# Patient Record
Sex: FEMALE | Race: BLACK OR AFRICAN AMERICAN | Employment: FULL TIME | URBAN - METROPOLITAN AREA
[De-identification: names, ages, dates, MRNs, and addresses within clinical notes are randomized per-mention and may not be internally consistent; named-entity substitution may affect disease eponyms.]

---

## 2018-07-12 ENCOUNTER — HOSPITAL ENCOUNTER (EMERGENCY)
Age: 58
Discharge: HOME | End: 2018-07-12
Attending: EMERGENCY MEDICINE | Admitting: EMERGENCY MEDICINE
Payer: COMMERCIAL

## 2018-07-12 VITALS
HEIGHT: 62 IN | RESPIRATION RATE: 16 BRPM | DIASTOLIC BLOOD PRESSURE: 80 MMHG | SYSTOLIC BLOOD PRESSURE: 145 MMHG | HEART RATE: 85 BPM | TEMPERATURE: 97.8 F | BODY MASS INDEX: 36.12 KG/M2 | WEIGHT: 196.25 LBS | OXYGEN SATURATION: 99 %

## 2018-07-12 DIAGNOSIS — T78.40XA ALLERGIC REACTION, INITIAL ENCOUNTER: Primary | ICD-10-CM

## 2018-07-12 DIAGNOSIS — L50.9 URTICARIA: ICD-10-CM

## 2018-07-12 PROCEDURE — 74011000250 HC RX REV CODE- 250: Performed by: PHYSICIAN ASSISTANT

## 2018-07-12 PROCEDURE — 96361 HYDRATE IV INFUSION ADD-ON: CPT

## 2018-07-12 PROCEDURE — 74011250636 HC RX REV CODE- 250/636: Performed by: PHYSICIAN ASSISTANT

## 2018-07-12 PROCEDURE — 96375 TX/PRO/DX INJ NEW DRUG ADDON: CPT

## 2018-07-12 PROCEDURE — 99282 EMERGENCY DEPT VISIT SF MDM: CPT

## 2018-07-12 PROCEDURE — 96374 THER/PROPH/DIAG INJ IV PUSH: CPT

## 2018-07-12 RX ORDER — DIPHENHYDRAMINE HYDROCHLORIDE 50 MG/ML
50 INJECTION, SOLUTION INTRAMUSCULAR; INTRAVENOUS
Status: COMPLETED | OUTPATIENT
Start: 2018-07-12 | End: 2018-07-12

## 2018-07-12 RX ORDER — FAMOTIDINE 20 MG/1
20 TABLET, FILM COATED ORAL 2 TIMES DAILY
Qty: 20 TAB | Refills: 0 | Status: SHIPPED | OUTPATIENT
Start: 2018-07-12 | End: 2018-12-28

## 2018-07-12 RX ORDER — PREDNISONE 20 MG/1
60 TABLET ORAL DAILY
Qty: 15 TAB | Refills: 0 | Status: SHIPPED | OUTPATIENT
Start: 2018-07-12 | End: 2018-07-17

## 2018-07-12 RX ORDER — HYDROXYZINE 50 MG/1
50 TABLET, FILM COATED ORAL
Qty: 20 TAB | Refills: 0 | Status: SHIPPED | OUTPATIENT
Start: 2018-07-12 | End: 2018-07-22

## 2018-07-12 RX ADMIN — METHYLPREDNISOLONE SODIUM SUCCINATE 125 MG: 125 INJECTION, POWDER, FOR SOLUTION INTRAMUSCULAR; INTRAVENOUS at 16:18

## 2018-07-12 RX ADMIN — DIPHENHYDRAMINE HYDROCHLORIDE 50 MG: 50 INJECTION, SOLUTION INTRAMUSCULAR; INTRAVENOUS at 16:18

## 2018-07-12 RX ADMIN — FAMOTIDINE 20 MG: 10 INJECTION, SOLUTION INTRAVENOUS at 16:18

## 2018-07-12 RX ADMIN — SODIUM CHLORIDE 1000 ML: 900 INJECTION, SOLUTION INTRAVENOUS at 16:17

## 2018-07-12 NOTE — ED PROVIDER NOTES
HPI Comments: 61 y.o. female with no significant past medical history who presents from home via private vehicle with chief complaint of rash. Pt reports a 2 day history of generalized hive-like rash with accompanying HA. Pt also notes 2 days of diarrhea, but states this has resolved. Pt states she was evaluated 2 years ago by an allergy specialist and since then has been taking Benadryl and Zantac, which has not been helping her rash. Pt denies any new medications, new soaps, or new lotions. Pt denies any sore throat, trouble swallowing, fevers, chills, nausea, vomiting, chest pain, abd pain, urinary sx, shortness of breath, headache, diarrhea, sweating or weight loss. There are no other acute medical concerns at this time. Social hx: Former smoker (Quit 2013); No EtOH use; Works at [employer]  PCP: Diamond Burns MD     Note written by Kait Troy, as dictated by Lilo Pineda PA-C 4:04 PM     The history is provided by the patient. No  was used. History reviewed. No pertinent past medical history. Past Surgical History:   Procedure Laterality Date    HX HYSTERECTOMY           History reviewed. No pertinent family history. Social History     Social History    Marital status: SINGLE     Spouse name: N/A    Number of children: N/A    Years of education: N/A     Occupational History    Not on file. Social History Main Topics    Smoking status: Former Smoker     Quit date: 3/6/2013    Smokeless tobacco: Not on file    Alcohol use No    Drug use: No    Sexual activity: Not on file     Other Topics Concern    Not on file     Social History Narrative         ALLERGIES: Lortab [hydrocodone-acetaminophen]    Review of Systems   Constitutional: Negative for appetite change, chills, fatigue and fever. HENT: Negative for congestion, ear pain, postnasal drip, rhinorrhea, sore throat and trouble swallowing. Eyes: Negative for visual disturbance.    Respiratory: Negative for cough, shortness of breath and wheezing. Cardiovascular: Negative for chest pain, palpitations and leg swelling. Gastrointestinal: Negative for abdominal pain, anal bleeding, constipation, diarrhea, nausea and vomiting. Genitourinary: Negative for dysuria and hematuria. Musculoskeletal: Negative for arthralgias and myalgias. Skin: Positive for color change and rash. Allergic/Immunologic: Negative for immunocompromised state. Neurological: Positive for headaches. Negative for weakness and light-headedness. Vitals:    07/12/18 1429   BP: 150/80   Pulse: (!) 120   Resp: 20   Temp: 99 °F (37.2 °C)   SpO2: 98%   Weight: 89 kg (196 lb 4 oz)   Height: 5' 2\" (1.575 m)            Physical Exam   Constitutional: She is oriented to person, place, and time. She appears well-developed and well-nourished. No distress. HENT:   Head: Normocephalic and atraumatic. Right Ear: External ear normal.   Left Ear: External ear normal.   Eyes: EOM are normal. Pupils are equal, round, and reactive to light. Neck: Neck supple. Cardiovascular: Normal rate, regular rhythm, normal heart sounds and intact distal pulses. Exam reveals no gallop and no friction rub. No murmur heard. Pulmonary/Chest: Effort normal and breath sounds normal. No stridor. No respiratory distress. She has no wheezes. She has no rales. She exhibits no tenderness. Abdominal: Soft. Bowel sounds are normal. She exhibits no distension and no mass. There is no tenderness. There is no rebound and no guarding. Musculoskeletal: Normal range of motion. She exhibits no edema, tenderness or deformity. Neurological: She is alert and oriented to person, place, and time. No cranial nerve deficit. Coordination normal.   Skin: Rash noted. No erythema. No pallor. Diffuse urticarial eruption to face, chest, back and extremities. + pruritic and blanching. Psychiatric: She has a normal mood and affect.  Her behavior is normal.   Nursing note and vitals reviewed. MDM  Number of Diagnoses or Management Options  Allergic reaction, initial encounter:   Urticaria:      Amount and/or Complexity of Data Reviewed  Review and summarize past medical records: yes  Independent visualization of images, tracings, or specimens: yes    Patient Progress  Patient progress: stable        ED Course       Procedures    4:25 PM  Discussed pt, sx, hx and current findings with Bridget Torrez MD. He is in agreement with plan. Will give steroids, pepcid and benadyrl with IV fluids. Jeferson Garcia. EVELIN Wolfe      6:30 PM  Pt states she is feeling much better after fluids and medications. Will discharge home with steroids, pepcid, and atarax. LABORATORY TESTS:  No results found for this or any previous visit (from the past 12 hour(s)). IMAGING RESULTS:    No results found. MEDICATIONS GIVEN:  Medications   sodium chloride 0.9 % bolus infusion 1,000 mL (0 mL IntraVENous IV Completed 7/12/18 1802)   diphenhydrAMINE (BENADRYL) injection 50 mg (50 mg IntraVENous Given 7/12/18 1618)   famotidine (PF) (PEPCID) 20 mg in sodium chloride 0.9% 10 mL injection (20 mg IntraVENous Given 7/12/18 1618)   methylPREDNISolone (PF) (SOLU-MEDROL) injection 125 mg (125 mg IntraVENous Given 7/12/18 1618)       IMPRESSION:  1. Allergic reaction, initial encounter    2. Urticaria        PLAN:  1. Discharge Medication List as of 7/12/2018  6:35 PM      START taking these medications    Details   famotidine (PEPCID) 20 mg tablet Take 1 Tab by mouth two (2) times a day., Print, Disp-20 Tab, R-0      hydrOXYzine HCl (ATARAX) 50 mg tablet Take 1 Tab by mouth three (3) times daily as needed for Itching for up to 10 days. , Print, Disp-20 Tab, R-0         CONTINUE these medications which have CHANGED    Details   predniSONE (DELTASONE) 20 mg tablet Take 3 Tabs by mouth daily for 5 days. , Print, Disp-15 Tab, R-0           2.    Follow-up Information     Follow up With Details Comments Contact Nicolás Palomo MD Schedule an appointment as soon as possible for a visit 2-4 days for recheck  [address] [facility] [phone]      your allergy doctor your saw before Schedule an appointment as soon as possible for a visit 2-4 days for recheck         Return to ED if worse       6:35 PM  Pt has been reexamined. Pt has no new complaints, changes or physical findings. Care plan outlined and precautions discussed. All available results were reviewed with pt. All medications were reviewed with pt. All of pt's questions and concerns were addressed. Pt agrees to F/U as instructed and agrees to return to ED upon further deterioration. Pt is ready to go home.   EDIN Valentine

## 2018-07-12 NOTE — LETTER
[address] 
[address] [place], [place] [address] [id number] 
[phone] Work/School Note Date: 7/12/2018 To Whom It May concern: 
 
Chloe Lockett was seen and treated today in the emergency room by the following provider(s): 
Attending Provider: Trina Campos MD 
Physician Assistant: EDIN Thomas. Chloe Lockett may return to work on 7/15/18 [facility] Sincerely, 
 
 
 
 
EDIN Thomas

## 2018-07-12 NOTE — DISCHARGE INSTRUCTIONS
Hives: Care Instructions  Your Care Instructions  Hives are raised, red, itchy patches of skin. They are also called wheals or welts. They usually have red borders and pale centers. Hives range in size from ¼ inch to 3 inches or more across. They may seem to move from place to place on the skin. Several hives may form a large area of raised, red skin. You can get hives after an insect sting, after taking medicine or eating certain foods, or because of infection or stress. Other causes include plants, things you breathe in, makeup, heat, cold, sunlight, and latex. You cannot spread hives to other people. Hives may last a few minutes or a few days, but a single spot may last less than 36 hours. Follow-up care is a key part of your treatment and safety. Be sure to make and go to all appointments, and call your doctor if you are having problems. It's also a good idea to know your test results and keep a list of the medicines you take. How can you care for yourself at home? · Avoid whatever you think may have caused your hives, such as a certain food or medicine. However, you may not know the cause. · Put a cool, wet towel on the area to relieve itching. · Take an over-the-counter antihistamine, such as diphenhydramine (Benadryl), cetirizine (Zyrtec), or loratadine (Claritin), to help stop the hives and calm the itching. Read and follow directions on the label. These medicines can make you feel sleepy. Do not drive while using them. · Stay away from strong soaps, detergents, and chemicals. These can make itching worse. When should you call for help? Call 911 anytime you think you may need emergency care. For example, call if:    · You have symptoms of a severe allergic reaction. These may include:  ¨ Sudden raised, red areas (hives) all over your body. ¨ Swelling of the throat, mouth, lips, or tongue. ¨ Trouble breathing. ¨ Passing out (losing consciousness).  Or you may feel very lightheaded or suddenly feel weak, confused, or restless.    Call your doctor now or seek immediate medical care if:    · You have symptoms of an allergic reaction, such as:  ¨ A rash or hives (raised, red areas on the skin). ¨ Itching. ¨ Swelling. ¨ Belly pain, nausea, or vomiting.     · You get hives after you start a new medicine.     · Hives have not gone away after 24 hours.    Watch closely for changes in your health, and be sure to contact your doctor if:    · You do not get better as expected. Where can you learn more? Go to http://carolinaAssemblagejose.info/. Enter N715 in the search box to learn more about \"Hives: Care Instructions. \"  Current as of: November 20, 2017  Content Version: 11.7  © 5489-6771 [employer]. Care instructions adapted under license by [employer] (which disclaims liability or warranty for this information). If you have questions about a medical condition or this instruction, always ask your healthcare professional. [place] [address] any warranty or liability for your use of this information. Allergic Reaction: Care Instructions  Your Care Instructions    An allergic reaction is an excessive response from your immune system to a medicine, chemical, food, insect bite, or other substance. A reaction can range from mild to life-threatening. Some people have a mild rash, hives, and itching or stomach cramps. In severe reactions, swelling of your tongue and throat can close up your airway so that you cannot breathe. Follow-up care is a key part of your treatment and safety. Be sure to make and go to all appointments, and call your doctor if you are having problems. It's also a good idea to know your test results and keep a list of the medicines you take. How can you care for yourself at home? · If you know what caused your allergic reaction, be sure to avoid it. Your allergy may become more severe each time you have a reaction.   · Take an over-the-counter antihistamine, such as cetirizine (Zyrtec) or loratadine (Claritin), to treat mild symptoms. Read and follow directions on the label. Some antihistamines can make you feel sleepy. Do not give antihistamines to a child unless you have checked with your doctor first. Mild symptoms include sneezing or an itchy or runny nose; an itchy mouth; a few hives or mild itching; and mild nausea or stomach discomfort. · Do not scratch hives or a rash. Put a cold, moist towel on them or take cool baths to relieve itching. Put ice packs on hives, swelling, or insect stings for 10 to 15 minutes at a time. Put a thin cloth between the ice pack and your skin. Do not take hot baths or showers. They will make the itching worse. · Your doctor may prescribe a shot of epinephrine to carry with you in case you have a severe reaction. Learn how to give yourself the shot and keep it with you at all times. Make sure it is not . · Go to the emergency room every time you have a severe reaction, even if you have used your shot of epinephrine and are feeling better. Symptoms can come back after a shot. · Wear medical alert jewelry that lists your allergies. You can buy this at most [employer]. · If your child has a severe allergy, make sure that his or her teachers, babysitters, coaches, and other caregivers know about the allergy. They should have an epinephrine shot, know how and when to give it, and have a plan to take your child to the hospital.  When should you call for help? Give an epinephrine shot if:    · You think you are having a severe allergic reaction.     · You have symptoms in more than one body area, such as mild nausea and an itchy mouth.    After giving an epinephrine shot call 911, even if you feel better.   Call 911 if:    · You have symptoms of a severe allergic reaction. These may include:  ¨ Sudden raised, red areas (hives) all over your body.   ¨ Swelling of the throat, mouth, lips, or tongue. ¨ Trouble breathing. ¨ Passing out (losing consciousness). Or you may feel very lightheaded or suddenly feel weak, confused, or restless.     · You have been given an epinephrine shot, even if you feel better.    Call your doctor now or seek immediate medical care if:    · You have symptoms of an allergic reaction, such as:  ¨ A rash or hives (raised, red areas on the skin). ¨ Itching. ¨ Swelling. ¨ Belly pain, nausea, or vomiting.    Watch closely for changes in your health, and be sure to contact your doctor if:    · You do not get better as expected. Where can you learn more? Go to http://carolina-jose.info/. Enter Z843 in the search box to learn more about \"Allergic Reaction: Care Instructions. \"  Current as of: October 6, 2017  Content Version: 11.7  © 8269-2875 [employer]. Care instructions adapted under license by [employer] (which disclaims liability or warranty for this information). If you have questions about a medical condition or this instruction, always ask your healthcare professional. [address] any warranty or liability for your use of this information.

## 2018-07-12 NOTE — ED NOTES
Patient given discharge instructions and prescriptions, all questions answered and patient verbalized understanding.   Patient ambulatory to ED Lobby

## 2018-07-12 NOTE — ED NOTES
2:28 PM  I have evaluated the patient as the Provider in Triage. I have reviewed Her vital signs and the triage nurse assessment. I have talked with the patient and any available family and advised that I am the provider in triage and have ordered the appropriate study to initiate their work up based on the clinical presentation during my assessment. I have advised that the patient will be accommodated in the Main ED as soon as possible. I have also requested to contact the triage nurse or myself immediately if the patient experiences any changes in their condition during this brief waiting period. Patient with hives for the past 3 days. No known trigger. Denies changes in soaps, lotions, detergents. States that she periodically breaks out in hives, this time is the worst. Has been taking benadryl, last dose last night. Sent by pcp office   States that yesterday both eyes were swollen as well as her lips.  Denies shortness of breath, drooling, inability to swallow, stridor  Jane Salas, JANIYA

## 2018-11-30 ENCOUNTER — HOSPITAL ENCOUNTER (OUTPATIENT)
Dept: MAMMOGRAPHY | Age: 58
Discharge: HOME | End: 2018-11-30
Attending: PHYSICIAN ASSISTANT
Payer: COMMERCIAL

## 2018-11-30 DIAGNOSIS — N63.20 LEFT BREAST LUMP: ICD-10-CM

## 2018-11-30 PROCEDURE — 76882 US LMTD JT/FCL EVL NVASC XTR: CPT

## 2018-11-30 PROCEDURE — 77066 DX MAMMO INCL CAD BI: CPT

## 2018-12-12 ENCOUNTER — HOSPITAL ENCOUNTER (OUTPATIENT)
Dept: MAMMOGRAPHY | Age: 58
Discharge: HOME | End: 2018-12-12
Attending: PHYSICIAN ASSISTANT
Payer: COMMERCIAL

## 2018-12-12 DIAGNOSIS — N63.20 LEFT BREAST LUMP: ICD-10-CM

## 2018-12-12 DIAGNOSIS — R92.8 ABNORMAL MAMMOGRAM OF LEFT BREAST: ICD-10-CM

## 2018-12-12 PROCEDURE — 88305 TISSUE EXAM BY PATHOLOGIST: CPT

## 2018-12-12 PROCEDURE — 88360 TUMOR IMMUNOHISTOCHEM/MANUAL: CPT

## 2018-12-12 PROCEDURE — 77030020004 US BX BREAST VAC LT 1ST LESION W/CLIP AND SPECIMEN

## 2018-12-12 PROCEDURE — 38505 NEEDLE BIOPSY LYMPH NODES: CPT

## 2018-12-12 PROCEDURE — 77030020004 US GUIDE BX BREAST LT LYMPH NODE

## 2018-12-12 PROCEDURE — 74011000250 HC RX REV CODE- 250: Performed by: RADIOLOGY

## 2018-12-12 PROCEDURE — 74011250636 HC RX REV CODE- 250/636: Performed by: RADIOLOGY

## 2018-12-12 RX ORDER — LIDOCAINE HYDROCHLORIDE AND EPINEPHRINE 10; 10 MG/ML; UG/ML
20 INJECTION, SOLUTION INFILTRATION; PERINEURAL ONCE
Status: COMPLETED | OUTPATIENT
Start: 2018-12-12 | End: 2018-12-12

## 2018-12-12 RX ORDER — LIDOCAINE HYDROCHLORIDE 10 MG/ML
5 INJECTION INFILTRATION; PERINEURAL
Status: COMPLETED | OUTPATIENT
Start: 2018-12-12 | End: 2018-12-12

## 2018-12-12 RX ADMIN — LIDOCAINE HYDROCHLORIDE AND EPINEPHRINE 200 MG: 10; 10 INJECTION, SOLUTION INFILTRATION; PERINEURAL at 10:35

## 2018-12-12 RX ADMIN — LIDOCAINE HYDROCHLORIDE 5 ML: 10 INJECTION, SOLUTION INFILTRATION; PERINEURAL at 10:35

## 2018-12-12 NOTE — PROGRESS NOTES
Patient tolerated left breast and left axilla biopsies well with scant bleeding. Biopsy site was bandaged in the usual fashion and discharge instructions were reviewed with the patient. She was provided with a written copy as well. Advised patient that results should be available by Friday and that she will receive a phone call in the afternoon. Encouraged her to call with any questions or concerns.

## 2018-12-12 NOTE — DISCHARGE INSTRUCTIONS
Breast Biopsy Discharge Instructions    PAIN CONTROL     You may have mild discomfort; take 1-2 Tylenol every 4-6 hours as needed.  Do not take aspirin containing products or anti-inflammatory medications (Advil, Aleve, Motrin, Ibuprofen, etc.) for 24 hours.  Wearing a comfortable bra for support may help with discomfort. WOUND CARE      A small amount of bleeding or bruising at the biopsy site is normal. Watch for signs and symptoms of infections: skin warm to touch, yellowish drainage from wound, fever or severe pain.  Place an ice pack over the site hourly, 20-30 at a time for a few hours today.  The clear dressing is water resistant (you may shower, but do not allow the dressing to become saturated). You may remove the dressing in 48 hours. The steri-strips (small pieces of tape covering the incision) will fall off on their own in a few days. If the clear dressing irritates your skin or begins to peel off, you may remove it. Remember, if you remove the clear dressing before 48 hours, you should not get the site wet.  If at any point you have EXCESSIVE BLEEDING (saturating the gauze under the clear dressing) OR pain please call:    Daytime 7:30am-5:00pm: [facility] [phone]    After Hours:    [phone] (ask to speak to a radiologist)              or [address] [place] [address] [phone]    ACTIVITY     Do not participate in any strenuous activities for 24 hours (exercise, sports, housework, etc.).  You may resume work (light duty only) for the first 24 hours.  No heavy lifting with the arm on the affected side of your body. ADDITIONAL INSTRUCTIONS    We will call you with results on Friday December 14 in the afternoon.       YOUR TREATMENT TEAM TODAY WAS    MD: Arabella Ash   RN: Chau Gutiérrez  Radiology Tech: Ching Reynolds

## 2018-12-14 NOTE — PROGRESS NOTES
Dr. Leonard Hodge called patient with pathology results. Called patient and provided appointment information. Appointment with Dr. Gurjit Hawk at [facility] on 12/28/18 at [phone], 1300 arrival time. Patient reports no concerns with the biopsy sites. Encouraged her to call with any questions or concerns.

## 2018-12-28 ENCOUNTER — OFFICE VISIT (OUTPATIENT)
Dept: SURGERY | Age: 58
End: 2018-12-28

## 2018-12-28 VITALS
BODY MASS INDEX: 35.88 KG/M2 | SYSTOLIC BLOOD PRESSURE: 160 MMHG | WEIGHT: 195 LBS | DIASTOLIC BLOOD PRESSURE: 92 MMHG | HEART RATE: 96 BPM | HEIGHT: 62 IN

## 2018-12-28 DIAGNOSIS — Z17.1 MALIGNANT NEOPLASM OF UPPER-OUTER QUADRANT OF LEFT BREAST IN FEMALE, ESTROGEN RECEPTOR NEGATIVE (HCC): Primary | ICD-10-CM

## 2018-12-28 DIAGNOSIS — C50.412 MALIGNANT NEOPLASM OF UPPER-OUTER QUADRANT OF LEFT BREAST IN FEMALE, ESTROGEN RECEPTOR NEGATIVE (HCC): Primary | ICD-10-CM

## 2018-12-28 PROBLEM — E66.01 SEVERE OBESITY (HCC): Status: ACTIVE | Noted: 2018-12-28

## 2018-12-28 RX ORDER — DIPHENHYDRAMINE HCL 25 MG
25 CAPSULE ORAL
COMMUNITY
End: 2019-08-23 | Stop reason: ALTCHOICE

## 2018-12-28 NOTE — PROGRESS NOTES
HISTORY OF PRESENT ILLNESS  Harrison Link is a 60 y.o. female. HPI NEW patient referral for consultation by Dr. Pauline mack for a new LEFT breast cancer diagnosis. The patient had a palpable LEFT breast lump that led to the breast imaging and biopsy. The patient denies any nipple inversion or discharge. She is accompanied by her daughter to discuss her surgical options . OB History     Obstetric Comments    Menarche 14, LMP 47 # of children 2, age of 4st delivery 22, Hysterectomy/oophorectomy yes/no, Breast bx yes left , history of breast feeding no, BCP yes, Hormone therapy no        Family history - no breast or ovarian cancer  Mammogram and ultrasound - 11/30/18   Ultrasound: Ultrasound of the axillary tail at 1:00 15 cm from the nipple  demonstrates a nonparallel round hypoechoic mass with indistinct margins  measuring 1.6 x 1.7 x 1.7 cm with no posterior features. Internal vascularity is  noted. There is a single irregularly thickened left axillary lymph node with  cortical thickness of 0.4 cm. Additional normal left axillary nodes are seen. The patient was scanned by the technologist and the physician.        IMPRESSION  IMPRESSION:     1. BI-RADS Assessment Category 5: Highly suggestive of malignancy- Appropriate  action should be taken. Left axillary tail mass, favored to be a primary breast  mass versus abnormal lymph node. Abnormal left axillary lymph node.     Recommendations:  Left breast and left axillary ultrasound guided biopsies.     Past Medical History:   Diagnosis Date    Menopause      Past Surgical History:   Procedure Laterality Date    HX HYSTERECTOMY       Social History     Socioeconomic History    Marital status: UNKNOWN     Spouse name: Not on file    Number of children: Not on file    Years of education: Not on file    Highest education level: Not on file   Social Needs    Financial resource strain: Not on file    Food insecurity - worry: Not on file    Food insecurity - inability: Not on file    Transportation needs - medical: Not on file   [place] needs - non-medical: Not on file   Occupational History    Not on file   Tobacco Use    Smoking status: Former Smoker     Last attempt to quit: 3/6/2013     Years since quittin.8    Smokeless tobacco: Never Used   Substance and Sexual Activity    Alcohol use: No    Drug use: No    Sexual activity: Not on file   Other Topics Concern    Not on file   Social History Narrative    Not on file     OB History     Obstetric Comments    Menarche 14, LMP 47 # of children 2, age of 4st delivery 22, Hysterectomy/oophorectomy yes/no, Breast bx yes left , history of breast feeding no, BCP yes, Hormone therapy no          Current Outpatient Medications:     diphenhydrAMINE (BENADRYL) 25 mg capsule, Take 25 mg by mouth every six (6) hours as needed. , Disp: , Rfl:   Allergies   Allergen Reactions    Lortab [Hydrocodone-Acetaminophen] Rash     Pt stated she is not allergic to it          Review of Systems   Constitutional: Negative. HENT: Negative. Eyes: Negative. Respiratory: Negative. Cardiovascular: Negative. Gastrointestinal: Negative. Genitourinary: Negative. Musculoskeletal: Positive for joint pain. Skin: Negative. Neurological: Negative. Endo/Heme/Allergies: Negative. Psychiatric/Behavioral: Negative. All other systems reviewed and are negative. Physical Exam   Constitutional: She is oriented to person, place, and time. She appears well-developed and well-nourished. HENT:   Head: Normocephalic. Eyes: EOM are normal.   Neck: Neck supple. No thyromegaly present. Cardiovascular: Normal rate, regular rhythm, normal heart sounds and intact distal pulses. Pulmonary/Chest: Effort normal and breath sounds normal. Right breast exhibits no inverted nipple, no mass, no nipple discharge, no skin change and no tenderness. Left breast exhibits mass (1.5 cm mass tail of left breast).  Left breast exhibits no inverted nipple, no nipple discharge, no skin change and no tenderness. Breasts are symmetrical.       Abdominal: Soft. Musculoskeletal: Normal range of motion. Lymphadenopathy:     She has no cervical adenopathy. She has no axillary adenopathy. Neurological: She is alert and oriented to person, place, and time. Skin: Skin is warm and dry. Psychiatric: She has a normal mood and affect. Nursing note and vitals reviewed. ASSESSMENT and PLAN    ICD-10-CM ICD-9-CM    1. Malignant neoplasm of upper-outer quadrant of left breast in female, estrogen receptor negative (HCC) C50.412 174.4     Z17.1 V86.1      59 yo female with left breast T1 (1.7 cm) N0 (node biopsy neg) idc triple negative. She is here with her daughter  I have reviewed the imaging and pathology with her and she was given copies of these reports. 90 minutes were spent face-to-face with the patient during this encounter and 90% of that time was spent on counseling and coordination of care. 1. Discussed lumpectomy and radiation vs mastectomy. Discussed reconstruction. MRI ordered to see if patient is a candidate for a lumpectomy. 2. Discussed sentinel lymph node biopsy. 3. Discussed external beam radiation. 4. Discussed hormone therapy. 5. Discussed the possibility of chemotherapy. Will schedule mri and refer to med onc since triple negative  She will need adjuvant chemo  Surgical plan is left us guided lumpectomy, sln biopsy, likely through same incision. biosorb placement  Will need xrt as well.    I will call her after breast mri

## 2018-12-28 NOTE — COMMUNICATION BODY
HISTORY OF PRESENT ILLNESS  Muna Lares is a 60 y.o. female. HPI NEW patient referral for consultation by Dr. Mariam mack for a new LEFT breast cancer diagnosis. The patient had a palpable LEFT breast lump that led to the breast imaging and biopsy. The patient denies any nipple inversion or discharge. She is accompanied by her daughter to discuss her surgical options . OB History     Obstetric Comments    Menarche 14, LMP 47 # of children 2, age of 4st delivery 22, Hysterectomy/oophorectomy yes/no, Breast bx yes left , history of breast feeding no, BCP yes, Hormone therapy no        Family history - no breast or ovarian cancer  Mammogram and ultrasound - 11/30/18   Ultrasound: Ultrasound of the axillary tail at 1:00 15 cm from the nipple  demonstrates a nonparallel round hypoechoic mass with indistinct margins  measuring 1.6 x 1.7 x 1.7 cm with no posterior features. Internal vascularity is  noted. There is a single irregularly thickened left axillary lymph node with  cortical thickness of 0.4 cm. Additional normal left axillary nodes are seen. The patient was scanned by the technologist and the physician.        IMPRESSION  IMPRESSION:     1. BI-RADS Assessment Category 5: Highly suggestive of malignancy- Appropriate  action should be taken. Left axillary tail mass, favored to be a primary breast  mass versus abnormal lymph node. Abnormal left axillary lymph node.     Recommendations:  Left breast and left axillary ultrasound guided biopsies.     Past Medical History:   Diagnosis Date    Menopause      Past Surgical History:   Procedure Laterality Date    HX HYSTERECTOMY       Social History     Socioeconomic History    Marital status: UNKNOWN     Spouse name: Not on file    Number of children: Not on file    Years of education: Not on file    Highest education level: Not on file   Social Needs    Financial resource strain: Not on file    Food insecurity - worry: Not on file    Food insecurity - inability: Not on file    Transportation needs - medical: Not on file   [employer] needs - non-medical: Not on file   Occupational History    Not on file   Tobacco Use    Smoking status: Former Smoker     Last attempt to quit: 3/6/2013     Years since quittin.8    Smokeless tobacco: Never Used   Substance and Sexual Activity    Alcohol use: No    Drug use: No    Sexual activity: Not on file   Other Topics Concern    Not on file   Social History Narrative    Not on file     OB History     Obstetric Comments    Menarche 14, LMP 47 # of children 2, age of 4st delivery 22, Hysterectomy/oophorectomy yes/no, Breast bx yes left , history of breast feeding no, BCP yes, Hormone therapy no          Current Outpatient Medications:     diphenhydrAMINE (BENADRYL) 25 mg capsule, Take 25 mg by mouth every six (6) hours as needed. , Disp: , Rfl:   Allergies   Allergen Reactions    Lortab [Hydrocodone-Acetaminophen] Rash     Pt stated she is not allergic to it          Review of Systems   Constitutional: Negative. HENT: Negative. Eyes: Negative. Respiratory: Negative. Cardiovascular: Negative. Gastrointestinal: Negative. Genitourinary: Negative. Musculoskeletal: Positive for joint pain. Skin: Negative. Neurological: Negative. Endo/Heme/Allergies: Negative. Psychiatric/Behavioral: Negative. All other systems reviewed and are negative. Physical Exam   Constitutional: She is oriented to person, place, and time. She appears well-developed and well-nourished. HENT:   Head: Normocephalic. Eyes: EOM are normal.   Neck: Neck supple. No thyromegaly present. Cardiovascular: Normal rate, regular rhythm, normal heart sounds and intact distal pulses. Pulmonary/Chest: Effort normal and breath sounds normal. Right breast exhibits no inverted nipple, no mass, no nipple discharge, no skin change and no tenderness. Left breast exhibits mass (1.5 cm mass tail of left breast).  Left breast exhibits no inverted nipple, no nipple discharge, no skin change and no tenderness. Breasts are symmetrical.       Abdominal: Soft. Musculoskeletal: Normal range of motion. Lymphadenopathy:     She has no cervical adenopathy. She has no axillary adenopathy. Neurological: She is alert and oriented to person, place, and time. Skin: Skin is warm and dry. Psychiatric: She has a normal mood and affect. Nursing note and vitals reviewed. ASSESSMENT and PLAN    ICD-10-CM ICD-9-CM    1. Malignant neoplasm of upper-outer quadrant of left breast in female, estrogen receptor negative (HCC) C50.412 174.4     Z17.1 V86.1      61 yo female with left breast T1 (1.7 cm) N0 (node biopsy neg) idc triple negative. She is here with her daughter  I have reviewed the imaging and pathology with her and she was given copies of these reports. 90 minutes were spent face-to-face with the patient during this encounter and 90% of that time was spent on counseling and coordination of care. 1. Discussed lumpectomy and radiation vs mastectomy. Discussed reconstruction. MRI ordered to see if patient is a candidate for a lumpectomy. 2. Discussed sentinel lymph node biopsy. 3. Discussed external beam radiation. 4. Discussed hormone therapy. 5. Discussed the possibility of chemotherapy. Will schedule mri and refer to med onc since triple negative  She will need adjuvant chemo  Surgical plan is left us guided lumpectomy, sln biopsy, likely through same incision. biosorb placement  Will need xrt as well.    I will call her after breast mri

## 2018-12-28 NOTE — PATIENT INSTRUCTIONS
Learning About Breast Cancer Surgery  What is breast cancer surgery? Surgery is a key part of treatment for breast cancer. The type of surgery you have depends on the size, location, and type of the cancer. It also depends on your health and what is important to you. Your doctor may combine treatments. This is a common way to treat breast cancer. You may have surgery to remove all the cancer that can be seen. After surgery you may also need radiation, chemotherapy, or hormone therapy. These treatments get rid of any cancer cells that may be left. During the surgery, the doctor may remove lymph nodes from the armpit. The lymph nodes will be looked at under a microscope. This is used to check if cancer has spread from the breast into the lymph nodes. What is a lumpectomy? Lumpectomy    Lumpectomy removes the tumor in the breast. The incision is made close to the tumor. This shows common places where the cut is made. Simple mastectomy    Simple mastectomy removes the whole breast, including nipple and skin. This shows where the cut is often made. Nipple-sparing mastectomy with inframammary cut    Nipple-sparing mastectomy removes the whole breast but leaves the skin and nipple. This shows the cut in the curve under the breast (inframammary). Nipple-sparing mastectomy with lateral cut    Nipple-sparing mastectomy removes the whole breast but leaves the skin and nipple. This shows the cut from the nipple along the side of the breast toward the armpit (lateral). Nipple-sparing mastectomy with periareolar cut    Nipple-sparing mastectomy removes the whole breast but leaves the skin and nipple. This shows the cut around the top of the nipple and along the side of the breast toward the armpit (periareolar).   Skin-sparing mastectomy with periareolar cut    Skin-sparing mastectomy removes the whole breast and the nipple, but it keeps the skin that covers the breast. This shows the cut around the nipple (periareloar). Skin-sparing mastectomy with teardrop cut    Skin-sparing mastectomy removes the whole breast and the nipple, but it keeps the skin that covers the breast. This shows the cut around the nipple in a teardrop shape. Skin-sparing mastectomy with tennis racket cut    Skin-sparing mastectomy removes the whole breast and the nipple, but it keeps the skin that covers the breast. This shows the cut around the nipple and along the side of the breast toward the armpit (tennis racket shape). What can you expect after surgery? Your doctor will send the breast tissue to a lab for testing. This will help the doctor know more about the type of cancer you have. It may take up to a week or more to get the results back. Your doctor will discuss the results with you. You may meet with a doctor who specializes in cancer treatment (an oncologist). This doctor can help you decide about any other treatment you may need. Your needs and values are important when choosing a treatment that is right for you. The amount of time you will need to recover depends on the type of surgery you had. It also depends on whether you need any more treatment. If you had a lumpectomy, you will probably look the same in a bra. But your breasts may not match in size or shape after surgery. This depends on the size of your breasts and how much tissue was removed. Surgery to create a new breast is called reconstruction. This may be done at the same time as a mastectomy. Or it may be done later. Some women choose not to do reconstruction at all. You choose what feels right for you. No matter what kind of surgery you have, you will get information about your treatment. This includes how to prepare, what to expect, and what to do afterward. Follow-up care is a key part of your treatment and safety. Be sure to make and go to all appointments, and call your doctor if you are having problems.  It's also a good idea to know your test results and keep a list of the medicines you take. Where can you learn more? Go to http://carolina-jose.info/. Enter P020 in the search box to learn more about \"Learning About Breast Cancer Surgery. \"  Current as of: September 20, 2017  Content Version: 11.8  © 7111-4808 Healthwise, [employer]. Care instructions adapted under license by [employer] (which disclaims liability or warranty for this information). If you have questions about a medical condition or this instruction, always ask your healthcare professional. [address] any warranty or liability for your use of this information.

## 2019-01-16 ENCOUNTER — TELEPHONE (OUTPATIENT)
Dept: SURGERY | Age: 59
End: 2019-01-16

## 2019-01-16 ENCOUNTER — HOSPITAL ENCOUNTER (OUTPATIENT)
Dept: MRI IMAGING | Age: 59
Discharge: HOME | End: 2019-01-16
Attending: SURGERY

## 2019-01-16 DIAGNOSIS — C50.412 MALIGNANT NEOPLASM OF UPPER-OUTER QUADRANT OF LEFT BREAST IN FEMALE, ESTROGEN RECEPTOR NEGATIVE (HCC): ICD-10-CM

## 2019-01-16 DIAGNOSIS — C50.412 MALIGNANT NEOPLASM OF UPPER-OUTER QUADRANT OF LEFT BREAST IN FEMALE, ESTROGEN RECEPTOR NEGATIVE (HCC): Primary | ICD-10-CM

## 2019-01-16 DIAGNOSIS — Z17.1 MALIGNANT NEOPLASM OF UPPER-OUTER QUADRANT OF LEFT BREAST IN FEMALE, ESTROGEN RECEPTOR NEGATIVE (HCC): Primary | ICD-10-CM

## 2019-01-16 DIAGNOSIS — Z17.1 MALIGNANT NEOPLASM OF UPPER-OUTER QUADRANT OF LEFT BREAST IN FEMALE, ESTROGEN RECEPTOR NEGATIVE (HCC): ICD-10-CM

## 2019-01-16 NOTE — TELEPHONE ENCOUNTER
Patient's breast mri has been rescheduled for Monday 1/21/19. Appointments for this Order   1/21/2019 1230  - 45 min [facility] MRI 2 (Resource) [facility] Rad Mri     Per technologist, Wade Doan, at [facility] MRI, Dr. Margaretmary Apley is requesting that the patient be pre-medicated for possible contrast reaction. Will discuss with Dr. Brandon Allen what to call in for the patient.

## 2019-01-16 NOTE — TELEPHONE ENCOUNTER
Called this pretreatment protocol into patient's [facility] pharmacy at [facility]. I gave her the instructions and she understood.

## 2019-01-21 ENCOUNTER — HOSPITAL ENCOUNTER (OUTPATIENT)
Dept: MRI IMAGING | Age: 59
Discharge: HOME | End: 2019-01-21
Attending: SURGERY
Payer: COMMERCIAL

## 2019-01-21 VITALS — WEIGHT: 195 LBS | BODY MASS INDEX: 35.67 KG/M2

## 2019-01-21 PROCEDURE — 74011000258 HC RX REV CODE- 258: Performed by: SURGERY

## 2019-01-21 PROCEDURE — A9585 GADOBUTROL INJECTION: HCPCS | Performed by: SURGERY

## 2019-01-21 PROCEDURE — 74011250636 HC RX REV CODE- 250/636: Performed by: SURGERY

## 2019-01-21 PROCEDURE — 77049 MRI BREAST C-+ W/CAD BI: CPT

## 2019-01-21 RX ORDER — SODIUM CHLORIDE 0.9 % (FLUSH) 0.9 %
10 SYRINGE (ML) INJECTION
Status: ACTIVE | OUTPATIENT
Start: 2019-01-21 | End: 2019-01-22

## 2019-01-21 RX ADMIN — GADOBUTROL 10 ML: 604.72 INJECTION INTRAVENOUS at 13:19

## 2019-01-21 RX ADMIN — SODIUM CHLORIDE 100 ML: 900 INJECTION, SOLUTION INTRAVENOUS at 13:20

## 2019-01-21 NOTE — PROGRESS NOTES
Pt came for breast MRI today and she was a difficult stick. Unable to access either antecubital. Placed IV in right hand.

## 2019-01-22 ENCOUNTER — TELEPHONE (OUTPATIENT)
Dept: SURGERY | Age: 59
End: 2019-01-22

## 2019-01-24 ENCOUNTER — OFFICE VISIT (OUTPATIENT)
Dept: ONCOLOGY | Age: 59
End: 2019-01-24

## 2019-01-24 VITALS
TEMPERATURE: 98.4 F | OXYGEN SATURATION: 98 % | SYSTOLIC BLOOD PRESSURE: 178 MMHG | RESPIRATION RATE: 16 BRPM | HEART RATE: 89 BPM | HEIGHT: 62 IN | DIASTOLIC BLOOD PRESSURE: 107 MMHG | BODY MASS INDEX: 36.44 KG/M2 | WEIGHT: 198 LBS

## 2019-01-24 DIAGNOSIS — Z51.81 ENCOUNTER FOR MONITORING CARDIOTOXIC DRUG THERAPY: ICD-10-CM

## 2019-01-24 DIAGNOSIS — T45.1X5A CINV (CHEMOTHERAPY-INDUCED NAUSEA AND VOMITING): ICD-10-CM

## 2019-01-24 DIAGNOSIS — C50.412 MALIGNANT NEOPLASM OF UPPER-OUTER QUADRANT OF LEFT BREAST IN FEMALE, ESTROGEN RECEPTOR NEGATIVE (HCC): Primary | ICD-10-CM

## 2019-01-24 DIAGNOSIS — Z17.1 MALIGNANT NEOPLASM OF UPPER-OUTER QUADRANT OF LEFT BREAST IN FEMALE, ESTROGEN RECEPTOR NEGATIVE (HCC): Primary | ICD-10-CM

## 2019-01-24 DIAGNOSIS — R11.2 CINV (CHEMOTHERAPY-INDUCED NAUSEA AND VOMITING): ICD-10-CM

## 2019-01-24 DIAGNOSIS — Z79.899 ENCOUNTER FOR MONITORING CARDIOTOXIC DRUG THERAPY: ICD-10-CM

## 2019-01-24 RX ORDER — PROCHLORPERAZINE MALEATE 10 MG
5 TABLET ORAL
Qty: 60 TAB | Refills: 3 | Status: SHIPPED | OUTPATIENT
Start: 2019-01-24 | End: 2019-01-31

## 2019-01-24 RX ORDER — LIDOCAINE AND PRILOCAINE 25; 25 MG/G; MG/G
CREAM TOPICAL AS NEEDED
Qty: 30 G | Refills: 0 | Status: SHIPPED | OUTPATIENT
Start: 2019-01-24 | End: 2019-08-23 | Stop reason: ALTCHOICE

## 2019-01-24 RX ORDER — ONDANSETRON 4 MG/1
4 TABLET, ORALLY DISINTEGRATING ORAL
Qty: 30 TAB | Refills: 1 | Status: SHIPPED | OUTPATIENT
Start: 2019-01-24 | End: 2019-08-23 | Stop reason: ALTCHOICE

## 2019-01-24 NOTE — PROGRESS NOTES
Mj Menard is a 60 y.o. female new patient referred by Dr. Alvarez Myers to provider for left breast cancer. Patient reports she works full-time at [employer]. Patient accompanied by her daughter to today's appt. Elevated B/P noted; pt denies taking B/P medication; other VS stable. Patient denies pain. Visit Vitals  BP (!) 178/107 (BP 1 Location: Left arm, BP Patient Position: Sitting)   Pulse 89   Temp 98.4 °F (36.9 °C) (Oral)   Resp 16   Ht 5' 2\" (1.575 m)   Wt 198 lb (89.8 kg)   SpO2 98%   BMI 36.21 kg/m²         Health Maintenance Review: Patient reminded of \"due or due soon\" health maintenance. I have asked the patient to contact his/her primary care provider (PCP) for follow-up on his/her health maintenance.

## 2019-01-24 NOTE — PROGRESS NOTES
Initial chemo teaching completed,written copies given, chemo packet given and reviewed, consent signed.

## 2019-01-24 NOTE — PROGRESS NOTES
[employer]  Social Work Navigator Encounter     Patient Name:  Morelia Handy    Medical History: dx breast cancer    Advance Directives:    Narrative: SW provided business cards for [place]. Dr and pt discussed pt getting chemo on Wednesday and working FR SAT and then taking SUnday off. Pt works at [employer]. PT has 4 weeks of vacation. PT is disabled? and SW unsure how much pt works. Pt also has PTO and sick days. SW spoke to patient about importance of positive attitude and thinking, yoga, walking, etc       SW will complete a complete assessment at a future visit.        Barriers to Care:     Plan:

## 2019-01-24 NOTE — PROGRESS NOTES
Oncology Consultation Note        Patient: Philip Mahoney MRN: [id number]  SSN: [id number]    YOB: 1960  Age: 60 y.o. Sex: female      Subjective:      Philip Mahoney is a 60 y.o. female who I am seeing in consultation for a diagnosis of left sided invasive breast cancer. She felt a lump in the left breast, went to see her PCP, got a mammogram and was noted to have abnormal density in the left upper outer quadrant of the breast. A biopsy of the mass revealed gr 3 IDC, TNBC. The axillary LN is clear of disease. She saw Dr. Keiko Bush. An MRI of the breast shows the tumor to be larger than previously believed to be. A few of the left axillary LN still appear abnormal. She comes in to discuss the role of systemic chemotherapy in this disease. She works of the [employer] full time and she is accompanied by her daughter. Review of Systems:    Constitutional: negative  Eyes: negative  Ears, Nose, Mouth, Throat, and Face: negative  Respiratory: negative  Cardiovascular: negative  Gastrointestinal: negative  Genitourinary:negative  Integument/Breast: negative  Hematologic/Lymphatic: negative  Musculoskeletal:negative  Neurological: negative        Past Medical History:   Diagnosis Date    Menopause      Past Surgical History:   Procedure Laterality Date    HX HYSTERECTOMY        History reviewed. No pertinent family history. Social History     Tobacco Use    Smoking status: Former Smoker     Last attempt to quit: 3/6/2013     Years since quittin.8    Smokeless tobacco: Never Used    Tobacco comment: Quit smoking 23 days ago   Substance Use Topics    Alcohol use: No      Prior to Admission medications    Medication Sig Start Date End Date Taking? Authorizing Provider   lidocaine-prilocaine (EMLA) topical cream Apply  to affected area as needed for Pain.  19  Yes Gretchen Cordero MD   ondansetron (ZOFRAN ODT) 4 mg disintegrating tablet Take 1 Tab by mouth every eight (8) hours as needed for Nausea. 1/24/19  Yes Artem Sparks MD   prochlorperazine (COMPAZINE) 10 mg tablet Take 0.5 Tabs by mouth every six (6) hours as needed for up to 7 days. 1/24/19 1/31/19 Yes Artem Sparks MD   diphenhydrAMINE (BENADRYL) 25 mg capsule Take 25 mg by mouth every six (6) hours as needed. Yes Provider, Historical              Allergies   Allergen Reactions    Lortab [Hydrocodone-Acetaminophen] Rash     Pt stated she is not allergic to it           Objective:     Vitals:    01/24/19 1325   BP: (!) 178/107   Pulse: 89   Resp: 16   Temp: 98.4 °F (36.9 °C)   TempSrc: Oral   SpO2: 98%   Weight: 198 lb (89.8 kg)   Height: 5' 2\" (1.575 m)            Physical Exam:    GENERAL: alert, cooperative, no distress, appears stated age  EYE: conjunctivae/corneas clear. PERRL, EOM's intact. Fundi benign  LYMPHATIC: Cervical, supraclavicular, and axillary nodes normal.   THROAT & NECK: normal and no erythema or exudates noted. LUNG: clear to auscultation bilaterally  HEART: regular rate and rhythm, S1, S2 normal, no murmur, click, rub or gallop  ABDOMEN: soft, non-tender. Bowel sounds normal. No masses,  no organomegaly  EXTREMITIES:  extremities normal, atraumatic, no cyanosis or edema  SKIN: Normal.  NEUROLOGIC: AOx3. Gait normal. Reflexes and motor strength normal and symmetric            Assessment:     1. Left breast carcinoma:  T1c N0 M0 (Stage I) infiltrating ductal carcinoma, Tumor size 1.6 cm, LN -ve, grade 3, ER -ve, NC -ve, Her 2 -ve    ECOG PS 0  Intent of Treatment - curative  Prognosis - good        I spent 90 minute with the patient in a face-to-face encounter. I explained her the stage of the disease, pathophysiology of the disease and the treatment approaches. I answered all her questions. More than 50% of the time was utilized in education, counseling and co-ordination of care. Patient sent for consideration of neoadjuvant therapy.  I spent significant time in explaining the rationale of neoadjuvant chemotherapy is tumor shrinkage with the hope to achieve a successful surgical treatment (with clear surgical margin) which may be a lumpectomy/mastectomy associated with axillary lymph node dissection. I explained to patient that neoadjuvant chemotherapy has not shown to be superior to adjuvant chemotherapy in the treatment of breast cancer. However if the patient achieves a pathological complete remission (pCR), her chances for 5 year survival is excellent. Most (60-70%) of patients have a response to neoadjuvant chemotherapy but a small proportion of patient actually achieve a pCR (10-15%). Since Ms. Kaley Saxena has a gr 3 tumor, she may have a good response to chemotherapy. NSABP B-27 randomized patient to a combination of AC=>T and AC. There was no difference in the DFS and OS between the two groups. However greater proportion of patient were able to achieve a pCR in the arm receiving AC=>T. We also know that patients who do achieve a pCR have a better outcome than those who don't. Thus I believe AC=>T is the treatment of choice. Dose dense chemotherapy is more toxic and has not proven to be superior to q3 weeks chemotherapy. I have offered her standard adjuvant chemotherapy. AC followed by weekly paclitacel    Adriamycin 60 mg/m2 3w X 4  Cyclophosphamide 600 mg/m2 q3w X 4    Followed by    Paclitaxel 80 mg/m2 qw X 12    I counseled the patient regarding the chemotherapy. Discussions included side-effect, toxicity, benefit and risks of chemotherapy. She understood the expected side-effect which includes alopecia, nausea, peripheral neuropathy, neutropenic fever, anemia, decreased in the LVEF, need for transfusion among other things. After weighing the benefit and risks, she agreed to proceed with chemotherapy. I have discussed the case with Dr. Amira Mariscal.  She will place a port and perform a sentinel LN excision because the USG guided axillary LN is clear and MRI still shows more than one abnormal node in the axilla. The patient's emotional well being was addressed during this office visit and patient seems to be coping well with the diagnosis and the treatment. Plan:       1. Port-a-cath placement  2. Fax a prescription of Compazine and Dexamethasone (pre-med) to pharmacy. 3. Refer the patient to [facility] to start chemotherapy. 4. Start neoadjuvant chemotherapy in 2 weeks      Signed By: Lai Medina MD     January 24, 2019           CC. Wero Sequeira MD  CC.  Lex Woodward MD

## 2019-01-30 ENCOUNTER — HOSPITAL ENCOUNTER (OUTPATIENT)
Dept: NON INVASIVE DIAGNOSTICS | Age: 59
Discharge: HOME | End: 2019-01-30
Attending: INTERNAL MEDICINE
Payer: COMMERCIAL

## 2019-01-30 VITALS
BODY MASS INDEX: 35.88 KG/M2 | DIASTOLIC BLOOD PRESSURE: 80 MMHG | WEIGHT: 195 LBS | HEIGHT: 62 IN | SYSTOLIC BLOOD PRESSURE: 150 MMHG

## 2019-01-30 DIAGNOSIS — Z79.899 ENCOUNTER FOR MONITORING CARDIOTOXIC DRUG THERAPY: ICD-10-CM

## 2019-01-30 DIAGNOSIS — C50.412 MALIGNANT NEOPLASM OF UPPER-OUTER QUADRANT OF LEFT FEMALE BREAST, UNSPECIFIED ESTROGEN RECEPTOR STATUS (HCC): Primary | ICD-10-CM

## 2019-01-30 DIAGNOSIS — Z17.1 MALIGNANT NEOPLASM OF UPPER-OUTER QUADRANT OF LEFT BREAST IN FEMALE, ESTROGEN RECEPTOR NEGATIVE (HCC): ICD-10-CM

## 2019-01-30 DIAGNOSIS — Z51.81 ENCOUNTER FOR MONITORING CARDIOTOXIC DRUG THERAPY: ICD-10-CM

## 2019-01-30 DIAGNOSIS — C50.412 MALIGNANT NEOPLASM OF UPPER-OUTER QUADRANT OF LEFT BREAST IN FEMALE, ESTROGEN RECEPTOR NEGATIVE (HCC): ICD-10-CM

## 2019-01-30 LAB
ECHO LV INTERNAL DIMENSION DIASTOLIC: 3.41 CM (ref 3.9–5.3)
ECHO LV INTERNAL DIMENSION SYSTOLIC: 2.14 CM
ECHO LV IVSD: 0.75 CM (ref 0.6–0.9)
ECHO LV MASS 2D: 90.9 G (ref 67–162)
ECHO LV MASS INDEX 2D: 48.1 G/M2 (ref 43–95)
ECHO LV POSTERIOR WALL DIASTOLIC: 1.04 CM (ref 0.6–0.9)
ECHO RV INTERNAL DIMENSION: 2.88 CM
ECHO TV REGURGITANT MAX VELOCITY: 161.03 CM/S
ECHO TV REGURGITANT PEAK GRADIENT: 10.4 MMHG

## 2019-01-30 PROCEDURE — 93306 TTE W/DOPPLER COMPLETE: CPT

## 2019-01-30 NOTE — PROGRESS NOTES
Chanel Payan   Order Status: Active   Rx Reply:    Status: Read Today 1526 by ZOE Balderas [facility]   Reason: Other   Message: Pt states she had an issue with tylenol #3, but never this, she can also take tylenol   Sent: [phone] by David Mccabe RN     Lortab allergy removed

## 2019-01-30 NOTE — PERIOP NOTES
[facility]  Ambulatory Surgery Unit  Pre-operative Instructions    Surgery/Procedure Date  Thursday, January 31, 2019            Tentative Arrival Time 1000      1. On the day of your surgery/procedure, please report to the Ambulatory Surgery Unit Registration Desk and sign in at your designated time. The Ambulatory Surgery Unit is located in [facility] on the [facility] side of the [facility] across from the [address] [place] [address]. Please have all of your health insurance cards and a photo ID. 2. You must have someone with you to drive you home, as you should not drive a car for 24 hours following anesthesia. Please make arrangements for a responsible adult friend or family member to stay with you for at least the first 24 hours after your surgery. 3. Do not have anything to eat or drink (including water, gum, mints, coffee, juice) after 11:59 PM, Wednesday. This may not apply to medications prescribed by your physician. (Please note below the special instructions with medications to take the morning of surgery, if applicable.)    4. We recommend you do not drink any alcoholic beverages for 24 hours before and after your surgery. 5. Contact your surgeons office for instructions on the following medications: non-steroidal anti-inflammatory drugs (i.e. Advil, Aleve), vitamins, and supplements. (Some surgeons will want you to stop these medications prior to surgery and others may allow you to take them)   **If you are currently taking Plavix, Coumadin, Aspirin and/or other blood-thinning agents, contact your surgeon for instructions. ** Your surgeon will partner with the physician prescribing these medications to determine if it is safe to stop or if you need to continue taking. Please do not stop taking these medications without instructions from your surgeon.     6. In an effort to help prevent surgical site infection, we ask that you shower with an anti-bacterial soap (i.e. Dial/Safeguard, or the soap provided to you at your preadmission testing appointment) for 3 days prior to and on the morning of surgery, using a fresh towel after each shower. (Please begin this process with fresh bed linens.) Do not apply any lotions, powders, or deodorants after the shower on the day of your procedure. If applicable, please do not shave the operative site for 48 hours prior to surgery. 7. Wear comfortable clothes. Wear glasses instead of contacts. Do not bring any jewelry or money (other than copays or fees as instructed). Do not wear make-up, particularly mascara, the morning of your surgery. Do not wear nail polish, particularly if you are having foot /hand surgery. Wear your hair loose or down, no ponytails, buns, shanelle pins or clips. All body piercings must be removed. 8. You should understand that if you do not follow these instructions your surgery may be cancelled. If your physical condition changes (i.e. fever, cold or flu) please contact your surgeon as soon as possible. 9. It is important that you be on time. If a situation occurs where you may be late, or if you have any questions or problems, please call [phone].    10. Your surgery time may be subject to change. You will receive a phone call the day prior to surgery to confirm your arrival time. 11. Pediatric patients: please bring a change of clothes, diapers, bottle/sippy cup, pacifier, etc.      Special Instructions: Take all medications and inhalers, as prescribed, on the morning of surgery with a sip of water. I understand a pre-operative phone call will be made to verify my surgery time. In the event that I am not available, I give permission for a message to be left on my answering service and/or with another person?       yes    Preop instructions reviewed  Pt verbalized understanding.      ___________________      ___________________      ________________  (Signature of Patient)          (Witness) (Date and Time)

## 2019-01-31 ENCOUNTER — ANESTHESIA (OUTPATIENT)
Dept: SURGERY | Age: 59
End: 2019-01-31
Payer: COMMERCIAL

## 2019-01-31 ENCOUNTER — HOSPITAL ENCOUNTER (OUTPATIENT)
Age: 59
Setting detail: OUTPATIENT SURGERY
Discharge: HOME | End: 2019-01-31
Attending: SURGERY | Admitting: SURGERY
Payer: COMMERCIAL

## 2019-01-31 ENCOUNTER — APPOINTMENT (OUTPATIENT)
Dept: GENERAL RADIOLOGY | Age: 59
End: 2019-01-31
Attending: SURGERY
Payer: COMMERCIAL

## 2019-01-31 ENCOUNTER — APPOINTMENT (OUTPATIENT)
Dept: NUCLEAR MEDICINE | Age: 59
End: 2019-01-31
Attending: SURGERY
Payer: COMMERCIAL

## 2019-01-31 ENCOUNTER — ANESTHESIA EVENT (OUTPATIENT)
Dept: SURGERY | Age: 59
End: 2019-01-31
Payer: COMMERCIAL

## 2019-01-31 VITALS
BODY MASS INDEX: 35.88 KG/M2 | OXYGEN SATURATION: 95 % | DIASTOLIC BLOOD PRESSURE: 84 MMHG | HEART RATE: 90 BPM | HEIGHT: 62 IN | SYSTOLIC BLOOD PRESSURE: 155 MMHG | RESPIRATION RATE: 15 BRPM | TEMPERATURE: 98.6 F | WEIGHT: 195 LBS

## 2019-01-31 DIAGNOSIS — C50.412 MALIGNANT NEOPLASM OF UPPER-OUTER QUADRANT OF LEFT FEMALE BREAST, UNSPECIFIED ESTROGEN RECEPTOR STATUS (HCC): ICD-10-CM

## 2019-01-31 DIAGNOSIS — C50.412 MALIGNANT NEOPLASM OF UPPER-OUTER QUADRANT OF LEFT FEMALE BREAST (HCC): ICD-10-CM

## 2019-01-31 LAB
25(OH)D3+25(OH)D2 SERPL-MCNC: 12.8 NG/ML (ref 30–100)
ALBUMIN SERPL-MCNC: 4.2 G/DL (ref 3.5–5.5)
ALBUMIN/GLOB SERPL: 1.6 {RATIO} (ref 1.2–2.2)
ALP SERPL-CCNC: 121 IU/L (ref 39–117)
ALT SERPL-CCNC: 16 IU/L (ref 0–32)
AST SERPL-CCNC: 13 IU/L (ref 0–40)
BASOPHILS # BLD AUTO: 0 X10E3/UL (ref 0–0.2)
BASOPHILS NFR BLD AUTO: 0 %
BILIRUB SERPL-MCNC: 0.3 MG/DL (ref 0–1.2)
BUN SERPL-MCNC: 14 MG/DL (ref 6–24)
BUN/CREAT SERPL: 16 (ref 9–23)
CALCIUM SERPL-MCNC: 9.8 MG/DL (ref 8.7–10.2)
CHLORIDE SERPL-SCNC: 101 MMOL/L (ref 96–106)
CO2 SERPL-SCNC: 25 MMOL/L (ref 20–29)
CREAT SERPL-MCNC: 0.88 MG/DL (ref 0.57–1)
EOSINOPHIL # BLD AUTO: 0.1 X10E3/UL (ref 0–0.4)
EOSINOPHIL NFR BLD AUTO: 2 %
ERYTHROCYTE [DISTWIDTH] IN BLOOD BY AUTOMATED COUNT: 15.6 % (ref 12.3–15.4)
GLOBULIN SER CALC-MCNC: 2.6 G/DL (ref 1.5–4.5)
GLUCOSE SERPL-MCNC: 112 MG/DL (ref 65–99)
HCT VFR BLD AUTO: 40.8 % (ref 34–46.6)
HGB BLD-MCNC: 13.1 G/DL (ref 11.1–15.9)
IMM GRANULOCYTES # BLD AUTO: 0 X10E3/UL (ref 0–0.1)
IMM GRANULOCYTES NFR BLD AUTO: 0 %
LYMPHOCYTES # BLD AUTO: 1.8 X10E3/UL (ref 0.7–3.1)
LYMPHOCYTES NFR BLD AUTO: 34 %
MCH RBC QN AUTO: 28.3 PG (ref 26.6–33)
MCHC RBC AUTO-ENTMCNC: 32.1 G/DL (ref 31.5–35.7)
MCV RBC AUTO: 88 FL (ref 79–97)
MONOCYTES # BLD AUTO: 0.3 X10E3/UL (ref 0.1–0.9)
MONOCYTES NFR BLD AUTO: 6 %
NEUTROPHILS # BLD AUTO: 3 X10E3/UL (ref 1.4–7)
NEUTROPHILS NFR BLD AUTO: 58 %
PLATELET # BLD AUTO: 344 X10E3/UL (ref 150–379)
POTASSIUM SERPL-SCNC: 4.6 MMOL/L (ref 3.5–5.2)
PROT SERPL-MCNC: 6.8 G/DL (ref 6–8.5)
RBC # BLD AUTO: 4.63 X10E6/UL (ref 3.77–5.28)
SODIUM SERPL-SCNC: 141 MMOL/L (ref 134–144)
WBC # BLD AUTO: 5.2 X10E3/UL (ref 3.4–10.8)

## 2019-01-31 PROCEDURE — 88341 IMHCHEM/IMCYTCHM EA ADD ANTB: CPT

## 2019-01-31 PROCEDURE — 76210000040 HC AMBSU PH I REC FIRST 0.5 HR: Performed by: SURGERY

## 2019-01-31 PROCEDURE — 77030011640 HC PAD GRND REM COVD -A: Performed by: SURGERY

## 2019-01-31 PROCEDURE — 74011250636 HC RX REV CODE- 250/636: Performed by: ANESTHESIOLOGY

## 2019-01-31 PROCEDURE — 71045 X-RAY EXAM CHEST 1 VIEW: CPT

## 2019-01-31 PROCEDURE — 76030000001 HC AMB SURG OR TIME 1 TO 1.5: Performed by: SURGERY

## 2019-01-31 PROCEDURE — 77030011267 HC ELECTRD BLD COVD -A: Performed by: SURGERY

## 2019-01-31 PROCEDURE — 74011000250 HC RX REV CODE- 250

## 2019-01-31 PROCEDURE — 77030018673: Performed by: SURGERY

## 2019-01-31 PROCEDURE — 77030031139 HC SUT VCRL2 J&J -A: Performed by: SURGERY

## 2019-01-31 PROCEDURE — 88307 TISSUE EXAM BY PATHOLOGIST: CPT

## 2019-01-31 PROCEDURE — 74011250636 HC RX REV CODE- 250/636: Performed by: SURGERY

## 2019-01-31 PROCEDURE — 76060000062 HC AMB SURG ANES 1 TO 1.5 HR: Performed by: SURGERY

## 2019-01-31 PROCEDURE — 77030002996 HC SUT SLK J&J -A: Performed by: SURGERY

## 2019-01-31 PROCEDURE — 74011250636 HC RX REV CODE- 250/636

## 2019-01-31 PROCEDURE — 88342 IMHCHEM/IMCYTCHM 1ST ANTB: CPT

## 2019-01-31 PROCEDURE — 74011000250 HC RX REV CODE- 250: Performed by: SURGERY

## 2019-01-31 PROCEDURE — C1788 PORT, INDWELLING, IMP: HCPCS | Performed by: SURGERY

## 2019-01-31 PROCEDURE — 77030021352 HC CBL LD SYS DISP COVD -B: Performed by: SURGERY

## 2019-01-31 PROCEDURE — 76000 FLUOROSCOPY <1 HR PHYS/QHP: CPT

## 2019-01-31 PROCEDURE — 77030026438 HC STYL ET INTUB CARD -A: Performed by: NURSE ANESTHETIST, CERTIFIED REGISTERED

## 2019-01-31 PROCEDURE — 77030034626 HC LIGASURE SM JAW SEAL OPN SURG COVD -E: Performed by: SURGERY

## 2019-01-31 PROCEDURE — 77030002933 HC SUT MCRYL J&J -A: Performed by: SURGERY

## 2019-01-31 PROCEDURE — 77030039266 HC ADH SKN EXOFIN S2SG -A: Performed by: SURGERY

## 2019-01-31 PROCEDURE — 77030008684 HC TU ET CUF COVD -B: Performed by: NURSE ANESTHETIST, CERTIFIED REGISTERED

## 2019-01-31 PROCEDURE — 77030002986 HC SUT PROL J&J -A: Performed by: SURGERY

## 2019-01-31 PROCEDURE — 77030020255 HC SOL INJ LR 1000ML BG: Performed by: SURGERY

## 2019-01-31 PROCEDURE — A9520 TC99 TILMANOCEPT DIAG 0.5MCI: HCPCS

## 2019-01-31 PROCEDURE — 76210000046 HC AMBSU PH II REC FIRST 0.5 HR: Performed by: SURGERY

## 2019-01-31 PROCEDURE — 77030020256 HC SOL INJ NACL 0.9%  500ML: Performed by: SURGERY

## 2019-01-31 DEVICE — SYSTEM INFUS PRT CATH 8FR L66CM INTRO 8FR CHST TI SGL LUMN: Type: IMPLANTABLE DEVICE | Site: CHEST | Status: FUNCTIONAL

## 2019-01-31 RX ORDER — MIDAZOLAM HYDROCHLORIDE 1 MG/ML
INJECTION, SOLUTION INTRAMUSCULAR; INTRAVENOUS AS NEEDED
Status: DISCONTINUED | OUTPATIENT
Start: 2019-01-31 | End: 2019-01-31 | Stop reason: HOSPADM

## 2019-01-31 RX ORDER — GLYCOPYRROLATE 0.2 MG/ML
INJECTION INTRAMUSCULAR; INTRAVENOUS AS NEEDED
Status: DISCONTINUED | OUTPATIENT
Start: 2019-01-31 | End: 2019-01-31 | Stop reason: HOSPADM

## 2019-01-31 RX ORDER — FENTANYL CITRATE 50 UG/ML
INJECTION, SOLUTION INTRAMUSCULAR; INTRAVENOUS AS NEEDED
Status: DISCONTINUED | OUTPATIENT
Start: 2019-01-31 | End: 2019-01-31 | Stop reason: HOSPADM

## 2019-01-31 RX ORDER — ACETAMINOPHEN 10 MG/ML
INJECTION, SOLUTION INTRAVENOUS AS NEEDED
Status: DISCONTINUED | OUTPATIENT
Start: 2019-01-31 | End: 2019-01-31 | Stop reason: HOSPADM

## 2019-01-31 RX ORDER — NEOSTIGMINE METHYLSULFATE 1 MG/ML
INJECTION INTRAVENOUS AS NEEDED
Status: DISCONTINUED | OUTPATIENT
Start: 2019-01-31 | End: 2019-01-31 | Stop reason: HOSPADM

## 2019-01-31 RX ORDER — CEFAZOLIN SODIUM/WATER 2 G/20 ML
SYRINGE (ML) INTRAVENOUS
Status: COMPLETED
Start: 2019-01-31 | End: 2019-01-31

## 2019-01-31 RX ORDER — OXYCODONE AND ACETAMINOPHEN 5; 325 MG/1; MG/1
1 TABLET ORAL
Qty: 30 TAB | Refills: 0 | Status: SHIPPED | OUTPATIENT
Start: 2019-01-31 | End: 2019-08-23 | Stop reason: ALTCHOICE

## 2019-01-31 RX ORDER — DIPHENHYDRAMINE HYDROCHLORIDE 50 MG/ML
INJECTION, SOLUTION INTRAMUSCULAR; INTRAVENOUS AS NEEDED
Status: DISCONTINUED | OUTPATIENT
Start: 2019-01-31 | End: 2019-01-31 | Stop reason: HOSPADM

## 2019-01-31 RX ORDER — BUPIVACAINE HYDROCHLORIDE 2.5 MG/ML
10 INJECTION, SOLUTION EPIDURAL; INFILTRATION; INTRACAUDAL ONCE
Status: COMPLETED | OUTPATIENT
Start: 2019-01-31 | End: 2019-01-31

## 2019-01-31 RX ORDER — SUCCINYLCHOLINE CHLORIDE 20 MG/ML
INJECTION INTRAMUSCULAR; INTRAVENOUS AS NEEDED
Status: DISCONTINUED | OUTPATIENT
Start: 2019-01-31 | End: 2019-01-31 | Stop reason: HOSPADM

## 2019-01-31 RX ORDER — ONDANSETRON 2 MG/ML
INJECTION INTRAMUSCULAR; INTRAVENOUS AS NEEDED
Status: DISCONTINUED | OUTPATIENT
Start: 2019-01-31 | End: 2019-01-31 | Stop reason: HOSPADM

## 2019-01-31 RX ORDER — PROPOFOL 10 MG/ML
INJECTION, EMULSION INTRAVENOUS AS NEEDED
Status: DISCONTINUED | OUTPATIENT
Start: 2019-01-31 | End: 2019-01-31 | Stop reason: HOSPADM

## 2019-01-31 RX ORDER — ROCURONIUM BROMIDE 10 MG/ML
INJECTION, SOLUTION INTRAVENOUS AS NEEDED
Status: DISCONTINUED | OUTPATIENT
Start: 2019-01-31 | End: 2019-01-31 | Stop reason: HOSPADM

## 2019-01-31 RX ORDER — CEFAZOLIN SODIUM/WATER 2 G/20 ML
2 SYRINGE (ML) INTRAVENOUS ONCE
Status: COMPLETED | OUTPATIENT
Start: 2019-02-01 | End: 2019-01-31

## 2019-01-31 RX ORDER — LIDOCAINE HYDROCHLORIDE 20 MG/ML
INJECTION, SOLUTION EPIDURAL; INFILTRATION; INTRACAUDAL; PERINEURAL AS NEEDED
Status: DISCONTINUED | OUTPATIENT
Start: 2019-01-31 | End: 2019-01-31 | Stop reason: HOSPADM

## 2019-01-31 RX ORDER — LIDOCAINE HYDROCHLORIDE AND EPINEPHRINE 10; 10 MG/ML; UG/ML
10 INJECTION, SOLUTION INFILTRATION; PERINEURAL ONCE
Status: COMPLETED | OUTPATIENT
Start: 2019-01-31 | End: 2019-01-31

## 2019-01-31 RX ORDER — DEXAMETHASONE SODIUM PHOSPHATE 4 MG/ML
INJECTION, SOLUTION INTRA-ARTICULAR; INTRALESIONAL; INTRAMUSCULAR; INTRAVENOUS; SOFT TISSUE AS NEEDED
Status: DISCONTINUED | OUTPATIENT
Start: 2019-01-31 | End: 2019-01-31 | Stop reason: HOSPADM

## 2019-01-31 RX ORDER — SODIUM CHLORIDE, SODIUM LACTATE, POTASSIUM CHLORIDE, CALCIUM CHLORIDE 600; 310; 30; 20 MG/100ML; MG/100ML; MG/100ML; MG/100ML
25 INJECTION, SOLUTION INTRAVENOUS CONTINUOUS
Status: DISCONTINUED | OUTPATIENT
Start: 2019-01-31 | End: 2019-01-31 | Stop reason: HOSPADM

## 2019-01-31 RX ADMIN — SUCCINYLCHOLINE CHLORIDE 140 MG: 20 INJECTION INTRAMUSCULAR; INTRAVENOUS at 13:10

## 2019-01-31 RX ADMIN — SODIUM CHLORIDE, SODIUM LACTATE, POTASSIUM CHLORIDE, AND CALCIUM CHLORIDE 25 ML/HR: 600; 310; 30; 20 INJECTION, SOLUTION INTRAVENOUS at 11:33

## 2019-01-31 RX ADMIN — Medication 1.5 G: at 13:17

## 2019-01-31 RX ADMIN — ONDANSETRON 4 MG: 2 INJECTION INTRAMUSCULAR; INTRAVENOUS at 13:25

## 2019-01-31 RX ADMIN — DEXAMETHASONE SODIUM PHOSPHATE 4 MG: 4 INJECTION, SOLUTION INTRA-ARTICULAR; INTRALESIONAL; INTRAMUSCULAR; INTRAVENOUS; SOFT TISSUE at 13:25

## 2019-01-31 RX ADMIN — ACETAMINOPHEN 1000 MG: 10 INJECTION, SOLUTION INTRAVENOUS at 13:34

## 2019-01-31 RX ADMIN — DIPHENHYDRAMINE HYDROCHLORIDE 12.5 MG: 50 INJECTION, SOLUTION INTRAMUSCULAR; INTRAVENOUS at 13:20

## 2019-01-31 RX ADMIN — LIDOCAINE HYDROCHLORIDE 40 MG: 20 INJECTION, SOLUTION EPIDURAL; INFILTRATION; INTRACAUDAL; PERINEURAL at 13:09

## 2019-01-31 RX ADMIN — FENTANYL CITRATE 50 MCG: 50 INJECTION, SOLUTION INTRAMUSCULAR; INTRAVENOUS at 13:20

## 2019-01-31 RX ADMIN — PROPOFOL 200 MG: 10 INJECTION, EMULSION INTRAVENOUS at 13:09

## 2019-01-31 RX ADMIN — ROCURONIUM BROMIDE 30 MG: 10 INJECTION, SOLUTION INTRAVENOUS at 13:25

## 2019-01-31 RX ADMIN — GLYCOPYRROLATE 0.4 MG: 0.2 INJECTION INTRAMUSCULAR; INTRAVENOUS at 14:03

## 2019-01-31 RX ADMIN — FENTANYL CITRATE 50 MCG: 50 INJECTION, SOLUTION INTRAMUSCULAR; INTRAVENOUS at 13:09

## 2019-01-31 RX ADMIN — MIDAZOLAM HYDROCHLORIDE 2 MG: 1 INJECTION, SOLUTION INTRAMUSCULAR; INTRAVENOUS at 13:05

## 2019-01-31 RX ADMIN — NEOSTIGMINE METHYLSULFATE 3 MG: 1 INJECTION INTRAVENOUS at 14:03

## 2019-01-31 NOTE — H&P
HISTORY OF PRESENT ILLNESS  Lul Moralez is a 60 y.o. female. HPI NEW patient referral for consultation by Dr. Isa mack for a new LEFT breast cancer diagnosis. The patient had a palpable LEFT breast lump that led to the breast imaging and biopsy. The patient denies any nipple inversion or discharge. She is accompanied by her daughter to discuss her surgical options . OB History      Obstetric Comments     Menarche 14, LMP 47 # of children 2, age of 4st delivery 22, Hysterectomy/oophorectomy yes/no, Breast bx yes left , history of breast feeding no, BCP yes, Hormone therapy no          Family history - no breast or ovarian cancer  Mammogram and ultrasound - 11/30/18   Ultrasound: Ultrasound of the axillary tail at 1:00 15 cm from the nipple  demonstrates a nonparallel round hypoechoic mass with indistinct margins  measuring 1.6 x 1.7 x 1.7 cm with no posterior features. Internal vascularity is  noted. There is a single irregularly thickened left axillary lymph node with  cortical thickness of 0.4 cm. Additional normal left axillary nodes are seen. The patient was scanned by the technologist and the physician.        IMPRESSION  IMPRESSION:     1. BI-RADS Assessment Category 5: Highly suggestive of malignancy- Appropriate  action should be taken.  Left axillary tail mass, favored to be a primary breast  mass versus abnormal lymph node.  Abnormal left axillary lymph node.     Recommendations:  Left breast and left axillary ultrasound guided biopsies.          Past Medical History:   Diagnosis Date    Menopause              Past Surgical History:   Procedure Laterality Date    HX HYSTERECTOMY          Social History            Socioeconomic History    Marital status: UNKNOWN       Spouse name: Not on file    Number of children: Not on file    Years of education: Not on file    Highest education level: Not on file   Social Needs    Financial resource strain: Not on file    Food insecurity - worry: Not on file    Food insecurity - inability: Not on file    Transportation needs - medical: Not on file   [employer] needs - non-medical: Not on file   Occupational History    Not on file   Tobacco Use    Smoking status: Former Smoker       Last attempt to quit: 3/6/2013       Years since quittin.8    Smokeless tobacco: Never Used   Substance and Sexual Activity    Alcohol use: No    Drug use: No    Sexual activity: Not on file   Other Topics Concern    Not on file   Social History Narrative    Not on file          OB History      Obstetric Comments     Menarche 14, LMP 47 # of children 2, age of 4st delivery 22, Hysterectomy/oophorectomy yes/no, Breast bx yes left , history of breast feeding no, BCP yes, Hormone therapy no             Current Outpatient Medications:     diphenhydrAMINE (BENADRYL) 25 mg capsule, Take 25 mg by mouth every six (6) hours as needed. , Disp: , Rfl:         Allergies   Allergen Reactions    Lortab [Hydrocodone-Acetaminophen] Rash       Pt stated she is not allergic to it            Review of Systems   Constitutional: Negative. HENT: Negative. Eyes: Negative. Respiratory: Negative. Cardiovascular: Negative. Gastrointestinal: Negative. Genitourinary: Negative. Musculoskeletal: Positive for joint pain. Skin: Negative. Neurological: Negative. Endo/Heme/Allergies: Negative. Psychiatric/Behavioral: Negative. All other systems reviewed and are negative.        Physical Exam   Constitutional: She is oriented to person, place, and time. She appears well-developed and well-nourished. HENT:   Head: Normocephalic. Eyes: EOM are normal.   Neck: Neck supple. No thyromegaly present. Cardiovascular: Normal rate, regular rhythm, normal heart sounds and intact distal pulses. Pulmonary/Chest: Effort normal and breath sounds normal. Right breast exhibits no inverted nipple, no mass, no nipple discharge, no skin change and no tenderness.  Left breast exhibits mass (1.5 cm mass tail of left breast). Left breast exhibits no inverted nipple, no nipple discharge, no skin change and no tenderness. Breasts are symmetrical.       Abdominal: Soft. Musculoskeletal: Normal range of motion. Lymphadenopathy:     She has no cervical adenopathy. She has no axillary adenopathy. Neurological: She is alert and oriented to person, place, and time. Skin: Skin is warm and dry. Psychiatric: She has a normal mood and affect. Nursing note and vitals reviewed.        ASSESSMENT and PLAN      ICD-10-CM ICD-9-CM     1. Malignant neoplasm of upper-outer quadrant of left breast in female, estrogen receptor negative ([facility] [address] C50.412 174. 4       Z17.1 V86. 1        59 yo female with left breast T1 (1.7 cm) N0 (node biopsy neg) idc triple negative. She is here with her daughter  I have reviewed the imaging and pathology with her and she was given copies of these reports.     90 minutes were spent face-to-face with the patient during this encounter and 90% of that time was spent on counseling and coordination of care. 1. Discussed lumpectomy and radiation vs mastectomy. Discussed reconstruction. MRI ordered to see if patient is a candidate for a lumpectomy. 2. Discussed sentinel lymph node biopsy. 3. Discussed external beam radiation. 4. Discussed hormone therapy. 5. Discussed the possibility of chemotherapy.      Will schedule mri and refer to med onc since triple negative  She will need adjuvant chemo  Surgical plan is left us guided lumpectomy, sln biopsy, likely through same incision. biosorb placement  Will need xrt as well. Patient has seen med onc. Plan is neoadjuvant chemo  Will place port and do sln biopsy.

## 2019-01-31 NOTE — PERIOP NOTES
Betty Jimenez  1960  [id number]    Situation:  Verbal report given from: RADAMES Silva RN; Kobe Bailey CRNA  Procedure: Procedure(s):  PORTACATH INSERTION/LEFT BREAST SENTINEL NODE BIOPSY  SENTINEL NODE BIOPSY    Background:    Preoperative diagnosis: LEFT BREAST CANCER    Postoperative diagnosis: LEFT BREAST CANCER    :  Dr. Mendy Davidson    Assistant(s): Circ-1: Brittney Pérez RN  Radiology Technician: Matthew Milan  Scrub Tech-1: Jefferson Hoskins  Scrub Tech-Relief: [place] Calos  Scrub RN-1: Kellen Freire RN  Surg Asst-1: Mary Menchaca    Specimens:   ID Type Source Tests Collected by Time Destination   1 : LEFT AXILLARY SENTINEL NODE #1 Fresh Kristy Moya MD 1/31/2019 1347 Pathology   2 : LEFT AXILLARY SENTINEL NODE #2 Fresh Henry Livingston MD 1/31/2019 1358 Pathology   3 : LEFT AXILLARY SENTINEL NODE #3 Fresh Henry Livingston MD 1/31/2019 1359 Pathology       Assessment:  Intra-procedure medications         Anesthesia gave intra-procedure sedation and medications, see anesthesia flow sheet     Intravenous fluids: LR@ KVO     Vital signs stable       Recommendation:    Permission to share finding with Elizabeth Dickerson (dtr) : yes

## 2019-01-31 NOTE — PERIOP NOTES
Patient: Jeremiah Son MRN: [id number]  SSN: [id number]   YOB: 1960  Age: 60 y.o. Sex: female     Patient is status post Procedure(s):  PORTACATH INSERTION/LEFT BREAST SENTINEL NODE BIOPSY  SENTINEL NODE BIOPSY. Surgeon(s) and Role:     Baldemar Castro MD - Primary    Local/Dose/Irrigation:  SEE MAR                  Peripheral IV 01/31/19 Right Wrist (Active)   Site Assessment Clean, dry, & intact 1/31/2019 11:32 AM   Phlebitis Assessment 0 1/31/2019 11:32 AM   Infiltration Assessment 0 1/31/2019 11:32 AM   Dressing Status Clean, dry, & intact 1/31/2019 11:32 AM   Dressing Type Transparent 1/31/2019 11:32 AM   Hub Color/Line Status Pink; Infusing 1/31/2019 11:32 AM            Airway - Endotracheal Tube 01/31/19 Oral (Active)                   Dressing/Packing:  Wound Chest Right-Dressing Type : Topical skin adhesive/glue (01/31/19 1300)  Wound Axilla Left-Dressing Type : Topical skin adhesive/glue (01/31/19 1300)      Other:  PER DR. Paula Madrid LEFT AXILLARY SENTINEL NODE SPECIMENS FOR PERMANENT NOT FROZEN. SPECIMENS TAKEN TO LAB BY MODESTA CASTAÑEDA.

## 2019-01-31 NOTE — OP NOTES
[address]  OPERATIVE REPORT    Cole Olson  MR#: [id number]  : 1960  ACCOUNT #: [de-identified]   DATE OF SERVICE: 2019    PREOPERATIVE DIAGNOSIS:  Left breast cancer, upper outer quadrant, need intravenous access for chemotherapy. POSTOPERATIVE DIAGNOSIS:  Left breast cancer, upper outer quadrant, need intravenous access for chemotherapy. PROCEDURES PERFORMED:  Right subclavian port insertion, left axillary sentinel node biopsy. SURGEON:  Roberto Da Silva MD    ASSISTANT:  Maliha Gar    ANESTHESIA:  General.    ESTIMATED BLOOD LOSS:  20 mL. SPECIMENS REMOVED:  1. Left axillary sentinel node #1.  2.  Left axillary sentinel node #2. 3.  Left axillary sentinel node #3. FINDINGS:  Tip of port junction SVC and right atrium. COMPLICATIONS:  None. IMPLANTS:  An 8-[employer]. INDICATIONS FOR PROCEDURE:  This 42-year-old female with triple negative breast cancer, left breast, stage II. Her initial core needle biopsy of the node was negative; however, she is to have a sentinel lymph node for staging and port placement for neoadjuvant chemo. PROCEDURE IN DETAIL:  The patient was seen in the preop holding area, surgical site was marked by surgeon. Informed consent was obtained. She initially went to nuclear medicine where technetium 99 was injected in the left breast.  She tolerated this well. She went to the operating room and laid in supine position where LMA anesthesia was induced. Bilateral chest and left axilla prepped and draped in the usual fashion. A timeout was performed. Attention was turned to the right chest wall. The patient was placed in Trendelenburg position. An 18-gauge introducer needle was used to access the right subclavian vein via landmarks. The syringe was removed. The wire was threaded through the needle. The needle was removed and the wire was going toward the SVC and right atrium on fluoroscopy.   Next, a 15 blade was used to make an incision at the wire. Bovie cautery was used to dissect the port pocket. The dilator sheath was threaded over the wire. The inner cannula wire were removed and the catheter was clamped and threaded through the sheath and the sheath was torn away at 15 cm at the chest wall. The tip of the port was at the junction of SVC and right atrium on fluoroscopy. Next, the catheter was clamped, cut and threaded into the port. The port was placed in the port pocket. This aspirated and flushed well with injectable saline. The port was secured on either side with a 3-0 Prolene and final flush aspirated and flushed well. A chest x-ray was ordered for recovery. The 10 mL of local anesthetic was injected at the skin incision and port site. The port incision was closed with interrupted 3-0 Vicryl and 4-0 subcuticular Monocryl and Dermabond was placed on the skin. Next, attention was turned to the left axillary area. A 10 blade was used to make an incision in the inferior axillary hairline. Bovie cautery was used to dissect through the axillary fascia. Three sentinel lymph nodes were identified using Neoprobe guidance. The hottest node, sentinel lymph node #1 was also the node that had the previous core needle biopsy. These were excised using LigaSure and sent for permanent pathology. The cavity was irrigated, 20 mL of local anesthetic was injected into the axilla and skin. Deeper tissues were closed with interrupted 3-0 Vicryl and the skin with 4-0 subcuticular Monocryl. Dermabond was placed on the skin incisions. All sponge, needle and instrument counts were correct. The patient went to recovery in stable condition.       MD CHRISTIAN Montes / [place]  D: 01/31/2019 14:19     T: 01/31/2019 14:32  JOB #: [id number]

## 2019-01-31 NOTE — PERIOP NOTES
Yael Paws applied at this time and set to appropriate temperature. Patient given remote and instructed on use. Will continue to monitor.

## 2019-01-31 NOTE — ANESTHESIA POSTPROCEDURE EVALUATION
Procedure(s): PORTACATH INSERTION/LEFT BREAST SENTINEL NODE BIOPSY SENTINEL NODE BIOPSY. Anesthesia Post Evaluation Patient location during evaluation: PACU Note status: Adequate. Level of consciousness: responsive to verbal stimuli and sleepy but conscious Pain management: satisfactory to patient Airway patency: patent Anesthetic complications: no 
Cardiovascular status: acceptable Respiratory status: acceptable Hydration status: acceptable Comments: +Post-Anesthesia Evaluation and Assessment Patient: Lashonda Melendez MRN: [id number]  SSN: [id number] YOB: 1960  Age: 60 y.o. Sex: female Cardiovascular Function/Vital Signs /84   Pulse 90   Temp 37.2 °C (98.9 °F)   Resp 15   Ht 5' 2\" (1.575 m)   Wt 88.5 kg (195 lb)   SpO2 95%   BMI 35.67 kg/m² Patient is status post Procedure(s): PORTACATH INSERTION/LEFT BREAST SENTINEL NODE BIOPSY SENTINEL NODE BIOPSY. Nausea/Vomiting: Controlled. Postoperative hydration reviewed and adequate. Pain: 
Pain Scale 1: Numeric (0 - 10) (01/31/19 1445) Pain Intensity 1: 0 (01/31/19 1445) Managed. Neurological Status:  
Neuro (WDL): Exceptions to WDL (01/31/19 1425) At baseline. Mental Status and Level of Consciousness: Arousable. Pulmonary Status:  
O2 Device: Room air (01/31/19 1445) Adequate oxygenation and airway patent. Complications related to anesthesia: None Post-anesthesia assessment completed. No concerns. Signed By: Marco Turpin MD  
 1/31/2019 Post anesthesia nausea and vomiting:  controlled Visit Vitals /84 Pulse 90 Temp 37.2 °C (98.9 °F) Resp 15 Ht 5' 2\" (1.575 m) Wt 88.5 kg (195 lb) SpO2 95% BMI 35.67 kg/m²

## 2019-01-31 NOTE — ANESTHESIA PREPROCEDURE EVALUATION
Anesthetic History No history of anesthetic complications Review of Systems / Medical History Patient summary reviewed, nursing notes reviewed and pertinent labs reviewed Pulmonary Within defined limits Neuro/Psych Cardiovascular Within defined limits Exercise tolerance: >4 METS 
  
GI/Hepatic/Renal 
Within defined limits Endo/Other Obesity and cancer (breast ca) Other Findings Physical Exam 
 
Airway Mallampati: I 
TM Distance: 4 - 6 cm Neck ROM: normal range of motion Mouth opening: Normal 
 
 Cardiovascular Regular rate and rhythm,  S1 and S2 normal,  no murmur, click, rub, or gallop Dental 
No notable dental hx Pulmonary Breath sounds clear to auscultation Abdominal 
GI exam deferred Other Findings Anesthetic Plan ASA: 2 Anesthesia type: general 
 
 
 
 
 
Anesthetic plan and risks discussed with: Patient

## 2019-01-31 NOTE — PERIOP NOTES
Permission received to review discharge instructions and discuss private health information with daughter, Aly Daley. Patient states that daughter & sister will be with them for at least 24 hours following today's procedure.

## 2019-01-31 NOTE — PERIOP NOTES
1424 Pt arrived via stretcher into PACU from OR procedure with Dr. Luis Domingo asleep and comfortable. Received report from RN and CRNA. 56 Permission received to review discharge instructions and discuss private health information with Geo Garcia (dtr) 26 Brought daughter back to be with mom in recovery.

## 2019-01-31 NOTE — BRIEF OP NOTE
BRIEF OPERATIVE NOTE    Date of Procedure: 1/31/2019   Preoperative Diagnosis: LEFT BREAST CANCER UPPER OUTER QUADRANT  Postoperative Diagnosis: SAME  Procedure(s):  PORTACATH INSERTION RIGHT SUBCLAVIAN   LEFT BREAST SENTINEL NODE BIOPSY  SENTINEL NODE BIOPSY  Surgeon(s) and Role:     Baylee Sanchez MD - Primary         Surgical Assistant: Itzel Christopher    Surgical Staff:  Circ-1: Nora Lopez RN  Radiology Technician: Agustina Coffman  Scrub Tech-1: Tina Casanova  Scrub Tech-Relief: Gordy Lunsfordub RN-1: Rodríguez Aden RN  Surg Asst-1: Ceci Jimenez  Event Time In Time Out   Incision Start 1331    Incision Close [phone]      Anesthesia: General   Estimated Blood Loss: 20 ML  Specimens:   ID Type Source Tests Collected by Time Destination   1 : LEFT AXILLARY SENTINEL NODE #1 Fresh Sandra Rodas MD 1/31/2019 1347 Pathology   2 : LEFT AXILLARY SENTINEL NODE #2 Fresh Henry Loza MD 1/31/2019 1358 Pathology   3 : LEFT AXILLARY SENTINEL NODE #3 Fresh Axillary  Sean Loza MD 1/31/2019 1359 Pathology      Findings: TIP OF PORT JCT SVC AND RIGHT ATRIUM   Complications: NONE  Implants:   Implant Name Type Inv.  Item Serial No.  Lot No. LRB No. Used Action   SMART PORT CT, [employer] Other  [id number]  [id number] Right 1 Implanted     DICTATED   [id number]

## 2019-02-01 PROBLEM — Z17.0 MALIGNANT NEOPLASM OF UPPER-OUTER QUADRANT OF LEFT BREAST IN FEMALE, ESTROGEN RECEPTOR POSITIVE (HCC): Status: ACTIVE | Noted: 2019-02-01

## 2019-02-01 PROBLEM — C50.412 MALIGNANT NEOPLASM OF UPPER-OUTER QUADRANT OF LEFT BREAST IN FEMALE, ESTROGEN RECEPTOR POSITIVE (HCC): Status: ACTIVE | Noted: 2019-02-01

## 2019-02-01 RX ORDER — PALONOSETRON 0.05 MG/ML
0.25 INJECTION, SOLUTION INTRAVENOUS ONCE
Status: CANCELLED | OUTPATIENT
Start: 2019-02-11 | End: 2019-02-11

## 2019-02-01 RX ORDER — ACETAMINOPHEN 325 MG/1
650 TABLET ORAL AS NEEDED
Status: CANCELLED
Start: 2019-02-11

## 2019-02-01 RX ORDER — SODIUM CHLORIDE 9 MG/ML
10 INJECTION INTRAMUSCULAR; INTRAVENOUS; SUBCUTANEOUS AS NEEDED
Status: CANCELLED | OUTPATIENT
Start: 2019-02-11

## 2019-02-01 RX ORDER — SODIUM CHLORIDE 9 MG/ML
25 INJECTION, SOLUTION INTRAVENOUS CONTINUOUS
Status: CANCELLED | OUTPATIENT
Start: 2019-02-11 | End: 2019-02-11

## 2019-02-01 RX ORDER — DIPHENHYDRAMINE HYDROCHLORIDE 50 MG/ML
50 INJECTION, SOLUTION INTRAMUSCULAR; INTRAVENOUS AS NEEDED
Status: CANCELLED
Start: 2019-02-11

## 2019-02-01 RX ORDER — EPINEPHRINE 1 MG/ML
0.3 INJECTION, SOLUTION, CONCENTRATE INTRAVENOUS AS NEEDED
Status: CANCELLED | OUTPATIENT
Start: 2019-02-11

## 2019-02-01 RX ORDER — ONDANSETRON 2 MG/ML
8 INJECTION INTRAMUSCULAR; INTRAVENOUS AS NEEDED
Status: CANCELLED | OUTPATIENT
Start: 2019-02-11

## 2019-02-01 RX ORDER — SODIUM CHLORIDE 0.9 % (FLUSH) 0.9 %
10 SYRINGE (ML) INJECTION AS NEEDED
Status: CANCELLED
Start: 2019-02-11

## 2019-02-01 RX ORDER — ALBUTEROL SULFATE 0.83 MG/ML
2.5 SOLUTION RESPIRATORY (INHALATION) AS NEEDED
Status: CANCELLED
Start: 2019-02-11

## 2019-02-01 RX ORDER — DOXORUBICIN HYDROCHLORIDE 2 MG/ML
60 INJECTION, SOLUTION INTRAVENOUS ONCE
Status: CANCELLED
Start: 2019-02-11 | End: 2019-02-11

## 2019-02-01 RX ORDER — HEPARIN 100 UNIT/ML
300-500 SYRINGE INTRAVENOUS AS NEEDED
Status: CANCELLED
Start: 2019-02-11

## 2019-02-01 RX ORDER — HYDROCORTISONE SODIUM SUCCINATE 100 MG/2ML
100 INJECTION, POWDER, FOR SOLUTION INTRAMUSCULAR; INTRAVENOUS AS NEEDED
Status: CANCELLED | OUTPATIENT
Start: 2019-02-11

## 2019-02-04 ENCOUNTER — TELEPHONE (OUTPATIENT)
Dept: SURGERY | Age: 59
End: 2019-02-04

## 2019-02-04 NOTE — TELEPHONE ENCOUNTER
Called and left a voicemail on the patient's answering machine to follow up after surgery. Instructed to call with any concerns or questions.

## 2019-02-11 ENCOUNTER — HOSPITAL ENCOUNTER (OUTPATIENT)
Dept: INFUSION THERAPY | Age: 59
Discharge: HOME | End: 2019-02-11
Payer: COMMERCIAL

## 2019-02-11 ENCOUNTER — OFFICE VISIT (OUTPATIENT)
Dept: ONCOLOGY | Age: 59
End: 2019-02-11

## 2019-02-11 VITALS
TEMPERATURE: 98.8 F | BODY MASS INDEX: 36.07 KG/M2 | SYSTOLIC BLOOD PRESSURE: 166 MMHG | HEART RATE: 82 BPM | RESPIRATION RATE: 16 BRPM | DIASTOLIC BLOOD PRESSURE: 96 MMHG | OXYGEN SATURATION: 96 % | WEIGHT: 196 LBS | HEIGHT: 62 IN

## 2019-02-11 VITALS
OXYGEN SATURATION: 99 % | SYSTOLIC BLOOD PRESSURE: 159 MMHG | HEART RATE: 85 BPM | WEIGHT: 196.5 LBS | HEIGHT: 62 IN | TEMPERATURE: 98.5 F | BODY MASS INDEX: 36.16 KG/M2 | RESPIRATION RATE: 16 BRPM | DIASTOLIC BLOOD PRESSURE: 82 MMHG

## 2019-02-11 DIAGNOSIS — Z17.1 MALIGNANT NEOPLASM OF UPPER-OUTER QUADRANT OF LEFT BREAST IN FEMALE, ESTROGEN RECEPTOR NEGATIVE (HCC): Primary | ICD-10-CM

## 2019-02-11 DIAGNOSIS — C50.412 MALIGNANT NEOPLASM OF UPPER-OUTER QUADRANT OF LEFT BREAST IN FEMALE, ESTROGEN RECEPTOR POSITIVE (HCC): Primary | ICD-10-CM

## 2019-02-11 DIAGNOSIS — C50.412 MALIGNANT NEOPLASM OF UPPER-OUTER QUADRANT OF LEFT BREAST IN FEMALE, ESTROGEN RECEPTOR NEGATIVE (HCC): Primary | ICD-10-CM

## 2019-02-11 DIAGNOSIS — Z17.0 MALIGNANT NEOPLASM OF UPPER-OUTER QUADRANT OF LEFT BREAST IN FEMALE, ESTROGEN RECEPTOR POSITIVE (HCC): Primary | ICD-10-CM

## 2019-02-11 LAB
ALBUMIN SERPL-MCNC: 3.2 G/DL (ref 3.5–5)
ALBUMIN/GLOB SERPL: 0.8 {RATIO} (ref 1.1–2.2)
ALP SERPL-CCNC: 104 U/L (ref 45–117)
ALT SERPL-CCNC: 27 U/L (ref 12–78)
ANION GAP SERPL CALC-SCNC: 7 MMOL/L (ref 5–15)
AST SERPL-CCNC: 13 U/L (ref 15–37)
BASO+EOS+MONOS # BLD AUTO: 0.3 K/UL (ref 0.2–1.2)
BASO+EOS+MONOS NFR BLD AUTO: 5 % (ref 3.2–16.9)
BILIRUB SERPL-MCNC: 0.3 MG/DL (ref 0.2–1)
BUN SERPL-MCNC: 15 MG/DL (ref 6–20)
BUN/CREAT SERPL: 21 (ref 12–20)
CALCIUM SERPL-MCNC: 9 MG/DL (ref 8.5–10.1)
CHLORIDE SERPL-SCNC: 105 MMOL/L (ref 97–108)
CO2 SERPL-SCNC: 28 MMOL/L (ref 21–32)
CREAT SERPL-MCNC: 0.72 MG/DL (ref 0.55–1.02)
DIFFERENTIAL METHOD BLD: ABNORMAL
ERYTHROCYTE [DISTWIDTH] IN BLOOD BY AUTOMATED COUNT: 15.9 % (ref 11.8–15.8)
GLOBULIN SER CALC-MCNC: 4 G/DL (ref 2–4)
GLUCOSE SERPL-MCNC: 98 MG/DL (ref 65–100)
HCT VFR BLD AUTO: 39 % (ref 35–47)
HGB BLD-MCNC: 12.4 G/DL (ref 11.5–16)
LYMPHOCYTES # BLD: 1.8 K/UL (ref 0.8–3.5)
LYMPHOCYTES NFR BLD: 34 % (ref 12–49)
MCH RBC QN AUTO: 28.1 PG (ref 26–34)
MCHC RBC AUTO-ENTMCNC: 31.8 G/DL (ref 30–36.5)
MCV RBC AUTO: 88.4 FL (ref 80–99)
NEUTS SEG # BLD: 3.1 K/UL (ref 1.8–8)
NEUTS SEG NFR BLD: 61 % (ref 32–75)
PLATELET # BLD AUTO: 349 K/UL (ref 150–400)
POTASSIUM SERPL-SCNC: 3.6 MMOL/L (ref 3.5–5.1)
PROT SERPL-MCNC: 7.2 G/DL (ref 6.4–8.2)
RBC # BLD AUTO: 4.41 M/UL (ref 3.8–5.2)
SODIUM SERPL-SCNC: 140 MMOL/L (ref 136–145)
WBC # BLD AUTO: 5.2 K/UL (ref 3.6–11)

## 2019-02-11 PROCEDURE — 96367 TX/PROPH/DG ADDL SEQ IV INF: CPT

## 2019-02-11 PROCEDURE — 80053 COMPREHEN METABOLIC PANEL: CPT

## 2019-02-11 PROCEDURE — 74011250636 HC RX REV CODE- 250/636: Performed by: INTERNAL MEDICINE

## 2019-02-11 PROCEDURE — 74011000258 HC RX REV CODE- 258: Performed by: INTERNAL MEDICINE

## 2019-02-11 PROCEDURE — 36415 COLL VENOUS BLD VENIPUNCTURE: CPT

## 2019-02-11 PROCEDURE — 74011250636 HC RX REV CODE- 250/636

## 2019-02-11 PROCEDURE — 77030012965 HC NDL HUBR BBMI -A

## 2019-02-11 PROCEDURE — 96411 CHEMO IV PUSH ADDL DRUG: CPT

## 2019-02-11 PROCEDURE — 85025 COMPLETE CBC W/AUTO DIFF WBC: CPT

## 2019-02-11 PROCEDURE — 74011000250 HC RX REV CODE- 250: Performed by: INTERNAL MEDICINE

## 2019-02-11 PROCEDURE — 96375 TX/PRO/DX INJ NEW DRUG ADDON: CPT

## 2019-02-11 PROCEDURE — 96413 CHEMO IV INFUSION 1 HR: CPT

## 2019-02-11 RX ORDER — DOXORUBICIN HYDROCHLORIDE 2 MG/ML
30 INJECTION, SOLUTION INTRAVENOUS ONCE
Status: COMPLETED | OUTPATIENT
Start: 2019-02-11 | End: 2019-02-11

## 2019-02-11 RX ORDER — SODIUM CHLORIDE 9 MG/ML
10 INJECTION INTRAMUSCULAR; INTRAVENOUS; SUBCUTANEOUS AS NEEDED
Status: DISPENSED | OUTPATIENT
Start: 2019-02-11 | End: 2019-02-11

## 2019-02-11 RX ORDER — SODIUM CHLORIDE 9 MG/ML
25 INJECTION, SOLUTION INTRAVENOUS CONTINUOUS
Status: DISPENSED | OUTPATIENT
Start: 2019-02-11 | End: 2019-02-11

## 2019-02-11 RX ORDER — SODIUM CHLORIDE 0.9 % (FLUSH) 0.9 %
10 SYRINGE (ML) INJECTION AS NEEDED
Status: ACTIVE | OUTPATIENT
Start: 2019-02-11 | End: 2019-02-11

## 2019-02-11 RX ORDER — HEPARIN 100 UNIT/ML
300-500 SYRINGE INTRAVENOUS AS NEEDED
Status: ACTIVE | OUTPATIENT
Start: 2019-02-11 | End: 2019-02-11

## 2019-02-11 RX ORDER — PALONOSETRON 0.05 MG/ML
0.25 INJECTION, SOLUTION INTRAVENOUS ONCE
Status: COMPLETED | OUTPATIENT
Start: 2019-02-11 | End: 2019-02-11

## 2019-02-11 RX ADMIN — PALONOSETRON HYDROCHLORIDE 0.25 MG: 0.25 INJECTION INTRAVENOUS at 12:20

## 2019-02-11 RX ADMIN — Medication 500 UNITS: at 14:08

## 2019-02-11 RX ADMIN — CYCLOPHOSPHAMIDE 1182 MG: 1 INJECTION, POWDER, FOR SOLUTION INTRAVENOUS; ORAL at 13:30

## 2019-02-11 RX ADMIN — SODIUM CHLORIDE 10 ML: 9 INJECTION, SOLUTION INTRAMUSCULAR; INTRAVENOUS; SUBCUTANEOUS at 11:10

## 2019-02-11 RX ADMIN — DEXAMETHASONE SODIUM PHOSPHATE 12 MG: 4 INJECTION, SOLUTION INTRA-ARTICULAR; INTRALESIONAL; INTRAMUSCULAR; INTRAVENOUS; SOFT TISSUE at 12:45

## 2019-02-11 RX ADMIN — DOXORUBICIN HYDROCHLORIDE 59.2 MG: 2 INJECTION, SOLUTION INTRAVENOUS at 13:20

## 2019-02-11 RX ADMIN — SODIUM CHLORIDE 150 MG: 900 INJECTION, SOLUTION INTRAVENOUS at 13:00

## 2019-02-11 RX ADMIN — Medication 10 ML: at 11:10

## 2019-02-11 RX ADMIN — DOXORUBICIN HYDROCHLORIDE 59.2 MG: 2 INJECTION, SOLUTION INTRAVENOUS at 13:25

## 2019-02-11 RX ADMIN — SODIUM CHLORIDE 25 ML/HR: 900 INJECTION, SOLUTION INTRAVENOUS at 12:20

## 2019-02-11 RX ADMIN — Medication 10 ML: at 14:08

## 2019-02-11 NOTE — DISCHARGE INSTRUCTIONS

## 2019-02-11 NOTE — PROGRESS NOTES
[facility] [place] 
at [place] [address], [facility], [address] 
[phone] Follow-up Note Patient: Naveed Dee MRN: [id number]  SSN: [id number] YOB: 1960  Age: 60 y.o. Sex: female Diagnosis: 1. Left breast carcinoma: 
T1c N1mi M0 (Stage I) infiltrating ductal carcinoma, Tumor size 1.6 cm, LN 1/3 with micromet, grade 3, ER -ve, ID -ve, Her 2 -ve Treatment: 1. Neoadjuvant chemotherapy Adriamycin, cytoxan - Cycle 1 Day 1 Subjective:  
  
Naveed Dee is a 60 y.o. female with a diagnosis of left sided invasive breast cancer. She felt a lump in the left breast, went to see her PCP, got a mammogram and was noted to have abnormal density in the left upper outer quadrant of the breast. A biopsy of the mass revealed gr 3 IDC, TNBC. The axillary LN is clear of disease. She saw Dr. Jennifer Iniguez. An MRI of the breast shows the tumor to be larger than previously believed to be. A left axillary LN biopsy showed 1/3 nodes with micrometastasis. She works at [employer] full time. Ms. Felisa Hou is here today with her daughter to start neoadjuvant chemotherapy. She feels well without complaints. Review of Systems: 
 
Constitutional: negative Eyes: negative Ears, Nose, Mouth, Throat, and Face: negative Respiratory: negative Cardiovascular: negative Gastrointestinal: negative Genitourinary:negative Integument/Breast: negative Hematologic/Lymphatic: negative Musculoskeletal:negative Neurological: negative Past Medical History:  
Diagnosis Date  Breast cancer ([facility] [address]  Menopause Past Surgical History:  
Procedure Laterality Date  HX BREAST BIOPSY Left x2  
 HX HYSTERECTOMY partial, ovaries remain No family history on file. Social History Tobacco Use  Smoking status: Former Smoker Last attempt to quit: 3/6/2013 Years since quittin.9  Smokeless tobacco: Never Used  Tobacco comment: Quit smoking 23 days ago Substance Use Topics  Alcohol use: No  
  
Prior to Admission medications Medication Sig Start Date End Date Taking? Authorizing Provider  
oxyCODONE-acetaminophen (PERCOCET) 5-325 mg per tablet Take 1 Tab by mouth every four (4) hours as needed for Pain. Max Daily Amount: 6 Tabs. 1/31/19  Yes Rosemary Ortiz MD  
lidocaine-prilocaine (EMLA) topical cream Apply  to affected area as needed for Pain. 1/24/19  Yes Gretchen Cordero MD  
ondansetron (ZOFRAN ODT) 4 mg disintegrating tablet Take 1 Tab by mouth every eight (8) hours as needed for Nausea. 1/24/19  Yes Gretchen Cordero MD  
diphenhydrAMINE (BENADRYL) 25 mg capsule Take 25 mg by mouth every six (6) hours as needed. Yes Provider, Historical  
  
 
 
 
 
Allergies Allergen Reactions  Codeine Rash Rash from tylenol #3 Objective:  
 
Visit Vitals BP (!) 166/96 (BP 1 Location: Right arm, BP Patient Position: Sitting) Pulse 82 Temp 98.8 °F (37.1 °C) (Oral) Resp 16 Ht 5' 2\" (1.575 m) Wt 196 lb (88.9 kg) SpO2 96% BMI 35.85 kg/m² Pain Scale: 0/10 Pain Location:  
 
 
 
Physical Exam: 
 
GENERAL: alert, cooperative, no distress, appears stated age EYE: conjunctivae/corneas clear. PERRL, EOM's intact. Fundi benign LYMPHATIC: Cervical, supraclavicular, and axillary nodes normal.  
THROAT & NECK: normal and no erythema or exudates noted. LUNG: clear to auscultation bilaterally HEART: regular rate and rhythm, S1, S2 normal, no murmur, click, rub or gallop ABDOMEN: soft, non-tender. Bowel sounds normal. No masses,  no organomegaly EXTREMITIES:  extremities normal, atraumatic, no cyanosis or edema SKIN: Normal. 
NEUROLOGIC: AOx3. Gait normal. Reflexes and motor strength normal and symmetric Lab Results Component Value Date/Time  WBC 5.2 02/11/2019 11:03 AM  
 HGB 12.4 02/11/2019 11:03 AM  
 HCT 39.0 02/11/2019 11:03 AM  
 PLATELET 085 33/92/9357 11:03 AM  
 MCV 88.4 02/11/2019 11:03 AM  
 
 
Lab Results Component Value Date/Time Sodium 140 02/11/2019 11:03 AM  
 Potassium 3.6 02/11/2019 11:03 AM  
 Chloride 105 02/11/2019 11:03 AM  
 CO2 28 02/11/2019 11:03 AM  
 Anion gap 7 02/11/2019 11:03 AM  
 Glucose 98 02/11/2019 11:03 AM  
 BUN 15 02/11/2019 11:03 AM  
 Creatinine 0.72 02/11/2019 11:03 AM  
 BUN/Creatinine ratio 21 (H) 02/11/2019 11:03 AM  
 GFR est AA >60 02/11/2019 11:03 AM  
 GFR est non-AA >60 02/11/2019 11:03 AM  
 Calcium 9.0 02/11/2019 11:03 AM  
 Bilirubin, total 0.3 02/11/2019 11:03 AM  
 AST (SGOT) 13 (L) 02/11/2019 11:03 AM  
 Alk. phosphatase 104 02/11/2019 11:03 AM  
 Protein, total 7.2 02/11/2019 11:03 AM  
 Albumin 3.2 (L) 02/11/2019 11:03 AM  
 Globulin 4.0 02/11/2019 11:03 AM  
 A-G Ratio 0.8 (L) 02/11/2019 11:03 AM  
 ALT (SGPT) 27 02/11/2019 11:03 AM  
 
 
 
 
Assessment: 1. Left breast carcinoma: 
T1c N1mi M0 (Stage I) infiltrating ductal carcinoma, Tumor size 1.6 cm, LN 1/3 with micromet, grade 3, ER -ve, AK -ve, Her 2 -ve ECOG PS 0 Intent of Treatment - curative Prognosis - good Echocardiogram (1/30/2019): Normal, LVEF 56-60% Starting neoadjuvant chemotherapy Adriamycin, Cyclophosphamide - Cycle 1 Day 1 Reviewed the expected side-effects which includes alopecia, nausea, peripheral neuropathy, neutropenic fever, anemia, decreased in the LVEF, need for transfusion among other things. Blood counts acceptable. Results reviewed with patient. Symptom management form reviewed with patient. Plan:  
 
 
> Start neoadjuvant chemotherapy 
> Zofran and compazine as needed 
> Follow-up in 3 weeks Signed by: Agustín Elizabeth MD 
                   February 11, 2019 
 
 
 
CC. Echo Reis MD 
CC.  Breanne Ahumada MD 
 
 
 
 yes

## 2019-02-11 NOTE — PROGRESS NOTES
Outpatient Infusion Center - Chemotherapy Progress Note    0982- [facility] in stable condition. Assessment completed. No concerns voiced other than pt noted to have high BP, pt denies taking BP medication. MD office made aware. Right chest port accessed without issue with positive blood return. Labs drawn per order and sent. Line flushed, clamped, Curos Cap applied to end clave. Pt over to MD office. Patient Vitals for the past 12 hrs:   Temp Pulse Resp BP SpO2   02/11/19 1408 -- 85 16 159/82 --   02/11/19 1055 98.5 °F (36.9 °C) 95 18 (!) 177/101 99 %     Recent Results (from the past 12 hour(s))   METABOLIC PANEL, COMPREHENSIVE    Collection Time: 02/11/19 11:03 AM   Result Value Ref Range    Sodium 140 136 - 145 mmol/L    Potassium 3.6 3.5 - 5.1 mmol/L    Chloride 105 97 - 108 mmol/L    CO2 28 21 - 32 mmol/L    Anion gap 7 5 - 15 mmol/L    Glucose 98 65 - 100 mg/dL    BUN 15 6 - 20 MG/DL    Creatinine 0.72 0.55 - 1.02 MG/DL    BUN/Creatinine ratio 21 (H) 12 - 20      GFR est AA >60 >60 ml/min/1.73m2    GFR est non-AA >60 >60 ml/min/1.73m2    Calcium 9.0 8.5 - 10.1 MG/DL    Bilirubin, total 0.3 0.2 - 1.0 MG/DL    ALT (SGPT) 27 12 - 78 U/L    AST (SGOT) 13 (L) 15 - 37 U/L    Alk. phosphatase 104 45 - 117 U/L    Protein, total 7.2 6.4 - 8.2 g/dL    Albumin 3.2 (L) 3.5 - 5.0 g/dL    Globulin 4.0 2.0 - 4.0 g/dL    A-G Ratio 0.8 (L) 1.1 - 2.2     CBC WITH 3 PART DIFF    Collection Time: 02/11/19 11:03 AM   Result Value Ref Range    WBC 5.2 3.6 - 11.0 K/uL    RBC 4.41 3.80 - 5.20 M/uL    HGB 12.4 11.5 - 16.0 g/dL    HCT 39.0 35.0 - 47.0 %    MCV 88.4 80.0 - 99.0 FL    MCH 28.1 26.0 - 34.0 PG    MCHC 31.8 30.0 - 36.5 g/dL    RDW 15.9 (H) 11.8 - 15.8 %    PLATELET 586 870 - 091 K/uL    NEUTROPHILS 61 32 - 75 %    MIXED CELLS 5 3.2 - 16.9 %    LYMPHOCYTES 34 12 - 49 %    ABS. NEUTROPHILS 3.1 1.8 - 8.0 K/UL    ABS. MIXED CELLS 0.3 0.2 - 1.2 K/uL    ABS.  LYMPHOCYTES 1.8 0.8 - 3.5 K/UL    DF AUTOMATED       Medications:  NS KVO  Aloxi IVP  Decadron IV  Emend IV  Doxorubicin IVP  Cytoxan IV    Chemo discharge instructions reviewed and copy given to pt. Opportunity for questions/clarification provided. Pt verbalized understanding. 1410- Pt tolerated treatment well. Port maintained positive blood return throughout treatment, flushed with positive blood return at conclusion, and de-accessed. D/c home ambulatory in no distress.  Pt aware of next [facility] appointment scheduled for 3/6 at 10 AM.

## 2019-02-11 NOTE — PROGRESS NOTES
Alex Gentile is a 60 y.o. female here today for left breast cancer f/u. Patient starting A/C today. Patient accompanied by her daughter to today's appt. Elevated B/P noted; pt denies taking B/P medication; other VS stable. Patient denies pain. Good appetite. Patient denies N/V/D and constipation. Patient denies numbness and tingling. Patient denies mouth ulcers. Patient denies cough. Patient denies SOB. Patient denies falls. Visit Vitals BP (!) 166/96 (BP 1 Location: Right arm, BP Patient Position: Sitting) Pulse 82 Temp 98.8 °F (37.1 °C) (Oral) Resp 16 Ht 5' 2\" (1.575 m) Wt 196 lb (88.9 kg) SpO2 96% BMI 35.85 kg/m² Pain Scale: /10 Pain Location: 1. Have you been to the ER, urgent care clinic since your last visit? Hospitalized since your last visit? No 
 
2. Have you seen or consulted any other health care providers outside of the [address] [place] [address] since your last visit? Include any pap smears or colon screening. No  
 
Health Maintenance Review: Patient reminded of \"due or due soon\" health maintenance. I have asked the patient to contact his/her primary care provider (PCP) for follow-up on his/her health maintenance.

## 2019-03-04 ENCOUNTER — APPOINTMENT (OUTPATIENT)
Dept: INFUSION THERAPY | Age: 59
End: 2019-03-04
Payer: COMMERCIAL

## 2019-03-05 RX ORDER — SODIUM CHLORIDE 9 MG/ML
25 INJECTION, SOLUTION INTRAVENOUS CONTINUOUS
Status: CANCELLED | OUTPATIENT
Start: 2019-03-06 | End: 2019-03-06

## 2019-03-05 RX ORDER — ONDANSETRON 2 MG/ML
8 INJECTION INTRAMUSCULAR; INTRAVENOUS AS NEEDED
Status: CANCELLED | OUTPATIENT
Start: 2019-03-06

## 2019-03-05 RX ORDER — DIPHENHYDRAMINE HYDROCHLORIDE 50 MG/ML
50 INJECTION, SOLUTION INTRAMUSCULAR; INTRAVENOUS AS NEEDED
Status: CANCELLED
Start: 2019-03-27

## 2019-03-05 RX ORDER — EPINEPHRINE 1 MG/ML
0.3 INJECTION, SOLUTION, CONCENTRATE INTRAVENOUS AS NEEDED
Status: CANCELLED | OUTPATIENT
Start: 2019-03-27

## 2019-03-05 RX ORDER — DOXORUBICIN HYDROCHLORIDE 2 MG/ML
30 INJECTION, SOLUTION INTRAVENOUS ONCE
Status: CANCELLED
Start: 2019-03-06 | End: 2019-03-06

## 2019-03-05 RX ORDER — HYDROCORTISONE SODIUM SUCCINATE 100 MG/2ML
100 INJECTION, POWDER, FOR SOLUTION INTRAMUSCULAR; INTRAVENOUS AS NEEDED
Status: CANCELLED | OUTPATIENT
Start: 2019-03-06

## 2019-03-05 RX ORDER — DIPHENHYDRAMINE HYDROCHLORIDE 50 MG/ML
50 INJECTION, SOLUTION INTRAMUSCULAR; INTRAVENOUS AS NEEDED
Status: CANCELLED
Start: 2019-03-06

## 2019-03-05 RX ORDER — ACETAMINOPHEN 325 MG/1
650 TABLET ORAL AS NEEDED
Status: CANCELLED
Start: 2019-04-17

## 2019-03-05 RX ORDER — EPINEPHRINE 1 MG/ML
0.3 INJECTION, SOLUTION, CONCENTRATE INTRAVENOUS AS NEEDED
Status: CANCELLED | OUTPATIENT
Start: 2019-03-06

## 2019-03-05 RX ORDER — DIPHENHYDRAMINE HYDROCHLORIDE 50 MG/ML
50 INJECTION, SOLUTION INTRAMUSCULAR; INTRAVENOUS AS NEEDED
Status: CANCELLED
Start: 2019-04-17

## 2019-03-05 RX ORDER — SODIUM CHLORIDE 0.9 % (FLUSH) 0.9 %
10 SYRINGE (ML) INJECTION AS NEEDED
Status: CANCELLED
Start: 2019-04-17

## 2019-03-05 RX ORDER — SODIUM CHLORIDE 9 MG/ML
25 INJECTION, SOLUTION INTRAVENOUS CONTINUOUS
Status: CANCELLED | OUTPATIENT
Start: 2019-04-17 | End: 2019-04-17

## 2019-03-05 RX ORDER — SODIUM CHLORIDE 9 MG/ML
10 INJECTION INTRAMUSCULAR; INTRAVENOUS; SUBCUTANEOUS AS NEEDED
Status: CANCELLED | OUTPATIENT
Start: 2019-03-27

## 2019-03-05 RX ORDER — ALBUTEROL SULFATE 0.83 MG/ML
2.5 SOLUTION RESPIRATORY (INHALATION) AS NEEDED
Status: CANCELLED
Start: 2019-03-06

## 2019-03-05 RX ORDER — EPINEPHRINE 1 MG/ML
0.3 INJECTION, SOLUTION, CONCENTRATE INTRAVENOUS AS NEEDED
Status: CANCELLED | OUTPATIENT
Start: 2019-04-17

## 2019-03-05 RX ORDER — ONDANSETRON 2 MG/ML
8 INJECTION INTRAMUSCULAR; INTRAVENOUS AS NEEDED
Status: CANCELLED | OUTPATIENT
Start: 2019-03-27

## 2019-03-05 RX ORDER — ACETAMINOPHEN 325 MG/1
650 TABLET ORAL AS NEEDED
Status: CANCELLED
Start: 2019-03-27

## 2019-03-05 RX ORDER — PALONOSETRON 0.05 MG/ML
0.25 INJECTION, SOLUTION INTRAVENOUS ONCE
Status: CANCELLED | OUTPATIENT
Start: 2019-03-27 | End: 2019-03-25

## 2019-03-05 RX ORDER — DOXORUBICIN HYDROCHLORIDE 2 MG/ML
30 INJECTION, SOLUTION INTRAVENOUS ONCE
Status: CANCELLED
Start: 2019-03-27 | End: 2019-03-27

## 2019-03-05 RX ORDER — DOXORUBICIN HYDROCHLORIDE 2 MG/ML
30 INJECTION, SOLUTION INTRAVENOUS ONCE
Status: CANCELLED
Start: 2019-04-17 | End: 2019-04-17

## 2019-03-05 RX ORDER — SODIUM CHLORIDE 9 MG/ML
25 INJECTION, SOLUTION INTRAVENOUS CONTINUOUS
Status: CANCELLED | OUTPATIENT
Start: 2019-03-27 | End: 2019-03-27

## 2019-03-05 RX ORDER — SODIUM CHLORIDE 0.9 % (FLUSH) 0.9 %
10 SYRINGE (ML) INJECTION AS NEEDED
Status: CANCELLED
Start: 2019-03-27

## 2019-03-05 RX ORDER — ALBUTEROL SULFATE 0.83 MG/ML
2.5 SOLUTION RESPIRATORY (INHALATION) AS NEEDED
Status: CANCELLED
Start: 2019-03-27

## 2019-03-05 RX ORDER — PALONOSETRON 0.05 MG/ML
0.25 INJECTION, SOLUTION INTRAVENOUS ONCE
Status: CANCELLED | OUTPATIENT
Start: 2019-03-06 | End: 2019-03-05

## 2019-03-05 RX ORDER — HEPARIN 100 UNIT/ML
300-500 SYRINGE INTRAVENOUS AS NEEDED
Status: CANCELLED
Start: 2019-03-27

## 2019-03-05 RX ORDER — HEPARIN 100 UNIT/ML
300-500 SYRINGE INTRAVENOUS AS NEEDED
Status: CANCELLED
Start: 2019-04-17

## 2019-03-05 RX ORDER — SODIUM CHLORIDE 9 MG/ML
10 INJECTION INTRAMUSCULAR; INTRAVENOUS; SUBCUTANEOUS AS NEEDED
Status: CANCELLED | OUTPATIENT
Start: 2019-04-17

## 2019-03-05 RX ORDER — SODIUM CHLORIDE 9 MG/ML
10 INJECTION INTRAMUSCULAR; INTRAVENOUS; SUBCUTANEOUS AS NEEDED
Status: CANCELLED | OUTPATIENT
Start: 2019-03-06

## 2019-03-05 RX ORDER — HYDROCORTISONE SODIUM SUCCINATE 100 MG/2ML
100 INJECTION, POWDER, FOR SOLUTION INTRAMUSCULAR; INTRAVENOUS AS NEEDED
Status: CANCELLED | OUTPATIENT
Start: 2019-04-17

## 2019-03-05 RX ORDER — ALBUTEROL SULFATE 0.83 MG/ML
2.5 SOLUTION RESPIRATORY (INHALATION) AS NEEDED
Status: CANCELLED
Start: 2019-04-17

## 2019-03-05 RX ORDER — SODIUM CHLORIDE 0.9 % (FLUSH) 0.9 %
10 SYRINGE (ML) INJECTION AS NEEDED
Status: CANCELLED
Start: 2019-03-06

## 2019-03-05 RX ORDER — HEPARIN 100 UNIT/ML
300-500 SYRINGE INTRAVENOUS AS NEEDED
Status: CANCELLED
Start: 2019-03-06

## 2019-03-05 RX ORDER — ONDANSETRON 2 MG/ML
8 INJECTION INTRAMUSCULAR; INTRAVENOUS AS NEEDED
Status: CANCELLED | OUTPATIENT
Start: 2019-04-17

## 2019-03-05 RX ORDER — HYDROCORTISONE SODIUM SUCCINATE 100 MG/2ML
100 INJECTION, POWDER, FOR SOLUTION INTRAMUSCULAR; INTRAVENOUS AS NEEDED
Status: CANCELLED | OUTPATIENT
Start: 2019-03-27

## 2019-03-05 RX ORDER — ACETAMINOPHEN 325 MG/1
650 TABLET ORAL AS NEEDED
Status: CANCELLED
Start: 2019-03-06

## 2019-03-05 RX ORDER — PALONOSETRON 0.05 MG/ML
0.25 INJECTION, SOLUTION INTRAVENOUS ONCE
Status: CANCELLED | OUTPATIENT
Start: 2019-04-17 | End: 2019-04-15

## 2019-03-06 ENCOUNTER — OFFICE VISIT (OUTPATIENT)
Dept: ONCOLOGY | Age: 59
End: 2019-03-06

## 2019-03-06 ENCOUNTER — HOSPITAL ENCOUNTER (OUTPATIENT)
Dept: INFUSION THERAPY | Age: 59
Discharge: HOME | End: 2019-03-06
Payer: COMMERCIAL

## 2019-03-06 VITALS
TEMPERATURE: 98.8 F | SYSTOLIC BLOOD PRESSURE: 150 MMHG | DIASTOLIC BLOOD PRESSURE: 86 MMHG | BODY MASS INDEX: 36.77 KG/M2 | WEIGHT: 199.8 LBS | HEART RATE: 89 BPM | OXYGEN SATURATION: 98 % | RESPIRATION RATE: 18 BRPM | HEIGHT: 62 IN

## 2019-03-06 VITALS
HEIGHT: 62 IN | OXYGEN SATURATION: 97 % | WEIGHT: 199.2 LBS | RESPIRATION RATE: 18 BRPM | HEART RATE: 75 BPM | TEMPERATURE: 98.4 F | BODY MASS INDEX: 36.66 KG/M2 | DIASTOLIC BLOOD PRESSURE: 93 MMHG | SYSTOLIC BLOOD PRESSURE: 170 MMHG

## 2019-03-06 DIAGNOSIS — C50.412 MALIGNANT NEOPLASM OF UPPER-OUTER QUADRANT OF LEFT BREAST IN FEMALE, ESTROGEN RECEPTOR POSITIVE (HCC): Primary | ICD-10-CM

## 2019-03-06 DIAGNOSIS — Z17.1 MALIGNANT NEOPLASM OF UPPER-OUTER QUADRANT OF LEFT BREAST IN FEMALE, ESTROGEN RECEPTOR NEGATIVE (HCC): Primary | ICD-10-CM

## 2019-03-06 DIAGNOSIS — Z17.0 MALIGNANT NEOPLASM OF UPPER-OUTER QUADRANT OF LEFT BREAST IN FEMALE, ESTROGEN RECEPTOR POSITIVE (HCC): Primary | ICD-10-CM

## 2019-03-06 DIAGNOSIS — C50.412 MALIGNANT NEOPLASM OF UPPER-OUTER QUADRANT OF LEFT BREAST IN FEMALE, ESTROGEN RECEPTOR NEGATIVE (HCC): Primary | ICD-10-CM

## 2019-03-06 LAB
ALBUMIN SERPL-MCNC: 3.5 G/DL (ref 3.5–5)
ALBUMIN/GLOB SERPL: 0.9 {RATIO} (ref 1.1–2.2)
ALP SERPL-CCNC: 114 U/L (ref 45–117)
ALT SERPL-CCNC: 21 U/L (ref 12–78)
ANION GAP SERPL CALC-SCNC: 7 MMOL/L (ref 5–15)
AST SERPL-CCNC: 11 U/L (ref 15–37)
BASOPHILS # BLD: 0 K/UL (ref 0–0.1)
BASOPHILS NFR BLD: 1 % (ref 0–1)
BILIRUB SERPL-MCNC: 0.3 MG/DL (ref 0.2–1)
BUN SERPL-MCNC: 18 MG/DL (ref 6–20)
BUN/CREAT SERPL: 23 (ref 12–20)
CALCIUM SERPL-MCNC: 8.9 MG/DL (ref 8.5–10.1)
CHLORIDE SERPL-SCNC: 107 MMOL/L (ref 97–108)
CO2 SERPL-SCNC: 26 MMOL/L (ref 21–32)
CREAT SERPL-MCNC: 0.79 MG/DL (ref 0.55–1.02)
DIFFERENTIAL METHOD BLD: ABNORMAL
EOSINOPHIL # BLD: 0 K/UL (ref 0–0.4)
EOSINOPHIL NFR BLD: 1 % (ref 0–7)
ERYTHROCYTE [DISTWIDTH] IN BLOOD BY AUTOMATED COUNT: 15.3 % (ref 11.5–14.5)
GLOBULIN SER CALC-MCNC: 3.7 G/DL (ref 2–4)
GLUCOSE SERPL-MCNC: 100 MG/DL (ref 65–100)
HCT VFR BLD AUTO: 37.9 % (ref 35–47)
HGB BLD-MCNC: 12.1 G/DL (ref 11.5–16)
IMM GRANULOCYTES # BLD AUTO: 0 K/UL (ref 0–0.04)
IMM GRANULOCYTES NFR BLD AUTO: 1 % (ref 0–0.5)
LYMPHOCYTES # BLD: 1.4 K/UL (ref 0.8–3.5)
LYMPHOCYTES NFR BLD: 38 % (ref 12–49)
MCH RBC QN AUTO: 28.5 PG (ref 26–34)
MCHC RBC AUTO-ENTMCNC: 31.9 G/DL (ref 30–36.5)
MCV RBC AUTO: 89.4 FL (ref 80–99)
MONOCYTES # BLD: 0.5 K/UL (ref 0–1)
MONOCYTES NFR BLD: 14 % (ref 5–13)
NEUTS SEG # BLD: 1.6 K/UL (ref 1.8–8)
NEUTS SEG NFR BLD: 45 % (ref 32–75)
NRBC # BLD: 0.02 K/UL (ref 0–0.01)
NRBC BLD-RTO: 0.6 PER 100 WBC
PLATELET # BLD AUTO: 538 K/UL (ref 150–400)
PMV BLD AUTO: 9.9 FL (ref 8.9–12.9)
POTASSIUM SERPL-SCNC: 3.8 MMOL/L (ref 3.5–5.1)
PROT SERPL-MCNC: 7.2 G/DL (ref 6.4–8.2)
RBC # BLD AUTO: 4.24 M/UL (ref 3.8–5.2)
SODIUM SERPL-SCNC: 140 MMOL/L (ref 136–145)
TROPONIN I SERPL-MCNC: <0.05 NG/ML
WBC # BLD AUTO: 3.6 K/UL (ref 3.6–11)

## 2019-03-06 PROCEDURE — 77030012965 HC NDL HUBR BBMI -A

## 2019-03-06 PROCEDURE — 74011000258 HC RX REV CODE- 258: Performed by: INTERNAL MEDICINE

## 2019-03-06 PROCEDURE — 74011250636 HC RX REV CODE- 250/636: Performed by: INTERNAL MEDICINE

## 2019-03-06 PROCEDURE — 80053 COMPREHEN METABOLIC PANEL: CPT

## 2019-03-06 PROCEDURE — 84484 ASSAY OF TROPONIN QUANT: CPT

## 2019-03-06 PROCEDURE — 96367 TX/PROPH/DG ADDL SEQ IV INF: CPT

## 2019-03-06 PROCEDURE — 96411 CHEMO IV PUSH ADDL DRUG: CPT

## 2019-03-06 PROCEDURE — 96375 TX/PRO/DX INJ NEW DRUG ADDON: CPT

## 2019-03-06 PROCEDURE — 36415 COLL VENOUS BLD VENIPUNCTURE: CPT

## 2019-03-06 PROCEDURE — 96413 CHEMO IV INFUSION 1 HR: CPT

## 2019-03-06 PROCEDURE — 74011250636 HC RX REV CODE- 250/636: Performed by: NURSE PRACTITIONER

## 2019-03-06 PROCEDURE — 74011250636 HC RX REV CODE- 250/636

## 2019-03-06 PROCEDURE — 85025 COMPLETE CBC W/AUTO DIFF WBC: CPT

## 2019-03-06 RX ORDER — PALONOSETRON 0.05 MG/ML
0.25 INJECTION, SOLUTION INTRAVENOUS ONCE
Status: COMPLETED | OUTPATIENT
Start: 2019-03-06 | End: 2019-03-06

## 2019-03-06 RX ORDER — SODIUM CHLORIDE 9 MG/ML
25 INJECTION, SOLUTION INTRAVENOUS CONTINUOUS
Status: DISPENSED | OUTPATIENT
Start: 2019-03-06 | End: 2019-03-06

## 2019-03-06 RX ORDER — SODIUM CHLORIDE 0.9 % (FLUSH) 0.9 %
10 SYRINGE (ML) INJECTION AS NEEDED
Status: ACTIVE | OUTPATIENT
Start: 2019-03-06 | End: 2019-03-06

## 2019-03-06 RX ORDER — DOXORUBICIN HYDROCHLORIDE 2 MG/ML
30 INJECTION, SOLUTION INTRAVENOUS ONCE
Status: COMPLETED | OUTPATIENT
Start: 2019-03-06 | End: 2019-03-06

## 2019-03-06 RX ORDER — HEPARIN 100 UNIT/ML
300-500 SYRINGE INTRAVENOUS AS NEEDED
Status: ACTIVE | OUTPATIENT
Start: 2019-03-06 | End: 2019-03-06

## 2019-03-06 RX ORDER — SODIUM CHLORIDE 9 MG/ML
10 INJECTION INTRAMUSCULAR; INTRAVENOUS; SUBCUTANEOUS AS NEEDED
Status: ACTIVE | OUTPATIENT
Start: 2019-03-06 | End: 2019-03-06

## 2019-03-06 RX ADMIN — DEXAMETHASONE SODIUM PHOSPHATE 12 MG: 4 INJECTION, SOLUTION INTRA-ARTICULAR; INTRALESIONAL; INTRAMUSCULAR; INTRAVENOUS; SOFT TISSUE at 11:36

## 2019-03-06 RX ADMIN — DOXORUBICIN HYDROCHLORIDE 59.2 MG: 2 INJECTION, SOLUTION INTRAVENOUS at 13:05

## 2019-03-06 RX ADMIN — Medication 500 UNITS: at 13:50

## 2019-03-06 RX ADMIN — PALONOSETRON HYDROCHLORIDE 0.25 MG: 0.25 INJECTION, SOLUTION INTRAVENOUS at 11:29

## 2019-03-06 RX ADMIN — Medication 20 ML: at 13:50

## 2019-03-06 RX ADMIN — DOXORUBICIN HYDROCHLORIDE 59.2 MG: 2 INJECTION, SOLUTION INTRAVENOUS at 13:08

## 2019-03-06 RX ADMIN — SODIUM CHLORIDE 150 MG: 900 INJECTION, SOLUTION INTRAVENOUS at 12:35

## 2019-03-06 RX ADMIN — CYCLOPHOSPHAMIDE 1182 MG: 1 INJECTION, POWDER, FOR SOLUTION INTRAVENOUS; ORAL at 13:12

## 2019-03-06 RX ADMIN — SODIUM CHLORIDE 25 ML/HR: 900 INJECTION, SOLUTION INTRAVENOUS at 11:29

## 2019-03-06 RX ADMIN — SODIUM CHLORIDE 10 ML: 9 INJECTION INTRAMUSCULAR; INTRAVENOUS; SUBCUTANEOUS at 10:18

## 2019-03-06 RX ADMIN — Medication 10 ML: at 10:19

## 2019-03-06 NOTE — PROGRESS NOTES
Oncology Navigator  Psychosocial Assessment    Reason for Assessment:    []Depression  []Anxiety  []Caregiver [place]  []Maladaptive Coping with Serious Illness   [x]Other: Initial assessment - has completed 1 chemo - very upbeat positive, worked everyday    Sources of Information:    [x]Patient  []Family  []Staff  []Medical Record   35year old daughter and 9 yr old Jyotsna Silverio 2/11/2019   Confirm Advance Directive None       Mental Status:    [x]Alert  []Lethargic  []Unresponsive  Oriented to:  [x]Person  [x]Place  [x]Time  [x]Situation      Barriers to Learning:    []Language  []Developmental  []Cognitive  []Altered Mental Status  []Visual/Hearing Impairment  []Unable to Read/Write  []Motivational   []No Barriers Identified  []Other:    Relationship Status:  [x]Single  []  []Significant Other/Life Partner  []  []  []      Living Circumstances:  []Lives Alone  []Family/Significant Other in Household  []Roommates  [x]Children in the Home  []Paid Caregivers  []Assisted Living Facility/Group Home  []Skilled [address]  []Homeless  []Incarcerated  []Environmental/Care Concerns  []Other: 35 yr old child and 9 yr old granddtr    Support System:    []Strong  [x]Fair  []Limited    Financial/Legal Concerns:    []Uninsured  []Limited Income/Resources  []Non-Citizen  []No Concerns Identified  []Financial POA:    []Other:    Quaker/Spiritual/Existential:  []Strong Sense of Spirituality  []Involved in [facility]  []Request  Visit  []Expressing [place]  [x]No Concerns Identified    Coping with Illness:         Patient: Family/Caregiver:   Understanding and Acceptance of Illness/Prognosis  [x] [x]   Strong Sense of Resilience [x] [x]   Self Reflection [] []   Engaged Support System [x] []   Does not Readily Discuss Illness [] []   Denial of Terminal Status [] []   Anger [] []   Depression [] []   Anxiety/Fear [] [] Bargaining [] []   Recent Diagnosis/Prognosis [x] [x]   Difficulties with Body Image [] []   Loss of Identity [] []   Excessive Substance Use [] []   Mental Health History [] []   Enmeshed Relationships [] []   History of Loss [] []   Anticipatory Grief [] []   Concern for Complicated Grief [] []   Suicidal Ideation or Plan [] []   Unable to assess [] []                  Narrative:  Pt works at [employer] and is happy to rpt she was able to work everyday since last treatment. Pt tolerating treatment very well. Referrals:     I. Transportation    Medicaid ([employer]) []   [facility] []                                    Regional organization  []                                      Financial Assistance/Medication Access    Patient assistance program (Care Card) []   Co-pay assistance  []                                    Leukemia & Lymphoma Society []   [address]  []   Patient [address] []   [facility]  []     Emotional support    Peer support group []   Local counseling []                                    Online support group []   Coordination of psychiatry consult []     Goals/Plan:  Continue to provide psychosocial support as needed.

## 2019-03-06 NOTE — PROGRESS NOTES
Betty Jimenez is a 60 y.o. female here today for left breast cancer; triple negative f/u. Patient receiving A/C; cycle 2. Patient accompanied by her daughter to today's appt. Elevated B/P noted; pt denies taking B/P medication; other VS stable. Patient denies pain. Good appetite. Patient denies N/V/D and constipation. Patient denies numbness and tingling. Patient denies mouth ulcers. Patient denies cough. Patient denies SOB. Patient denies falls. Patient reports fatigue. Visit Vitals  BP (!) 170/93 (BP 1 Location: Right arm, BP Patient Position: Sitting)   Pulse 75   Temp 98.4 °F (36.9 °C) (Oral)   Resp 18   Ht 5' 2\" (1.575 m)   Wt 199 lb 3.2 oz (90.4 kg)   SpO2 97%   BMI 36.43 kg/m²       Pain Scale: 0 - No pain/10  Pain Location:     1. Have you been to the ER, urgent care clinic since your last visit? Hospitalized since your last visit? No    2. Have you seen or consulted any other health care providers outside of the [address] [place] [address] since your last visit? Include any pap smears or colon screening. No     Health Maintenance Review: Patient reminded of \"due or due soon\" health maintenance. I have asked the patient to contact his/her primary care provider (PCP) for follow-up on his/her health maintenance.

## 2019-03-06 NOTE — PROGRESS NOTES
Pt arrived to [facility] ambulatory for [facility] C2 in no acute distress at 1005.  Assessment unremarkable, no new concerns voiced. R chest port accessed without issue and positive blood return noted.  Labs obtained- CBC with diff and CMP. Pt to MD office for follow-up appointment. Visit Vitals  BP (!) 168/96 (BP 1 Location: Left arm, BP Patient Position: Sitting)   Pulse 93   Temp 98.8 °F (37.1 °C)   Resp 18   Ht 5' 2\" (1.575 m)   Wt 90.6 kg (199 lb 12.8 oz)   SpO2 98%   BMI 36.54 kg/m²     Recent Results (from the past 12 hour(s))   CBC WITH AUTOMATED DIFF    Collection Time: 03/06/19 10:11 AM   Result Value Ref Range    WBC 3.6 3.6 - 11.0 K/uL    RBC 4.24 3.80 - 5.20 M/uL    HGB 12.1 11.5 - 16.0 g/dL    HCT 37.9 35.0 - 47.0 %    MCV 89.4 80.0 - 99.0 FL    MCH 28.5 26.0 - 34.0 PG    MCHC 31.9 30.0 - 36.5 g/dL    RDW 15.3 (H) 11.5 - 14.5 %    PLATELET 423 (H) 860 - 400 K/uL    MPV 9.9 8.9 - 12.9 FL    NRBC 0.6 (H) 0  WBC    ABSOLUTE NRBC 0.02 (H) 0.00 - 0.01 K/uL    NEUTROPHILS 45 32 - 75 %    LYMPHOCYTES 38 12 - 49 %    MONOCYTES 14 (H) 5 - 13 %    EOSINOPHILS 1 0 - 7 %    BASOPHILS 1 0 - 1 %    IMMATURE GRANULOCYTES 1 (H) 0.0 - 0.5 %    ABS. NEUTROPHILS 1.6 (L) 1.8 - 8.0 K/UL    ABS. LYMPHOCYTES 1.4 0.8 - 3.5 K/UL    ABS. MONOCYTES 0.5 0.0 - 1.0 K/UL    ABS. EOSINOPHILS 0.0 0.0 - 0.4 K/UL    ABS. BASOPHILS 0.0 0.0 - 0.1 K/UL    ABS. IMM.  GRANS. 0.0 0.00 - 0.04 K/UL    DF AUTOMATED     METABOLIC PANEL, COMPREHENSIVE    Collection Time: 03/06/19 10:11 AM   Result Value Ref Range    Sodium 140 136 - 145 mmol/L    Potassium 3.8 3.5 - 5.1 mmol/L    Chloride 107 97 - 108 mmol/L    CO2 26 21 - 32 mmol/L    Anion gap 7 5 - 15 mmol/L    Glucose 100 65 - 100 mg/dL    BUN 18 6 - 20 MG/DL    Creatinine 0.79 0.55 - 1.02 MG/DL    BUN/Creatinine ratio 23 (H) 12 - 20      GFR est AA >60 >60 ml/min/1.73m2    GFR est non-AA >60 >60 ml/min/1.73m2    Calcium 8.9 8.5 - 10.1 MG/DL    Bilirubin, total 0.3 0.2 - 1.0 MG/DL ALT (SGPT) 21 12 - 78 U/L    AST (SGOT) 11 (L) 15 - 37 U/L    Alk. phosphatase 114 45 - 117 U/L    Protein, total 7.2 6.4 - 8.2 g/dL    Albumin 3.5 3.5 - 5.0 g/dL    Globulin 3.7 2.0 - 4.0 g/dL    A-G Ratio 0.9 (L) 1.1 - 2.2         The following medications administered:  NS @ KVO  Aloxi 0.25 mg IVP  Decadron 12 mg IVP  Emend 150 mg IV over 20 minutes  Adriamycin 118.4 mg IVP  Cytoxan 1,182 mg IV over 30 minutes    Visit Vitals  /86 (BP 1 Location: Right arm, BP Patient Position: Sitting)   Pulse 89   Temp 98.8 °F (37.1 °C)   Resp 18   Ht 5' 2\" (1.575 m)   Wt 90.6 kg (199 lb 12.8 oz)   SpO2 98%   BMI 36.54 kg/m²     Troponin level drawn at conclusion of treatment. Pending at time of note. Pt tolerated treatment well.  No adverse reaction noted. Port flushed per policy and needle removed, 2x2 and paper tape placed.  Pt discharged ambulatory in no acute distress at 1350, accompanied by self. Next appointment 3/27/19 @ 1000.

## 2019-03-25 ENCOUNTER — APPOINTMENT (OUTPATIENT)
Dept: INFUSION THERAPY | Age: 59
End: 2019-03-25
Payer: COMMERCIAL

## 2019-03-27 ENCOUNTER — OFFICE VISIT (OUTPATIENT)
Dept: ONCOLOGY | Age: 59
End: 2019-03-27

## 2019-03-27 ENCOUNTER — HOSPITAL ENCOUNTER (OUTPATIENT)
Dept: INFUSION THERAPY | Age: 59
Discharge: HOME | End: 2019-03-27
Payer: COMMERCIAL

## 2019-03-27 VITALS
SYSTOLIC BLOOD PRESSURE: 148 MMHG | HEIGHT: 62 IN | HEART RATE: 90 BPM | BODY MASS INDEX: 36.62 KG/M2 | TEMPERATURE: 98.4 F | RESPIRATION RATE: 16 BRPM | WEIGHT: 199 LBS | DIASTOLIC BLOOD PRESSURE: 84 MMHG

## 2019-03-27 VITALS
HEIGHT: 62 IN | WEIGHT: 199.1 LBS | HEART RATE: 83 BPM | TEMPERATURE: 98.9 F | DIASTOLIC BLOOD PRESSURE: 90 MMHG | OXYGEN SATURATION: 99 % | SYSTOLIC BLOOD PRESSURE: 151 MMHG | BODY MASS INDEX: 36.64 KG/M2 | RESPIRATION RATE: 18 BRPM

## 2019-03-27 DIAGNOSIS — Z17.0 MALIGNANT NEOPLASM OF UPPER-OUTER QUADRANT OF LEFT BREAST IN FEMALE, ESTROGEN RECEPTOR POSITIVE (HCC): Primary | ICD-10-CM

## 2019-03-27 DIAGNOSIS — C50.412 MALIGNANT NEOPLASM OF UPPER-OUTER QUADRANT OF LEFT BREAST IN FEMALE, ESTROGEN RECEPTOR NEGATIVE (HCC): Primary | ICD-10-CM

## 2019-03-27 DIAGNOSIS — C50.412 MALIGNANT NEOPLASM OF UPPER-OUTER QUADRANT OF LEFT BREAST IN FEMALE, ESTROGEN RECEPTOR POSITIVE (HCC): Primary | ICD-10-CM

## 2019-03-27 DIAGNOSIS — Z17.1 MALIGNANT NEOPLASM OF UPPER-OUTER QUADRANT OF LEFT BREAST IN FEMALE, ESTROGEN RECEPTOR NEGATIVE (HCC): Primary | ICD-10-CM

## 2019-03-27 LAB
ALBUMIN SERPL-MCNC: 3.3 G/DL (ref 3.5–5)
ALBUMIN/GLOB SERPL: 0.9 {RATIO} (ref 1.1–2.2)
ALP SERPL-CCNC: 95 U/L (ref 45–117)
ALT SERPL-CCNC: 16 U/L (ref 12–78)
ANION GAP SERPL CALC-SCNC: 7 MMOL/L (ref 5–15)
AST SERPL-CCNC: 12 U/L (ref 15–37)
BASO+EOS+MONOS # BLD AUTO: 0.4 K/UL (ref 0.2–1.2)
BASO+EOS+MONOS NFR BLD AUTO: 17 % (ref 3.2–16.9)
BILIRUB SERPL-MCNC: 0.2 MG/DL (ref 0.2–1)
BUN SERPL-MCNC: 14 MG/DL (ref 6–20)
BUN/CREAT SERPL: 18 (ref 12–20)
CALCIUM SERPL-MCNC: 8.7 MG/DL (ref 8.5–10.1)
CHLORIDE SERPL-SCNC: 109 MMOL/L (ref 97–108)
CO2 SERPL-SCNC: 25 MMOL/L (ref 21–32)
CREAT SERPL-MCNC: 0.78 MG/DL (ref 0.55–1.02)
DIFFERENTIAL METHOD BLD: ABNORMAL
ERYTHROCYTE [DISTWIDTH] IN BLOOD BY AUTOMATED COUNT: 16.1 % (ref 11.8–15.8)
GLOBULIN SER CALC-MCNC: 3.5 G/DL (ref 2–4)
GLUCOSE SERPL-MCNC: 104 MG/DL (ref 65–100)
HCT VFR BLD AUTO: 34 % (ref 35–47)
HGB BLD-MCNC: 11.1 G/DL (ref 11.5–16)
LYMPHOCYTES # BLD: 1 K/UL (ref 0.8–3.5)
LYMPHOCYTES NFR BLD: 44 % (ref 12–49)
MCH RBC QN AUTO: 28.8 PG (ref 26–34)
MCHC RBC AUTO-ENTMCNC: 32.6 G/DL (ref 30–36.5)
MCV RBC AUTO: 88.1 FL (ref 80–99)
NEUTS SEG # BLD: 1 K/UL (ref 1.8–8)
NEUTS SEG NFR BLD: 39 % (ref 32–75)
PLATELET # BLD AUTO: 496 K/UL (ref 150–400)
POTASSIUM SERPL-SCNC: 3.7 MMOL/L (ref 3.5–5.1)
PROT SERPL-MCNC: 6.8 G/DL (ref 6.4–8.2)
RBC # BLD AUTO: 3.86 M/UL (ref 3.8–5.2)
SODIUM SERPL-SCNC: 141 MMOL/L (ref 136–145)
WBC # BLD AUTO: 2.4 K/UL (ref 3.6–11)

## 2019-03-27 PROCEDURE — 96375 TX/PRO/DX INJ NEW DRUG ADDON: CPT

## 2019-03-27 PROCEDURE — 74011250636 HC RX REV CODE- 250/636

## 2019-03-27 PROCEDURE — 96411 CHEMO IV PUSH ADDL DRUG: CPT

## 2019-03-27 PROCEDURE — 80053 COMPREHEN METABOLIC PANEL: CPT

## 2019-03-27 PROCEDURE — 74011250636 HC RX REV CODE- 250/636: Performed by: INTERNAL MEDICINE

## 2019-03-27 PROCEDURE — 96367 TX/PROPH/DG ADDL SEQ IV INF: CPT

## 2019-03-27 PROCEDURE — 36415 COLL VENOUS BLD VENIPUNCTURE: CPT

## 2019-03-27 PROCEDURE — 77030012965 HC NDL HUBR BBMI -A

## 2019-03-27 PROCEDURE — 85025 COMPLETE CBC W/AUTO DIFF WBC: CPT

## 2019-03-27 PROCEDURE — 74011000258 HC RX REV CODE- 258: Performed by: INTERNAL MEDICINE

## 2019-03-27 PROCEDURE — 96413 CHEMO IV INFUSION 1 HR: CPT

## 2019-03-27 RX ORDER — SODIUM CHLORIDE 9 MG/ML
25 INJECTION, SOLUTION INTRAVENOUS CONTINUOUS
Status: DISPENSED | OUTPATIENT
Start: 2019-03-27 | End: 2019-03-27

## 2019-03-27 RX ORDER — DOXORUBICIN HYDROCHLORIDE 2 MG/ML
30 INJECTION, SOLUTION INTRAVENOUS ONCE
Status: COMPLETED | OUTPATIENT
Start: 2019-03-27 | End: 2019-03-27

## 2019-03-27 RX ORDER — SODIUM CHLORIDE 0.9 % (FLUSH) 0.9 %
10 SYRINGE (ML) INJECTION AS NEEDED
Status: ACTIVE | OUTPATIENT
Start: 2019-03-27 | End: 2019-03-27

## 2019-03-27 RX ORDER — HEPARIN 100 UNIT/ML
300-500 SYRINGE INTRAVENOUS AS NEEDED
Status: ACTIVE | OUTPATIENT
Start: 2019-03-27 | End: 2019-03-27

## 2019-03-27 RX ORDER — PALONOSETRON 0.05 MG/ML
0.25 INJECTION, SOLUTION INTRAVENOUS ONCE
Status: COMPLETED | OUTPATIENT
Start: 2019-03-27 | End: 2019-03-27

## 2019-03-27 RX ORDER — SODIUM CHLORIDE 9 MG/ML
10 INJECTION INTRAMUSCULAR; INTRAVENOUS; SUBCUTANEOUS AS NEEDED
Status: ACTIVE | OUTPATIENT
Start: 2019-03-27 | End: 2019-03-27

## 2019-03-27 RX ADMIN — SODIUM CHLORIDE 25 ML/HR: 900 INJECTION, SOLUTION INTRAVENOUS at 11:16

## 2019-03-27 RX ADMIN — DOXORUBICIN HYDROCHLORIDE 59.2 MG: 2 INJECTION, SOLUTION INTRAVENOUS at 12:15

## 2019-03-27 RX ADMIN — CYCLOPHOSPHAMIDE 1182 MG: 1 INJECTION, POWDER, FOR SOLUTION INTRAVENOUS; ORAL at 12:25

## 2019-03-27 RX ADMIN — PALONOSETRON HYDROCHLORIDE 0.25 MG: 0.25 INJECTION, SOLUTION INTRAVENOUS at 11:17

## 2019-03-27 RX ADMIN — Medication 500 UNITS: at 12:58

## 2019-03-27 RX ADMIN — Medication 10 ML: at 10:11

## 2019-03-27 RX ADMIN — SODIUM CHLORIDE 150 MG: 900 INJECTION, SOLUTION INTRAVENOUS at 11:18

## 2019-03-27 RX ADMIN — Medication 10 ML: at 12:58

## 2019-03-27 RX ADMIN — DEXAMETHASONE SODIUM PHOSPHATE 12 MG: 4 INJECTION, SOLUTION INTRA-ARTICULAR; INTRALESIONAL; INTRAMUSCULAR; INTRAVENOUS; SOFT TISSUE at 11:20

## 2019-03-27 NOTE — PROGRESS NOTES
Jeferson Smalls is a 60 y.o. female here today for left breast cancer; triple negative f/u. Patient receiving A/C; cycle 3. Patient accompanied by her daughter to today's appt. VS stable. Patient denies pain. Decreased appetite; stable weight. Patient denies N/V/D and constipation. Patient report numbness and tingling in her fingers. Patient denies mouth ulcers. Patient denies cough. Patient denies SOB. Visit Vitals  /84 (BP 1 Location: Left arm, BP Patient Position: Sitting)   Pulse 90   Temp 98.4 °F (36.9 °C) (Oral)   Resp 16   Ht 5' 2\" (1.575 m)   Wt 199 lb (90.3 kg)   BMI 36.40 kg/m²       Pain Scale: 0 - No pain/10  Pain Location:     Health Maintenance Review: Patient reminded of \"due or due soon\" health maintenance. I have asked the patient to contact his/her primary care provider (PCP) for follow-up on his/her health maintenance.

## 2019-03-27 NOTE — PROGRESS NOTES
Pt arrived to [facility] ambulatory in no acute distress at 1000 for [facility] C3.  Assessment unremarkable. R chest port accessed without issue and positive blood return noted.  Labs obtained, CBCap, CMP. Visit Vitals  /89 (BP 1 Location: Left arm, BP Patient Position: Sitting)   Pulse 84   Temp 98.9 °F (37.2 °C)   Resp 18   Ht 5' 2\" (1.575 m)   Wt 90.3 kg (199 lb 1.6 oz)   SpO2 99%   BMI 36.42 kg/m²     Recent Results (from the past 12 hour(s))   CBC WITH 3 PART DIFF    Collection Time: 03/27/19 10:07 AM   Result Value Ref Range    WBC 2.4 (L) 3.6 - 11.0 K/uL    RBC 3.86 3.80 - 5.20 M/uL    HGB 11.1 (L) 11.5 - 16.0 g/dL    HCT 34.0 (L) 35.0 - 47.0 %    MCV 88.1 80.0 - 99.0 FL    MCH 28.8 26.0 - 34.0 PG    MCHC 32.6 30.0 - 36.5 g/dL    RDW 16.1 (H) 11.8 - 15.8 %    PLATELET 981 (H) 798 - 400 K/uL    NEUTROPHILS 39 32 - 75 %    MIXED CELLS 17 (H) 3.2 - 16.9 %    LYMPHOCYTES 44 12 - 49 %    ABS. NEUTROPHILS 1.0 (L) 1.8 - 8.0 K/UL    ABS. MIXED CELLS 0.4 0.2 - 1.2 K/uL    ABS. LYMPHOCYTES 1.0 0.8 - 3.5 K/UL    DF AUTOMATED     METABOLIC PANEL, COMPREHENSIVE    Collection Time: 03/27/19 10:07 AM   Result Value Ref Range    Sodium 141 136 - 145 mmol/L    Potassium 3.7 3.5 - 5.1 mmol/L    Chloride 109 (H) 97 - 108 mmol/L    CO2 25 21 - 32 mmol/L    Anion gap 7 5 - 15 mmol/L    Glucose 104 (H) 65 - 100 mg/dL    BUN 14 6 - 20 MG/DL    Creatinine 0.78 0.55 - 1.02 MG/DL    BUN/Creatinine ratio 18 12 - 20      GFR est AA >60 >60 ml/min/1.73m2    GFR est non-AA >60 >60 ml/min/1.73m2    Calcium 8.7 8.5 - 10.1 MG/DL    Bilirubin, total 0.2 0.2 - 1.0 MG/DL    ALT (SGPT) 16 12 - 78 U/L    AST (SGOT) 12 (L) 15 - 37 U/L    Alk.  phosphatase 95 45 - 117 U/L    Protein, total 6.8 6.4 - 8.2 g/dL    Albumin 3.3 (L) 3.5 - 5.0 g/dL    Globulin 3.5 2.0 - 4.0 g/dL    A-G Ratio 0.9 (L) 1.1 - 2.2       The following medications administered:  [email or web address]  Aloxi 0.25mg IVP  Decadron 12mg IV over 10 minutes  Emend 150mg IV over 20 minutes  Doxorubicin 59.2 x 2 IVP  Cytoxan 1182mg IV over 30 minutes    Visit Vitals  /90   Pulse 83   Temp 98.9 °F (37.2 °C)   Resp 18   Ht 5' 2\" (1.575 m)   Wt 90.3 kg (199 lb 1.6 oz)   SpO2 99%   BMI 36.42 kg/m²     Pt tolerated treatment well. Port flushed per policy and de-accessed, 2x2 and tape placed.  Pt discharged ambulatory in no acute distress at 1300, accompanied by family. Next appointment 4/17/19 at 1000.

## 2019-03-27 NOTE — PROGRESS NOTES
[facility]  at [address] [place] [address], [address] [facility]  [phone]      Follow-up Note        Patient: Guillermo Lozano MRN: [id number]  SSN: [id number]    YOB: 1960  Age: 60 y.o. Sex: female        Diagnosis:     1. Left breast carcinoma:  T1c N1mi M0 (Stage I) infiltrating ductal carcinoma, Tumor size 1.6 cm, LN 1/3 with micromet, grade 3, ER -ve, NE -ve, Her 2 -ve    Treatment:     1. Neoadjuvant chemotherapy   Adriamycin, cytoxan - Cycle 3 Day 1    Subjective:      Guillermo Lozano is a 60 y.o. female with a diagnosis of left sided invasive breast cancer. She felt a lump in the left breast, went to see her PCP, got a mammogram and was noted to have abnormal density in the left upper outer quadrant of the breast. A biopsy of the mass revealed gr 3 IDC, TNBC. The axillary LN is clear of disease. She saw Dr. Valerie Wadsworth. An MRI of the breast shows the tumor to be larger than previously believed to be. A left axillary LN biopsy showed 1/3 nodes with micrometastasis. She works at [employer] full time. Ms. Elmira Shaw is receiving neoadjuvant chemotherapy. She is tolerating treatment extremely well and continues to work full time. She is here today with her daughter. She notes decreased appetite but weight remains stable. Review of Systems:    Constitutional: decreased appetite  Eyes: negative  Ears, Nose, Mouth, Throat, and Face: negative  Respiratory: negative  Cardiovascular: negative  Gastrointestinal: negative  Genitourinary:negative  Integument/Breast: negative  Hematologic/Lymphatic: negative  Musculoskeletal:negative  Neurological: negative        Past Medical History:   Diagnosis Date    Breast cancer ([address]     Menopause      Past Surgical History:   Procedure Laterality Date    HX BREAST BIOPSY Left     x2    HX HYSTERECTOMY      partial, ovaries remain      History reviewed.  No pertinent family history. Social History     Tobacco Use    Smoking status: Former Smoker     Last attempt to quit: 3/6/2013     Years since quittin.0    Smokeless tobacco: Never Used    Tobacco comment: Quit smoking 23 days ago   Substance Use Topics    Alcohol use: No      Prior to Admission medications    Medication Sig Start Date End Date Taking? Authorizing Provider   oxyCODONE-acetaminophen (PERCOCET) 5-325 mg per tablet Take 1 Tab by mouth every four (4) hours as needed for Pain. Max Daily Amount: 6 Tabs. 19  Yes Adriane Zaidi MD   lidocaine-prilocaine (EMLA) topical cream Apply  to affected area as needed for Pain. 19  Yes Iva Monson MD   ondansetron (ZOFRAN ODT) 4 mg disintegrating tablet Take 1 Tab by mouth every eight (8) hours as needed for Nausea. 19  Yes Iva Monson MD   diphenhydrAMINE (BENADRYL) 25 mg capsule Take 25 mg by mouth every six (6) hours as needed. Yes Provider, Historical              Allergies   Allergen Reactions    Codeine Rash     Rash from tylenol #3           Objective:     Visit Vitals  /84 (BP 1 Location: Left arm, BP Patient Position: Sitting)   Pulse 90   Temp 98.4 °F (36.9 °C) (Oral)   Resp 16   Ht 5' 2\" (1.575 m)   Wt 199 lb (90.3 kg)   BMI 36.40 kg/m²       Pain Scale: 0/10  Pain Location:       Physical Exam:    GENERAL: alert, cooperative, no distress, appears stated age  EYE: conjunctivae/corneas clear. PERRL, EOM's intact. Fundi benign  LYMPHATIC: Cervical, supraclavicular, and axillary nodes normal.   THROAT & NECK: normal and no erythema or exudates noted. LUNG: clear to auscultation bilaterally  HEART: regular rate and rhythm, S1, S2 normal, no murmur, click, rub or gallop  ABDOMEN: soft, non-tender. Bowel sounds normal. No masses,  no organomegaly  EXTREMITIES:  extremities normal, atraumatic, no cyanosis or edema  SKIN: Normal.  NEUROLOGIC: AOx3.  Gait normal. Reflexes and motor strength normal and symmetric      Lab Results   Component Value Date/Time    WBC 2.4 (L) 03/27/2019 10:07 AM    HGB 11.1 (L) 03/27/2019 10:07 AM    HCT 34.0 (L) 03/27/2019 10:07 AM    PLATELET 531 (H) 56/30/9032 10:07 AM    MCV 88.1 03/27/2019 10:07 AM       Lab Results   Component Value Date/Time    Sodium 141 03/27/2019 10:07 AM    Potassium 3.7 03/27/2019 10:07 AM    Chloride 109 (H) 03/27/2019 10:07 AM    CO2 25 03/27/2019 10:07 AM    Anion gap 7 03/27/2019 10:07 AM    Glucose 104 (H) 03/27/2019 10:07 AM    BUN 14 03/27/2019 10:07 AM    Creatinine 0.78 03/27/2019 10:07 AM    BUN/Creatinine ratio 18 03/27/2019 10:07 AM    GFR est AA >60 03/27/2019 10:07 AM    GFR est non-AA >60 03/27/2019 10:07 AM    Calcium 8.7 03/27/2019 10:07 AM    Bilirubin, total 0.2 03/27/2019 10:07 AM    AST (SGOT) 12 (L) 03/27/2019 10:07 AM    Alk. phosphatase 95 03/27/2019 10:07 AM    Protein, total 6.8 03/27/2019 10:07 AM    Albumin 3.3 (L) 03/27/2019 10:07 AM    Globulin 3.5 03/27/2019 10:07 AM    A-G Ratio 0.9 (L) 03/27/2019 10:07 AM    ALT (SGPT) 16 03/27/2019 10:07 AM           Assessment:     1. Left breast carcinoma:  T1c N1mi M0 (Stage I) infiltrating ductal carcinoma, Tumor size 1.6 cm, LN 1/3 with micromet, grade 3, ER -ve, GA -ve, Her 2 -ve    ECOG PS 0  Intent of Treatment - curative  Prognosis - good        Echocardiogram (1/30/2019): Normal, LVEF 56-60%    Receiving neoadjuvant chemotherapy   Adriamycin, Cyclophosphamide - Cycle 3 Day 1    Tolerating treatment   A detailed system by system evaluation of side effect was performed to assess chemotherapy related toxicity. Blood counts are acceptable. Results reviewed with the patient. Symptom management form reviewed with patient. Plan:       > Continue neoadjuvant chemotherapy  > Zofran and compazine as needed  > Follow-up in 3 weeks        Signed by: Clementine Cheadle, MD                     March 27, 2019        CC. Alejandrina Ramirez MD  CC.  Bonnie Ruiz MD

## 2019-04-17 ENCOUNTER — HOSPITAL ENCOUNTER (OUTPATIENT)
Dept: INFUSION THERAPY | Age: 59
Discharge: HOME | End: 2019-04-17
Payer: COMMERCIAL

## 2019-04-17 ENCOUNTER — OFFICE VISIT (OUTPATIENT)
Dept: ONCOLOGY | Age: 59
End: 2019-04-17

## 2019-04-17 VITALS
HEART RATE: 99 BPM | HEIGHT: 62 IN | SYSTOLIC BLOOD PRESSURE: 153 MMHG | OXYGEN SATURATION: 98 % | BODY MASS INDEX: 35.9 KG/M2 | RESPIRATION RATE: 16 BRPM | WEIGHT: 195.1 LBS | DIASTOLIC BLOOD PRESSURE: 83 MMHG | TEMPERATURE: 98.3 F

## 2019-04-17 VITALS
SYSTOLIC BLOOD PRESSURE: 165 MMHG | DIASTOLIC BLOOD PRESSURE: 89 MMHG | RESPIRATION RATE: 16 BRPM | TEMPERATURE: 98 F | HEIGHT: 62 IN | BODY MASS INDEX: 35.9 KG/M2 | WEIGHT: 195.1 LBS | OXYGEN SATURATION: 98 % | HEART RATE: 78 BPM

## 2019-04-17 DIAGNOSIS — C50.412 MALIGNANT NEOPLASM OF UPPER-OUTER QUADRANT OF LEFT BREAST IN FEMALE, ESTROGEN RECEPTOR POSITIVE (HCC): Primary | ICD-10-CM

## 2019-04-17 DIAGNOSIS — Z17.0 MALIGNANT NEOPLASM OF UPPER-OUTER QUADRANT OF LEFT BREAST IN FEMALE, ESTROGEN RECEPTOR POSITIVE (HCC): Primary | ICD-10-CM

## 2019-04-17 DIAGNOSIS — Z79.899 ENCOUNTER FOR MONITORING CARDIOTOXIC DRUG THERAPY: ICD-10-CM

## 2019-04-17 DIAGNOSIS — Z51.81 ENCOUNTER FOR MONITORING CARDIOTOXIC DRUG THERAPY: ICD-10-CM

## 2019-04-17 LAB
ALBUMIN SERPL-MCNC: 3.5 G/DL (ref 3.5–5)
ALBUMIN/GLOB SERPL: 1.1 {RATIO} (ref 1.1–2.2)
ALP SERPL-CCNC: 90 U/L (ref 45–117)
ALT SERPL-CCNC: 21 U/L (ref 12–78)
ANION GAP SERPL CALC-SCNC: 4 MMOL/L (ref 5–15)
AST SERPL-CCNC: 15 U/L (ref 15–37)
BASOPHILS # BLD: 0 K/UL (ref 0–0.1)
BASOPHILS NFR BLD: 1 % (ref 0–1)
BILIRUB SERPL-MCNC: 0.3 MG/DL (ref 0.2–1)
BUN SERPL-MCNC: 13 MG/DL (ref 6–20)
BUN/CREAT SERPL: 18 (ref 12–20)
CALCIUM SERPL-MCNC: 8.9 MG/DL (ref 8.5–10.1)
CHLORIDE SERPL-SCNC: 110 MMOL/L (ref 97–108)
CO2 SERPL-SCNC: 28 MMOL/L (ref 21–32)
CREAT SERPL-MCNC: 0.72 MG/DL (ref 0.55–1.02)
DIFFERENTIAL METHOD BLD: ABNORMAL
EOSINOPHIL # BLD: 0 K/UL (ref 0–0.4)
EOSINOPHIL NFR BLD: 1 % (ref 0–7)
ERYTHROCYTE [DISTWIDTH] IN BLOOD BY AUTOMATED COUNT: 16.3 % (ref 11.5–14.5)
GLOBULIN SER CALC-MCNC: 3.2 G/DL (ref 2–4)
GLUCOSE SERPL-MCNC: 97 MG/DL (ref 65–100)
HCT VFR BLD AUTO: 34 % (ref 35–47)
HGB BLD-MCNC: 11.2 G/DL (ref 11.5–16)
IMM GRANULOCYTES # BLD AUTO: 0 K/UL (ref 0–0.04)
IMM GRANULOCYTES NFR BLD AUTO: 0 % (ref 0–0.5)
LYMPHOCYTES # BLD: 0.9 K/UL (ref 0.8–3.5)
LYMPHOCYTES NFR BLD: 36 % (ref 12–49)
MCH RBC QN AUTO: 29.6 PG (ref 26–34)
MCHC RBC AUTO-ENTMCNC: 32.9 G/DL (ref 30–36.5)
MCV RBC AUTO: 89.7 FL (ref 80–99)
MONOCYTES # BLD: 0.5 K/UL (ref 0–1)
MONOCYTES NFR BLD: 20 % (ref 5–13)
NEUTS SEG # BLD: 1.2 K/UL (ref 1.8–8)
NEUTS SEG NFR BLD: 42 % (ref 32–75)
NRBC # BLD: 0 K/UL (ref 0–0.01)
NRBC BLD-RTO: 0 PER 100 WBC
PLATELET # BLD AUTO: 458 K/UL (ref 150–400)
PMV BLD AUTO: 9.8 FL (ref 8.9–12.9)
POTASSIUM SERPL-SCNC: 3.6 MMOL/L (ref 3.5–5.1)
PROT SERPL-MCNC: 6.7 G/DL (ref 6.4–8.2)
RBC # BLD AUTO: 3.79 M/UL (ref 3.8–5.2)
RBC MORPH BLD: ABNORMAL
SODIUM SERPL-SCNC: 142 MMOL/L (ref 136–145)
WBC # BLD AUTO: 2.6 K/UL (ref 3.6–11)

## 2019-04-17 PROCEDURE — 36415 COLL VENOUS BLD VENIPUNCTURE: CPT

## 2019-04-17 PROCEDURE — 85025 COMPLETE CBC W/AUTO DIFF WBC: CPT

## 2019-04-17 PROCEDURE — 96367 TX/PROPH/DG ADDL SEQ IV INF: CPT

## 2019-04-17 PROCEDURE — 74011000250 HC RX REV CODE- 250: Performed by: INTERNAL MEDICINE

## 2019-04-17 PROCEDURE — 77030012965 HC NDL HUBR BBMI -A

## 2019-04-17 PROCEDURE — 96413 CHEMO IV INFUSION 1 HR: CPT

## 2019-04-17 PROCEDURE — 74011250636 HC RX REV CODE- 250/636: Performed by: INTERNAL MEDICINE

## 2019-04-17 PROCEDURE — 96411 CHEMO IV PUSH ADDL DRUG: CPT

## 2019-04-17 PROCEDURE — 80053 COMPREHEN METABOLIC PANEL: CPT

## 2019-04-17 PROCEDURE — 96375 TX/PRO/DX INJ NEW DRUG ADDON: CPT

## 2019-04-17 PROCEDURE — 74011250636 HC RX REV CODE- 250/636

## 2019-04-17 PROCEDURE — 74011000258 HC RX REV CODE- 258: Performed by: INTERNAL MEDICINE

## 2019-04-17 RX ORDER — SODIUM CHLORIDE 0.9 % (FLUSH) 0.9 %
10 SYRINGE (ML) INJECTION AS NEEDED
Status: ACTIVE | OUTPATIENT
Start: 2019-04-17 | End: 2019-04-17

## 2019-04-17 RX ORDER — PALONOSETRON 0.05 MG/ML
0.25 INJECTION, SOLUTION INTRAVENOUS ONCE
Status: COMPLETED | OUTPATIENT
Start: 2019-04-17 | End: 2019-04-17

## 2019-04-17 RX ORDER — HEPARIN 100 UNIT/ML
300-500 SYRINGE INTRAVENOUS AS NEEDED
Status: ACTIVE | OUTPATIENT
Start: 2019-04-17 | End: 2019-04-17

## 2019-04-17 RX ORDER — SODIUM CHLORIDE 9 MG/ML
25 INJECTION, SOLUTION INTRAVENOUS CONTINUOUS
Status: DISPENSED | OUTPATIENT
Start: 2019-04-17 | End: 2019-04-17

## 2019-04-17 RX ORDER — SODIUM CHLORIDE 9 MG/ML
10 INJECTION INTRAMUSCULAR; INTRAVENOUS; SUBCUTANEOUS AS NEEDED
Status: ACTIVE | OUTPATIENT
Start: 2019-04-17 | End: 2019-04-17

## 2019-04-17 RX ORDER — DOXORUBICIN HYDROCHLORIDE 2 MG/ML
30 INJECTION, SOLUTION INTRAVENOUS ONCE
Status: COMPLETED | OUTPATIENT
Start: 2019-04-17 | End: 2019-04-17

## 2019-04-17 RX ADMIN — PALONOSETRON HYDROCHLORIDE 0.25 MG: 0.25 INJECTION INTRAVENOUS at 11:28

## 2019-04-17 RX ADMIN — Medication 10 ML: at 13:40

## 2019-04-17 RX ADMIN — DOXORUBICIN HYDROCHLORIDE 59.2 MG: 2 INJECTION, SOLUTION INTRAVENOUS at 13:02

## 2019-04-17 RX ADMIN — CYCLOPHOSPHAMIDE 1182 MG: 1 INJECTION, POWDER, FOR SOLUTION INTRAVENOUS; ORAL at 13:05

## 2019-04-17 RX ADMIN — Medication 10 ML: at 10:15

## 2019-04-17 RX ADMIN — Medication 500 UNITS: at 13:40

## 2019-04-17 RX ADMIN — DEXAMETHASONE SODIUM PHOSPHATE 12 MG: 4 INJECTION, SOLUTION INTRA-ARTICULAR; INTRALESIONAL; INTRAMUSCULAR; INTRAVENOUS; SOFT TISSUE at 12:20

## 2019-04-17 RX ADMIN — SODIUM CHLORIDE 150 MG: 900 INJECTION, SOLUTION INTRAVENOUS at 12:35

## 2019-04-17 RX ADMIN — SODIUM CHLORIDE 25 ML/HR: 900 INJECTION, SOLUTION INTRAVENOUS at 11:28

## 2019-04-17 RX ADMIN — SODIUM CHLORIDE 10 ML: 9 INJECTION INTRAMUSCULAR; INTRAVENOUS; SUBCUTANEOUS at 10:15

## 2019-04-17 RX ADMIN — DOXORUBICIN HYDROCHLORIDE 59.2 MG: 2 INJECTION, SOLUTION INTRAVENOUS at 12:59

## 2019-04-17 NOTE — PROGRESS NOTES
Outpatient Infusion Center - Chemotherapy Progress Note    1005- Pt admit to [facility] in stable condition. Assessment completed. No concerns voiced. Pt c/o occasional constipation and is taking stool softener. Right chest port accessed without issue with positive blood return. Labs drawn per order and sent. Line flushed, clamped, Curos Cap applied to end clave. Pt over to MD office. Patient Vitals for the past 12 hrs:   Temp Pulse Resp BP SpO2   04/17/19 1337 -- 78 16 165/89 --   04/17/19 1006 98 °F (36.7 °C) 86 18 (!) 153/91 98 %     Recent Results (from the past 12 hour(s))   CBC WITH AUTOMATED DIFF    Collection Time: 04/17/19 10:09 AM   Result Value Ref Range    WBC 2.6 (L) 3.6 - 11.0 K/uL    RBC 3.79 (L) 3.80 - 5.20 M/uL    HGB 11.2 (L) 11.5 - 16.0 g/dL    HCT 34.0 (L) 35.0 - 47.0 %    MCV 89.7 80.0 - 99.0 FL    MCH 29.6 26.0 - 34.0 PG    MCHC 32.9 30.0 - 36.5 g/dL    RDW 16.3 (H) 11.5 - 14.5 %    PLATELET 975 (H) 804 - 400 K/uL    MPV 9.8 8.9 - 12.9 FL    NRBC 0.0 0  WBC    ABSOLUTE NRBC 0.00 0.00 - 0.01 K/uL    NEUTROPHILS 42 32 - 75 %    LYMPHOCYTES 36 12 - 49 %    MONOCYTES 20 (H) 5 - 13 %    EOSINOPHILS 1 0 - 7 %    BASOPHILS 1 0 - 1 %    IMMATURE GRANULOCYTES 0 0.0 - 0.5 %    ABS. NEUTROPHILS 1.2 (L) 1.8 - 8.0 K/UL    ABS. LYMPHOCYTES 0.9 0.8 - 3.5 K/UL    ABS. MONOCYTES 0.5 0.0 - 1.0 K/UL    ABS. EOSINOPHILS 0.0 0.0 - 0.4 K/UL    ABS. BASOPHILS 0.0 0.0 - 0.1 K/UL    ABS. IMM.  GRANS. 0.0 0.00 - 0.04 K/UL    DF AUTOMATED      RBC COMMENTS ANISOCYTOSIS  1+       METABOLIC PANEL, COMPREHENSIVE    Collection Time: 04/17/19 10:09 AM   Result Value Ref Range    Sodium 142 136 - 145 mmol/L    Potassium 3.6 3.5 - 5.1 mmol/L    Chloride 110 (H) 97 - 108 mmol/L    CO2 28 21 - 32 mmol/L    Anion gap 4 (L) 5 - 15 mmol/L    Glucose 97 65 - 100 mg/dL    BUN 13 6 - 20 MG/DL    Creatinine 0.72 0.55 - 1.02 MG/DL    BUN/Creatinine ratio 18 12 - 20      GFR est AA >60 >60 ml/min/1.73m2    GFR est non-AA >60 >60 ml/min/1.73m2    Calcium 8.9 8.5 - 10.1 MG/DL    Bilirubin, total 0.3 0.2 - 1.0 MG/DL    ALT (SGPT) 21 12 - 78 U/L    AST (SGOT) 15 15 - 37 U/L    Alk. phosphatase 90 45 - 117 U/L    Protein, total 6.7 6.4 - 8.2 g/dL    Albumin 3.5 3.5 - 5.0 g/dL    Globulin 3.2 2.0 - 4.0 g/dL    A-G Ratio 1.1 1.1 - 2.2       Medications:  NS KVO  Aloxi IVP  Decadron IV  Emend IV  Doxorubicin IVP  Cytoxan IV    1340- Pt tolerated treatment well. Port maintained positive blood return throughout treatment, flushed with positive blood return at conclusion, and de-accessed. D/c home ambulatory in no distress.  Pt aware of next [facility] appointment scheduled for 5/8 at 10 AM.

## 2019-04-17 NOTE — PROGRESS NOTES
2001 [facility]  at [address]  [place], [address] [facility]  [phone]      Follow-up Note        Patient: Hansel Brand MRN: [id number]  SSN: [id number]    YOB: 1960  Age: 60 y.o. Sex: female        Diagnosis:     1. Left breast carcinoma:  T1c N1mi M0 (Stage I) infiltrating ductal carcinoma, Tumor size 1.6 cm, LN 1/3 with micromet, grade 3, ER -ve, TN -ve, Her 2 -ve    Treatment:     1. Neoadjuvant chemotherapy   Adriamycin, cytoxan - Cycle 4 Day 1    Subjective:      Hansel Brand is a 60 y.o. female with a diagnosis of left sided invasive breast cancer. She felt a lump in the left breast, went to see her PCP, got a mammogram and was noted to have abnormal density in the left upper outer quadrant of the breast. A biopsy of the mass revealed gr 3 IDC, TNBC. The axillary LN is clear of disease. She saw Dr. Adrián Díaz. An MRI of the breast shows the tumor to be larger than previously believed to be. A left axillary LN biopsy showed 1/3 nodes with micrometastasis. She works at [employer] full time. Ms. Laurel Pollard is receiving neoadjuvant chemotherapy. She is tolerating treatment extremely well and continues to work full time. She denies pain but does report numbness and tingling in her fingers. She will start taxol in 2-3 weeks. Review of Systems:    Constitutional: decreased appetite  Eyes: negative  Ears, Nose, Mouth, Throat, and Face: negative  Respiratory: negative  Cardiovascular: negative  Gastrointestinal: negative  Genitourinary:negative  Integument/Breast: negative  Hematologic/Lymphatic: negative  Musculoskeletal:negative  Neurological: negative        Past Medical History:   Diagnosis Date    Breast cancer ([facility] [address]     Menopause      Past Surgical History:   Procedure Laterality Date    HX BREAST BIOPSY Left     x2    HX HYSTERECTOMY      partial, ovaries remain      No family history on file.   Social History     Tobacco Use    Smoking status: Former Smoker     Last attempt to quit: 3/6/2013     Years since quittin.1    Smokeless tobacco: Never Used    Tobacco comment: Quit smoking 23 days ago   Substance Use Topics    Alcohol use: No      Prior to Admission medications    Medication Sig Start Date End Date Taking? Authorizing Provider   oxyCODONE-acetaminophen (PERCOCET) 5-325 mg per tablet Take 1 Tab by mouth every four (4) hours as needed for Pain. Max Daily Amount: 6 Tabs. 19  Yes Sera Ramirez MD   lidocaine-prilocaine (EMLA) topical cream Apply  to affected area as needed for Pain. 19  Yes Negro Rodrigues MD   ondansetron (ZOFRAN ODT) 4 mg disintegrating tablet Take 1 Tab by mouth every eight (8) hours as needed for Nausea. 19  Yes Negro Rodrigues MD   diphenhydrAMINE (BENADRYL) 25 mg capsule Take 25 mg by mouth every six (6) hours as needed. Yes Provider, Historical              Allergies   Allergen Reactions    Codeine Rash     Rash from tylenol #3           Objective:     Visit Vitals  /83 (BP 1 Location: Left arm, BP Patient Position: Sitting)   Pulse 99   Temp 98.3 °F (36.8 °C) (Oral)   Resp 16   Ht 5' 2\" (1.575 m)   Wt 195 lb 1.6 oz (88.5 kg)   SpO2 98%   BMI 35.68 kg/m²       Pain Scale: 0/10  Pain Location:       Physical Exam:    GENERAL: alert, cooperative, no distress, appears stated age  EYE: conjunctivae/corneas clear. PERRL, EOM's intact. Fundi benign  LYMPHATIC: Cervical, supraclavicular, and axillary nodes normal.   THROAT & NECK: normal and no erythema or exudates noted. LUNG: clear to auscultation bilaterally  HEART: regular rate and rhythm, S1, S2 normal, no murmur, click, rub or gallop  ABDOMEN: soft, non-tender. Bowel sounds normal. No masses,  no organomegaly  EXTREMITIES:  extremities normal, atraumatic, no cyanosis or edema  SKIN: Normal.  NEUROLOGIC: AOx3.  Gait normal. Reflexes and motor strength normal and symmetric      Lab Results Component Value Date/Time    WBC 2.6 (L) 04/17/2019 10:09 AM    HGB 11.2 (L) 04/17/2019 10:09 AM    HCT 34.0 (L) 04/17/2019 10:09 AM    PLATELET 662 (H) 79/20/4967 10:09 AM    MCV 89.7 04/17/2019 10:09 AM       Lab Results   Component Value Date/Time    Sodium 142 04/17/2019 10:09 AM    Potassium 3.6 04/17/2019 10:09 AM    Chloride 110 (H) 04/17/2019 10:09 AM    CO2 28 04/17/2019 10:09 AM    Anion gap 4 (L) 04/17/2019 10:09 AM    Glucose 97 04/17/2019 10:09 AM    BUN 13 04/17/2019 10:09 AM    Creatinine 0.72 04/17/2019 10:09 AM    BUN/Creatinine ratio 18 04/17/2019 10:09 AM    GFR est AA >60 04/17/2019 10:09 AM    GFR est non-AA >60 04/17/2019 10:09 AM    Calcium 8.9 04/17/2019 10:09 AM    Bilirubin, total 0.3 04/17/2019 10:09 AM    AST (SGOT) 15 04/17/2019 10:09 AM    Alk. phosphatase 90 04/17/2019 10:09 AM    Protein, total 6.7 04/17/2019 10:09 AM    Albumin 3.5 04/17/2019 10:09 AM    Globulin 3.2 04/17/2019 10:09 AM    A-G Ratio 1.1 04/17/2019 10:09 AM    ALT (SGPT) 21 04/17/2019 10:09 AM           Assessment:     1. Left breast carcinoma:  T1c N1mi M0 (Stage I) infiltrating ductal carcinoma, Tumor size 1.6 cm, LN 1/3 with micromet, grade 3, ER -ve, MA -ve, Her 2 -ve    ECOG PS 0  Intent of Treatment - curative  Prognosis - good        Echocardiogram (1/30/2019): Normal, LVEF 56-60%    Receiving neoadjuvant chemotherapy   Adriamycin, Cyclophosphamide - Cycle 4 Day 1    Tolerating treatment   A detailed system by system evaluation of side effect was performed to assess chemotherapy related toxicity. Blood counts are acceptable. Results reviewed with the patient. Symptom management form reviewed with patient. Plan:       > Continue neoadjuvant chemotherapy  > plan to start Taxol on 5/8/2019  > Zofran and compazine as needed  > repeat 2D echo since completing Adriamycin  > Follow-up in 3 weeks        Signed by: Gayla Bazzi MD                     April 17, 2019        CC. Yonathan Ghosh MD  CC.  Jesica Alfaro Mariam Lu MD

## 2019-04-17 NOTE — PROGRESS NOTES
Selam Recinos a 60 y.o. female here today for left breast cancer; triple negative f/u. Patient receiving A/C; cycle 4. Patient accompanied by her daughter to today's appt. VS stable. Patient denies pain. Decreased appetite; stable weight. Patient denies N/V/D and constipation. Patient report numbness and tingling in her fingers. Patient denies mouth ulcers. Patient denies cough. Patient denies SOB. Visit Vitals  /83 (BP 1 Location: Left arm, BP Patient Position: Sitting)   Pulse 99   Temp 98.3 °F (36.8 °C) (Oral)   Resp 16   Ht 5' 2\" (1.575 m)   Wt 195 lb 1.6 oz (88.5 kg)   SpO2 98%   BMI 35.68 kg/m²       Pain Scale: 0 - No pain/10  Pain Location:     1. Have you been to the ER, urgent care clinic since your last visit? Hospitalized since your last visit? No    2. Have you seen or consulted any other health care providers outside of the [address] [place] [address] since your last visit? Include any pap smears or colon screening. No     Health Maintenance Review: Patient reminded of \"due or due soon\" health maintenance. I have asked the patient to contact his/her primary care provider (PCP) for follow-up on his/her health maintenance.           .

## 2019-05-06 RX ORDER — SODIUM CHLORIDE 9 MG/ML
10 INJECTION INTRAMUSCULAR; INTRAVENOUS; SUBCUTANEOUS AS NEEDED
Status: CANCELLED | OUTPATIENT
Start: 2019-05-22

## 2019-05-06 RX ORDER — SODIUM CHLORIDE 9 MG/ML
10 INJECTION INTRAMUSCULAR; INTRAVENOUS; SUBCUTANEOUS AS NEEDED
Status: CANCELLED | OUTPATIENT
Start: 2019-07-03

## 2019-05-06 RX ORDER — ONDANSETRON 2 MG/ML
8 INJECTION INTRAMUSCULAR; INTRAVENOUS AS NEEDED
Status: CANCELLED | OUTPATIENT
Start: 2019-05-15

## 2019-05-06 RX ORDER — SODIUM CHLORIDE 0.9 % (FLUSH) 0.9 %
10 SYRINGE (ML) INJECTION AS NEEDED
Status: CANCELLED
Start: 2019-06-19

## 2019-05-06 RX ORDER — DIPHENHYDRAMINE HYDROCHLORIDE 50 MG/ML
50 INJECTION, SOLUTION INTRAMUSCULAR; INTRAVENOUS AS NEEDED
Status: CANCELLED
Start: 2019-06-19

## 2019-05-06 RX ORDER — ACETAMINOPHEN 325 MG/1
650 TABLET ORAL AS NEEDED
Status: CANCELLED
Start: 2019-05-22

## 2019-05-06 RX ORDER — HEPARIN 100 UNIT/ML
300-500 SYRINGE INTRAVENOUS AS NEEDED
Status: CANCELLED
Start: 2019-06-05

## 2019-05-06 RX ORDER — DIPHENHYDRAMINE HYDROCHLORIDE 50 MG/ML
50 INJECTION, SOLUTION INTRAMUSCULAR; INTRAVENOUS AS NEEDED
Status: CANCELLED
Start: 2019-05-22

## 2019-05-06 RX ORDER — DIPHENHYDRAMINE HYDROCHLORIDE 50 MG/ML
50 INJECTION, SOLUTION INTRAMUSCULAR; INTRAVENOUS AS NEEDED
Status: CANCELLED
Start: 2019-05-29

## 2019-05-06 RX ORDER — ACETAMINOPHEN 325 MG/1
650 TABLET ORAL AS NEEDED
Status: CANCELLED
Start: 2019-06-19

## 2019-05-06 RX ORDER — DEXAMETHASONE SODIUM PHOSPHATE 4 MG/ML
10 INJECTION, SOLUTION INTRA-ARTICULAR; INTRALESIONAL; INTRAMUSCULAR; INTRAVENOUS; SOFT TISSUE ONCE
Status: CANCELLED | OUTPATIENT
Start: 2019-06-12

## 2019-05-06 RX ORDER — HYDROCORTISONE SODIUM SUCCINATE 100 MG/2ML
100 INJECTION, POWDER, FOR SOLUTION INTRAMUSCULAR; INTRAVENOUS AS NEEDED
Status: CANCELLED | OUTPATIENT
Start: 2019-05-22

## 2019-05-06 RX ORDER — DIPHENHYDRAMINE HYDROCHLORIDE 50 MG/ML
50 INJECTION, SOLUTION INTRAMUSCULAR; INTRAVENOUS AS NEEDED
Status: CANCELLED
Start: 2019-07-03

## 2019-05-06 RX ORDER — HYDROCORTISONE SODIUM SUCCINATE 100 MG/2ML
100 INJECTION, POWDER, FOR SOLUTION INTRAMUSCULAR; INTRAVENOUS AS NEEDED
Status: CANCELLED | OUTPATIENT
Start: 2019-05-08

## 2019-05-06 RX ORDER — DIPHENHYDRAMINE HYDROCHLORIDE 50 MG/ML
50 INJECTION, SOLUTION INTRAMUSCULAR; INTRAVENOUS AS NEEDED
Status: CANCELLED
Start: 2019-05-08

## 2019-05-06 RX ORDER — DIPHENHYDRAMINE HYDROCHLORIDE 50 MG/ML
50 INJECTION, SOLUTION INTRAMUSCULAR; INTRAVENOUS ONCE
Status: CANCELLED
Start: 2019-06-26

## 2019-05-06 RX ORDER — EPINEPHRINE 1 MG/ML
0.3 INJECTION, SOLUTION, CONCENTRATE INTRAVENOUS AS NEEDED
Status: CANCELLED | OUTPATIENT
Start: 2019-06-26

## 2019-05-06 RX ORDER — SODIUM CHLORIDE 9 MG/ML
25 INJECTION, SOLUTION INTRAVENOUS CONTINUOUS
Status: CANCELLED | OUTPATIENT
Start: 2019-05-15 | End: 2019-05-15

## 2019-05-06 RX ORDER — DIPHENHYDRAMINE HYDROCHLORIDE 50 MG/ML
50 INJECTION, SOLUTION INTRAMUSCULAR; INTRAVENOUS AS NEEDED
Status: CANCELLED
Start: 2019-06-26

## 2019-05-06 RX ORDER — HYDROCORTISONE SODIUM SUCCINATE 100 MG/2ML
100 INJECTION, POWDER, FOR SOLUTION INTRAMUSCULAR; INTRAVENOUS AS NEEDED
Status: CANCELLED | OUTPATIENT
Start: 2019-06-12

## 2019-05-06 RX ORDER — ALBUTEROL SULFATE 0.83 MG/ML
2.5 SOLUTION RESPIRATORY (INHALATION) AS NEEDED
Status: CANCELLED
Start: 2019-05-22

## 2019-05-06 RX ORDER — DEXAMETHASONE SODIUM PHOSPHATE 4 MG/ML
10 INJECTION, SOLUTION INTRA-ARTICULAR; INTRALESIONAL; INTRAMUSCULAR; INTRAVENOUS; SOFT TISSUE ONCE
Status: CANCELLED | OUTPATIENT
Start: 2019-05-29

## 2019-05-06 RX ORDER — DIPHENHYDRAMINE HYDROCHLORIDE 50 MG/ML
50 INJECTION, SOLUTION INTRAMUSCULAR; INTRAVENOUS ONCE
Status: CANCELLED
Start: 2019-06-19

## 2019-05-06 RX ORDER — ACETAMINOPHEN 325 MG/1
650 TABLET ORAL AS NEEDED
Status: CANCELLED
Start: 2019-07-03

## 2019-05-06 RX ORDER — SODIUM CHLORIDE 9 MG/ML
25 INJECTION, SOLUTION INTRAVENOUS CONTINUOUS
Status: CANCELLED | OUTPATIENT
Start: 2019-05-29 | End: 2019-05-30

## 2019-05-06 RX ORDER — SODIUM CHLORIDE 0.9 % (FLUSH) 0.9 %
10 SYRINGE (ML) INJECTION AS NEEDED
Status: CANCELLED
Start: 2019-05-15

## 2019-05-06 RX ORDER — DIPHENHYDRAMINE HYDROCHLORIDE 50 MG/ML
50 INJECTION, SOLUTION INTRAMUSCULAR; INTRAVENOUS ONCE
Status: CANCELLED
Start: 2019-05-22

## 2019-05-06 RX ORDER — EPINEPHRINE 1 MG/ML
0.3 INJECTION, SOLUTION, CONCENTRATE INTRAVENOUS AS NEEDED
Status: CANCELLED | OUTPATIENT
Start: 2019-05-29

## 2019-05-06 RX ORDER — DIPHENHYDRAMINE HYDROCHLORIDE 50 MG/ML
50 INJECTION, SOLUTION INTRAMUSCULAR; INTRAVENOUS AS NEEDED
Status: CANCELLED
Start: 2019-05-15

## 2019-05-06 RX ORDER — ONDANSETRON 2 MG/ML
8 INJECTION INTRAMUSCULAR; INTRAVENOUS AS NEEDED
Status: CANCELLED | OUTPATIENT
Start: 2019-06-12

## 2019-05-06 RX ORDER — HEPARIN 100 UNIT/ML
300-500 SYRINGE INTRAVENOUS AS NEEDED
Status: CANCELLED
Start: 2019-05-22

## 2019-05-06 RX ORDER — SODIUM CHLORIDE 9 MG/ML
25 INJECTION, SOLUTION INTRAVENOUS CONTINUOUS
Status: CANCELLED | OUTPATIENT
Start: 2019-06-19 | End: 2019-06-20

## 2019-05-06 RX ORDER — HYDROCORTISONE SODIUM SUCCINATE 100 MG/2ML
100 INJECTION, POWDER, FOR SOLUTION INTRAMUSCULAR; INTRAVENOUS AS NEEDED
Status: CANCELLED | OUTPATIENT
Start: 2019-06-19

## 2019-05-06 RX ORDER — DIPHENHYDRAMINE HYDROCHLORIDE 50 MG/ML
50 INJECTION, SOLUTION INTRAMUSCULAR; INTRAVENOUS ONCE
Status: CANCELLED
Start: 2019-05-08

## 2019-05-06 RX ORDER — SODIUM CHLORIDE 9 MG/ML
25 INJECTION, SOLUTION INTRAVENOUS CONTINUOUS
Status: CANCELLED | OUTPATIENT
Start: 2019-05-08 | End: 2019-05-08

## 2019-05-06 RX ORDER — ALBUTEROL SULFATE 0.83 MG/ML
2.5 SOLUTION RESPIRATORY (INHALATION) AS NEEDED
Status: CANCELLED
Start: 2019-05-29

## 2019-05-06 RX ORDER — ACETAMINOPHEN 325 MG/1
650 TABLET ORAL AS NEEDED
Status: CANCELLED
Start: 2019-06-05

## 2019-05-06 RX ORDER — EPINEPHRINE 1 MG/ML
0.3 INJECTION, SOLUTION, CONCENTRATE INTRAVENOUS AS NEEDED
Status: CANCELLED | OUTPATIENT
Start: 2019-05-08

## 2019-05-06 RX ORDER — SODIUM CHLORIDE 9 MG/ML
10 INJECTION INTRAMUSCULAR; INTRAVENOUS; SUBCUTANEOUS AS NEEDED
Status: CANCELLED | OUTPATIENT
Start: 2019-05-29

## 2019-05-06 RX ORDER — SODIUM CHLORIDE 9 MG/ML
25 INJECTION, SOLUTION INTRAVENOUS CONTINUOUS
Status: CANCELLED | OUTPATIENT
Start: 2019-07-03 | End: 2019-07-03

## 2019-05-06 RX ORDER — ALBUTEROL SULFATE 0.83 MG/ML
2.5 SOLUTION RESPIRATORY (INHALATION) AS NEEDED
Status: CANCELLED
Start: 2019-07-03

## 2019-05-06 RX ORDER — DEXAMETHASONE SODIUM PHOSPHATE 4 MG/ML
10 INJECTION, SOLUTION INTRA-ARTICULAR; INTRALESIONAL; INTRAMUSCULAR; INTRAVENOUS; SOFT TISSUE ONCE
Status: CANCELLED | OUTPATIENT
Start: 2019-06-19

## 2019-05-06 RX ORDER — DEXAMETHASONE SODIUM PHOSPHATE 4 MG/ML
10 INJECTION, SOLUTION INTRA-ARTICULAR; INTRALESIONAL; INTRAMUSCULAR; INTRAVENOUS; SOFT TISSUE ONCE
Status: CANCELLED | OUTPATIENT
Start: 2019-05-22

## 2019-05-06 RX ORDER — ACETAMINOPHEN 325 MG/1
650 TABLET ORAL AS NEEDED
Status: CANCELLED
Start: 2019-06-12

## 2019-05-06 RX ORDER — SODIUM CHLORIDE 9 MG/ML
25 INJECTION, SOLUTION INTRAVENOUS CONTINUOUS
Status: CANCELLED | OUTPATIENT
Start: 2019-06-12 | End: 2019-06-12

## 2019-05-06 RX ORDER — ONDANSETRON 2 MG/ML
8 INJECTION INTRAMUSCULAR; INTRAVENOUS AS NEEDED
Status: CANCELLED | OUTPATIENT
Start: 2019-05-22

## 2019-05-06 RX ORDER — SODIUM CHLORIDE 0.9 % (FLUSH) 0.9 %
10 SYRINGE (ML) INJECTION AS NEEDED
Status: CANCELLED
Start: 2019-06-26

## 2019-05-06 RX ORDER — HEPARIN 100 UNIT/ML
300-500 SYRINGE INTRAVENOUS AS NEEDED
Status: CANCELLED
Start: 2019-05-15

## 2019-05-06 RX ORDER — SODIUM CHLORIDE 9 MG/ML
10 INJECTION INTRAMUSCULAR; INTRAVENOUS; SUBCUTANEOUS AS NEEDED
Status: CANCELLED | OUTPATIENT
Start: 2019-06-26

## 2019-05-06 RX ORDER — SODIUM CHLORIDE 9 MG/ML
10 INJECTION INTRAMUSCULAR; INTRAVENOUS; SUBCUTANEOUS AS NEEDED
Status: CANCELLED | OUTPATIENT
Start: 2019-05-15

## 2019-05-06 RX ORDER — ALBUTEROL SULFATE 0.83 MG/ML
2.5 SOLUTION RESPIRATORY (INHALATION) AS NEEDED
Status: CANCELLED
Start: 2019-06-12

## 2019-05-06 RX ORDER — HYDROCORTISONE SODIUM SUCCINATE 100 MG/2ML
100 INJECTION, POWDER, FOR SOLUTION INTRAMUSCULAR; INTRAVENOUS AS NEEDED
Status: CANCELLED | OUTPATIENT
Start: 2019-07-03

## 2019-05-06 RX ORDER — EPINEPHRINE 1 MG/ML
0.3 INJECTION, SOLUTION, CONCENTRATE INTRAVENOUS AS NEEDED
Status: CANCELLED | OUTPATIENT
Start: 2019-06-05

## 2019-05-06 RX ORDER — SODIUM CHLORIDE 9 MG/ML
10 INJECTION INTRAMUSCULAR; INTRAVENOUS; SUBCUTANEOUS AS NEEDED
Status: CANCELLED | OUTPATIENT
Start: 2019-05-08

## 2019-05-06 RX ORDER — EPINEPHRINE 1 MG/ML
0.3 INJECTION, SOLUTION, CONCENTRATE INTRAVENOUS AS NEEDED
Status: CANCELLED | OUTPATIENT
Start: 2019-05-15

## 2019-05-06 RX ORDER — HEPARIN 100 UNIT/ML
300-500 SYRINGE INTRAVENOUS AS NEEDED
Status: CANCELLED
Start: 2019-06-12

## 2019-05-06 RX ORDER — DIPHENHYDRAMINE HYDROCHLORIDE 50 MG/ML
50 INJECTION, SOLUTION INTRAMUSCULAR; INTRAVENOUS AS NEEDED
Status: CANCELLED
Start: 2019-06-12

## 2019-05-06 RX ORDER — ONDANSETRON 2 MG/ML
8 INJECTION INTRAMUSCULAR; INTRAVENOUS AS NEEDED
Status: CANCELLED | OUTPATIENT
Start: 2019-06-26

## 2019-05-06 RX ORDER — DEXAMETHASONE SODIUM PHOSPHATE 4 MG/ML
10 INJECTION, SOLUTION INTRA-ARTICULAR; INTRALESIONAL; INTRAMUSCULAR; INTRAVENOUS; SOFT TISSUE ONCE
Status: CANCELLED | OUTPATIENT
Start: 2019-06-26

## 2019-05-06 RX ORDER — HEPARIN 100 UNIT/ML
300-500 SYRINGE INTRAVENOUS AS NEEDED
Status: CANCELLED
Start: 2019-06-26

## 2019-05-06 RX ORDER — EPINEPHRINE 1 MG/ML
0.3 INJECTION, SOLUTION, CONCENTRATE INTRAVENOUS AS NEEDED
Status: CANCELLED | OUTPATIENT
Start: 2019-05-22

## 2019-05-06 RX ORDER — SODIUM CHLORIDE 0.9 % (FLUSH) 0.9 %
10 SYRINGE (ML) INJECTION AS NEEDED
Status: CANCELLED
Start: 2019-05-22

## 2019-05-06 RX ORDER — SODIUM CHLORIDE 0.9 % (FLUSH) 0.9 %
10 SYRINGE (ML) INJECTION AS NEEDED
Status: CANCELLED
Start: 2019-05-08

## 2019-05-06 RX ORDER — HEPARIN 100 UNIT/ML
300-500 SYRINGE INTRAVENOUS AS NEEDED
Status: CANCELLED
Start: 2019-06-19

## 2019-05-06 RX ORDER — SODIUM CHLORIDE 9 MG/ML
10 INJECTION INTRAMUSCULAR; INTRAVENOUS; SUBCUTANEOUS AS NEEDED
Status: CANCELLED | OUTPATIENT
Start: 2019-06-05

## 2019-05-06 RX ORDER — ALBUTEROL SULFATE 0.83 MG/ML
2.5 SOLUTION RESPIRATORY (INHALATION) AS NEEDED
Status: CANCELLED
Start: 2019-05-15

## 2019-05-06 RX ORDER — SODIUM CHLORIDE 9 MG/ML
10 INJECTION INTRAMUSCULAR; INTRAVENOUS; SUBCUTANEOUS AS NEEDED
Status: CANCELLED | OUTPATIENT
Start: 2019-06-19

## 2019-05-06 RX ORDER — EPINEPHRINE 1 MG/ML
0.3 INJECTION, SOLUTION, CONCENTRATE INTRAVENOUS AS NEEDED
Status: CANCELLED | OUTPATIENT
Start: 2019-07-03

## 2019-05-06 RX ORDER — HYDROCORTISONE SODIUM SUCCINATE 100 MG/2ML
100 INJECTION, POWDER, FOR SOLUTION INTRAMUSCULAR; INTRAVENOUS AS NEEDED
Status: CANCELLED | OUTPATIENT
Start: 2019-06-05

## 2019-05-06 RX ORDER — SODIUM CHLORIDE 0.9 % (FLUSH) 0.9 %
10 SYRINGE (ML) INJECTION AS NEEDED
Status: CANCELLED
Start: 2019-07-03

## 2019-05-06 RX ORDER — SODIUM CHLORIDE 9 MG/ML
25 INJECTION, SOLUTION INTRAVENOUS CONTINUOUS
Status: CANCELLED | OUTPATIENT
Start: 2019-06-26 | End: 2019-06-26

## 2019-05-06 RX ORDER — HEPARIN 100 UNIT/ML
300-500 SYRINGE INTRAVENOUS AS NEEDED
Status: CANCELLED
Start: 2019-07-03

## 2019-05-06 RX ORDER — ACETAMINOPHEN 325 MG/1
650 TABLET ORAL AS NEEDED
Status: CANCELLED
Start: 2019-06-26

## 2019-05-06 RX ORDER — SODIUM CHLORIDE 0.9 % (FLUSH) 0.9 %
10 SYRINGE (ML) INJECTION AS NEEDED
Status: CANCELLED
Start: 2019-05-29

## 2019-05-06 RX ORDER — DEXAMETHASONE SODIUM PHOSPHATE 4 MG/ML
10 INJECTION, SOLUTION INTRA-ARTICULAR; INTRALESIONAL; INTRAMUSCULAR; INTRAVENOUS; SOFT TISSUE ONCE
Status: CANCELLED | OUTPATIENT
Start: 2019-05-15

## 2019-05-06 RX ORDER — DIPHENHYDRAMINE HYDROCHLORIDE 50 MG/ML
50 INJECTION, SOLUTION INTRAMUSCULAR; INTRAVENOUS AS NEEDED
Status: CANCELLED
Start: 2019-06-05

## 2019-05-06 RX ORDER — ALBUTEROL SULFATE 0.83 MG/ML
2.5 SOLUTION RESPIRATORY (INHALATION) AS NEEDED
Status: CANCELLED
Start: 2019-05-08

## 2019-05-06 RX ORDER — ONDANSETRON 2 MG/ML
8 INJECTION INTRAMUSCULAR; INTRAVENOUS AS NEEDED
Status: CANCELLED | OUTPATIENT
Start: 2019-05-29

## 2019-05-06 RX ORDER — SODIUM CHLORIDE 9 MG/ML
25 INJECTION, SOLUTION INTRAVENOUS CONTINUOUS
Status: CANCELLED | OUTPATIENT
Start: 2019-05-22 | End: 2019-05-22

## 2019-05-06 RX ORDER — DIPHENHYDRAMINE HYDROCHLORIDE 50 MG/ML
50 INJECTION, SOLUTION INTRAMUSCULAR; INTRAVENOUS ONCE
Status: CANCELLED
Start: 2019-06-05

## 2019-05-06 RX ORDER — ACETAMINOPHEN 325 MG/1
650 TABLET ORAL AS NEEDED
Status: CANCELLED
Start: 2019-05-08

## 2019-05-06 RX ORDER — HEPARIN 100 UNIT/ML
300-500 SYRINGE INTRAVENOUS AS NEEDED
Status: CANCELLED
Start: 2019-05-08

## 2019-05-06 RX ORDER — EPINEPHRINE 1 MG/ML
0.3 INJECTION, SOLUTION, CONCENTRATE INTRAVENOUS AS NEEDED
Status: CANCELLED | OUTPATIENT
Start: 2019-06-19

## 2019-05-06 RX ORDER — HYDROCORTISONE SODIUM SUCCINATE 100 MG/2ML
100 INJECTION, POWDER, FOR SOLUTION INTRAMUSCULAR; INTRAVENOUS AS NEEDED
Status: CANCELLED | OUTPATIENT
Start: 2019-05-15

## 2019-05-06 RX ORDER — DIPHENHYDRAMINE HYDROCHLORIDE 50 MG/ML
50 INJECTION, SOLUTION INTRAMUSCULAR; INTRAVENOUS ONCE
Status: CANCELLED
Start: 2019-05-29

## 2019-05-06 RX ORDER — ONDANSETRON 2 MG/ML
8 INJECTION INTRAMUSCULAR; INTRAVENOUS AS NEEDED
Status: CANCELLED | OUTPATIENT
Start: 2019-06-19

## 2019-05-06 RX ORDER — SODIUM CHLORIDE 9 MG/ML
10 INJECTION INTRAMUSCULAR; INTRAVENOUS; SUBCUTANEOUS AS NEEDED
Status: CANCELLED | OUTPATIENT
Start: 2019-06-12

## 2019-05-06 RX ORDER — DIPHENHYDRAMINE HYDROCHLORIDE 50 MG/ML
50 INJECTION, SOLUTION INTRAMUSCULAR; INTRAVENOUS ONCE
Status: CANCELLED
Start: 2019-07-03

## 2019-05-06 RX ORDER — ACETAMINOPHEN 325 MG/1
650 TABLET ORAL AS NEEDED
Status: CANCELLED
Start: 2019-05-15

## 2019-05-06 RX ORDER — ONDANSETRON 2 MG/ML
8 INJECTION INTRAMUSCULAR; INTRAVENOUS AS NEEDED
Status: CANCELLED | OUTPATIENT
Start: 2019-07-03

## 2019-05-06 RX ORDER — EPINEPHRINE 1 MG/ML
0.3 INJECTION, SOLUTION, CONCENTRATE INTRAVENOUS AS NEEDED
Status: CANCELLED | OUTPATIENT
Start: 2019-06-12

## 2019-05-06 RX ORDER — SODIUM CHLORIDE 0.9 % (FLUSH) 0.9 %
10 SYRINGE (ML) INJECTION AS NEEDED
Status: CANCELLED
Start: 2019-06-12

## 2019-05-06 RX ORDER — HYDROCORTISONE SODIUM SUCCINATE 100 MG/2ML
100 INJECTION, POWDER, FOR SOLUTION INTRAMUSCULAR; INTRAVENOUS AS NEEDED
Status: CANCELLED | OUTPATIENT
Start: 2019-06-26

## 2019-05-06 RX ORDER — DEXAMETHASONE SODIUM PHOSPHATE 4 MG/ML
10 INJECTION, SOLUTION INTRA-ARTICULAR; INTRALESIONAL; INTRAMUSCULAR; INTRAVENOUS; SOFT TISSUE ONCE
Status: CANCELLED | OUTPATIENT
Start: 2019-07-03

## 2019-05-06 RX ORDER — DIPHENHYDRAMINE HYDROCHLORIDE 50 MG/ML
50 INJECTION, SOLUTION INTRAMUSCULAR; INTRAVENOUS ONCE
Status: CANCELLED
Start: 2019-05-15

## 2019-05-06 RX ORDER — HEPARIN 100 UNIT/ML
300-500 SYRINGE INTRAVENOUS AS NEEDED
Status: CANCELLED
Start: 2019-05-29

## 2019-05-06 RX ORDER — DIPHENHYDRAMINE HYDROCHLORIDE 50 MG/ML
50 INJECTION, SOLUTION INTRAMUSCULAR; INTRAVENOUS ONCE
Status: CANCELLED
Start: 2019-06-12

## 2019-05-06 RX ORDER — SODIUM CHLORIDE 9 MG/ML
25 INJECTION, SOLUTION INTRAVENOUS CONTINUOUS
Status: CANCELLED | OUTPATIENT
Start: 2019-06-05 | End: 2019-06-05

## 2019-05-06 RX ORDER — DEXAMETHASONE SODIUM PHOSPHATE 4 MG/ML
10 INJECTION, SOLUTION INTRA-ARTICULAR; INTRALESIONAL; INTRAMUSCULAR; INTRAVENOUS; SOFT TISSUE ONCE
Status: CANCELLED | OUTPATIENT
Start: 2019-05-08

## 2019-05-06 RX ORDER — ALBUTEROL SULFATE 0.83 MG/ML
2.5 SOLUTION RESPIRATORY (INHALATION) AS NEEDED
Status: CANCELLED
Start: 2019-06-19

## 2019-05-06 RX ORDER — ALBUTEROL SULFATE 0.83 MG/ML
2.5 SOLUTION RESPIRATORY (INHALATION) AS NEEDED
Status: CANCELLED
Start: 2019-06-05

## 2019-05-06 RX ORDER — ONDANSETRON 2 MG/ML
8 INJECTION INTRAMUSCULAR; INTRAVENOUS AS NEEDED
Status: CANCELLED | OUTPATIENT
Start: 2019-05-08

## 2019-05-06 RX ORDER — ALBUTEROL SULFATE 0.83 MG/ML
2.5 SOLUTION RESPIRATORY (INHALATION) AS NEEDED
Status: CANCELLED
Start: 2019-06-26

## 2019-05-06 RX ORDER — HYDROCORTISONE SODIUM SUCCINATE 100 MG/2ML
100 INJECTION, POWDER, FOR SOLUTION INTRAMUSCULAR; INTRAVENOUS AS NEEDED
Status: CANCELLED | OUTPATIENT
Start: 2019-05-29

## 2019-05-06 RX ORDER — DEXAMETHASONE SODIUM PHOSPHATE 4 MG/ML
10 INJECTION, SOLUTION INTRA-ARTICULAR; INTRALESIONAL; INTRAMUSCULAR; INTRAVENOUS; SOFT TISSUE ONCE
Status: CANCELLED | OUTPATIENT
Start: 2019-06-05

## 2019-05-06 RX ORDER — SODIUM CHLORIDE 0.9 % (FLUSH) 0.9 %
10 SYRINGE (ML) INJECTION AS NEEDED
Status: CANCELLED
Start: 2019-06-05

## 2019-05-06 RX ORDER — ACETAMINOPHEN 325 MG/1
650 TABLET ORAL AS NEEDED
Status: CANCELLED
Start: 2019-05-29

## 2019-05-06 RX ORDER — ONDANSETRON 2 MG/ML
8 INJECTION INTRAMUSCULAR; INTRAVENOUS AS NEEDED
Status: CANCELLED | OUTPATIENT
Start: 2019-06-05

## 2019-05-07 ENCOUNTER — TELEPHONE (OUTPATIENT)
Dept: ONCOLOGY | Age: 59
End: 2019-05-07

## 2019-05-07 RX ORDER — DEXAMETHASONE 4 MG/1
20 TABLET ORAL SEE ADMIN INSTRUCTIONS
Qty: 60 TAB | Refills: 0 | Status: SHIPPED | OUTPATIENT
Start: 2019-05-07 | End: 2019-08-23 | Stop reason: ALTCHOICE

## 2019-05-07 NOTE — TELEPHONE ENCOUNTER
Patient is starting new chemo tomorrow. Mentioned she was supposed to start taking pills today but they are not at her pharmacy.       5/7/19 jake

## 2019-05-08 ENCOUNTER — OFFICE VISIT (OUTPATIENT)
Dept: ONCOLOGY | Age: 59
End: 2019-05-08

## 2019-05-08 ENCOUNTER — HOSPITAL ENCOUNTER (OUTPATIENT)
Dept: INFUSION THERAPY | Age: 59
Discharge: HOME | End: 2019-05-08
Payer: COMMERCIAL

## 2019-05-08 VITALS
HEIGHT: 62 IN | BODY MASS INDEX: 35.51 KG/M2 | DIASTOLIC BLOOD PRESSURE: 79 MMHG | SYSTOLIC BLOOD PRESSURE: 134 MMHG | TEMPERATURE: 98.3 F | RESPIRATION RATE: 18 BRPM | OXYGEN SATURATION: 97 % | WEIGHT: 193 LBS | HEART RATE: 93 BPM

## 2019-05-08 VITALS
HEIGHT: 62 IN | WEIGHT: 193.9 LBS | HEART RATE: 84 BPM | SYSTOLIC BLOOD PRESSURE: 156 MMHG | TEMPERATURE: 98 F | OXYGEN SATURATION: 98 % | RESPIRATION RATE: 18 BRPM | DIASTOLIC BLOOD PRESSURE: 94 MMHG | BODY MASS INDEX: 35.68 KG/M2

## 2019-05-08 DIAGNOSIS — C50.412 MALIGNANT NEOPLASM OF UPPER-OUTER QUADRANT OF LEFT BREAST IN FEMALE, ESTROGEN RECEPTOR POSITIVE (HCC): Primary | ICD-10-CM

## 2019-05-08 DIAGNOSIS — Z17.0 MALIGNANT NEOPLASM OF UPPER-OUTER QUADRANT OF LEFT BREAST IN FEMALE, ESTROGEN RECEPTOR POSITIVE (HCC): Primary | ICD-10-CM

## 2019-05-08 LAB
ALBUMIN SERPL-MCNC: 3.5 G/DL (ref 3.5–5)
ALBUMIN/GLOB SERPL: 0.9 {RATIO} (ref 1.1–2.2)
ALP SERPL-CCNC: 106 U/L (ref 45–117)
ALT SERPL-CCNC: 18 U/L (ref 12–78)
ANION GAP SERPL CALC-SCNC: 7 MMOL/L (ref 5–15)
AST SERPL-CCNC: 9 U/L (ref 15–37)
BASO+EOS+MONOS # BLD AUTO: 0.1 K/UL (ref 0.2–1.2)
BASO+EOS+MONOS NFR BLD AUTO: 5 % (ref 3.2–16.9)
BILIRUB SERPL-MCNC: 0.3 MG/DL (ref 0.2–1)
BUN SERPL-MCNC: 17 MG/DL (ref 6–20)
BUN/CREAT SERPL: 22 (ref 12–20)
CALCIUM SERPL-MCNC: 8.9 MG/DL (ref 8.5–10.1)
CHLORIDE SERPL-SCNC: 108 MMOL/L (ref 97–108)
CO2 SERPL-SCNC: 24 MMOL/L (ref 21–32)
CREAT SERPL-MCNC: 0.79 MG/DL (ref 0.55–1.02)
DIFFERENTIAL METHOD BLD: ABNORMAL
ERYTHROCYTE [DISTWIDTH] IN BLOOD BY AUTOMATED COUNT: 16.8 % (ref 11.8–15.8)
GLOBULIN SER CALC-MCNC: 4 G/DL (ref 2–4)
GLUCOSE SERPL-MCNC: 153 MG/DL (ref 65–100)
HCT VFR BLD AUTO: 32.8 % (ref 35–47)
HGB BLD-MCNC: 10.6 G/DL (ref 11.5–16)
LYMPHOCYTES # BLD: 0.4 K/UL (ref 0.8–3.5)
LYMPHOCYTES NFR BLD: 16 % (ref 12–49)
MCH RBC QN AUTO: 28.5 PG (ref 26–34)
MCHC RBC AUTO-ENTMCNC: 32.3 G/DL (ref 30–36.5)
MCV RBC AUTO: 88.2 FL (ref 80–99)
NEUTS SEG # BLD: 2.2 K/UL (ref 1.8–8)
NEUTS SEG NFR BLD: 79 % (ref 32–75)
PLATELET # BLD AUTO: 502 K/UL (ref 150–400)
POTASSIUM SERPL-SCNC: 3.8 MMOL/L (ref 3.5–5.1)
PROT SERPL-MCNC: 7.5 G/DL (ref 6.4–8.2)
RBC # BLD AUTO: 3.72 M/UL (ref 3.8–5.2)
SODIUM SERPL-SCNC: 139 MMOL/L (ref 136–145)
WBC # BLD AUTO: 2.7 K/UL (ref 3.6–11)

## 2019-05-08 PROCEDURE — 36415 COLL VENOUS BLD VENIPUNCTURE: CPT

## 2019-05-08 PROCEDURE — 74011250636 HC RX REV CODE- 250/636: Performed by: INTERNAL MEDICINE

## 2019-05-08 PROCEDURE — 77030012965 HC NDL HUBR BBMI -A

## 2019-05-08 PROCEDURE — 96375 TX/PRO/DX INJ NEW DRUG ADDON: CPT

## 2019-05-08 PROCEDURE — 85025 COMPLETE CBC W/AUTO DIFF WBC: CPT

## 2019-05-08 PROCEDURE — 80053 COMPREHEN METABOLIC PANEL: CPT

## 2019-05-08 PROCEDURE — 96413 CHEMO IV INFUSION 1 HR: CPT

## 2019-05-08 PROCEDURE — 74011000250 HC RX REV CODE- 250: Performed by: INTERNAL MEDICINE

## 2019-05-08 RX ORDER — DIPHENHYDRAMINE HYDROCHLORIDE 50 MG/ML
50 INJECTION, SOLUTION INTRAMUSCULAR; INTRAVENOUS ONCE
Status: COMPLETED | OUTPATIENT
Start: 2019-05-08 | End: 2019-05-08

## 2019-05-08 RX ORDER — DEXAMETHASONE SODIUM PHOSPHATE 4 MG/ML
10 INJECTION, SOLUTION INTRA-ARTICULAR; INTRALESIONAL; INTRAMUSCULAR; INTRAVENOUS; SOFT TISSUE ONCE
Status: COMPLETED | OUTPATIENT
Start: 2019-05-08 | End: 2019-05-08

## 2019-05-08 RX ORDER — SODIUM CHLORIDE 9 MG/ML
25 INJECTION, SOLUTION INTRAVENOUS CONTINUOUS
Status: DISPENSED | OUTPATIENT
Start: 2019-05-08 | End: 2019-05-08

## 2019-05-08 RX ORDER — SODIUM CHLORIDE 9 MG/ML
10 INJECTION INTRAMUSCULAR; INTRAVENOUS; SUBCUTANEOUS AS NEEDED
Status: ACTIVE | OUTPATIENT
Start: 2019-05-08 | End: 2019-05-08

## 2019-05-08 RX ORDER — HEPARIN 100 UNIT/ML
300-500 SYRINGE INTRAVENOUS AS NEEDED
Status: ACTIVE | OUTPATIENT
Start: 2019-05-08 | End: 2019-05-08

## 2019-05-08 RX ORDER — SODIUM CHLORIDE 0.9 % (FLUSH) 0.9 %
10 SYRINGE (ML) INJECTION AS NEEDED
Status: ACTIVE | OUTPATIENT
Start: 2019-05-08 | End: 2019-05-08

## 2019-05-08 RX ADMIN — SODIUM CHLORIDE 25 ML/HR: 900 INJECTION, SOLUTION INTRAVENOUS at 11:44

## 2019-05-08 RX ADMIN — Medication 500 UNITS: at 13:30

## 2019-05-08 RX ADMIN — FAMOTIDINE 20 MG: 10 INJECTION, SOLUTION INTRAVENOUS at 11:49

## 2019-05-08 RX ADMIN — PACLITAXEL 158 MG: 6 INJECTION, SOLUTION INTRAVENOUS at 12:17

## 2019-05-08 RX ADMIN — SODIUM CHLORIDE 10 ML: 9 INJECTION INTRAMUSCULAR; INTRAVENOUS; SUBCUTANEOUS at 13:30

## 2019-05-08 RX ADMIN — DEXAMETHASONE SODIUM PHOSPHATE 10 MG: 4 INJECTION, SOLUTION INTRA-ARTICULAR; INTRALESIONAL; INTRAMUSCULAR; INTRAVENOUS; SOFT TISSUE at 12:02

## 2019-05-08 RX ADMIN — Medication 10 ML: at 11:46

## 2019-05-08 RX ADMIN — DIPHENHYDRAMINE HYDROCHLORIDE 50 MG: 50 INJECTION INTRAMUSCULAR; INTRAVENOUS at 11:46

## 2019-05-08 NOTE — PROGRESS NOTES
Identified pt with two pt identifiers(name and ). Reviewed record in preparation for visit and have obtained necessary documentation. Chief Complaint Patient presents with  Breast Cancer D1, C5, Paclitaxel (weekly), Helms bad after last chemo Tylenol dose not help Visit Vitals /79 (BP 1 Location: Left arm, BP Patient Position: Sitting) Pulse 93 Temp 98.3 °F (36.8 °C) (Oral) Resp 18 Ht 5' 2\" (1.575 m) Wt 193 lb (87.5 kg) SpO2 97% BMI 35.30 kg/m² Health Maintenance Due Topic  Hepatitis C Screening  DTaP/Tdap/Td series (1 - Tdap)  PAP AKA CERVICAL CYTOLOGY  Shingrix Vaccine Age 50> (1 of 2)  FOBT Q 1 YEAR AGE 50-75 Coordination of Care Questionnaire: 
:  
1) Have you been to an emergency room, urgent care, or hospitalized since your last visit? If yes, where when, and reason for visit? no  
 
 
2. Have seen or consulted any other health care provider since your last visit? If yes, where when, and reason for visit? NO 
 
 
3) Do you have an Advanced Directive/ Living Will in place? NO If yes, do we have a copy on file NO If no, would you like information NO Patient is accompanied by sister I have received verbal consent from Alda Davis to discuss any/all medical information while they are present in the room.

## 2019-05-08 NOTE — PROGRESS NOTES
[facility] Note:    1000 Pt arrived at [facility] ambulatory and in no distress for C1 taxol. Assessment completed, no new complaints voiced. Port accessed, labs drawn. Pt seen at Dr. Shira Back office. Handout on taxol given to pt and reviewed. Medications received:  NS @ KVO  Benadryl 50 mg IV  pepsid 20 mg IV  Dexamethasone 10 mg IV  Taxol 158 mg IV over 1 hour- pt put hands in ice bath    Port flushed with NS and heparin and needle removed. Patient Vitals for the past 8 hrs:   Temp Pulse Resp BP SpO2   05/08/19 1325 98 °F (36.7 °C) 84 18 (!) 156/94 --   05/08/19 1018 98.6 °F (37 °C) 92 18 (!) 172/98 98 %         1330 Tolerated treatment well, no adverse reaction noted. D/Cd from [facility] ambulatory and in no distress accompanied by daughter. Next appt 5/15/19    Recent Results (from the past 8 hour(s))   METABOLIC PANEL, COMPREHENSIVE    Collection Time: 05/08/19 10:06 AM   Result Value Ref Range    Sodium 139 136 - 145 mmol/L    Potassium 3.8 3.5 - 5.1 mmol/L    Chloride 108 97 - 108 mmol/L    CO2 24 21 - 32 mmol/L    Anion gap 7 5 - 15 mmol/L    Glucose 153 (H) 65 - 100 mg/dL    BUN 17 6 - 20 MG/DL    Creatinine 0.79 0.55 - 1.02 MG/DL    BUN/Creatinine ratio 22 (H) 12 - 20      GFR est AA >60 >60 ml/min/1.73m2    GFR est non-AA >60 >60 ml/min/1.73m2    Calcium 8.9 8.5 - 10.1 MG/DL    Bilirubin, total 0.3 0.2 - 1.0 MG/DL    ALT (SGPT) 18 12 - 78 U/L    AST (SGOT) 9 (L) 15 - 37 U/L    Alk.  phosphatase 106 45 - 117 U/L    Protein, total 7.5 6.4 - 8.2 g/dL    Albumin 3.5 3.5 - 5.0 g/dL    Globulin 4.0 2.0 - 4.0 g/dL    A-G Ratio 0.9 (L) 1.1 - 2.2     CBC WITH 3 PART DIFF    Collection Time: 05/08/19 10:06 AM   Result Value Ref Range    WBC 2.7 (L) 3.6 - 11.0 K/uL    RBC 3.72 (L) 3.80 - 5.20 M/uL    HGB 10.6 (L) 11.5 - 16.0 g/dL    HCT 32.8 (L) 35.0 - 47.0 %    MCV 88.2 80.0 - 99.0 FL    MCH 28.5 26.0 - 34.0 PG    MCHC 32.3 30.0 - 36.5 g/dL    RDW 16.8 (H) 11.8 - 15.8 %    PLATELET 219 (H) 150 - 400 K/uL    NEUTROPHILS 79 (H) 32 - 75 %    MIXED CELLS 5 3.2 - 16.9 %    LYMPHOCYTES 16 12 - 49 %    ABS. NEUTROPHILS 2.2 1.8 - 8.0 K/UL    ABS. MIXED CELLS 0.1 (L) 0.2 - 1.2 K/uL    ABS.  LYMPHOCYTES 0.4 (L) 0.8 - 3.5 K/UL    DF AUTOMATED

## 2019-05-08 NOTE — PROGRESS NOTES
2001 [facility] 
at [place] [address], [facility], [address] 
[phone] Follow-up Note Patient: Governor Narvaez MRN: [id number]  SSN: [id number] YOB: 1960  Age: 60 y.o. Sex: female Diagnosis: 1. Left breast carcinoma: 
T1c N1mi M0 (Stage I) infiltrating ductal carcinoma, Tumor size 1.6 cm, LN 1/3 with micromet, grade 3, ER -ve, TN -ve, Her 2 -ve Treatment: 1. Neoadjuvant chemotherapy Adriamycin, cytoxan - s/p 4 cycles Weekly Taxol - Week #1 of 12 Subjective:  
  
Governor Narvaez is a 60 y.o. female with a diagnosis of left sided invasive breast cancer. She felt a lump in the left breast, went to see her PCP, got a mammogram and was noted to have abnormal density in the left upper outer quadrant of the breast. A biopsy of the mass revealed gr 3 IDC, TNBC. The axillary LN is clear of disease. She saw Dr. Jennifer Mckeon. An MRI of the breast shows the tumor to be larger than previously believed to be. A left axillary LN biopsy showed 1/3 nodes with micrometastasis. She works at [employer] full time. Ms. Latesha Martinez is receiving neoadjuvant chemotherapy. She is tolerating treatment extremely well and continues to work full time. She is starting weekly taxol today. She feels well with no complaints. Review of Systems: 
 
Constitutional: decreased appetite Eyes: negative Ears, Nose, Mouth, Throat, and Face: negative Respiratory: negative Cardiovascular: negative Gastrointestinal: negative Genitourinary:negative Integument/Breast: negative Hematologic/Lymphatic: negative Musculoskeletal:negative Neurological: negative Past Medical History:  
Diagnosis Date  Breast cancer ([address]  Menopause Past Surgical History:  
Procedure Laterality Date  HX BREAST BIOPSY Left x2  
 HX HYSTERECTOMY partial, ovaries remain No family history on file. Social History Tobacco Use  Smoking status: Former Smoker Last attempt to quit: 3/6/2013 Years since quittin.1  Smokeless tobacco: Never Used  Tobacco comment: Quit smoking 23 days ago Substance Use Topics  Alcohol use: No  
  
Prior to Admission medications Medication Sig Start Date End Date Taking? Authorizing Provider  
dexamethasone (DECADRON) 4 mg tablet Take 20 mg by mouth See Admin Instructions. Take 20 mg the night before chemotherapy 19  Yes David Bello NP  
oxyCODONE-acetaminophen (PERCOCET) 5-325 mg per tablet Take 1 Tab by mouth every four (4) hours as needed for Pain. Max Daily Amount: 6 Tabs. 19  Yes Marek Del Rio MD  
lidocaine-prilocaine (EMLA) topical cream Apply  to affected area as needed for Pain. 19  Yes Jess Mccarthy MD  
ondansetron (ZOFRAN ODT) 4 mg disintegrating tablet Take 1 Tab by mouth every eight (8) hours as needed for Nausea. 19  Yes Jess Mccarthy MD  
diphenhydrAMINE (BENADRYL) 25 mg capsule Take 25 mg by mouth every six (6) hours as needed. Yes Provider, Historical  
  
 
 
 
 
Allergies Allergen Reactions  Codeine Rash Rash from tylenol #3 Objective:  
 
Visit Vitals /79 (BP 1 Location: Left arm, BP Patient Position: Sitting) Pulse 93 Temp 98.3 °F (36.8 °C) (Oral) Resp 18 Ht 5' 2\" (1.575 m) Wt 193 lb (87.5 kg) SpO2 97% BMI 35.30 kg/m² Pain Scale: 0/10 Pain Location:  
 
 
Physical Exam: 
 
GENERAL: alert, cooperative, no distress, appears stated age EYE: conjunctivae/corneas clear. PERRL, EOM's intact. Fundi benign LYMPHATIC: Cervical, supraclavicular, and axillary nodes normal.  
THROAT & NECK: normal and no erythema or exudates noted. LUNG: clear to auscultation bilaterally HEART: regular rate and rhythm, S1, S2 normal, no murmur, click, rub or gallop ABDOMEN: soft, non-tender. Bowel sounds normal. No masses,  no organomegaly EXTREMITIES:  extremities normal, atraumatic, no cyanosis or edema SKIN: Normal. 
NEUROLOGIC: AOx3. Gait normal. Reflexes and motor strength normal and symmetric Lab Results Component Value Date/Time WBC 2.7 (L) 05/08/2019 10:06 AM  
 HGB 10.6 (L) 05/08/2019 10:06 AM  
 HCT 32.8 (L) 05/08/2019 10:06 AM  
 PLATELET 688 (H) 98/97/6915 10:06 AM  
 MCV 88.2 05/08/2019 10:06 AM  
 
 
Lab Results Component Value Date/Time Sodium 139 05/08/2019 10:06 AM  
 Potassium 3.8 05/08/2019 10:06 AM  
 Chloride 108 05/08/2019 10:06 AM  
 CO2 24 05/08/2019 10:06 AM  
 Anion gap 7 05/08/2019 10:06 AM  
 Glucose 153 (H) 05/08/2019 10:06 AM  
 BUN 17 05/08/2019 10:06 AM  
 Creatinine 0.79 05/08/2019 10:06 AM  
 BUN/Creatinine ratio 22 (H) 05/08/2019 10:06 AM  
 GFR est AA >60 05/08/2019 10:06 AM  
 GFR est non-AA >60 05/08/2019 10:06 AM  
 Calcium 8.9 05/08/2019 10:06 AM  
 Bilirubin, total 0.3 05/08/2019 10:06 AM  
 AST (SGOT) 9 (L) 05/08/2019 10:06 AM  
 Alk. phosphatase 106 05/08/2019 10:06 AM  
 Protein, total 7.5 05/08/2019 10:06 AM  
 Albumin 3.5 05/08/2019 10:06 AM  
 Globulin 4.0 05/08/2019 10:06 AM  
 A-G Ratio 0.9 (L) 05/08/2019 10:06 AM  
 ALT (SGPT) 18 05/08/2019 10:06 AM  
 
 
 
 
Assessment: 1. Left breast carcinoma: 
T1c N1mi M0 (Stage I) infiltrating ductal carcinoma, Tumor size 1.6 cm, LN 1/3 with micromet, grade 3, ER -ve, CO -ve, Her 2 -ve ECOG PS 0 Intent of Treatment - curative Prognosis - good Echocardiogram (1/30/2019): Normal, LVEF 56-60% Receiving neoadjuvant chemotherapy Adriamycin, Cyclophosphamide - s/p 4 cycles Weekly Taxol - Week #1 of 12 Tolerating treatment A detailed system by system evaluation of side effect was performed to assess chemotherapy related toxicity. Blood counts are acceptable. Results reviewed with the patient. Symptom management form reviewed with patient. Plan:  
 
 
> Continue neoadjuvant chemotherapy - starting weekly Taxol today > Zofran and compazine as needed 
> Follow-up in 2 weeks Signed by: Misty Elizondo MD 
                   May 8, 2019 
 
 
 
CC. Lizzie Miller MD 
CC.  Caesar Toro MD

## 2019-05-14 ENCOUNTER — HOSPITAL ENCOUNTER (OUTPATIENT)
Dept: NON INVASIVE DIAGNOSTICS | Age: 59
Discharge: HOME | End: 2019-05-14
Attending: INTERNAL MEDICINE
Payer: COMMERCIAL

## 2019-05-14 DIAGNOSIS — Z17.0 MALIGNANT NEOPLASM OF UPPER-OUTER QUADRANT OF LEFT BREAST IN FEMALE, ESTROGEN RECEPTOR POSITIVE (HCC): ICD-10-CM

## 2019-05-14 DIAGNOSIS — Z79.899 ENCOUNTER FOR MONITORING CARDIOTOXIC DRUG THERAPY: ICD-10-CM

## 2019-05-14 DIAGNOSIS — Z51.81 ENCOUNTER FOR MONITORING CARDIOTOXIC DRUG THERAPY: ICD-10-CM

## 2019-05-14 DIAGNOSIS — C50.412 MALIGNANT NEOPLASM OF UPPER-OUTER QUADRANT OF LEFT BREAST IN FEMALE, ESTROGEN RECEPTOR POSITIVE (HCC): ICD-10-CM

## 2019-05-14 LAB
ECHO AV AREA PEAK VELOCITY: 1.5 CM2
ECHO AV AREA/BSA PEAK VELOCITY: 0.8 CM2/M2
ECHO AV CUSP MM: 1.78 CM
ECHO AV PEAK GRADIENT: 11.3 MMHG
ECHO AV PEAK VELOCITY: 168.07 CM/S
ECHO EST RA PRESSURE: 10 MMHG
ECHO LA AREA 4C: 16.9 CM2
ECHO LA VOL 4C: 49.56 ML (ref 22–52)
ECHO LV E' LATERAL VELOCITY: 8.59 CM/S
ECHO LV E' SEPTAL VELOCITY: 8.06 CM/S
ECHO LV INTERNAL DIMENSION DIASTOLIC MMODE: 4.95 CM
ECHO LV INTERNAL DIMENSION SYSTOLIC MMODE: 2.84 CM
ECHO LV IVSD MMODE: 0.86 CM
ECHO LV POSTERIOR WALL DIASTOLIC MMODE: 1.05 CM
ECHO LVOT DIAM: 2.01 CM
ECHO LVOT PEAK GRADIENT: 2.6 MMHG
ECHO LVOT PEAK VELOCITY: 80.35 CM/S
ECHO MV A VELOCITY: 61.45 CM/S
ECHO MV E DECELERATION TIME (DT): 203 MS
ECHO MV E VELOCITY: 84.25 CM/S
ECHO MV E/A RATIO: 1.37
ECHO MV E/E' LATERAL: 9.81
ECHO MV E/E' RATIO (AVERAGED): 10.13
ECHO MV E/E' SEPTAL: 10.45
ECHO MV REGURGITANT PEAK GRADIENT: 14.3 MMHG
ECHO MV REGURGITANT PEAK VELOCITY: 189.19 CM/S
ECHO PULMONARY ARTERY SYSTOLIC PRESSURE (PASP): 26 MMHG
ECHO PV MAX VELOCITY: 82.82 CM/S
ECHO PV PEAK GRADIENT: 2.7 MMHG
ECHO RIGHT VENTRICULAR SYSTOLIC PRESSURE (RVSP): 26 MMHG
ECHO RV INTERNAL DIMENSION: 2.96 CM
ECHO TV A WAVE: 40.34 CM/S
ECHO TV E WAVE: 67.39 CM/S
ECHO TV EROA: 0.6
ECHO TV REGURGITANT MAX VELOCITY: 200.03 CM/S
ECHO TV REGURGITANT PEAK GRADIENT: 16 MMHG

## 2019-05-14 PROCEDURE — 93306 TTE W/DOPPLER COMPLETE: CPT

## 2019-05-15 ENCOUNTER — HOSPITAL ENCOUNTER (OUTPATIENT)
Dept: INFUSION THERAPY | Age: 59
Discharge: HOME | End: 2019-05-15
Payer: COMMERCIAL

## 2019-05-15 VITALS
DIASTOLIC BLOOD PRESSURE: 88 MMHG | HEART RATE: 95 BPM | OXYGEN SATURATION: 99 % | HEIGHT: 62 IN | RESPIRATION RATE: 18 BRPM | BODY MASS INDEX: 35.3 KG/M2 | SYSTOLIC BLOOD PRESSURE: 143 MMHG | WEIGHT: 191.8 LBS | TEMPERATURE: 98.2 F

## 2019-05-15 DIAGNOSIS — Z17.0 MALIGNANT NEOPLASM OF UPPER-OUTER QUADRANT OF LEFT BREAST IN FEMALE, ESTROGEN RECEPTOR POSITIVE (HCC): Primary | ICD-10-CM

## 2019-05-15 DIAGNOSIS — C50.412 MALIGNANT NEOPLASM OF UPPER-OUTER QUADRANT OF LEFT BREAST IN FEMALE, ESTROGEN RECEPTOR POSITIVE (HCC): Primary | ICD-10-CM

## 2019-05-15 LAB
BASO+EOS+MONOS # BLD AUTO: 0.1 K/UL (ref 0.2–1.2)
BASO+EOS+MONOS NFR BLD AUTO: 2 % (ref 3.2–16.9)
DIFFERENTIAL METHOD BLD: ABNORMAL
ERYTHROCYTE [DISTWIDTH] IN BLOOD BY AUTOMATED COUNT: 17 % (ref 11.8–15.8)
HCT VFR BLD AUTO: 33.2 % (ref 35–47)
HGB BLD-MCNC: 11 G/DL (ref 11.5–16)
LYMPHOCYTES # BLD: 0.5 K/UL (ref 0.8–3.5)
LYMPHOCYTES NFR BLD: 12 % (ref 12–49)
MCH RBC QN AUTO: 29.1 PG (ref 26–34)
MCHC RBC AUTO-ENTMCNC: 33.1 G/DL (ref 30–36.5)
MCV RBC AUTO: 87.8 FL (ref 80–99)
NEUTS SEG # BLD: 3.4 K/UL (ref 1.8–8)
NEUTS SEG NFR BLD: 86 % (ref 32–75)
PLATELET # BLD AUTO: 441 K/UL (ref 150–400)
RBC # BLD AUTO: 3.78 M/UL (ref 3.8–5.2)
WBC # BLD AUTO: 4 K/UL (ref 3.6–11)

## 2019-05-15 PROCEDURE — 77030012965 HC NDL HUBR BBMI -A

## 2019-05-15 PROCEDURE — 74011000250 HC RX REV CODE- 250: Performed by: INTERNAL MEDICINE

## 2019-05-15 PROCEDURE — 74011250637 HC RX REV CODE- 250/637: Performed by: NURSE PRACTITIONER

## 2019-05-15 PROCEDURE — 74011250636 HC RX REV CODE- 250/636: Performed by: INTERNAL MEDICINE

## 2019-05-15 PROCEDURE — 85025 COMPLETE CBC W/AUTO DIFF WBC: CPT

## 2019-05-15 PROCEDURE — 96375 TX/PRO/DX INJ NEW DRUG ADDON: CPT

## 2019-05-15 PROCEDURE — 36415 COLL VENOUS BLD VENIPUNCTURE: CPT

## 2019-05-15 PROCEDURE — 96413 CHEMO IV INFUSION 1 HR: CPT

## 2019-05-15 RX ORDER — SODIUM CHLORIDE 0.9 % (FLUSH) 0.9 %
10 SYRINGE (ML) INJECTION AS NEEDED
Status: ACTIVE | OUTPATIENT
Start: 2019-05-15 | End: 2019-05-15

## 2019-05-15 RX ORDER — DIPHENHYDRAMINE HYDROCHLORIDE 50 MG/ML
50 INJECTION, SOLUTION INTRAMUSCULAR; INTRAVENOUS ONCE
Status: DISCONTINUED | OUTPATIENT
Start: 2019-05-15 | End: 2019-05-15 | Stop reason: SDUPTHER

## 2019-05-15 RX ORDER — HEPARIN 100 UNIT/ML
300-500 SYRINGE INTRAVENOUS AS NEEDED
Status: ACTIVE | OUTPATIENT
Start: 2019-05-15 | End: 2019-05-15

## 2019-05-15 RX ORDER — SODIUM CHLORIDE 9 MG/ML
25 INJECTION, SOLUTION INTRAVENOUS CONTINUOUS
Status: DISPENSED | OUTPATIENT
Start: 2019-05-15 | End: 2019-05-15

## 2019-05-15 RX ORDER — SODIUM CHLORIDE 9 MG/ML
10 INJECTION INTRAMUSCULAR; INTRAVENOUS; SUBCUTANEOUS AS NEEDED
Status: ACTIVE | OUTPATIENT
Start: 2019-05-15 | End: 2019-05-15

## 2019-05-15 RX ORDER — DEXAMETHASONE SODIUM PHOSPHATE 4 MG/ML
10 INJECTION, SOLUTION INTRA-ARTICULAR; INTRALESIONAL; INTRAMUSCULAR; INTRAVENOUS; SOFT TISSUE ONCE
Status: COMPLETED | OUTPATIENT
Start: 2019-05-15 | End: 2019-05-15

## 2019-05-15 RX ORDER — DIPHENHYDRAMINE HCL 25 MG
50 CAPSULE ORAL ONCE
Status: COMPLETED | OUTPATIENT
Start: 2019-05-15 | End: 2019-05-15

## 2019-05-15 RX ADMIN — FAMOTIDINE 20 MG: 10 INJECTION, SOLUTION INTRAVENOUS at 11:05

## 2019-05-15 RX ADMIN — SODIUM CHLORIDE 25 ML/HR: 900 INJECTION, SOLUTION INTRAVENOUS at 11:05

## 2019-05-15 RX ADMIN — DEXAMETHASONE SODIUM PHOSPHATE 10 MG: 4 INJECTION, SOLUTION INTRA-ARTICULAR; INTRALESIONAL; INTRAMUSCULAR; INTRAVENOUS; SOFT TISSUE at 11:09

## 2019-05-15 RX ADMIN — PACLITAXEL 158 MG: 6 INJECTION, SOLUTION INTRAVENOUS at 11:55

## 2019-05-15 RX ADMIN — Medication 500 UNITS: at 13:00

## 2019-05-15 RX ADMIN — Medication 10 ML: at 13:00

## 2019-05-15 RX ADMIN — SODIUM CHLORIDE 10 ML: 9 INJECTION, SOLUTION INTRAMUSCULAR; INTRAVENOUS; SUBCUTANEOUS at 10:15

## 2019-05-15 RX ADMIN — DIPHENHYDRAMINE HYDROCHLORIDE 50 MG: 25 CAPSULE ORAL at 11:04

## 2019-05-15 NOTE — PROGRESS NOTES
[facility]fusion [facility] - Chemotherapy Progress Note    1010- Pt admit to [facility] for C5D8 (C2D1) Taxol ambulatory in stable condition. Assessment completed. Pt c/o increase in headaches with no relief from Tylenol or Advil. Pt also c/o increase in frequency of acid reflux. MD office notified. Per NP, pt advised to start taking Prilosec 30 mins before breakfast daily. Right chest port accessed without issue with positive blood return. Labs drawn per order and sent. Line flushed, clamped, Curos Cap applied to end clave. Patient Vitals for the past 12 hrs:   Temp Pulse Resp BP SpO2   05/15/19 1256 -- 95 -- 143/88 --   05/15/19 1026 98.2 °F (36.8 °C) 96 18 (!) 161/91 99 %     Recent Results (from the past 12 hour(s))   CBC WITH 3 PART DIFF    Collection Time: 05/15/19 10:13 AM   Result Value Ref Range    WBC 4.0 3.6 - 11.0 K/uL    RBC 3.78 (L) 3.80 - 5.20 M/uL    HGB 11.0 (L) 11.5 - 16.0 g/dL    HCT 33.2 (L) 35.0 - 47.0 %    MCV 87.8 80.0 - 99.0 FL    MCH 29.1 26.0 - 34.0 PG    MCHC 33.1 30.0 - 36.5 g/dL    RDW 17.0 (H) 11.8 - 15.8 %    PLATELET 118 (H) 486 - 400 K/uL    NEUTROPHILS 86 (H) 32 - 75 %    MIXED CELLS 2 (L) 3.2 - 16.9 %    LYMPHOCYTES 12 12 - 49 %    ABS. NEUTROPHILS 3.4 1.8 - 8.0 K/UL    ABS. MIXED CELLS 0.1 (L) 0.2 - 1.2 K/uL    ABS. LYMPHOCYTES 0.5 (L) 0.8 - 3.5 K/UL    DF AUTOMATED       Medications:  NS KVO  Benadryl PO  Pepcid IVP  Decadron IV  Taxol IV    1300- Pt tolerated treatment well. Port maintained positive blood return throughout treatment, flushed with positive blood return at conclusion, and de-accessed. D/c home ambulatory in no distress.  Pt aware of next [facility] appointment scheduled for 5/22 at 10 AM.

## 2019-05-22 ENCOUNTER — HOSPITAL ENCOUNTER (OUTPATIENT)
Dept: INFUSION THERAPY | Age: 59
Discharge: HOME | End: 2019-05-22
Payer: COMMERCIAL

## 2019-05-22 ENCOUNTER — OFFICE VISIT (OUTPATIENT)
Dept: ONCOLOGY | Age: 59
End: 2019-05-22

## 2019-05-22 VITALS
BODY MASS INDEX: 36.07 KG/M2 | SYSTOLIC BLOOD PRESSURE: 154 MMHG | HEART RATE: 87 BPM | DIASTOLIC BLOOD PRESSURE: 89 MMHG | WEIGHT: 196 LBS | TEMPERATURE: 97.7 F | RESPIRATION RATE: 16 BRPM | HEIGHT: 62 IN

## 2019-05-22 VITALS
SYSTOLIC BLOOD PRESSURE: 160 MMHG | WEIGHT: 196 LBS | DIASTOLIC BLOOD PRESSURE: 92 MMHG | RESPIRATION RATE: 16 BRPM | TEMPERATURE: 98.3 F | HEIGHT: 62 IN | HEART RATE: 86 BPM | BODY MASS INDEX: 36.07 KG/M2

## 2019-05-22 DIAGNOSIS — Z17.0 MALIGNANT NEOPLASM OF UPPER-OUTER QUADRANT OF LEFT BREAST IN FEMALE, ESTROGEN RECEPTOR POSITIVE (HCC): Primary | ICD-10-CM

## 2019-05-22 DIAGNOSIS — T45.1X5A ANEMIA ASSOCIATED WITH CHEMOTHERAPY: ICD-10-CM

## 2019-05-22 DIAGNOSIS — D64.81 ANEMIA ASSOCIATED WITH CHEMOTHERAPY: ICD-10-CM

## 2019-05-22 DIAGNOSIS — C50.412 MALIGNANT NEOPLASM OF UPPER-OUTER QUADRANT OF LEFT BREAST IN FEMALE, ESTROGEN RECEPTOR POSITIVE (HCC): Primary | ICD-10-CM

## 2019-05-22 LAB
BASOPHILS # BLD: 0 K/UL (ref 0–0.1)
BASOPHILS NFR BLD: 0 % (ref 0–1)
DIFFERENTIAL METHOD BLD: ABNORMAL
EOSINOPHIL # BLD: 0 K/UL (ref 0–0.4)
EOSINOPHIL NFR BLD: 0 % (ref 0–7)
ERYTHROCYTE [DISTWIDTH] IN BLOOD BY AUTOMATED COUNT: 17.2 % (ref 11.5–14.5)
HCT VFR BLD AUTO: 31.8 % (ref 35–47)
HGB BLD-MCNC: 10.4 G/DL (ref 11.5–16)
IMM GRANULOCYTES # BLD AUTO: 0 K/UL (ref 0–0.04)
IMM GRANULOCYTES NFR BLD AUTO: 1 % (ref 0–0.5)
LYMPHOCYTES # BLD: 0.3 K/UL (ref 0.8–3.5)
LYMPHOCYTES NFR BLD: 20 % (ref 12–49)
MCH RBC QN AUTO: 29 PG (ref 26–34)
MCHC RBC AUTO-ENTMCNC: 32.7 G/DL (ref 30–36.5)
MCV RBC AUTO: 88.6 FL (ref 80–99)
MONOCYTES # BLD: 0 K/UL (ref 0–1)
MONOCYTES NFR BLD: 2 % (ref 5–13)
NEUTS SEG # BLD: 1.3 K/UL (ref 1.8–8)
NEUTS SEG NFR BLD: 77 % (ref 32–75)
NRBC # BLD: 0 K/UL (ref 0–0.01)
NRBC BLD-RTO: 0 PER 100 WBC
PLATELET # BLD AUTO: 296 K/UL (ref 150–400)
PMV BLD AUTO: 10.2 FL (ref 8.9–12.9)
RBC # BLD AUTO: 3.59 M/UL (ref 3.8–5.2)
RBC MORPH BLD: ABNORMAL
RBC MORPH BLD: ABNORMAL
WBC # BLD AUTO: 1.6 K/UL (ref 3.6–11)

## 2019-05-22 PROCEDURE — 74011250636 HC RX REV CODE- 250/636: Performed by: INTERNAL MEDICINE

## 2019-05-22 PROCEDURE — 85025 COMPLETE CBC W/AUTO DIFF WBC: CPT

## 2019-05-22 PROCEDURE — 74011000250 HC RX REV CODE- 250: Performed by: INTERNAL MEDICINE

## 2019-05-22 PROCEDURE — 96375 TX/PRO/DX INJ NEW DRUG ADDON: CPT

## 2019-05-22 PROCEDURE — 77030012965 HC NDL HUBR BBMI -A

## 2019-05-22 PROCEDURE — 74011250637 HC RX REV CODE- 250/637: Performed by: NURSE PRACTITIONER

## 2019-05-22 PROCEDURE — 74011000258 HC RX REV CODE- 258: Performed by: INTERNAL MEDICINE

## 2019-05-22 PROCEDURE — 96413 CHEMO IV INFUSION 1 HR: CPT

## 2019-05-22 PROCEDURE — 36415 COLL VENOUS BLD VENIPUNCTURE: CPT

## 2019-05-22 RX ORDER — DIPHENHYDRAMINE HCL 25 MG
50 CAPSULE ORAL ONCE
Status: COMPLETED | OUTPATIENT
Start: 2019-05-22 | End: 2019-05-22

## 2019-05-22 RX ORDER — DIPHENHYDRAMINE HYDROCHLORIDE 50 MG/ML
50 INJECTION, SOLUTION INTRAMUSCULAR; INTRAVENOUS ONCE
Status: DISCONTINUED | OUTPATIENT
Start: 2019-05-22 | End: 2019-05-22 | Stop reason: CLARIF

## 2019-05-22 RX ORDER — SODIUM CHLORIDE 9 MG/ML
10 INJECTION INTRAMUSCULAR; INTRAVENOUS; SUBCUTANEOUS AS NEEDED
Status: ACTIVE | OUTPATIENT
Start: 2019-05-22 | End: 2019-05-22

## 2019-05-22 RX ORDER — SODIUM CHLORIDE 0.9 % (FLUSH) 0.9 %
10 SYRINGE (ML) INJECTION AS NEEDED
Status: ACTIVE | OUTPATIENT
Start: 2019-05-22 | End: 2019-05-22

## 2019-05-22 RX ORDER — SODIUM CHLORIDE 9 MG/ML
25 INJECTION, SOLUTION INTRAVENOUS CONTINUOUS
Status: DISPENSED | OUTPATIENT
Start: 2019-05-22 | End: 2019-05-22

## 2019-05-22 RX ORDER — DEXAMETHASONE SODIUM PHOSPHATE 4 MG/ML
10 INJECTION, SOLUTION INTRA-ARTICULAR; INTRALESIONAL; INTRAMUSCULAR; INTRAVENOUS; SOFT TISSUE ONCE
Status: COMPLETED | OUTPATIENT
Start: 2019-05-22 | End: 2019-05-22

## 2019-05-22 RX ORDER — HEPARIN 100 UNIT/ML
300-500 SYRINGE INTRAVENOUS AS NEEDED
Status: ACTIVE | OUTPATIENT
Start: 2019-05-22 | End: 2019-05-22

## 2019-05-22 RX ADMIN — DEXAMETHASONE SODIUM PHOSPHATE 10 MG: 4 INJECTION, SOLUTION INTRA-ARTICULAR; INTRALESIONAL; INTRAMUSCULAR; INTRAVENOUS; SOFT TISSUE at 12:04

## 2019-05-22 RX ADMIN — Medication 10 ML: at 14:05

## 2019-05-22 RX ADMIN — FAMOTIDINE 20 MG: 10 INJECTION, SOLUTION INTRAVENOUS at 12:04

## 2019-05-22 RX ADMIN — DIPHENHYDRAMINE HYDROCHLORIDE 50 MG: 25 CAPSULE ORAL at 12:04

## 2019-05-22 RX ADMIN — PACLITAXEL 158 MG: 6 INJECTION, SOLUTION INTRAVENOUS at 13:02

## 2019-05-22 RX ADMIN — Medication 500 UNITS: at 14:05

## 2019-05-22 RX ADMIN — SODIUM CHLORIDE 25 ML/HR: 900 INJECTION, SOLUTION INTRAVENOUS at 12:07

## 2019-05-22 NOTE — PROGRESS NOTES
Rios Monaco a 60 y.o. female here today for left breast cancer; triple negative f/u. Patient receiving weekly Taxol; cycle 5; day 15. VS stable. Patient denies pain. Good appetite. Patient denies N/V/D and constipation. Patient denies numbness and tingling. Patient denies mouth ulcers. Patient denies cough. Patient denies SOB. Visit Vitals  /89 (BP 1 Location: Left arm, BP Patient Position: Sitting)   Pulse 87   Temp 97.7 °F (36.5 °C) (Oral)   Resp 16   Ht 5' 2\" (1.575 m)   Wt 196 lb (88.9 kg)   BMI 35.85 kg/m²       Pain Scale: 0 - No pain/10  Pain Location:     1. Have you been to the ER, urgent care clinic since your last visit? Hospitalized since your last visit? No    2. Have you seen or consulted any other health care providers outside of the [address] [place] [address] since your last visit? Include any pap smears or colon screening. No     Health Maintenance Review: Patient reminded of \"due or due soon\" health maintenance. I have asked the patient to contact his/her primary care provider (PCP) for follow-up on his/her health maintenance.

## 2019-05-22 NOTE — PROGRESS NOTES
[address], 97 [facility] Juan David Nguyenu, 200 [facility]  [phone]      Follow-up Note        Patient: Ariel David MRN: [id number]  SSN: [id number]    YOB: 1960  Age: 60 y.o. Sex: female        Diagnosis:     1. Left breast carcinoma:  T1c N1mi M0 (Stage I) infiltrating ductal carcinoma, Tumor size 1.6 cm, LN 1/3 with micromet, grade 3, ER -ve, [place] -ve, Her 2 -ve    Treatment:     1. Neoadjuvant chemotherapy   Adriamycin, cytoxan - s/p 4 cycles   Weekly Taxol - Week #3 of 12    Subjective:      Ariel David is a 60 y.o. female with a diagnosis of left sided invasive breast cancer. She felt a lump in the left breast, went to see her PCP, got a mammogram and was noted to have abnormal density in the left upper outer quadrant of the breast. A biopsy of the mass revealed gr 3 IDC, TNBC. The axillary LN is clear of disease. She saw Dr. Bautista Griffiths. An MRI of the breast shows the tumor to be larger than previously believed to be. A left axillary LN biopsy showed 1/3 nodes with micrometastasis. She works at [employer] full time. Ms. Dariana Jack is receiving neoadjuvant chemotherapy. She is tolerating treatment extremely well and continues to work full time. She is receiving weekly taxol. She feels well with no complaints. Review of Systems:    Constitutional: decreased appetite  Eyes: negative  Ears, Nose, Mouth, Throat, and Face: negative  Respiratory: negative  Cardiovascular: negative  Gastrointestinal: negative  Genitourinary:negative  Integument/Breast: negative  Hematologic/Lymphatic: negative  Musculoskeletal:negative  Neurological: negative        Past Medical History:   Diagnosis Date    Breast cancer ([address]     Menopause      Past Surgical History:   Procedure Laterality Date    HX BREAST BIOPSY Left     x2    HX HYSTERECTOMY      partial, ovaries remain      History reviewed.  No pertinent family history. Social History     Tobacco Use    Smoking status: Former Smoker     Last attempt to quit: 3/6/2013     Years since quittin.2    Smokeless tobacco: Never Used    Tobacco comment: Quit smoking 23 days ago   Substance Use Topics    Alcohol use: No      Prior to Admission medications    Medication Sig Start Date End Date Taking? Authorizing Provider   omeprazole magnesium (PRILOSEC PO) Take  by mouth. Yes Provider, Historical   dexamethasone (DECADRON) 4 mg tablet Take 20 mg by mouth See Admin Instructions. Take 20 mg the night before chemotherapy 19  Yes Sharlene Bello NP   lidocaine-prilocaine (EMLA) topical cream Apply  to affected area as needed for Pain. 19  Yes Torie Francisco MD   ondansetron (ZOFRAN ODT) 4 mg disintegrating tablet Take 1 Tab by mouth every eight (8) hours as needed for Nausea. 19  Yes Torie Francisco MD   diphenhydrAMINE (BENADRYL) 25 mg capsule Take 25 mg by mouth every six (6) hours as needed. Yes Provider, Historical   oxyCODONE-acetaminophen (PERCOCET) 5-325 mg per tablet Take 1 Tab by mouth every four (4) hours as needed for Pain. Max Daily Amount: 6 Tabs. 19   Hank Damon MD              Allergies   Allergen Reactions    Codeine Rash     Rash from tylenol #3           Objective:     Visit Vitals  /89 (BP 1 Location: Left arm, BP Patient Position: Sitting)   Pulse 87   Temp 97.7 °F (36.5 °C) (Oral)   Resp 16   Ht 5' 2\" (1.575 m)   Wt 196 lb (88.9 kg)   BMI 35.85 kg/m²       Pain Scale: 0/10  Pain Location:       Physical Exam:    GENERAL: alert, cooperative, no distress, appears stated age  EYE: conjunctivae/corneas clear. PERRL, EOM's intact. Fundi benign  LYMPHATIC: Cervical, supraclavicular, and axillary nodes normal.   THROAT & NECK: normal and no erythema or exudates noted. LUNG: clear to auscultation bilaterally  HEART: regular rate and rhythm, S1, S2 normal, no murmur, click, rub or gallop  ABDOMEN: soft, non-tender.  Bowel sounds normal. No masses,  no organomegaly  EXTREMITIES:  extremities normal, atraumatic, no cyanosis or edema  SKIN: Normal.  NEUROLOGIC: AOx3. Gait normal. Reflexes and motor strength normal and symmetric      Lab Results   Component Value Date/Time    WBC 1.6 (L) 05/22/2019 10:45 AM    HGB 10.4 (L) 05/22/2019 10:45 AM    HCT 31.8 (L) 05/22/2019 10:45 AM    PLATELET 813 46/81/4460 10:45 AM    MCV 88.6 05/22/2019 10:45 AM       Lab Results   Component Value Date/Time    Sodium 139 05/08/2019 10:06 AM    Potassium 3.8 05/08/2019 10:06 AM    Chloride 108 05/08/2019 10:06 AM    CO2 24 05/08/2019 10:06 AM    Anion gap 7 05/08/2019 10:06 AM    Glucose 153 (H) 05/08/2019 10:06 AM    BUN 17 05/08/2019 10:06 AM    Creatinine 0.79 05/08/2019 10:06 AM    BUN/Creatinine ratio 22 (H) 05/08/2019 10:06 AM    GFR est AA >60 05/08/2019 10:06 AM    GFR est non-AA >60 05/08/2019 10:06 AM    Calcium 8.9 05/08/2019 10:06 AM    Bilirubin, total 0.3 05/08/2019 10:06 AM    AST (SGOT) 9 (L) 05/08/2019 10:06 AM    Alk. phosphatase 106 05/08/2019 10:06 AM    Protein, total 7.5 05/08/2019 10:06 AM    Albumin 3.5 05/08/2019 10:06 AM    Globulin 4.0 05/08/2019 10:06 AM    A-G Ratio 0.9 (L) 05/08/2019 10:06 AM    ALT (SGPT) 18 05/08/2019 10:06 AM           Assessment:     1. Left breast carcinoma:  T1c N1mi M0 (Stage I) infiltrating ductal carcinoma, Tumor size 1.6 cm, LN 1/3 with micromet, grade 3, ER -ve, [place] -ve, Her 2 -ve    ECOG PS 0  Intent of Treatment - curative  Prognosis - good        Echocardiogram (1/30/2019): Normal, LVEF 56-60%    Receiving neoadjuvant chemotherapy   Adriamycin, Cyclophosphamide - s/p 4 cycles   Weekly Taxol - Week #3 of 12    Tolerating treatment   A detailed system by system evaluation of side effect was performed to assess chemotherapy related toxicity. Blood counts are acceptable. Results reviewed with the patient. Symptom management form reviewed with patient.       2. Anemia, chemotherapy related    Observation        Plan:       > Continue neoadjuvant chemotherapy   > F/U with Dr. Delfino Fang  > Return in 2 weeks      Signed by: Viviana Zapata MD                     May 22, 2019        CC. Amirah Rizo MD  CC.  Lynn Farooq MD

## 2019-05-22 NOTE — PROGRESS NOTES
1010 Pt arrived at [facility] ambulatory and in no acute distress for the following regimen:    Chemotherapy Flowsheet 5/22/2019   Cycle C5D15   Date 5/22/2019   Drug / Regimen Taxol   Pre Meds given   Notes given         Patient Vitals for the past 12 hrs:   Temp Pulse Resp BP   05/22/19 1401 -- 86 16 (!) 160/92   05/22/19 1017 98.3 °F (36.8 °C) 88 18 150/89       Recent Results (from the past 12 hour(s))   CBC WITH AUTOMATED DIFF    Collection Time: 05/22/19 10:45 AM   Result Value Ref Range    WBC 1.6 (L) 3.6 - 11.0 K/uL    RBC 3.59 (L) 3.80 - 5.20 M/uL    HGB 10.4 (L) 11.5 - 16.0 g/dL    HCT 31.8 (L) 35.0 - 47.0 %    MCV 88.6 80.0 - 99.0 FL    MCH 29.0 26.0 - 34.0 PG    MCHC 32.7 30.0 - 36.5 g/dL    RDW 17.2 (H) 11.5 - 14.5 %    PLATELET 202 826 - 499 K/uL    MPV 10.2 8.9 - 12.9 FL    NRBC 0.0 0  WBC    ABSOLUTE NRBC 0.00 0.00 - 0.01 K/uL    NEUTROPHILS 77 (H) 32 - 75 %    LYMPHOCYTES 20 12 - 49 %    MONOCYTES 2 (L) 5 - 13 %    EOSINOPHILS 0 0 - 7 %    BASOPHILS 0 0 - 1 %    IMMATURE GRANULOCYTES 1 (H) 0.0 - 0.5 %    ABS. NEUTROPHILS 1.3 (L) 1.8 - 8.0 K/UL    ABS. LYMPHOCYTES 0.3 (L) 0.8 - 3.5 K/UL    ABS. MONOCYTES 0.0 0.0 - 1.0 K/UL    ABS. EOSINOPHILS 0.0 0.0 - 0.4 K/UL    ABS. BASOPHILS 0.0 0.0 - 0.1 K/UL    ABS. IMM.  GRANS. 0.0 0.00 - 0.04 K/UL    DF AUTOMATED      RBC COMMENTS ANISOCYTOSIS  1+        RBC COMMENTS OVALOCYTES  PRESENT           Medications Administered     0.9% sodium chloride infusion     Admin Date  05/22/2019 Action  New Bag Dose  25 mL/hr Rate  25 mL/hr Route  IntraVENous Administered By  Lisa Watson RN          dexamethasone (DECADRON) 4 mg/mL injection 10 mg     Admin Date  05/22/2019 Action  Given Dose  10 mg Route  IntraVENous Administered By  Lisa Watson RN          diphenhydrAMINE (BENADRYL) capsule 50 mg     Admin Date  05/22/2019 Action  Given Dose  50 mg Route  Oral Administered By  Lisa Watson RN          famotidine (PF) (PEPCID) 20 mg in sodium chloride 0.9% 10 mL injection     Admin Date  05/22/2019 Action  Given Dose  20 mg Route  IntraVENous Administered By  Odilia Dahl RN          heparin (porcine) pf 300-500 Units     Admin Date  05/22/2019 Action  Given Dose  500 Units Route  InterCATHeter Administered By  Tatyana Paulson RN          PACLitaxel (TAXOL) 158 mg in 0.9% sodium chloride 250 mL, overfill volume 25 mL chemo infusion     Admin Date  05/22/2019 Action  New Bag Dose  158 mg Rate  301.3 mL/hr Route  IntraVENous Administered By  Odilia Dahl RN          saline peripheral flush soln 10 mL     Admin Date  05/22/2019 Action  Given Dose  10 mL Route  InterCATHeter Administered By  Tatyana Paulson RN                9287 Tolerated treatment well, no adverse reaction noted. D/Cd from [facility] ambulatory and in no acute distress.     Future Appointments   Date Time Provider Jyotsna Burns   5/29/2019  1:00 PM CHAIR 2 [address] REG   6/5/2019 11:00 AM CHAIR 2 [address] REG   6/5/2019 11:15 AM Jeyson Chan NP ONCMR ELADIA SCHED   6/12/2019  1:00 PM CHAIR 2 [address] REG   6/19/2019  1:00 PM CHAIR 2 [address] REG   6/26/2019  1:00 PM CHAIR 2 [address] REG   7/3/2019  1:00 PM CHAIR 2 [address] REG   7/10/2019  1:00 PM CHAIR 2 [address] REG

## 2019-05-29 ENCOUNTER — HOSPITAL ENCOUNTER (OUTPATIENT)
Dept: INFUSION THERAPY | Age: 59
Discharge: HOME | End: 2019-05-29
Payer: COMMERCIAL

## 2019-05-29 VITALS
RESPIRATION RATE: 16 BRPM | OXYGEN SATURATION: 97 % | TEMPERATURE: 98.2 F | HEIGHT: 62 IN | WEIGHT: 195.2 LBS | DIASTOLIC BLOOD PRESSURE: 93 MMHG | HEART RATE: 90 BPM | SYSTOLIC BLOOD PRESSURE: 174 MMHG | BODY MASS INDEX: 35.92 KG/M2

## 2019-05-29 DIAGNOSIS — C50.412 MALIGNANT NEOPLASM OF UPPER-OUTER QUADRANT OF LEFT BREAST IN FEMALE, ESTROGEN RECEPTOR POSITIVE (HCC): Primary | ICD-10-CM

## 2019-05-29 DIAGNOSIS — Z17.0 MALIGNANT NEOPLASM OF UPPER-OUTER QUADRANT OF LEFT BREAST IN FEMALE, ESTROGEN RECEPTOR POSITIVE (HCC): Primary | ICD-10-CM

## 2019-05-29 LAB
ALBUMIN SERPL-MCNC: 3.5 G/DL (ref 3.5–5)
ALBUMIN/GLOB SERPL: 0.8 {RATIO} (ref 1.1–2.2)
ALP SERPL-CCNC: 106 U/L (ref 45–117)
ALT SERPL-CCNC: 39 U/L (ref 12–78)
ANION GAP SERPL CALC-SCNC: 7 MMOL/L (ref 5–15)
AST SERPL-CCNC: 23 U/L (ref 15–37)
BASOPHILS # BLD: 0 K/UL (ref 0–0.1)
BASOPHILS NFR BLD: 0 % (ref 0–1)
BILIRUB SERPL-MCNC: 0.3 MG/DL (ref 0.2–1)
BUN SERPL-MCNC: 16 MG/DL (ref 6–20)
BUN/CREAT SERPL: 21 (ref 12–20)
CALCIUM SERPL-MCNC: 9.2 MG/DL (ref 8.5–10.1)
CHLORIDE SERPL-SCNC: 105 MMOL/L (ref 97–108)
CO2 SERPL-SCNC: 26 MMOL/L (ref 21–32)
CREAT SERPL-MCNC: 0.77 MG/DL (ref 0.55–1.02)
DIFFERENTIAL METHOD BLD: ABNORMAL
EOSINOPHIL # BLD: 0 K/UL (ref 0–0.4)
EOSINOPHIL NFR BLD: 0 % (ref 0–7)
ERYTHROCYTE [DISTWIDTH] IN BLOOD BY AUTOMATED COUNT: 17.3 % (ref 11.5–14.5)
GLOBULIN SER CALC-MCNC: 4.2 G/DL (ref 2–4)
GLUCOSE SERPL-MCNC: 131 MG/DL (ref 65–100)
HCT VFR BLD AUTO: 31.6 % (ref 35–47)
HGB BLD-MCNC: 10.5 G/DL (ref 11.5–16)
IMM GRANULOCYTES # BLD AUTO: 0 K/UL (ref 0–0.04)
IMM GRANULOCYTES NFR BLD AUTO: 1 % (ref 0–0.5)
LYMPHOCYTES # BLD: 0.4 K/UL (ref 0.8–3.5)
LYMPHOCYTES NFR BLD: 17 % (ref 12–49)
MCH RBC QN AUTO: 29.7 PG (ref 26–34)
MCHC RBC AUTO-ENTMCNC: 33.2 G/DL (ref 30–36.5)
MCV RBC AUTO: 89.5 FL (ref 80–99)
MONOCYTES # BLD: 0.1 K/UL (ref 0–1)
MONOCYTES NFR BLD: 2 % (ref 5–13)
NEUTS SEG # BLD: 2.1 K/UL (ref 1.8–8)
NEUTS SEG NFR BLD: 80 % (ref 32–75)
NRBC # BLD: 0 K/UL (ref 0–0.01)
NRBC BLD-RTO: 0 PER 100 WBC
PLATELET # BLD AUTO: 280 K/UL (ref 150–400)
PMV BLD AUTO: 9.9 FL (ref 8.9–12.9)
POTASSIUM SERPL-SCNC: 3.6 MMOL/L (ref 3.5–5.1)
PROT SERPL-MCNC: 7.7 G/DL (ref 6.4–8.2)
RBC # BLD AUTO: 3.53 M/UL (ref 3.8–5.2)
RBC MORPH BLD: ABNORMAL
SODIUM SERPL-SCNC: 138 MMOL/L (ref 136–145)
WBC # BLD AUTO: 2.6 K/UL (ref 3.6–11)

## 2019-05-29 PROCEDURE — 74011250636 HC RX REV CODE- 250/636: Performed by: INTERNAL MEDICINE

## 2019-05-29 PROCEDURE — 96413 CHEMO IV INFUSION 1 HR: CPT

## 2019-05-29 PROCEDURE — 77030012965 HC NDL HUBR BBMI -A

## 2019-05-29 PROCEDURE — 96375 TX/PRO/DX INJ NEW DRUG ADDON: CPT

## 2019-05-29 PROCEDURE — 85025 COMPLETE CBC W/AUTO DIFF WBC: CPT

## 2019-05-29 PROCEDURE — 74011000250 HC RX REV CODE- 250: Performed by: INTERNAL MEDICINE

## 2019-05-29 PROCEDURE — 36415 COLL VENOUS BLD VENIPUNCTURE: CPT

## 2019-05-29 PROCEDURE — 74011250637 HC RX REV CODE- 250/637: Performed by: INTERNAL MEDICINE

## 2019-05-29 PROCEDURE — 80053 COMPREHEN METABOLIC PANEL: CPT

## 2019-05-29 RX ORDER — SODIUM CHLORIDE 0.9 % (FLUSH) 0.9 %
10 SYRINGE (ML) INJECTION AS NEEDED
Status: DISCONTINUED | OUTPATIENT
Start: 2019-05-29 | End: 2019-05-30 | Stop reason: HOSPADM

## 2019-05-29 RX ORDER — SODIUM CHLORIDE 9 MG/ML
10 INJECTION INTRAMUSCULAR; INTRAVENOUS; SUBCUTANEOUS AS NEEDED
Status: DISCONTINUED | OUTPATIENT
Start: 2019-05-29 | End: 2019-05-30 | Stop reason: HOSPADM

## 2019-05-29 RX ORDER — DIPHENHYDRAMINE HCL 25 MG
50 CAPSULE ORAL ONCE
Status: COMPLETED | OUTPATIENT
Start: 2019-05-29 | End: 2019-05-29

## 2019-05-29 RX ORDER — DIPHENHYDRAMINE HYDROCHLORIDE 50 MG/ML
50 INJECTION, SOLUTION INTRAMUSCULAR; INTRAVENOUS ONCE
Status: DISCONTINUED | OUTPATIENT
Start: 2019-05-29 | End: 2019-05-29

## 2019-05-29 RX ORDER — DEXAMETHASONE SODIUM PHOSPHATE 4 MG/ML
10 INJECTION, SOLUTION INTRA-ARTICULAR; INTRALESIONAL; INTRAMUSCULAR; INTRAVENOUS; SOFT TISSUE ONCE
Status: COMPLETED | OUTPATIENT
Start: 2019-05-29 | End: 2019-05-29

## 2019-05-29 RX ORDER — SODIUM CHLORIDE 9 MG/ML
25 INJECTION, SOLUTION INTRAVENOUS CONTINUOUS
Status: DISCONTINUED | OUTPATIENT
Start: 2019-05-29 | End: 2019-05-30 | Stop reason: HOSPADM

## 2019-05-29 RX ORDER — HEPARIN 100 UNIT/ML
300-500 SYRINGE INTRAVENOUS AS NEEDED
Status: DISCONTINUED | OUTPATIENT
Start: 2019-05-29 | End: 2019-05-30 | Stop reason: HOSPADM

## 2019-05-29 RX ADMIN — SODIUM CHLORIDE 25 ML/HR: 900 INJECTION, SOLUTION INTRAVENOUS at 15:01

## 2019-05-29 RX ADMIN — DEXAMETHASONE SODIUM PHOSPHATE 10 MG: 4 INJECTION, SOLUTION INTRA-ARTICULAR; INTRALESIONAL; INTRAMUSCULAR; INTRAVENOUS; SOFT TISSUE at 15:09

## 2019-05-29 RX ADMIN — DIPHENHYDRAMINE HYDROCHLORIDE 50 MG: 25 CAPSULE ORAL at 15:02

## 2019-05-29 RX ADMIN — FAMOTIDINE 20 MG: 10 INJECTION, SOLUTION INTRAVENOUS at 15:04

## 2019-05-29 RX ADMIN — Medication 500 UNITS: at 17:03

## 2019-05-29 RX ADMIN — PACLITAXEL 158 MG: 6 INJECTION, SOLUTION INTRAVENOUS at 16:00

## 2019-05-29 RX ADMIN — Medication 10 ML: at 15:57

## 2019-05-29 RX ADMIN — Medication 10 ML: at 17:04

## 2019-05-29 NOTE — PROGRESS NOTES
[facility] - Chemotherapy Progress Note    1310 Pt admit to [facility] for Taxol ambulatory in stable condition. Assessment completed. No new concerns voiced. Port accessed and flushed with positive blood return. Labs drawn per order and sent for processing. Results are within parameters to treat. Chemotherapy Flowsheet 5/29/2019   Cycle C6D1   Date 5/29/2019   Drug / Regimen Taxol   Pre Meds given   Notes -       Visit Vitals  BP (!) 145/92 (BP 1 Location: Right arm, BP Patient Position: Sitting)   Pulse 96   Temp 97.9 °F (36.6 °C)   Resp 18   Ht 5' 2\" (1.575 m)   Wt 88.5 kg (195 lb 3.2 oz)   BMI 35.70 kg/m²     Patient Vitals for the past 12 hrs:   Temp Pulse Resp BP SpO2   05/29/19 1701 98.2 °F (36.8 °C) 90 16 (!) 174/93 97 %   05/29/19 1312 97.9 °F (36.6 °C) 96 18 (!) 145/92 --       Recent Results (from the past 12 hour(s))   CBC WITH AUTOMATED DIFF    Collection Time: 05/29/19  1:31 PM   Result Value Ref Range    WBC 2.6 (L) 3.6 - 11.0 K/uL    RBC 3.53 (L) 3.80 - 5.20 M/uL    HGB 10.5 (L) 11.5 - 16.0 g/dL    HCT 31.6 (L) 35.0 - 47.0 %    MCV 89.5 80.0 - 99.0 FL    MCH 29.7 26.0 - 34.0 PG    MCHC 33.2 30.0 - 36.5 g/dL    RDW 17.3 (H) 11.5 - 14.5 %    PLATELET 267 578 - 949 K/uL    MPV 9.9 8.9 - 12.9 FL    NRBC 0.0 0  WBC    ABSOLUTE NRBC 0.00 0.00 - 0.01 K/uL    NEUTROPHILS 80 (H) 32 - 75 %    LYMPHOCYTES 17 12 - 49 %    MONOCYTES 2 (L) 5 - 13 %    EOSINOPHILS 0 0 - 7 %    BASOPHILS 0 0 - 1 %    IMMATURE GRANULOCYTES 1 (H) 0.0 - 0.5 %    ABS. NEUTROPHILS 2.1 1.8 - 8.0 K/UL    ABS. LYMPHOCYTES 0.4 (L) 0.8 - 3.5 K/UL    ABS. MONOCYTES 0.1 0.0 - 1.0 K/UL    ABS. EOSINOPHILS 0.0 0.0 - 0.4 K/UL    ABS. BASOPHILS 0.0 0.0 - 0.1 K/UL    ABS. IMM.  GRANS. 0.0 0.00 - 0.04 K/UL    DF AUTOMATED      RBC COMMENTS ANISOCYTOSIS  1+       METABOLIC PANEL, COMPREHENSIVE    Collection Time: 05/29/19  1:31 PM   Result Value Ref Range    Sodium 138 136 - 145 mmol/L    Potassium 3.6 3.5 - 5.1 mmol/L    Chloride 105 97 - 108 mmol/L    CO2 26 21 - 32 mmol/L    Anion gap 7 5 - 15 mmol/L    Glucose 131 (H) 65 - 100 mg/dL    BUN 16 6 - 20 MG/DL    Creatinine 0.77 0.55 - 1.02 MG/DL    BUN/Creatinine ratio 21 (H) 12 - 20      GFR est AA >60 >60 ml/min/1.73m2    GFR est non-AA >60 >60 ml/min/1.73m2    Calcium 9.2 8.5 - 10.1 MG/DL    Bilirubin, total 0.3 0.2 - 1.0 MG/DL    ALT (SGPT) 39 12 - 78 U/L    AST (SGOT) 23 15 - 37 U/L    Alk. phosphatase 106 45 - 117 U/L    Protein, total 7.7 6.4 - 8.2 g/dL    Albumin 3.5 3.5 - 5.0 g/dL    Globulin 4.2 (H) 2.0 - 4.0 g/dL    A-G Ratio 0.8 (L) 1.1 - 2.2         Medications:  Dexamethasone IVP  Benadryl PO  Pepcid IVP  Taxol IV    1715 Pt tolerated treatment well. Port maintained positive blood return throughout treatment, flushed with positive blood return at conclusion and throughout. D/c home ambulatory in no distress. Pt aware of next appointment scheduled for 6/5/19.

## 2019-06-04 NOTE — PROGRESS NOTES
[address] [facility]  [address], 97 [facility] Juan David Diaz, 200 [facility]  [phone]      Follow-up Note        Patient: Andria Lepe MRN: [id number]  SSN: [id number]    YOB: 1960  Age: 60 y.o. Sex: female        Diagnosis:     1. Left breast carcinoma:  T1c N1mi M0 (Stage I) infiltrating ductal carcinoma, Tumor size 1.6 cm, LN 1/3 with micromet, grade 3, ER -ve, [place] -ve, Her 2 -ve    Treatment:     1. Neoadjuvant chemotherapy   Adriamycin, cytoxan - s/p 4 cycles   Weekly Taxol - Week #5 of 12    Subjective:      Andria Lepe is a 60 y.o. female with a diagnosis of left sided invasive breast cancer. She felt a lump in the left breast, went to see her PCP, got a mammogram and was noted to have abnormal density in the left upper outer quadrant of the breast. A biopsy of the mass revealed gr 3 IDC, TNBC. The axillary LN is clear of disease. She saw Dr. Rachael Mejia. An MRI of the breast shows the tumor to be larger than previously believed to be. A left axillary LN biopsy showed 1/3 nodes with micrometastasis. She works at [employer] full time. Ms. Arelis Napoles is receiving neoadjuvant chemotherapy. She is tolerating treatment extremely well and continues to work full time. She is receiving weekly taxol. She notes some mild numbness/tingling in her toes but otherwise feels well.       Review of Systems:    Constitutional: decreased appetite  Eyes: negative  Ears, Nose, Mouth, Throat, and Face: negative  Respiratory: negative  Cardiovascular: negative  Gastrointestinal: negative  Genitourinary:negative  Integument/Breast: negative  Hematologic/Lymphatic: negative  Musculoskeletal:negative  Neurological: numbness/tingling toes        Past Medical History:   Diagnosis Date    Breast cancer ([address]     Menopause      Past Surgical History:   Procedure Laterality Date    HX BREAST BIOPSY Left     x2    HX HYSTERECTOMY      partial, ovaries remain      No family history on file. Social History     Tobacco Use    Smoking status: Former Smoker     Last attempt to quit: 3/6/2013     Years since quittin.2    Smokeless tobacco: Never Used    Tobacco comment: Quit smoking 23 days ago   Substance Use Topics    Alcohol use: No      Prior to Admission medications    Medication Sig Start Date End Date Taking? Authorizing Provider   omeprazole magnesium (PRILOSEC PO) Take  by mouth. Provider, Historical   dexamethasone (DECADRON) 4 mg tablet Take 20 mg by mouth See Admin Instructions. Take 20 mg the night before chemotherapy 19   Jori Patrick NP   oxyCODONE-acetaminophen (PERCOCET) 5-325 mg per tablet Take 1 Tab by mouth every four (4) hours as needed for Pain. Max Daily Amount: 6 Tabs. 19   Radu Hunter MD   lidocaine-prilocaine (EMLA) topical cream Apply  to affected area as needed for Pain. 19   Mariia Oseguera MD   ondansetron (ZOFRAN ODT) 4 mg disintegrating tablet Take 1 Tab by mouth every eight (8) hours as needed for Nausea. 19   Mariia Oseguera MD   diphenhydrAMINE (BENADRYL) 25 mg capsule Take 25 mg by mouth every six (6) hours as needed. Provider, Historical          Allergies   Allergen Reactions    Codeine Rash     Rash from tylenol #3         Objective:     Visit Vitals  /85 (BP 1 Location: Left arm, BP Patient Position: Sitting)   Pulse 95   Temp 98 °F (36.7 °C) (Oral)   Resp 16   Ht 5' 2\" (1.575 m)   Wt 194 lb 12.8 oz (88.4 kg)   SpO2 98%   BMI 35.63 kg/m²       Pain Scale: 0 - No pain/10  Pain Location:       Physical Exam:    GENERAL: alert, cooperative, no distress, appears stated age  EYE: conjunctivae/corneas clear. PERRL, EOM's intact. Fundi benign  LYMPHATIC: Cervical, supraclavicular, and axillary nodes normal.   THROAT & NECK: normal and no erythema or exudates noted.    LUNG: clear to auscultation bilaterally  HEART: regular rate and rhythm, S1, S2 normal, no murmur, click, rub or gallop  ABDOMEN: soft, non-tender. Bowel sounds normal. No masses,  no organomegaly  EXTREMITIES:  extremities normal, atraumatic, no cyanosis or edema  SKIN: Normal.  NEUROLOGIC: AOx3. Gait normal. Reflexes and motor strength normal and symmetric      Lab Results   Component Value Date/Time    WBC 2.6 (L) 05/29/2019 01:31 PM    HGB 10.5 (L) 05/29/2019 01:31 PM    HCT 31.6 (L) 05/29/2019 01:31 PM    PLATELET 416 75/48/3464 01:31 PM    MCV 89.5 05/29/2019 01:31 PM       Lab Results   Component Value Date/Time    Sodium 138 05/29/2019 01:31 PM    Potassium 3.6 05/29/2019 01:31 PM    Chloride 105 05/29/2019 01:31 PM    CO2 26 05/29/2019 01:31 PM    Anion gap 7 05/29/2019 01:31 PM    Glucose 131 (H) 05/29/2019 01:31 PM    BUN 16 05/29/2019 01:31 PM    Creatinine 0.77 05/29/2019 01:31 PM    BUN/Creatinine ratio 21 (H) 05/29/2019 01:31 PM    GFR est AA >60 05/29/2019 01:31 PM    GFR est non-AA >60 05/29/2019 01:31 PM    Calcium 9.2 05/29/2019 01:31 PM    Bilirubin, total 0.3 05/29/2019 01:31 PM    AST (SGOT) 23 05/29/2019 01:31 PM    Alk. phosphatase 106 05/29/2019 01:31 PM    Protein, total 7.7 05/29/2019 01:31 PM    Albumin 3.5 05/29/2019 01:31 PM    Globulin 4.2 (H) 05/29/2019 01:31 PM    A-G Ratio 0.8 (L) 05/29/2019 01:31 PM    ALT (SGPT) 39 05/29/2019 01:31 PM           Assessment:     1. Left breast carcinoma:  T1c N1mi M0 (Stage I) infiltrating ductal carcinoma, Tumor size 1.6 cm, LN 1/3 with micromet, grade 3, ER -ve, [place] -ve, Her 2 -ve    ECOG PS 0  Intent of Treatment - curative  Prognosis - good        Echocardiogram (5/14/2019): Normal, LVEF 61-65%    Receiving neoadjuvant chemotherapy   Adriamycin, Cyclophosphamide - s/p 4 cycles   Weekly Taxol - Week #5 of 12    Tolerating treatment   A detailed system by system evaluation of side effect was performed to assess chemotherapy related toxicity. Blood counts are acceptable. Results reviewed with the patient.     Symptom management form reviewed with patient. 2. Anemia, chemotherapy related    Observation        Plan:       > Continue neoadjuvant chemotherapy   > Follow-up with Dr. Pulliam Loss on 6/10  > Follow-up in 2 weeks        Signed by: Asael Mcgarry MD                     June 5, 2019        CC. Bruno Goodell, MD  CC.  Marium Hooks MD

## 2019-06-05 ENCOUNTER — OFFICE VISIT (OUTPATIENT)
Dept: ONCOLOGY | Age: 59
End: 2019-06-05

## 2019-06-05 ENCOUNTER — HOSPITAL ENCOUNTER (OUTPATIENT)
Dept: INFUSION THERAPY | Age: 59
Discharge: HOME | End: 2019-06-05
Payer: COMMERCIAL

## 2019-06-05 VITALS
TEMPERATURE: 98.5 F | WEIGHT: 194.8 LBS | SYSTOLIC BLOOD PRESSURE: 164 MMHG | HEART RATE: 90 BPM | RESPIRATION RATE: 18 BRPM | BODY MASS INDEX: 35.85 KG/M2 | OXYGEN SATURATION: 98 % | HEIGHT: 62 IN | DIASTOLIC BLOOD PRESSURE: 94 MMHG

## 2019-06-05 VITALS
HEART RATE: 95 BPM | TEMPERATURE: 98 F | BODY MASS INDEX: 35.85 KG/M2 | OXYGEN SATURATION: 98 % | HEIGHT: 62 IN | SYSTOLIC BLOOD PRESSURE: 154 MMHG | WEIGHT: 194.8 LBS | DIASTOLIC BLOOD PRESSURE: 85 MMHG | RESPIRATION RATE: 16 BRPM

## 2019-06-05 DIAGNOSIS — D64.81 ANEMIA ASSOCIATED WITH CHEMOTHERAPY: ICD-10-CM

## 2019-06-05 DIAGNOSIS — Z17.0 MALIGNANT NEOPLASM OF UPPER-OUTER QUADRANT OF LEFT BREAST IN FEMALE, ESTROGEN RECEPTOR POSITIVE (HCC): Primary | ICD-10-CM

## 2019-06-05 DIAGNOSIS — C50.412 MALIGNANT NEOPLASM OF UPPER-OUTER QUADRANT OF LEFT BREAST IN FEMALE, ESTROGEN RECEPTOR POSITIVE (HCC): Primary | ICD-10-CM

## 2019-06-05 DIAGNOSIS — T45.1X5A ANEMIA ASSOCIATED WITH CHEMOTHERAPY: ICD-10-CM

## 2019-06-05 LAB
BASO+EOS+MONOS # BLD AUTO: 0 K/UL (ref 0.2–1.2)
BASO+EOS+MONOS NFR BLD AUTO: 1 % (ref 3.2–16.9)
DIFFERENTIAL METHOD BLD: ABNORMAL
ERYTHROCYTE [DISTWIDTH] IN BLOOD BY AUTOMATED COUNT: 17.4 % (ref 11.8–15.8)
HCT VFR BLD AUTO: 32.5 % (ref 35–47)
HGB BLD-MCNC: 10.8 G/DL (ref 11.5–16)
LYMPHOCYTES # BLD: 0.3 K/UL (ref 0.8–3.5)
LYMPHOCYTES NFR BLD: 21 % (ref 12–49)
MCH RBC QN AUTO: 30 PG (ref 26–34)
MCHC RBC AUTO-ENTMCNC: 33.2 G/DL (ref 30–36.5)
MCV RBC AUTO: 90.3 FL (ref 80–99)
NEUTS SEG # BLD: 1.2 K/UL (ref 1.8–8)
NEUTS SEG NFR BLD: 78 % (ref 32–75)
PLATELET # BLD AUTO: 362 K/UL (ref 150–400)
RBC # BLD AUTO: 3.6 M/UL (ref 3.8–5.2)
WBC # BLD AUTO: 1.5 K/UL (ref 3.6–11)

## 2019-06-05 PROCEDURE — 77030012965 HC NDL HUBR BBMI -A

## 2019-06-05 PROCEDURE — 74011250636 HC RX REV CODE- 250/636: Performed by: INTERNAL MEDICINE

## 2019-06-05 PROCEDURE — 36415 COLL VENOUS BLD VENIPUNCTURE: CPT

## 2019-06-05 PROCEDURE — 74011250637 HC RX REV CODE- 250/637: Performed by: NURSE PRACTITIONER

## 2019-06-05 PROCEDURE — 96413 CHEMO IV INFUSION 1 HR: CPT

## 2019-06-05 PROCEDURE — 96375 TX/PRO/DX INJ NEW DRUG ADDON: CPT

## 2019-06-05 PROCEDURE — 85025 COMPLETE CBC W/AUTO DIFF WBC: CPT

## 2019-06-05 PROCEDURE — 74011000250 HC RX REV CODE- 250: Performed by: INTERNAL MEDICINE

## 2019-06-05 RX ORDER — DEXAMETHASONE SODIUM PHOSPHATE 4 MG/ML
10 INJECTION, SOLUTION INTRA-ARTICULAR; INTRALESIONAL; INTRAMUSCULAR; INTRAVENOUS; SOFT TISSUE ONCE
Status: COMPLETED | OUTPATIENT
Start: 2019-06-05 | End: 2019-06-05

## 2019-06-05 RX ORDER — SODIUM CHLORIDE 9 MG/ML
25 INJECTION, SOLUTION INTRAVENOUS CONTINUOUS
Status: DISPENSED | OUTPATIENT
Start: 2019-06-05 | End: 2019-06-05

## 2019-06-05 RX ORDER — SODIUM CHLORIDE 9 MG/ML
10 INJECTION INTRAMUSCULAR; INTRAVENOUS; SUBCUTANEOUS AS NEEDED
Status: ACTIVE | OUTPATIENT
Start: 2019-06-05 | End: 2019-06-05

## 2019-06-05 RX ORDER — SODIUM CHLORIDE 0.9 % (FLUSH) 0.9 %
10 SYRINGE (ML) INJECTION AS NEEDED
Status: ACTIVE | OUTPATIENT
Start: 2019-06-05 | End: 2019-06-05

## 2019-06-05 RX ORDER — DIPHENHYDRAMINE HCL 25 MG
50 CAPSULE ORAL ONCE
Status: COMPLETED | OUTPATIENT
Start: 2019-06-05 | End: 2019-06-05

## 2019-06-05 RX ORDER — DIPHENHYDRAMINE HYDROCHLORIDE 50 MG/ML
50 INJECTION, SOLUTION INTRAMUSCULAR; INTRAVENOUS ONCE
Status: DISCONTINUED | OUTPATIENT
Start: 2019-06-05 | End: 2019-06-05 | Stop reason: CLARIF

## 2019-06-05 RX ORDER — HEPARIN 100 UNIT/ML
300-500 SYRINGE INTRAVENOUS AS NEEDED
Status: ACTIVE | OUTPATIENT
Start: 2019-06-05 | End: 2019-06-05

## 2019-06-05 RX ADMIN — SODIUM CHLORIDE 25 ML/HR: 900 INJECTION, SOLUTION INTRAVENOUS at 12:46

## 2019-06-05 RX ADMIN — Medication 500 UNITS: at 15:07

## 2019-06-05 RX ADMIN — Medication 10 ML: at 15:07

## 2019-06-05 RX ADMIN — PACLITAXEL 158 MG: 6 INJECTION, SOLUTION INTRAVENOUS at 14:03

## 2019-06-05 RX ADMIN — DIPHENHYDRAMINE HYDROCHLORIDE 50 MG: 25 CAPSULE ORAL at 12:45

## 2019-06-05 RX ADMIN — DEXAMETHASONE SODIUM PHOSPHATE 10 MG: 4 INJECTION, SOLUTION INTRA-ARTICULAR; INTRALESIONAL; INTRAMUSCULAR; INTRAVENOUS; SOFT TISSUE at 12:53

## 2019-06-05 RX ADMIN — FAMOTIDINE 20 MG: 10 INJECTION, SOLUTION INTRAVENOUS at 12:48

## 2019-06-05 RX ADMIN — SODIUM CHLORIDE 10 ML: 9 INJECTION INTRAMUSCULAR; INTRAVENOUS; SUBCUTANEOUS at 11:20

## 2019-06-05 RX ADMIN — Medication 10 ML: at 11:20

## 2019-06-05 NOTE — PROGRESS NOTES
Pt arrived to [facility] ambulatory for Taxol C6D8 in no acute distress at 1110.  Assessment unremarkable except pt reports tingling in both feet and elevated BP.  R chest port accessed without issue and positive blood return noted.  Labs obtained- CBC with diff. Pt to MD office for follow-up appointment. Patient Vitals for the past 12 hrs:   Temp Pulse Resp BP SpO2   06/05/19 1113 -- 96 -- (!) 164/103 --   06/05/19 1111 98.5 °F (36.9 °C) (!) 101 18 (!) 187/101 98 %     Recent Results (from the past 12 hour(s))   CBC WITH 3 PART DIFF    Collection Time: 06/05/19 11:19 AM   Result Value Ref Range    WBC 1.5 (L) 3.6 - 11.0 K/uL    RBC 3.60 (L) 3.80 - 5.20 M/uL    HGB 10.8 (L) 11.5 - 16.0 g/dL    HCT 32.5 (L) 35.0 - 47.0 %    MCV 90.3 80.0 - 99.0 FL    MCH 30.0 26.0 - 34.0 PG    MCHC 33.2 30.0 - 36.5 g/dL    RDW 17.4 (H) 11.8 - 15.8 %    PLATELET 776 289 - 125 K/uL    NEUTROPHILS 78 (H) 32 - 75 %    MIXED CELLS 1 (L) 3.2 - 16.9 %    LYMPHOCYTES 21 12 - 49 %    ABS. NEUTROPHILS 1.2 (L) 1.8 - 8.0 K/UL    ABS. MIXED CELLS 0.0 (L) 0.2 - 1.2 K/uL    ABS. LYMPHOCYTES 0.3 (L) 0.8 - 3.5 K/UL    DF AUTOMATED         The following medications administered:  NS @ KVO  Benadryl 50 mg PO  Pepcid 20 mg IVP  Decadron 10 mg IVP  Taxol 158 mg IV over 1 hour    Visit Vitals  BP (!) 164/94 (BP 1 Location: Left arm, BP Patient Position: Sitting)   Pulse 90   Temp 98.5 °F (36.9 °C)   Resp 18   Ht 5' 2\" (1.575 m)   Wt 88.4 kg (194 lb 12.8 oz)   SpO2 98%   Breastfeeding? No   BMI 35.63 kg/m²       Pt tolerated treatment well.  No adverse reaction noted. Port flushed per policy and removed, 2x2 and paper tape placed.  Pt discharged ambulatory in no acute distress at 1510, accompanied by family member. Next appointment 6/12/19 @ 1000.

## 2019-06-05 NOTE — PROGRESS NOTES
Tarah Miles a 60 y.o. female here today for left breast cancer; triple negative f/u. Patient receiving weekly Taxol; cycle 6; day 8. VS stable. Patient denies pain. Good appetite. Patient denies N/V/D and constipation. Patient report numbness and tingling in toes and a slight H/A. Patient denies mouth ulcers. Patient denies cough. Patient denies SOB. Visit Vitals  /85 (BP 1 Location: Left arm, BP Patient Position: Sitting)   Pulse 95   Temp 98 °F (36.7 °C) (Oral)   Resp 16   Ht 5' 2\" (1.575 m)   Wt 194 lb 12.8 oz (88.4 kg)   SpO2 98%   BMI 35.63 kg/m²       Pain Scale: 0 - No pain/10  Pain Location:     1. Have you been to the ER, urgent care clinic since your last visit? Hospitalized since your last visit? No    2. Have you seen or consulted any other health care providers outside of the [address] [place] [address] since your last visit? Include any pap smears or colon screening. No     Health Maintenance Review: Patient reminded of \"due or due soon\" health maintenance.  I have asked the patient to contact his/her primary care provider (PCP) for follow-up on his/her health maintenance.

## 2019-06-10 ENCOUNTER — OFFICE VISIT (OUTPATIENT)
Dept: SURGERY | Age: 59
End: 2019-06-10

## 2019-06-10 VITALS
HEART RATE: 101 BPM | WEIGHT: 194 LBS | DIASTOLIC BLOOD PRESSURE: 86 MMHG | BODY MASS INDEX: 35.7 KG/M2 | SYSTOLIC BLOOD PRESSURE: 147 MMHG | HEIGHT: 62 IN

## 2019-06-10 DIAGNOSIS — C50.412 MALIGNANT NEOPLASM OF UPPER-OUTER QUADRANT OF LEFT FEMALE BREAST, UNSPECIFIED ESTROGEN RECEPTOR STATUS (HCC): Primary | ICD-10-CM

## 2019-06-10 NOTE — PROGRESS NOTES
HISTORY OF PRESENT ILLNESS  Kenn Beck is a 60 y.o. female. HPI ESTABLISHED patient here for follow-up of LEFT breast cancer. She is half-way through her chemotherapy. Left breast carcinoma:  T1c N1mi M0 (Stage I) infiltrating ductal carcinoma, Tumor size 1.6 cm, LN 1/3 with micromet, grade 3, ER -ve, [place] -ve, Her 2 -ve    No family history of breast or ovarian cancer. Last breast imagin19: BILATERAL breast MRI  18: BILATERAL diagnostic mammogram    Review of Systems   All other systems reviewed and are negative. Physical Exam   Pulmonary/Chest:       Scar in axilla from sln well healed  Left breast residual mass is adjacent and superior to medial edge of scar. Nursing note and vitals reviewed. BREAST ULTRASOUND, Preop planning  Indication:preop planning  left Breast tail of breast    Technique: The area was scanned using a high-frequency linear-array near-field transducer  Findings: 7 mmirregular mass with dropout  Impression: Biopsy site visible with ultrasound  Disposition:  Will schedule lumpectomy  And mri preop  ASSESSMENT and PLAN    ICD-10-CM ICD-9-CM    1. Malignant neoplasm of upper-outer quadrant of left female breast, unspecified estrogen receptor status (HCC) C50.412 174.4      59 yo with T1c N1mi M0 (Stage I) infiltrating ductal carcinoma, Tumor size 1.6 cm, LN 1/3 with micromet, grade 3, ER -ve, [place] -ve, Her 2 -ve  She had sln biopsy  Plan is mri after chemo and lumpectomy  Tumor much smaller today.

## 2019-06-10 NOTE — PATIENT INSTRUCTIONS

## 2019-06-12 ENCOUNTER — HOSPITAL ENCOUNTER (OUTPATIENT)
Dept: INFUSION THERAPY | Age: 59
Discharge: HOME | End: 2019-06-12
Payer: COMMERCIAL

## 2019-06-12 VITALS
HEIGHT: 62 IN | OXYGEN SATURATION: 97 % | BODY MASS INDEX: 35.96 KG/M2 | TEMPERATURE: 98 F | HEART RATE: 96 BPM | DIASTOLIC BLOOD PRESSURE: 92 MMHG | WEIGHT: 195.44 LBS | RESPIRATION RATE: 16 BRPM | SYSTOLIC BLOOD PRESSURE: 144 MMHG

## 2019-06-12 DIAGNOSIS — C50.412 MALIGNANT NEOPLASM OF UPPER-OUTER QUADRANT OF LEFT BREAST IN FEMALE, ESTROGEN RECEPTOR POSITIVE (HCC): Primary | ICD-10-CM

## 2019-06-12 DIAGNOSIS — Z17.0 MALIGNANT NEOPLASM OF UPPER-OUTER QUADRANT OF LEFT BREAST IN FEMALE, ESTROGEN RECEPTOR POSITIVE (HCC): Primary | ICD-10-CM

## 2019-06-12 LAB
BASO+EOS+MONOS # BLD AUTO: 0 K/UL (ref 0.2–1.2)
BASO+EOS+MONOS NFR BLD AUTO: 2 % (ref 3.2–16.9)
DIFFERENTIAL METHOD BLD: ABNORMAL
ERYTHROCYTE [DISTWIDTH] IN BLOOD BY AUTOMATED COUNT: 17.5 % (ref 11.8–15.8)
HCT VFR BLD AUTO: 32.9 % (ref 35–47)
HGB BLD-MCNC: 11 G/DL (ref 11.5–16)
LYMPHOCYTES # BLD: 0.4 K/UL (ref 0.8–3.5)
LYMPHOCYTES NFR BLD: 18 % (ref 12–49)
MCH RBC QN AUTO: 30 PG (ref 26–34)
MCHC RBC AUTO-ENTMCNC: 33.4 G/DL (ref 30–36.5)
MCV RBC AUTO: 89.6 FL (ref 80–99)
NEUTS SEG # BLD: 1.8 K/UL (ref 1.8–8)
NEUTS SEG NFR BLD: 81 % (ref 32–75)
PLATELET # BLD AUTO: 355 K/UL (ref 150–400)
RBC # BLD AUTO: 3.67 M/UL (ref 3.8–5.2)
WBC # BLD AUTO: 2.2 K/UL (ref 3.6–11)

## 2019-06-12 PROCEDURE — 74011250636 HC RX REV CODE- 250/636: Performed by: INTERNAL MEDICINE

## 2019-06-12 PROCEDURE — 77030012965 HC NDL HUBR BBMI -A

## 2019-06-12 PROCEDURE — 96413 CHEMO IV INFUSION 1 HR: CPT

## 2019-06-12 PROCEDURE — 36415 COLL VENOUS BLD VENIPUNCTURE: CPT

## 2019-06-12 PROCEDURE — 74011000250 HC RX REV CODE- 250: Performed by: INTERNAL MEDICINE

## 2019-06-12 PROCEDURE — 74011250637 HC RX REV CODE- 250/637: Performed by: INTERNAL MEDICINE

## 2019-06-12 PROCEDURE — 96375 TX/PRO/DX INJ NEW DRUG ADDON: CPT

## 2019-06-12 PROCEDURE — 85025 COMPLETE CBC W/AUTO DIFF WBC: CPT

## 2019-06-12 RX ORDER — HEPARIN 100 UNIT/ML
300-500 SYRINGE INTRAVENOUS AS NEEDED
Status: ACTIVE | OUTPATIENT
Start: 2019-06-12 | End: 2019-06-12

## 2019-06-12 RX ORDER — SODIUM CHLORIDE 9 MG/ML
10 INJECTION INTRAMUSCULAR; INTRAVENOUS; SUBCUTANEOUS AS NEEDED
Status: ACTIVE | OUTPATIENT
Start: 2019-06-12 | End: 2019-06-12

## 2019-06-12 RX ORDER — DEXAMETHASONE SODIUM PHOSPHATE 4 MG/ML
10 INJECTION, SOLUTION INTRA-ARTICULAR; INTRALESIONAL; INTRAMUSCULAR; INTRAVENOUS; SOFT TISSUE ONCE
Status: COMPLETED | OUTPATIENT
Start: 2019-06-12 | End: 2019-06-12

## 2019-06-12 RX ORDER — SODIUM CHLORIDE 0.9 % (FLUSH) 0.9 %
10 SYRINGE (ML) INJECTION AS NEEDED
Status: ACTIVE | OUTPATIENT
Start: 2019-06-12 | End: 2019-06-12

## 2019-06-12 RX ORDER — DIPHENHYDRAMINE HCL 25 MG
50 CAPSULE ORAL ONCE
Status: COMPLETED | OUTPATIENT
Start: 2019-06-12 | End: 2019-06-12

## 2019-06-12 RX ORDER — SODIUM CHLORIDE 9 MG/ML
25 INJECTION, SOLUTION INTRAVENOUS CONTINUOUS
Status: DISPENSED | OUTPATIENT
Start: 2019-06-12 | End: 2019-06-12

## 2019-06-12 RX ORDER — DIPHENHYDRAMINE HYDROCHLORIDE 50 MG/ML
50 INJECTION, SOLUTION INTRAMUSCULAR; INTRAVENOUS ONCE
Status: DISCONTINUED | OUTPATIENT
Start: 2019-06-12 | End: 2019-06-12

## 2019-06-12 RX ADMIN — SODIUM CHLORIDE 25 ML/HR: 900 INJECTION, SOLUTION INTRAVENOUS at 10:44

## 2019-06-12 RX ADMIN — DIPHENHYDRAMINE HYDROCHLORIDE 50 MG: 25 CAPSULE ORAL at 10:44

## 2019-06-12 RX ADMIN — Medication 500 UNITS: at 13:05

## 2019-06-12 RX ADMIN — Medication 10 ML: at 10:21

## 2019-06-12 RX ADMIN — Medication 10 ML: at 13:05

## 2019-06-12 RX ADMIN — SODIUM CHLORIDE 10 ML: 9 INJECTION INTRAMUSCULAR; INTRAVENOUS; SUBCUTANEOUS at 10:22

## 2019-06-12 RX ADMIN — DEXAMETHASONE SODIUM PHOSPHATE 10 MG: 4 INJECTION, SOLUTION INTRA-ARTICULAR; INTRALESIONAL; INTRAMUSCULAR; INTRAVENOUS; SOFT TISSUE at 10:54

## 2019-06-12 RX ADMIN — PACLITAXEL 158 MG: 6 INJECTION, SOLUTION INTRAVENOUS at 12:05

## 2019-06-12 RX ADMIN — FAMOTIDINE 20 MG: 10 INJECTION, SOLUTION INTRAVENOUS at 10:47

## 2019-06-12 NOTE — PROGRESS NOTES
Pt arrived to [facility] [place] ambulatory in no acute distress at 1010 for Taxol C6D15.  Assessment unremarkable no new complaints or concerns voiced. Pt continues to have tingling in fingers and toes, but denies any increase since last chemotherapy treatment. R chest port accessed without issue and positive blood return noted.      Visit Vitals  BP (!) 152/93 (BP 1 Location: Left arm, BP Patient Position: At rest;Sitting)   Pulse (!) 104   Temp 98 °F (36.7 °C)   Resp 18   Ht 5' 2\" (1.575 m)   Wt 88.6 kg (195 lb 7 oz)   SpO2 97%   BMI 35.75 kg/m²         Labs obtained:   Recent Results (from the past 12 hour(s))   CBC WITH 3 PART DIFF    Collection Time: 06/12/19 10:22 AM   Result Value Ref Range    WBC 2.2 (L) 3.6 - 11.0 K/uL    RBC 3.67 (L) 3.80 - 5.20 M/uL    HGB 11.0 (L) 11.5 - 16.0 g/dL    HCT 32.9 (L) 35.0 - 47.0 %    MCV 89.6 80.0 - 99.0 FL    MCH 30.0 26.0 - 34.0 PG    MCHC 33.4 30.0 - 36.5 g/dL    RDW 17.5 (H) 11.8 - 15.8 %    PLATELET 335 557 - 141 K/uL    NEUTROPHILS 81 (H) 32 - 75 %    MIXED CELLS 2 (L) 3.2 - 16.9 %    LYMPHOCYTES 18 12 - 49 %    ABS. NEUTROPHILS 1.8 1.8 - 8.0 K/UL    ABS. MIXED CELLS 0.0 (L) 0.2 - 1.2 K/uL    ABS. LYMPHOCYTES 0.4 (L) 0.8 - 3.5 K/UL    DF AUTOMATED         The following medications administered:  NS KVO  Pepcid 20 mg IVP  Decadron 10 mg IVP  Benadryl 50 mg po  Taxol 158 mg IV over 1 hour  NS Flush  Heparin Flush    Visit Vitals  BP (!) 144/92 (BP 1 Location: Left arm, BP Patient Position: At rest)   Pulse 96   Temp 98 °F (36.7 °C)   Resp 16   Ht 5' 2\" (1.575 m)   Wt 88.6 kg (195 lb 7 oz)   SpO2 97%   BMI 35.75 kg/m²       Pt tolerated treatment well. No adverse reactions noted. IV flushed per policy and removed, 2x2 and coban placed.  Pt discharged ambulatory in no acute distress at 1310, accompanied by family.   Next appointment 6/19/19 @ 1 pm.

## 2019-06-19 ENCOUNTER — OFFICE VISIT (OUTPATIENT)
Dept: ONCOLOGY | Age: 59
End: 2019-06-19

## 2019-06-19 ENCOUNTER — HOSPITAL ENCOUNTER (OUTPATIENT)
Dept: INFUSION THERAPY | Age: 59
Discharge: HOME | End: 2019-06-19
Payer: COMMERCIAL

## 2019-06-19 VITALS
OXYGEN SATURATION: 98 % | TEMPERATURE: 98.6 F | SYSTOLIC BLOOD PRESSURE: 181 MMHG | RESPIRATION RATE: 18 BRPM | WEIGHT: 194 LBS | BODY MASS INDEX: 35.7 KG/M2 | DIASTOLIC BLOOD PRESSURE: 89 MMHG | HEART RATE: 107 BPM | HEIGHT: 62 IN

## 2019-06-19 VITALS
HEART RATE: 99 BPM | SYSTOLIC BLOOD PRESSURE: 155 MMHG | DIASTOLIC BLOOD PRESSURE: 85 MMHG | RESPIRATION RATE: 18 BRPM | BODY MASS INDEX: 35.7 KG/M2 | WEIGHT: 194 LBS | TEMPERATURE: 98.3 F | OXYGEN SATURATION: 99 % | HEIGHT: 62 IN

## 2019-06-19 DIAGNOSIS — T45.1X5A ANEMIA ASSOCIATED WITH CHEMOTHERAPY: ICD-10-CM

## 2019-06-19 DIAGNOSIS — D64.81 ANEMIA ASSOCIATED WITH CHEMOTHERAPY: ICD-10-CM

## 2019-06-19 DIAGNOSIS — C50.412 MALIGNANT NEOPLASM OF UPPER-OUTER QUADRANT OF LEFT BREAST IN FEMALE, ESTROGEN RECEPTOR POSITIVE (HCC): Primary | ICD-10-CM

## 2019-06-19 DIAGNOSIS — Z17.0 MALIGNANT NEOPLASM OF UPPER-OUTER QUADRANT OF LEFT BREAST IN FEMALE, ESTROGEN RECEPTOR POSITIVE (HCC): Primary | ICD-10-CM

## 2019-06-19 LAB
ALBUMIN SERPL-MCNC: 3.8 G/DL (ref 3.5–5)
ALBUMIN/GLOB SERPL: 1 {RATIO} (ref 1.1–2.2)
ALP SERPL-CCNC: 102 U/L (ref 45–117)
ALT SERPL-CCNC: 27 U/L (ref 12–78)
ANION GAP SERPL CALC-SCNC: 6 MMOL/L (ref 5–15)
AST SERPL-CCNC: 12 U/L (ref 15–37)
BASO+EOS+MONOS # BLD AUTO: 0 K/UL (ref 0.2–1.2)
BASO+EOS+MONOS NFR BLD AUTO: 1 % (ref 3.2–16.9)
BILIRUB SERPL-MCNC: 0.3 MG/DL (ref 0.2–1)
BUN SERPL-MCNC: 16 MG/DL (ref 6–20)
BUN/CREAT SERPL: 17 (ref 12–20)
CALCIUM SERPL-MCNC: 9.1 MG/DL (ref 8.5–10.1)
CHLORIDE SERPL-SCNC: 106 MMOL/L (ref 97–108)
CO2 SERPL-SCNC: 25 MMOL/L (ref 21–32)
CREAT SERPL-MCNC: 0.93 MG/DL (ref 0.55–1.02)
DIFFERENTIAL METHOD BLD: ABNORMAL
ERYTHROCYTE [DISTWIDTH] IN BLOOD BY AUTOMATED COUNT: 17.8 % (ref 11.8–15.8)
GLOBULIN SER CALC-MCNC: 3.8 G/DL (ref 2–4)
GLUCOSE SERPL-MCNC: 196 MG/DL (ref 65–100)
HCT VFR BLD AUTO: 33 % (ref 35–47)
HGB BLD-MCNC: 11.1 G/DL (ref 11.5–16)
LYMPHOCYTES # BLD: 0.5 K/UL (ref 0.8–3.5)
LYMPHOCYTES NFR BLD: 19 % (ref 12–49)
MCH RBC QN AUTO: 30.1 PG (ref 26–34)
MCHC RBC AUTO-ENTMCNC: 33.6 G/DL (ref 30–36.5)
MCV RBC AUTO: 89.4 FL (ref 80–99)
NEUTS SEG # BLD: 2.3 K/UL (ref 1.8–8)
NEUTS SEG NFR BLD: 80 % (ref 32–75)
PLATELET # BLD AUTO: 318 K/UL (ref 150–400)
POTASSIUM SERPL-SCNC: 3.4 MMOL/L (ref 3.5–5.1)
PROT SERPL-MCNC: 7.6 G/DL (ref 6.4–8.2)
RBC # BLD AUTO: 3.69 M/UL (ref 3.8–5.2)
SODIUM SERPL-SCNC: 137 MMOL/L (ref 136–145)
WBC # BLD AUTO: 2.8 K/UL (ref 3.6–11)

## 2019-06-19 PROCEDURE — 96375 TX/PRO/DX INJ NEW DRUG ADDON: CPT

## 2019-06-19 PROCEDURE — 74011250637 HC RX REV CODE- 250/637: Performed by: INTERNAL MEDICINE

## 2019-06-19 PROCEDURE — 77030012965 HC NDL HUBR BBMI -A

## 2019-06-19 PROCEDURE — 80053 COMPREHEN METABOLIC PANEL: CPT

## 2019-06-19 PROCEDURE — 74011250636 HC RX REV CODE- 250/636: Performed by: INTERNAL MEDICINE

## 2019-06-19 PROCEDURE — 36415 COLL VENOUS BLD VENIPUNCTURE: CPT

## 2019-06-19 PROCEDURE — 74011000250 HC RX REV CODE- 250: Performed by: INTERNAL MEDICINE

## 2019-06-19 PROCEDURE — 74011000250 HC RX REV CODE- 250

## 2019-06-19 PROCEDURE — 85025 COMPLETE CBC W/AUTO DIFF WBC: CPT

## 2019-06-19 PROCEDURE — 96413 CHEMO IV INFUSION 1 HR: CPT

## 2019-06-19 RX ORDER — DIPHENHYDRAMINE HYDROCHLORIDE 50 MG/ML
50 INJECTION, SOLUTION INTRAMUSCULAR; INTRAVENOUS ONCE
Status: DISCONTINUED | OUTPATIENT
Start: 2019-06-19 | End: 2019-06-19

## 2019-06-19 RX ORDER — DIPHENHYDRAMINE HCL 25 MG
50 CAPSULE ORAL ONCE
Status: COMPLETED | OUTPATIENT
Start: 2019-06-19 | End: 2019-06-19

## 2019-06-19 RX ORDER — DEXAMETHASONE SODIUM PHOSPHATE 4 MG/ML
10 INJECTION, SOLUTION INTRA-ARTICULAR; INTRALESIONAL; INTRAMUSCULAR; INTRAVENOUS; SOFT TISSUE ONCE
Status: COMPLETED | OUTPATIENT
Start: 2019-06-19 | End: 2019-06-19

## 2019-06-19 RX ORDER — SODIUM CHLORIDE 9 MG/ML
25 INJECTION, SOLUTION INTRAVENOUS CONTINUOUS
Status: DISCONTINUED | OUTPATIENT
Start: 2019-06-19 | End: 2019-06-20 | Stop reason: HOSPADM

## 2019-06-19 RX ORDER — SODIUM CHLORIDE 9 MG/ML
10 INJECTION INTRAMUSCULAR; INTRAVENOUS; SUBCUTANEOUS AS NEEDED
Status: DISCONTINUED | OUTPATIENT
Start: 2019-06-19 | End: 2019-06-20 | Stop reason: HOSPADM

## 2019-06-19 RX ORDER — SODIUM CHLORIDE 0.9 % (FLUSH) 0.9 %
10 SYRINGE (ML) INJECTION AS NEEDED
Status: DISCONTINUED | OUTPATIENT
Start: 2019-06-19 | End: 2019-06-20 | Stop reason: HOSPADM

## 2019-06-19 RX ORDER — SODIUM CHLORIDE 9 MG/ML
INJECTION INTRAMUSCULAR; INTRAVENOUS; SUBCUTANEOUS
Status: COMPLETED
Start: 2019-06-19 | End: 2019-06-19

## 2019-06-19 RX ORDER — HEPARIN 100 UNIT/ML
300-500 SYRINGE INTRAVENOUS AS NEEDED
Status: DISCONTINUED | OUTPATIENT
Start: 2019-06-19 | End: 2019-06-20 | Stop reason: HOSPADM

## 2019-06-19 RX ADMIN — FAMOTIDINE 20 MG: 10 INJECTION, SOLUTION INTRAVENOUS at 14:21

## 2019-06-19 RX ADMIN — DEXAMETHASONE SODIUM PHOSPHATE 10 MG: 4 INJECTION, SOLUTION INTRAMUSCULAR; INTRAVENOUS at 14:15

## 2019-06-19 RX ADMIN — SODIUM CHLORIDE 10 ML: 9 INJECTION, SOLUTION INTRAMUSCULAR; INTRAVENOUS; SUBCUTANEOUS at 13:28

## 2019-06-19 RX ADMIN — DIPHENHYDRAMINE HYDROCHLORIDE 50 MG: 25 CAPSULE ORAL at 14:20

## 2019-06-19 RX ADMIN — SODIUM CHLORIDE 25 ML/HR: 900 INJECTION, SOLUTION INTRAVENOUS at 14:13

## 2019-06-19 RX ADMIN — Medication 10 ML: at 15:42

## 2019-06-19 RX ADMIN — SODIUM CHLORIDE 10 ML: 9 INJECTION INTRAMUSCULAR; INTRAVENOUS; SUBCUTANEOUS at 13:28

## 2019-06-19 RX ADMIN — Medication 500 UNITS: at 15:42

## 2019-06-19 RX ADMIN — PACLITAXEL 158 MG: 6 INJECTION, SOLUTION INTRAVENOUS at 14:42

## 2019-06-19 RX ADMIN — Medication 10 ML: at 13:28

## 2019-06-19 NOTE — PROGRESS NOTES
[address], [address] [place] [address] Juan David Paulineau, [address] [facility]  [phone]      Follow-up Note        Patient: Giuseppe Tapia MRN: [id number]  SSN: [id number]    YOB: 1960  Age: 60 y.o. Sex: female        Diagnosis:     1. Left breast carcinoma:  T1c N1mi M0 (Stage I) infiltrating ductal carcinoma, Tumor size 1.6 cm, LN 1/3 with micromet, grade 3, ER -ve, IA -ve, Her 2 -ve    Treatment:     1. Neoadjuvant chemotherapy   Adriamycin, cytoxan - s/p 4 cycles   Weekly Taxol - Week #7 of 12    Subjective:      Giuseppe Tapia is a 60 y.o. female with a diagnosis of left sided invasive breast cancer. She felt a lump in the left breast, went to see her PCP, got a mammogram and was noted to have abnormal density in the left upper outer quadrant of the breast. A biopsy of the mass revealed gr 3 IDC, TNBC. The axillary LN is clear of disease. She saw Dr. Clarence Winn. An MRI of the breast shows the tumor to be larger than previously believed to be. A left axillary LN biopsy showed 1/3 nodes with micrometastasis. She works at [employer] full time. Ms. Lorraine Calderon is receiving neoadjuvant chemotherapy. She is tolerating treatment extremely well and continues to work full time. She is receiving weekly taxol. Repeat ultrasound with Dr. Shasha Blakely shows good response to treatment.       Review of Systems:    Constitutional: decreased appetite  Eyes: negative  Ears, Nose, Mouth, Throat, and Face: negative  Respiratory: negative  Cardiovascular: negative  Gastrointestinal: negative  Genitourinary:negative  Integument/Breast: negative  Hematologic/Lymphatic: negative  Musculoskeletal:negative  Neurological: numbness/tingling toes        Past Medical History:   Diagnosis Date    Breast cancer ([address]     Menopause      Past Surgical History:   Procedure Laterality Date    HX BREAST BIOPSY Left     x2    HX HYSTERECTOMY      partial, ovaries remain  VASCULAR SURGERY PROCEDURE UNLIST      [facility]      History reviewed. No pertinent family history. Social History     Tobacco Use    Smoking status: Former Smoker     Last attempt to quit: 3/6/2013     Years since quittin.2    Smokeless tobacco: Never Used    Tobacco comment: Quit smoking 23 days ago   Substance Use Topics    Alcohol use: No      Prior to Admission medications    Medication Sig Start Date End Date Taking? Authorizing Provider   omeprazole magnesium (PRILOSEC PO) Take  by mouth. Yes Provider, Historical   dexamethasone (DECADRON) 4 mg tablet Take 20 mg by mouth See Admin Instructions. Take 20 mg the night before chemotherapy 19  Yes Glory Bello, JANIYA   oxyCODONE-acetaminophen (PERCOCET) 5-325 mg per tablet Take 1 Tab by mouth every four (4) hours as needed for Pain. Max Daily Amount: 6 Tabs. 19   Lucian Mckenna MD   lidocaine-prilocaine (EMLA) topical cream Apply  to affected area as needed for Pain. 19   Nilda Bo MD   ondansetron (ZOFRAN ODT) 4 mg disintegrating tablet Take 1 Tab by mouth every eight (8) hours as needed for Nausea. 19   Nilda Bo MD   diphenhydrAMINE (BENADRYL) 25 mg capsule Take 25 mg by mouth every six (6) hours as needed. Provider, Historical          Allergies   Allergen Reactions    Codeine Rash     Rash from tylenol #3         Objective:     Visit Vitals  Resp 18   Ht 5' 2\" (1.575 m)   Wt 194 lb (88 kg)   BMI 35.48 kg/m²       Pain Scale: 0 - No pain/10  Pain Location:       Physical Exam:    GENERAL: alert, cooperative, no distress, appears stated age  EYE: conjunctivae/corneas clear. PERRL, EOM's intact. Fundi benign  LYMPHATIC: Cervical, supraclavicular, and axillary nodes normal.   THROAT & NECK: normal and no erythema or exudates noted. LUNG: clear to auscultation bilaterally  HEART: regular rate and rhythm, S1, S2 normal, no murmur, click, rub or gallop  ABDOMEN: soft, non-tender.  Bowel sounds normal. No masses,  no organomegaly  EXTREMITIES:  extremities normal, atraumatic, no cyanosis or edema  SKIN: Normal.  NEUROLOGIC: AOx3. Gait normal. Reflexes and motor strength normal and symmetric      Lab Results   Component Value Date/Time    WBC 2.8 (L) 06/19/2019 01:24 PM    HGB 11.1 (L) 06/19/2019 01:24 PM    HCT 33.0 (L) 06/19/2019 01:24 PM    PLATELET 613 38/11/9591 01:24 PM    MCV 89.4 06/19/2019 01:24 PM       Lab Results   Component Value Date/Time    Sodium 138 05/29/2019 01:31 PM    Potassium 3.6 05/29/2019 01:31 PM    Chloride 105 05/29/2019 01:31 PM    CO2 26 05/29/2019 01:31 PM    Anion gap 7 05/29/2019 01:31 PM    Glucose 131 (H) 05/29/2019 01:31 PM    BUN 16 05/29/2019 01:31 PM    Creatinine 0.77 05/29/2019 01:31 PM    BUN/Creatinine ratio 21 (H) 05/29/2019 01:31 PM    GFR est AA >60 05/29/2019 01:31 PM    GFR est non-AA >60 05/29/2019 01:31 PM    Calcium 9.2 05/29/2019 01:31 PM    Bilirubin, total 0.3 05/29/2019 01:31 PM    AST (SGOT) 23 05/29/2019 01:31 PM    Alk. phosphatase 106 05/29/2019 01:31 PM    Protein, total 7.7 05/29/2019 01:31 PM    Albumin 3.5 05/29/2019 01:31 PM    Globulin 4.2 (H) 05/29/2019 01:31 PM    A-G Ratio 0.8 (L) 05/29/2019 01:31 PM    ALT (SGPT) 39 05/29/2019 01:31 PM           Assessment:     1. Left breast carcinoma:  T1c N1mi M0 (Stage I) infiltrating ductal carcinoma, Tumor size 1.6 cm, LN 1/3 with micromet, grade 3, ER -ve, ID -ve, Her 2 -ve    ECOG PS 0  Intent of Treatment - curative  Prognosis - good        Echocardiogram (5/14/2019): Normal, LVEF 61-65%    Receiving neoadjuvant chemotherapy   Adriamycin, Cyclophosphamide - s/p 4 cycles   Weekly Taxol - Week #7 of 12    Tolerating treatment   A detailed system by system evaluation of side effect was performed to assess chemotherapy related toxicity. Blood counts are acceptable. Results reviewed with the patient. Symptom management form reviewed with patient.       2. Anemia, chemotherapy related    Observation        Plan:       > Continue neoadjuvant chemotherapy   > Follow-up in 2 weeks        Signed by: Pablito Andrea MD                     June 19, 2019        CC. Racquel Dennis MD  CC.  Sean Quick MD

## 2019-06-19 NOTE — PROGRESS NOTES
Gamal Freeman is a 60 y.o. female here today for follow up for right breast cancer. VS stable. Patient denies pain. Good appetite. Patient denies N/V/D and constipation. Patient c/o numbness and tingling in feet. Patient denies mouth ulcers. Patient denies cough. Patient denies SOB. Patient denies falls. Visit Vitals  /85 (BP 1 Location: Left arm, BP Patient Position: Sitting)   Pulse 99   Temp 98.3 °F (36.8 °C) (Oral)   Resp 18   Ht 5' 2\" (1.575 m)   Wt 194 lb (88 kg)   SpO2 99%   BMI 35.48 kg/m²       Pain Scale: /10  Pain Location:     1. Have you been to the ER, urgent care clinic since your last visit? Hospitalized since your last visit? No    2. Have you seen or consulted any other health care providers outside of the [address] [place] [address] since your last visit? Include any pap smears or colon screening. No    Ms. Ursula Lal has a reminder for a \"due or due soon\" health maintenance. I have asked that she contact her primary care provider for follow-up on this health maintenance.

## 2019-06-26 ENCOUNTER — HOSPITAL ENCOUNTER (OUTPATIENT)
Dept: INFUSION THERAPY | Age: 59
Discharge: HOME | End: 2019-06-26
Payer: COMMERCIAL

## 2019-06-26 VITALS
OXYGEN SATURATION: 100 % | RESPIRATION RATE: 18 BRPM | WEIGHT: 197.31 LBS | TEMPERATURE: 97.9 F | SYSTOLIC BLOOD PRESSURE: 158 MMHG | HEART RATE: 100 BPM | BODY MASS INDEX: 36.31 KG/M2 | DIASTOLIC BLOOD PRESSURE: 99 MMHG | HEIGHT: 62 IN

## 2019-06-26 DIAGNOSIS — C50.412 MALIGNANT NEOPLASM OF UPPER-OUTER QUADRANT OF LEFT BREAST IN FEMALE, ESTROGEN RECEPTOR POSITIVE (HCC): Primary | ICD-10-CM

## 2019-06-26 DIAGNOSIS — Z17.0 MALIGNANT NEOPLASM OF UPPER-OUTER QUADRANT OF LEFT BREAST IN FEMALE, ESTROGEN RECEPTOR POSITIVE (HCC): Primary | ICD-10-CM

## 2019-06-26 LAB
BASO+EOS+MONOS # BLD AUTO: 0.1 K/UL (ref 0.2–1.2)
BASO+EOS+MONOS NFR BLD AUTO: 4 % (ref 3.2–16.9)
DIFFERENTIAL METHOD BLD: ABNORMAL
ERYTHROCYTE [DISTWIDTH] IN BLOOD BY AUTOMATED COUNT: 17.5 % (ref 11.8–15.8)
HCT VFR BLD AUTO: 32.2 % (ref 35–47)
HGB BLD-MCNC: 10.6 G/DL (ref 11.5–16)
LYMPHOCYTES # BLD: 0.4 K/UL (ref 0.8–3.5)
LYMPHOCYTES NFR BLD: 15 % (ref 12–49)
MCH RBC QN AUTO: 29.6 PG (ref 26–34)
MCHC RBC AUTO-ENTMCNC: 32.9 G/DL (ref 30–36.5)
MCV RBC AUTO: 89.9 FL (ref 80–99)
NEUTS SEG # BLD: 2 K/UL (ref 1.8–8)
NEUTS SEG NFR BLD: 81 % (ref 32–75)
PLATELET # BLD AUTO: 307 K/UL (ref 150–400)
RBC # BLD AUTO: 3.58 M/UL (ref 3.8–5.2)
WBC # BLD AUTO: 2.5 K/UL (ref 3.6–11)

## 2019-06-26 PROCEDURE — 96413 CHEMO IV INFUSION 1 HR: CPT

## 2019-06-26 PROCEDURE — 74011000250 HC RX REV CODE- 250: Performed by: INTERNAL MEDICINE

## 2019-06-26 PROCEDURE — 74011250636 HC RX REV CODE- 250/636: Performed by: INTERNAL MEDICINE

## 2019-06-26 PROCEDURE — 74011250637 HC RX REV CODE- 250/637: Performed by: NURSE PRACTITIONER

## 2019-06-26 PROCEDURE — 36415 COLL VENOUS BLD VENIPUNCTURE: CPT

## 2019-06-26 PROCEDURE — 85025 COMPLETE CBC W/AUTO DIFF WBC: CPT

## 2019-06-26 PROCEDURE — 77030012965 HC NDL HUBR BBMI -A

## 2019-06-26 PROCEDURE — 96375 TX/PRO/DX INJ NEW DRUG ADDON: CPT

## 2019-06-26 RX ORDER — HEPARIN 100 UNIT/ML
300-500 SYRINGE INTRAVENOUS AS NEEDED
Status: ACTIVE | OUTPATIENT
Start: 2019-06-26 | End: 2019-06-26

## 2019-06-26 RX ORDER — SODIUM CHLORIDE 9 MG/ML
10 INJECTION INTRAMUSCULAR; INTRAVENOUS; SUBCUTANEOUS AS NEEDED
Status: ACTIVE | OUTPATIENT
Start: 2019-06-26 | End: 2019-06-26

## 2019-06-26 RX ORDER — DEXAMETHASONE SODIUM PHOSPHATE 4 MG/ML
10 INJECTION, SOLUTION INTRA-ARTICULAR; INTRALESIONAL; INTRAMUSCULAR; INTRAVENOUS; SOFT TISSUE ONCE
Status: COMPLETED | OUTPATIENT
Start: 2019-06-26 | End: 2019-06-26

## 2019-06-26 RX ORDER — SODIUM CHLORIDE 0.9 % (FLUSH) 0.9 %
10 SYRINGE (ML) INJECTION AS NEEDED
Status: ACTIVE | OUTPATIENT
Start: 2019-06-26 | End: 2019-06-26

## 2019-06-26 RX ORDER — SODIUM CHLORIDE 9 MG/ML
25 INJECTION, SOLUTION INTRAVENOUS CONTINUOUS
Status: DISPENSED | OUTPATIENT
Start: 2019-06-26 | End: 2019-06-26

## 2019-06-26 RX ORDER — DIPHENHYDRAMINE HCL 25 MG
50 CAPSULE ORAL ONCE
Status: COMPLETED | OUTPATIENT
Start: 2019-06-26 | End: 2019-06-26

## 2019-06-26 RX ORDER — DIPHENHYDRAMINE HYDROCHLORIDE 50 MG/ML
50 INJECTION, SOLUTION INTRAMUSCULAR; INTRAVENOUS ONCE
Status: DISCONTINUED | OUTPATIENT
Start: 2019-06-26 | End: 2019-06-26 | Stop reason: SDUPTHER

## 2019-06-26 RX ADMIN — SODIUM CHLORIDE 25 ML/HR: 900 INJECTION, SOLUTION INTRAVENOUS at 11:36

## 2019-06-26 RX ADMIN — FAMOTIDINE 20 MG: 10 INJECTION, SOLUTION INTRAVENOUS at 11:36

## 2019-06-26 RX ADMIN — Medication 500 UNITS: at 14:27

## 2019-06-26 RX ADMIN — PACLITAXEL 158 MG: 6 INJECTION, SOLUTION INTRAVENOUS at 13:24

## 2019-06-26 RX ADMIN — SODIUM CHLORIDE 25 ML/HR: 900 INJECTION, SOLUTION INTRAVENOUS at 14:24

## 2019-06-26 RX ADMIN — DIPHENHYDRAMINE HYDROCHLORIDE 50 MG: 25 CAPSULE ORAL at 11:35

## 2019-06-26 RX ADMIN — Medication 10 ML: at 14:27

## 2019-06-26 RX ADMIN — DEXAMETHASONE SODIUM PHOSPHATE 10 MG: 4 INJECTION, SOLUTION INTRAMUSCULAR; INTRAVENOUS at 11:36

## 2019-06-26 NOTE — PROGRESS NOTES
[facility] Progress Note    Date: 2019    Name: Lia Galvan    MRN: [id number]         : 1960    Ms. Philip Mascorro arrived ambulatory at 1100 and in no distress for C7D8 Taxol. Assessment was completed, no acute issues at this time, no new complaints voiced. RCW port accessed without difficulty with 1 inch garcia needle; + blood return noted, labs drawn and sent. Ms. Walker Johns vitals were reviewed. Patient Vitals for the past 12 hrs:   Temp Pulse Resp BP SpO2   19 1426 -- 100 18 (!) 158/99 --   19 1109 97.9 °F (36.6 °C) 93 18 (!) 154/100 100 %       Lab results were obtained and reviewed. Recent Results (from the past 12 hour(s))   CBC WITH 3 PART DIFF    Collection Time: 19 11:17 AM   Result Value Ref Range    WBC 2.5 (L) 3.6 - 11.0 K/uL    RBC 3.58 (L) 3.80 - 5.20 M/uL    HGB 10.6 (L) 11.5 - 16.0 g/dL    HCT 32.2 (L) 35.0 - 47.0 %    MCV 89.9 80.0 - 99.0 FL    MCH 29.6 26.0 - 34.0 PG    MCHC 32.9 30.0 - 36.5 g/dL    RDW 17.5 (H) 11.8 - 15.8 %    PLATELET 449 838 - 161 K/uL    NEUTROPHILS 81 (H) 32 - 75 %    MIXED CELLS 4 3.2 - 16.9 %    LYMPHOCYTES 15 12 - 49 %    ABS. NEUTROPHILS 2.0 1.8 - 8.0 K/UL    ABS. MIXED CELLS 0.1 (L) 0.2 - 1.2 K/uL    ABS. LYMPHOCYTES 0.4 (L) 0.8 - 3.5 K/UL    DF AUTOMATED       Patient's labs within parameters for treatment. Pre-medications  were administered as ordered and chemotherapy was initiated.      Medication:  Medications Administered     0.9% sodium chloride infusion     Admin Date  2019 Action  New Bag Dose  25 mL/hr Rate  25 mL/hr Route  IntraVENous Administered By  [employer] A           Admin Date  2019 Action  New Bag Dose  25 mL/hr Rate  25 mL/hr Route  IntraVENous Administered By  Ling [place] A          dexamethasone (DECADRON) 4 mg/mL injection 10 mg     Admin Date  2019 Action  Given Dose  10 mg Route  IntraVENous Administered By  Ling LUNA          diphenhydrAMINE (BENADRYL) capsule 50 mg     Admin Date  06/26/2019 Action  Given Dose  50 mg Route  Oral Administered By  Krystyna Webbs A          famotidine (PF) (PEPCID) 20 mg in sodium chloride 0.9% 10 mL injection     Admin Date  06/26/2019 Action  Given Dose  20 mg Route  IntraVENous Administered By  Krystyna Webbs A          heparin (porcine) pf 300-500 Units     Admin Date  06/26/2019 Action  Given Dose  500 Units Route  InterCATHeter Administered By  Krystyna Rae A          PACLitaxel (TAXOL) 158 mg in 0.9% sodium chloride 250 mL, overfill volume 25 mL chemo infusion     Admin Date  06/26/2019 Action  New Bag Dose  158 mg Rate  301.3 mL/hr Route  IntraVENous Administered By  Krystyna Webbs A          saline peripheral flush soln 10 mL     Admin Date  06/26/2019 Action  Given Dose  10 mL Route  InterCATHeter Administered By  Krystyna Webbs A                  Patient port flushed; heparinized; and de-accessed per protocol. Ms. Hany Knight tolerated treatment well and was discharged from [place] [address] in stable condition at 1430. She is aware of when to return for her next appointment.     Future Appointments   Date Time Provider Jyotsna Burns   6/26/2019 11:00 AM CHAIR 2 [address] REG   7/1/2019  2:15 PM [facility] MRI 2 [facility] . [facility]    7/3/2019 11:00 AM CHAIR 2 [address] REG   7/3/2019  1:45 PM JANIYA Mims SCHED   7/10/2019 10:00 AM CHAIR 2 [address] REG   7/17/2019 11:00 AM CHAIR 2 [facility] REG   8/5/2019 11:20 AM Dalia Melendez MD [facility] [place] SCHED       Sonam Armas  June 26, 2019

## 2019-06-27 ENCOUNTER — TELEPHONE (OUTPATIENT)
Dept: SURGERY | Age: 59
End: 2019-06-27

## 2019-06-27 ENCOUNTER — DOCUMENTATION ONLY (OUTPATIENT)
Dept: SURGERY | Age: 59
End: 2019-06-27

## 2019-06-27 NOTE — TELEPHONE ENCOUNTER
From RN - Just received a message that this patient's MRI was denied for 7/1/2019. Requesting a peer to peer. Phone # [phone] reference # [id number]. Doug العراقي called from pre authorization and his # is [phone] if you should need it    Called for peer to peer -   Auth number [id number] - expires 7/26/19.
ADMIT

## 2019-06-27 NOTE — PROGRESS NOTES
I just received a call from Latrice Frey at [facility] pre [facility] department. The patient has a MRI scheduled for 7/1/2019 and it was denied. They are requesting a peer to peer @ # [phone]. Reference # [id number].  The information was given to Renu Damon NP.

## 2019-07-01 ENCOUNTER — HOSPITAL ENCOUNTER (OUTPATIENT)
Dept: MRI IMAGING | Age: 59
Discharge: HOME | End: 2019-07-01
Attending: SURGERY
Payer: COMMERCIAL

## 2019-07-01 VITALS — BODY MASS INDEX: 36.03 KG/M2 | WEIGHT: 197 LBS

## 2019-07-01 DIAGNOSIS — C50.412 MALIGNANT NEOPLASM OF UPPER-OUTER QUADRANT OF LEFT FEMALE BREAST, UNSPECIFIED ESTROGEN RECEPTOR STATUS (HCC): ICD-10-CM

## 2019-07-01 PROCEDURE — 77049 MRI BREAST C-+ W/CAD BI: CPT

## 2019-07-01 PROCEDURE — 74011250636 HC RX REV CODE- 250/636: Performed by: SURGERY

## 2019-07-01 PROCEDURE — A9585 GADOBUTROL INJECTION: HCPCS | Performed by: SURGERY

## 2019-07-01 PROCEDURE — 74011000258 HC RX REV CODE- 258: Performed by: SURGERY

## 2019-07-01 RX ADMIN — SODIUM CHLORIDE 100 ML: 900 INJECTION, SOLUTION INTRAVENOUS at 14:00

## 2019-07-01 RX ADMIN — GADOBUTROL 10 ML: 604.72 INJECTION INTRAVENOUS at 15:01

## 2019-07-03 ENCOUNTER — OFFICE VISIT (OUTPATIENT)
Dept: ONCOLOGY | Age: 59
End: 2019-07-03

## 2019-07-03 ENCOUNTER — HOSPITAL ENCOUNTER (OUTPATIENT)
Dept: INFUSION THERAPY | Age: 59
Discharge: HOME | End: 2019-07-03
Payer: COMMERCIAL

## 2019-07-03 VITALS
HEART RATE: 98 BPM | BODY MASS INDEX: 35.7 KG/M2 | TEMPERATURE: 97.3 F | RESPIRATION RATE: 18 BRPM | SYSTOLIC BLOOD PRESSURE: 129 MMHG | OXYGEN SATURATION: 97 % | HEIGHT: 62 IN | WEIGHT: 194 LBS | DIASTOLIC BLOOD PRESSURE: 83 MMHG

## 2019-07-03 VITALS
DIASTOLIC BLOOD PRESSURE: 83 MMHG | OXYGEN SATURATION: 97 % | HEIGHT: 62 IN | BODY MASS INDEX: 35.79 KG/M2 | HEART RATE: 98 BPM | RESPIRATION RATE: 18 BRPM | SYSTOLIC BLOOD PRESSURE: 129 MMHG | WEIGHT: 194.5 LBS | TEMPERATURE: 97.3 F

## 2019-07-03 DIAGNOSIS — C50.412 MALIGNANT NEOPLASM OF UPPER-OUTER QUADRANT OF LEFT BREAST IN FEMALE, ESTROGEN RECEPTOR POSITIVE (HCC): Primary | ICD-10-CM

## 2019-07-03 DIAGNOSIS — D64.81 ANEMIA ASSOCIATED WITH CHEMOTHERAPY: ICD-10-CM

## 2019-07-03 DIAGNOSIS — Z17.0 MALIGNANT NEOPLASM OF UPPER-OUTER QUADRANT OF LEFT BREAST IN FEMALE, ESTROGEN RECEPTOR POSITIVE (HCC): Primary | ICD-10-CM

## 2019-07-03 DIAGNOSIS — D70.1 CHEMOTHERAPY INDUCED NEUTROPENIA (HCC): ICD-10-CM

## 2019-07-03 DIAGNOSIS — T45.1X5A ANEMIA ASSOCIATED WITH CHEMOTHERAPY: ICD-10-CM

## 2019-07-03 DIAGNOSIS — T45.1X5A CHEMOTHERAPY INDUCED NEUTROPENIA (HCC): ICD-10-CM

## 2019-07-03 LAB
BASOPHILS # BLD: 0 K/UL (ref 0–0.1)
BASOPHILS NFR BLD: 0 % (ref 0–1)
DIFFERENTIAL METHOD BLD: ABNORMAL
EOSINOPHIL # BLD: 0 K/UL (ref 0–0.4)
EOSINOPHIL NFR BLD: 0 % (ref 0–7)
ERYTHROCYTE [DISTWIDTH] IN BLOOD BY AUTOMATED COUNT: 17.1 % (ref 11.5–14.5)
HCT VFR BLD AUTO: 36 % (ref 35–47)
HGB BLD-MCNC: 11.5 G/DL (ref 11.5–16)
IMM GRANULOCYTES # BLD AUTO: 0 K/UL (ref 0–0.04)
IMM GRANULOCYTES NFR BLD AUTO: 1 % (ref 0–0.5)
LYMPHOCYTES # BLD: 0.4 K/UL (ref 0.8–3.5)
LYMPHOCYTES NFR BLD: 29 % (ref 12–49)
MCH RBC QN AUTO: 28.8 PG (ref 26–34)
MCHC RBC AUTO-ENTMCNC: 31.9 G/DL (ref 30–36.5)
MCV RBC AUTO: 90.2 FL (ref 80–99)
MONOCYTES # BLD: 0 K/UL (ref 0–1)
MONOCYTES NFR BLD: 3 % (ref 5–13)
NEUTS SEG # BLD: 0.9 K/UL (ref 1.8–8)
NEUTS SEG NFR BLD: 67 % (ref 32–75)
NRBC # BLD: 0 K/UL (ref 0–0.01)
NRBC BLD-RTO: 0 PER 100 WBC
PLATELET # BLD AUTO: 325 K/UL (ref 150–400)
PMV BLD AUTO: 10 FL (ref 8.9–12.9)
RBC # BLD AUTO: 3.99 M/UL (ref 3.8–5.2)
RBC MORPH BLD: ABNORMAL
WBC # BLD AUTO: 1.3 K/UL (ref 3.6–11)

## 2019-07-03 PROCEDURE — 74011250636 HC RX REV CODE- 250/636: Performed by: INTERNAL MEDICINE

## 2019-07-03 PROCEDURE — 36415 COLL VENOUS BLD VENIPUNCTURE: CPT

## 2019-07-03 PROCEDURE — 77030012965 HC NDL HUBR BBMI -A

## 2019-07-03 PROCEDURE — 36591 DRAW BLOOD OFF VENOUS DEVICE: CPT

## 2019-07-03 PROCEDURE — 85025 COMPLETE CBC W/AUTO DIFF WBC: CPT

## 2019-07-03 RX ORDER — ALBUTEROL SULFATE 0.83 MG/ML
2.5 SOLUTION RESPIRATORY (INHALATION) AS NEEDED
Status: CANCELLED
Start: 2019-07-17

## 2019-07-03 RX ORDER — ONDANSETRON 2 MG/ML
8 INJECTION INTRAMUSCULAR; INTRAVENOUS AS NEEDED
Status: CANCELLED | OUTPATIENT
Start: 2019-07-24

## 2019-07-03 RX ORDER — DEXAMETHASONE SODIUM PHOSPHATE 4 MG/ML
10 INJECTION, SOLUTION INTRA-ARTICULAR; INTRALESIONAL; INTRAMUSCULAR; INTRAVENOUS; SOFT TISSUE ONCE
Status: CANCELLED | OUTPATIENT
Start: 2019-07-24

## 2019-07-03 RX ORDER — ACETAMINOPHEN 325 MG/1
650 TABLET ORAL AS NEEDED
Status: CANCELLED
Start: 2019-07-24

## 2019-07-03 RX ORDER — DIPHENHYDRAMINE HYDROCHLORIDE 50 MG/ML
50 INJECTION, SOLUTION INTRAMUSCULAR; INTRAVENOUS ONCE
Status: CANCELLED
Start: 2019-07-24

## 2019-07-03 RX ORDER — DIPHENHYDRAMINE HYDROCHLORIDE 50 MG/ML
50 INJECTION, SOLUTION INTRAMUSCULAR; INTRAVENOUS ONCE
Status: CANCELLED
Start: 2019-07-17

## 2019-07-03 RX ORDER — SODIUM CHLORIDE 0.9 % (FLUSH) 0.9 %
10 SYRINGE (ML) INJECTION AS NEEDED
Status: ACTIVE | OUTPATIENT
Start: 2019-07-03 | End: 2019-07-03

## 2019-07-03 RX ORDER — ONDANSETRON 2 MG/ML
8 INJECTION INTRAMUSCULAR; INTRAVENOUS AS NEEDED
Status: CANCELLED | OUTPATIENT
Start: 2019-07-10

## 2019-07-03 RX ORDER — ACETAMINOPHEN 325 MG/1
650 TABLET ORAL AS NEEDED
Status: CANCELLED
Start: 2019-07-10

## 2019-07-03 RX ORDER — SODIUM CHLORIDE 9 MG/ML
25 INJECTION, SOLUTION INTRAVENOUS CONTINUOUS
Status: CANCELLED | OUTPATIENT
Start: 2019-07-24 | End: 2019-07-24

## 2019-07-03 RX ORDER — ALBUTEROL SULFATE 0.83 MG/ML
2.5 SOLUTION RESPIRATORY (INHALATION) AS NEEDED
Status: CANCELLED
Start: 2019-07-10

## 2019-07-03 RX ORDER — ONDANSETRON 2 MG/ML
8 INJECTION INTRAMUSCULAR; INTRAVENOUS AS NEEDED
Status: CANCELLED | OUTPATIENT
Start: 2019-07-17

## 2019-07-03 RX ORDER — SODIUM CHLORIDE 0.9 % (FLUSH) 0.9 %
10 SYRINGE (ML) INJECTION AS NEEDED
Status: CANCELLED
Start: 2019-07-24

## 2019-07-03 RX ORDER — HYDROCORTISONE SODIUM SUCCINATE 100 MG/2ML
100 INJECTION, POWDER, FOR SOLUTION INTRAMUSCULAR; INTRAVENOUS AS NEEDED
Status: CANCELLED | OUTPATIENT
Start: 2019-07-10

## 2019-07-03 RX ORDER — DIPHENHYDRAMINE HYDROCHLORIDE 50 MG/ML
50 INJECTION, SOLUTION INTRAMUSCULAR; INTRAVENOUS AS NEEDED
Status: CANCELLED
Start: 2019-07-24

## 2019-07-03 RX ORDER — DEXAMETHASONE SODIUM PHOSPHATE 4 MG/ML
10 INJECTION, SOLUTION INTRA-ARTICULAR; INTRALESIONAL; INTRAMUSCULAR; INTRAVENOUS; SOFT TISSUE ONCE
Status: CANCELLED | OUTPATIENT
Start: 2019-07-17

## 2019-07-03 RX ORDER — SODIUM CHLORIDE 9 MG/ML
10 INJECTION INTRAMUSCULAR; INTRAVENOUS; SUBCUTANEOUS AS NEEDED
Status: ACTIVE | OUTPATIENT
Start: 2019-07-03 | End: 2019-07-03

## 2019-07-03 RX ORDER — DEXAMETHASONE SODIUM PHOSPHATE 4 MG/ML
10 INJECTION, SOLUTION INTRA-ARTICULAR; INTRALESIONAL; INTRAMUSCULAR; INTRAVENOUS; SOFT TISSUE ONCE
Status: CANCELLED | OUTPATIENT
Start: 2019-07-10

## 2019-07-03 RX ORDER — HEPARIN 100 UNIT/ML
300-500 SYRINGE INTRAVENOUS AS NEEDED
Status: ACTIVE | OUTPATIENT
Start: 2019-07-03 | End: 2019-07-03

## 2019-07-03 RX ORDER — HYDROCORTISONE SODIUM SUCCINATE 100 MG/2ML
100 INJECTION, POWDER, FOR SOLUTION INTRAMUSCULAR; INTRAVENOUS AS NEEDED
Status: CANCELLED | OUTPATIENT
Start: 2019-07-17

## 2019-07-03 RX ORDER — HEPARIN 100 UNIT/ML
300-500 SYRINGE INTRAVENOUS AS NEEDED
Status: CANCELLED
Start: 2019-07-17

## 2019-07-03 RX ORDER — DIPHENHYDRAMINE HYDROCHLORIDE 50 MG/ML
50 INJECTION, SOLUTION INTRAMUSCULAR; INTRAVENOUS AS NEEDED
Status: CANCELLED
Start: 2019-07-17

## 2019-07-03 RX ORDER — SODIUM CHLORIDE 9 MG/ML
10 INJECTION INTRAMUSCULAR; INTRAVENOUS; SUBCUTANEOUS AS NEEDED
Status: CANCELLED | OUTPATIENT
Start: 2019-07-10

## 2019-07-03 RX ORDER — DIPHENHYDRAMINE HYDROCHLORIDE 50 MG/ML
50 INJECTION, SOLUTION INTRAMUSCULAR; INTRAVENOUS AS NEEDED
Status: CANCELLED
Start: 2019-07-10

## 2019-07-03 RX ORDER — SODIUM CHLORIDE 9 MG/ML
10 INJECTION INTRAMUSCULAR; INTRAVENOUS; SUBCUTANEOUS AS NEEDED
Status: CANCELLED | OUTPATIENT
Start: 2019-07-17

## 2019-07-03 RX ORDER — HEPARIN 100 UNIT/ML
300-500 SYRINGE INTRAVENOUS AS NEEDED
Status: CANCELLED
Start: 2019-07-10

## 2019-07-03 RX ORDER — SODIUM CHLORIDE 9 MG/ML
25 INJECTION, SOLUTION INTRAVENOUS CONTINUOUS
Status: CANCELLED | OUTPATIENT
Start: 2019-07-17 | End: 2019-07-17

## 2019-07-03 RX ORDER — EPINEPHRINE 1 MG/ML
0.3 INJECTION, SOLUTION, CONCENTRATE INTRAVENOUS AS NEEDED
Status: CANCELLED | OUTPATIENT
Start: 2019-07-10

## 2019-07-03 RX ORDER — ACETAMINOPHEN 325 MG/1
650 TABLET ORAL AS NEEDED
Status: CANCELLED
Start: 2019-07-17

## 2019-07-03 RX ORDER — ALBUTEROL SULFATE 0.83 MG/ML
2.5 SOLUTION RESPIRATORY (INHALATION) AS NEEDED
Status: CANCELLED
Start: 2019-07-24

## 2019-07-03 RX ORDER — HYDROCORTISONE SODIUM SUCCINATE 100 MG/2ML
100 INJECTION, POWDER, FOR SOLUTION INTRAMUSCULAR; INTRAVENOUS AS NEEDED
Status: CANCELLED | OUTPATIENT
Start: 2019-07-24

## 2019-07-03 RX ORDER — EPINEPHRINE 1 MG/ML
0.3 INJECTION, SOLUTION, CONCENTRATE INTRAVENOUS AS NEEDED
Status: CANCELLED | OUTPATIENT
Start: 2019-07-24

## 2019-07-03 RX ORDER — SODIUM CHLORIDE 9 MG/ML
25 INJECTION, SOLUTION INTRAVENOUS CONTINUOUS
Status: CANCELLED | OUTPATIENT
Start: 2019-07-10 | End: 2019-07-10

## 2019-07-03 RX ORDER — SODIUM CHLORIDE 0.9 % (FLUSH) 0.9 %
10 SYRINGE (ML) INJECTION AS NEEDED
Status: CANCELLED
Start: 2019-07-17

## 2019-07-03 RX ORDER — HEPARIN 100 UNIT/ML
300-500 SYRINGE INTRAVENOUS AS NEEDED
Status: CANCELLED
Start: 2019-07-24

## 2019-07-03 RX ORDER — SODIUM CHLORIDE 0.9 % (FLUSH) 0.9 %
10 SYRINGE (ML) INJECTION AS NEEDED
Status: CANCELLED
Start: 2019-07-10

## 2019-07-03 RX ORDER — EPINEPHRINE 1 MG/ML
0.3 INJECTION, SOLUTION, CONCENTRATE INTRAVENOUS AS NEEDED
Status: CANCELLED | OUTPATIENT
Start: 2019-07-17

## 2019-07-03 RX ORDER — LORATADINE 10 MG/1
10 TABLET ORAL
COMMUNITY
End: 2020-06-24

## 2019-07-03 RX ORDER — DIPHENHYDRAMINE HYDROCHLORIDE 50 MG/ML
50 INJECTION, SOLUTION INTRAMUSCULAR; INTRAVENOUS ONCE
Status: CANCELLED
Start: 2019-07-10

## 2019-07-03 RX ORDER — SODIUM CHLORIDE 9 MG/ML
10 INJECTION INTRAMUSCULAR; INTRAVENOUS; SUBCUTANEOUS AS NEEDED
Status: CANCELLED | OUTPATIENT
Start: 2019-07-24

## 2019-07-03 RX ADMIN — Medication 500 UNITS: at 12:44

## 2019-07-03 RX ADMIN — Medication 10 ML: at 12:44

## 2019-07-03 RX ADMIN — Medication 10 ML: at 11:21

## 2019-07-03 RX ADMIN — Medication 10 ML: at 11:22

## 2019-07-03 NOTE — PROGRESS NOTES
[facility] Visit Note    2151 Pt arrived at [facility] ambulatory and in no distress for Taxol C7D14 HELD. Assessment unremarkable, no new complaints voiced. R Chest port accessed w/o difficulty and positive for blood return. Dwayne Emerson NP notified of pt.'s [address] 0.9, order given to hold (skip) treatment today and pt to return for next week's treatment. Will resume at [facility]    Pt educated on hand washing and infection prevention. Pt verbalized if she should develop any fevers 100.4 or greater to contact her oncologist.     Patient Vitals for the past 12 hrs:   Temp Pulse Resp BP SpO2   07/03/19 1200 -- 98 18 129/83 --   07/03/19 1109 97.3 °F (36.3 °C) 98 18 (!) 156/102 97 %     Labs:   Recent Results (from the past 12 hour(s))   CBC WITH AUTOMATED DIFF    Collection Time: 07/03/19 11:19 AM   Result Value Ref Range    WBC 1.3 (L) 3.6 - 11.0 K/uL    RBC 3.99 3.80 - 5.20 M/uL    HGB 11.5 11.5 - 16.0 g/dL    HCT 36.0 35.0 - 47.0 %    MCV 90.2 80.0 - 99.0 FL    MCH 28.8 26.0 - 34.0 PG    MCHC 31.9 30.0 - 36.5 g/dL    RDW 17.1 (H) 11.5 - 14.5 %    PLATELET 638 211 - 246 K/uL    MPV 10.0 8.9 - 12.9 FL    NRBC 0.0 0  WBC    ABSOLUTE NRBC 0.00 0.00 - 0.01 K/uL    NEUTROPHILS 67 32 - 75 %    LYMPHOCYTES 29 12 - 49 %    MONOCYTES 3 (L) 5 - 13 %    EOSINOPHILS 0 0 - 7 %    BASOPHILS 0 0 - 1 %    IMMATURE GRANULOCYTES 1 (H) 0.0 - 0.5 %    ABS. NEUTROPHILS 0.9 (L) 1.8 - 8.0 K/UL    ABS. LYMPHOCYTES 0.4 (L) 0.8 - 3.5 K/UL    ABS. MONOCYTES 0.0 0.0 - 1.0 K/UL    ABS. EOSINOPHILS 0.0 0.0 - 0.4 K/UL    ABS. BASOPHILS 0.0 0.0 - 0.1 K/UL    ABS. IMM.  GRANS. 0.0 0.00 - 0.04 K/UL    DF AUTOMATED      RBC COMMENTS ANISOCYTOSIS  1+         Labs:   Recent Results (from the past 12 hour(s))   CBC WITH AUTOMATED DIFF    Collection Time: 07/03/19 11:19 AM   Result Value Ref Range    WBC 1.3 (L) 3.6 - 11.0 K/uL    RBC 3.99 3.80 - 5.20 M/uL    HGB 11.5 11.5 - 16.0 g/dL    HCT 36.0 35.0 - 47.0 %    MCV 90.2 80.0 - 99.0 FL MCH 28.8 26.0 - 34.0 PG    MCHC 31.9 30.0 - 36.5 g/dL    RDW 17.1 (H) 11.5 - 14.5 %    PLATELET 208 632 - 296 K/uL    MPV 10.0 8.9 - 12.9 FL    NRBC 0.0 0  WBC    ABSOLUTE NRBC 0.00 0.00 - 0.01 K/uL    NEUTROPHILS 67 32 - 75 %    LYMPHOCYTES 29 12 - 49 %    MONOCYTES 3 (L) 5 - 13 %    EOSINOPHILS 0 0 - 7 %    BASOPHILS 0 0 - 1 %    IMMATURE GRANULOCYTES 1 (H) 0.0 - 0.5 %    ABS. NEUTROPHILS 0.9 (L) 1.8 - 8.0 K/UL    ABS. LYMPHOCYTES 0.4 (L) 0.8 - 3.5 K/UL    ABS. MONOCYTES 0.0 0.0 - 1.0 K/UL    ABS. EOSINOPHILS 0.0 0.0 - 0.4 K/UL    ABS. BASOPHILS 0.0 0.0 - 0.1 K/UL    ABS. IMM. GRANS. 0.0 0.00 - 0.04 K/UL    DF AUTOMATED      RBC COMMENTS ANISOCYTOSIS  1+           Medications received:  NS Flush  Heparin Flush    1250 Tolerated treatment well, no adverse reaction noted. D/Cd from [facility] ambulatory and in no distress accompanied by Self.   Next appt 7/10/19    Future Appointments   Date Time Provider Jyotsna Burns   7/10/2019 10:00 AM CHAIR 2 [address] REG   7/17/2019 11:00 AM CHAIR 2 [address] REG   7/24/2019 11:00 AM CHAIR 2 [address] REG   8/5/2019 11:20 AM Valerie Lehamn MD [facility] [place] SCHED   9/5/2019  7:30 AM Valerie Lehman MD [facility] [place] SCHED   9/16/2019  9:30 AM Valerie Lehman MD [facility] [address]

## 2019-07-03 NOTE — PROGRESS NOTES
[address] [facility]  [address], [address] [facility] Juan David [place], 200 [address] [facility]  [phone]      Follow-up Note        Patient: Bernice Busby MRN: [id number]  SSN: [id number]    YOB: 1960  Age: 60 y.o. Sex: female        Diagnosis:     1. Left breast carcinoma:  T1c N1mi M0 (Stage I) infiltrating ductal carcinoma, Tumor size 1.6 cm, LN 1/3 with micromet, grade 3, ER -ve, LA -ve, Her 2 -ve    Treatment:     1. Neoadjuvant chemotherapy   Adriamycin, cytoxan - s/p 4 cycles   Weekly Taxol - s/p 8 weeks    Subjective:      Bernice Busby is a 60 y.o. female with a diagnosis of left sided invasive breast cancer. She felt a lump in the left breast, went to see her PCP, got a mammogram and was noted to have abnormal density in the left upper outer quadrant of the breast. A biopsy of the mass revealed gr 3 IDC, TNBC. The axillary LN is clear of disease. She saw Dr. Norma Morales. An MRI of the breast shows the tumor to be larger than previously believed to be. A left axillary LN biopsy showed 1/3 nodes with micrometastasis. She works at [employer] full time. Ms. Mary Bronson is receiving neoadjuvant chemotherapy. She is tolerating treatment extremely well and continues to work full time. She is receiving weekly taxol. Repeat ultrasound with Dr. Kerry Sexton shows good response to treatment. She feels well today with no complaints.       Review of Systems:    Constitutional: decreased appetite  Eyes: negative  Ears, Nose, Mouth, Throat, and Face: negative  Respiratory: negative  Cardiovascular: negative  Gastrointestinal: negative  Genitourinary:negative  Integument/Breast: negative  Hematologic/Lymphatic: negative  Musculoskeletal:negative  Neurological: numbness/tingling toes        Past Medical History:   Diagnosis Date    Breast cancer ([address]     Menopause      Past Surgical History:   Procedure Laterality Date    HX BREAST BIOPSY Left     x2    HX HYSTERECTOMY      partial, ovaries remain    VASCULAR SURGERY PROCEDURE UNLIST      portacat      History reviewed. No pertinent family history. Social History     Tobacco Use    Smoking status: Former Smoker     Last attempt to quit: 3/6/2013     Years since quittin.3    Smokeless tobacco: Never Used    Tobacco comment: Quit smoking 23 days ago   Substance Use Topics    Alcohol use: No      Prior to Admission medications    Medication Sig Start Date End Date Taking? Authorizing Provider   loratadine (CLARITIN) 10 mg tablet Take 10 mg by mouth daily as needed for Allergies. Yes Provider, Historical   omeprazole magnesium (PRILOSEC PO) Take  by mouth. Yes Provider, Historical   dexamethasone (DECADRON) 4 mg tablet Take 20 mg by mouth See Admin Instructions. Take 20 mg the night before chemotherapy 19  Yes Sharlene Bello, JANIYA   lidocaine-prilocaine (EMLA) topical cream Apply  to affected area as needed for Pain. 19  Yes Makenna Adams MD   diphenhydrAMINE (BENADRYL) 25 mg capsule Take 25 mg by mouth every six (6) hours as needed. Yes Provider, Historical   oxyCODONE-acetaminophen (PERCOCET) 5-325 mg per tablet Take 1 Tab by mouth every four (4) hours as needed for Pain. Max Daily Amount: 6 Tabs. Patient not taking: Reported on 7/3/2019 1/31/19   Qing Green MD   ondansetron (ZOFRAN ODT) 4 mg disintegrating tablet Take 1 Tab by mouth every eight (8) hours as needed for Nausea.   Patient not taking: Reported on 7/3/2019 1/24/19   Makenna Adams MD          Allergies   Allergen Reactions    Codeine Rash     Rash from tylenol #3         Objective:     Visit Vitals  /83 (BP 1 Location: Left arm, BP Patient Position: Sitting)   Pulse 98   Temp 97.3 °F (36.3 °C) (Oral)   Resp 18   Ht 5' 2\" (1.575 m)   Wt 194 lb (88 kg)   SpO2 97%   BMI 35.48 kg/m²       Pain Scale: 0 - No pain/10  Pain Location:       Physical Exam:    GENERAL: alert, cooperative, no distress, appears stated age  EYE: conjunctivae/corneas clear. PERRL, EOM's intact. Fundi benign  LYMPHATIC: Cervical, supraclavicular, and axillary nodes normal.   THROAT & NECK: normal and no erythema or exudates noted. LUNG: clear to auscultation bilaterally  HEART: regular rate and rhythm, S1, S2 normal, no murmur, click, rub or gallop  ABDOMEN: soft, non-tender. Bowel sounds normal. No masses,  no organomegaly  EXTREMITIES:  extremities normal, atraumatic, no cyanosis or edema  SKIN: Normal.  NEUROLOGIC: AOx3. Gait normal. Reflexes and motor strength normal and symmetric      Lab Results   Component Value Date/Time    WBC 1.3 (L) 07/03/2019 11:19 AM    HGB 11.5 07/03/2019 11:19 AM    HCT 36.0 07/03/2019 11:19 AM    PLATELET 443 67/27/2852 11:19 AM    MCV 90.2 07/03/2019 11:19 AM       Lab Results   Component Value Date/Time    Sodium 137 06/19/2019 01:24 PM    Potassium 3.4 (L) 06/19/2019 01:24 PM    Chloride 106 06/19/2019 01:24 PM    CO2 25 06/19/2019 01:24 PM    Anion gap 6 06/19/2019 01:24 PM    Glucose 196 (H) 06/19/2019 01:24 PM    BUN 16 06/19/2019 01:24 PM    Creatinine 0.93 06/19/2019 01:24 PM    BUN/Creatinine ratio 17 06/19/2019 01:24 PM    GFR est AA >60 06/19/2019 01:24 PM    GFR est non-AA >60 06/19/2019 01:24 PM    Calcium 9.1 06/19/2019 01:24 PM    Bilirubin, total 0.3 06/19/2019 01:24 PM    AST (SGOT) 12 (L) 06/19/2019 01:24 PM    Alk. phosphatase 102 06/19/2019 01:24 PM    Protein, total 7.6 06/19/2019 01:24 PM    Albumin 3.8 06/19/2019 01:24 PM    Globulin 3.8 06/19/2019 01:24 PM    A-G Ratio 1.0 (L) 06/19/2019 01:24 PM    ALT (SGPT) 27 06/19/2019 01:24 PM           Assessment:     1.  Left breast carcinoma:  T1c N1mi M0 (Stage I) infiltrating ductal carcinoma, Tumor size 1.6 cm, LN 1/3 with micromet, grade 3, ER -ve, VT -ve, Her 2 -ve    ECOG PS 0  Intent of Treatment - curative  Prognosis - good        Echocardiogram (5/14/2019): Normal, LVEF 61-65%    Receiving neoadjuvant chemotherapy   Adriamycin, Cyclophosphamide - s/p 4 cycles   Weekly Taxol - s/p 8 weeks    Tolerating treatment   + neutropenia (grade III)  A detailed system by system evaluation of side effect was performed to assess chemotherapy related toxicity. Blood counts are low. Results reviewed with the patient. Hold treatment today    Symptom management form reviewed with patient. 2. Anemia, chemotherapy related    Observation      3. Neutropenia    > Hold treatment today  > Resume weekly paclitaxel in 1 week      Plan:       > Hold treatment today  > Follow-up in 2 weeks        Signed by: Patrizia Chaudhari MD                     July 7, 2019        CC. Shabnam Johnson MD  CC.  Juan Mckeon MD

## 2019-07-03 NOTE — PROGRESS NOTES
Identified pt with two pt identifiers(name and ). Reviewed record in preparation for visit and have obtained necessary documentation. Chief Complaint   Patient presents with    Breast Cancer     infusion day       Visit Vitals  /83 (BP 1 Location: Left arm, BP Patient Position: Sitting)   Pulse 98   Temp 97.3 °F (36.3 °C) (Oral)   Resp 18   Ht 5' 2\" (1.575 m)   Wt 194 lb (88 kg)   SpO2 97%   BMI 35.48 kg/m²     Health Maintenance Due   Topic    Hepatitis C Screening     DTaP/Tdap/Td series (1 - Tdap)    PAP AKA CERVICAL CYTOLOGY     Shingrix Vaccine Age 50> (1 of 2)    FOBT Q 1 YEAR AGE 50-75        Coordination of Care Questionnaire:  :   1) Have you been to an emergency room, urgent care, or hospitalized since your last visit? If yes, where when, and reason for visit? No         2. Have seen or consulted any other health care provider since your last visit? If yes, where when, and reason for visit? No       3) Do you have an Advanced Directive/ Living Will in place? No  If yes, do we have a copy on file   If no, would you like information     Patient is accompanied by self I have received verbal consent from Satinder Hawk to discuss any/all medical information while they are present in the room.

## 2019-07-08 ENCOUNTER — DOCUMENTATION ONLY (OUTPATIENT)
Dept: SURGERY | Age: 59
End: 2019-07-08

## 2019-07-08 NOTE — PROGRESS NOTES
I received a call from the patient. She could not have her last chemotherapy because of low WBC. Patient will call me back on 08/10 or 08/11 so we can pick another day for her surgery. This should be her last chemo then.

## 2019-07-10 ENCOUNTER — HOSPITAL ENCOUNTER (OUTPATIENT)
Dept: INFUSION THERAPY | Age: 59
Discharge: HOME | End: 2019-07-10
Payer: COMMERCIAL

## 2019-07-10 VITALS
TEMPERATURE: 98.2 F | OXYGEN SATURATION: 100 % | RESPIRATION RATE: 18 BRPM | SYSTOLIC BLOOD PRESSURE: 163 MMHG | HEART RATE: 97 BPM | BODY MASS INDEX: 36.49 KG/M2 | DIASTOLIC BLOOD PRESSURE: 92 MMHG | WEIGHT: 198.3 LBS | HEIGHT: 62 IN

## 2019-07-10 DIAGNOSIS — Z17.0 MALIGNANT NEOPLASM OF UPPER-OUTER QUADRANT OF LEFT BREAST IN FEMALE, ESTROGEN RECEPTOR POSITIVE (HCC): Primary | ICD-10-CM

## 2019-07-10 DIAGNOSIS — C50.412 MALIGNANT NEOPLASM OF UPPER-OUTER QUADRANT OF LEFT BREAST IN FEMALE, ESTROGEN RECEPTOR POSITIVE (HCC): Primary | ICD-10-CM

## 2019-07-10 LAB
ALBUMIN SERPL-MCNC: 3.4 G/DL (ref 3.5–5)
ALBUMIN/GLOB SERPL: 0.9 {RATIO} (ref 1.1–2.2)
ALP SERPL-CCNC: 98 U/L (ref 45–117)
ALT SERPL-CCNC: 31 U/L (ref 12–78)
ANION GAP SERPL CALC-SCNC: 10 MMOL/L (ref 5–15)
AST SERPL-CCNC: 14 U/L (ref 15–37)
BASOPHILS # BLD: 0 K/UL (ref 0–0.1)
BASOPHILS NFR BLD: 0 % (ref 0–1)
BILIRUB SERPL-MCNC: 0.3 MG/DL (ref 0.2–1)
BUN SERPL-MCNC: 22 MG/DL (ref 6–20)
BUN/CREAT SERPL: 20 (ref 12–20)
CALCIUM SERPL-MCNC: 9.1 MG/DL (ref 8.5–10.1)
CHLORIDE SERPL-SCNC: 105 MMOL/L (ref 97–108)
CO2 SERPL-SCNC: 22 MMOL/L (ref 21–32)
CREAT SERPL-MCNC: 1.08 MG/DL (ref 0.55–1.02)
DIFFERENTIAL METHOD BLD: ABNORMAL
EOSINOPHIL # BLD: 0 K/UL (ref 0–0.4)
EOSINOPHIL NFR BLD: 0 % (ref 0–7)
ERYTHROCYTE [DISTWIDTH] IN BLOOD BY AUTOMATED COUNT: 16.7 % (ref 11.5–14.5)
GLOBULIN SER CALC-MCNC: 3.8 G/DL (ref 2–4)
GLUCOSE SERPL-MCNC: 245 MG/DL (ref 65–100)
HCT VFR BLD AUTO: 36.2 % (ref 35–47)
HGB BLD-MCNC: 11.7 G/DL (ref 11.5–16)
IMM GRANULOCYTES # BLD AUTO: 0 K/UL (ref 0–0.04)
IMM GRANULOCYTES NFR BLD AUTO: 1 % (ref 0–0.5)
LYMPHOCYTES # BLD: 0.4 K/UL (ref 0.8–3.5)
LYMPHOCYTES NFR BLD: 13 % (ref 12–49)
MCH RBC QN AUTO: 29.5 PG (ref 26–34)
MCHC RBC AUTO-ENTMCNC: 32.3 G/DL (ref 30–36.5)
MCV RBC AUTO: 91.4 FL (ref 80–99)
MONOCYTES # BLD: 0.1 K/UL (ref 0–1)
MONOCYTES NFR BLD: 2 % (ref 5–13)
NEUTS SEG # BLD: 2.9 K/UL (ref 1.8–8)
NEUTS SEG NFR BLD: 84 % (ref 32–75)
NRBC # BLD: 0 K/UL (ref 0–0.01)
NRBC BLD-RTO: 0 PER 100 WBC
PLATELET # BLD AUTO: 315 K/UL (ref 150–400)
PMV BLD AUTO: 10.1 FL (ref 8.9–12.9)
POTASSIUM SERPL-SCNC: 4 MMOL/L (ref 3.5–5.1)
PROT SERPL-MCNC: 7.2 G/DL (ref 6.4–8.2)
RBC # BLD AUTO: 3.96 M/UL (ref 3.8–5.2)
RBC MORPH BLD: ABNORMAL
SODIUM SERPL-SCNC: 137 MMOL/L (ref 136–145)
WBC # BLD AUTO: 3.4 K/UL (ref 3.6–11)

## 2019-07-10 PROCEDURE — 96413 CHEMO IV INFUSION 1 HR: CPT

## 2019-07-10 PROCEDURE — 74011000250 HC RX REV CODE- 250: Performed by: INTERNAL MEDICINE

## 2019-07-10 PROCEDURE — 74011250637 HC RX REV CODE- 250/637: Performed by: INTERNAL MEDICINE

## 2019-07-10 PROCEDURE — 80053 COMPREHEN METABOLIC PANEL: CPT

## 2019-07-10 PROCEDURE — 77030012965 HC NDL HUBR BBMI -A

## 2019-07-10 PROCEDURE — 74011250636 HC RX REV CODE- 250/636: Performed by: INTERNAL MEDICINE

## 2019-07-10 PROCEDURE — 85025 COMPLETE CBC W/AUTO DIFF WBC: CPT

## 2019-07-10 PROCEDURE — 96375 TX/PRO/DX INJ NEW DRUG ADDON: CPT

## 2019-07-10 PROCEDURE — 36415 COLL VENOUS BLD VENIPUNCTURE: CPT

## 2019-07-10 RX ORDER — DEXAMETHASONE SODIUM PHOSPHATE 4 MG/ML
10 INJECTION, SOLUTION INTRA-ARTICULAR; INTRALESIONAL; INTRAMUSCULAR; INTRAVENOUS; SOFT TISSUE ONCE
Status: COMPLETED | OUTPATIENT
Start: 2019-07-10 | End: 2019-07-10

## 2019-07-10 RX ORDER — HEPARIN 100 UNIT/ML
300-500 SYRINGE INTRAVENOUS AS NEEDED
Status: ACTIVE | OUTPATIENT
Start: 2019-07-10 | End: 2019-07-10

## 2019-07-10 RX ORDER — DIPHENHYDRAMINE HYDROCHLORIDE 50 MG/ML
50 INJECTION, SOLUTION INTRAMUSCULAR; INTRAVENOUS ONCE
Status: DISCONTINUED | OUTPATIENT
Start: 2019-07-10 | End: 2019-07-10

## 2019-07-10 RX ORDER — SODIUM CHLORIDE 0.9 % (FLUSH) 0.9 %
10 SYRINGE (ML) INJECTION AS NEEDED
Status: ACTIVE | OUTPATIENT
Start: 2019-07-10 | End: 2019-07-10

## 2019-07-10 RX ORDER — DIPHENHYDRAMINE HCL 25 MG
50 CAPSULE ORAL ONCE
Status: COMPLETED | OUTPATIENT
Start: 2019-07-10 | End: 2019-07-10

## 2019-07-10 RX ORDER — SODIUM CHLORIDE 9 MG/ML
10 INJECTION INTRAMUSCULAR; INTRAVENOUS; SUBCUTANEOUS AS NEEDED
Status: ACTIVE | OUTPATIENT
Start: 2019-07-10 | End: 2019-07-10

## 2019-07-10 RX ORDER — SODIUM CHLORIDE 9 MG/ML
25 INJECTION, SOLUTION INTRAVENOUS CONTINUOUS
Status: DISPENSED | OUTPATIENT
Start: 2019-07-10 | End: 2019-07-10

## 2019-07-10 RX ADMIN — PACLITAXEL 158 MG: 6 INJECTION, SOLUTION INTRAVENOUS at 12:55

## 2019-07-10 RX ADMIN — FAMOTIDINE 20 MG: 10 INJECTION, SOLUTION INTRAVENOUS at 12:00

## 2019-07-10 RX ADMIN — SODIUM CHLORIDE 25 ML/HR: 900 INJECTION, SOLUTION INTRAVENOUS at 11:55

## 2019-07-10 RX ADMIN — DIPHENHYDRAMINE HYDROCHLORIDE 50 MG: 25 CAPSULE ORAL at 11:55

## 2019-07-10 RX ADMIN — DEXAMETHASONE SODIUM PHOSPHATE 10 MG: 4 INJECTION, SOLUTION INTRAMUSCULAR; INTRAVENOUS at 11:56

## 2019-07-10 RX ADMIN — Medication 500 UNITS: at 13:55

## 2019-07-10 RX ADMIN — Medication 10 ML: at 10:25

## 2019-07-10 RX ADMIN — Medication 10 ML: at 13:55

## 2019-07-10 RX ADMIN — SODIUM CHLORIDE 10 ML: 9 INJECTION, SOLUTION INTRAMUSCULAR; INTRAVENOUS; SUBCUTANEOUS at 10:25

## 2019-07-10 NOTE — PROGRESS NOTES
[facility]patient [facility]fusion Center - Chemotherapy Progress Note    6279- Pt admit to [facility] for C8D1 Taxol ambulatory in stable condition. Assessment completed. No new concerns voiced. Right chest port accessed without issue with positive blood return. Labs drawn per order and sent. Line flushed, clamped, Curos Cap applied to end clave. Patient Vitals for the past 12 hrs:   Temp Pulse Resp BP SpO2   07/10/19 1016 98.2 °F (36.8 °C) (!) 107 18 (!) 157/95 100 %     Recent Results (from the past 12 hour(s))   CBC WITH AUTOMATED DIFF    Collection Time: 07/10/19 10:23 AM   Result Value Ref Range    WBC 3.4 (L) 3.6 - 11.0 K/uL    RBC 3.96 3.80 - 5.20 M/uL    HGB 11.7 11.5 - 16.0 g/dL    HCT 36.2 35.0 - 47.0 %    MCV 91.4 80.0 - 99.0 FL    MCH 29.5 26.0 - 34.0 PG    MCHC 32.3 30.0 - 36.5 g/dL    RDW 16.7 (H) 11.5 - 14.5 %    PLATELET 622 823 - 239 K/uL    MPV 10.1 8.9 - 12.9 FL    NRBC 0.0 0  WBC    ABSOLUTE NRBC 0.00 0.00 - 0.01 K/uL    NEUTROPHILS 84 (H) 32 - 75 %    LYMPHOCYTES 13 12 - 49 %    MONOCYTES 2 (L) 5 - 13 %    EOSINOPHILS 0 0 - 7 %    BASOPHILS 0 0 - 1 %    IMMATURE GRANULOCYTES 1 (H) 0.0 - 0.5 %    ABS. NEUTROPHILS 2.9 1.8 - 8.0 K/UL    ABS. LYMPHOCYTES 0.4 (L) 0.8 - 3.5 K/UL    ABS. MONOCYTES 0.1 0.0 - 1.0 K/UL    ABS. EOSINOPHILS 0.0 0.0 - 0.4 K/UL    ABS. BASOPHILS 0.0 0.0 - 0.1 K/UL    ABS. IMM.  GRANS. 0.0 0.00 - 0.04 K/UL    DF AUTOMATED      RBC COMMENTS NORMOCYTIC, NORMOCHROMIC     METABOLIC PANEL, COMPREHENSIVE    Collection Time: 07/10/19 10:23 AM   Result Value Ref Range    Sodium 137 136 - 145 mmol/L    Potassium 4.0 3.5 - 5.1 mmol/L    Chloride 105 97 - 108 mmol/L    CO2 22 21 - 32 mmol/L    Anion gap 10 5 - 15 mmol/L    Glucose 245 (H) 65 - 100 mg/dL    BUN 22 (H) 6 - 20 MG/DL    Creatinine 1.08 (H) 0.55 - 1.02 MG/DL    BUN/Creatinine ratio 20 12 - 20      GFR est AA >60 >60 ml/min/1.73m2    GFR est non-AA 52 (L) >60 ml/min/1.73m2    Calcium 9.1 8.5 - 10.1 MG/DL    Bilirubin, total 0.3 0.2 - 1.0 MG/DL    ALT (SGPT) 31 12 - 78 U/L    AST (SGOT) 14 (L) 15 - 37 U/L    Alk. phosphatase 98 45 - 117 U/L    Protein, total 7.2 6.4 - 8.2 g/dL    Albumin 3.4 (L) 3.5 - 5.0 g/dL    Globulin 3.8 2.0 - 4.0 g/dL    A-G Ratio 0.9 (L) 1.1 - 2.2       Medications:  NS KVO  Benadryl PO  Pepcid IVP  Decadron IVP  Taxol IV    BP (!) 163/92   Pulse 97   Temp 98.2 °F (36.8 °C)   Resp 18   Ht 5' 2\" (1.575 m)   Wt 89.9 kg (198 lb 4.8 oz)   SpO2 100%   BMI 36.27 kg/m²     1355- Pt tolerated treatment well. Port maintained positive blood return throughout treatment, flushed with positive blood return at conclusion, and de-accessed. D/c home ambulatory in no distress.  Pt aware of next [facility] appointment scheduled for 7/17 at 11 AM.

## 2019-07-17 ENCOUNTER — OFFICE VISIT (OUTPATIENT)
Dept: ONCOLOGY | Age: 59
End: 2019-07-17

## 2019-07-17 ENCOUNTER — HOSPITAL ENCOUNTER (OUTPATIENT)
Dept: INFUSION THERAPY | Age: 59
Discharge: HOME | End: 2019-07-17
Payer: COMMERCIAL

## 2019-07-17 VITALS
OXYGEN SATURATION: 97 % | HEIGHT: 62 IN | DIASTOLIC BLOOD PRESSURE: 82 MMHG | WEIGHT: 198.6 LBS | BODY MASS INDEX: 36.55 KG/M2 | TEMPERATURE: 98.1 F | HEART RATE: 102 BPM | SYSTOLIC BLOOD PRESSURE: 136 MMHG | RESPIRATION RATE: 18 BRPM

## 2019-07-17 VITALS
DIASTOLIC BLOOD PRESSURE: 104 MMHG | HEART RATE: 97 BPM | BODY MASS INDEX: 36.51 KG/M2 | HEIGHT: 62 IN | WEIGHT: 198.4 LBS | SYSTOLIC BLOOD PRESSURE: 155 MMHG | OXYGEN SATURATION: 98 % | RESPIRATION RATE: 18 BRPM | TEMPERATURE: 97.8 F

## 2019-07-17 DIAGNOSIS — T45.1X5A ANEMIA ASSOCIATED WITH CHEMOTHERAPY: ICD-10-CM

## 2019-07-17 DIAGNOSIS — C50.412 MALIGNANT NEOPLASM OF UPPER-OUTER QUADRANT OF LEFT BREAST IN FEMALE, ESTROGEN RECEPTOR POSITIVE (HCC): Primary | ICD-10-CM

## 2019-07-17 DIAGNOSIS — Z17.0 MALIGNANT NEOPLASM OF UPPER-OUTER QUADRANT OF LEFT BREAST IN FEMALE, ESTROGEN RECEPTOR POSITIVE (HCC): Primary | ICD-10-CM

## 2019-07-17 DIAGNOSIS — D64.81 ANEMIA ASSOCIATED WITH CHEMOTHERAPY: ICD-10-CM

## 2019-07-17 LAB
BASO+EOS+MONOS # BLD AUTO: 0.1 K/UL (ref 0.2–1.2)
BASO+EOS+MONOS NFR BLD AUTO: 1 % (ref 3.2–16.9)
DIFFERENTIAL METHOD BLD: ABNORMAL
ERYTHROCYTE [DISTWIDTH] IN BLOOD BY AUTOMATED COUNT: 16.7 % (ref 11.8–15.8)
HCT VFR BLD AUTO: 33.5 % (ref 35–47)
HGB BLD-MCNC: 11.2 G/DL (ref 11.5–16)
LYMPHOCYTES # BLD: 0.6 K/UL (ref 0.8–3.5)
LYMPHOCYTES NFR BLD: 12 % (ref 12–49)
MCH RBC QN AUTO: 29.9 PG (ref 26–34)
MCHC RBC AUTO-ENTMCNC: 33.4 G/DL (ref 30–36.5)
MCV RBC AUTO: 89.6 FL (ref 80–99)
NEUTS SEG # BLD: 4.4 K/UL (ref 1.8–8)
NEUTS SEG NFR BLD: 87 % (ref 32–75)
PLATELET # BLD AUTO: 330 K/UL (ref 150–400)
RBC # BLD AUTO: 3.74 M/UL (ref 3.8–5.2)
WBC # BLD AUTO: 5.1 K/UL (ref 3.6–11)

## 2019-07-17 PROCEDURE — 96375 TX/PRO/DX INJ NEW DRUG ADDON: CPT

## 2019-07-17 PROCEDURE — 96413 CHEMO IV INFUSION 1 HR: CPT

## 2019-07-17 PROCEDURE — 85025 COMPLETE CBC W/AUTO DIFF WBC: CPT

## 2019-07-17 PROCEDURE — 74011000250 HC RX REV CODE- 250: Performed by: INTERNAL MEDICINE

## 2019-07-17 PROCEDURE — 74011250637 HC RX REV CODE- 250/637

## 2019-07-17 PROCEDURE — 77030012965 HC NDL HUBR BBMI -A

## 2019-07-17 PROCEDURE — 74011250636 HC RX REV CODE- 250/636: Performed by: INTERNAL MEDICINE

## 2019-07-17 PROCEDURE — 36415 COLL VENOUS BLD VENIPUNCTURE: CPT

## 2019-07-17 RX ORDER — SODIUM CHLORIDE 9 MG/ML
10 INJECTION INTRAMUSCULAR; INTRAVENOUS; SUBCUTANEOUS AS NEEDED
Status: DISPENSED | OUTPATIENT
Start: 2019-07-17 | End: 2019-07-17

## 2019-07-17 RX ORDER — DIPHENHYDRAMINE HCL 25 MG
50 CAPSULE ORAL ONCE
Status: COMPLETED | OUTPATIENT
Start: 2019-07-17 | End: 2019-07-17

## 2019-07-17 RX ORDER — HEPARIN 100 UNIT/ML
300-500 SYRINGE INTRAVENOUS AS NEEDED
Status: ACTIVE | OUTPATIENT
Start: 2019-07-17 | End: 2019-07-17

## 2019-07-17 RX ORDER — SODIUM CHLORIDE 9 MG/ML
25 INJECTION, SOLUTION INTRAVENOUS CONTINUOUS
Status: DISPENSED | OUTPATIENT
Start: 2019-07-17 | End: 2019-07-17

## 2019-07-17 RX ORDER — DEXAMETHASONE SODIUM PHOSPHATE 4 MG/ML
10 INJECTION, SOLUTION INTRA-ARTICULAR; INTRALESIONAL; INTRAMUSCULAR; INTRAVENOUS; SOFT TISSUE ONCE
Status: COMPLETED | OUTPATIENT
Start: 2019-07-17 | End: 2019-07-17

## 2019-07-17 RX ORDER — SODIUM CHLORIDE 0.9 % (FLUSH) 0.9 %
10 SYRINGE (ML) INJECTION AS NEEDED
Status: ACTIVE | OUTPATIENT
Start: 2019-07-17 | End: 2019-07-17

## 2019-07-17 RX ORDER — DIPHENHYDRAMINE HYDROCHLORIDE 50 MG/ML
50 INJECTION, SOLUTION INTRAMUSCULAR; INTRAVENOUS ONCE
Status: DISCONTINUED | OUTPATIENT
Start: 2019-07-17 | End: 2019-07-17

## 2019-07-17 RX ADMIN — DIPHENHYDRAMINE HYDROCHLORIDE 50 MG: 25 CAPSULE ORAL at 12:16

## 2019-07-17 RX ADMIN — Medication 10 ML: at 14:10

## 2019-07-17 RX ADMIN — Medication 10 ML: at 11:15

## 2019-07-17 RX ADMIN — DEXAMETHASONE SODIUM PHOSPHATE 10 MG: 4 INJECTION, SOLUTION INTRA-ARTICULAR; INTRALESIONAL; INTRAMUSCULAR; INTRAVENOUS; SOFT TISSUE at 12:20

## 2019-07-17 RX ADMIN — FAMOTIDINE 20 MG: 10 INJECTION, SOLUTION INTRAVENOUS at 12:17

## 2019-07-17 RX ADMIN — SODIUM CHLORIDE 10 ML: 9 INJECTION, SOLUTION INTRAMUSCULAR; INTRAVENOUS; SUBCUTANEOUS at 11:15

## 2019-07-17 RX ADMIN — Medication 500 UNITS: at 14:10

## 2019-07-17 RX ADMIN — SODIUM CHLORIDE 25 ML/HR: 900 INJECTION, SOLUTION INTRAVENOUS at 12:17

## 2019-07-17 RX ADMIN — PACLITAXEL 158 MG: 6 INJECTION, SOLUTION INTRAVENOUS at 13:00

## 2019-07-17 NOTE — PROGRESS NOTES
[facility]  [address], [address] [facility] [place], [address] [facility]  [phone]      Follow-up Note        Patient: Domenica Francisco MRN: [id number]  SSN: [id number]    YOB: 1960  Age: 60 y.o. Sex: female        Diagnosis:     1. Left breast carcinoma:  T1c N1mi M0 (Stage I) infiltrating ductal carcinoma, Tumor size 1.6 cm, LN 1/3 with micromet, grade 3, ER -ve, [place] -ve, Her 2 -ve    Treatment:     1. Neoadjuvant chemotherapy   Adriamycin, cytoxan - s/p 4 cycles   Weekly Taxol - Week # 11 of 12    Subjective:      Domenica Francisco is a 60 y.o. female with a diagnosis of left sided invasive breast cancer. She felt a lump in the left breast, went to see her PCP, got a mammogram and was noted to have abnormal density in the left upper outer quadrant of the breast. A biopsy of the mass revealed gr 3 IDC, TNBC. The axillary LN is clear of disease. She saw Dr. Lloyd Hall. An MRI of the breast shows the tumor to be larger than previously believed to be. A left axillary LN biopsy showed 1/3 nodes with micrometastasis. She works at [employer] full time. Ms. Lore Jacob is receiving neoadjuvant chemotherapy. She is tolerating treatment extremely well and continues to work full time. She is receiving weekly taxol. Repeat ultrasound with Dr. Lewis Webb shows good response to treatment. She feels well today with no complaints. Surgery is scheduled in early September.       Review of Systems:    Constitutional: decreased appetite  Eyes: negative  Ears, Nose, Mouth, Throat, and Face: negative  Respiratory: negative  Cardiovascular: negative  Gastrointestinal: negative  Genitourinary:negative  Integument/Breast: negative  Hematologic/Lymphatic: negative  Musculoskeletal:negative  Neurological: numbness/tingling toes        Past Medical History:   Diagnosis Date    Breast cancer ([address]     Menopause      Past Surgical History:   Procedure Laterality Date    HX BREAST BIOPSY Left     x2    HX HYSTERECTOMY      partial, ovaries remain    VASCULAR SURGERY PROCEDURE UNLIST      portacath      History reviewed. No pertinent family history. Social History     Tobacco Use    Smoking status: Former Smoker     Last attempt to quit: 3/6/2013     Years since quittin.3    Smokeless tobacco: Never Used    Tobacco comment: Quit smoking 23 days ago   Substance Use Topics    Alcohol use: No      Prior to Admission medications    Medication Sig Start Date End Date Taking? Authorizing Provider   loratadine (CLARITIN) 10 mg tablet Take 10 mg by mouth daily as needed for Allergies. Yes Provider, Historical   omeprazole magnesium (PRILOSEC PO) Take  by mouth. Yes Provider, Historical   dexamethasone (DECADRON) 4 mg tablet Take 20 mg by mouth See Admin Instructions. Take 20 mg the night before chemotherapy 19  Yes Tunde Bello, NP   diphenhydrAMINE (BENADRYL) 25 mg capsule Take 25 mg by mouth every six (6) hours as needed. Yes Provider, Historical   oxyCODONE-acetaminophen (PERCOCET) 5-325 mg per tablet Take 1 Tab by mouth every four (4) hours as needed for Pain. Max Daily Amount: 6 Tabs. Patient not taking: Reported on 7/3/2019 1/31/19   Sheyla Martinez MD   lidocaine-prilocaine (EMLA) topical cream Apply  to affected area as needed for Pain. 19   Ryley Dunne MD   ondansetron (ZOFRAN ODT) 4 mg disintegrating tablet Take 1 Tab by mouth every eight (8) hours as needed for Nausea.   Patient not taking: Reported on 7/3/2019 1/24/19   Ryley Dunne MD          Allergies   Allergen Reactions    Codeine Rash     Rash from tylenol #3         Objective:     Visit Vitals  /82 (BP 1 Location: Left arm, BP Patient Position: Sitting)   Pulse (!) 102   Temp 98.1 °F (36.7 °C) (Oral)   Resp 18   Ht 5' 2\" (1.575 m)   Wt 198 lb 9.6 oz (90.1 kg)   SpO2 97%   BMI 36.32 kg/m²       Pain Scale: 0 - No pain/10  Pain Location:       Physical Exam:    GENERAL: alert, cooperative, no distress, appears stated age  EYE: conjunctivae/corneas clear. PERRL, EOM's intact  LYMPHATIC: Cervical, supraclavicular, and axillary nodes normal.   THROAT & NECK: normal and no erythema or exudates noted. LUNG: clear to auscultation bilaterally  HEART: regular rate and rhythm, S1, S2 normal, no murmur, click, rub or gallop  ABDOMEN: soft, non-tender. Bowel sounds normal. No masses,  no organomegaly  EXTREMITIES:  extremities normal, atraumatic, no cyanosis or edema  SKIN: Normal.  NEUROLOGIC: AOx3. Gait normal. Reflexes and motor strength normal and symmetric      Lab Results   Component Value Date/Time    WBC 5.1 07/17/2019 11:14 AM    HGB 11.2 (L) 07/17/2019 11:14 AM    HCT 33.5 (L) 07/17/2019 11:14 AM    PLATELET 278 45/68/9675 11:14 AM    MCV 89.6 07/17/2019 11:14 AM       Lab Results   Component Value Date/Time    Sodium 137 07/10/2019 10:23 AM    Potassium 4.0 07/10/2019 10:23 AM    Chloride 105 07/10/2019 10:23 AM    CO2 22 07/10/2019 10:23 AM    Anion gap 10 07/10/2019 10:23 AM    Glucose 245 (H) 07/10/2019 10:23 AM    BUN 22 (H) 07/10/2019 10:23 AM    Creatinine 1.08 (H) 07/10/2019 10:23 AM    BUN/Creatinine ratio 20 07/10/2019 10:23 AM    GFR est AA >60 07/10/2019 10:23 AM    GFR est non-AA 52 (L) 07/10/2019 10:23 AM    Calcium 9.1 07/10/2019 10:23 AM    Bilirubin, total 0.3 07/10/2019 10:23 AM    AST (SGOT) 14 (L) 07/10/2019 10:23 AM    Alk. phosphatase 98 07/10/2019 10:23 AM    Protein, total 7.2 07/10/2019 10:23 AM    Albumin 3.4 (L) 07/10/2019 10:23 AM    Globulin 3.8 07/10/2019 10:23 AM    A-G Ratio 0.9 (L) 07/10/2019 10:23 AM    ALT (SGPT) 31 07/10/2019 10:23 AM           Assessment:     1.  Left breast carcinoma:  T1c N1mi M0 (Stage I) infiltrating ductal carcinoma, Tumor size 1.6 cm, LN 1/3 with micromet, grade 3, ER -ve, [place] -ve, Her 2 -ve    ECOG PS 0  Intent of Treatment - curative  Prognosis - good        Echocardiogram (5/14/2019): Normal, LVEF 61-65%    Receiving neoadjuvant chemotherapy   Adriamycin, Cyclophosphamide - s/p 4 cycles   Weekly Taxol - Week # 11 of 12    Tolerating treatment   A detailed system by system evaluation of side effect was performed to assess chemotherapy related toxicity. Blood counts are acceptable. Results reviewed with the patient. Symptom management form reviewed with patient. 2. Anemia, chemotherapy related    Observation      3. Neutropenia - improved    > Monitor      Plan:       > Continue weekly Paclitaxel  > Proceed with surgery - scheduled on 9/5/2019  > Follow-up in 3 months        Signed by: Tay Hannon MD                     August 4, 2019        CC. Garfield Aldridge MD  CC.  Daxa Martinez MD

## 2019-07-24 ENCOUNTER — HOSPITAL ENCOUNTER (OUTPATIENT)
Dept: INFUSION THERAPY | Age: 59
Discharge: HOME | End: 2019-07-24
Payer: COMMERCIAL

## 2019-07-24 VITALS
OXYGEN SATURATION: 99 % | TEMPERATURE: 96.8 F | WEIGHT: 197.3 LBS | HEIGHT: 62 IN | RESPIRATION RATE: 18 BRPM | DIASTOLIC BLOOD PRESSURE: 102 MMHG | SYSTOLIC BLOOD PRESSURE: 159 MMHG | BODY MASS INDEX: 36.31 KG/M2 | HEART RATE: 102 BPM

## 2019-07-24 DIAGNOSIS — Z17.0 MALIGNANT NEOPLASM OF UPPER-OUTER QUADRANT OF LEFT BREAST IN FEMALE, ESTROGEN RECEPTOR POSITIVE (HCC): Primary | ICD-10-CM

## 2019-07-24 DIAGNOSIS — C50.412 MALIGNANT NEOPLASM OF UPPER-OUTER QUADRANT OF LEFT BREAST IN FEMALE, ESTROGEN RECEPTOR POSITIVE (HCC): Primary | ICD-10-CM

## 2019-07-24 LAB
BASO+EOS+MONOS # BLD AUTO: 0.1 K/UL (ref 0.2–1.2)
BASO+EOS+MONOS NFR BLD AUTO: 3 % (ref 3.2–16.9)
DIFFERENTIAL METHOD BLD: ABNORMAL
ERYTHROCYTE [DISTWIDTH] IN BLOOD BY AUTOMATED COUNT: 17.6 % (ref 11.8–15.8)
HCT VFR BLD AUTO: 34.6 % (ref 35–47)
HGB BLD-MCNC: 11.5 G/DL (ref 11.5–16)
LYMPHOCYTES # BLD: 0.5 K/UL (ref 0.8–3.5)
LYMPHOCYTES NFR BLD: 24 % (ref 12–49)
MCH RBC QN AUTO: 29.7 PG (ref 26–34)
MCHC RBC AUTO-ENTMCNC: 33.2 G/DL (ref 30–36.5)
MCV RBC AUTO: 89.4 FL (ref 80–99)
NEUTS SEG # BLD: 1.6 K/UL (ref 1.8–8)
NEUTS SEG NFR BLD: 73 % (ref 32–75)
PLATELET # BLD AUTO: 302 K/UL (ref 150–400)
RBC # BLD AUTO: 3.87 M/UL (ref 3.8–5.2)
WBC # BLD AUTO: 2.2 K/UL (ref 3.6–11)

## 2019-07-24 PROCEDURE — 96375 TX/PRO/DX INJ NEW DRUG ADDON: CPT

## 2019-07-24 PROCEDURE — 77030012965 HC NDL HUBR BBMI -A

## 2019-07-24 PROCEDURE — 85025 COMPLETE CBC W/AUTO DIFF WBC: CPT

## 2019-07-24 PROCEDURE — 74011250636 HC RX REV CODE- 250/636: Performed by: INTERNAL MEDICINE

## 2019-07-24 PROCEDURE — 74011250637 HC RX REV CODE- 250/637: Performed by: INTERNAL MEDICINE

## 2019-07-24 PROCEDURE — 74011000250 HC RX REV CODE- 250: Performed by: INTERNAL MEDICINE

## 2019-07-24 PROCEDURE — 96413 CHEMO IV INFUSION 1 HR: CPT

## 2019-07-24 PROCEDURE — 36415 COLL VENOUS BLD VENIPUNCTURE: CPT

## 2019-07-24 RX ORDER — SODIUM CHLORIDE 0.9 % (FLUSH) 0.9 %
10 SYRINGE (ML) INJECTION AS NEEDED
Status: ACTIVE | OUTPATIENT
Start: 2019-07-24 | End: 2019-07-24

## 2019-07-24 RX ORDER — HEPARIN 100 UNIT/ML
300-500 SYRINGE INTRAVENOUS AS NEEDED
Status: ACTIVE | OUTPATIENT
Start: 2019-07-24 | End: 2019-07-24

## 2019-07-24 RX ORDER — DEXAMETHASONE SODIUM PHOSPHATE 4 MG/ML
10 INJECTION, SOLUTION INTRA-ARTICULAR; INTRALESIONAL; INTRAMUSCULAR; INTRAVENOUS; SOFT TISSUE ONCE
Status: COMPLETED | OUTPATIENT
Start: 2019-07-24 | End: 2019-07-24

## 2019-07-24 RX ORDER — DIPHENHYDRAMINE HCL 25 MG
50 CAPSULE ORAL ONCE
Status: COMPLETED | OUTPATIENT
Start: 2019-07-24 | End: 2019-07-24

## 2019-07-24 RX ORDER — SODIUM CHLORIDE 9 MG/ML
25 INJECTION, SOLUTION INTRAVENOUS CONTINUOUS
Status: DISPENSED | OUTPATIENT
Start: 2019-07-24 | End: 2019-07-24

## 2019-07-24 RX ORDER — SODIUM CHLORIDE 9 MG/ML
10 INJECTION INTRAMUSCULAR; INTRAVENOUS; SUBCUTANEOUS AS NEEDED
Status: ACTIVE | OUTPATIENT
Start: 2019-07-24 | End: 2019-07-24

## 2019-07-24 RX ORDER — DIPHENHYDRAMINE HYDROCHLORIDE 50 MG/ML
50 INJECTION, SOLUTION INTRAMUSCULAR; INTRAVENOUS ONCE
Status: DISCONTINUED | OUTPATIENT
Start: 2019-07-24 | End: 2019-07-24

## 2019-07-24 RX ADMIN — PACLITAXEL 158 MG: 6 INJECTION, SOLUTION INTRAVENOUS at 12:10

## 2019-07-24 RX ADMIN — DIPHENHYDRAMINE HYDROCHLORIDE 50 MG: 25 CAPSULE ORAL at 11:37

## 2019-07-24 RX ADMIN — Medication 10 ML: at 13:15

## 2019-07-24 RX ADMIN — Medication 10 ML: at 11:15

## 2019-07-24 RX ADMIN — Medication 500 UNITS: at 13:15

## 2019-07-24 RX ADMIN — DEXAMETHASONE SODIUM PHOSPHATE 10 MG: 4 INJECTION, SOLUTION INTRA-ARTICULAR; INTRALESIONAL; INTRAMUSCULAR; INTRAVENOUS; SOFT TISSUE at 11:42

## 2019-07-24 RX ADMIN — FAMOTIDINE 20 MG: 10 INJECTION INTRAVENOUS at 11:40

## 2019-07-24 RX ADMIN — SODIUM CHLORIDE 25 ML/HR: 900 INJECTION, SOLUTION INTRAVENOUS at 11:38

## 2019-07-24 RX ADMIN — SODIUM CHLORIDE 10 ML: 9 INJECTION, SOLUTION INTRAMUSCULAR; INTRAVENOUS; SUBCUTANEOUS at 11:15

## 2019-07-24 NOTE — PROGRESS NOTES
Outpatient Infusion Center - Chemotherapy Progress Note    1110- Pt admit to [facility] for C8D15 Taxol ambulatory in stable condition. Assessment completed. No new concerns voiced. Right chest port accessed without issue with positive blood return. Labs drawn per order and sent. Line flushed, clamped, Curos Cap applied to end clave. Pt over to MD office. Chemotherapy Flowsheet 7/24/2019   Cycle C8D15   Date 7/24/2019   Drug / Regimen Taxol   Pre Meds given   Notes given     Patient Vitals for the past 12 hrs:   Temp Pulse Resp BP SpO2   07/24/19 1308 -- (!) 102 -- (!) 159/102 --   07/24/19 1110 96.8 °F (36 °C) (!) 101 18 (!) 162/99 99 %     Recent Results (from the past 12 hour(s))   CBC WITH 3 PART DIFF    Collection Time: 07/24/19 11:03 AM   Result Value Ref Range    WBC 2.2 (L) 3.6 - 11.0 K/uL    RBC 3.87 3.80 - 5.20 M/uL    HGB 11.5 11.5 - 16.0 g/dL    HCT 34.6 (L) 35.0 - 47.0 %    MCV 89.4 80.0 - 99.0 FL    MCH 29.7 26.0 - 34.0 PG    MCHC 33.2 30.0 - 36.5 g/dL    RDW 17.6 (H) 11.8 - 15.8 %    PLATELET 429 258 - 752 K/uL    NEUTROPHILS 73 32 - 75 %    MIXED CELLS 3 (L) 3.2 - 16.9 %    LYMPHOCYTES 24 12 - 49 %    ABS. NEUTROPHILS 1.6 (L) 1.8 - 8.0 K/UL    ABS. MIXED CELLS 0.1 (L) 0.2 - 1.2 K/uL    ABS.  LYMPHOCYTES 0.5 (L) 0.8 - 3.5 K/UL    DF AUTOMATED       Medications Administered     0.9% sodium chloride infusion     Admin Date  07/24/2019 Action  New Bag Dose  25 mL/hr Rate  25 mL/hr Route  IntraVENous Administered By  Radha Loredo, HIRO          dexamethasone (DECADRON) 4 mg/mL injection 10 mg     Admin Date  07/24/2019 Action  Given Dose  10 mg Route  IntraVENous Administered By  Radha Loredo RN          diphenhydrAMINE (BENADRYL) capsule 50 mg     Admin Date  07/24/2019 Action  Given Dose  50 mg Route  Oral Administered By  Radha Loredo, RN          famotidine (PF) (PEPCID) 20 mg in sodium chloride 0.9% 10 mL injection     Admin Date  07/24/2019 Action  Given Dose  20 mg Route  IntraVENous Administered By  Cirilo Guerra RN          heparin (porcine) pf 300-500 Units     Admin Date  07/24/2019 Action  Given Dose  500 Units Route  InterCATHeter Administered By  Cirilo Guerra RN          PACLitaxel (TAXOL) 158 mg in 0.9% sodium chloride 250 mL, overfill volume 25 mL chemo infusion     Admin Date  07/24/2019 Action  New Bag Dose  158 mg Rate  301.3 mL/hr Route  IntraVENous Administered By  Cirilo Guerra RN          saline peripheral flush soln 10 mL     Admin Date  07/24/2019 Action  Given Dose  10 mL Route  InterCATHeter Administered By  Cirilo Guerra RN           Admin Date  07/24/2019 Action  Given Dose  10 mL Route  InterCATHeter Administered By  Cirilo Guerra RN          sodium chloride 0.9% injection 10 mL     Admin Date  07/24/2019 Action  Given Dose  10 mL Route  IntraVENous Administered By  Cirilo Guerra RN                1128- Pt tolerated treatment well. Port maintained positive blood return throughout treatment, flushed with positive blood return at conclusion, and de-accessed. D/c home ambulatory in no distress. Treatment completed, no further [facility] appts scheduled at this time.

## 2019-07-31 ENCOUNTER — APPOINTMENT (OUTPATIENT)
Dept: INFUSION THERAPY | Age: 59
End: 2019-07-31
Payer: COMMERCIAL

## 2019-08-07 ENCOUNTER — APPOINTMENT (OUTPATIENT)
Dept: INFUSION THERAPY | Age: 59
End: 2019-08-07

## 2019-08-23 ENCOUNTER — OFFICE VISIT (OUTPATIENT)
Dept: SURGERY | Age: 59
End: 2019-08-23

## 2019-08-23 VITALS
HEIGHT: 62 IN | WEIGHT: 197 LBS | HEART RATE: 84 BPM | DIASTOLIC BLOOD PRESSURE: 84 MMHG | BODY MASS INDEX: 36.25 KG/M2 | SYSTOLIC BLOOD PRESSURE: 155 MMHG

## 2019-08-23 DIAGNOSIS — C77.3 BREAST CANCER METASTASIZED TO AXILLARY LYMPH NODE, LEFT (HCC): Primary | ICD-10-CM

## 2019-08-23 DIAGNOSIS — C50.912 BREAST CANCER METASTASIZED TO AXILLARY LYMPH NODE, LEFT (HCC): Primary | ICD-10-CM

## 2019-08-23 NOTE — PROGRESS NOTES
HISTORY OF PRESENT ILLNESS  Roxanna Bill is a 60 y.o. female. HPI ESTABLISHED patient here to discuss surgery for LEFT breast cancer. She completed neoadjuvant chemotherapy and is scheduled for LEFT breast lumpectomy 9/4/19. She feels a lump near SLNB site which is concerning to her. Breast cancer-  1/3/19 - port insertion and LEFT SLNB. 61 yo with T1c N1mi M0 (Stage I) infiltrating ductal carcinoma, Tumor size 1.6 cm, LN 1/3 with micromet, grade 3, ER -ve, [place] -ve, Her 2 -ve  7/2019 - completed neoadjuvant chemotherapy - Dr. Stevie Chapman   9/5/19 - pending surgery LEFT breast lumpectomy     No family history of breast or ovarian cancer.     Last breast imaging-  11/2018 - mammogram  7/1/19 - breast MRI  MRI Results (most recent):  Results from [place] encounter on 07/01/19   MRI BREAST BI W WO CONT    Narrative HISTORY: 40-year-old female with left breast cancer, status post neoadjuvant  chemotherapy, presenting for follow-up breast MRI. She has also had a sentinel  lymph node biopsy, with 1 lymph node demonstrating micrometastatic carcinoma. COMPARISON: MRI January 2019, mammography and ultrasound from November and  December, 2018    TECHNIQUE:  Bilateral breast MRI was performed using a dedicated breast coil without  compression with the patient in the prone position. Precontrast T1-weighted  images with fat suppression were obtained followed by bolus injection of 9 mL  Gadavist. Postcontrast dynamic and high-resolution images were acquired. T2-weighted axial imaging with fat suppression was also performed. The images  were analyzed using CAD analysis, enhancement curves, digital subtraction, and 2  and 3 dimensional reconstructions.     FINDINGS:    Background parenchymal enhancement: Mild     Mammographic breast density: Heterogeneously dense breast parenchyma    Right breast:  There is no suspicious mass or nonmass enhancement within the right breast.  There is no skin thickening or nipple retraction. There is no suspicious axillary or internal mammary chain lymphadenopathy. Left breast:  Again demonstrated is a malignant mass in the posterior third of the left breast  at 1:00 with an associated biopsy clip. There has been a reduction in size,  status post chemotherapy. The mass now measures 2.4 x 1.6 cm in greatest axial  dimension, in comparison to 2.9 x 2.3 cm. There is some persistent malignant  enhancement within the mass, but this too has decreased since chemotherapy. No  new or progressive disease is identified. There is no evidence of left axillary  lymphadenopathy. The previously biopsied left axillary lymph node has been  removed since the prior study. A small axillary lymph node is noted inferior to  the biopsy-proven malignancy by approximately 1.5 cm, and this measures 8 mm and  is without significant change. Impression IMPRESSION:    Right Breast:  1. BI-RADS Assessment Category 1: Negative. No evidence of breast carcinoma  within the right breast.    Left Breast:  1. BI-RADS Assessment Category 6: Known biopsy proven malignancy- Appropriate  action should be taken. The biopsy-proven malignancy in the posterior left  breast at 1:00 has decreased in size and degree of internal enhancement, status  post neoadjuvant chemotherapy, now 2.4 x 1.6 cm, previously 2.9 x 2.3 cm. This  is consistent with a response to chemotherapy. 2. Interval resection of left axillary lymph nodes. No axillary lymphadenopathy  on the current study. There is a single small axillary versus axillary tail  lymph node located 1.5 cm inferior to the biopsy-proven malignancy, and this is  unchanged since the prior study. 3. No new or progressive disease identified. RECOMMENDATIONS:  Appropriate action is recommended. There is evidence of a response to  chemotherapy in the left breast upper outer quadrant. There is no new or  progressive disease. There is no contralateral malignancy.     A summary portfolio has been created in PACS. Review of Systems   All other systems reviewed and are negative. Physical Exam   Pulmonary/Chest:       Left breast mass adjacent to sln biopsy sight     Nursing note and vitals reviewed. ASSESSMENT and PLAN    ICD-10-CM ICD-9-CM    1. Breast cancer metastasized to axillary lymph node, left (HCC) C50.912 174.9     C77.3 196.3      - mass slightly smaller on mri.    Will do lumpectomy, poss port removal  Had sln biopsy with 1/3 + no pierce  No alnd needed since getting xrt

## 2019-08-23 NOTE — PATIENT INSTRUCTIONS
Lumpectomy: What to Expect at [address] [place] [address]    For 1 or 2 days after the surgery you will probably feel tired and have some pain. The skin around the cut (incision) may feel firm, swollen, and tender, and be bruised. Tenderness should go away in about 2 or 3 days, and the bruising within 2 weeks. Firmness and swelling may last for 3 to 6 months. You may feel a soft lump in your breast that gradually turns hard. This is the incision healing. It is not cancer. Women should wear a well-fitted and supportive bra, even during the night, for 1 week. You will probably be able to go back to work or your normal routine in 1 to 3 weeks after the surgery. This may depend on whether you have more treatment. Your doctor may have removed some lymph nodes in your armpit to see if the cancer has spread. If so, you may feel either numbness or tingling (\"pins and needles\") in your armpit or on the inside of your upper arm. This should improve over the next several weeks. Some people have numbness for a longer time. When you find out that you have cancer, you may feel many emotions and may need some help coping. Seek out family, friends, and counselors for support. You also can do things at home to make yourself feel better while you go through treatment. Call the [employer] [facility] (8-236.525.3371) or visit its website at [employer] [employer] for more information. This care sheet gives you a general idea about how long it will take for you to recover. But each person recovers at a different pace. Follow the steps below to get better as quickly as possible. How can you care for yourself at home? Activity    · Rest when you feel tired. Getting enough sleep will help you recover. You may want to sleep on the side that has not been operated on.  A woman may want to use a pillow to support the affected breast while lying on her side.     · Avoid strenuous activities, such as biking, jogging, weightlifting, or aerobic exercise, for 1 month or until your doctor says it is okay. This may include housework, such as washing windows, especially if you have to use the arm next to the affected breast.     · Most people can return to their normal activities within 2 weeks.     · Try to walk each day. Start out by walking a little more than you did the day before. Bit by bit, increase the amount you walk. Walking boosts blood flow and helps prevent pneumonia and constipation.     · For 1 to 2 weeks, avoid lifting anything over 10 to 15 pounds or that would make you strain. This may include heavy grocery bags and milk containers, a heavy briefcase or backpack, cat litter or dog food bags, a vacuum , or a child.     · You may drive when you are no longer taking pain medicine and can use your arm without pain. Talk to your doctor about when to start driving, especially if you are having radiation treatments.     · You will probably be able to go back to work or your normal routine in 1 to 3 weeks. It may be longer, depending on the type of work you do and whether you are having radiation or chemotherapy.     · You may shower 24 to 48 hours after surgery, if your doctor okays it. Pat the incision dry. Do not take a bath for the first 2 weeks, or until your doctor tells you it is okay. Diet    · You can eat your normal diet. If your stomach is upset, try bland, low-fat foods like plain rice, broiled chicken, toast, and yogurt.     · You may notice that your bowel movements are not regular right after your surgery. This is common. Try to avoid constipation and straining with bowel movements. You may want to take a fiber supplement every day. If you have not had a bowel movement after a couple of days, ask your doctor about taking a mild laxative. Medicines    · Your doctor will tell you if and when you can restart your medicines.  He or she will also give you instructions about taking any new medicines.     · If you take blood thinners, such as warfarin (Coumadin), clopidogrel (Plavix), or aspirin, be sure to talk to your doctor. He or she will tell you if and when to start taking those medicines again. Make sure that you understand exactly what your doctor wants you to do.     · Take pain medicines exactly as directed. ? Your doctor may have given you a medicine to numb the area inside and around your cut (incision). The numbness will last from 6 to 12 hours. If you went home right after the surgery, you may want to take pain medicine before this wears off.  ? If the doctor gave you a prescription medicine for pain, take it as prescribed. ? If you are not taking a prescription pain medicine, ask your doctor if you can take an over-the-counter medicine.     · If your doctor prescribed antibiotics, take them as directed. Do not stop taking them just because you feel better. You need to take the full course of antibiotics.     · If you think your pain medicine is making you sick to your stomach:  ? Take your medicine after meals (unless your doctor has told you not to). ? Ask your doctor for a different pain medicine. Incision care    · If you have strips of tape on the cut the doctor made (incision), leave the tape on for a week or until it falls off.     · When you can shower, wash the area daily with warm, soapy water and pat it dry. Follow-up care is a key part of your treatment and safety. Be sure to make and go to all appointments, and call your doctor if you are having problems. It's also a good idea to know your test results and keep a list of the medicines you take. When should you call for help? Call 911 anytime you think you may need emergency care.  For example, call if:    · You passed out (lost consciousness).     · You have chest pain, are short of breath, or cough up blood.    Call your doctor now or seek immediate medical care if:    · You are sick to your stomach or cannot drink fluids.     · You cannot pass stools or gas.     · You have pain that does not get better after you take your pain medicine.     · You have loose stitches, or your incision comes open.     · Bright red blood has soaked through the bandage over your incision.     · You have signs of a blood clot in your leg (called a deep vein thrombosis), such as:  ? Pain in your calf, back of the knee, thigh, or groin. ? Redness or swelling in your leg.     · You have signs of infection, such as:  ? Increased pain, swelling, warmth, or redness. ? Red streaks leading from the incision. ? Pus draining from the incision. ? A fever.    Watch closely for changes in your health, and be sure to contact your doctor if:    · You have any problems.     · You have new or worse swelling or pain in your arm. Where can you learn more? Go to http://carolina-jose.info/. Enter D222 in the search box to learn more about \"Lumpectomy: What to Expect at Home. \"  Current as of: December 19, 2018  Content Version: 12.1  © 3996-7544 [employer]. Care instructions adapted under license by [employer] (which disclaims liability or warranty for this information). If you have questions about a medical condition or this instruction, always ask your healthcare professional. [address] any warranty or liability for your use of this information.

## 2019-08-23 NOTE — H&P (VIEW-ONLY)
HISTORY OF PRESENT ILLNESS  Yunior Link is a 60 y.o. female. HPI ESTABLISHED patient here to discuss surgery for LEFT breast cancer. She completed neoadjuvant chemotherapy and is scheduled for LEFT breast lumpectomy 9/4/19. She feels a lump near SLNB site which is concerning to her. Breast cancer-  1/3/19 - port insertion and LEFT SLNB. 59 yo with T1c N1mi M0 (Stage I) infiltrating ductal carcinoma, Tumor size 1.6 cm, LN 1/3 with micromet, grade 3, ER -ve, MD -ve, Her 2 -ve  7/2019 - completed neoadjuvant chemotherapy - Dr. Trish Montenegro   9/5/19 - pending surgery LEFT breast lumpectomy     No family history of breast or ovarian cancer.     Last breast imaging-  11/2018 - mammogram  7/1/19 - breast MRI  MRI Results (most recent):  Results from [place] encounter on 07/01/19   MRI BREAST BI W WO CONT    Narrative HISTORY: 58-year-old female with left breast cancer, status post neoadjuvant  chemotherapy, presenting for follow-up breast MRI. She has also had a sentinel  lymph node biopsy, with 1 lymph node demonstrating micrometastatic carcinoma. COMPARISON: MRI January 2019, mammography and ultrasound from November and  December, 2018    TECHNIQUE:  Bilateral breast MRI was performed using a dedicated breast coil without  compression with the patient in the prone position. Precontrast T1-weighted  images with fat suppression were obtained followed by bolus injection of 9 mL  Gadavist. Postcontrast dynamic and high-resolution images were acquired. T2-weighted axial imaging with fat suppression was also performed. The images  were analyzed using CAD analysis, enhancement curves, digital subtraction, and 2  and 3 dimensional reconstructions.     FINDINGS:    Background parenchymal enhancement: Mild     Mammographic breast density: Heterogeneously dense breast parenchyma    Right breast:  There is no suspicious mass or nonmass enhancement within the right breast.  There is no skin thickening or nipple retraction. There is no suspicious axillary or internal mammary chain lymphadenopathy. Left breast:  Again demonstrated is a malignant mass in the posterior third of the left breast  at 1:00 with an associated biopsy clip. There has been a reduction in size,  status post chemotherapy. The mass now measures 2.4 x 1.6 cm in greatest axial  dimension, in comparison to 2.9 x 2.3 cm. There is some persistent malignant  enhancement within the mass, but this too has decreased since chemotherapy. No  new or progressive disease is identified. There is no evidence of left axillary  lymphadenopathy. The previously biopsied left axillary lymph node has been  removed since the prior study. A small axillary lymph node is noted inferior to  the biopsy-proven malignancy by approximately 1.5 cm, and this measures 8 mm and  is without significant change. Impression IMPRESSION:    Right Breast:  1. BI-RADS Assessment Category 1: Negative. No evidence of breast carcinoma  within the right breast.    Left Breast:  1. BI-RADS Assessment Category 6: Known biopsy proven malignancy- Appropriate  action should be taken. The biopsy-proven malignancy in the posterior left  breast at 1:00 has decreased in size and degree of internal enhancement, status  post neoadjuvant chemotherapy, now 2.4 x 1.6 cm, previously 2.9 x 2.3 cm. This  is consistent with a response to chemotherapy. 2. Interval resection of left axillary lymph nodes. No axillary lymphadenopathy  on the current study. There is a single small axillary versus axillary tail  lymph node located 1.5 cm inferior to the biopsy-proven malignancy, and this is  unchanged since the prior study. 3. No new or progressive disease identified. RECOMMENDATIONS:  Appropriate action is recommended. There is evidence of a response to  chemotherapy in the left breast upper outer quadrant. There is no new or  progressive disease. There is no contralateral malignancy.     A summary portfolio has been created in PACS. Review of Systems   All other systems reviewed and are negative. Physical Exam   Pulmonary/Chest:       Left breast mass adjacent to sln biopsy sight     Nursing note and vitals reviewed. ASSESSMENT and PLAN    ICD-10-CM ICD-9-CM    1. Breast cancer metastasized to axillary lymph node, left (HCC) C50.912 174.9     C77.3 196.3      - mass slightly smaller on mri.    Will do lumpectomy, poss port removal  Had sln biopsy with 1/3 + no pierce  No alnd needed since getting xrt

## 2019-08-27 ENCOUNTER — DOCUMENTATION ONLY (OUTPATIENT)
Dept: SURGERY | Age: 59
End: 2019-08-27

## 2019-08-27 DIAGNOSIS — C50.412 MALIGNANT NEOPLASM OF UPPER-OUTER QUADRANT OF LEFT FEMALE BREAST, UNSPECIFIED ESTROGEN RECEPTOR STATUS (HCC): Primary | ICD-10-CM

## 2019-08-27 NOTE — PROGRESS NOTES
I spoke with the patient and reviewed her surgery in early September, I also gave her the post op appointment with date and time. She appreciated the call. ,

## 2019-08-30 NOTE — PERIOP NOTES
[facility]  Ambulatory Surgery Unit  Pre-operative Instructions    Surgery/Procedure Date  Thursday, September 5, 2019            Tentative Arrival Time 9645      1. On the day of your surgery/procedure, please report to the Ambulatory Surgery Unit Registration Desk and sign in at your designated time. The Ambulatory Surgery Unit is located in [facility] on the [facility] side of the [facility] across from the [address] [place] [address]. Please have all of your health insurance cards and a photo ID. 2. You must have someone with you to drive you home, as you should not drive a car for 24 hours following anesthesia. Please make arrangements for a responsible adult friend or family member to stay with you for at least the first 24 hours after your surgery. 3. Do not have anything to eat or drink (including water, gum, mints, coffee, juice) after 11:59 PM, Wednesday. This may not apply to medications prescribed by your physician. (Please note below the special instructions with medications to take the morning of surgery, if applicable.)    4. We recommend you do not drink any alcoholic beverages for 24 hours before and after your surgery. 5. Contact your surgeons office for instructions on the following medications: non-steroidal anti-inflammatory drugs (i.e. Advil, Aleve), vitamins, and supplements. (Some surgeons will want you to stop these medications prior to surgery and others may allow you to take them)   **If you are currently taking Plavix, Coumadin, Aspirin and/or other blood-thinning agents, contact your surgeon for instructions. ** Your surgeon will partner with the physician prescribing these medications to determine if it is safe to stop or if you need to continue taking. Please do not stop taking these medications without instructions from your surgeon.     6. In an effort to help prevent surgical site infection, we ask that you shower with an anti-bacterial soap (i.e. Dial/Safeguard, or the soap provided to you at your preadmission testing appointment) for 3 days prior to and on the morning of surgery, using a fresh towel after each shower. (Please begin this process with fresh bed linens.) Do not apply any lotions, powders, or deodorants after the shower on the day of your procedure. If applicable, please do not shave the operative site for 48 hours prior to surgery. 7. Wear comfortable clothes. Wear glasses instead of contacts. Do not bring any jewelry or money (other than copays or fees as instructed). Do not wear make-up, particularly mascara, the morning of your surgery. Do not wear nail polish, particularly if you are having foot /hand surgery. Wear your hair loose or down, no ponytails, buns, shanelle pins or clips. All body piercings must be removed. 8. You should understand that if you do not follow these instructions your surgery may be cancelled. If your physical condition changes (i.e. fever, cold or flu) please contact your surgeon as soon as possible. 9. It is important that you be on time. If a situation occurs where you may be late, or if you have any questions or problems, please call [phone].    10. Your surgery time may be subject to change. You will receive a phone call the day prior to surgery to confirm your arrival time. 11. Pediatric patients: please bring a change of clothes, diapers, bottle/sippy cup, pacifier, etc.      Special Instructions: Take all medications and inhalers, as prescribed, on the morning of surgery with a sip of water. I understand a pre-operative phone call will be made to verify my surgery time. In the event that I am not available, I give permission for a message to be left on my answering service and/or with another person?       yes    Preop instructions reviewed  Pt verbalized understanding.      ___________________      ___________________      ________________  (Signature of Patient)          (Witness) (Date and Time)

## 2019-09-04 ENCOUNTER — ANESTHESIA EVENT (OUTPATIENT)
Dept: SURGERY | Age: 59
End: 2019-09-04
Payer: COMMERCIAL

## 2019-09-05 ENCOUNTER — ANESTHESIA (OUTPATIENT)
Dept: SURGERY | Age: 59
End: 2019-09-05
Payer: COMMERCIAL

## 2019-09-05 ENCOUNTER — HOSPITAL ENCOUNTER (OUTPATIENT)
Age: 59
Setting detail: OUTPATIENT SURGERY
Discharge: HOME | End: 2019-09-05
Attending: SURGERY | Admitting: SURGERY
Payer: COMMERCIAL

## 2019-09-05 VITALS
BODY MASS INDEX: 36.22 KG/M2 | TEMPERATURE: 97.8 F | DIASTOLIC BLOOD PRESSURE: 91 MMHG | WEIGHT: 196.8 LBS | OXYGEN SATURATION: 100 % | HEIGHT: 62 IN | RESPIRATION RATE: 16 BRPM | SYSTOLIC BLOOD PRESSURE: 162 MMHG | HEART RATE: 83 BPM

## 2019-09-05 DIAGNOSIS — C50.412 MALIGNANT NEOPLASM OF UPPER-OUTER QUADRANT OF LEFT FEMALE BREAST, UNSPECIFIED ESTROGEN RECEPTOR STATUS (HCC): ICD-10-CM

## 2019-09-05 PROCEDURE — 77030034626 HC LIGASURE SM JAW SEAL OPN SURG COVD -E: Performed by: SURGERY

## 2019-09-05 PROCEDURE — 77030031139 HC SUT VCRL2 J&J -A: Performed by: SURGERY

## 2019-09-05 PROCEDURE — 74011250636 HC RX REV CODE- 250/636: Performed by: SURGERY

## 2019-09-05 PROCEDURE — 76060000062 HC AMB SURG ANES 1 TO 1.5 HR: Performed by: SURGERY

## 2019-09-05 PROCEDURE — 77030018836 HC SOL IRR NACL ICUM -A: Performed by: SURGERY

## 2019-09-05 PROCEDURE — 74011250636 HC RX REV CODE- 250/636: Performed by: ANESTHESIOLOGY

## 2019-09-05 PROCEDURE — 77030019908 HC STETH ESOPH SIMS -A: Performed by: ANESTHESIOLOGY

## 2019-09-05 PROCEDURE — 77030020255 HC SOL INJ LR 1000ML BG: Performed by: SURGERY

## 2019-09-05 PROCEDURE — 77030018673: Performed by: SURGERY

## 2019-09-05 PROCEDURE — 77030021352 HC CBL LD SYS DISP COVD -B: Performed by: SURGERY

## 2019-09-05 PROCEDURE — 77030020143 HC AIRWY LARYN INTUB CGAS -A: Performed by: ANESTHESIOLOGY

## 2019-09-05 PROCEDURE — 77030011825 HC SUPP SURG PSTOP S2SG -B: Performed by: SURGERY

## 2019-09-05 PROCEDURE — 77030008467 HC STPLR SKN COVD -B: Performed by: SURGERY

## 2019-09-05 PROCEDURE — 76210000046 HC AMBSU PH II REC FIRST 0.5 HR: Performed by: SURGERY

## 2019-09-05 PROCEDURE — 88307 TISSUE EXAM BY PATHOLOGIST: CPT

## 2019-09-05 PROCEDURE — 77030002933 HC SUT MCRYL J&J -A: Performed by: SURGERY

## 2019-09-05 PROCEDURE — 77030039266 HC ADH SKN EXOFIN S2SG -A: Performed by: SURGERY

## 2019-09-05 PROCEDURE — 77030011267 HC ELECTRD BLD COVD -A: Performed by: SURGERY

## 2019-09-05 PROCEDURE — 77030011640 HC PAD GRND REM COVD -A: Performed by: SURGERY

## 2019-09-05 PROCEDURE — 74011000250 HC RX REV CODE- 250: Performed by: SURGERY

## 2019-09-05 PROCEDURE — 74011250636 HC RX REV CODE- 250/636: Performed by: NURSE ANESTHETIST, CERTIFIED REGISTERED

## 2019-09-05 PROCEDURE — 76210000040 HC AMBSU PH I REC FIRST 0.5 HR: Performed by: SURGERY

## 2019-09-05 PROCEDURE — 76030000001 HC AMB SURG OR TIME 1 TO 1.5: Performed by: SURGERY

## 2019-09-05 RX ORDER — HYDROMORPHONE HYDROCHLORIDE 1 MG/ML
.2-.5 INJECTION, SOLUTION INTRAMUSCULAR; INTRAVENOUS; SUBCUTANEOUS ONCE
Status: DISCONTINUED | OUTPATIENT
Start: 2019-09-05 | End: 2019-09-05 | Stop reason: HOSPADM

## 2019-09-05 RX ORDER — PHENYLEPHRINE HCL IN 0.9% NACL 0.4MG/10ML
SYRINGE (ML) INTRAVENOUS AS NEEDED
Status: DISCONTINUED | OUTPATIENT
Start: 2019-09-05 | End: 2019-09-05 | Stop reason: HOSPADM

## 2019-09-05 RX ORDER — CEFAZOLIN SODIUM/WATER 2 G/20 ML
SYRINGE (ML) INTRAVENOUS
Status: COMPLETED
Start: 2019-09-05 | End: 2019-09-05

## 2019-09-05 RX ORDER — DEXAMETHASONE SODIUM PHOSPHATE 4 MG/ML
INJECTION, SOLUTION INTRA-ARTICULAR; INTRALESIONAL; INTRAMUSCULAR; INTRAVENOUS; SOFT TISSUE AS NEEDED
Status: DISCONTINUED | OUTPATIENT
Start: 2019-09-05 | End: 2019-09-05 | Stop reason: HOSPADM

## 2019-09-05 RX ORDER — LIDOCAINE HYDROCHLORIDE 10 MG/ML
0.1 INJECTION, SOLUTION EPIDURAL; INFILTRATION; INTRACAUDAL; PERINEURAL AS NEEDED
Status: DISCONTINUED | OUTPATIENT
Start: 2019-09-05 | End: 2019-09-05 | Stop reason: HOSPADM

## 2019-09-05 RX ORDER — SODIUM CHLORIDE 0.9 % (FLUSH) 0.9 %
5-40 SYRINGE (ML) INJECTION EVERY 8 HOURS
Status: DISCONTINUED | OUTPATIENT
Start: 2019-09-05 | End: 2019-09-05 | Stop reason: HOSPADM

## 2019-09-05 RX ORDER — OXYCODONE AND ACETAMINOPHEN 5; 325 MG/1; MG/1
1 TABLET ORAL
Qty: 20 TAB | Refills: 0 | Status: SHIPPED | OUTPATIENT
Start: 2019-09-05 | End: 2019-09-10

## 2019-09-05 RX ORDER — SODIUM CHLORIDE 0.9 % (FLUSH) 0.9 %
5-40 SYRINGE (ML) INJECTION AS NEEDED
Status: DISCONTINUED | OUTPATIENT
Start: 2019-09-05 | End: 2019-09-05 | Stop reason: HOSPADM

## 2019-09-05 RX ORDER — FENTANYL CITRATE 50 UG/ML
INJECTION, SOLUTION INTRAMUSCULAR; INTRAVENOUS AS NEEDED
Status: DISCONTINUED | OUTPATIENT
Start: 2019-09-05 | End: 2019-09-05 | Stop reason: HOSPADM

## 2019-09-05 RX ORDER — PROPOFOL 10 MG/ML
INJECTION, EMULSION INTRAVENOUS AS NEEDED
Status: DISCONTINUED | OUTPATIENT
Start: 2019-09-05 | End: 2019-09-05 | Stop reason: HOSPADM

## 2019-09-05 RX ORDER — MORPHINE SULFATE 10 MG/ML
2 INJECTION, SOLUTION INTRAMUSCULAR; INTRAVENOUS
Status: DISCONTINUED | OUTPATIENT
Start: 2019-09-05 | End: 2019-09-05 | Stop reason: HOSPADM

## 2019-09-05 RX ORDER — CEFAZOLIN SODIUM/WATER 2 G/20 ML
2 SYRINGE (ML) INTRAVENOUS
Status: COMPLETED | OUTPATIENT
Start: 2019-09-05 | End: 2019-09-05

## 2019-09-05 RX ORDER — FENTANYL CITRATE 50 UG/ML
25 INJECTION, SOLUTION INTRAMUSCULAR; INTRAVENOUS
Status: DISCONTINUED | OUTPATIENT
Start: 2019-09-05 | End: 2019-09-05 | Stop reason: HOSPADM

## 2019-09-05 RX ORDER — LIDOCAINE HYDROCHLORIDE 20 MG/ML
INJECTION, SOLUTION EPIDURAL; INFILTRATION; INTRACAUDAL; PERINEURAL AS NEEDED
Status: DISCONTINUED | OUTPATIENT
Start: 2019-09-05 | End: 2019-09-05 | Stop reason: HOSPADM

## 2019-09-05 RX ORDER — MIDAZOLAM HYDROCHLORIDE 1 MG/ML
INJECTION, SOLUTION INTRAMUSCULAR; INTRAVENOUS AS NEEDED
Status: DISCONTINUED | OUTPATIENT
Start: 2019-09-05 | End: 2019-09-05 | Stop reason: HOSPADM

## 2019-09-05 RX ORDER — SODIUM CHLORIDE, SODIUM LACTATE, POTASSIUM CHLORIDE, CALCIUM CHLORIDE 600; 310; 30; 20 MG/100ML; MG/100ML; MG/100ML; MG/100ML
25 INJECTION, SOLUTION INTRAVENOUS CONTINUOUS
Status: DISCONTINUED | OUTPATIENT
Start: 2019-09-05 | End: 2019-09-05 | Stop reason: HOSPADM

## 2019-09-05 RX ORDER — DIPHENHYDRAMINE HYDROCHLORIDE 50 MG/ML
12.5 INJECTION, SOLUTION INTRAMUSCULAR; INTRAVENOUS AS NEEDED
Status: DISCONTINUED | OUTPATIENT
Start: 2019-09-05 | End: 2019-09-05 | Stop reason: HOSPADM

## 2019-09-05 RX ORDER — OXYCODONE AND ACETAMINOPHEN 5; 325 MG/1; MG/1
1 TABLET ORAL
Status: DISCONTINUED | OUTPATIENT
Start: 2019-09-05 | End: 2019-09-05 | Stop reason: HOSPADM

## 2019-09-05 RX ORDER — ONDANSETRON 4 MG/1
4 TABLET, ORALLY DISINTEGRATING ORAL
Qty: 5 TAB | Refills: 1 | Status: SHIPPED | OUTPATIENT
Start: 2019-09-05 | End: 2020-06-24

## 2019-09-05 RX ADMIN — Medication 80 MCG: at 08:21

## 2019-09-05 RX ADMIN — Medication 80 MCG: at 08:02

## 2019-09-05 RX ADMIN — Medication 2 G: at 07:30

## 2019-09-05 RX ADMIN — PROPOFOL 150 MG: 10 INJECTION, EMULSION INTRAVENOUS at 07:33

## 2019-09-05 RX ADMIN — SODIUM CHLORIDE, SODIUM LACTATE, POTASSIUM CHLORIDE, AND CALCIUM CHLORIDE 25 ML/HR: 600; 310; 30; 20 INJECTION, SOLUTION INTRAVENOUS at 06:45

## 2019-09-05 RX ADMIN — LIDOCAINE HYDROCHLORIDE 40 MG: 20 INJECTION, SOLUTION EPIDURAL; INFILTRATION; INTRACAUDAL; PERINEURAL at 07:33

## 2019-09-05 RX ADMIN — Medication 80 MCG: at 08:13

## 2019-09-05 RX ADMIN — DEXAMETHASONE SODIUM PHOSPHATE 4 MG: 4 INJECTION, SOLUTION INTRAMUSCULAR; INTRAVENOUS at 07:41

## 2019-09-05 RX ADMIN — FENTANYL CITRATE 50 MCG: 50 INJECTION, SOLUTION INTRAMUSCULAR; INTRAVENOUS at 07:33

## 2019-09-05 RX ADMIN — FENTANYL CITRATE 50 MCG: 50 INJECTION, SOLUTION INTRAMUSCULAR; INTRAVENOUS at 08:04

## 2019-09-05 RX ADMIN — MIDAZOLAM HYDROCHLORIDE 2 MG: 1 INJECTION, SOLUTION INTRAMUSCULAR; INTRAVENOUS at 07:29

## 2019-09-05 NOTE — INTERVAL H&P NOTE
H&P Update:  Isa Boeck was seen and examined. History and physical has been reviewed. The patient has been examined.  There have been no significant clinical changes since the completion of the originally dated History and Physical.

## 2019-09-05 NOTE — ANESTHESIA POSTPROCEDURE EVALUATION
Procedure(s):  LEFT BREAST LUMPECTOMY WITH ULTRASOUND  PORT A CATH REMOVAL.     general    Anesthesia Post Evaluation      Multimodal analgesia: multimodal analgesia used between 6 hours prior to anesthesia start to PACU discharge  Patient location during evaluation: bedside  Patient participation: complete - patient participated  Level of consciousness: awake and alert  Pain score: 0  Airway patency: patent  Anesthetic complications: no  Cardiovascular status: acceptable  Respiratory status: acceptable  Hydration status: acceptable  Comments: Pt's BP running high periop; she states that her BP has been \"all over lately\"  Post anesthesia nausea and vomiting:  none      Vitals Value Taken Time   /87 9/5/2019  8:51 AM   Temp 36.6 °C (97.8 °F) 9/5/2019  8:46 AM   Pulse 89 9/5/2019  8:51 AM   Resp 15 9/5/2019  8:51 AM   SpO2 99 % 9/5/2019  8:51 AM

## 2019-09-05 NOTE — PERIOP NOTES
0836-Pt. To pacu, sleepy but arousable. Vss. Denies pain. Incision site to right chest and left breast w/dermabond intact. Denies nausea. 0855-Pts. Family at bedside. Discharge instructions reviewed w/pt. And family. They verbalized understanding. Prescription given. 0914-Pt. And family stated ready for discharge. Vss. Denies pain and nausea. Incision to right chest and left breast w/dermabond intact. Lungs clear. Pt discharged via wheelchair to car, accompanied by RN. Pt discharged awake and alert, respirations equal and unlabored, skin warm, dry, and intact. Pt and family members' questions and concerns addressed prior to discharge.

## 2019-09-05 NOTE — PERIOP NOTES
Tressa Bump  1960  [id number]    Situation:  Verbal report given from: Rach Rankin CRNA  Procedure: Procedure(s):  LEFT BREAST LUMPECTOMY WITH ULTRASOUND  PORT A CATH REMOVAL    Background:    Preoperative diagnosis: LEFT BREAST CANCER    Postoperative diagnosis: LEFT BREAST CANCER    :  Dr. Kashmir Harris    Assistant(s): Circ-1: Juli Leavitt RN  Scrub Tech-1: Alma Rosa Ohms  Surg Asst-1: Abigail Ibarra    Specimens:   ID Type Source Tests Collected by Time Destination   1 : Left Breast Lumpectomy ( SHort Superior/Long Lateral/ Staples on Anterior Margin) Preservative Breast  Shae Manzo MD 9/5/2019 3424 Pathology   2 : Medial Margin - Stitch on New Margin Preservative Breast  Shae Manzo MD 9/5/2019 9426 Pathology   3 : Deep Margin - Stitch on New Margin Preservative Breast  Shae Manzo MD 9/5/2019 3790 Pathology       Assessment:  Intra-procedure medications         Anesthesia gave intra-procedure sedation and medications, see anesthesia flow sheet     Intravenous fluids: LR@ KVO     Vital signs stable       Recommendation:    Permission to share finding with daughter and sister : yes

## 2019-09-05 NOTE — PERIOP NOTES
Permission received to review discharge instructions and discuss private health information with Golden Coronel (sister) and Miguel A Gonzales (daughter).

## 2019-09-05 NOTE — BRIEF OP NOTE
BRIEF OPERATIVE NOTE    Date of Procedure: 9/5/2019   Preoperative Diagnosis: LEFT BREAST CANCER upper outer quadrant  Postoperative Diagnosis: LEFT BREAST CANCER    Procedure(s):  LEFT BREAST LUMPECTOMY WITH ULTRASOUND  PORT A CATH REMOVAL  intraop margin assessment using rf spectroscopy  Surgeon(s) and Role:     Marvin Damian MD - Primary         Surgical Assistant: shabnam mcmullen     Surgical Staff:  Circ-1: Thad Ayala RN  Scrub Tech-1: [facility]  Surg Asst-1: Josie Holman  Event Time In Time Out   Incision Start 9353    Incision Close       Anesthesia: General   Estimated Blood Loss: 10 ml  Specimens:   ID Type Source Tests Collected by Time Destination   1 : Left Breast Lumpectomy ( SHort Superior/Long Lateral/ Staples on Anterior Margin) Preservative Breast  Armen Ngo MD 9/5/2019 0139 Pathology   2 : Medial Margin - Stitch on New Margin Preservative Breast  Armen Ngo MD 9/5/2019 2748 Pathology   3 : Deep Margin - Stitch on New Margin Preservative Breast  Armen Ngo MD 9/5/2019 9775 Pathology      Findings: left breast mass excised   Complications: none  Implants: * No implants in log *    Dictated stat

## 2019-09-05 NOTE — ANESTHESIA PREPROCEDURE EVALUATION
Anesthetic History   No history of anesthetic complications            Review of Systems / Medical History  Patient summary reviewed, nursing notes reviewed and pertinent labs reviewed    Pulmonary  Within defined limits                 Neuro/Psych   Within defined limits           Cardiovascular  Within defined limits                Exercise tolerance: >4 METS  Comments: 05/19 Post Chemo ECHO= EF 61-65%.  Mild MR   GI/Hepatic/Renal  Within defined limits              Endo/Other        Obesity and cancer (left breast ca, s/p chemo)     Other Findings              Physical Exam    Airway  Mallampati: I  TM Distance: 4 - 6 cm  Neck ROM: normal range of motion   Mouth opening: Normal     Cardiovascular  Regular rate and rhythm,  S1 and S2 normal,  no murmur, click, rub, or gallop  Rhythm: regular  Rate: normal      Pertinent negatives: No murmur   Dental    Dentition: Bridges  Comments: Permanent, upper right   Pulmonary  Breath sounds clear to auscultation               Abdominal  GI exam deferred       Other Findings            Anesthetic Plan    ASA: 2  Anesthesia type: general          Induction: Intravenous  Anesthetic plan and risks discussed with: Patient      LMA OK

## 2019-09-05 NOTE — OP NOTES
[address]  OPERATIVE REPORT    Name:  Wade Gaitan  MR#:  [id number]  :  1960  ACCOUNT #:  [de-identified]  DATE OF SERVICE:  2019      PREOPERATIVE DIAGNOSIS:  Left breast cancer, upper outer quadrant. POSTOPERATIVE DIAGNOSIS:  Left breast cancer, upper outer quadrant. PROCEDURE PERFORMED:  Left ultrasound-guided lumpectomy, right subclavian port removal, intraop margin assessment using RF spectroscopy. SURGEON:  Tomasa Almonte MD    ASSISTANT:  Malachi Jovel    ANESTHESIA:  General.    COMPLICATIONS:  None. SPECIMENS REMOVED:  1. Left breast lumpectomy. 2.  Medial margin. 3.  Deep margin. IMPLANTS:  No implants. ESTIMATED BLOOD LOSS:  10 mL. FINDINGS:  Left breast mass excised. INDICATIONS FOR PROCEDURE:  This is a 50-year-old female who had a left breast cancer of the upper outer quadrant. She had already had a right subclavian port placement with a left axillary sentinel lymph node biopsy, a deep sentinel lymph node biopsy. She had 1/3 nodes positive on this. She had completed neoadjuvant chemotherapy and then was here to have her lumpectomy and port removed. PROCEDURE IN DETAIL:  The patient was seen in the preop holding area, where surgical site was marked by surgeon. Informed consent was obtained. She was taken to the operating room and laid in supine position, where general endotracheal anesthesia was induced. Bilateral chest and breasts were prepped and draped in the usual fashion. A time-out was performed. Attention was turned to the right chest wall and 10 mL of local anesthetic was injected at the port site. A 15-blade was used to make an incision on the port scar. Bovie cautery was used to dissect through the port capsule. Heavy scissors were used to cut the Prolene on either side. The port was removed and pressure was held on the port insertion opening for 5 minutes. Next this tract was oversewn with 3-0 Vicryl.   The incision was closed with interrupted 3-0 Vicryl and 4-0 subcuticular Monocryl. Attention was turned to the left breast and the tail of the left breast in the upper outer quadrant. This mass is just medial to the scar to the incision for the sentinel lymph node biopsy and therefore the incision today was used to incorporate this old scarring to her incision. 10 mL of local was injected into the left breast skin. The lesion was identified and confirmed using ultrasound guidance. A 15-blade was used to make an incision. Bovie cautery was used to dissect down all the way to the pectoralis muscle. Using Bovie cautery, this was excised, marked short stitch superior and long stitch lateral.  Margin probe device was plugged in and calibrated. All six margins were assessed. For additional margins, see above. Of note, this was sitting right on top of the pectoralis muscle and therefore for deep margin, additional pectoralis muscle was excised using a LigaSure device and sent for permanent pathology. The cavity was irrigated. The muscle that was excised was oversewn with 2-0 Vicryl, 30 mL of local anesthetic was injected into the breast tissue. The deep tissue was closed with interrupted 2-0 Vicryl and the skin with interrupted 3-0 Vicryl and 4-0 subcuticular Monocryl. Skin glue was placed on the incision as well as a pressure dressing and a bra. All sponge and instrument counts were correct. The patient went to the recovery in stable condition.         Glendon Hashimoto, MD [id number]  D:  09/05/2019 8:29  T:  09/05/2019 8:37  JOB #:  [id number]

## 2019-09-06 ENCOUNTER — TELEPHONE (OUTPATIENT)
Dept: SURGERY | Age: 59
End: 2019-09-06

## 2019-09-06 NOTE — TELEPHONE ENCOUNTER
I called the patient to check on her POD #1 and she states she is doing well. She has not needed any pain medications and was very appreciative for the call.

## 2019-09-18 ENCOUNTER — OFFICE VISIT (OUTPATIENT)
Dept: SURGERY | Age: 59
End: 2019-09-18

## 2019-09-18 VITALS
BODY MASS INDEX: 36.07 KG/M2 | WEIGHT: 196 LBS | HEIGHT: 62 IN | SYSTOLIC BLOOD PRESSURE: 138 MMHG | HEART RATE: 98 BPM | DIASTOLIC BLOOD PRESSURE: 83 MMHG

## 2019-09-18 DIAGNOSIS — C50.412 MALIGNANT NEOPLASM OF UPPER-OUTER QUADRANT OF LEFT FEMALE BREAST, UNSPECIFIED ESTROGEN RECEPTOR STATUS (HCC): Primary | ICD-10-CM

## 2019-09-18 NOTE — LETTER
NOTIFICATION RETURN TO WORK 
 
9/18/2019 9:40 AM 
 
Ms. Brian Kirk [address] [place] [id number] To Whom It May Concern: 
 
Brian Kirk is currently under the care of [address] [place] [address]. Ms. Dagmar Abbasi may return to work on 9/19/19 with the following restriction: No lifting greater than 10 pounds for 2 weeks (until October 4, 2019). If there are questions or concerns please have the patient contact our office. Sincerely, June Mims MD

## 2019-09-18 NOTE — PATIENT INSTRUCTIONS
Lumpectomy: What to Expect at [address] [place] [address]    For 1 or 2 days after the surgery you will probably feel tired and have some pain. The skin around the cut (incision) may feel firm, swollen, and tender, and be bruised. Tenderness should go away in about 2 or 3 days, and the bruising within 2 weeks. Firmness and swelling may last for 3 to 6 months. You may feel a soft lump in your breast that gradually turns hard. This is the incision healing. It is not cancer. Women should wear a well-fitted and supportive bra, even during the night, for 1 week. You will probably be able to go back to work or your normal routine in 1 to 3 weeks after the surgery. This may depend on whether you have more treatment. Your doctor may have removed some lymph nodes in your armpit to see if the cancer has spread. If so, you may feel either numbness or tingling (\"pins and needles\") in your armpit or on the inside of your upper arm. This should improve over the next several weeks. Some people have numbness for a longer time. When you find out that you have cancer, you may feel many emotions and may need some help coping. Seek out family, friends, and counselors for support. You also can do things at home to make yourself feel better while you go through treatment. Call the [employer] (3-921.171.6483) or visit its website at [address] [employer] for more information. This care sheet gives you a general idea about how long it will take for you to recover. But each person recovers at a different pace. Follow the steps below to get better as quickly as possible. How can you care for yourself at home? Activity    · Rest when you feel tired. Getting enough sleep will help you recover. You may want to sleep on the side that has not been operated on.  A woman may want to use a pillow to support the affected breast while lying on her side.     · Avoid strenuous activities, such as biking, jogging, weightlifting, or aerobic exercise, for 1 month or until your doctor says it is okay. This may include housework, such as washing windows, especially if you have to use the arm next to the affected breast.     · Most people can return to their normal activities within 2 weeks.     · Try to walk each day. Start out by walking a little more than you did the day before. Bit by bit, increase the amount you walk. Walking boosts blood flow and helps prevent pneumonia and constipation.     · For 1 to 2 weeks, avoid lifting anything over 10 to 15 pounds or that would make you strain. This may include heavy grocery bags and milk containers, a heavy briefcase or backpack, cat litter or dog food bags, a vacuum , or a child.     · You may drive when you are no longer taking pain medicine and can use your arm without pain. Talk to your doctor about when to start driving, especially if you are having radiation treatments.     · You will probably be able to go back to work or your normal routine in 1 to 3 weeks. It may be longer, depending on the type of work you do and whether you are having radiation or chemotherapy.     · You may shower 24 to 48 hours after surgery, if your doctor okays it. Pat the incision dry. Do not take a bath for the first 2 weeks, or until your doctor tells you it is okay. Diet    · You can eat your normal diet. If your stomach is upset, try bland, low-fat foods like plain rice, broiled chicken, toast, and yogurt.     · You may notice that your bowel movements are not regular right after your surgery. This is common. Try to avoid constipation and straining with bowel movements. You may want to take a fiber supplement every day. If you have not had a bowel movement after a couple of days, ask your doctor about taking a mild laxative. Medicines    · Your doctor will tell you if and when you can restart your medicines.  He or she will also give you instructions about taking any new medicines.     · If you take blood thinners, such as warfarin (Coumadin), clopidogrel (Plavix), or aspirin, be sure to talk to your doctor. He or she will tell you if and when to start taking those medicines again. Make sure that you understand exactly what your doctor wants you to do.     · Take pain medicines exactly as directed. ? Your doctor may have given you a medicine to numb the area inside and around your cut (incision). The numbness will last from 6 to 12 hours. If you went home right after the surgery, you may want to take pain medicine before this wears off.  ? If the doctor gave you a prescription medicine for pain, take it as prescribed. ? If you are not taking a prescription pain medicine, ask your doctor if you can take an over-the-counter medicine.     · If your doctor prescribed antibiotics, take them as directed. Do not stop taking them just because you feel better. You need to take the full course of antibiotics.     · If you think your pain medicine is making you sick to your stomach:  ? Take your medicine after meals (unless your doctor has told you not to). ? Ask your doctor for a different pain medicine. Incision care    · If you have strips of tape on the cut the doctor made (incision), leave the tape on for a week or until it falls off.     · When you can shower, wash the area daily with warm, soapy water and pat it dry. Follow-up care is a key part of your treatment and safety. Be sure to make and go to all appointments, and call your doctor if you are having problems. It's also a good idea to know your test results and keep a list of the medicines you take. When should you call for help? Call 911 anytime you think you may need emergency care.  For example, call if:    · You passed out (lost consciousness).     · You have chest pain, are short of breath, or cough up blood.    Call your doctor now or seek immediate medical care if:    · You are sick to your stomach or cannot drink fluids.     · You cannot pass stools or gas.     · You have pain that does not get better after you take your pain medicine.     · You have loose stitches, or your incision comes open.     · Bright red blood has soaked through the bandage over your incision.     · You have signs of a blood clot in your leg (called a deep vein thrombosis), such as:  ? Pain in your calf, back of the knee, thigh, or groin. ? Redness or swelling in your leg.     · You have signs of infection, such as:  ? Increased pain, swelling, warmth, or redness. ? Red streaks leading from the incision. ? Pus draining from the incision. ? A fever.    Watch closely for changes in your health, and be sure to contact your doctor if:    · You have any problems.     · You have new or worse swelling or pain in your arm. Where can you learn more? Go to http://carolina-jose.info/. Enter D222 in the search box to learn more about \"Lumpectomy: What to Expect at Home. \"  Current as of: December 19, 2018  Content Version: 12.1  © 0346-1353 [employer]wise[employer]. Care instructions adapted under license by [employer] (which disclaims liability or warranty for this information). If you have questions about a medical condition or this instruction, always ask your healthcare professional. [address] any warranty or liability for your use of this information.

## 2019-09-18 NOTE — PROGRESS NOTES
HISTORY OF PRESENT ILLNESS  Corinda Holstein is a 60 y.o. female. HPI ESTABLISHED patient here for post op visit s/p LEFT breast lumpectomy and port removal. She is doing well. No pain at this time. Has occasional soreness.      Breast cancer-  1/3/19 - port insertion and LEFT SLNB. 61 yo with T1c N1mi M0 (Stage I) infiltrating ductal carcinoma, Tumor size 1.6 cm, LN 1/3 with micromet, grade 3, ER -ve, MI -ve, Her 2 -ve  7/2019 - completed neoadjuvant chemotherapy - Dr. Linda Matter   9/5/19 - LEFT breast lumpectomy and port removal. 22 mm not great response, Margins clear. Pending appt with Dr. Emanuel Lack      No family history of breast or ovarian cancer.     Last breast imaging-  11/2018 - mammogram  7/1/19 - breast MRI    FINAL PATHOLOGIC DIAGNOSIS   1. Left breast, excision:   Invasive Carcinoma of the Breast Cancer Case Summary:   Procedure: Excision. Specimen Laterality: Left. Tumor Size: Size of Largest Invasive Carcinoma: 22 mm. Histologic Type: Poorly differentiated invasive carcinoma. Histologic Grade ([place] Histologic Score):   - Glandular (Acinar)/Tubular Differentiation: Score 3.   - Nuclear Pleomorphism: Score 3.   - Mitotic Rate: Score 3.   - Overall Grade: Grade 3 (Score 9). Tumor Focality: Unifocal.   Ductal Carcinoma in Situ (DCIS): Not identified. Margins:   - Invasive Carcinoma: Margins uninvolved by invasive carcinoma. Distance from closest margin: <1 mm, medial and lateral (additional separately submitted margins uninvolved). Lymph Nodes:   - Total number of lymph nodes examined (sentinel and nonsentinel): 0 (previously removed). Treatment effect in breast: Probable response to presurgical therapy in invasive carcinoma. Lymph-Vascular Invasion: Not identified. Pathologic Staging: ypT2 pNX   Breast biomarkers previously reported: ER negative, MI negative, HER-2/natacha negative.    2. Soft tissue, submitted as \"medial margin\", excision:   Benign fibroadipose tissue, negative for tumor.   3. Soft tissue, submitted as \"deep margin\", excision:   Benign fibromuscular tissue, negative for tumor. Review of Systems   All other systems reviewed and are negative. Physical Exam   Pulmonary/Chest:       Incisions healing well     Nursing note and vitals reviewed.       ASSESSMENT and PLAN    ICD-10-CM ICD-9-CM    1. Malignant neoplasm of upper-outer quadrant of left female breast, unspecified estrogen receptor status (HCC) C50.412 174.4      - refer to rad onc  Healing well  F/u in 4 months

## 2019-09-22 DIAGNOSIS — C50.412 MALIGNANT NEOPLASM OF UPPER-OUTER QUADRANT OF LEFT FEMALE BREAST, UNSPECIFIED ESTROGEN RECEPTOR STATUS (HCC): Primary | ICD-10-CM

## 2019-10-16 ENCOUNTER — OFFICE VISIT (OUTPATIENT)
Dept: ONCOLOGY | Age: 59
End: 2019-10-16

## 2019-10-16 VITALS
BODY MASS INDEX: 36.62 KG/M2 | WEIGHT: 199 LBS | SYSTOLIC BLOOD PRESSURE: 171 MMHG | HEIGHT: 62 IN | RESPIRATION RATE: 16 BRPM | DIASTOLIC BLOOD PRESSURE: 84 MMHG | HEART RATE: 92 BPM | TEMPERATURE: 98.8 F | OXYGEN SATURATION: 98 %

## 2019-10-16 DIAGNOSIS — Z17.0 MALIGNANT NEOPLASM OF UPPER-OUTER QUADRANT OF LEFT BREAST IN FEMALE, ESTROGEN RECEPTOR POSITIVE (HCC): Primary | ICD-10-CM

## 2019-10-16 DIAGNOSIS — C50.412 MALIGNANT NEOPLASM OF UPPER-OUTER QUADRANT OF LEFT BREAST IN FEMALE, ESTROGEN RECEPTOR POSITIVE (HCC): Primary | ICD-10-CM

## 2019-10-16 NOTE — PROGRESS NOTES
[facility]  [address], [address] [facility] [place], [address] [facility]  [phone]      Follow-up Note        Patient: Oleg Carrizales MRN: [id number]  SSN: [id number]    YOB: 1960  Age: 60 y.o. Sex: female        Diagnosis:     1. Left breast carcinoma:  T1c N1mi M0 (Stage I) infiltrating ductal carcinoma, Tumor size 1.6 cm, LN 1/3 with micromet, grade 3, ER -ve, ND -ve, Her 2 -ve    ypT2 N0    Treatment:     1. Neoadjuvant chemotherapy   Adriamycin, cytoxan - s/p 4 cycles   Weekly Taxol - s/p 12 cycles  2. S/p left lumpectomy on 9/5/2019 by Dr. Estela Kuhn    Subjective:      Oleg Carrizales is a 60 y.o. female with a diagnosis of left sided invasive breast cancer. She felt a lump in the left breast, went to see her PCP, got a mammogram and was noted to have abnormal density in the left upper outer quadrant of the breast. A biopsy of the mass revealed gr 3 IDC, TNBC. The axillary LN is clear of disease. She saw Dr. Xavi Shafer. An MRI of the breast shows the tumor to be larger than previously believed to be. A left axillary LN biopsy showed 1/3 nodes with micrometastasis. She works at [employer] full time. Ms. Shantell Waller completed neoadjuvant chemotherapy and underwent lumpectomy in September. She has recovered well from surgery. She has seen Dr. Selvin Sanchez and will start adjuvant radiation in the near future. She feels well with no complaints.       Review of Systems:    Constitutional: negative  Eyes: negative  Ears, Nose, Mouth, Throat, and Face: negative  Respiratory: negative  Cardiovascular: negative  Gastrointestinal: negative  Genitourinary:negative  Integument/Breast: negative  Hematologic/Lymphatic: negative  Musculoskeletal:negative  Neurological: mild numbness/tingling toes        Past Medical History:   Diagnosis Date    Breast cancer ([address]     left side    Menopause      Past Surgical History:   Procedure Laterality Date  HX BREAST BIOPSY Left     x2    HX BREAST LUMPECTOMY Left 2019    LEFT BREAST LUMPECTOMY WITH ULTRASOUND performed by Ramón Escalante MD at [facility]    HX HYSTERECTOMY      partial, ovaries remain    VASCULAR SURGERY PROCEDURE UNLIST      [facility]      No family history on file. Social History     Tobacco Use    Smoking status: Former Smoker     Packs/day: 0.10     Last attempt to quit: 3/6/2013     Years since quittin.6    Smokeless tobacco: Never Used    Tobacco comment: Quit smoking 23 days ago   Substance Use Topics    Alcohol use: No      Prior to Admission medications    Medication Sig Start Date End Date Taking? Authorizing Provider   ondansetron (ZOFRAN ODT) 4 mg disintegrating tablet Take 1 Tab by mouth every eight (8) hours as needed for Nausea. 19   Ramón Escalante MD   loratadine (CLARITIN) 10 mg tablet Take 10 mg by mouth daily as needed for Allergies. Provider, Historical          Allergies   Allergen Reactions    Codeine Rash     Rash from tylenol #3         Objective:     Visit Vitals  /84 (BP 1 Location: Left arm, BP Patient Position: Sitting)   Pulse 92   Temp 98.8 °F (37.1 °C) (Oral)   Resp 16   Ht 5' 2\" (1.575 m)   Wt 199 lb (90.3 kg)   SpO2 98%   BMI 36.40 kg/m²       Pain Scale: 0 - No pain/10  Pain Location:       Physical Exam:    GENERAL: alert, cooperative, no distress, appears stated age  EYE: conjunctivae/corneas clear. PERRL, EOM's intact  LYMPHATIC: Cervical, supraclavicular, and axillary nodes normal.   THROAT & NECK: normal and no erythema or exudates noted. LUNG: clear to auscultation bilaterally  HEART: regular rate and rhythm, S1, S2 normal, no murmur, click, rub or gallop  ABDOMEN: soft, non-tender. Bowel sounds normal. No masses,  no organomegaly  EXTREMITIES:  extremities normal, atraumatic, no cyanosis or edema  SKIN: Normal.  NEUROLOGIC: AOx3. Gait normal. Reflexes and motor strength normal and symmetric        Assessment:     1. Left breast carcinoma:  T1c N1mi M0 (Stage I) infiltrating ductal carcinoma, Tumor size 1.6 cm, LN 1/3 with micromet, grade 3, ER -ve, NM -ve, Her 2 -ve    ECOG PS 0  Intent of Treatment - curative  Prognosis - good        Echocardiogram (5/14/2019): Normal, LVEF 61-65%    Completed neoadjuvant chemotherapy   Adriamycin, Cyclophosphamide - s/p 4 cycles   Weekly Taxol - s/p 12 cycles    S/p left lumpectomy on 9/5/2019 with Dr. Luz Elena Jaffe  ypT2 N0    Since she had residual disease, she may qualify for clinical trial   OG : A Randomized, Phase III Trial to Evaluate the Efficacy and Safety of [id number]-3475 (Pembrolizumab) as Adjuvant Therapy for Triple Receptor-Negative Breast Cancer with >/= 1 CM Residual Invasive Cancer or Positive Lymph Nodes (ypN+) after Neoadjuvant Chemotherapy    Discussed clinical trial with patient and she is amenable. Will have research screen patient for enrollment. She has an appointment with Dr. Candi Currie for adjuvant radiation today. Symptom management form reviewed with patient. Plan:       > Screen for enrollment in clinical trial  > Follow-up in 4 weeks      Signed by: Gene Delong MD                     October 16, 2019        CC. Vazquez Prakash MD  CC. Jay Puckett MD  CC.  Delores Earl MD

## 2019-10-16 NOTE — PROGRESS NOTES
Identified pt with two pt identifiers(name and ). Reviewed record in preparation for visit and have obtained necessary documentation. Chief Complaint   Patient presents with    Breast Cancer     3 month f/u        Health Maintenance Due   Topic    Hepatitis C Screening     Pneumococcal 0-64 years (1 of 3 - PCV13)    DTaP/Tdap/Td series (1 - Tdap)    PAP AKA CERVICAL CYTOLOGY     Shingrix Vaccine Age 50> (1 of 2)    FOBT Q 1 YEAR AGE 54-65     Influenza Age 5 to Adult         Visit Vitals  /84 (BP 1 Location: Left arm, BP Patient Position: Sitting)   Pulse 92   Temp 98.8 °F (37.1 °C) (Oral)   Resp 16   Ht 5' 2\" (1.575 m)   Wt 199 lb (90.3 kg)   SpO2 98%   BMI 36.40 kg/m²     Pain Scale: 0 - No pain/10    Coordination of Care Questionnaire:  :   1. Have you been to the ER, urgent care clinic since your last visit? Hospitalized since your last visit? No    2. Have you seen or consulted any other health care providers outside of the [address] [place] [address] since your last visit? Include any pap smears or colon screening.  No

## 2019-10-23 ENCOUNTER — RESEARCH ENCOUNTER (OUTPATIENT)
Dept: ONCOLOGY | Age: 59
End: 2019-10-23

## 2019-10-23 NOTE — PROGRESS NOTES
Met with patient and her daughter regarding interest in clinical trial . Answered all immediate questions. Patient states she wants to think more about it and will let us know at her next visit.

## 2019-11-11 ENCOUNTER — TELEPHONE (OUTPATIENT)
Dept: ONCOLOGY | Age: 59
End: 2019-11-11

## 2019-11-11 NOTE — TELEPHONE ENCOUNTER
Unfortunately Tamika President has to leave early this day I do not want to double book at [phone] due to he has tumor board. She will need to re-schedule for another day.

## 2019-11-11 NOTE — TELEPHONE ENCOUNTER
Pt called to state the date of her appt she has to go to radiation at 8:30 am and also meet with the doctor after, pt is asking can her appt time here be moved to later on sometime during the day 11/13/2019 pt best contact [phone]

## 2019-11-27 ENCOUNTER — RESEARCH ENCOUNTER (OUTPATIENT)
Dept: ONCOLOGY | Age: 59
End: 2019-11-27

## 2019-11-27 ENCOUNTER — OFFICE VISIT (OUTPATIENT)
Dept: ONCOLOGY | Age: 59
End: 2019-11-27

## 2019-11-27 VITALS
DIASTOLIC BLOOD PRESSURE: 89 MMHG | RESPIRATION RATE: 14 BRPM | HEIGHT: 62 IN | WEIGHT: 196.4 LBS | OXYGEN SATURATION: 97 % | SYSTOLIC BLOOD PRESSURE: 149 MMHG | HEART RATE: 97 BPM | TEMPERATURE: 98.6 F | BODY MASS INDEX: 36.14 KG/M2

## 2019-11-27 DIAGNOSIS — C50.412 MALIGNANT NEOPLASM OF UPPER-OUTER QUADRANT OF LEFT BREAST IN FEMALE, ESTROGEN RECEPTOR NEGATIVE (HCC): Primary | ICD-10-CM

## 2019-11-27 DIAGNOSIS — Z17.1 MALIGNANT NEOPLASM OF UPPER-OUTER QUADRANT OF LEFT BREAST IN FEMALE, ESTROGEN RECEPTOR NEGATIVE (HCC): Primary | ICD-10-CM

## 2019-11-27 NOTE — PROGRESS NOTES
.  Chief Complaint   Patient presents with    Breast Problem     left breast cancer f/u   pt has radiation this month  f/u     1. Have you been to the ER, urgent care clinic since your last visit? Hospitalized since your last visit? No    2. Have you seen or consulted any other health care providers outside of the [address] [place] [address] since your last visit? Include any pap smears or colon screening.  No     Health Maintenance Due   Topic Date Due    Hepatitis C Screening  1960    Pneumococcal 0-64 years (1 of 3 - PCV13) 09/21/1965    DTaP/Tdap/Td series (1 - Tdap) 09/21/1970    PAP AKA CERVICAL CYTOLOGY  09/21/1980    Shingrix Vaccine Age 50> (1 of 2) 09/21/2009    FOBT Q 1 YEAR AGE 50-75  09/21/2009    Influenza Age 9 to Adult  08/01/2019

## 2019-11-28 NOTE — PROGRESS NOTES
[facility]  [address], 97 [facility] [place] Diaz, [address] [facility]  [phone]      Follow-up Note        Patient: Robel Peterson MRN: [id number]  SSN: [id number]    YOB: 1960  Age: 60 y.o. Sex: female        Diagnosis:     1. Left breast carcinoma:  T1c N1mi M0 (Stage I) infiltrating ductal carcinoma, Tumor size 1.6 cm, LN 1/3 with micromet, grade 3, ER -ve, [place] -ve, Her 2 -ve    ypT2 N0    Treatment:     1. Neoadjuvant chemotherapy   Adriamycin, cytoxan - s/p 4 cycles   Weekly Taxol - s/p 12 cycles  2. S/p left lumpectomy on 9/5/2019 by Dr. Primitivo Gonzalez    Subjective:      Robel Peterson is a 60 y.o. female with a diagnosis of left sided invasive breast cancer. She felt a lump in the left breast, went to see her PCP, got a mammogram and was noted to have abnormal density in the left upper outer quadrant of the breast. A biopsy of the mass revealed gr 3 IDC, TNBC. The axillary LN is clear of disease. She saw Dr. Noé Wiley. An MRI of the breast shows the tumor to be larger than previously believed to be. A left axillary LN biopsy showed 1/3 nodes with micrometastasis. She works at [employer] full time. Ms. Dayton Nielson completed neoadjuvant chemotherapy and underwent lumpectomy in September. She has recovered well from surgery. She feels well with no complaints.  She returns to enroll in the clinical trial.       Review of Systems:    Constitutional: negative  Eyes: negative  Ears, Nose, Mouth, Throat, and Face: negative  Respiratory: negative  Cardiovascular: negative  Gastrointestinal: negative  Genitourinary:negative  Integument/Breast: negative  Hematologic/Lymphatic: negative  Musculoskeletal:negative  Neurological: mild numbness/tingling toes        Past Medical History:   Diagnosis Date    Breast cancer ([address]     left side    Menopause      Past Surgical History:   Procedure Laterality Date    HX BREAST BIOPSY Left     x2  HX BREAST LUMPECTOMY Left 2019    LEFT BREAST LUMPECTOMY WITH ULTRASOUND performed by Hasmukh Almanza MD at [facility]    HX HYSTERECTOMY      partial, ovaries remain    VASCULAR SURGERY PROCEDURE UNLIST      Located within [facility]      Family History   Problem Relation Age of Onset    Cancer Father     Cancer Maternal Aunt     Cancer Maternal Uncle     Cancer Paternal Aunt     Cancer Paternal Uncle      Social History     Tobacco Use    Smoking status: Former Smoker     Packs/day: 0.10     Last attempt to quit: 3/6/2013     Years since quittin.7    Smokeless tobacco: Never Used    Tobacco comment: Quit smoking 23 days ago   Substance Use Topics    Alcohol use: No      Prior to Admission medications    Medication Sig Start Date End Date Taking? Authorizing Provider   ondansetron (ZOFRAN ODT) 4 mg disintegrating tablet Take 1 Tab by mouth every eight (8) hours as needed for Nausea. 19   Hasmukh Almanza MD   loratadine (CLARITIN) 10 mg tablet Take 10 mg by mouth daily as needed for Allergies. Provider, Historical          Allergies   Allergen Reactions    Codeine Rash     Rash from tylenol #3         Objective:     Visit Vitals  /89   Pulse 97   Temp 98.6 °F (37 °C) (Oral)   Resp 14   Ht 5' 2\" (1.575 m)   Wt 196 lb 6.4 oz (89.1 kg)   SpO2 97%   BMI 35.92 kg/m²       Pain Scale: 0 - No pain/10  Pain Location:       Physical Exam:    GENERAL: alert, cooperative, no distress, appears stated age  EYE: conjunctivae/corneas clear. PERRL, EOM's intact  LYMPHATIC: Cervical, supraclavicular, and axillary nodes normal.   THROAT & NECK: normal and no erythema or exudates noted. LUNG: clear to auscultation bilaterally  HEART: regular rate and rhythm, S1, S2 normal, no murmur, click, rub or gallop  ABDOMEN: soft, non-tender. Bowel sounds normal. No masses,  no organomegaly  EXTREMITIES:  extremities normal, atraumatic, no cyanosis or edema  SKIN: Normal.  NEUROLOGIC: AOx3.  Gait normal. Reflexes and motor strength normal and symmetric        Assessment:     1. Left breast carcinoma:  T1c N1mi M0 (Stage I) infiltrating ductal carcinoma, Tumor size 1.6 cm, LN 1/3 with micromet, grade 3, ER -ve, HI -ve, Her 2 -ve    ECOG PS 0  Intent of Treatment - curative  Prognosis - good        Echocardiogram (5/14/2019): Normal, LVEF 61-65%    Completed neoadjuvant chemotherapy   Adriamycin, Cyclophosphamide - s/p 4 cycles   Weekly Taxol - s/p 12 cycles    S/p left lumpectomy on 9/5/2019 with Dr. Monie Pérez  ypT2 N0    Since she had residual disease, she qualifies for a clinical trial   OG : A Randomized, Phase III Trial to Evaluate the Efficacy and Safety of MK-3475 (Pembrolizumab) as Adjuvant Therapy for Triple Receptor-Negative Breast Cancer with >/= 1 CM Residual Invasive Cancer or Positive Lymph Nodes (ypN+) after Neoadjuvant Chemotherapy    Discussed clinical trial with patient and she is amenable. She signed consent and will proceed to randomization. She has an appointment with Dr. Nabeel Garcia for adjuvant radiation today. Symptom management form reviewed with patient. Plan:       > Enrollment in clinical trial  > Follow-up at the time of starting treatment. Signed by: Juliana Hudson MD                     November 27, 2019        CC. Kwaku Cheung MD  CC. Candy Aguilera MD  CC.  Angel Ratliff MD

## 2019-12-18 ENCOUNTER — RESEARCH ENCOUNTER (OUTPATIENT)
Dept: ONCOLOGY | Age: 59
End: 2019-12-18

## 2019-12-18 ENCOUNTER — HOSPITAL ENCOUNTER (OUTPATIENT)
Dept: INFUSION THERAPY | Age: 59
Discharge: HOME | End: 2019-12-18
Payer: COMMERCIAL

## 2019-12-18 LAB
ALBUMIN SERPL-MCNC: 3.6 G/DL (ref 3.5–5)
ALBUMIN/GLOB SERPL: 1.1 {RATIO} (ref 1.1–2.2)
ALP SERPL-CCNC: 109 U/L (ref 45–117)
ALT SERPL-CCNC: 24 U/L (ref 12–78)
ANION GAP SERPL CALC-SCNC: 4 MMOL/L (ref 5–15)
AST SERPL-CCNC: 14 U/L (ref 15–37)
BASOPHILS # BLD: 0 K/UL (ref 0–0.1)
BASOPHILS NFR BLD: 0 % (ref 0–1)
BILIRUB SERPL-MCNC: 0.4 MG/DL (ref 0.2–1)
BUN SERPL-MCNC: 12 MG/DL (ref 6–20)
BUN/CREAT SERPL: 15 (ref 12–20)
CALCIUM SERPL-MCNC: 9 MG/DL (ref 8.5–10.1)
CHLORIDE SERPL-SCNC: 108 MMOL/L (ref 97–108)
CO2 SERPL-SCNC: 28 MMOL/L (ref 21–32)
CREAT SERPL-MCNC: 0.82 MG/DL (ref 0.55–1.02)
DIFFERENTIAL METHOD BLD: ABNORMAL
EOSINOPHIL # BLD: 0.1 K/UL (ref 0–0.4)
EOSINOPHIL NFR BLD: 2 % (ref 0–7)
ERYTHROCYTE [DISTWIDTH] IN BLOOD BY AUTOMATED COUNT: 15.5 % (ref 11.5–14.5)
GLOBULIN SER CALC-MCNC: 3.4 G/DL (ref 2–4)
GLUCOSE SERPL-MCNC: 91 MG/DL (ref 65–100)
HCT VFR BLD AUTO: 40.2 % (ref 35–47)
HGB BLD-MCNC: 12.8 G/DL (ref 11.5–16)
IMM GRANULOCYTES # BLD AUTO: 0 K/UL (ref 0–0.04)
IMM GRANULOCYTES NFR BLD AUTO: 0 % (ref 0–0.5)
LYMPHOCYTES # BLD: 1 K/UL (ref 0.8–3.5)
LYMPHOCYTES NFR BLD: 38 % (ref 12–49)
MCH RBC QN AUTO: 27.9 PG (ref 26–34)
MCHC RBC AUTO-ENTMCNC: 31.8 G/DL (ref 30–36.5)
MCV RBC AUTO: 87.8 FL (ref 80–99)
MONOCYTES # BLD: 0.3 K/UL (ref 0–1)
MONOCYTES NFR BLD: 10 % (ref 5–13)
NEUTS SEG # BLD: 1.3 K/UL (ref 1.8–8)
NEUTS SEG NFR BLD: 50 % (ref 32–75)
NRBC # BLD: 0 K/UL (ref 0–0.01)
NRBC BLD-RTO: 0 PER 100 WBC
PLATELET # BLD AUTO: 279 K/UL (ref 150–400)
PMV BLD AUTO: 10.1 FL (ref 8.9–12.9)
POTASSIUM SERPL-SCNC: 3.6 MMOL/L (ref 3.5–5.1)
PROT SERPL-MCNC: 7 G/DL (ref 6.4–8.2)
RBC # BLD AUTO: 4.58 M/UL (ref 3.8–5.2)
SODIUM SERPL-SCNC: 140 MMOL/L (ref 136–145)
T4 FREE SERPL-MCNC: 1 NG/DL (ref 0.8–1.5)
TSH SERPL DL<=0.05 MIU/L-ACNC: 1.26 UIU/ML (ref 0.36–3.74)
WBC # BLD AUTO: 2.7 K/UL (ref 3.6–11)

## 2019-12-18 PROCEDURE — 84439 ASSAY OF FREE THYROXINE: CPT

## 2019-12-18 PROCEDURE — 84443 ASSAY THYROID STIM HORMONE: CPT

## 2019-12-18 PROCEDURE — 36415 COLL VENOUS BLD VENIPUNCTURE: CPT

## 2019-12-18 PROCEDURE — 80053 COMPREHEN METABOLIC PANEL: CPT

## 2019-12-18 PROCEDURE — 85025 COMPLETE CBC W/AUTO DIFF WBC: CPT

## 2019-12-18 NOTE — PROGRESS NOTES
[facility] VISIT NOTE    6768  Pt arrived at [facility] ambulatory and in no distress for labs for research. Labs drawn peripherally from right [facility] after 5 attempts by 2 nurses. Results will be in [facility]. 1510  D/C'd from [facility] ambulatory and in no distress.

## 2019-12-18 NOTE — PROGRESS NOTES
Pt in for screening labs and baseline questionnaires. Flako Limon blood obtained and shipped. Questionnaires completed per protocol.

## 2019-12-26 ENCOUNTER — RESEARCH ENCOUNTER (OUTPATIENT)
Dept: ONCOLOGY | Age: 59
End: 2019-12-26

## 2019-12-26 ENCOUNTER — HOSPITAL ENCOUNTER (OUTPATIENT)
Dept: INFUSION THERAPY | Age: 59
Discharge: HOME | End: 2019-12-26
Payer: COMMERCIAL

## 2019-12-26 VITALS
RESPIRATION RATE: 16 BRPM | TEMPERATURE: 98.3 F | SYSTOLIC BLOOD PRESSURE: 158 MMHG | HEART RATE: 101 BPM | DIASTOLIC BLOOD PRESSURE: 98 MMHG

## 2019-12-26 LAB
BASOPHILS # BLD: 0 K/UL (ref 0–0.1)
BASOPHILS NFR BLD: 0 % (ref 0–1)
DIFFERENTIAL METHOD BLD: ABNORMAL
EOSINOPHIL # BLD: 0.1 K/UL (ref 0–0.4)
EOSINOPHIL NFR BLD: 1 % (ref 0–7)
ERYTHROCYTE [DISTWIDTH] IN BLOOD BY AUTOMATED COUNT: 15 % (ref 11.5–14.5)
HCT VFR BLD AUTO: 37.2 % (ref 35–47)
HGB BLD-MCNC: 11.6 G/DL (ref 11.5–16)
IMM GRANULOCYTES # BLD AUTO: 0 K/UL (ref 0–0.04)
IMM GRANULOCYTES NFR BLD AUTO: 0 % (ref 0–0.5)
LYMPHOCYTES # BLD: 0.9 K/UL (ref 0.8–3.5)
LYMPHOCYTES NFR BLD: 26 % (ref 12–49)
MCH RBC QN AUTO: 27.6 PG (ref 26–34)
MCHC RBC AUTO-ENTMCNC: 31.2 G/DL (ref 30–36.5)
MCV RBC AUTO: 88.6 FL (ref 80–99)
MONOCYTES # BLD: 0.3 K/UL (ref 0–1)
MONOCYTES NFR BLD: 9 % (ref 5–13)
NEUTS SEG # BLD: 2.3 K/UL (ref 1.8–8)
NEUTS SEG NFR BLD: 64 % (ref 32–75)
NRBC # BLD: 0 K/UL (ref 0–0.01)
NRBC BLD-RTO: 0 PER 100 WBC
PLATELET # BLD AUTO: 258 K/UL (ref 150–400)
PMV BLD AUTO: 9.8 FL (ref 8.9–12.9)
RBC # BLD AUTO: 4.2 M/UL (ref 3.8–5.2)
WBC # BLD AUTO: 3.6 K/UL (ref 3.6–11)

## 2019-12-26 PROCEDURE — 85025 COMPLETE CBC W/AUTO DIFF WBC: CPT

## 2019-12-26 PROCEDURE — 36415 COLL VENOUS BLD VENIPUNCTURE: CPT

## 2019-12-26 NOTE — PROGRESS NOTES
[facility]    4182  Pt arrived at [facility] ambulatory and in no distress for Labs (CBC w/ diff). Assessment completed, pt had no complaints. Labs drawn from [facility] w/o difficulty. Pt stated she hydrated since last visit. Patient Vitals for the past 12 hrs:   Temp Pulse Resp BP   12/26/19 1542 98.3 °F (36.8 °C) (!) 101 16 (!) 158/98     1550  D/C'd from [facility] ambulatory and in no distress.      Future Appointments   Date Time Provider Jyotsna Burns   12/26/2019  4:00 PM YARELY FT CHAIR 1 [facility] REG   1/22/2020  9:30 AM Mini Navarrete MD [address]

## 2019-12-27 NOTE — PROGRESS NOTES
Patient eligible to proceed with step 2- randomization for clinical trial . Pt randomized in OPEN and received the observation arm. Notified patient and Dr. Angel Alcala of treatment assignment, will begin observation arm on 1/8/2020.

## 2020-01-08 ENCOUNTER — HOSPITAL ENCOUNTER (OUTPATIENT)
Dept: INFUSION THERAPY | Age: 60
Discharge: HOME | End: 2020-01-08

## 2020-01-08 ENCOUNTER — OFFICE VISIT (OUTPATIENT)
Dept: ONCOLOGY | Age: 60
End: 2020-01-08

## 2020-01-08 ENCOUNTER — RESEARCH ENCOUNTER (OUTPATIENT)
Dept: ONCOLOGY | Age: 60
End: 2020-01-08

## 2020-01-08 VITALS
RESPIRATION RATE: 16 BRPM | TEMPERATURE: 98 F | HEART RATE: 77 BPM | OXYGEN SATURATION: 99 % | WEIGHT: 199.2 LBS | SYSTOLIC BLOOD PRESSURE: 162 MMHG | DIASTOLIC BLOOD PRESSURE: 96 MMHG | BODY MASS INDEX: 36.43 KG/M2

## 2020-01-08 VITALS
HEIGHT: 62 IN | RESPIRATION RATE: 16 BRPM | WEIGHT: 199.4 LBS | OXYGEN SATURATION: 97 % | HEART RATE: 83 BPM | BODY MASS INDEX: 36.7 KG/M2 | DIASTOLIC BLOOD PRESSURE: 89 MMHG | TEMPERATURE: 98.2 F | SYSTOLIC BLOOD PRESSURE: 157 MMHG

## 2020-01-08 DIAGNOSIS — Z17.1 MALIGNANT NEOPLASM OF UPPER-OUTER QUADRANT OF LEFT BREAST IN FEMALE, ESTROGEN RECEPTOR NEGATIVE (HCC): Primary | ICD-10-CM

## 2020-01-08 DIAGNOSIS — C50.412 MALIGNANT NEOPLASM OF UPPER-OUTER QUADRANT OF LEFT BREAST IN FEMALE, ESTROGEN RECEPTOR NEGATIVE (HCC): Primary | ICD-10-CM

## 2020-01-08 NOTE — PROGRESS NOTES
Pt in for follow up visit today per protocol with Dr. Corine Lopez and RN. Today is week 1 of observation. Patient does not report any adverse events at this time. Patient to go to infusion center to obtain study specific labs, will return for week 13 4/1/20.

## 2020-01-08 NOTE — PROGRESS NOTES
[facility] Lab Draw Note:  Arrived - 0905    Visit Vitals  BP (!) 162/96 (BP 1 Location: Left arm, BP Patient Position: At rest)   Pulse 77   Temp 98 °F (36.7 °C)   Resp 16   Wt 90.4 kg (199 lb 3.2 oz)   SpO2 99%   BMI 36.43 kg/m²       Labs drawn peripherally from [facility] hand - pt difficult draw, nurse assist  Pt left at [address] well. Pt denies any acute problems/changes. Discharged from [facility] ambulatory. No distress. See connect care for pending lab results.

## 2020-01-08 NOTE — PROGRESS NOTES
[facility]  [address], [address] [facility] Juan David Diaz, [address] [facility]  [phone]      Follow-up Note        Patient: Vincenzo Bhagat MRN: [id number]  SSN: [id number]    YOB: 1960  Age: 60 y.o. Sex: female        Diagnosis:     1. Left breast carcinoma:  T1c N1mi M0 (Stage I) infiltrating ductal carcinoma, Tumor size 1.6 cm, LN 1/3 with micromet, grade 3, ER -ve, HI -ve, Her 2 -ve    ypT2 N0    Treatment:     1. Neoadjuvant chemotherapy   Adriamycin, cytoxan - s/p 4 cycles   Weekly Taxol - s/p 12 cycles  2. S/p left lumpectomy on 9/5/2019 by Dr. Tiffanie Ramachandran    Subjective:      Vincenzo Bhagat is a 60 y.o. female with a diagnosis of left sided invasive breast cancer. She felt a lump in the left breast, went to see her PCP, got a mammogram and was noted to have abnormal density in the left upper outer quadrant of the breast. A biopsy of the mass revealed gr 3 IDC, TNBC. The axillary LN is clear of disease. She saw Dr. Jad Haro. An MRI of the breast shows the tumor to be larger than previously believed to be. A left axillary LN biopsy showed 1/3 nodes with micrometastasis. She works at [employer] full time. Ms. Hever Nelson completed neoadjuvant chemotherapy and underwent lumpectomy in September. She has recovered well from surgery. She feels well with no complaints. She is enrolled in the clinical trial  and has been randomized to the placebo arm.         Review of Systems:    Constitutional: negative  Eyes: negative  Ears, Nose, Mouth, Throat, and Face: negative  Respiratory: negative  Cardiovascular: negative  Gastrointestinal: negative  Genitourinary:negative  Integument/Breast: negative  Hematologic/Lymphatic: negative  Musculoskeletal:negative  Neurological: mild numbness/tingling toes        Past Medical History:   Diagnosis Date    Breast cancer [address] [address] [facility] [address]     left side    Menopause      Past Surgical History:   Procedure Laterality Date    HX BREAST BIOPSY Left     x2    HX BREAST LUMPECTOMY Left 2019    LEFT BREAST LUMPECTOMY WITH ULTRASOUND performed by Angie Lutz MD at [facility]    HX HYSTERECTOMY      partial, ovaries remain    VASCULAR SURGERY PROCEDURE UNLIST      [facility]      Family History   Problem Relation Age of Onset    Cancer Father     Cancer Maternal Aunt     Cancer Maternal Uncle     Cancer Paternal Aunt     Cancer Paternal Uncle      Social History     Tobacco Use    Smoking status: Former Smoker     Packs/day: 0.10     Last attempt to quit: 3/6/2013     Years since quittin.8    Smokeless tobacco: Never Used    Tobacco comment: Quit smoking 23 days ago   Substance Use Topics    Alcohol use: No      Prior to Admission medications    Medication Sig Start Date End Date Taking? Authorizing Provider   ondansetron (ZOFRAN ODT) 4 mg disintegrating tablet Take 1 Tab by mouth every eight (8) hours as needed for Nausea. 19   Angie Lutz MD   loratadine (CLARITIN) 10 mg tablet Take 10 mg by mouth daily as needed for Allergies. Provider, Historical          Allergies   Allergen Reactions    Codeine Rash     Rash from tylenol #3         Objective:     Visit Vitals  /89 (BP 1 Location: Left arm, BP Patient Position: At rest)   Pulse 83   Temp 98.2 °F (36.8 °C) (Oral)   Resp 16   Ht 5' 2\" (1.575 m)   Wt 199 lb 6.4 oz (90.4 kg)   SpO2 97% Comment: RA   BMI 36.47 kg/m²       Pain Scale: 0 - No pain/10  Pain Location:       Physical Exam:    GENERAL: alert, cooperative, no distress, appears stated age  EYE: conjunctivae/corneas clear. PERRL, EOM's intact  LYMPHATIC: Cervical, supraclavicular, and axillary nodes normal.   THROAT & NECK: normal and no erythema or exudates noted. LUNG: clear to auscultation bilaterally  HEART: regular rate and rhythm, S1, S2 normal, no murmur, click, rub or gallop  ABDOMEN: soft, non-tender.  Bowel sounds normal. No masses,  no organomegaly  EXTREMITIES:  extremities normal, atraumatic, no cyanosis or edema  SKIN: Normal.  NEUROLOGIC: AOx3. Gait normal. Reflexes and motor strength normal and symmetric        Assessment:     1. Left breast carcinoma:  T1c N1mi M0 (Stage I) infiltrating ductal carcinoma, Tumor size 1.6 cm, LN 1/3 with micromet, grade 3, ER -ve, [place] -ve, Her 2 -ve    ECOG PS 0  Intent of Treatment - curative  Prognosis - good        Echocardiogram (5/14/2019): Normal, LVEF 61-65%    Completed neoadjuvant chemotherapy   Adriamycin, Cyclophosphamide - s/p 4 cycles   Weekly Taxol - s/p 12 cycles    S/p left lumpectomy on 9/5/2019 with Dr. Paige Samano  ypT2 N0    Since is enrolled in a clinical trial  SWOG : A Randomized, Phase III Trial to Evaluate the Efficacy and Safety of MK-3475 (Pembrolizumab) as Adjuvant Therapy for Triple Receptor-Negative Breast Cancer with >/= 1 CM Residual Invasive Cancer or Positive Lymph Nodes (ypN+) after Neoadjuvant Chemotherapy    She is randomized to the placebo arm. She feels well and is asymptomatic. Symptom management form reviewed and scanned into the EMR under Media. Plan:       > Continue follow up per clinical trial - observation arm  > Follow-up  3 months      I saw the patient in conjunction with LYDIA Munoz      Signed by: Marquez Hall MD                     January 8, 2020      CC. Maria Ines Erwin MD  CC. Kumar Martinez MD  CC.  Vanessa Pitts MD

## 2020-01-08 NOTE — PROGRESS NOTES
59 y/o AAF here for a f/u appt for left sided breast cancer. Pt is enrolled in a clinical trial and will be meeting with research today. Pt has no complaints at this time other than mild difficulty falling asleep and some numbness/tingling of toes related to chemotherapy in the past,not interfering with ADL's. Pt has lost 8lbs due to switching diet around. Pt has radiation appointment at [address] [place] [address]. Last tx was in July 2019      1. Have you been to the ER, urgent care clinic since your last visit? Hospitalized since your last visit? No    2. Have you seen or consulted any other health care providers outside of the [address] [place] [address] since your last visit? Include any pap smears or colon screening.  No

## 2020-03-30 ENCOUNTER — TELEPHONE (OUTPATIENT)
Dept: ONCOLOGY | Age: 60
End: 2020-03-30

## 2020-03-30 NOTE — TELEPHONE ENCOUNTER
Pt called to ask is it safe for her to go to work pt works at [employer] will.  Best contact [phone]

## 2020-04-01 ENCOUNTER — RESEARCH ENCOUNTER (OUTPATIENT)
Dept: ONCOLOGY | Age: 60
End: 2020-04-01

## 2020-04-01 NOTE — PROGRESS NOTES
Pt was supposed to be in for follow up visit today per protocol with Dr. Jones Leader and RN. Today is week 13 of observation. Patient did not come in due to COVID-19 and appointment was reschedule for 9/23/2020. I spoke to patient via telephone and completed the follow-up questionnaires per protocol.

## 2020-05-05 ENCOUNTER — RESEARCH ENCOUNTER (OUTPATIENT)
Dept: ONCOLOGY | Age: 60
End: 2020-05-05

## 2020-05-13 ENCOUNTER — HOSPITAL ENCOUNTER (OUTPATIENT)
Dept: MAMMOGRAPHY | Age: 60
Discharge: HOME | End: 2020-05-13
Attending: RADIOLOGY
Payer: COMMERCIAL

## 2020-05-13 DIAGNOSIS — Z85.3 PERSONAL HISTORY OF MALIGNANT NEOPLASM OF BREAST: ICD-10-CM

## 2020-05-13 PROCEDURE — 77062 BREAST TOMOSYNTHESIS BI: CPT

## 2020-06-24 ENCOUNTER — APPOINTMENT (OUTPATIENT)
Dept: INFUSION THERAPY | Age: 60
End: 2020-06-24

## 2020-06-24 ENCOUNTER — VIRTUAL VISIT (OUTPATIENT)
Dept: ONCOLOGY | Age: 60
End: 2020-06-24

## 2020-06-24 ENCOUNTER — RESEARCH ENCOUNTER (OUTPATIENT)
Dept: ONCOLOGY | Age: 60
End: 2020-06-24

## 2020-06-24 DIAGNOSIS — C50.412 MALIGNANT NEOPLASM OF UPPER-OUTER QUADRANT OF LEFT BREAST IN FEMALE, ESTROGEN RECEPTOR NEGATIVE (HCC): Primary | ICD-10-CM

## 2020-06-24 DIAGNOSIS — Z17.1 MALIGNANT NEOPLASM OF UPPER-OUTER QUADRANT OF LEFT BREAST IN FEMALE, ESTROGEN RECEPTOR NEGATIVE (HCC): Primary | ICD-10-CM

## 2020-06-24 NOTE — PROGRESS NOTES
[facility]  [address], [address] [facility] [place] Diaz, [address] [facility]  [phone]      Follow-up Note        Patient: Lata Beebe MRN: [id number]  SSN: [id number]    YOB: 1960  Age: 60 y.o. Sex: female        Reason for Visit:   Lata Beebe is a 60 y.o. female who is seen by synchronous (real-time) audio-video technology for follow up of left breast carcinoma      Diagnosis:     1. Left breast carcinoma:  T1c N1mi M0 (Stage I) infiltrating ductal carcinoma, Tumor size 1.6 cm, LN 1/3 with micromet, grade 3, ER -ve, [place] -ve, Her 2 -ve    ypT2 N0    Treatment:     1. Neoadjuvant chemotherapy   Adriamycin, cytoxan - s/p 4 cycles   Weekly Taxol - s/p 12 cycles  2. S/p left lumpectomy on 9/5/2019 by Dr. Kimberly Chauhan    Subjective:      Lata Beebe is a 60 y.o. female with a diagnosis of left sided invasive breast cancer. She felt a lump in the left breast, went to see her PCP, got a mammogram and was noted to have abnormal density in the left upper outer quadrant of the breast. A biopsy of the mass revealed gr 3 IDC, TNBC. The axillary LN is clear of disease. She saw Dr. Angel Perez. An MRI of the breast shows the tumor to be larger than previously believed to be. A left axillary LN biopsy showed 1/3 nodes with micrometastasis. She works at [employer] full time. Ms. Hernan Shannon completed neoadjuvant chemotherapy and underwent lumpectomy in September. She has recovered well from surgery. She feels well with no complaints. She is enrolled in the clinical trial  and has been randomized to the placebo arm.         Review of Systems:    Constitutional: negative  Eyes: negative  Ears, Nose, Mouth, Throat, and Face: negative  Respiratory: negative  Cardiovascular: negative  Gastrointestinal: negative  Genitourinary:negative  Integument/Breast: negative  Hematologic/Lymphatic: negative  Musculoskeletal:negative  Neurological: mild numbness/tingling toes        Past Medical History:   Diagnosis Date    Breast cancer ([address]     left side    Menopause      Past Surgical History:   Procedure Laterality Date    HX BREAST BIOPSY Left     x2    HX BREAST LUMPECTOMY Left 2019    LEFT BREAST LUMPECTOMY WITH ULTRASOUND performed by Saritha Quiroga MD at [facility]    HX HYSTERECTOMY      partial, ovaries remain    VASCULAR SURGERY PROCEDURE UNLIST      portacath      Family History   Problem Relation Age of Onset    Cancer Father     Cancer Maternal Aunt     Breast Cancer Maternal Aunt         4 paternal aunts    Cancer Maternal Uncle     Cancer Paternal Aunt     Cancer Paternal Uncle     Breast Cancer Maternal Grandmother      Social History     Tobacco Use    Smoking status: Former Smoker     Packs/day: 0.10     Last attempt to quit: 3/6/2013     Years since quittin.3    Smokeless tobacco: Never Used    Tobacco comment: Quit smoking 23 days ago   Substance Use Topics    Alcohol use: No      Prior to Admission medications    Not on File          Allergies   Allergen Reactions    Codeine Rash     Rash from tylenol #3         Objective:     General: alert, cooperative, no distress   Mental  status: normal mood, behavior, speech, dress, motor activity, and thought processes, able to follow commands   HENT: NCAT   Neck: no visualized mass   Resp: no respiratory distress   Neuro: no gross deficits   Skin: no discoloration or lesions of concern on visible areas   Psychiatric: normal affect, consistent with stated mood, no evidence of hallucinations       Due to this being a TeleHealth evaluation (During [facility] public health emergency), many elements of the physical examination are unable to be assessed. Evaluation of the following organ systems was limited: Vitals/Constitutional/EENT/Resp/CV/GI//MS/Neuro/Skin/Heme-Lymph-Imm. Assessment:     1.  Left breast carcinoma:  T1c N1mi M0 (Stage I) infiltrating ductal carcinoma, Tumor size 1.6 cm, LN 1/3 with micromet, grade 3, ER -ve, [place] -ve, Her 2 -ve    ECOG PS 0  Intent of Treatment - curative  Prognosis - good        Echocardiogram (5/14/2019): Normal, LVEF 61-65%    Completed neoadjuvant chemotherapy   Adriamycin, Cyclophosphamide - s/p 4 cycles   Weekly Taxol - s/p 12 cycles    S/p left lumpectomy on 9/5/2019 with Dr. Maik Choi  ypT2 N0    Since is enrolled in a clinical trial  SWOG : A Randomized, Phase III Trial to Evaluate the Efficacy and Safety of MK-3475 (Pembrolizumab) as Adjuvant Therapy for Triple Receptor-Negative Breast Cancer with >/= 1 CM Residual Invasive Cancer or Positive Lymph Nodes (ypN+) after Neoadjuvant Chemotherapy    She is randomized to the placebo arm. She feels well and is asymptomatic. Plan:       > Continue follow up per clinical trial - observation arm  > Follow-up 3 months      Signed by: Claudetta Harrier, MD                     June 24, 2020      CC. Terrance Harris MD  CC. Jennifer Bianchi MD  CC. Richard aBiley MD      I was in the office while conducting this encounter. The patient was at her home. Consent:  She and/or her healthcare decision maker is aware that this patient-initiated Telehealth encounter is a billable service, with coverage as determined by her insurance carrier. She is aware that she may receive a bill and has provided verbal consent to proceed: Yes    Pursuant to the emergency declaration under the [employer] and the [facility], 1135 waiver authority and the [employer] General Act, this Virtual  Visit was conducted, with patient's (and/or legal guardian's) consent, to reduce the patient's risk of exposure to COVID-19 and provide necessary medical care. Services were provided through a video synchronous discussion virtually to substitute for in-person visit.

## 2020-06-24 NOTE — PROGRESS NOTES
61 y/o female meeting via doxy. me for a virtual visit for left sided breast cancer, triple negative. Had a lumpectomy 9/5/2019. Pt finished radiation on 11/15/2019. Enrolled in a clinical trial.  Mammo done last month. Overall pt says she has been good but having issues sleeping. Denies any urgent or emergent care since last seen. No recently f/u with PCP since dx'd with Cancer.

## 2020-06-24 NOTE — PROGRESS NOTES
Patient in today for VV with Dr. Genny Layton. Pt did not come in due to COVID-19 Today is wk 25 of observation. RN spoke with patient over the phone, pt complained of gr 1 insomnia. Pt did not come in due to COVID-19. Next appointment scheduled in 12 weeks.

## 2020-06-26 DIAGNOSIS — C50.412 MALIGNANT NEOPLASM OF UPPER-OUTER QUADRANT OF LEFT BREAST IN FEMALE, ESTROGEN RECEPTOR NEGATIVE (HCC): Primary | ICD-10-CM

## 2020-06-26 DIAGNOSIS — Z17.1 MALIGNANT NEOPLASM OF UPPER-OUTER QUADRANT OF LEFT BREAST IN FEMALE, ESTROGEN RECEPTOR NEGATIVE (HCC): Primary | ICD-10-CM

## 2020-06-26 NOTE — PROGRESS NOTES
Per Research Nurse, Penelope Donato, pt needs labs prior to next appt. Will mail this to pt and she can get done at [address] [place] [address] DR FRANKLIN CBC WITH DIFF CMP [facility] PHOSP.

## 2020-09-11 DIAGNOSIS — C50.412 MALIGNANT NEOPLASM OF UPPER-OUTER QUADRANT OF LEFT BREAST IN FEMALE, ESTROGEN RECEPTOR NEGATIVE (HCC): ICD-10-CM

## 2020-09-11 DIAGNOSIS — Z17.1 MALIGNANT NEOPLASM OF UPPER-OUTER QUADRANT OF LEFT BREAST IN FEMALE, ESTROGEN RECEPTOR NEGATIVE (HCC): ICD-10-CM

## 2020-09-16 ENCOUNTER — OFFICE VISIT (OUTPATIENT)
Dept: ONCOLOGY | Age: 60
End: 2020-09-16
Payer: COMMERCIAL

## 2020-09-16 ENCOUNTER — HOSPITAL ENCOUNTER (OUTPATIENT)
Dept: INFUSION THERAPY | Age: 60
Discharge: HOME | End: 2020-09-16
Payer: COMMERCIAL

## 2020-09-16 VITALS
WEIGHT: 208 LBS | HEART RATE: 96 BPM | SYSTOLIC BLOOD PRESSURE: 156 MMHG | DIASTOLIC BLOOD PRESSURE: 89 MMHG | HEIGHT: 62 IN | TEMPERATURE: 98.1 F | OXYGEN SATURATION: 94 % | BODY MASS INDEX: 38.28 KG/M2

## 2020-09-16 VITALS
SYSTOLIC BLOOD PRESSURE: 190 MMHG | DIASTOLIC BLOOD PRESSURE: 110 MMHG | HEART RATE: 90 BPM | BODY MASS INDEX: 38.06 KG/M2 | WEIGHT: 208.1 LBS | TEMPERATURE: 98.9 F

## 2020-09-16 DIAGNOSIS — Z17.1 MALIGNANT NEOPLASM OF UPPER-OUTER QUADRANT OF LEFT BREAST IN FEMALE, ESTROGEN RECEPTOR NEGATIVE (HCC): Primary | ICD-10-CM

## 2020-09-16 DIAGNOSIS — C50.412 MALIGNANT NEOPLASM OF UPPER-OUTER QUADRANT OF LEFT BREAST IN FEMALE, ESTROGEN RECEPTOR NEGATIVE (HCC): Primary | ICD-10-CM

## 2020-09-16 LAB
ALBUMIN SERPL-MCNC: 3.7 G/DL (ref 3.5–5)
ALBUMIN/GLOB SERPL: 1 {RATIO} (ref 1.1–2.2)
ALP SERPL-CCNC: 126 U/L (ref 45–117)
ALT SERPL-CCNC: 22 U/L (ref 12–78)
ANION GAP SERPL CALC-SCNC: 4 MMOL/L (ref 5–15)
AST SERPL-CCNC: 15 U/L (ref 15–37)
BASOPHILS # BLD: 0 K/UL (ref 0–0.1)
BASOPHILS NFR BLD: 0 % (ref 0–1)
BILIRUB SERPL-MCNC: 0.9 MG/DL (ref 0.2–1)
BUN SERPL-MCNC: 20 MG/DL (ref 6–20)
BUN/CREAT SERPL: 24 (ref 12–20)
CALCIUM SERPL-MCNC: 9.3 MG/DL (ref 8.5–10.1)
CHLORIDE SERPL-SCNC: 106 MMOL/L (ref 97–108)
CO2 SERPL-SCNC: 29 MMOL/L (ref 21–32)
CREAT SERPL-MCNC: 0.85 MG/DL (ref 0.55–1.02)
DIFFERENTIAL METHOD BLD: ABNORMAL
EOSINOPHIL # BLD: 0.1 K/UL (ref 0–0.4)
EOSINOPHIL NFR BLD: 3 % (ref 0–7)
ERYTHROCYTE [DISTWIDTH] IN BLOOD BY AUTOMATED COUNT: 14.4 % (ref 11.5–14.5)
GLOBULIN SER CALC-MCNC: 3.8 G/DL (ref 2–4)
GLUCOSE SERPL-MCNC: 100 MG/DL (ref 65–100)
HCT VFR BLD AUTO: 41.3 % (ref 35–47)
HGB BLD-MCNC: 13.3 G/DL (ref 11.5–16)
IMM GRANULOCYTES # BLD AUTO: 0 K/UL (ref 0–0.04)
IMM GRANULOCYTES NFR BLD AUTO: 0 % (ref 0–0.5)
LYMPHOCYTES # BLD: 1.4 K/UL (ref 0.8–3.5)
LYMPHOCYTES NFR BLD: 46 % (ref 12–49)
MCH RBC QN AUTO: 29 PG (ref 26–34)
MCHC RBC AUTO-ENTMCNC: 32.2 G/DL (ref 30–36.5)
MCV RBC AUTO: 90.2 FL (ref 80–99)
MONOCYTES # BLD: 0.2 K/UL (ref 0–1)
MONOCYTES NFR BLD: 7 % (ref 5–13)
NEUTS SEG # BLD: 1.3 K/UL (ref 1.8–8)
NEUTS SEG NFR BLD: 44 % (ref 32–75)
NRBC # BLD: 0 K/UL (ref 0–0.01)
NRBC BLD-RTO: 0 PER 100 WBC
PLATELET # BLD AUTO: 284 K/UL (ref 150–400)
PMV BLD AUTO: 9.4 FL (ref 8.9–12.9)
POTASSIUM SERPL-SCNC: 3.6 MMOL/L (ref 3.5–5.1)
PROT SERPL-MCNC: 7.5 G/DL (ref 6.4–8.2)
RBC # BLD AUTO: 4.58 M/UL (ref 3.8–5.2)
SODIUM SERPL-SCNC: 139 MMOL/L (ref 136–145)
WBC # BLD AUTO: 3 K/UL (ref 3.6–11)

## 2020-09-16 PROCEDURE — 36415 COLL VENOUS BLD VENIPUNCTURE: CPT

## 2020-09-16 PROCEDURE — 80053 COMPREHEN METABOLIC PANEL: CPT

## 2020-09-16 PROCEDURE — 99213 OFFICE O/P EST LOW 20 MIN: CPT | Performed by: INTERNAL MEDICINE

## 2020-09-16 PROCEDURE — 85025 COMPLETE CBC W/AUTO DIFF WBC: CPT

## 2020-09-16 NOTE — PROGRESS NOTES
Gwen Parks is a 60 y.o. female here for 3 month follow up for:  Chief Complaint   Patient presents with    Breast Cancer     L   Clinical Trial    1. Have you been to the ER, urgent care clinic since your last visit? Hospitalized since your last visit? no    2. Have you seen or consulted any other health care providers outside of the [address] [place] [address] since your last visit? Include any pap smears or colon screening. No    Pt states she is having trouble sleeping but otherwise doing ok.

## 2020-09-16 NOTE — LETTER
9/16/20 Patient: Christos Marsh YOB: 1960 Date of Visit: 9/16/2020 Amy Jones MD 
[address] [id number] VIA In Basket Dear Amy Jones MD, Thank you for referring Ms. Blum Brought to [address] [place] [address] for evaluation. My notes for this consultation are attached. If you have questions, please do not hesitate to call me. I look forward to following your patient along with you.  
 
 
Sincerely, 
 
Payton Carmen MD

## 2020-09-16 NOTE — PROGRESS NOTES
[facility]  [address], [address] [facility] Juan David Diaz, 200 [address] [facility]  [phone]      Follow-up Note        Patient: Nury Garza MRN: [id number]  SSN: [id number]    YOB: 1960  Age: 60 y.o. Sex: female        Diagnosis:     1. Left breast carcinoma:  T1c N1mi M0 (Stage I) infiltrating ductal carcinoma, Tumor size 1.6 cm, LN 1/3 with micromet, grade 3, ER -ve, NV -ve, Her 2 -ve    ypT2 N0    Treatment:     1. Neoadjuvant chemotherapy   Adriamycin, cytoxan - s/p 4 cycles   Weekly Taxol - s/p 12 cycles  2. S/p left lumpectomy on 9/5/2019 by Dr. Isauro Torres    Subjective:      Nury Garza is a 60 y.o. female with a diagnosis of left sided invasive breast cancer. She felt a lump in the left breast, went to see her PCP, got a mammogram and was noted to have abnormal density in the left upper outer quadrant of the breast. A biopsy of the mass revealed gr 3 IDC, TNBC. The axillary LN is clear of disease. She saw Dr. Shane Harris. An MRI of the breast shows the tumor to be larger than previously believed to be. A left axillary LN biopsy showed 1/3 nodes with micrometastasis. She works at [employer] full time. Ms. Igor Wood completed neoadjuvant chemotherapy and underwent lumpectomy in September. She has recovered well from surgery. She is enrolled in the clinical trial  and has been randomized to the placebo arm. She notes some difficulty sleeping but otherwise is doing well.       Review of Systems:    Constitutional: negative  Eyes: negative  Ears, Nose, Mouth, Throat, and Face: negative  Respiratory: negative  Cardiovascular: negative  Gastrointestinal: negative  Genitourinary:negative  Integument/Breast: negative  Hematologic/Lymphatic: negative  Musculoskeletal:negative  Neurological: negative        Past Medical History:   Diagnosis Date    Breast cancer ([address]     left side    Menopause      Past Surgical History: Procedure Laterality Date    HX BREAST BIOPSY Left     x2    HX BREAST LUMPECTOMY Left 2019    LEFT BREAST LUMPECTOMY WITH ULTRASOUND performed by Ramez Chavez MD at [facility] AMBULATORY OR    HX HYSTERECTOMY      partial, ovaries remain    [facility]      Family History   Problem Relation Age of Onset    Cancer Father     Cancer Maternal Aunt     Breast Cancer Maternal Aunt         4 paternal aunts    Cancer Maternal Uncle     Cancer Paternal Aunt     Cancer Paternal Uncle     Breast Cancer Maternal Grandmother      Social History     Tobacco Use    Smoking status: Former Smoker     Packs/day: 0.10     Last attempt to quit: 2020     Years since quittin.7    Smokeless tobacco: Never Used   Substance Use Topics    Alcohol use: No      Prior to Admission medications    Not on File          Allergies   Allergen Reactions    Codeine Rash     Rash from tylenol #3         Objective:     Visit Vitals  BP (!) 156/89 (BP 1 Location: Right arm, BP Patient Position: Sitting)   Pulse 96   Temp 98.1 °F (36.7 °C)   Ht 5' 2\" (1.575 m)   Wt 208 lb (94.3 kg)   SpO2 94%   BMI 38.04 kg/m²       Pain Scale: 0 - No pain/10  Pain Location:       Physical Exam:     GENERAL: alert, cooperative, no distress, appears stated age  EYE: conjunctivae/corneas clear. PERRL, EOM's intact  LYMPHATIC: Cervical, supraclavicular, and axillary nodes normal.   THROAT & NECK: normal and no erythema or exudates noted. LUNG: clear to auscultation bilaterally  HEART: regular rate and rhythm, S1, S2 normal, no murmur, click, rub or gallop  ABDOMEN: soft, non-tender. Bowel sounds normal. No masses,  no organomegaly  EXTREMITIES:  extremities normal, atraumatic, no cyanosis or edema  SKIN: Normal.  NEUROLOGIC: AOx3. Gait normal. Reflexes and motor strength normal and symmetric       Assessment:     1.  Left breast carcinoma:  T1c N1mi M0 (Stage I) infiltrating ductal carcinoma, Tumor size 1.6 cm, LN 1/3 with micromet, grade 3, ER -ve, NH -ve, Her 2 -ve    ECOG PS 0  Intent of Treatment - curative  Prognosis - good        Echocardiogram (5/14/2019): Normal, LVEF 61-65%    Completed neoadjuvant chemotherapy   Adriamycin, Cyclophosphamide - s/p 4 cycles   Weekly Taxol - s/p 12 cycles    S/p left lumpectomy on 9/5/2019 with Dr. Luz Elena Jaffe  ypT2 N0    Since is enrolled in a clinical trial  SWOG : A Randomized, Phase III Trial to Evaluate the Efficacy and Safety of MK-3475 (Pembrolizumab) as Adjuvant Therapy for Triple Receptor-Negative Breast Cancer with >/= 1 CM Residual Invasive Cancer or Positive Lymph Nodes (ypN+) after Neoadjuvant Chemotherapy    She is randomized to the placebo arm. She feels well and is asymptomatic. Plan:       > Continue follow up per clinical trial - observation arm  > Follow-up in 3 months      Signed by: Gene Delong MD                     September 16, 2020      CC. Vazquez Prakash MD  CC. Jay Puckett MD  CC.  Delores Earl MD

## 2020-09-22 NOTE — PROGRESS NOTES
HISTORY OF PRESENT ILLNESS  Nury Garza is a 63 y.o. female. HPI  ESTABLISHED patient here for annual follow up LEFT breast cancer. She is doing well. Denies pain. Breast cancer-  1/3/19 - port insertion and LEFT SLNB. 59 yo with T1c N1mi M0 (Stage I) infiltrating ductal carcinoma, Tumor size 1.6 cm, LN 1/3 with micromet, grade 3, ER -ve, VT -ve, Her 2 -ve  7/2019 - completed neoadjuvant chemotherapy - Dr. Poly Tatum   9/5/19 - LEFT breast lumpectomy and port removal. 22 mm not great response, Margins clear. Dr. Misbah Frederick - XRT - completed 11/2019        [facility] Results (most recent):  Results from [place] encounter on 05/13/20   [facility] 3D HARISH W MAMMO BI DX INCL CAD    Narrative INDICATION: . Personal history of malignant neoplasm of breast / Due May 2020. COMPARISON: Mammogram(s), most recent, 11/30/2018. Ken [place] COMPOSITION:   There are scattered fibroglandular densities. .  TECHNIQUE:   Standard 2D, CC and MLO views of the each breast as well whole breast ML and  spot magnification views of the left breast were performed with digital  technique and subjected to computer-aided detection. Tomosynthesis of each  breast was performed in CC and MLO projections as well as of the left breast in  whole breast ML projection. Ken [place] FINDINGS:    Postsurgical changes in the upper, outer quadrant of the left breast, deep 3rd  No new visualized mass, suspicious microcalcification or architectural  distortion. .    Impression IMPRESSION:     1. BI-RADS Category 2 - Benign findings. .  RECOMMENDATION:    Routine surveillance of the diagnostic mammogram in 1 year. Review of Systems   All other systems reviewed and are negative. Physical Exam  Vitals signs and nursing note reviewed. Chest:      Breasts: Breasts are symmetrical.         Right: No inverted nipple, mass, nipple discharge, skin change or tenderness. Left: No inverted nipple, mass, nipple discharge, skin change or tenderness. Lymphadenopathy:      Cervical: No cervical adenopathy. Upper Body:      Right upper body: No supraclavicular, axillary or pectoral adenopathy. Left upper body: No supraclavicular, axillary or pectoral adenopathy.          ASSESSMENT and PLAN    ICD-10-CM ICD-9-CM    1. Malignant neoplasm of upper-outer quadrant of left female breast, unspecified estrogen receptor status (HCC)  C50.412 174.4      - doing well  - no evidence local recurrence  - f/u in 6 months with np

## 2020-09-22 NOTE — PATIENT INSTRUCTIONS
 
9

## 2020-09-23 ENCOUNTER — OFFICE VISIT (OUTPATIENT)
Dept: SURGERY | Age: 60
End: 2020-09-23
Payer: COMMERCIAL

## 2020-09-23 VITALS
BODY MASS INDEX: 38.28 KG/M2 | TEMPERATURE: 98.8 F | DIASTOLIC BLOOD PRESSURE: 89 MMHG | SYSTOLIC BLOOD PRESSURE: 149 MMHG | WEIGHT: 208 LBS | HEART RATE: 88 BPM | HEIGHT: 62 IN

## 2020-09-23 DIAGNOSIS — C50.412 MALIGNANT NEOPLASM OF UPPER-OUTER QUADRANT OF LEFT FEMALE BREAST, UNSPECIFIED ESTROGEN RECEPTOR STATUS (HCC): Primary | ICD-10-CM

## 2020-09-23 PROCEDURE — 99212 OFFICE O/P EST SF 10 MIN: CPT | Performed by: SURGERY

## 2020-09-23 NOTE — LETTER
9/23/20 Patient: Eagle Gould YOB: 1960 Date of Visit: 9/23/2020 Uday Krishnamurthy MD 
[address] [address] VIA In Basket Dear Uday Krishnamurthy MD, Thank you for referring Ms. Eugenie Sanders to [address] MAIN OFFICE [address] [place] [address] for evaluation. My notes for this consultation are attached. If you have questions, please do not hesitate to call me. I look forward to following your patient along with you. Sincerely, Aimee Galloway MD

## 2020-12-09 ENCOUNTER — HOSPITAL ENCOUNTER (OUTPATIENT)
Dept: INFUSION THERAPY | Age: 60
End: 2020-12-09

## 2020-12-15 NOTE — PROGRESS NOTES
Avani Alex is a 63 y.o. female here for 3 month follow up for left breast cancer. She is on clinical trial.    1. Have you been to the ER, urgent care clinic since your last visit? Hospitalized since your last visit? no    2. Have you seen or consulted any other health care providers outside of the [address] [place] [address] since your last visit? Include any pap smears or colon screening. No    Pt states she is doing well. No complaints. Her BP is elevated today but she says she feels fine. No dizziness. Does not take any medications.

## 2020-12-16 ENCOUNTER — OFFICE VISIT (OUTPATIENT)
Dept: ONCOLOGY | Age: 60
End: 2020-12-16
Payer: COMMERCIAL

## 2020-12-16 ENCOUNTER — HOSPITAL ENCOUNTER (OUTPATIENT)
Dept: INFUSION THERAPY | Age: 60
Discharge: HOME | End: 2020-12-16

## 2020-12-16 VITALS
TEMPERATURE: 97.3 F | SYSTOLIC BLOOD PRESSURE: 182 MMHG | HEART RATE: 86 BPM | RESPIRATION RATE: 16 BRPM | OXYGEN SATURATION: 100 % | DIASTOLIC BLOOD PRESSURE: 98 MMHG | BODY MASS INDEX: 38.24 KG/M2 | WEIGHT: 209.1 LBS

## 2020-12-16 VITALS
DIASTOLIC BLOOD PRESSURE: 104 MMHG | OXYGEN SATURATION: 99 % | BODY MASS INDEX: 38.72 KG/M2 | HEART RATE: 80 BPM | WEIGHT: 210.4 LBS | SYSTOLIC BLOOD PRESSURE: 176 MMHG | TEMPERATURE: 97.9 F | HEIGHT: 62 IN

## 2020-12-16 DIAGNOSIS — C50.412 MALIGNANT NEOPLASM OF UPPER-OUTER QUADRANT OF LEFT BREAST IN FEMALE, ESTROGEN RECEPTOR NEGATIVE (HCC): Primary | ICD-10-CM

## 2020-12-16 DIAGNOSIS — Z17.1 MALIGNANT NEOPLASM OF UPPER-OUTER QUADRANT OF LEFT BREAST IN FEMALE, ESTROGEN RECEPTOR NEGATIVE (HCC): Primary | ICD-10-CM

## 2020-12-16 PROCEDURE — 99213 OFFICE O/P EST LOW 20 MIN: CPT | Performed by: INTERNAL MEDICINE

## 2020-12-16 NOTE — LETTER
12/27/2020 Patient: Avani Alex YOB: 1960 Date of Visit: 12/16/2020 Jagdeep Belcher MD 
[address] [address] [address] [employer] Dear Jagdeep Belcher MD, Thank you for referring Ms. Emily Zacarias to [address] [place] [address] for evaluation. My notes for this consultation are attached. If you have questions, please do not hesitate to call me. I look forward to following your patient along with you.  
 
 
Sincerely, 
 
Brenda Wu MD

## 2020-12-16 NOTE — PROGRESS NOTES
[address] [facility] Lab Draw Note:  Arrived - 3330    Patient denied having any symptoms of COVID-19, i.e. SOB, coughing, fever, or generally not feeling well. Also denies having been exposed to COVID-19 recently or having had any recent contact with family/friend that has a pending COVID test.      Visit Vitals  BP (!) 182/98   Pulse 86   Temp 97.3 °F (36.3 °C)   Resp 16   Wt 94.8 kg (209 lb 1.6 oz)   SpO2 100%   BMI 38.24 kg/m²       Patient at [facility] for research labs (CBC w/ diff and CMP). Unable to get labs after 5 attempts by multiple nurses. Called [facility] with Research and asked if patient could come back at a later date to try again. [facility] stated okay for patient to come back within a week. Patient rescheduled. 1425 - Pt denies any acute problems/changes. Discharged from [facility] ambulatory. No distress.  Next appt:  12/21/20

## 2020-12-16 NOTE — PROGRESS NOTES
[facility]  [address], 97 [facility] Juan David Diaz, 200 [facility]  [phone]      Follow-up Note        Patient: Avani Alex MRN: [id number]  SSN: [id number]    YOB: 1960  Age: 63 y.o. Sex: female        Diagnosis:     1. Left breast carcinoma:  T1c N1mi M0 (Stage I) infiltrating ductal carcinoma, Tumor size 1.6 cm, LN 1/3 with micromet, grade 3, ER -ve, VT -ve, Her 2 -ve    ypT2 N0    Treatment:     1. Neoadjuvant chemotherapy   Adriamycin, cytoxan - s/p 4 cycles   Weekly Taxol - s/p 12 cycles  2. S/p left lumpectomy on 9/5/2019 by Dr. Angie Villalpando    Subjective:      Avani Alex is a 63 y.o. female with a diagnosis of left sided invasive breast cancer. She felt a lump in the left breast, went to see her PCP, got a mammogram and was noted to have abnormal density in the left upper outer quadrant of the breast. A biopsy of the mass revealed gr 3 IDC, TNBC. The axillary LN is clear of disease. She saw Dr. Stevenson Alcaraz. An MRI of the breast shows the tumor to be larger than previously believed to be. A left axillary LN biopsy showed 1/3 nodes with micrometastasis. She works at [employer] full time. Ms. Sherry James completed neoadjuvant chemotherapy and underwent lumpectomy in September 2020. She is enrolled in the clinical trial  and has been randomized to the placebo arm. She feels well today with no new complaints.       Review of Systems:    Constitutional: negative  Eyes: negative  Ears, Nose, Mouth, Throat, and Face: negative  Respiratory: negative  Cardiovascular: negative  Gastrointestinal: negative  Genitourinary:negative  Integument/Breast: negative  Hematologic/Lymphatic: negative  Musculoskeletal:negative  Neurological: negative        Past Medical History:   Diagnosis Date    Breast cancer ([address]     left side    Menopause      Past Surgical History:   Procedure Laterality Date    HX BREAST BIOPSY Left x2    HX BREAST LUMPECTOMY Left 2019    LEFT BREAST LUMPECTOMY WITH ULTRASOUND performed by Vanesa Boggs MD at [facility] AMBULATORY OR    HX HYSTERECTOMY      partial, ovaries remain    VASCULAR SURGERY PROCEDURE UNLIST      [facility]      Family History   Problem Relation Age of Onset    Cancer Father     Cancer Maternal Aunt     Breast Cancer Maternal Aunt         4 paternal aunts    Cancer Maternal Uncle     Cancer Paternal Aunt     Cancer Paternal Uncle     Breast Cancer Maternal Grandmother      Social History     Tobacco Use    Smoking status: Former Smoker     Packs/day: 0.10     Quit date: 2020     Years since quittin.9    Smokeless tobacco: Never Used   Substance Use Topics    Alcohol use: No      Prior to Admission medications    Not on File          Allergies   Allergen Reactions    Codeine Rash     Rash from tylenol #3         Objective:     Visit Vitals  BP (!) 176/104 (BP 1 Location: Right arm, BP Patient Position: Sitting)   Pulse 80   Temp 97.9 °F (36.6 °C) (Temporal)   Ht 5' 2\" (1.575 m)   Wt 210 lb 6.4 oz (95.4 kg)   SpO2 99%   BMI 38.48 kg/m²       Pain Scale: 0 - No pain/10  Pain Location:       Physical Exam:     GENERAL: alert, cooperative, no distress, appears stated age  EYE: conjunctivae/corneas clear. LYMPHATIC: Cervical, supraclavicular, and axillary nodes normal.   THROAT & NECK: normal and no erythema or exudates noted. LUNG: clear to auscultation bilaterally  HEART: regular rate and rhythm  ABDOMEN: soft, non-tender. EXTREMITIES:  extremities normal, atraumatic, no cyanosis or edema  SKIN: Normal.  NEUROLOGIC: AOx3. Gait normal.        Assessment:     1.  Left breast carcinoma:  T1c N1mi M0 (Stage I) infiltrating ductal carcinoma, Tumor size 1.6 cm, LN 1/3 with micromet, grade 3, ER -ve, MD -ve, Her 2 -ve    ECOG PS 0  Intent of Treatment - curative  Prognosis - good        Echocardiogram (2019): Normal, LVEF 61-65%    Completed neoadjuvant chemotherapy   Adriamycin, Cyclophosphamide - s/p 4 cycles   Weekly Taxol - s/p 12 cycles    S/p left lumpectomy on 9/5/2019 with Dr. Leno Morales  ypT2 N0    Since is enrolled in a clinical trial  SWOG : A Randomized, Phase III Trial to Evaluate the Efficacy and Safety of MK-3475 (Pembrolizumab) as Adjuvant Therapy for Triple Receptor-Negative Breast Cancer with >/= 1 CM Residual Invasive Cancer or Positive Lymph Nodes (ypN+) after Neoadjuvant Chemotherapy    She is randomized to the placebo arm. She feels well and is asymptomatic. Plan:       > Continue follow up per clinical trial - observation arm  > Follow-up in 6 months      I performed a history and physical examination of the patient and discussed his management with the NPP. I reviewed the NPP note and agree with the documented findings and plan of care. The patient was seen in conjunction with Ms. Leslie Bailey. Signed by: Jacquie Patel MD                     December 27, 2020      CC. Michelle Mazariegos MD  CC. Devin Yang MD  CC.  Allegra Beltrán MD

## 2020-12-17 ENCOUNTER — TELEPHONE (OUTPATIENT)
Dept: ONCOLOGY | Age: 60
End: 2020-12-17

## 2020-12-17 NOTE — TELEPHONE ENCOUNTER
Patient has a F/U appt scheduled in June. Thinks she will need labs drawn that same time. Please confirm and if so add her on at [facility].

## 2020-12-21 ENCOUNTER — HOSPITAL ENCOUNTER (OUTPATIENT)
Dept: INFUSION THERAPY | Age: 60
Discharge: HOME | End: 2020-12-21
Payer: COMMERCIAL

## 2020-12-21 VITALS
SYSTOLIC BLOOD PRESSURE: 173 MMHG | RESPIRATION RATE: 16 BRPM | DIASTOLIC BLOOD PRESSURE: 103 MMHG | BODY MASS INDEX: 38.21 KG/M2 | WEIGHT: 208.9 LBS | HEART RATE: 104 BPM | TEMPERATURE: 97.7 F

## 2020-12-21 LAB
ALBUMIN SERPL-MCNC: 3.7 G/DL (ref 3.5–5)
ALBUMIN/GLOB SERPL: 0.9 {RATIO} (ref 1.1–2.2)
ALP SERPL-CCNC: 124 U/L (ref 45–117)
ALT SERPL-CCNC: 20 U/L (ref 12–78)
ANION GAP SERPL CALC-SCNC: 6 MMOL/L (ref 5–15)
AST SERPL-CCNC: 14 U/L (ref 15–37)
BASOPHILS # BLD: 0 K/UL (ref 0–0.1)
BASOPHILS NFR BLD: 0 % (ref 0–1)
BILIRUB SERPL-MCNC: 0.4 MG/DL (ref 0.2–1)
BUN SERPL-MCNC: 9 MG/DL (ref 6–20)
BUN/CREAT SERPL: 11 (ref 12–20)
CALCIUM SERPL-MCNC: 9.4 MG/DL (ref 8.5–10.1)
CHLORIDE SERPL-SCNC: 103 MMOL/L (ref 97–108)
CO2 SERPL-SCNC: 28 MMOL/L (ref 21–32)
CREAT SERPL-MCNC: 0.85 MG/DL (ref 0.55–1.02)
DIFFERENTIAL METHOD BLD: ABNORMAL
EOSINOPHIL # BLD: 0.1 K/UL (ref 0–0.4)
EOSINOPHIL NFR BLD: 3 % (ref 0–7)
ERYTHROCYTE [DISTWIDTH] IN BLOOD BY AUTOMATED COUNT: 14.8 % (ref 11.5–14.5)
GLOBULIN SER CALC-MCNC: 4 G/DL (ref 2–4)
GLUCOSE SERPL-MCNC: 93 MG/DL (ref 65–100)
HCT VFR BLD AUTO: 41.6 % (ref 35–47)
HGB BLD-MCNC: 13 G/DL (ref 11.5–16)
IMM GRANULOCYTES # BLD AUTO: 0 K/UL (ref 0–0.04)
IMM GRANULOCYTES NFR BLD AUTO: 0 % (ref 0–0.5)
LYMPHOCYTES # BLD: 1.5 K/UL (ref 0.8–3.5)
LYMPHOCYTES NFR BLD: 43 % (ref 12–49)
MCH RBC QN AUTO: 28 PG (ref 26–34)
MCHC RBC AUTO-ENTMCNC: 31.3 G/DL (ref 30–36.5)
MCV RBC AUTO: 89.7 FL (ref 80–99)
MONOCYTES # BLD: 0.2 K/UL (ref 0–1)
MONOCYTES NFR BLD: 7 % (ref 5–13)
NEUTS SEG # BLD: 1.6 K/UL (ref 1.8–8)
NEUTS SEG NFR BLD: 47 % (ref 32–75)
NRBC # BLD: 0 K/UL (ref 0–0.01)
NRBC BLD-RTO: 0 PER 100 WBC
PLATELET # BLD AUTO: 282 K/UL (ref 150–400)
PMV BLD AUTO: 9.7 FL (ref 8.9–12.9)
POTASSIUM SERPL-SCNC: 3.3 MMOL/L (ref 3.5–5.1)
PROT SERPL-MCNC: 7.7 G/DL (ref 6.4–8.2)
RBC # BLD AUTO: 4.64 M/UL (ref 3.8–5.2)
SODIUM SERPL-SCNC: 137 MMOL/L (ref 136–145)
WBC # BLD AUTO: 3.5 K/UL (ref 3.6–11)

## 2020-12-21 PROCEDURE — 80053 COMPREHEN METABOLIC PANEL: CPT

## 2020-12-21 PROCEDURE — 36415 COLL VENOUS BLD VENIPUNCTURE: CPT

## 2020-12-21 PROCEDURE — 85025 COMPLETE CBC W/AUTO DIFF WBC: CPT

## 2020-12-21 NOTE — PROGRESS NOTES
[facility] VISIT NOTE    80  Pt arrived at [facility] ambulatory and in no distress for labs (CBC and CMP) for research study. Labs drawn peripherally from left [facility]. Blood pressure (!) 173/103, pulse (!) 104, temperature 97.7 °F (36.5 °C), resp. rate 16, weight 94.8 kg (208 lb 14.4 oz). 32 61 16  D/C'd from [facility] ambulatory and in no distress. Next appointment is 1/27/21 at 1330. Patient denied having any symptoms of COVID-19, i.e. SOB, coughing, fever, or generally not feeling well.   Also denies having been exposed to COVID-19 recently or having had any recent contact with family/friend that has a pending COVID test.

## 2021-01-01 ENCOUNTER — HOSPITAL ENCOUNTER (OUTPATIENT)
Dept: INFUSION THERAPY | Age: 61
Discharge: HOME | End: 2021-11-29
Payer: COMMERCIAL

## 2021-01-01 ENCOUNTER — APPOINTMENT (OUTPATIENT)
Dept: PHYSICAL THERAPY | Age: 61
End: 2021-01-01
Payer: COMMERCIAL

## 2021-01-01 ENCOUNTER — HOSPITAL ENCOUNTER (OUTPATIENT)
Dept: INFUSION THERAPY | Age: 61
Discharge: HOME | End: 2021-11-01
Payer: COMMERCIAL

## 2021-01-01 ENCOUNTER — TELEPHONE (OUTPATIENT)
Dept: PALLATIVE CARE | Age: 61
End: 2021-01-01

## 2021-01-01 ENCOUNTER — HOSPITAL ENCOUNTER (OUTPATIENT)
Dept: INFUSION THERAPY | Age: 61
Discharge: HOME | End: 2021-10-11
Payer: COMMERCIAL

## 2021-01-01 ENCOUNTER — OFFICE VISIT (OUTPATIENT)
Dept: ONCOLOGY | Age: 61
End: 2021-01-01

## 2021-01-01 ENCOUNTER — APPOINTMENT (OUTPATIENT)
Dept: INFUSION THERAPY | Age: 61
End: 2021-01-01
Payer: COMMERCIAL

## 2021-01-01 ENCOUNTER — HOSPITAL ENCOUNTER (OUTPATIENT)
Dept: INFUSION THERAPY | Age: 61
Discharge: HOME | End: 2021-09-13
Payer: COMMERCIAL

## 2021-01-01 ENCOUNTER — OFFICE VISIT (OUTPATIENT)
Dept: ONCOLOGY | Age: 61
End: 2021-01-01
Payer: COMMERCIAL

## 2021-01-01 ENCOUNTER — APPOINTMENT (OUTPATIENT)
Dept: PHYSICAL THERAPY | Age: 61
End: 2021-01-01

## 2021-01-01 ENCOUNTER — HOSPITAL ENCOUNTER (OUTPATIENT)
Dept: INFUSION THERAPY | Age: 61
Discharge: HOME | End: 2021-12-13
Payer: COMMERCIAL

## 2021-01-01 ENCOUNTER — HOSPITAL ENCOUNTER (OUTPATIENT)
Dept: NUCLEAR MEDICINE | Age: 61
Discharge: HOME | End: 2021-10-06
Attending: INTERNAL MEDICINE
Payer: COMMERCIAL

## 2021-01-01 ENCOUNTER — HOSPITAL ENCOUNTER (OUTPATIENT)
Dept: CT IMAGING | Age: 61
Discharge: HOME | End: 2021-10-06
Attending: INTERNAL MEDICINE
Payer: COMMERCIAL

## 2021-01-01 ENCOUNTER — HOSPITAL ENCOUNTER (OUTPATIENT)
Dept: INFUSION THERAPY | Age: 61
Discharge: HOME | End: 2021-10-18
Payer: COMMERCIAL

## 2021-01-01 ENCOUNTER — HOSPITAL ENCOUNTER (OUTPATIENT)
Dept: CT IMAGING | Age: 61
Discharge: HOME | End: 2021-12-22
Attending: INTERNAL MEDICINE
Payer: COMMERCIAL

## 2021-01-01 ENCOUNTER — HOSPITAL ENCOUNTER (OUTPATIENT)
Dept: INFUSION THERAPY | Age: 61
Discharge: HOME | End: 2021-08-23
Payer: COMMERCIAL

## 2021-01-01 ENCOUNTER — HOSPITAL ENCOUNTER (OUTPATIENT)
Dept: CT IMAGING | Age: 61
Discharge: HOME | End: 2021-08-03
Attending: INTERNAL MEDICINE
Payer: COMMERCIAL

## 2021-01-01 ENCOUNTER — HOSPITAL ENCOUNTER (OUTPATIENT)
Dept: INTERVENTIONAL RADIOLOGY/VASCULAR | Age: 61
Discharge: HOME | End: 2021-08-16
Attending: INTERNAL MEDICINE
Payer: COMMERCIAL

## 2021-01-01 ENCOUNTER — TELEPHONE (OUTPATIENT)
Dept: ONCOLOGY | Age: 61
End: 2021-01-01

## 2021-01-01 ENCOUNTER — APPOINTMENT (OUTPATIENT)
Dept: CT IMAGING | Age: 61
End: 2021-01-01
Attending: INTERNAL MEDICINE
Payer: COMMERCIAL

## 2021-01-01 ENCOUNTER — DOCUMENTATION ONLY (OUTPATIENT)
Dept: SURGERY | Age: 61
End: 2021-01-01

## 2021-01-01 ENCOUNTER — DOCUMENTATION ONLY (OUTPATIENT)
Dept: ONCOLOGY | Age: 61
End: 2021-01-01

## 2021-01-01 ENCOUNTER — OFFICE VISIT (OUTPATIENT)
Dept: PALLATIVE CARE | Age: 61
End: 2021-01-01
Payer: COMMERCIAL

## 2021-01-01 ENCOUNTER — PATIENT MESSAGE (OUTPATIENT)
Dept: ONCOLOGY | Age: 61
End: 2021-01-01

## 2021-01-01 ENCOUNTER — HOSPITAL ENCOUNTER (OUTPATIENT)
Dept: NUCLEAR MEDICINE | Age: 61
Discharge: HOME | End: 2021-08-03
Attending: INTERNAL MEDICINE
Payer: COMMERCIAL

## 2021-01-01 ENCOUNTER — OFFICE VISIT (OUTPATIENT)
Dept: SURGERY | Age: 61
End: 2021-01-01
Payer: COMMERCIAL

## 2021-01-01 ENCOUNTER — HOSPITAL ENCOUNTER (OUTPATIENT)
Dept: PHYSICAL THERAPY | Age: 61
Discharge: HOME | End: 2021-07-14
Payer: COMMERCIAL

## 2021-01-01 ENCOUNTER — HOSPITAL ENCOUNTER (OUTPATIENT)
Dept: INFUSION THERAPY | Age: 61
Discharge: HOME | End: 2021-11-22
Payer: COMMERCIAL

## 2021-01-01 ENCOUNTER — HOSPITAL ENCOUNTER (OUTPATIENT)
Dept: INFUSION THERAPY | Age: 61
Discharge: HOME | End: 2021-11-08
Payer: COMMERCIAL

## 2021-01-01 ENCOUNTER — VIRTUAL VISIT (OUTPATIENT)
Dept: PALLATIVE CARE | Age: 61
End: 2021-01-01
Payer: COMMERCIAL

## 2021-01-01 ENCOUNTER — HOSPITAL ENCOUNTER (OUTPATIENT)
Dept: INFUSION THERAPY | Age: 61
End: 2021-01-01

## 2021-01-01 ENCOUNTER — APPOINTMENT (OUTPATIENT)
Dept: INFUSION THERAPY | Age: 61
End: 2021-01-01

## 2021-01-01 ENCOUNTER — HOSPITAL ENCOUNTER (OUTPATIENT)
Dept: INFUSION THERAPY | Age: 61
Discharge: HOME | End: 2021-09-20
Payer: COMMERCIAL

## 2021-01-01 ENCOUNTER — HOSPITAL ENCOUNTER (OUTPATIENT)
Dept: NUCLEAR MEDICINE | Age: 61
Discharge: HOME | End: 2021-12-22
Attending: INTERNAL MEDICINE
Payer: COMMERCIAL

## 2021-01-01 ENCOUNTER — DOCUMENTATION ONLY (OUTPATIENT)
Dept: PALLATIVE CARE | Age: 61
End: 2021-01-01

## 2021-01-01 ENCOUNTER — HOSPITAL ENCOUNTER (OUTPATIENT)
Dept: INFUSION THERAPY | Age: 61
Discharge: HOME | End: 2021-08-30
Payer: COMMERCIAL

## 2021-01-01 VITALS
HEIGHT: 63 IN | RESPIRATION RATE: 18 BRPM | SYSTOLIC BLOOD PRESSURE: 154 MMHG | WEIGHT: 191 LBS | HEART RATE: 102 BPM | TEMPERATURE: 98.8 F | BODY MASS INDEX: 33.84 KG/M2 | OXYGEN SATURATION: 99 % | DIASTOLIC BLOOD PRESSURE: 91 MMHG

## 2021-01-01 VITALS
SYSTOLIC BLOOD PRESSURE: 128 MMHG | HEIGHT: 63 IN | TEMPERATURE: 99.6 F | DIASTOLIC BLOOD PRESSURE: 88 MMHG | HEART RATE: 126 BPM | OXYGEN SATURATION: 97 % | WEIGHT: 193.4 LBS | BODY MASS INDEX: 34.27 KG/M2

## 2021-01-01 VITALS
HEART RATE: 97 BPM | OXYGEN SATURATION: 100 % | RESPIRATION RATE: 17 BRPM | BODY MASS INDEX: 33.31 KG/M2 | WEIGHT: 181 LBS | DIASTOLIC BLOOD PRESSURE: 94 MMHG | SYSTOLIC BLOOD PRESSURE: 171 MMHG | TEMPERATURE: 98.8 F | HEIGHT: 62 IN

## 2021-01-01 VITALS
SYSTOLIC BLOOD PRESSURE: 161 MMHG | BODY MASS INDEX: 32.29 KG/M2 | HEIGHT: 62 IN | OXYGEN SATURATION: 100 % | RESPIRATION RATE: 18 BRPM | HEART RATE: 99 BPM | DIASTOLIC BLOOD PRESSURE: 88 MMHG | WEIGHT: 175.49 LBS | TEMPERATURE: 97.5 F

## 2021-01-01 VITALS
RESPIRATION RATE: 20 BRPM | WEIGHT: 193 LBS | BODY MASS INDEX: 35.51 KG/M2 | SYSTOLIC BLOOD PRESSURE: 137 MMHG | OXYGEN SATURATION: 98 % | HEART RATE: 96 BPM | DIASTOLIC BLOOD PRESSURE: 86 MMHG | TEMPERATURE: 98.6 F | HEIGHT: 62 IN

## 2021-01-01 VITALS
OXYGEN SATURATION: 100 % | TEMPERATURE: 98.8 F | HEIGHT: 62 IN | WEIGHT: 181.2 LBS | BODY MASS INDEX: 33.34 KG/M2 | DIASTOLIC BLOOD PRESSURE: 94 MMHG | HEART RATE: 97 BPM | RESPIRATION RATE: 17 BRPM | SYSTOLIC BLOOD PRESSURE: 171 MMHG

## 2021-01-01 VITALS
WEIGHT: 186.4 LBS | DIASTOLIC BLOOD PRESSURE: 90 MMHG | SYSTOLIC BLOOD PRESSURE: 148 MMHG | RESPIRATION RATE: 16 BRPM | TEMPERATURE: 98.6 F | HEIGHT: 63 IN | BODY MASS INDEX: 33.03 KG/M2 | OXYGEN SATURATION: 100 % | HEART RATE: 93 BPM

## 2021-01-01 VITALS
HEIGHT: 62 IN | WEIGHT: 182.7 LBS | BODY MASS INDEX: 33.62 KG/M2 | RESPIRATION RATE: 18 BRPM | HEART RATE: 88 BPM | TEMPERATURE: 98.3 F | OXYGEN SATURATION: 100 % | DIASTOLIC BLOOD PRESSURE: 75 MMHG | SYSTOLIC BLOOD PRESSURE: 120 MMHG

## 2021-01-01 VITALS
SYSTOLIC BLOOD PRESSURE: 160 MMHG | HEART RATE: 98 BPM | DIASTOLIC BLOOD PRESSURE: 91 MMHG | HEIGHT: 63 IN | TEMPERATURE: 97.9 F | OXYGEN SATURATION: 100 % | BODY MASS INDEX: 34.38 KG/M2 | WEIGHT: 194 LBS

## 2021-01-01 VITALS
HEIGHT: 62 IN | OXYGEN SATURATION: 100 % | SYSTOLIC BLOOD PRESSURE: 145 MMHG | TEMPERATURE: 98.1 F | BODY MASS INDEX: 34.78 KG/M2 | WEIGHT: 189 LBS | DIASTOLIC BLOOD PRESSURE: 101 MMHG | RESPIRATION RATE: 18 BRPM | HEART RATE: 104 BPM

## 2021-01-01 VITALS
WEIGHT: 179.4 LBS | BODY MASS INDEX: 33.01 KG/M2 | HEART RATE: 97 BPM | HEIGHT: 62 IN | SYSTOLIC BLOOD PRESSURE: 145 MMHG | OXYGEN SATURATION: 98 % | DIASTOLIC BLOOD PRESSURE: 90 MMHG | RESPIRATION RATE: 18 BRPM | TEMPERATURE: 96.5 F

## 2021-01-01 VITALS
TEMPERATURE: 98.5 F | WEIGHT: 192 LBS | BODY MASS INDEX: 34.02 KG/M2 | SYSTOLIC BLOOD PRESSURE: 146 MMHG | RESPIRATION RATE: 18 BRPM | DIASTOLIC BLOOD PRESSURE: 96 MMHG | OXYGEN SATURATION: 100 % | HEIGHT: 63 IN | HEART RATE: 104 BPM

## 2021-01-01 VITALS
HEIGHT: 62 IN | RESPIRATION RATE: 18 BRPM | WEIGHT: 182.63 LBS | SYSTOLIC BLOOD PRESSURE: 143 MMHG | WEIGHT: 178 LBS | DIASTOLIC BLOOD PRESSURE: 81 MMHG | OXYGEN SATURATION: 97 % | HEART RATE: 102 BPM | DIASTOLIC BLOOD PRESSURE: 88 MMHG | OXYGEN SATURATION: 96 % | TEMPERATURE: 98.9 F | BODY MASS INDEX: 32.76 KG/M2 | HEART RATE: 67 BPM | BODY MASS INDEX: 33.61 KG/M2 | HEART RATE: 109 BPM | RESPIRATION RATE: 16 BRPM | TEMPERATURE: 97.2 F | SYSTOLIC BLOOD PRESSURE: 128 MMHG | DIASTOLIC BLOOD PRESSURE: 85 MMHG | BODY MASS INDEX: 33.75 KG/M2 | SYSTOLIC BLOOD PRESSURE: 143 MMHG | OXYGEN SATURATION: 96 % | TEMPERATURE: 98.4 F | HEIGHT: 62 IN | HEIGHT: 62 IN | WEIGHT: 183.4 LBS

## 2021-01-01 VITALS
SYSTOLIC BLOOD PRESSURE: 139 MMHG | TEMPERATURE: 98.8 F | HEIGHT: 63 IN | DIASTOLIC BLOOD PRESSURE: 85 MMHG | RESPIRATION RATE: 18 BRPM | HEART RATE: 85 BPM | BODY MASS INDEX: 33.89 KG/M2 | OXYGEN SATURATION: 99 % | WEIGHT: 191.3 LBS

## 2021-01-01 VITALS
WEIGHT: 192.4 LBS | RESPIRATION RATE: 18 BRPM | SYSTOLIC BLOOD PRESSURE: 153 MMHG | HEART RATE: 92 BPM | TEMPERATURE: 98.5 F | DIASTOLIC BLOOD PRESSURE: 92 MMHG | BODY MASS INDEX: 34.09 KG/M2 | OXYGEN SATURATION: 100 % | HEIGHT: 63 IN

## 2021-01-01 VITALS
WEIGHT: 178.1 LBS | HEART RATE: 85 BPM | DIASTOLIC BLOOD PRESSURE: 83 MMHG | TEMPERATURE: 97.6 F | RESPIRATION RATE: 17 BRPM | HEIGHT: 62 IN | SYSTOLIC BLOOD PRESSURE: 142 MMHG | BODY MASS INDEX: 32.77 KG/M2 | OXYGEN SATURATION: 100 %

## 2021-01-01 VITALS
RESPIRATION RATE: 18 BRPM | DIASTOLIC BLOOD PRESSURE: 71 MMHG | OXYGEN SATURATION: 100 % | SYSTOLIC BLOOD PRESSURE: 136 MMHG | TEMPERATURE: 97.5 F | WEIGHT: 175.5 LBS | HEIGHT: 62 IN | BODY MASS INDEX: 32.3 KG/M2 | HEART RATE: 90 BPM

## 2021-01-01 VITALS
WEIGHT: 197 LBS | HEIGHT: 63 IN | DIASTOLIC BLOOD PRESSURE: 98 MMHG | HEART RATE: 116 BPM | BODY MASS INDEX: 34.91 KG/M2 | SYSTOLIC BLOOD PRESSURE: 146 MMHG

## 2021-01-01 VITALS
WEIGHT: 178.9 LBS | OXYGEN SATURATION: 98 % | RESPIRATION RATE: 16 BRPM | SYSTOLIC BLOOD PRESSURE: 127 MMHG | HEIGHT: 62 IN | TEMPERATURE: 98.9 F | HEART RATE: 83 BPM | BODY MASS INDEX: 32.92 KG/M2 | DIASTOLIC BLOOD PRESSURE: 80 MMHG

## 2021-01-01 VITALS — BODY MASS INDEX: 33.49 KG/M2 | HEIGHT: 62 IN | WEIGHT: 182 LBS

## 2021-01-01 VITALS
HEART RATE: 89 BPM | HEIGHT: 62 IN | TEMPERATURE: 97.2 F | BODY MASS INDEX: 32.41 KG/M2 | SYSTOLIC BLOOD PRESSURE: 143 MMHG | WEIGHT: 176.1 LBS | DIASTOLIC BLOOD PRESSURE: 86 MMHG | RESPIRATION RATE: 16 BRPM | OXYGEN SATURATION: 99 %

## 2021-01-01 VITALS
TEMPERATURE: 97.6 F | BODY MASS INDEX: 31.76 KG/M2 | WEIGHT: 172.6 LBS | DIASTOLIC BLOOD PRESSURE: 76 MMHG | HEART RATE: 75 BPM | RESPIRATION RATE: 16 BRPM | SYSTOLIC BLOOD PRESSURE: 144 MMHG | OXYGEN SATURATION: 100 % | HEIGHT: 62 IN

## 2021-01-01 VITALS
BODY MASS INDEX: 33.05 KG/M2 | RESPIRATION RATE: 18 BRPM | HEART RATE: 90 BPM | SYSTOLIC BLOOD PRESSURE: 152 MMHG | TEMPERATURE: 96.1 F | DIASTOLIC BLOOD PRESSURE: 91 MMHG | HEIGHT: 62 IN | OXYGEN SATURATION: 97 % | WEIGHT: 179.6 LBS

## 2021-01-01 VITALS
TEMPERATURE: 98.1 F | BODY MASS INDEX: 34.74 KG/M2 | RESPIRATION RATE: 16 BRPM | HEART RATE: 95 BPM | SYSTOLIC BLOOD PRESSURE: 133 MMHG | WEIGHT: 188.8 LBS | OXYGEN SATURATION: 100 % | DIASTOLIC BLOOD PRESSURE: 81 MMHG | HEIGHT: 62 IN

## 2021-01-01 VITALS — HEIGHT: 62 IN | WEIGHT: 197.6 LBS | BODY MASS INDEX: 36.36 KG/M2

## 2021-01-01 VITALS
BODY MASS INDEX: 34.23 KG/M2 | RESPIRATION RATE: 18 BRPM | TEMPERATURE: 98.6 F | OXYGEN SATURATION: 100 % | WEIGHT: 186 LBS | SYSTOLIC BLOOD PRESSURE: 153 MMHG | DIASTOLIC BLOOD PRESSURE: 89 MMHG | HEART RATE: 114 BPM | HEIGHT: 62 IN

## 2021-01-01 DIAGNOSIS — Z09 CHEMOTHERAPY FOLLOW-UP EXAMINATION: ICD-10-CM

## 2021-01-01 DIAGNOSIS — C79.2 BREAST CANCER METASTASIZED TO SKIN, LEFT (HCC): ICD-10-CM

## 2021-01-01 DIAGNOSIS — C50.912 BREAST CANCER METASTASIZED TO SKIN, LEFT (HCC): Primary | ICD-10-CM

## 2021-01-01 DIAGNOSIS — G89.3 CANCER RELATED PAIN: ICD-10-CM

## 2021-01-01 DIAGNOSIS — Z17.1 MALIGNANT NEOPLASM OF UPPER-OUTER QUADRANT OF LEFT BREAST IN FEMALE, ESTROGEN RECEPTOR NEGATIVE (HCC): ICD-10-CM

## 2021-01-01 DIAGNOSIS — Z17.1 MALIGNANT NEOPLASM OF UPPER-OUTER QUADRANT OF LEFT BREAST IN FEMALE, ESTROGEN RECEPTOR NEGATIVE (HCC): Primary | ICD-10-CM

## 2021-01-01 DIAGNOSIS — C79.2 BREAST CANCER METASTASIZED TO SKIN, LEFT (HCC): Primary | ICD-10-CM

## 2021-01-01 DIAGNOSIS — C50.412 MALIGNANT NEOPLASM OF UPPER-OUTER QUADRANT OF LEFT BREAST IN FEMALE, ESTROGEN RECEPTOR NEGATIVE (HCC): Primary | ICD-10-CM

## 2021-01-01 DIAGNOSIS — D64.81 ANTINEOPLASTIC CHEMOTHERAPY INDUCED ANEMIA: ICD-10-CM

## 2021-01-01 DIAGNOSIS — C50.412 MALIGNANT NEOPLASM OF UPPER-OUTER QUADRANT OF LEFT BREAST IN FEMALE, ESTROGEN RECEPTOR NEGATIVE (HCC): ICD-10-CM

## 2021-01-01 DIAGNOSIS — Z71.89 ADVANCED CARE PLANNING/COUNSELING DISCUSSION: ICD-10-CM

## 2021-01-01 DIAGNOSIS — N64.4 BREAST PAIN, LEFT: ICD-10-CM

## 2021-01-01 DIAGNOSIS — C50.912 INFLAMMATORY CARCINOMA OF LEFT BREAST (HCC): Primary | ICD-10-CM

## 2021-01-01 DIAGNOSIS — R63.0 DECREASED APPETITE: ICD-10-CM

## 2021-01-01 DIAGNOSIS — T45.1X5A ANTINEOPLASTIC CHEMOTHERAPY INDUCED ANEMIA: ICD-10-CM

## 2021-01-01 DIAGNOSIS — T45.1X5A CHEMOTHERAPY INDUCED NEUTROPENIA (HCC): ICD-10-CM

## 2021-01-01 DIAGNOSIS — N64.4 BREAST PAIN: ICD-10-CM

## 2021-01-01 DIAGNOSIS — N64.4 MASTODYNIA: ICD-10-CM

## 2021-01-01 DIAGNOSIS — C50.912 BREAST CANCER METASTASIZED TO SKIN, LEFT (HCC): ICD-10-CM

## 2021-01-01 DIAGNOSIS — Z51.11 ENCOUNTER FOR ANTINEOPLASTIC CHEMOTHERAPY: ICD-10-CM

## 2021-01-01 DIAGNOSIS — R45.89 DEPRESSED MOOD: ICD-10-CM

## 2021-01-01 DIAGNOSIS — C79.81 METASTATIC ADENOCARCINOMA TO BREAST (HCC): ICD-10-CM

## 2021-01-01 DIAGNOSIS — C50.912 RECURRENT MALIGNANT NEOPLASM OF LEFT BREAST (HCC): Primary | ICD-10-CM

## 2021-01-01 DIAGNOSIS — R53.0 NEOPLASTIC MALIGNANT RELATED FATIGUE: ICD-10-CM

## 2021-01-01 DIAGNOSIS — K30 INDIGESTION: ICD-10-CM

## 2021-01-01 DIAGNOSIS — R22.9 SKIN NODULE: Primary | ICD-10-CM

## 2021-01-01 DIAGNOSIS — D70.1 CHEMOTHERAPY INDUCED NEUTROPENIA (HCC): ICD-10-CM

## 2021-01-01 DIAGNOSIS — Z85.3 HISTORY OF LEFT BREAST CANCER: ICD-10-CM

## 2021-01-01 DIAGNOSIS — R11.2 NON-INTRACTABLE VOMITING WITH NAUSEA, UNSPECIFIED VOMITING TYPE: ICD-10-CM

## 2021-01-01 LAB
ALBUMIN SERPL-MCNC: 2.5 G/DL (ref 3.5–5)
ALBUMIN SERPL-MCNC: 2.6 G/DL (ref 3.5–5)
ALBUMIN SERPL-MCNC: 2.7 G/DL (ref 3.5–5)
ALBUMIN SERPL-MCNC: 2.7 G/DL (ref 3.5–5)
ALBUMIN SERPL-MCNC: 2.9 G/DL (ref 3.5–5)
ALBUMIN SERPL-MCNC: 3 G/DL (ref 3.5–5)
ALBUMIN/GLOB SERPL: 0.6 {RATIO} (ref 1.1–2.2)
ALBUMIN/GLOB SERPL: 0.7 {RATIO} (ref 1.1–2.2)
ALBUMIN/GLOB SERPL: 0.7 {RATIO} (ref 1.1–2.2)
ALBUMIN/GLOB SERPL: 0.8 {RATIO} (ref 1.1–2.2)
ALBUMIN/GLOB SERPL: 0.8 {RATIO} (ref 1.1–2.2)
ALP SERPL-CCNC: 105 U/L (ref 45–117)
ALP SERPL-CCNC: 106 U/L (ref 45–117)
ALP SERPL-CCNC: 110 U/L (ref 45–117)
ALP SERPL-CCNC: 110 U/L (ref 45–117)
ALP SERPL-CCNC: 79 U/L (ref 45–117)
ALP SERPL-CCNC: 86 U/L (ref 45–117)
ALP SERPL-CCNC: 86 U/L (ref 45–117)
ALP SERPL-CCNC: 98 U/L (ref 45–117)
ALT SERPL-CCNC: 16 U/L (ref 12–78)
ALT SERPL-CCNC: 20 U/L (ref 12–78)
ALT SERPL-CCNC: 23 U/L (ref 12–78)
ALT SERPL-CCNC: 24 U/L (ref 12–78)
ALT SERPL-CCNC: 27 U/L (ref 12–78)
ALT SERPL-CCNC: 29 U/L (ref 12–78)
ALT SERPL-CCNC: 44 U/L (ref 12–78)
ALT SERPL-CCNC: 46 U/L (ref 12–78)
ANION GAP SERPL CALC-SCNC: 10 MMOL/L (ref 5–15)
ANION GAP SERPL CALC-SCNC: 6 MMOL/L (ref 5–15)
ANION GAP SERPL CALC-SCNC: 7 MMOL/L (ref 5–15)
ANION GAP SERPL CALC-SCNC: 8 MMOL/L (ref 5–15)
ANION GAP SERPL CALC-SCNC: 8 MMOL/L (ref 5–15)
ANION GAP SERPL CALC-SCNC: 9 MMOL/L (ref 5–15)
AST SERPL-CCNC: 16 U/L (ref 15–37)
AST SERPL-CCNC: 17 U/L (ref 15–37)
AST SERPL-CCNC: 17 U/L (ref 15–37)
AST SERPL-CCNC: 22 U/L (ref 15–37)
AST SERPL-CCNC: 24 U/L (ref 15–37)
AST SERPL-CCNC: 28 U/L (ref 15–37)
AST SERPL-CCNC: 32 U/L (ref 15–37)
AST SERPL-CCNC: 50 U/L (ref 15–37)
BASOPHILS # BLD: 0 K/UL (ref 0–0.1)
BASOPHILS NFR BLD: 0 % (ref 0–1)
BASOPHILS NFR BLD: 1 % (ref 0–1)
BILIRUB SERPL-MCNC: 0.3 MG/DL (ref 0.2–1)
BILIRUB SERPL-MCNC: 0.4 MG/DL (ref 0.2–1)
BILIRUB SERPL-MCNC: 0.5 MG/DL (ref 0.2–1)
BILIRUB SERPL-MCNC: 0.6 MG/DL (ref 0.2–1)
BILIRUB SERPL-MCNC: 0.7 MG/DL (ref 0.2–1)
BILIRUB SERPL-MCNC: 0.7 MG/DL (ref 0.2–1)
BUN SERPL-MCNC: 13 MG/DL (ref 6–20)
BUN SERPL-MCNC: 7 MG/DL (ref 6–20)
BUN SERPL-MCNC: 7 MG/DL (ref 6–20)
BUN SERPL-MCNC: 8 MG/DL (ref 6–20)
BUN SERPL-MCNC: 8 MG/DL (ref 6–20)
BUN SERPL-MCNC: 9 MG/DL (ref 6–20)
BUN/CREAT SERPL: 10 (ref 12–20)
BUN/CREAT SERPL: 11 (ref 12–20)
BUN/CREAT SERPL: 12 (ref 12–20)
BUN/CREAT SERPL: 12 (ref 12–20)
BUN/CREAT SERPL: 13 (ref 12–20)
BUN/CREAT SERPL: 13 (ref 12–20)
BUN/CREAT SERPL: 14 (ref 12–20)
BUN/CREAT SERPL: 19 (ref 12–20)
CALCIUM SERPL-MCNC: 8.4 MG/DL (ref 8.5–10.1)
CALCIUM SERPL-MCNC: 8.8 MG/DL (ref 8.5–10.1)
CALCIUM SERPL-MCNC: 8.9 MG/DL (ref 8.5–10.1)
CALCIUM SERPL-MCNC: 9 MG/DL (ref 8.5–10.1)
CALCIUM SERPL-MCNC: 9 MG/DL (ref 8.5–10.1)
CALCIUM SERPL-MCNC: 9.1 MG/DL (ref 8.5–10.1)
CALCIUM SERPL-MCNC: 9.3 MG/DL (ref 8.5–10.1)
CALCIUM SERPL-MCNC: 9.4 MG/DL (ref 8.5–10.1)
CHLORIDE SERPL-SCNC: 100 MMOL/L (ref 97–108)
CHLORIDE SERPL-SCNC: 100 MMOL/L (ref 97–108)
CHLORIDE SERPL-SCNC: 103 MMOL/L (ref 97–108)
CHLORIDE SERPL-SCNC: 103 MMOL/L (ref 97–108)
CHLORIDE SERPL-SCNC: 105 MMOL/L (ref 97–108)
CHLORIDE SERPL-SCNC: 105 MMOL/L (ref 97–108)
CHLORIDE SERPL-SCNC: 106 MMOL/L (ref 97–108)
CHLORIDE SERPL-SCNC: 109 MMOL/L (ref 97–108)
CO2 SERPL-SCNC: 22 MMOL/L (ref 21–32)
CO2 SERPL-SCNC: 25 MMOL/L (ref 21–32)
CO2 SERPL-SCNC: 26 MMOL/L (ref 21–32)
CO2 SERPL-SCNC: 26 MMOL/L (ref 21–32)
CO2 SERPL-SCNC: 27 MMOL/L (ref 21–32)
CO2 SERPL-SCNC: 27 MMOL/L (ref 21–32)
CREAT SERPL-MCNC: 0.58 MG/DL (ref 0.55–1.02)
CREAT SERPL-MCNC: 0.64 MG/DL (ref 0.55–1.02)
CREAT SERPL-MCNC: 0.65 MG/DL (ref 0.55–1.02)
CREAT SERPL-MCNC: 0.67 MG/DL (ref 0.55–1.02)
CREAT SERPL-MCNC: 0.67 MG/DL (ref 0.55–1.02)
CREAT SERPL-MCNC: 0.7 MG/DL (ref 0.55–1.02)
CREAT SERPL-MCNC: 0.7 MG/DL (ref 0.55–1.02)
CREAT SERPL-MCNC: 0.72 MG/DL (ref 0.55–1.02)
DIFFERENTIAL METHOD BLD: ABNORMAL
EOSINOPHIL # BLD: 0 K/UL (ref 0–0.4)
EOSINOPHIL # BLD: 0.1 K/UL (ref 0–0.4)
EOSINOPHIL NFR BLD: 0 % (ref 0–7)
EOSINOPHIL NFR BLD: 0 % (ref 0–7)
EOSINOPHIL NFR BLD: 1 % (ref 0–7)
EOSINOPHIL NFR BLD: 2 % (ref 0–7)
EOSINOPHIL NFR BLD: 3 % (ref 0–7)
EOSINOPHIL NFR BLD: 3 % (ref 0–7)
EOSINOPHIL NFR BLD: 4 % (ref 0–7)
ERYTHROCYTE [DISTWIDTH] IN BLOOD BY AUTOMATED COUNT: 14.9 % (ref 11.5–14.5)
ERYTHROCYTE [DISTWIDTH] IN BLOOD BY AUTOMATED COUNT: 15.9 % (ref 11.5–14.5)
ERYTHROCYTE [DISTWIDTH] IN BLOOD BY AUTOMATED COUNT: 16.6 % (ref 11.5–14.5)
ERYTHROCYTE [DISTWIDTH] IN BLOOD BY AUTOMATED COUNT: 17.1 % (ref 11.5–14.5)
ERYTHROCYTE [DISTWIDTH] IN BLOOD BY AUTOMATED COUNT: 19.9 % (ref 11.5–14.5)
ERYTHROCYTE [DISTWIDTH] IN BLOOD BY AUTOMATED COUNT: 19.9 % (ref 11.5–14.5)
ERYTHROCYTE [DISTWIDTH] IN BLOOD BY AUTOMATED COUNT: 20.5 % (ref 11.5–14.5)
ERYTHROCYTE [DISTWIDTH] IN BLOOD BY AUTOMATED COUNT: 20.7 % (ref 11.5–14.5)
ERYTHROCYTE [DISTWIDTH] IN BLOOD BY AUTOMATED COUNT: 22.3 % (ref 11.5–14.5)
ERYTHROCYTE [DISTWIDTH] IN BLOOD BY AUTOMATED COUNT: 22.4 % (ref 11.5–14.5)
ERYTHROCYTE [DISTWIDTH] IN BLOOD BY AUTOMATED COUNT: 22.6 % (ref 11.5–14.5)
GLOBULIN SER CALC-MCNC: 3.7 G/DL (ref 2–4)
GLOBULIN SER CALC-MCNC: 3.7 G/DL (ref 2–4)
GLOBULIN SER CALC-MCNC: 4 G/DL (ref 2–4)
GLOBULIN SER CALC-MCNC: 4.2 G/DL (ref 2–4)
GLOBULIN SER CALC-MCNC: 4.3 G/DL (ref 2–4)
GLOBULIN SER CALC-MCNC: 4.4 G/DL (ref 2–4)
GLOBULIN SER CALC-MCNC: 4.5 G/DL (ref 2–4)
GLOBULIN SER CALC-MCNC: 4.6 G/DL (ref 2–4)
GLUCOSE SERPL-MCNC: 106 MG/DL (ref 65–100)
GLUCOSE SERPL-MCNC: 114 MG/DL (ref 65–100)
GLUCOSE SERPL-MCNC: 130 MG/DL (ref 65–100)
GLUCOSE SERPL-MCNC: 91 MG/DL (ref 65–100)
GLUCOSE SERPL-MCNC: 91 MG/DL (ref 65–100)
GLUCOSE SERPL-MCNC: 92 MG/DL (ref 65–100)
GLUCOSE SERPL-MCNC: 94 MG/DL (ref 65–100)
GLUCOSE SERPL-MCNC: 97 MG/DL (ref 65–100)
HAV IGM SER QL: NONREACTIVE
HBV CORE IGM SER QL: NONREACTIVE
HBV SURFACE AG SER QL: <0.1 INDEX
HBV SURFACE AG SER QL: NEGATIVE
HCT VFR BLD AUTO: 24.9 % (ref 35–47)
HCT VFR BLD AUTO: 27.9 % (ref 35–47)
HCT VFR BLD AUTO: 29.6 % (ref 35–47)
HCT VFR BLD AUTO: 29.6 % (ref 35–47)
HCT VFR BLD AUTO: 31.8 % (ref 35–47)
HCT VFR BLD AUTO: 31.9 % (ref 35–47)
HCT VFR BLD AUTO: 31.9 % (ref 35–47)
HCT VFR BLD AUTO: 32 % (ref 35–47)
HCT VFR BLD AUTO: 32.6 % (ref 35–47)
HCT VFR BLD AUTO: 32.7 % (ref 35–47)
HCT VFR BLD AUTO: 35.4 % (ref 35–47)
HCV AB SERPL QL IA: NONREACTIVE
HGB BLD-MCNC: 10.1 G/DL (ref 11.5–16)
HGB BLD-MCNC: 10.2 G/DL (ref 11.5–16)
HGB BLD-MCNC: 11 G/DL (ref 11.5–16)
HGB BLD-MCNC: 7.9 G/DL (ref 11.5–16)
HGB BLD-MCNC: 8.7 G/DL (ref 11.5–16)
HGB BLD-MCNC: 9.1 G/DL (ref 11.5–16)
HGB BLD-MCNC: 9.4 G/DL (ref 11.5–16)
HGB BLD-MCNC: 9.6 G/DL (ref 11.5–16)
HGB BLD-MCNC: 9.8 G/DL (ref 11.5–16)
HGB BLD-MCNC: 9.9 G/DL (ref 11.5–16)
HGB BLD-MCNC: 9.9 G/DL (ref 11.5–16)
IMM GRANULOCYTES # BLD AUTO: 0 K/UL (ref 0–0.04)
IMM GRANULOCYTES NFR BLD AUTO: 0 % (ref 0–0.5)
IMM GRANULOCYTES NFR BLD AUTO: 1 % (ref 0–0.5)
LYMPHOCYTES # BLD: 0.6 K/UL (ref 0.8–3.5)
LYMPHOCYTES # BLD: 0.7 K/UL (ref 0.8–3.5)
LYMPHOCYTES # BLD: 0.7 K/UL (ref 0.8–3.5)
LYMPHOCYTES # BLD: 0.8 K/UL (ref 0.8–3.5)
LYMPHOCYTES # BLD: 0.9 K/UL (ref 0.8–3.5)
LYMPHOCYTES # BLD: 0.9 K/UL (ref 0.8–3.5)
LYMPHOCYTES # BLD: 1.2 K/UL (ref 0.8–3.5)
LYMPHOCYTES NFR BLD: 19 % (ref 12–49)
LYMPHOCYTES NFR BLD: 20 % (ref 12–49)
LYMPHOCYTES NFR BLD: 23 % (ref 12–49)
LYMPHOCYTES NFR BLD: 24 % (ref 12–49)
LYMPHOCYTES NFR BLD: 44 % (ref 12–49)
LYMPHOCYTES NFR BLD: 45 % (ref 12–49)
LYMPHOCYTES NFR BLD: 54 % (ref 12–49)
LYMPHOCYTES NFR BLD: 68 % (ref 12–49)
MCH RBC QN AUTO: 26.1 PG (ref 26–34)
MCH RBC QN AUTO: 26.2 PG (ref 26–34)
MCH RBC QN AUTO: 26.5 PG (ref 26–34)
MCH RBC QN AUTO: 26.8 PG (ref 26–34)
MCH RBC QN AUTO: 27 PG (ref 26–34)
MCH RBC QN AUTO: 27 PG (ref 26–34)
MCH RBC QN AUTO: 27.1 PG (ref 26–34)
MCH RBC QN AUTO: 27.2 PG (ref 26–34)
MCH RBC QN AUTO: 27.2 PG (ref 26–34)
MCH RBC QN AUTO: 27.4 PG (ref 26–34)
MCH RBC QN AUTO: 27.6 PG (ref 26–34)
MCHC RBC AUTO-ENTMCNC: 30 G/DL (ref 30–36.5)
MCHC RBC AUTO-ENTMCNC: 30.7 G/DL (ref 30–36.5)
MCHC RBC AUTO-ENTMCNC: 30.8 G/DL (ref 30–36.5)
MCHC RBC AUTO-ENTMCNC: 30.9 G/DL (ref 30–36.5)
MCHC RBC AUTO-ENTMCNC: 31 G/DL (ref 30–36.5)
MCHC RBC AUTO-ENTMCNC: 31 G/DL (ref 30–36.5)
MCHC RBC AUTO-ENTMCNC: 31.1 G/DL (ref 30–36.5)
MCHC RBC AUTO-ENTMCNC: 31.2 G/DL (ref 30–36.5)
MCHC RBC AUTO-ENTMCNC: 31.3 G/DL (ref 30–36.5)
MCHC RBC AUTO-ENTMCNC: 31.7 G/DL (ref 30–36.5)
MCHC RBC AUTO-ENTMCNC: 31.8 G/DL (ref 30–36.5)
MCV RBC AUTO: 83.8 FL (ref 80–99)
MCV RBC AUTO: 85.3 FL (ref 80–99)
MCV RBC AUTO: 85.3 FL (ref 80–99)
MCV RBC AUTO: 85.8 FL (ref 80–99)
MCV RBC AUTO: 85.9 FL (ref 80–99)
MCV RBC AUTO: 86.8 FL (ref 80–99)
MCV RBC AUTO: 87.4 FL (ref 80–99)
MCV RBC AUTO: 87.8 FL (ref 80–99)
MCV RBC AUTO: 88.3 FL (ref 80–99)
MCV RBC AUTO: 88.6 FL (ref 80–99)
MCV RBC AUTO: 88.9 FL (ref 80–99)
MONOCYTES # BLD: 0.1 K/UL (ref 0–1)
MONOCYTES # BLD: 0.1 K/UL (ref 0–1)
MONOCYTES # BLD: 0.2 K/UL (ref 0–1)
MONOCYTES # BLD: 0.2 K/UL (ref 0–1)
MONOCYTES # BLD: 0.3 K/UL (ref 0–1)
MONOCYTES # BLD: 0.3 K/UL (ref 0–1)
MONOCYTES # BLD: 0.4 K/UL (ref 0–1)
MONOCYTES # BLD: 0.5 K/UL (ref 0–1)
MONOCYTES # BLD: 0.7 K/UL (ref 0–1)
MONOCYTES NFR BLD: 10 % (ref 5–13)
MONOCYTES NFR BLD: 11 % (ref 5–13)
MONOCYTES NFR BLD: 11 % (ref 5–13)
MONOCYTES NFR BLD: 15 % (ref 5–13)
MONOCYTES NFR BLD: 15 % (ref 5–13)
MONOCYTES NFR BLD: 18 % (ref 5–13)
MONOCYTES NFR BLD: 2 % (ref 5–13)
MONOCYTES NFR BLD: 9 % (ref 5–13)
MONOCYTES NFR BLD: 9 % (ref 5–13)
NEUTS BAND NFR BLD MANUAL: 1 %
NEUTS SEG # BLD: 0.2 K/UL (ref 1.8–8)
NEUTS SEG # BLD: 0.3 K/UL (ref 1.8–8)
NEUTS SEG # BLD: 0.6 K/UL (ref 1.8–8)
NEUTS SEG # BLD: 0.8 K/UL (ref 1.8–8)
NEUTS SEG # BLD: 1.8 K/UL (ref 1.8–8)
NEUTS SEG # BLD: 2.1 K/UL (ref 1.8–8)
NEUTS SEG # BLD: 2.2 K/UL (ref 1.8–8)
NEUTS SEG # BLD: 2.3 K/UL (ref 1.8–8)
NEUTS SEG # BLD: 2.3 K/UL (ref 1.8–8)
NEUTS SEG # BLD: 2.8 K/UL (ref 1.8–8)
NEUTS SEG # BLD: 3.1 K/UL (ref 1.8–8)
NEUTS SEG NFR BLD: 17 % (ref 32–75)
NEUTS SEG NFR BLD: 29 % (ref 32–75)
NEUTS SEG NFR BLD: 37 % (ref 32–75)
NEUTS SEG NFR BLD: 44 % (ref 32–75)
NEUTS SEG NFR BLD: 60 % (ref 32–75)
NEUTS SEG NFR BLD: 62 % (ref 32–75)
NEUTS SEG NFR BLD: 63 % (ref 32–75)
NEUTS SEG NFR BLD: 64 % (ref 32–75)
NEUTS SEG NFR BLD: 65 % (ref 32–75)
NEUTS SEG NFR BLD: 71 % (ref 32–75)
NEUTS SEG NFR BLD: 73 % (ref 32–75)
NRBC # BLD: 0 K/UL (ref 0–0.01)
NRBC # BLD: 0.04 K/UL (ref 0–0.01)
NRBC BLD-RTO: 0 PER 100 WBC
NRBC BLD-RTO: 1 PER 100 WBC
PATH REV BLD -IMP: ABNORMAL
PATH REV BLD -IMP: NORMAL
PLATELET # BLD AUTO: 237 K/UL (ref 150–400)
PLATELET # BLD AUTO: 253 K/UL (ref 150–400)
PLATELET # BLD AUTO: 254 K/UL (ref 150–400)
PLATELET # BLD AUTO: 290 K/UL (ref 150–400)
PLATELET # BLD AUTO: 350 K/UL (ref 150–400)
PLATELET # BLD AUTO: 353 K/UL (ref 150–400)
PLATELET # BLD AUTO: 356 K/UL (ref 150–400)
PLATELET # BLD AUTO: 435 K/UL (ref 150–400)
PLATELET # BLD AUTO: 464 K/UL (ref 150–400)
PLATELET # BLD AUTO: 523 K/UL (ref 150–400)
PLATELET # BLD AUTO: 536 K/UL (ref 150–400)
PMV BLD AUTO: 10.1 FL (ref 8.9–12.9)
PMV BLD AUTO: 8.4 FL (ref 8.9–12.9)
PMV BLD AUTO: 9 FL (ref 8.9–12.9)
PMV BLD AUTO: 9 FL (ref 8.9–12.9)
PMV BLD AUTO: 9.2 FL (ref 8.9–12.9)
PMV BLD AUTO: 9.3 FL (ref 8.9–12.9)
PMV BLD AUTO: 9.7 FL (ref 8.9–12.9)
POTASSIUM SERPL-SCNC: 3.2 MMOL/L (ref 3.5–5.1)
POTASSIUM SERPL-SCNC: 3.3 MMOL/L (ref 3.5–5.1)
POTASSIUM SERPL-SCNC: 3.4 MMOL/L (ref 3.5–5.1)
POTASSIUM SERPL-SCNC: 3.4 MMOL/L (ref 3.5–5.1)
POTASSIUM SERPL-SCNC: 3.5 MMOL/L (ref 3.5–5.1)
POTASSIUM SERPL-SCNC: 3.5 MMOL/L (ref 3.5–5.1)
POTASSIUM SERPL-SCNC: 3.6 MMOL/L (ref 3.5–5.1)
POTASSIUM SERPL-SCNC: 3.7 MMOL/L (ref 3.5–5.1)
PROT SERPL-MCNC: 6.5 G/DL (ref 6.4–8.2)
PROT SERPL-MCNC: 6.6 G/DL (ref 6.4–8.2)
PROT SERPL-MCNC: 6.7 G/DL (ref 6.4–8.2)
PROT SERPL-MCNC: 6.9 G/DL (ref 6.4–8.2)
PROT SERPL-MCNC: 7.1 G/DL (ref 6.4–8.2)
PROT SERPL-MCNC: 7.2 G/DL (ref 6.4–8.2)
PROT SERPL-MCNC: 7.3 G/DL (ref 6.4–8.2)
PROT SERPL-MCNC: 7.3 G/DL (ref 6.4–8.2)
RBC # BLD AUTO: 2.9 M/UL (ref 3.8–5.2)
RBC # BLD AUTO: 3.33 M/UL (ref 3.8–5.2)
RBC # BLD AUTO: 3.37 M/UL (ref 3.8–5.2)
RBC # BLD AUTO: 3.47 M/UL (ref 3.8–5.2)
RBC # BLD AUTO: 3.59 M/UL (ref 3.8–5.2)
RBC # BLD AUTO: 3.6 M/UL (ref 3.8–5.2)
RBC # BLD AUTO: 3.66 M/UL (ref 3.8–5.2)
RBC # BLD AUTO: 3.69 M/UL (ref 3.8–5.2)
RBC # BLD AUTO: 3.74 M/UL (ref 3.8–5.2)
RBC # BLD AUTO: 3.8 M/UL (ref 3.8–5.2)
RBC # BLD AUTO: 4.08 M/UL (ref 3.8–5.2)
RBC MORPH BLD: ABNORMAL
SODIUM SERPL-SCNC: 134 MMOL/L (ref 136–145)
SODIUM SERPL-SCNC: 135 MMOL/L (ref 136–145)
SODIUM SERPL-SCNC: 135 MMOL/L (ref 136–145)
SODIUM SERPL-SCNC: 137 MMOL/L (ref 136–145)
SODIUM SERPL-SCNC: 138 MMOL/L (ref 136–145)
SODIUM SERPL-SCNC: 138 MMOL/L (ref 136–145)
SODIUM SERPL-SCNC: 139 MMOL/L (ref 136–145)
SODIUM SERPL-SCNC: 140 MMOL/L (ref 136–145)
SP1: NORMAL
SP2: NORMAL
SP3: NORMAL
TSH SERPL DL<=0.05 MIU/L-ACNC: 0.58 UIU/ML (ref 0.36–3.74)
WBC # BLD AUTO: 0.9 K/UL (ref 3.6–11)
WBC # BLD AUTO: 1.1 K/UL (ref 3.6–11)
WBC # BLD AUTO: 1.7 K/UL (ref 3.6–11)
WBC # BLD AUTO: 1.9 K/UL (ref 3.6–11)
WBC # BLD AUTO: 2.6 K/UL (ref 3.6–11)
WBC # BLD AUTO: 3.2 K/UL (ref 3.6–11)
WBC # BLD AUTO: 3.5 K/UL (ref 3.6–11)
WBC # BLD AUTO: 3.7 K/UL (ref 3.6–11)
WBC # BLD AUTO: 3.9 K/UL (ref 3.6–11)
WBC # BLD AUTO: 3.9 K/UL (ref 3.6–11)
WBC # BLD AUTO: 4.9 K/UL (ref 3.6–11)
WBC MORPH BLD: ABNORMAL
WBC MORPH BLD: ABNORMAL

## 2021-01-01 PROCEDURE — 80053 COMPREHEN METABOLIC PANEL: CPT

## 2021-01-01 PROCEDURE — 96413 CHEMO IV INFUSION 1 HR: CPT

## 2021-01-01 PROCEDURE — 77030003560 HC NDL HUBR BARD -A

## 2021-01-01 PROCEDURE — 99215 OFFICE O/P EST HI 40 MIN: CPT | Performed by: INTERNAL MEDICINE

## 2021-01-01 PROCEDURE — 74011250636 HC RX REV CODE- 250/636: Performed by: INTERNAL MEDICINE

## 2021-01-01 PROCEDURE — 85025 COMPLETE CBC W/AUTO DIFF WBC: CPT

## 2021-01-01 PROCEDURE — 80074 ACUTE HEPATITIS PANEL: CPT

## 2021-01-01 PROCEDURE — 77030012965 HC NDL HUBR BBMI -A

## 2021-01-01 PROCEDURE — 36415 COLL VENOUS BLD VENIPUNCTURE: CPT

## 2021-01-01 PROCEDURE — 74011000258 HC RX REV CODE- 258: Performed by: INTERNAL MEDICINE

## 2021-01-01 PROCEDURE — 77030031139 HC SUT VCRL2 J&J -A

## 2021-01-01 PROCEDURE — 74011250637 HC RX REV CODE- 250/637: Performed by: INTERNAL MEDICINE

## 2021-01-01 PROCEDURE — 96415 CHEMO IV INFUSION ADDL HR: CPT

## 2021-01-01 PROCEDURE — 96375 TX/PRO/DX INJ NEW DRUG ADDON: CPT

## 2021-01-01 PROCEDURE — 11104 PUNCH BX SKIN SINGLE LESION: CPT | Performed by: SURGERY

## 2021-01-01 PROCEDURE — 2709999900 HC NON-CHARGEABLE SUPPLY

## 2021-01-01 PROCEDURE — 84443 ASSAY THYROID STIM HORMONE: CPT

## 2021-01-01 PROCEDURE — 78306 BONE IMAGING WHOLE BODY: CPT

## 2021-01-01 PROCEDURE — 96377 APPLICATON ON-BODY INJECTOR: CPT

## 2021-01-01 PROCEDURE — 77030010507 HC ADH SKN DERMBND J&J -B

## 2021-01-01 PROCEDURE — 74011000250 HC RX REV CODE- 250: Performed by: INTERNAL MEDICINE

## 2021-01-01 PROCEDURE — C1769 GUIDE WIRE: HCPCS

## 2021-01-01 PROCEDURE — 74011250636 HC RX REV CODE- 250/636: Performed by: STUDENT IN AN ORGANIZED HEALTH CARE EDUCATION/TRAINING PROGRAM

## 2021-01-01 PROCEDURE — 99213 OFFICE O/P EST LOW 20 MIN: CPT | Performed by: SURGERY

## 2021-01-01 PROCEDURE — 96417 CHEMO IV INFUS EACH ADDL SEQ: CPT

## 2021-01-01 PROCEDURE — 74011000636 HC RX REV CODE- 636: Performed by: INTERNAL MEDICINE

## 2021-01-01 PROCEDURE — 99204 OFFICE O/P NEW MOD 45 MIN: CPT | Performed by: FAMILY MEDICINE

## 2021-01-01 PROCEDURE — 96367 TX/PROPH/DG ADDL SEQ IV INF: CPT

## 2021-01-01 PROCEDURE — 71260 CT THORAX DX C+: CPT

## 2021-01-01 PROCEDURE — 99214 OFFICE O/P EST MOD 30 MIN: CPT | Performed by: INTERNAL MEDICINE

## 2021-01-01 PROCEDURE — 36591 DRAW BLOOD OFF VENOUS DEVICE: CPT

## 2021-01-01 PROCEDURE — 74177 CT ABD & PELVIS W/CONTRAST: CPT

## 2021-01-01 PROCEDURE — 99214 OFFICE O/P EST MOD 30 MIN: CPT | Performed by: SURGERY

## 2021-01-01 PROCEDURE — 74011000250 HC RX REV CODE- 250: Performed by: STUDENT IN AN ORGANIZED HEALTH CARE EDUCATION/TRAINING PROGRAM

## 2021-01-01 PROCEDURE — C1892 INTRO/SHEATH,FIXED,PEEL-AWAY: HCPCS

## 2021-01-01 PROCEDURE — 77001 FLUOROGUIDE FOR VEIN DEVICE: CPT

## 2021-01-01 PROCEDURE — C1788 PORT, INDWELLING, IMP: HCPCS

## 2021-01-01 PROCEDURE — 97140 MANUAL THERAPY 1/> REGIONS: CPT

## 2021-01-01 PROCEDURE — 99497 ADVNCD CARE PLAN 30 MIN: CPT | Performed by: FAMILY MEDICINE

## 2021-01-01 PROCEDURE — 74011250637 HC RX REV CODE- 250/637: Performed by: NURSE PRACTITIONER

## 2021-01-01 RX ORDER — SODIUM CHLORIDE 0.9 % (FLUSH) 0.9 %
10 SYRINGE (ML) INJECTION AS NEEDED
Status: DISPENSED | OUTPATIENT
Start: 2021-01-01 | End: 2021-01-01

## 2021-01-01 RX ORDER — SODIUM CHLORIDE 9 MG/ML
10 INJECTION INTRAMUSCULAR; INTRAVENOUS; SUBCUTANEOUS AS NEEDED
Status: CANCELLED | OUTPATIENT
Start: 2021-01-01

## 2021-01-01 RX ORDER — ONDANSETRON 2 MG/ML
8 INJECTION INTRAMUSCULAR; INTRAVENOUS ONCE
Status: COMPLETED | OUTPATIENT
Start: 2021-01-01 | End: 2021-01-01

## 2021-01-01 RX ORDER — SODIUM CHLORIDE 9 MG/ML
25 INJECTION, SOLUTION INTRAVENOUS CONTINUOUS
Status: DISPENSED | OUTPATIENT
Start: 2021-01-01 | End: 2021-01-01

## 2021-01-01 RX ORDER — MORPHINE SULFATE 100 %
240 POWDER (GRAM) MISCELLANEOUS EVERY 6 HOURS
Qty: 160 G | Refills: 0 | Status: SHIPPED | OUTPATIENT
Start: 2021-01-01 | End: 2021-01-01

## 2021-01-01 RX ORDER — MORPHINE SULFATE 60 MG/1
TABLET, FILM COATED, EXTENDED RELEASE ORAL
Qty: 4 TABLET | Refills: 0 | Status: CANCELLED | OUTPATIENT
Start: 2021-01-01 | End: 2021-01-01

## 2021-01-01 RX ORDER — HEPARIN 100 UNIT/ML
300-500 SYRINGE INTRAVENOUS AS NEEDED
Status: CANCELLED
Start: 2021-01-01

## 2021-01-01 RX ORDER — DIPHENHYDRAMINE HYDROCHLORIDE 50 MG/ML
25 INJECTION, SOLUTION INTRAMUSCULAR; INTRAVENOUS AS NEEDED
Status: CANCELLED
Start: 2021-01-01

## 2021-01-01 RX ORDER — PALONOSETRON 0.05 MG/ML
0.25 INJECTION, SOLUTION INTRAVENOUS ONCE
Status: CANCELLED | OUTPATIENT
Start: 2021-01-01 | End: 2021-01-01

## 2021-01-01 RX ORDER — EPINEPHRINE 1 MG/ML
0.3 INJECTION, SOLUTION, CONCENTRATE INTRAVENOUS AS NEEDED
Status: CANCELLED | OUTPATIENT
Start: 2021-01-01

## 2021-01-01 RX ORDER — SODIUM CHLORIDE 9 MG/ML
25 INJECTION, SOLUTION INTRAVENOUS CONTINUOUS
Status: CANCELLED | OUTPATIENT
Start: 2021-01-01 | End: 2021-01-01

## 2021-01-01 RX ORDER — HYDROCORTISONE SODIUM SUCCINATE 100 MG/2ML
100 INJECTION, POWDER, FOR SOLUTION INTRAMUSCULAR; INTRAVENOUS AS NEEDED
Status: CANCELLED | OUTPATIENT
Start: 2021-01-01

## 2021-01-01 RX ORDER — SODIUM CHLORIDE 0.9 % (FLUSH) 0.9 %
10 SYRINGE (ML) INJECTION AS NEEDED
Status: CANCELLED | OUTPATIENT
Start: 2021-01-01 | End: 2021-01-01

## 2021-01-01 RX ORDER — PALONOSETRON 0.05 MG/ML
0.25 INJECTION, SOLUTION INTRAVENOUS ONCE
Status: COMPLETED | OUTPATIENT
Start: 2021-01-01 | End: 2021-01-01

## 2021-01-01 RX ORDER — SODIUM CHLORIDE 9 MG/ML
10 INJECTION INTRAMUSCULAR; INTRAVENOUS; SUBCUTANEOUS AS NEEDED
Status: ACTIVE | OUTPATIENT
Start: 2021-01-01 | End: 2021-01-01

## 2021-01-01 RX ORDER — DEXAMETHASONE SODIUM PHOSPHATE 4 MG/ML
8 INJECTION, SOLUTION INTRA-ARTICULAR; INTRALESIONAL; INTRAMUSCULAR; INTRAVENOUS; SOFT TISSUE ONCE
Status: COMPLETED | OUTPATIENT
Start: 2021-01-01 | End: 2021-01-01

## 2021-01-01 RX ORDER — ONDANSETRON 2 MG/ML
8 INJECTION INTRAMUSCULAR; INTRAVENOUS AS NEEDED
Status: CANCELLED | OUTPATIENT
Start: 2021-01-01

## 2021-01-01 RX ORDER — DIPHENHYDRAMINE HYDROCHLORIDE 50 MG/ML
50 INJECTION, SOLUTION INTRAMUSCULAR; INTRAVENOUS AS NEEDED
Status: CANCELLED
Start: 2021-01-01

## 2021-01-01 RX ORDER — ACETAMINOPHEN 325 MG/1
650 TABLET ORAL AS NEEDED
Status: CANCELLED
Start: 2021-01-01

## 2021-01-01 RX ORDER — HEPARIN 100 UNIT/ML
300-500 SYRINGE INTRAVENOUS AS NEEDED
Status: ACTIVE | OUTPATIENT
Start: 2021-01-01 | End: 2021-01-01

## 2021-01-01 RX ORDER — DEXAMETHASONE SODIUM PHOSPHATE 4 MG/ML
8 INJECTION, SOLUTION INTRA-ARTICULAR; INTRALESIONAL; INTRAMUSCULAR; INTRAVENOUS; SOFT TISSUE ONCE
Status: CANCELLED
Start: 2021-01-01 | End: 2021-01-01

## 2021-01-01 RX ORDER — ALBUTEROL SULFATE 0.83 MG/ML
2.5 SOLUTION RESPIRATORY (INHALATION) AS NEEDED
Status: CANCELLED
Start: 2021-01-01

## 2021-01-01 RX ORDER — ACETAMINOPHEN 325 MG/1
650 TABLET ORAL ONCE
Status: COMPLETED | OUTPATIENT
Start: 2021-01-01 | End: 2021-01-01

## 2021-01-01 RX ORDER — DIPHENHYDRAMINE HYDROCHLORIDE 50 MG/ML
50 INJECTION, SOLUTION INTRAMUSCULAR; INTRAVENOUS ONCE
Status: CANCELLED | OUTPATIENT
Start: 2021-01-01 | End: 2021-01-01

## 2021-01-01 RX ORDER — ONDANSETRON 2 MG/ML
8 INJECTION INTRAMUSCULAR; INTRAVENOUS ONCE
Status: CANCELLED
Start: 2021-01-01 | End: 2021-01-01

## 2021-01-01 RX ORDER — ACETAMINOPHEN 325 MG/1
650 TABLET ORAL ONCE
Status: CANCELLED
Start: 2021-01-01 | End: 2021-01-01

## 2021-01-01 RX ORDER — HEPARIN 100 UNIT/ML
300 SYRINGE INTRAVENOUS AS NEEDED
Status: DISCONTINUED | OUTPATIENT
Start: 2021-01-01 | End: 2021-01-01 | Stop reason: HOSPADM

## 2021-01-01 RX ORDER — OXYCODONE HYDROCHLORIDE 5 MG/1
5-10 TABLET ORAL
Qty: 60 TABLET | Refills: 0 | Status: SHIPPED | OUTPATIENT
Start: 2021-01-01 | End: 2021-01-01

## 2021-01-01 RX ORDER — DIPHENHYDRAMINE HYDROCHLORIDE 50 MG/ML
50 INJECTION, SOLUTION INTRAMUSCULAR; INTRAVENOUS AS NEEDED
Status: DISCONTINUED | OUTPATIENT
Start: 2021-01-01 | End: 2021-01-01 | Stop reason: HOSPADM

## 2021-01-01 RX ORDER — HEPARIN 100 UNIT/ML
300 SYRINGE INTRAVENOUS ONCE
Status: COMPLETED | OUTPATIENT
Start: 2021-01-01 | End: 2021-01-01

## 2021-01-01 RX ORDER — DIPHENHYDRAMINE HYDROCHLORIDE 50 MG/ML
50 INJECTION, SOLUTION INTRAMUSCULAR; INTRAVENOUS ONCE
Status: COMPLETED | OUTPATIENT
Start: 2021-01-01 | End: 2021-01-01

## 2021-01-01 RX ORDER — ATROPINE SULFATE 0.4 MG/ML
0.4 INJECTION, SOLUTION ENDOTRACHEAL; INTRAMEDULLARY; INTRAMUSCULAR; INTRAVENOUS; SUBCUTANEOUS
Status: CANCELLED | OUTPATIENT
Start: 2021-01-01

## 2021-01-01 RX ORDER — HEPARIN 100 UNIT/ML
300 SYRINGE INTRAVENOUS AS NEEDED
Status: DISCONTINUED | OUTPATIENT
Start: 2021-01-01 | End: 2021-01-01

## 2021-01-01 RX ORDER — LIDOCAINE 40 MG/G
CREAM TOPICAL
Qty: 15 G | Refills: 4 | Status: SHIPPED | OUTPATIENT
Start: 2021-01-01 | End: 2022-01-01

## 2021-01-01 RX ORDER — ONDANSETRON 4 MG/1
TABLET, ORALLY DISINTEGRATING ORAL
COMMUNITY
Start: 2021-01-01 | End: 2022-01-01

## 2021-01-01 RX ORDER — FENTANYL CITRATE 50 UG/ML
100 INJECTION, SOLUTION INTRAMUSCULAR; INTRAVENOUS
Status: DISCONTINUED | OUTPATIENT
Start: 2021-01-01 | End: 2021-01-01

## 2021-01-01 RX ORDER — MIDAZOLAM HYDROCHLORIDE 1 MG/ML
1-5 INJECTION, SOLUTION INTRAMUSCULAR; INTRAVENOUS
Status: DISCONTINUED | OUTPATIENT
Start: 2021-01-01 | End: 2021-01-01

## 2021-01-01 RX ORDER — LIDOCAINE HYDROCHLORIDE AND EPINEPHRINE 10; 10 MG/ML; UG/ML
20 INJECTION, SOLUTION INFILTRATION; PERINEURAL ONCE
Status: COMPLETED | OUTPATIENT
Start: 2021-01-01 | End: 2021-01-01

## 2021-01-01 RX ORDER — PANTOPRAZOLE SODIUM 40 MG/1
40 TABLET, DELAYED RELEASE ORAL DAILY
Qty: 30 TABLET | Refills: 3 | Status: SHIPPED | OUTPATIENT
Start: 2021-01-01 | End: 2022-01-01

## 2021-01-01 RX ORDER — SODIUM CHLORIDE 9 MG/ML
25 INJECTION, SOLUTION INTRAVENOUS CONTINUOUS
Status: CANCELLED | OUTPATIENT
Start: 2021-01-01

## 2021-01-01 RX ORDER — OXYCODONE HYDROCHLORIDE 10 MG/1
10 TABLET ORAL
Qty: 180 TABLET | Refills: 0 | Status: SHIPPED | OUTPATIENT
Start: 2022-01-01 | End: 2022-01-01 | Stop reason: SDUPTHER

## 2021-01-01 RX ORDER — PROCHLORPERAZINE MALEATE 10 MG
10 TABLET ORAL
Qty: 60 TABLET | Refills: 3 | Status: SHIPPED | OUTPATIENT
Start: 2021-01-01 | End: 2021-01-01

## 2021-01-01 RX ORDER — SODIUM CHLORIDE 0.9 % (FLUSH) 0.9 %
10 SYRINGE (ML) INJECTION AS NEEDED
Status: CANCELLED | OUTPATIENT
Start: 2021-01-01

## 2021-01-01 RX ORDER — POTASSIUM CHLORIDE 750 MG/1
40 TABLET, FILM COATED, EXTENDED RELEASE ORAL
Status: COMPLETED | OUTPATIENT
Start: 2021-01-01 | End: 2021-01-01

## 2021-01-01 RX ORDER — SODIUM CHLORIDE 9 MG/ML
25 INJECTION, SOLUTION INTRAVENOUS CONTINUOUS
Status: DISCONTINUED | OUTPATIENT
Start: 2021-01-01 | End: 2021-01-01

## 2021-01-01 RX ORDER — DRONABINOL 2.5 MG/1
2.5 CAPSULE ORAL 2 TIMES DAILY
Qty: 60 CAPSULE | Refills: 1 | Status: SHIPPED | OUTPATIENT
Start: 2021-01-01 | End: 2022-01-01

## 2021-01-01 RX ORDER — OXYCODONE HYDROCHLORIDE 10 MG/1
10 TABLET ORAL
Qty: 120 TABLET | Refills: 0 | Status: SHIPPED | OUTPATIENT
Start: 2021-01-01 | End: 2021-01-01

## 2021-01-01 RX ORDER — HYDROCORTISONE SODIUM SUCCINATE 100 MG/2ML
100 INJECTION, POWDER, FOR SOLUTION INTRAMUSCULAR; INTRAVENOUS AS NEEDED
Status: DISCONTINUED | OUTPATIENT
Start: 2021-01-01 | End: 2021-01-01 | Stop reason: HOSPADM

## 2021-01-01 RX ORDER — LIDOCAINE HYDROCHLORIDE 20 MG/ML
15 INJECTION, SOLUTION INFILTRATION; PERINEURAL ONCE
Status: COMPLETED | OUTPATIENT
Start: 2021-01-01 | End: 2021-01-01

## 2021-01-01 RX ORDER — TRAMADOL HYDROCHLORIDE 50 MG/1
50 TABLET ORAL
Qty: 30 TABLET | Refills: 0 | Status: SHIPPED | OUTPATIENT
Start: 2021-01-01 | End: 2021-01-01

## 2021-01-01 RX ORDER — LIDOCAINE AND PRILOCAINE 25; 25 MG/G; MG/G
CREAM TOPICAL AS NEEDED
Qty: 30 G | Refills: 2 | Status: SHIPPED | OUTPATIENT
Start: 2021-01-01 | End: 2022-01-01

## 2021-01-01 RX ORDER — HEPARIN SODIUM 200 [USP'U]/100ML
400 INJECTION, SOLUTION INTRAVENOUS ONCE
Status: COMPLETED | OUTPATIENT
Start: 2021-01-01 | End: 2021-01-01

## 2021-01-01 RX ORDER — ONDANSETRON 4 MG/1
4 TABLET, ORALLY DISINTEGRATING ORAL
Qty: 40 TABLET | Refills: 2 | Status: SHIPPED | OUTPATIENT
Start: 2021-01-01 | End: 2021-01-01

## 2021-01-01 RX ORDER — MEGESTROL ACETATE 40 MG/1
40 TABLET ORAL 2 TIMES DAILY
Qty: 60 TABLET | Refills: 2 | Status: SHIPPED | OUTPATIENT
Start: 2021-01-01 | End: 2022-01-01

## 2021-01-01 RX ADMIN — CARBOPLATIN 300 MG: 10 INJECTION INTRAVENOUS at 13:15

## 2021-01-01 RX ADMIN — SODIUM CHLORIDE 25 ML/HR: 900 INJECTION, SOLUTION INTRAVENOUS at 11:22

## 2021-01-01 RX ADMIN — IOPAMIDOL 100 ML: 755 INJECTION, SOLUTION INTRAVENOUS at 13:27

## 2021-01-01 RX ADMIN — PALONOSETRON 0.25 MG: 0.05 INJECTION, SOLUTION INTRAVENOUS at 11:44

## 2021-01-01 RX ADMIN — SACITUZUMAB GOVITECAN 180 MG: 180 POWDER, FOR SOLUTION INTRAVENOUS at 12:51

## 2021-01-01 RX ADMIN — SODIUM CHLORIDE 200 MG: 9 INJECTION, SOLUTION INTRAVENOUS at 11:25

## 2021-01-01 RX ADMIN — FAMOTIDINE 20 MG: 10 INJECTION INTRAVENOUS at 11:47

## 2021-01-01 RX ADMIN — LIDOCAINE HYDROCHLORIDE AND EPINEPHRINE 100 MG: 10; 10 INJECTION, SOLUTION INFILTRATION; PERINEURAL at 08:25

## 2021-01-01 RX ADMIN — SACITUZUMAB GOVITECAN 620 MG: 180 POWDER, FOR SOLUTION INTRAVENOUS at 12:35

## 2021-01-01 RX ADMIN — Medication 10 ML: at 10:07

## 2021-01-01 RX ADMIN — LIDOCAINE HYDROCHLORIDE 200 MG: 20 INJECTION, SOLUTION INFILTRATION; PERINEURAL at 08:25

## 2021-01-01 RX ADMIN — Medication 10 ML: at 10:05

## 2021-01-01 RX ADMIN — Medication 500 UNITS: at 13:50

## 2021-01-01 RX ADMIN — MIDAZOLAM 2 MG: 1 INJECTION INTRAMUSCULAR; INTRAVENOUS at 08:27

## 2021-01-01 RX ADMIN — GEMCITABINE HYDROCHLORIDE 1970 MG: 2 INJECTION, SOLUTION INTRAVENOUS at 13:25

## 2021-01-01 RX ADMIN — FENTANYL CITRATE 50 MCG: 50 INJECTION, SOLUTION INTRAMUSCULAR; INTRAVENOUS at 08:20

## 2021-01-01 RX ADMIN — FAMOTIDINE 20 MG: 10 INJECTION INTRAVENOUS at 12:08

## 2021-01-01 RX ADMIN — PALONOSETRON 0.25 MG: 0.05 INJECTION, SOLUTION INTRAVENOUS at 11:28

## 2021-01-01 RX ADMIN — DEXAMETHASONE SODIUM PHOSPHATE 12 MG: 4 INJECTION, SOLUTION INTRAMUSCULAR; INTRAVENOUS at 12:11

## 2021-01-01 RX ADMIN — ACETAMINOPHEN 650 MG: 325 TABLET ORAL at 11:42

## 2021-01-01 RX ADMIN — PEGFILGRASTIM 6 MG: KIT SUBCUTANEOUS at 13:31

## 2021-01-01 RX ADMIN — SODIUM CHLORIDE 25 ML/HR: 900 INJECTION, SOLUTION INTRAVENOUS at 11:48

## 2021-01-01 RX ADMIN — DIPHENHYDRAMINE HYDROCHLORIDE 50 MG: 50 INJECTION, SOLUTION INTRAMUSCULAR; INTRAVENOUS at 11:30

## 2021-01-01 RX ADMIN — SODIUM CHLORIDE 1576 MG: 9 INJECTION, SOLUTION INTRAVENOUS at 12:49

## 2021-01-01 RX ADMIN — Medication 10 ML: at 10:45

## 2021-01-01 RX ADMIN — PALONOSETRON 0.25 MG: 0.05 INJECTION, SOLUTION INTRAVENOUS at 12:04

## 2021-01-01 RX ADMIN — HEPARIN 500 UNITS: 100 SYRINGE at 13:20

## 2021-01-01 RX ADMIN — HEPARIN 500 UNITS: 100 SYRINGE at 14:24

## 2021-01-01 RX ADMIN — Medication 10 ML: at 13:38

## 2021-01-01 RX ADMIN — FAMOTIDINE 20 MG: 10 INJECTION INTRAVENOUS at 11:48

## 2021-01-01 RX ADMIN — Medication 10 ML: at 14:24

## 2021-01-01 RX ADMIN — ONDANSETRON 8 MG: 2 INJECTION INTRAMUSCULAR; INTRAVENOUS at 11:05

## 2021-01-01 RX ADMIN — IOHEXOL 50 ML: 240 INJECTION, SOLUTION INTRATHECAL; INTRAVASCULAR; INTRAVENOUS; ORAL at 13:28

## 2021-01-01 RX ADMIN — HEPARIN 500 UNITS: 100 SYRINGE at 13:26

## 2021-01-01 RX ADMIN — Medication 500 UNITS: at 17:04

## 2021-01-01 RX ADMIN — DIPHENHYDRAMINE HYDROCHLORIDE 50 MG: 50 INJECTION, SOLUTION INTRAMUSCULAR; INTRAVENOUS at 11:46

## 2021-01-01 RX ADMIN — ACETAMINOPHEN 650 MG: 325 TABLET ORAL at 11:38

## 2021-01-01 RX ADMIN — IOHEXOL 50 ML: 240 INJECTION, SOLUTION INTRATHECAL; INTRAVASCULAR; INTRAVENOUS; ORAL at 10:35

## 2021-01-01 RX ADMIN — Medication 500 UNITS: at 14:00

## 2021-01-01 RX ADMIN — DIPHENHYDRAMINE HYDROCHLORIDE 50 MG: 50 INJECTION, SOLUTION INTRAMUSCULAR; INTRAVENOUS at 11:41

## 2021-01-01 RX ADMIN — IOPAMIDOL 100 ML: 755 INJECTION, SOLUTION INTRAVENOUS at 10:34

## 2021-01-01 RX ADMIN — DEXAMETHASONE SODIUM PHOSPHATE 8 MG: 4 INJECTION, SOLUTION INTRAMUSCULAR; INTRAVENOUS at 11:51

## 2021-01-01 RX ADMIN — SODIUM CHLORIDE 10 ML: 9 INJECTION INTRAMUSCULAR; INTRAVENOUS; SUBCUTANEOUS at 10:05

## 2021-01-01 RX ADMIN — SODIUM CHLORIDE 25 ML/HR: 9 INJECTION, SOLUTION INTRAVENOUS at 11:42

## 2021-01-01 RX ADMIN — DEXAMETHASONE SODIUM PHOSPHATE 12 MG: 4 INJECTION, SOLUTION INTRA-ARTICULAR; INTRALESIONAL; INTRAMUSCULAR; INTRAVENOUS; SOFT TISSUE at 11:50

## 2021-01-01 RX ADMIN — DIPHENHYDRAMINE HYDROCHLORIDE 50 MG: 50 INJECTION, SOLUTION INTRAMUSCULAR; INTRAVENOUS at 11:45

## 2021-01-01 RX ADMIN — IOHEXOL 50 ML: 240 INJECTION, SOLUTION INTRATHECAL; INTRAVASCULAR; INTRAVENOUS; ORAL at 12:29

## 2021-01-01 RX ADMIN — WATER 2 G: 1 INJECTION INTRAMUSCULAR; INTRAVENOUS; SUBCUTANEOUS at 08:15

## 2021-01-01 RX ADMIN — PALONOSETRON 0.25 MG: 0.25 INJECTION, SOLUTION INTRAVENOUS at 11:39

## 2021-01-01 RX ADMIN — Medication 10 ML: at 10:09

## 2021-01-01 RX ADMIN — DIPHENHYDRAMINE HYDROCHLORIDE 50 MG: 50 INJECTION, SOLUTION INTRAMUSCULAR; INTRAVENOUS at 12:04

## 2021-01-01 RX ADMIN — SACITUZUMAB GOVITECAN 620 MG: 180 POWDER, FOR SOLUTION INTRAVENOUS at 12:42

## 2021-01-01 RX ADMIN — SODIUM CHLORIDE 25 ML/HR: 900 INJECTION, SOLUTION INTRAVENOUS at 11:37

## 2021-01-01 RX ADMIN — SODIUM CHLORIDE 200 MG: 9 INJECTION, SOLUTION INTRAVENOUS at 13:25

## 2021-01-01 RX ADMIN — Medication 500 UNITS: at 13:59

## 2021-01-01 RX ADMIN — SACITUZUMAB GOVITECAN 646 MG: 180 POWDER, FOR SOLUTION INTRAVENOUS at 14:03

## 2021-01-01 RX ADMIN — DEXAMETHASONE SODIUM PHOSPHATE 12 MG: 4 INJECTION, SOLUTION INTRA-ARTICULAR; INTRALESIONAL; INTRAMUSCULAR; INTRAVENOUS; SOFT TISSUE at 11:40

## 2021-01-01 RX ADMIN — HEPARIN SODIUM IN SODIUM CHLORIDE 800 UNITS: 200 INJECTION INTRAVENOUS at 08:42

## 2021-01-01 RX ADMIN — ONDANSETRON 8 MG: 2 INJECTION INTRAMUSCULAR; INTRAVENOUS at 11:53

## 2021-01-01 RX ADMIN — Medication 10 ML: at 13:26

## 2021-01-01 RX ADMIN — Medication 10 ML: at 14:00

## 2021-01-01 RX ADMIN — ONDANSETRON 8 MG: 2 INJECTION INTRAMUSCULAR; INTRAVENOUS at 11:49

## 2021-01-01 RX ADMIN — DEXAMETHASONE SODIUM PHOSPHATE 8 MG: 4 INJECTION, SOLUTION INTRAMUSCULAR; INTRAVENOUS at 11:04

## 2021-01-01 RX ADMIN — SODIUM CHLORIDE 25 ML/HR: 9 INJECTION, SOLUTION INTRAVENOUS at 11:46

## 2021-01-01 RX ADMIN — FENTANYL CITRATE 25 MCG: 50 INJECTION, SOLUTION INTRAMUSCULAR; INTRAVENOUS at 08:33

## 2021-01-01 RX ADMIN — MIDAZOLAM 1 MG: 1 INJECTION INTRAMUSCULAR; INTRAVENOUS at 08:33

## 2021-01-01 RX ADMIN — IOPAMIDOL 100 ML: 755 INJECTION, SOLUTION INTRAVENOUS at 12:29

## 2021-01-01 RX ADMIN — SODIUM CHLORIDE 25 ML/HR: 900 INJECTION, SOLUTION INTRAVENOUS at 10:53

## 2021-01-01 RX ADMIN — HEPARIN 500 UNITS: 100 SYRINGE at 13:52

## 2021-01-01 RX ADMIN — DEXAMETHASONE SODIUM PHOSPHATE 12 MG: 4 INJECTION, SOLUTION INTRAMUSCULAR; INTRAVENOUS at 11:45

## 2021-01-01 RX ADMIN — SODIUM CHLORIDE 25 ML/HR: 900 INJECTION, SOLUTION INTRAVENOUS at 12:09

## 2021-01-01 RX ADMIN — HEPARIN 500 UNITS: 100 SYRINGE at 14:00

## 2021-01-01 RX ADMIN — FENTANYL CITRATE 25 MCG: 50 INJECTION, SOLUTION INTRAMUSCULAR; INTRAVENOUS at 08:27

## 2021-01-01 RX ADMIN — DEXAMETHASONE SODIUM PHOSPHATE 12 MG: 4 INJECTION, SOLUTION INTRA-ARTICULAR; INTRALESIONAL; INTRAMUSCULAR; INTRAVENOUS; SOFT TISSUE at 12:24

## 2021-01-01 RX ADMIN — SACITUZUMAB GOVITECAN 620 MG: 180 POWDER, FOR SOLUTION INTRAVENOUS at 12:45

## 2021-01-01 RX ADMIN — SODIUM CHLORIDE 25 ML/HR: 900 INJECTION, SOLUTION INTRAVENOUS at 12:00

## 2021-01-01 RX ADMIN — HEPARIN 500 UNITS: 100 SYRINGE at 13:38

## 2021-01-01 RX ADMIN — DEXAMETHASONE SODIUM PHOSPHATE 8 MG: 4 INJECTION, SOLUTION INTRAMUSCULAR; INTRAVENOUS at 11:53

## 2021-01-01 RX ADMIN — SODIUM CHLORIDE, PRESERVATIVE FREE 300 UNITS: 5 INJECTION INTRAVENOUS at 13:28

## 2021-01-01 RX ADMIN — GEMCITABINE HYDROCHLORIDE 1970 MG: 2 INJECTION, SOLUTION INTRAVENOUS at 13:50

## 2021-01-01 RX ADMIN — Medication 10 ML: at 13:19

## 2021-01-01 RX ADMIN — POTASSIUM CHLORIDE 40 MEQ: 750 TABLET, FILM COATED, EXTENDED RELEASE ORAL at 11:46

## 2021-01-01 RX ADMIN — Medication 10 ML: at 13:51

## 2021-01-01 RX ADMIN — FAMOTIDINE 20 MG: 10 INJECTION, SOLUTION INTRAVENOUS at 11:42

## 2021-01-01 RX ADMIN — ACETAMINOPHEN 650 MG: 325 TABLET ORAL at 12:02

## 2021-01-01 RX ADMIN — PALONOSETRON 0.25 MG: 0.05 INJECTION, SOLUTION INTRAVENOUS at 11:46

## 2021-01-01 RX ADMIN — PEGFILGRASTIM 6 MG: KIT SUBCUTANEOUS at 13:50

## 2021-01-01 RX ADMIN — ACETAMINOPHEN 650 MG: 325 TABLET ORAL at 12:05

## 2021-01-01 RX ADMIN — FAMOTIDINE 20 MG: 10 INJECTION INTRAVENOUS at 12:05

## 2021-01-01 RX ADMIN — ACETAMINOPHEN 650 MG: 325 TABLET ORAL at 11:27

## 2021-01-01 RX ADMIN — SODIUM CHLORIDE 25 ML/HR: 900 INJECTION, SOLUTION INTRAVENOUS at 11:53

## 2021-01-01 RX ADMIN — SODIUM CHLORIDE, PRESERVATIVE FREE 300 UNITS: 5 INJECTION INTRAVENOUS at 08:25

## 2021-01-01 RX ADMIN — FAMOTIDINE 20 MG: 10 INJECTION INTRAVENOUS at 11:33

## 2021-01-01 RX ADMIN — SODIUM CHLORIDE 25 ML/HR: 9 INJECTION, SOLUTION INTRAVENOUS at 07:46

## 2021-01-01 RX ADMIN — SACITUZUMAB GOVITECAN 620 MG: 180 POWDER, FOR SOLUTION INTRAVENOUS at 12:15

## 2021-01-01 RX ADMIN — CARBOPLATIN 300 MG: 10 INJECTION INTRAVENOUS at 12:50

## 2021-01-01 RX ADMIN — SODIUM CHLORIDE 10 ML: 9 INJECTION INTRAMUSCULAR; INTRAVENOUS; SUBCUTANEOUS at 17:04

## 2021-01-01 RX ADMIN — Medication 10 ML: at 10:08

## 2021-01-01 RX ADMIN — SODIUM CHLORIDE 10 ML: 9 INJECTION INTRAMUSCULAR; INTRAVENOUS; SUBCUTANEOUS at 10:10

## 2021-01-01 RX ADMIN — DIPHENHYDRAMINE HYDROCHLORIDE 50 MG: 50 INJECTION, SOLUTION INTRAMUSCULAR; INTRAVENOUS at 12:06

## 2021-01-01 RX ADMIN — SODIUM CHLORIDE 25 ML/HR: 900 INJECTION, SOLUTION INTRAVENOUS at 12:04

## 2021-01-01 RX ADMIN — ACETAMINOPHEN 650 MG: 325 TABLET ORAL at 11:46

## 2021-01-01 RX ADMIN — MIDAZOLAM 2 MG: 1 INJECTION INTRAMUSCULAR; INTRAVENOUS at 08:22

## 2021-01-01 RX ADMIN — Medication 10 ML: at 13:59

## 2021-01-01 RX ADMIN — Medication 10 ML: at 13:50

## 2021-01-01 RX ADMIN — PALONOSETRON 0.25 MG: 0.05 INJECTION, SOLUTION INTRAVENOUS at 12:03

## 2021-01-26 NOTE — PROGRESS NOTES
Hortensia Horner is a 63 y.o. female here for follow up for left breast cancer. Here for research follow up. 1. Have you been to the ER, urgent care clinic since your last visit? Hospitalized since your last visit? Last Thursday for sinus infection. Was put on antibiotics,  prednisone, and cough syrup. 2. Have you seen or consulted any other health care providers outside of the [address] [place] [address] since your last visit? Include any pap smears or colon screening. No    Pt states other than the above ER visit she has been doing well. Next Month's Dosage: Discontinue

## 2021-01-27 ENCOUNTER — RESEARCH ENCOUNTER (OUTPATIENT)
Dept: ONCOLOGY | Age: 61
End: 2021-01-27

## 2021-01-27 ENCOUNTER — OFFICE VISIT (OUTPATIENT)
Dept: ONCOLOGY | Age: 61
End: 2021-01-27
Payer: COMMERCIAL

## 2021-01-27 ENCOUNTER — HOSPITAL ENCOUNTER (OUTPATIENT)
Dept: INFUSION THERAPY | Age: 61
Discharge: HOME | End: 2021-01-27
Payer: COMMERCIAL

## 2021-01-27 VITALS
SYSTOLIC BLOOD PRESSURE: 153 MMHG | HEIGHT: 62 IN | DIASTOLIC BLOOD PRESSURE: 87 MMHG | HEART RATE: 89 BPM | OXYGEN SATURATION: 98 % | TEMPERATURE: 98.1 F | WEIGHT: 209.9 LBS | RESPIRATION RATE: 18 BRPM | BODY MASS INDEX: 38.63 KG/M2

## 2021-01-27 VITALS
SYSTOLIC BLOOD PRESSURE: 153 MMHG | HEIGHT: 62 IN | TEMPERATURE: 98.1 F | DIASTOLIC BLOOD PRESSURE: 87 MMHG | OXYGEN SATURATION: 98 % | BODY MASS INDEX: 38.46 KG/M2 | RESPIRATION RATE: 18 BRPM | HEART RATE: 89 BPM | WEIGHT: 209 LBS

## 2021-01-27 DIAGNOSIS — Z85.3 HISTORY OF LEFT BREAST CANCER: Primary | ICD-10-CM

## 2021-01-27 LAB
ALBUMIN SERPL-MCNC: 3.3 G/DL (ref 3.5–5)
ALBUMIN/GLOB SERPL: 0.9 {RATIO} (ref 1.1–2.2)
ALP SERPL-CCNC: 116 U/L (ref 45–117)
ALT SERPL-CCNC: 39 U/L (ref 12–78)
ANION GAP SERPL CALC-SCNC: 4 MMOL/L (ref 5–15)
AST SERPL-CCNC: 14 U/L (ref 15–37)
BASOPHILS # BLD: 0 K/UL (ref 0–0.1)
BASOPHILS NFR BLD: 0 % (ref 0–1)
BILIRUB SERPL-MCNC: 0.6 MG/DL (ref 0.2–1)
BUN SERPL-MCNC: 16 MG/DL (ref 6–20)
BUN/CREAT SERPL: 18 (ref 12–20)
CALCIUM SERPL-MCNC: 8.8 MG/DL (ref 8.5–10.1)
CHLORIDE SERPL-SCNC: 103 MMOL/L (ref 97–108)
CO2 SERPL-SCNC: 29 MMOL/L (ref 21–32)
CREAT SERPL-MCNC: 0.89 MG/DL (ref 0.55–1.02)
DIFFERENTIAL METHOD BLD: ABNORMAL
EOSINOPHIL # BLD: 0.1 K/UL (ref 0–0.4)
EOSINOPHIL NFR BLD: 2 % (ref 0–7)
ERYTHROCYTE [DISTWIDTH] IN BLOOD BY AUTOMATED COUNT: 15.1 % (ref 11.5–14.5)
GLOBULIN SER CALC-MCNC: 3.7 G/DL (ref 2–4)
GLUCOSE SERPL-MCNC: 98 MG/DL (ref 65–100)
HCT VFR BLD AUTO: 39.4 % (ref 35–47)
HGB BLD-MCNC: 12.3 G/DL (ref 11.5–16)
IMM GRANULOCYTES # BLD AUTO: 0 K/UL (ref 0–0.04)
IMM GRANULOCYTES NFR BLD AUTO: 0 % (ref 0–0.5)
LYMPHOCYTES # BLD: 1.8 K/UL (ref 0.8–3.5)
LYMPHOCYTES NFR BLD: 31 % (ref 12–49)
MCH RBC QN AUTO: 27.8 PG (ref 26–34)
MCHC RBC AUTO-ENTMCNC: 31.2 G/DL (ref 30–36.5)
MCV RBC AUTO: 89.1 FL (ref 80–99)
MONOCYTES # BLD: 0.4 K/UL (ref 0–1)
MONOCYTES NFR BLD: 6 % (ref 5–13)
NEUTS SEG # BLD: 3.6 K/UL (ref 1.8–8)
NEUTS SEG NFR BLD: 61 % (ref 32–75)
NRBC # BLD: 0 K/UL (ref 0–0.01)
NRBC BLD-RTO: 0 PER 100 WBC
PLATELET # BLD AUTO: 315 K/UL (ref 150–400)
PMV BLD AUTO: 9.9 FL (ref 8.9–12.9)
POTASSIUM SERPL-SCNC: 3.7 MMOL/L (ref 3.5–5.1)
PROT SERPL-MCNC: 7 G/DL (ref 6.4–8.2)
RBC # BLD AUTO: 4.42 M/UL (ref 3.8–5.2)
SODIUM SERPL-SCNC: 136 MMOL/L (ref 136–145)
WBC # BLD AUTO: 6 K/UL (ref 3.6–11)

## 2021-01-27 PROCEDURE — 99213 OFFICE O/P EST LOW 20 MIN: CPT | Performed by: INTERNAL MEDICINE

## 2021-01-27 PROCEDURE — 36415 COLL VENOUS BLD VENIPUNCTURE: CPT

## 2021-01-27 PROCEDURE — 85025 COMPLETE CBC W/AUTO DIFF WBC: CPT

## 2021-01-27 PROCEDURE — 80053 COMPREHEN METABOLIC PANEL: CPT

## 2021-01-27 RX ORDER — AMOXICILLIN 875 MG/1
875 TABLET, FILM COATED ORAL 2 TIMES DAILY
COMMUNITY
End: 2021-01-01

## 2021-01-27 NOTE — PROGRESS NOTES
Outpatient [facility]fusion [facility] Note    5443 - Pt admit to [facility] for lab draw in stable condition. Assessment completed. Patient denied having any symptoms of COVID-19, i.e. SOB, coughing, fever, or generally not feeling well. Also denies having been exposed to COVID-19 recently or having had any recent contact with family/friend that has a pending COVID test.     Labs drawn peripherally from [facility]. Patient Vitals for the past 12 hrs:   Temp Pulse Resp BP SpO2   01/27/21 1357 98.1 °F (36.7 °C) 89 18 (!) 153/87 98 %        Recent Results (from the past 12 hour(s))   METABOLIC PANEL, COMPREHENSIVE    Collection Time: 01/27/21  2:18 PM   Result Value Ref Range    Sodium 136 136 - 145 mmol/L    Potassium 3.7 3.5 - 5.1 mmol/L    Chloride 103 97 - 108 mmol/L    CO2 29 21 - 32 mmol/L    Anion gap 4 (L) 5 - 15 mmol/L    Glucose 98 65 - 100 mg/dL    BUN 16 6 - 20 MG/DL    Creatinine 0.89 0.55 - 1.02 MG/DL    BUN/Creatinine ratio 18 12 - 20      GFR est AA >60 >60 ml/min/1.73m2    GFR est non-AA >60 >60 ml/min/1.73m2    Calcium 8.8 8.5 - 10.1 MG/DL    Bilirubin, total 0.6 0.2 - 1.0 MG/DL    ALT (SGPT) 39 12 - 78 U/L    AST (SGOT) 14 (L) 15 - 37 U/L    Alk. phosphatase 116 45 - 117 U/L    Protein, total 7.0 6.4 - 8.2 g/dL    Albumin 3.3 (L) 3.5 - 5.0 g/dL    Globulin 3.7 2.0 - 4.0 g/dL    A-G Ratio 0.9 (L) 1.1 - 2.2     CBC WITH AUTOMATED DIFF    Collection Time: 01/27/21  2:18 PM   Result Value Ref Range    WBC 6.0 3.6 - 11.0 K/uL    RBC 4.42 3.80 - 5.20 M/uL    HGB 12.3 11.5 - 16.0 g/dL    HCT 39.4 35.0 - 47.0 %    MCV 89.1 80.0 - 99.0 FL    MCH 27.8 26.0 - 34.0 PG    MCHC 31.2 30.0 - 36.5 g/dL    RDW 15.1 (H) 11.5 - 14.5 %    PLATELET 920 852 - 029 K/uL    MPV 9.9 8.9 - 12.9 FL    NRBC 0.0 0  WBC    ABSOLUTE NRBC 0.00 0.00 - 0.01 K/uL    NEUTROPHILS 61 32 - 75 %    LYMPHOCYTES 31 12 - 49 %    MONOCYTES 6 5 - 13 %    EOSINOPHILS 2 0 - 7 %    BASOPHILS 0 0 - 1 %    IMMATURE GRANULOCYTES 0 0.0 - 0.5 %    ABS.  NEUTROPHILS 3.6 1.8 - 8.0 K/UL    ABS. LYMPHOCYTES 1.8 0.8 - 3.5 K/UL    ABS. MONOCYTES 0.4 0.0 - 1.0 K/UL    ABS. EOSINOPHILS 0.1 0.0 - 0.4 K/UL    ABS. BASOPHILS 0.0 0.0 - 0.1 K/UL    ABS. IMM. GRANS. 0.0 0.00 - 0.04 K/UL    DF AUTOMATED          1420 - D/c home in no distress. No further appt scheduled at this time.      Future Appointments   Date Time Provider Jyotsna Burns   3/29/2021  9:30 AM Rui Hamlin NP [facility] AMB   7/28/2021 11:00 AM Chandu Birmingham MD [facility] AMB

## 2021-01-27 NOTE — PROGRESS NOTES
Pt in for labs and follow up visit today per protocol with Dr. Imelda Lanes and RN. Today is week 55 of trial.  QOLs completed per protocol. Vital signs are stable. Next appointment is scheduled for 6 months.

## 2021-01-27 NOTE — LETTER
2/7/2021 Patient: Efrem Jarquin YOB: 1960 Date of Visit: 1/27/2021 Francisca Montero MD 
[address] [address] [address] [employer] Dear Francisca Montero MD, Thank you for referring Ms. David Salas to [address] [place] [address] for evaluation. My notes for this consultation are attached. If you have questions, please do not hesitate to call me. I look forward to following your patient along with you.  
 
 
Sincerely, 
 
Radu Perez MD

## 2021-02-08 NOTE — PROGRESS NOTES
[facility]  [address], [address] [facility] Juan David Diaz, [address]  [phone]      Follow-up Note        Patient: Natalie Kelly MRN: [id number]  SSN: [id number]    YOB: 1960  Age: 63 y.o. Sex: female        Diagnosis:     1. Left breast carcinoma:  T1c N1mi M0 (Stage I) infiltrating ductal carcinoma, Tumor size 1.6 cm, LN 1/3 with micromet, grade 3, ER -ve, ND -ve, Her 2 -ve    ypT2 N0    Treatment:     1. Neoadjuvant chemotherapy   Adriamycin, cytoxan - s/p 4 cycles   Weekly Taxol - s/p 12 cycles  2. S/p left lumpectomy on 9/5/2019 by Dr. Joseluis Menchaca    Subjective:      Natalie Kelly is a 63 y.o. female with a diagnosis of left sided invasive breast cancer. She felt a lump in the left breast, went to see her PCP, got a mammogram and was noted to have abnormal density in the left upper outer quadrant of the breast. A biopsy of the mass revealed gr 3 IDC, TNBC. The axillary LN is clear of disease. She saw Dr. Domenica Zayas. An MRI of the breast shows the tumor to be larger than previously believed to be. A left axillary LN biopsy showed 1/3 nodes with micrometastasis. She works at [employer] full time. Ms. Haven Day completed neoadjuvant chemotherapy and underwent lumpectomy in September 2020. She is enrolled in the clinical trial  and has been randomized to the placebo arm. She feels well today with no new complaints.       Review of Systems:    Constitutional: negative  Eyes: negative  Ears, Nose, Mouth, Throat, and Face: negative  Respiratory: negative  Cardiovascular: negative  Gastrointestinal: negative  Genitourinary:negative  Integument/Breast: negative  Hematologic/Lymphatic: negative  Musculoskeletal:negative  Neurological: negative        Past Medical History:   Diagnosis Date    Breast cancer ([address]     left side    Menopause      Past Surgical History:   Procedure Laterality Date    HX BREAST BIOPSY Left x2    HX BREAST LUMPECTOMY Left 2019    LEFT BREAST LUMPECTOMY WITH ULTRASOUND performed by Lakisha Kitchen MD at [facility] AMBULATORY OR    HX HYSTERECTOMY      partial, ovaries remain    VASCULAR SURGERY PROCEDURE UNLIST      [facility]      Family History   Problem Relation Age of Onset    Cancer Father     Cancer Maternal Aunt     Breast Cancer Maternal Aunt         4 paternal aunts    Cancer Maternal Uncle     Cancer Paternal Aunt     Cancer Paternal Uncle     Breast Cancer Maternal Grandmother      Social History     Tobacco Use    Smoking status: Former Smoker     Packs/day: 0.10     Quit date: 2020     Years since quittin.1    Smokeless tobacco: Never Used   Substance Use Topics    Alcohol use: No      Prior to Admission medications    Medication Sig Start Date End Date Taking? Authorizing Provider   amoxicillin (AMOXIL) 875 mg tablet Take 875 mg by mouth two (2) times a day. Yes Provider, Historical          Allergies   Allergen Reactions    Codeine Rash     Rash from tylenol #3         Objective:     Visit Vitals  BP (!) 153/87   Pulse 89   Temp 98.1 °F (36.7 °C)   Resp 18   Ht 5' 2\" (1.575 m)   Wt 209 lb (94.8 kg)   SpO2 98%   BMI 38.23 kg/m²       Pain Scale: 0 - No pain/10  Pain Location:       Physical Exam:     GENERAL: alert, cooperative, no distress, appears stated age  EYE: conjunctivae/corneas clear. LYMPHATIC: Cervical, supraclavicular, and axillary nodes normal.   THROAT & NECK: normal and no erythema or exudates noted. LUNG: clear to auscultation bilaterally  HEART: regular rate and rhythm  ABDOMEN: soft, non-tender. EXTREMITIES:  extremities normal, atraumatic, no cyanosis or edema  SKIN: Normal.  NEUROLOGIC: AOx3. Gait normal.          Physical exam and ROS has been modified from a prior visit to make it relevant and current        Assessment:     1.  Left breast carcinoma:  T1c N1mi M0 (Stage I) infiltrating ductal carcinoma, Tumor size 1.6 cm, LN 1/3 with micromet, grade 3, ER -ve, DC -ve, Her 2 -ve    ECOG PS 0  Intent of Treatment - curative  Prognosis - good        Echocardiogram (5/14/2019): Normal, LVEF 61-65%    Completed neoadjuvant chemotherapy   Adriamycin, Cyclophosphamide - s/p 4 cycles   Weekly Taxol - s/p 12 cycles    S/p left lumpectomy on 9/5/2019 with Dr. Major Shahid  ypT2 N0    Since is enrolled in a clinical trial  SWOG : A Randomized, Phase III Trial to Evaluate the Efficacy and Safety of [id number]-3475 (Pembrolizumab) as Adjuvant Therapy for Triple Receptor-Negative Breast Cancer with >/= 1 CM Residual Invasive Cancer or Positive Lymph Nodes (ypN+) after Neoadjuvant Chemotherapy    She is randomized to the placebo arm. She feels well and is asymptomatic. Plan:       > Continue follow up per clinical trial - observation arm  > Follow-up in 6 months      Signed by: Rojelio Argeuta MD                     February 7, 2021      CC. Gavino Naik MD  CC. Charlie Miller MD  CC.  Chau Beyer MD

## 2021-03-04 ENCOUNTER — TRANSCRIBE ORDER (OUTPATIENT)
Dept: SCHEDULING | Age: 61
End: 2021-03-04

## 2021-03-04 DIAGNOSIS — C50.612 MALIGNANT NEOPLASM OF AXILLARY TAIL OF LEFT FEMALE BREAST (HCC): Primary | ICD-10-CM

## 2021-03-13 ENCOUNTER — HOSPITAL ENCOUNTER (OUTPATIENT)
Dept: MRI IMAGING | Age: 61
Discharge: HOME | End: 2021-03-13
Attending: RADIOLOGY
Payer: COMMERCIAL

## 2021-03-13 DIAGNOSIS — C50.612 MALIGNANT NEOPLASM OF AXILLARY TAIL OF LEFT FEMALE BREAST (HCC): ICD-10-CM

## 2021-03-13 PROCEDURE — 72158 MRI LUMBAR SPINE W/O & W/DYE: CPT

## 2021-03-13 PROCEDURE — 74011250636 HC RX REV CODE- 250/636: Performed by: RADIOLOGY

## 2021-03-13 PROCEDURE — A9575 INJ GADOTERATE MEGLUMI 0.1ML: HCPCS | Performed by: RADIOLOGY

## 2021-03-13 RX ORDER — GADOTERATE MEGLUMINE 376.9 MG/ML
20 INJECTION INTRAVENOUS
Status: COMPLETED | OUTPATIENT
Start: 2021-03-13 | End: 2021-03-13

## 2021-03-13 RX ADMIN — GADOTERATE MEGLUMINE 20 ML: 376.9 INJECTION INTRAVENOUS at 09:40

## 2021-03-29 ENCOUNTER — TELEPHONE (OUTPATIENT)
Dept: SURGERY | Age: 61
End: 2021-03-29

## 2021-03-29 ENCOUNTER — OFFICE VISIT (OUTPATIENT)
Dept: SURGERY | Age: 61
End: 2021-03-29
Payer: COMMERCIAL

## 2021-03-29 DIAGNOSIS — Z92.3 S/P RADIATION THERAPY: ICD-10-CM

## 2021-03-29 DIAGNOSIS — C50.412 MALIGNANT NEOPLASM OF UPPER-OUTER QUADRANT OF LEFT FEMALE BREAST, UNSPECIFIED ESTROGEN RECEPTOR STATUS (HCC): Primary | ICD-10-CM

## 2021-03-29 DIAGNOSIS — Z85.3 HISTORY OF BREAST CANCER IN FEMALE: ICD-10-CM

## 2021-03-29 DIAGNOSIS — Z98.890 S/P LUMPECTOMY OF BREAST: ICD-10-CM

## 2021-03-29 PROCEDURE — 99213 OFFICE O/P EST LOW 20 MIN: CPT | Performed by: NURSE PRACTITIONER

## 2021-03-29 NOTE — PROGRESS NOTES
HISTORY OF PRESENT ILLNESS  Michelle Coy is a 63 y.o. female. HPI Established patient presents for follow-up to LEFT breast cancer. Denies breast mass, skin changes and nipple discharge. Reports LEFT breast and axillary pain. Mostly at night when she lays on her side. Also reports that her bra does not fit on that side and leaves creases on her breast as the cup is too small. Breast history -   Referring - Dr. Gary Stephens  1/3/19 - port insertion and LEFT SLNB. 57 yo with T1c N1mi M0 (Stage I) infiltrating ductal carcinoma, Tumor size 1.6 cm, LN 1/3 with micromet, grade 3, ER -ve, VT -ve, Her 2 -ve  7/2019 - completed neoadjuvant chemotherapy - Dr. Jacob Otero   9/5/19 - LEFT breast lumpectomy and port removal. 22 mm not great response, Margins clear. - Dr. Nohemy Howell - XRT - completed 11/2019      Family history - no breast or ovarian cancer    OB History    No obstetric history on file. Obstetric Comments   Menarche 14, LMP 47 # of children 2, age of 4st delivery 22, Hysterectomy/oophorectomy yes/no, Breast bx yes left , history of breast feeding no, BCP yes, Hormone therapy no               Past Surgical History:   Procedure Laterality Date    HX BREAST BIOPSY Left     x2    HX BREAST LUMPECTOMY Left 9/5/2019    LEFT BREAST LUMPECTOMY WITH ULTRASOUND [facility] Lieutenant Lam MD at hospitals AMBULATORY OR    HX HYSTERECTOMY      partial, ovaries remain    VASCULAR SURGERY PROCEDURE UNLIST      portacath         [facility] Results (most recent):  Results from Hospital Encounter encounter on 05/13/20   [facility] 3D HARISH W MAMMO BI DX INCL CAD    Narrative INDICATION: . Personal history of malignant neoplasm of breast / Due May 2020. COMPARISON: Mammogram(s), most recent, 11/30/2018. [facility] Billing COMPOSITION:   There are scattered fibroglandular densities.   .  TECHNIQUE:   Standard 2D, CC and MLO views of the each breast as well whole breast ML and  spot magnification views of the left breast were performed with digital  technique and subjected to computer-aided detection. Tomosynthesis of each  breast was performed in CC and MLO projections as well as of the left breast in  whole breast ML projection. Austin Ramirez FINDINGS:    Postsurgical changes in the upper, outer quadrant of the left breast, deep 3rd  No new visualized mass, suspicious microcalcification or architectural  distortion. .    Impression IMPRESSION:     1. BI-RADS Category 2 - Benign findings. .  RECOMMENDATION:    Routine surveillance of the diagnostic mammogram in 1 year. ROS    Physical Exam  Constitutional:       Appearance: Normal appearance. Chest:      Breasts:         Right: No mass, nipple discharge, skin change or tenderness. Left: Skin change and tenderness present. No mass or nipple discharge. Comments: LEFT breast - skin darkening and thickening post XRT. Larger than RIGHT breast.  No erythema or fluctuance. Tender in UOQ and into axilla. Musculoskeletal: Normal range of motion. Comments: UE x 2  Tightness through LEFT axilla   Lymphadenopathy:      Upper Body:      Right upper body: No supraclavicular or axillary adenopathy. Left upper body: No supraclavicular or axillary adenopathy. Neurological:      Mental Status: She is alert. Psychiatric:         Attention and Perception: Attention normal.         Mood and Affect: Mood normal.         Speech: Speech normal.         Behavior: Behavior normal.         ASSESSMENT and PLAN  Encounter Diagnoses   Name Primary?  Malignant neoplasm of upper-outer quadrant of left female breast, unspecified estrogen receptor status [address] [address] Yes    S/P lumpectomy of breast     S/P radiation therapy     History of breast cancer in female         Normal exam with no evidence of local recurrence. LEFT breast lymphedema. Refer to lymphedema clinic - LEFT breast lymphedema.   Discussed referral and use of OTC pain medication, heat and/or ice PRN and breast support while sleeping. Having trouble with well fitting bras. Will give RX for Stepping Stones at next appointment after she has had a chance to work with the lymphedema clinic. BDmammogram 3D in 5/2021. Order placed by Dr. Alden Frankel. RTC in 6 months or sooner PRN. She is comfortable with this plan. All questions answered and she stated understanding. Total time spent for this patient - 20 minutes.

## 2021-03-31 NOTE — TELEPHONE ENCOUNTER
Called [facility] Lymphedema clinic. Left patients name and . Will follow up at the end of the week.   Patient stated [facility] was fine for her PT.

## 2021-05-05 ENCOUNTER — HOSPITAL ENCOUNTER (OUTPATIENT)
Dept: PHYSICAL THERAPY | Age: 61
Discharge: HOME | End: 2021-05-05
Payer: COMMERCIAL

## 2021-05-05 VITALS — WEIGHT: 205 LBS | BODY MASS INDEX: 37.73 KG/M2 | HEIGHT: 62 IN

## 2021-05-05 PROCEDURE — 97140 MANUAL THERAPY 1/> REGIONS: CPT

## 2021-05-05 PROCEDURE — 97110 THERAPEUTIC EXERCISES: CPT

## 2021-05-05 PROCEDURE — 97161 PT EVAL LOW COMPLEX 20 MIN: CPT

## 2021-05-05 NOTE — PROGRESS NOTES
[address], 1 [facility]    INITIAL EVALUATION    NAME: Karla Murphy AGE: 63 y.o. GENDER: female  DATE: 5/5/2021  REFERRING PHYSICIAN: Shagufta Houston NP  HISTORY AND BACKGROUND:   Primary Diagnosis:  · Lymphedema, not elsewhere classified (L breast and L UE) (I89.0)  Other Treatment Diagnoses:   Swelling not relieved by elevation (R60.9)   Morbid obesity (E66.01)  · Malignant neoplasm of upper outer quadrant of L breast in female, estrogen receptor negative (C50.412)  · Lack of coordination (R27.9)  Date of Onset: March 14, 2021  Present Symptoms and Functional Limitations: Patient reports the onset of swelling and pain in the L breast and L upper arm in March 2020. She has difficulty lifting the L arm and sleeping on her L side due to tightness and pain in the L axillary region. She had a L breast lumpectomy with SLNB September 2019 for L breast cancer with chemotherapy and radiation treatments. Lymphedema Life Impact Scale: 44 with 57% impairment  Past Medical History:   Past Medical History:   Diagnosis Date    Breast cancer ([address]     left side    Menopause      Past Surgical History:   Procedure Laterality Date    HX BREAST BIOPSY Left     x2    HX BREAST LUMPECTOMY Left 9/5/2019    LEFT BREAST LUMPECTOMY WITH ULTRASOUND performed by Sadaf Allen MD at [facility] AMBULATORY OR    HX HYSTERECTOMY      partial, ovaries remain    VASCULAR SURGERY PROCEDURE UNLIST      portacath     Current Medications:  Patient reports taking Motrin and Benadryl. Allergies: Allergies   Allergen Reactions    Codeine Rash     Rash from tylenol #3        Prior Level of Function/Social/Work History/Personal factors and/or comorbidities impacting plan of care: Patient works full time at [employer]. Living Situation: Lives alone in a one story house.     Trainable Caregiver?: Yes - daughter   Mobility: Independent gait without any assistive device for community distances  Sleeping Arrangement:  in bed at night  Adaptive Equipment Owned: none    Previous Therapy:  None  Compression/Lymphedema Equipment: None    SUBJECTIVE:   Patient reports that the swelling in the breast began in March and seems to be getting worse. She wears an under wire bra for support since the breast is so heavy. She is having a tough time lifting the L arm and performing duties at work due to tightness in the L axilla. Patients goals for therapy: decrease swelling and pain in the L breast and L arm. Improve range of motion. OBJECTIVE DATA SUMMARY:   EXAMINATION/PRESENTATION/DECISION MAKING:   Pain:  Pain Scale 1: Numeric (0 - 10)  Pain Intensity 1: 3  Pain Location 1: Arm;Breast  Pain Orientation 1: Lower  Pain Description 1: Aching;Burning    Self-Care and ADLs: Modified independent with increased time and difficulty. Skin and Tissue Assessment:  Dermal Status: Intact , s/p L breast lumpectomy. Hives on the arms, chest, abdomen (chronic per patient). Texture/Consistency: Brawny L breast, Boggy L upper arm     Pigmentation/Color Change: Normal    Anomalies: N/A    Circulatory: No history of DVT    Nails: Normal    Stemmers Sign: Negative    Height:  Height: 5' 2\" (157.5 cm)  Weight:  Weight: 93 kg (205 lb)   BMI:  BMI (calculated): 37.5  (36 or greater: adversely affecting lymphedema)  Wound/Ulcer:  None        Volumetric Measurements:   Right:  2,972. 56 mL Left:  3,104.69 mL   % Difference: 4.45% Dominance: Right     Range of Motion: Within functional limits except L shoulder flexion: 117 degrees and abduction 90 degrees   Strength: Not formally assessed 2* patient is able to complete all functional activities in the clinic today.   Sensation:   Patient reports decreased sensation in the L hand - intermittent    Mobility:    Bed/Chair Mobility: Independent  Transfers: Independent  Gait: Independent  Endurance: Good    Safety:  Patient is alert and oriented: Yes  Safety awareness:Intact  Fall risk?: Low  Patient given written fall prevention handout: Yes    Based on the above components, the patient evaluation is determined to be of the following complexity level: LOW     Evaluation Time: 2:00 - 2:25 pm    TREATMENT PROVIDED:   1. Treatment description:  Therapeutic Exercise and Procedure: Patient instructed in activity restrictions and exercise guidelines. Therapist demonstrated deep abdominal breathing and initiated education in the remedial lymphedema range of motion exercise program to be done in sitting. Patient was educated in the butterfly stretch and T and Y stretch in supine and sitting and was instructed to perform the stretches 1 to 2 times per day as tolerated. Provided patient with the written HEP to follow. Patient was participating in a daily walking program until one month ago. She reports not having time for daily exercise due to work responsibilities. Treatment time:  3:00 - 3:15 pm  Minutes: 10    2. Treatment description:  Manual Therapy: Patient instructed in skin care principles and anatomy of the lymphatic system. Therapist able to demonstrate manual lymphatic drainage techniques for the drainage of L UE and breast and skin care protocol: Discussed daily skin care and recommended lotions. Demonstrated MLD techniques to the L UE and L breast and educated patient in the stationary hand Augustine technique during the visit today. Discussed standard lymphedema precautions to include avoiding blood pressure readings, injections and IVs or other procedures/acts that could lead to broken skin on affected area, and avoiding excessive heat, resistive activity or altitude without compression garment. Discussed the prevention of skin injuries and signs and symptoms of cellulitis.  Discussed the role and benefit of compression garments for management of swelling in the L breast and L UE and provided samples of garments in the clinic today. Patient voiced interest in pursuing compression garments in a future visit and will attempt to secure a vendor and check insurance coverage prior to the next visit. Patient instructed to avoid wearing under wire bras as she prefers to wear one each day for breast support. Provided patient with one 6\" X 12\" oval piece of Komprex II to wear with a full coverage sports bra for management of swelling for a few hours each day or nightly as tolerated. Patient to defer use of the Komprex with any skin irritation. Treatment time:  2:25-3:00 pm  Minutes: 35  ASSESSMENT:   Arnol Cardoso is a 63 y.o. female who presents with L breast and UE secondary lymphedema, stage 2, following diagnosis and treatment for L breast cancer. Patient is motivated to improve her condition and is seeking treatment in our clinic to reduce swelling and pain and improve range of motion in the L UE. Patient's condition is complicated by working full time at [employer] which entails pushing, pulling, and lifting objects repetitively. Patient will benefit from complete decongestive therapy (CDT) including: Manual lymphatic drainage (MLD) technique, Short-stretch textile bandages/compression system to decongest limb and Kinesiotaping as appropriate. Due to patient's cancer status, the lymphedema has the potential to significantly worsen over time if it is not managed well. Patient would benefit from an advanced vasopneumatic device to address L UE and breast swelling.  A basic vasopneumatic device that only addresses the L arm would be contraindicated and has a high risk of increasing the swelling in the trunk and this would be detrimental to the patient.      This care is medically necessary due to the infection risk with lymphedema and to improve functional activities.   CDT is necessary to resolve swelling to allow patient to return to wearing normal clothes/footwear and prevent worsening of symptoms, such as infections and/or hospitalizations. Patient will be independent with home program strategies to allow improved ADL ability and mobility and to allow patient to return to greatest functional independence. Rehabilitation potential is considered to be Fair. Factors which may influence rehabilitation potential include work responsibilities and pain. Patient will benefit from 5 to 10 physical therapy visits over 12 weeks to optimize improvement in these areas. PLAN OF CARE:   Recommendations and Planned Interventions: Manual lymph drainage/decongestive therapy, Multi-layer compression bandaging (short-stretch), Compression garment fitting/provision, Lymphedema therapeutic exercise, AROM/PROM/Strength/Coordination, Education in skin care and lymphedema precautions, Self-MLD education per home program, Self-bandaging education per home program and Caregiver education as needed    GOALS  Short term goals  Time frame: to be met by 6/9/2021  1. Patient will demonstrate knowledge of signs/symptoms of infections/cellulitis and be independent in skin care to prevent cellulitis. 2.  Patient will demonstrate independence in lymphedema home program of therapeutic exercises to improve circulation and decongest limb to improve ADLs. 3.  Patient will tolerate multi-layer bandages (MLB) to show measureable decrease in limb volume and/or participate in the selection process to allow ordering of home compression system (daytime, nighttime garments and pump as needed). Long term goals  Time frame: to be met by 7/31/2021  1. Patient will demonstrate an increase in shoulder flexion and abduction of 15 degrees for ease of performing ADL's and work responsibilities. 2.  Patient will be independent with don/doff of compression system and use in order to prevent reaccumulation of fluid at discharge.   3.  Pt will be independent in self-MLD and show stable limb volumes showing decongestion and pt. ready for transition to independent restorative phase of lymphedema therapy. Patient has participated in goal setting and agrees to work toward plan of care. Patient was instructed to call if questions or concerns arise. Thank you for this referral.  Kalyani Pichardo PT, CLT   Time Calculation: 75 mins    TREATMENT PLAN EFFECTIVE DATES:   5/5/2021 TO 7/31/2021  I have read the above plan of care for Walter Boogie. I certify the above prescribed services are required by this patient and are medically necessary.   The above plan of care has been developed in conjunction with the lymphedema/physical therapist.       Physician Signature: ____________________________________Date:______________      Sonam Euceda NP

## 2021-05-17 ENCOUNTER — HOSPITAL ENCOUNTER (OUTPATIENT)
Dept: MAMMOGRAPHY | Age: 61
Discharge: HOME | End: 2021-05-17
Attending: RADIOLOGY
Payer: COMMERCIAL

## 2021-05-17 DIAGNOSIS — Z85.3 PERSONAL HISTORY OF MALIGNANT NEOPLASM OF BREAST: ICD-10-CM

## 2021-05-17 PROCEDURE — 77062 BREAST TOMOSYNTHESIS BI: CPT

## 2021-05-19 ENCOUNTER — HOSPITAL ENCOUNTER (OUTPATIENT)
Dept: PHYSICAL THERAPY | Age: 61
Discharge: HOME | End: 2021-05-19
Payer: COMMERCIAL

## 2021-05-19 PROCEDURE — 97140 MANUAL THERAPY 1/> REGIONS: CPT

## 2021-05-19 NOTE — PROGRESS NOTES
LYMPHEDEMA PT DAILY TREATMENT NOTE - [facility] -15    Patient Name: Emmie Steward  Date:2021  : 1960  [x]  Patient  Verified  Payor: Rick Mensah / Plan: Golden Robles / Product Type: HMO /    In time: 12:00 pm Out time: 1:15 pm  Total Treatment Time (min): 75  Total Timed Codes (min): 75  1:1 Treatment Time ( [address] only): 75  Visit #: 2    Treatment Area: Lymphedema, not elsewhere classified [I89.0]    SUBJECTIVE  Pain Level (0-10 scale): 0/10  Any medication changes, allergies to medications, adverse drug reactions, diagnosis change, or new procedure performed?: [x] No    [] Yes (see summary sheet for update)  Subjective functional status/changes:   [x] No changes reported     Patient reports having a mammogram yesterday and that the results were good. She has avoided wearing under wire bras as discussed on evaluation and recently purchased a new bra from [employer]. She is performing the stretches and exercises learned on evaluation and is now able to move the L arm without pain or limitations. OBJECTIVE    0 min Therapeutic Exercise:  [] Viviann Flavin Exercises [x] Remedial Lymphedema Exercise Program [x] Axillary Web Exercise Program - butterfly stretch and T and Y stretch     [] Shoulder ROM Exercises   Rationale: Activate muscle pump to improve lymphatic fluid movement and decrease swelling to improve the patients ability to perform ADL and IADL skills and prevent worsening of swelling over time. 75 min Manual Therapy    Kinesiotaping: Sample piece of kinesiotape applied to assess for adverse skin reactions. Patient education provided on proper removal of kinesiotape. Manual Lymphatic Drainage (MLD):  Area to decongest: L breast and UE. Sequence used and effectiveness: Secondary sequence for upper extremity with trunk involvement. Focus on the L breast. Patient educated in self MLD packet during MLD portion of the visit.     Skin/wound care/debridement: Reviewed skin care principles: Skin care products  Prevention of cellulitis   Texture of L breast is boggy except for brawny texture from 5 to 8 O'clock. Softens with MLD. Upper/Lower Extremity Compression: Measured for the following compression products:    Trunk and L UE   1. Belisse Compression Bra size 44 DD/E in the color black  2. Solaris Breast Swell Spot in the size large  3. Juzo Soft arm sleeve with top silicone band 37-54 mmHg size 5 max/long in the color black  4. Juzo Soft gauntlet 20-30 mmHg size medium in the color black    Vendor: [email or web address]    Education: Educated patient in compression garment donning and doffing. Glove use with donning. Daily wear schedule. Daily laundering. Garment lifespan. Return and reordering process (by bringing prior garments into the clinic). Educated patient to monitor for redness or pressure points on the skin. If new pain occurs they should contact their therapist.    Patient/family demonstrated donning and doffing with verbal cues. Patient will bring the garments to the clinic for fitting. Provided patient with Komprex II for L breast swelling which patient reports is uncomfortable and too bulky. Provided patient with one chip bag and instructed in use today. Rationale: decrease pain and decrease edema of the left breast and L UE for ease of performing functional activities at home and work and ADL's.     0   Vasopneumatic Device:   Discussed the role and benefit of the vaso-pneumatic pump for management of swelling during the restorative phase of care and patient may be interested in having a pump trial in the future. Contacted the vendor and patient's insurance will cover 80% of the pump. Rationale: To improve lymphatic fluid movement and decrease swelling to improve the patients ability to perform ADL and IADL skills and prevent worsening of swelling over time.           With   [] TE   [] TA   [x] MT   [] SC   [] other: Patient Education: [x] Review HEP    [x] MLD Patient Education Reviewed with patient, as well as demonstration and instruction during MLD portion of the session. Continued education in self MLD technique with bathing and skin care. Provided patient with the written instructions to follow. [] Progressed/Changed HEP based on:   [x] positioning   [x] Kinesiotape   [x] Skin care   [] wound care   [x] other: Garment options, provided patient a chip bag for the L breast and educated patient is use of the product. Patient / caregiver re-demonstrated bandaging. [] Yes  [] No  Compression bandaging/garment precautions reviewed: [x] Yes  [] No     Other Objective/Functional Measures: Girth measurements of the L UE and trunk taken today for sizing of garments. L wrist: 16.4 cm  Elbow: 29.0 cm  Axilla: 41.0 cm    Chest: 50.5 inches  Band: 42 inches    Pain Level (0-10 scale) post treatment: 0/10      ASSESSMENT/Changes in Function:   Patient is doing well with her home program and is motivated to be successful in our program. Patient is performing the stretches learned on evaluation two times per day and now demonstrates functional L shoulder range of motion allowing her to perform tasks at work with ease. Patient is performing self MLD to the L breast and the texture is slowly softening and the breast is less tender to touch. Measured patient for compression garments today and discussed that it may take several weeks for patient to receive the products due to the insurance authorization process. Patient will continue to benefit from skilled PT services to  address ROM deficits, address swelling, analyze compression product fit and use, instruct in home lymphedema management program and modify and progress therapeutic interventions to attain remaining goals. [x]  See Plan of Care  []  See progress note/recertification  []  See Discharge Summary         Progress towards goals / Updated goals:  Short term goals  Time frame: to be met by 6/9/2021   1.   Patient will demonstrate knowledge of signs/symptoms of infections/cellulitis and be independent in skin care to prevent cellulitis. Goal in progress. 2.  Patient will demonstrate independence in lymphedema home program of therapeutic exercises to improve circulation and decongest limb to improve ADLs. Goal in progress. 3.  Patient will tolerate multi-layer bandages (MLB) to show measureable decrease in limb volume and/or participate in the selection process to allow ordering of home compression system (daytime, nighttime garments and pump as needed). Compression garments for the L UE and trunk ordered today. Will continue to assess additional garment needs. Patient would benefit from a home vaso-pneumatic pump. Goal in progress.      Long term goals  Time frame: to be met by 7/31/2021  1. Patient will demonstrate an increase in shoulder flexion and abduction of 15 degrees for ease of performing ADL's and work responsibilities. Shoulder flexion increased from 117 degrees to 180 degrees and shoulder abduction increased from 90 degrees to 180 degrees since evaluation 5/5/21. Goal met 5/19/21.  2.  Patient will be independent with don/doff of compression system and use in order to prevent reaccumulation of fluid at discharge. 3.  Pt will be independent in self-MLD and show stable limb volumes showing decongestion and pt. ready for transition to independent restorative phase of lymphedema therapy. Goal in progress.      PLAN  [x]  Upgrade activities as tolerated     [x]  Continue plan of care  []  Update interventions per flow sheet       []  Discharge due to:_  []  Other:_      Sandi Yoo PT, CLT 5/19/2021

## 2021-05-26 ENCOUNTER — APPOINTMENT (OUTPATIENT)
Dept: PHYSICAL THERAPY | Age: 61
End: 2021-05-26
Payer: COMMERCIAL

## 2021-06-02 ENCOUNTER — HOSPITAL ENCOUNTER (OUTPATIENT)
Dept: PHYSICAL THERAPY | Age: 61
Discharge: HOME | End: 2021-06-02
Payer: COMMERCIAL

## 2021-06-02 PROCEDURE — 97140 MANUAL THERAPY 1/> REGIONS: CPT

## 2021-06-09 ENCOUNTER — HOSPITAL ENCOUNTER (OUTPATIENT)
Dept: PHYSICAL THERAPY | Age: 61
Discharge: HOME | End: 2021-06-09
Payer: COMMERCIAL

## 2021-06-09 PROCEDURE — 97110 THERAPEUTIC EXERCISES: CPT

## 2021-06-09 PROCEDURE — 97140 MANUAL THERAPY 1/> REGIONS: CPT

## 2021-06-09 PROCEDURE — 97016 VASOPNEUMATIC DEVICE THERAPY: CPT

## 2021-06-09 NOTE — PROGRESS NOTES
LYMPHEDEMA PT 30 DAY PROGRESS NOTE - [facility] 2-15    Patient Name: René First  Date:2021  : 1960  [x]  Patient  Verified  Payor: Kayla Hernandez / Plan: Dariela Calderon / Product Type: HMO /    In time: 7:45 am Out time: 9:00 am  Total Treatment Time (min): 75  Total Timed Codes (min): 75  1:1 Treatment Time ([facility] only): 75  Visit #: 4    Treatment Area: Lymphedema, not elsewhere classified [I89.0]    SUBJECTIVE  Pain Level (0-10 scale): Patient reports pain at 0/10 but reports that the L breast is tender to touch. Any medication changes, allergies to medications, adverse drug reactions, diagnosis change, or new procedure performed?: [x] No    [] Yes (see summary sheet for update)  Subjective functional status/changes:   [x] No changes reported     Patient reports wearing the new compression products each day and that they are comfortable. She is performing self massage to the L breast and the texture is softening. OBJECTIVE    15 min Therapeutic Exercise:  [] Malon Roberto Exercises [x] Remedial Lymphedema Exercise Program - patient educated in the Stick Routine this visit. [x] Axillary Web Exercise Program  [] Shoulder ROM Exercises   Rationale: Activate muscle pump to improve lymphatic fluid movement and decrease swelling to improve the patients ability to perform ADL and IADL skills and prevent worsening of swelling over time. 25 min Manual Therapy    Kinesiotaping: Patient tolerated the  kinesiotape applied last visit for 2 to 3 days but then removed the tape since it was itchy and irritating her skin. Kinesiotape was deferred today. Manual Lymphatic Drainage (MLD):  Area to decongest: L breast and UE.     Sequence used and effectiveness: Deferred this visit due to pump trial.    Skin/wound care/debridement: Reviewed skin care principles:   Skin care products  Prevention of cellulitis   No skin discoloration or warmth noted - L breast.    Upper/Lower Extremity Compression: Patient was fitted with the following compression products last visit and is wearing the garments each day. Patient is ready to order a second set of garments with modifications to the size of Belisse Compression Bra for better fit and comfort when the Solaris Swell Spot is not worn:    Trunk and L UE   1. Belisse Compression Bra size 44 DD/E in the color black  2. Solaris Breast Swell Spot in the size large  3. Juzo Soft arm sleeve with top silicone band 03-77 mmHg size 5 max/long in the color black  4. Juzo Soft gauntlet 20-30 mmHg size medium in the color black    Vendor: [employer]    Education: Educated patient in compression garment donning and doffing. Glove use with donning. Daily wear schedule. Daily laundering. Garment lifespan. Return and reordering process (by bringing prior garments into the clinic). Educated patient to monitor for redness or pressure points on the skin. If new pain occurs they should contact their therapist.     Rationale: decrease pain and decrease edema and improve patient's ability to perform ADL's and prevent worsening of swelling over time. 35   Vasopneumatic Device:   Patient tolerated a 30 minute trial of the Flexi-touch vaso-pneumatic device performed by the rep from [facility] this visit. Pre and post pump measurements of the trunk and L UE taken today reveal a decrease in 4 out of 5 measurements. See below. Patient voiced interest in obtaining a pump for home use to manage swelling during the restorative phase of care. Paperwork will be submitted to patient's insurance for approval.    Rationale: To improve lymphatic fluid movement and decrease swelling to improve the patients ability to perform ADL and IADL skills and prevent worsening of swelling over time. With   [x] TE   [] TA   [x] MT   [] SC   [x] other:  Patient Education:   [x] MLD Patient Education   [x] Progressed/Changed HEP based on: educated in the Stick Routine this visit.    [x] positioning [] Kinesiotape   [x] Skin care   [] wound care   [x] other: Continued education in wear schedule of garments, pump trial and precautions  Patient / caregiver re-demonstrated bandaging. [] Yes  [] No  Compression bandaging/garment precautions reviewed: [x] Yes  [] No     Other Objective/Functional Measures:  -Pre pump measurements:  Waist:  117.4 cm  Chest: 123.8 cm  Bicep: 38.7 cm  Forearm: 27.0 cm  Wrist: 16.2 cm    -Post pump measurements:   Waist: 116.4 cm  Chest: 122.8 cm  Bicep: 38.5 cm  Forearm: 25.7 cm  Wrist: 16.3 cm    Pain Level (0-10 scale) post treatment: 0/10    ASSESSMENT/Changes in Function:   Patient is doing well with her home program and remains motivated to succeed in our program. She is compliant with daily use of compression products and is performing daily exercise and self MLD. Patient continues to have swelling and tenderness of the L breast. There is no evidence of L breast infection today but continued education in signs and symptoms and to call the physician with the onset of symptoms or concerns. Patient tolerated a pump trial during the visit today with good success. She demonstrated a decrease in 4 out of 5 girth measurements of the trunk and L UE and patient noticed an improvement in the texture of the L breast with pump use. Patient would benefit from having a pump at home for management of swelling during the restorative phase of care. Patient has met 4 out of 5 goals and will continue to benefit from skilled PT services to address swelling, instruct in home lymphedema management program and modify and progress therapeutic interventions to attain remaining goal.     [x]  See Plan of Care  []  See progress note/recertification  []  See Discharge Summary         Progress towards goals / Updated goals:  Short term goals  Time frame: to be met by 6/9/21, will extend short term goals 1 and 2 to be met by 7/31/21   1.   Patient will demonstrate knowledge of signs/symptoms of infections/cellulitis and be independent in skin care to prevent cellulitis. Goal met 6/2/21  2. Patient will demonstrate independence in lymphedema home program of therapeutic exercises to improve circulation and decongest limb to improve ADLs. Goal met 6/9/21  3. Patient will tolerate multi-layer bandages (MLB) to show measureable decrease in limb volume and/or participate in the selection process to allow ordering of home compression system (daytime, nighttime garments and pump as needed). A second set of garments for the trunk and L UE will be ordered today. Patient tolerated a pump trial today and the paperwork will be submitted to insurance for approval.  Goal met 6/9/21     Long term goals  Time frame: to be met by 7/31/21  1. Patient will demonstrate an increase in shoulder flexion and abduction of 15 degrees for ease of performing ADL's and work responsibilities. Shoulder flexion increased from 117 degrees to 180 degrees and shoulder abduction increased from 90 degrees to 180 degrees since evaluation 5/5/21. Goal met 5/19/21.  2.  Patient will be independent with don/doff of compression system and use in order to prevent reaccumulation of fluid at discharge. Goal met 6/9/21  3. Pt will be independent in self-MLD and show stable limb volumes showing decongestion and pt. ready for transition to independent restorative phase of lymphedema therapy. Goal in progress.      PLAN  [x]  Upgrade activities as tolerated     [x]  Continue plan of care  []  Update interventions per flow sheet       []  Discharge due to:_  []  Other:_      Nancy Arriola PT, CLT 6/9/2021

## 2021-06-16 ENCOUNTER — APPOINTMENT (OUTPATIENT)
Dept: PHYSICAL THERAPY | Age: 61
End: 2021-06-16
Payer: COMMERCIAL

## 2021-06-23 ENCOUNTER — HOSPITAL ENCOUNTER (OUTPATIENT)
Dept: PHYSICAL THERAPY | Age: 61
Discharge: HOME | End: 2021-06-23
Payer: COMMERCIAL

## 2021-06-23 PROCEDURE — 97140 MANUAL THERAPY 1/> REGIONS: CPT

## 2021-06-23 NOTE — PROGRESS NOTES
LYMPHEDEMA PT 30 DAY PROGRESS NOTE - [facility] 2-15    Patient Name: Gregory Naik  Date:2021  : 1960  [x]  Patient  Verified  Payor: Brielle Veloz / Plan: Stefano Jacobson / Product Type: HMO /    In time: 7:45 am Out time: 9:00 am  Total Treatment Time (min): 75  Total Timed Codes (min): 75  1:1 Treatment Time ( [address] only): 75  Visit #: 5    Treatment Area: Lymphedema, not elsewhere classified [I89.0]    SUBJECTIVE  Pain Level (0-10 scale): 0/10   Any medication changes, allergies to medications, adverse drug reactions, diagnosis change, or new procedure performed?: [x] No    [] Yes (see summary sheet for update)  Subjective functional status/changes:   [x] No changes reported     Patient reports that she has been inconsistent with her home program and use of garments for the past two weeks due to two recent deaths in the family and traveling for funerals. Patient reports eating too much fast food and sodium since last seen in the clinic. OBJECTIVE    0 min Therapeutic Exercise:  [] Anselm Glatter Exercises [x] Remedial Lymphedema Exercise Program [x] Axillary Web Exercise Program  [] Shoulder ROM Exercises   Rationale: Activate muscle pump to improve lymphatic fluid movement and decrease swelling to improve the patients ability to perform ADL and IADL skills and prevent worsening of swelling over time. 75 min Manual Therapy    Kinesiotaping: Deferred due to previous skin irritation. Manual Lymphatic Drainage (MLD):  Area to decongest: L breast and UE. Sequence used and effectiveness: Secondary sequence for upper extremity with trunk involvement. Focus on the L breast and L UE. Patient has been educated in the self MLD packet. Continued education in deep abdominal breathing techniques and stationary [place] technique.     Skin/wound care/debridement: Reviewed skin care principles:   Skin care products  Prevention of cellulitis   L breast swelling present - brawny texture from 3 to 9 O'clock position. Upper/Lower Extremity Compression: Patient has received two sets of compression products:    Trunk and L UE   1. Belisse Compression Bra size 44 DD/E in the color black (worn with Solaris Swell Spot) and 44 C/D (worn without Solaris Swell Spot)  2. Solaris Breast Swell Spot in the size large  3. Juzo Soft arm sleeve with top silicone band 04-62 mmHg size 5 max/long in the color black  4. Juzo Soft gauntlet 20-30 mmHg size medium in the color black    Vendor: [employer]    Education: Educated patient in compression garment donning and doffing. Glove use with donning. Daily wear schedule. Daily laundering. Garment lifespan. Return and reordering process (by bringing prior garments into the clinic). Educated patient to monitor for redness or pressure points on the skin. If new pain occurs they should contact their therapist.     Patient has been inconsistent with garment use for the past two weeks due to travel. Continued education in the role and benefit of daily use of compression products for management of swelling. Patient may benefit from a night time foam product for management of L breast swelling. Provided patient with samples of garments and she may be interested in ordering a product in a future visit. Provided patient with another piece of Komprex 2 for swelling in the L breast per her request.      Rationale: decrease pain and decrease edema and improve patient's ability to perform ADL's and prevent worsening of swelling over time. 0   Vasopneumatic Device:   Patient tolerated a 30 minute trial of the Flexi-touch vaso-pneumatic device last visit with good results. Paperwork has been submitted to patient's insurance for approval.    Rationale: To improve lymphatic fluid movement and decrease swelling to improve the patients ability to perform ADL and IADL skills and prevent worsening of swelling over time.           With   [] TE   [] TA   [x] MT   [] SC   [] other: Patient Education:   [x] MLD Patient Education - self MLD packet   [x] Progressed/Changed HEP based on: remedial lymphedema exercises  [x] positioning - avoid sleeping in L sidelying position   [] Kinesiotape   [x] Skin care   [] wound care   [x] other: Wear schedule of garments, garment fitting of second set of products, night time foam products  Patient / caregiver re-demonstrated bandaging. [] Yes  [] No  Compression bandaging/garment precautions reviewed: [x] Yes  [] No     Other Objective/Functional Measures: Full UE volumetric measurements taken today  L UE: 3,001.67 ml this visit compared to 3,017.55 ml on 6/2/21  R UE: 2,812.83 ml this visit compared to 2,861.96 ml on 6/2/21  Percentage difference 6.71% today compared to 5.44% 6/2/21    Girth measurements of the trunk:  Axilla: 114.0 cm  Chest:  126.0 cm  Waist: 115.0 cm  Hips: 117.0 cm    Pain Level (0-10 scale) post treatment: 0/10    ASSESSMENT/Changes in Function:   Patient has struggled with her home program for the past two weeks due travel and family obligations. She continues to have L breast swelling with skin changes. Full UE volumetric measurements taken today reveal a loss of volume in the arms bilaterally but a slight increase in percentage difference from 5% to 7% L UE > R UE since the beginning of June. Discussed the importance of daily use of compression products for management of swelling.  Patient may benefit from a night time foam products due to persistent swelling in the L breast.     Patient has met 4 out of 5 goals and will continue to benefit from skilled PT services to address swelling, instruct in home lymphedema management program and modify and progress therapeutic interventions to attain remaining goal.     [x]  See Plan of Care  []  See progress note/recertification  []  See Discharge Summary         Progress towards goals / Updated goals:  Short term goals  Time frame: to be met by 6/9/21, will extend short term goals 1 and 2 to be met by 7/31/21   1. Patient will demonstrate knowledge of signs/symptoms of infections/cellulitis and be independent in skin care to prevent cellulitis. Goal met 6/2/21  2. Patient will demonstrate independence in lymphedema home program of therapeutic exercises to improve circulation and decongest limb to improve ADLs. Goal met 6/9/21  3. Patient will tolerate multi-layer bandages (MLB) to show measureable decrease in limb volume and/or participate in the selection process to allow ordering of home compression system (daytime, nighttime garments and pump as needed). A second set of garments for the trunk and L UE will be ordered today. Patient tolerated a pump trial today and the paperwork will be submitted to insurance for approval.  Goal met 6/9/21     Long term goals  Time frame: to be met by 7/31/21  1. Patient will demonstrate an increase in shoulder flexion and abduction of 15 degrees for ease of performing ADL's and work responsibilities. Shoulder flexion increased from 117 degrees to 180 degrees and shoulder abduction increased from 90 degrees to 180 degrees since evaluation 5/5/21. Goal met 5/19/21.  2.  Patient will be independent with don/doff of compression system and use in order to prevent reaccumulation of fluid at discharge. Goal met 6/9/21  3. Pt will be independent in self-MLD and show stable limb volumes showing decongestion and pt. ready for transition to independent restorative phase of lymphedema therapy. Goal in progress.      PLAN  [x]  Upgrade activities as tolerated     [x]  Continue plan of care  []  Update interventions per flow sheet       []  Discharge due to:_  []  Other:_      Veryl Hyun PT, CLT 6/23/2021

## 2021-07-14 NOTE — PROGRESS NOTES
LYMPHEDEMA DAILY PROGRESS NOTE - [facility] 2-15    Patient Name: Margret Simental  Date:2021  : 1960  [x]  Patient  Verified  Payor: Neida Ramey / Plan: Celine Fong / Product Type: HMO /    In time: 7:45 am Out time: 9:00 am  Total Treatment Time (min): 75  Total Timed Codes (min): 75  1:1 Treatment Time ( [address] only): 75  Visit #: 6    Treatment Area: Lymphedema, not elsewhere classified [I89.0]    SUBJECTIVE  Pain Level (0-10 scale): Any medication changes, allergies to medications, adverse drug reactions, diagnosis change, or new procedure performed?: [x] No    [] Yes (see summary sheet for update)  Subjective functional status/changes:   [x] No changes reported     Patient reports that she has been doing well with her home program. She received the pump and is using it daily. She has noticed an improvement in L breast swelling and feels that the texture has softened. OBJECTIVE    0 min Therapeutic Exercise:  [] [place] Exercises [x] Remedial Lymphedema Exercise Program [x] Axillary Web Exercise Program  [] Shoulder ROM Exercises   Rationale: Activate muscle pump to improve lymphatic fluid movement and decrease swelling to improve the patients ability to perform ADL and IADL skills and prevent worsening of swelling over time. 75 min Manual Therapy    Kinesiotaping: Deferred due to previous skin irritation. Manual Lymphatic Drainage (MLD):  Area to decongest: L breast and UE. Sequence used and effectiveness: Secondary sequence for upper extremity with trunk involvement. Focus on the L breast and L UE. MLD: AAA, posterior AAA, L AI, parasternals, and intercostals. Patient has been educated in the self MLD packet. Continued education in deep abdominal breathing techniques and stationary hand [place] technique. Texture of the L breast softens with MLD.     Skin/wound care/debridement: Reviewed skin care principles:   Skin care products  Prevention of cellulitis   L breast swelling present - brawny texture from 3 to 9 O'clock position with several small papules now present on the medial portion of the breast. No redness,pain, or warmth noted. Upper/Lower Extremity Compression: Patient has received two sets of compression products:    Trunk and L UE   1. Belisse Compression Bra size 44 DD/E in the color black (worn with Solaris Swell Spot) and 44 C/D (worn without Solaris Swell Spot)  2. Solaris Breast Swell Spot in the size large  3. Juzo Soft arm sleeve with top silicone band 59-67 mmHg size 5 max/long in the color black  4. Juzo Soft gauntlet 20-30 mmHg size medium in the color black    Vendor: [employer]    Education: Educated patient in compression garment donning and doffing. Glove use with donning. Daily wear schedule. Daily laundering. Garment lifespan. Return and reordering process (by bringing prior garments into the clinic). Educated patient to monitor for redness or pressure points on the skin. If new pain occurs they should contact their therapist.     Patient arrives to the visit without L UE compression garments in place but reports that she normally wears the Juzo Soft arm sleeve with hand piece each day, especially when at work. Patient arrives wearing the Belisse Compression Bra size 44 C/D and reports wearing it each day. However, she also reports that purchasing a new bra from [employer] that she wears to work and is concerned that it does provide full coverage and that the straps are too tight. Discussed the texture and skin changes on the L breast and recommended that patient wear the full coverage Belisse Compression Bra with Solaris Swell spot daily for the next week and return to the clinic for assessment of swelling. Provided patient with another chip bag today per request.     Provided patient with samples of night time foam products for better management of truncal swelling.  Patient voiced interest in obtaining one custom Grant Roman with outer jacket and she was measured for that product today. The garment order will be submitted to the vendor, [employer], and patient will like to be notified of the cost of the garment prior to completion of the order. Rationale: decrease pain and decrease edema and improve patient's ability to perform ADL's and prevent worsening of swelling over time. 0   Vasopneumatic Device:   Patient has received the Flexi-touch vaso-pneumatic device and patient's daughter assisted her with fitting of the garments as described in the home instructional video. Patient has been using the pump for one hour each day without any issues. Discussed that she can use the pump in the AM and then again in the PM as tolerated with at least 6 hours between sessions. She reports that swelling in the L breast improves with use of the pump. Rationale: To improve lymphatic fluid movement and decrease swelling to improve the patients ability to perform ADL and IADL skills and prevent worsening of swelling over time. With   [] TE   [] TA   [x] MT   [] SC   [] other: Patient Education:   [x] MLD Patient Education - self MLD packet   [x] Progressed/Changed HEP based on: remedial lymphedema exercises  [x] positioning - avoid sleeping in L sidelying position   [] Kinesiotape   [x] Skin care   [x] Patient to follow up with physician regarding skin changes on the L breast  [x] other: Wear schedule of garments, night time foam products, pump one to two times per day  Patient / caregiver re-demonstrated bandaging.  [] Yes  [] No  Compression bandaging/garment precautions reviewed: [x] Yes  [] No     Other Objective/Functional Measures: Full UE volumetric measurements taken today  L UE: 2,898.40 ml this visit compared to 3,001.67 ml on 6/23/21  R UE: 2,769.36 ml this visit compared to 2,812.83 ml on 6/23/21  Percentage difference 4.66% today compared to 6.71% 6/23/21    Girth measurements of the trunk:  Axilla: 114.0 cm last visit, 113.1 cm this visit  Chest:  126.0 cm last visit, 123.0 cm this visit   Waist: 115.0 cm last visit, 108.9 cm this visit  Hips: 117.0 cm last visit, 115.0 cm this visit     Weight 197.6 lb this visit compared to 204.4 lb when last assessed 6/2/21    Pain Level (0-10 scale) post treatment: 0/10    ASSESSMENT/Changes in Function:   Patient has done fairly well with her home program of CDT since last visit. She is making an effort to be consistent with use of compression products but L breast swelling may be better managed with daily use of the compression bra and padding. She received the new vaso-pneumatic device and is pleased with the product and using it daily. She continues to have significant swelling of the L breast with texture changes and now the presence of papules on the L breast medially. Patient plans to call her physician for follow up this week. Patient has met 4 out of 5 goals and will continue to benefit from skilled PT services to address L breast swelling, instruct in home lymphedema management program and modify and progress therapeutic interventions to attain remaining goal. Patient was measured for a new night time foal garment and will bring the garment to the clinic for fitting. [x]  See Plan of Care  []  See progress note/recertification  []  See Discharge Summary         Progress towards goals / Updated goals:  Short term goals  Time frame: to be met by 6/9/21, will extend short term goals 1 and 2 to be met by 7/31/21   1. Patient will demonstrate knowledge of signs/symptoms of infections/cellulitis and be independent in skin care to prevent cellulitis. Goal met 6/2/21  2. Patient will demonstrate independence in lymphedema home program of therapeutic exercises to improve circulation and decongest limb to improve ADLs. Goal met 6/9/21  3.   Patient will tolerate multi-layer bandages (MLB) to show measureable decrease in limb volume and/or participate in the selection process to allow ordering of home compression system (daytime, nighttime garments and pump as needed). A second set of garments for the trunk and L UE will be ordered today. Patient tolerated a pump trial today and the paperwork will be submitted to insurance for approval.  Goal met 6/9/21     Long term goals  Time frame: to be met by 7/31/21  1. Patient will demonstrate an increase in shoulder flexion and abduction of 15 degrees for ease of performing ADL's and work responsibilities. Shoulder flexion increased from 117 degrees to 180 degrees and shoulder abduction increased from 90 degrees to 180 degrees since evaluation 5/5/21. Goal met 5/19/21.  2.  Patient will be independent with don/doff of compression system and use in order to prevent reaccumulation of fluid at discharge. Goal met 6/9/21  3. Pt will be independent in self-MLD and show stable limb volumes showing decongestion and pt. ready for transition to independent restorative phase of lymphedema therapy. Full UE volumetric measurements taken today have improved since last visit. Patient continues to present with significant swelling in the L breast with skin changes present. Goal in progress.      PLAN  [x]  Upgrade activities as tolerated     [x]  Continue plan of care  []  Update interventions per flow sheet       []  Discharge due to:_  []  Other:_      Sylva Kimberly, PT, CLT 7/14/2021

## 2021-07-19 NOTE — PROGRESS NOTES
HISTORY OF PRESENT ILLNESS  Frank Arredondo is a 63 y.o. female. HPI ESTABLISHED patient here for LEFT breast pain. Also describes small lumps around nipple. Describes pain 8/10. Last seen here 3/29/21 by Torie Heaton NP, and was referred to Lymphedema clinic. She went to LE clinic which she says didn't help.     Breast history -   Referring - Dr. Neo Baum  1/3/19 - port insertion and LEFT SLNB. 59 yo with T1c N1mi M0 (Stage I) infiltrating ductal carcinoma, Tumor size 1.6 cm, LN 1/3 with micromet, grade 3, ER -ve, UT -ve, Her 2 -ve  7/2019 - completed neoadjuvant chemotherapy - Dr. Marilu Linares   9/5/19 - LEFT breast lumpectomy and port removal. 22 mm not great response, Margins clear. - Dr. Poonam Helms - XRT - completed 11/2019        Family history - no breast or ovarian cancer    Review of Systems   All other systems reviewed and are negative. Physical Exam  Vitals and nursing note reviewed. Chest:      Breasts: Breasts are symmetrical.         Right: No inverted nipple, mass, nipple discharge, skin change or tenderness. Left: Skin change present. No inverted nipple, mass, nipple discharge or tenderness. Comments: Dermal nodules in medial breast  Peau d orange left breast   Edema left breast   Lymphadenopathy:      Cervical: No cervical adenopathy. Upper Body:      Right upper body: No supraclavicular, axillary or pectoral adenopathy. Left upper body: No supraclavicular, axillary or pectoral adenopathy. ASSESSMENT and PLAN    ICD-10-CM ICD-9-CM    1. Skin nodule  R22.9 782.2 SURGICAL PATHOLOGY      SURGICAL PATHOLOGY   2. History of left breast cancer  Z85.3 V10.3 SURGICAL PATHOLOGY      SURGICAL PATHOLOGY      traMADoL (ULTRAM) 50 mg tablet   3.  Mastodynia  N64.4 611.71 traMADoL (ULTRAM) 50 mg tablet     - 62 yo female Breast history -   Referring - Dr. Neo Baum  1/3/19 - port insertion and LEFT SLNB. 59 yo with T1c N1mi M0 (Stage I) infiltrating ductal carcinoma, Tumor size 1.6 cm, LN 1/3 with micromet, grade 3, ER -ve, ID -ve, Her 2 -ve  7/2019 - completed neoadjuvant chemotherapy - Dr. Js Rubi   9/5/19 - LEFT breast lumpectomy and port removal. 22 mm not great response, Margins clear. - Dr. Shun Michel - XRT - completed 11/2019    - has dermal nodules and pain left breast   Punch biopsy performed  Will call with results.

## 2021-07-19 NOTE — PATIENT INSTRUCTIONS
Breast Lumps: Care Instructions  Your Care Instructions  Breast lumps are common, especially in women between ages 27 and 48. Many women's breasts feel lumpy and tender before their menstrual period. Women also may have lumps when they are breastfeeding. Breast lumps may go away after menopause. All new breast lumps in women after menopause should be checked by a doctor. Although lumps may be normal for you, it is important to have your doctor check any lump or thickness that is not like the rest of your breast to make sure it is not cancer. A lump may be larger, harder, or different from the rest of your breast tissue. Follow-up care is a key part of your treatment and safety. Be sure to make and go to all appointments, and call your doctor if you are having problems. It's also a good idea to know your test results and keep a list of the medicines you take. How can you care for yourself at home? · Make an appointment to have a mammogram and other follow-up visits as recommended by your doctor. When should you call for help? Watch closely for changes in your health, and be sure to contact your doctor if:    · You do not get better as expected.     · Your breast has changed.     · You have pain in your breast.     · You have a discharge from your nipple.     · A breast lump changes or does not go away. Where can you learn more? Go to http://www.gray.com/  Enter Q928 in the search box to learn more about \"Breast Lumps: Care Instructions. \"  Current as of: July 17, 2020               Content Version: 12.8  © 3523-9810 Healthwise, Incorporated. Care instructions adapted under license by [employer] (which disclaims liability or warranty for this information). If you have questions about a medical condition or this instruction, always ask your healthcare professional. [address] any warranty or liability for your use of this information. Breast Self-Exam: Care Instructions  Your Care Instructions     A breast self-exam is when you check your breasts for lumps or changes. This regular exam helps you learn how your breasts normally look and feel. Most breast problems or changes are not because of cancer. Breast self-exam is not a substitute for a mammogram. Having regular breast exams by your doctor and regular mammograms improve your chances of finding any problems with your breasts. Some women set a time each month to do a step-by-step breast self-exam. Other women like a less formal system. They might look at their breasts as they brush their teeth, or feel their breasts once in a while in the shower. If you notice a change in your breast, tell your doctor. Follow-up care is a key part of your treatment and safety. Be sure to make and go to all appointments, and call your doctor if you are having problems. It's also a good idea to know your test results and keep a list of the medicines you take. How do you do a breast self-exam?  · The best time to examine your breasts is usually one week after your menstrual period begins. Your breasts should not be tender then. If you do not have periods, you might do your exam on a day of the month that is easy to remember. · To examine your breasts:  ? Remove all your clothes above the waist and lie down. When you are lying down, your breast tissue spreads evenly over your chest wall, which makes it easier to feel all your breast tissue. ? Use the padsnot the fingertipsof the 3 middle fingers of your left hand to check your right breast. Move your fingers slowly in small coin-sized circles that overlap. ? Use three levels of pressure to feel of all your breast tissue. Use light pressure to feel the tissue close to the skin surface. Use medium pressure to feel a little deeper. Use firm pressure to feel your tissue close to your breastbone and ribs.  Use each pressure level to feel your breast tissue before moving on to the next spot. ? Check your entire breast, moving up and down as if following a strip from the collarbone to the bra line, and from the armpit to the ribs. Repeat until you have covered the entire breast.  ? Repeat this procedure for your left breast, using the pads of the 3 middle fingers of your right hand. · To examine your breasts while in the shower:  ? Place one arm over your head and lightly soap your breast on that side. ? Using the pads of your fingers, gently move your hand over your breast (in the strip pattern described above), feeling carefully for any lumps or changes. ? Repeat for the other breast.  · Have your doctor inspect anything you notice to see if you need further testing. Where can you learn more? Go to http://www.gray.com/  Enter P148 in the search box to learn more about \"Breast Self-Exam: Care Instructions. \"  Current as of: December 17, 2020               Content Version: 12.8  © 2006-2021 Healthwise, Incorporated. Care instructions adapted under license by [employer] (which disclaims liability or warranty for this information). If you have questions about a medical condition or this instruction, always ask your healthcare professional. [place] [address] any warranty or liability for your use of this information.

## 2021-07-19 NOTE — LETTER
7/21/2021    Patient: Juan Carlos Leyva   YOB: 1960   Date of Visit: 7/19/2021     June Grier MD  [address] [place] [address]  Via In Juan coon MD  [address]  [address] [facility] [address]  Via Fax: [phone]    Dear MD Seferino Lennon MD,      Thank you for referring Ms. Keven Gomes to [address] [place] [address] for evaluation. My notes for this consultation are attached. If you have questions, please do not hesitate to call me. I look forward to following your patient along with you.       Sincerely,    Alisa Araujo MD

## 2021-07-20 NOTE — PROGRESS NOTES
The patient called asking for her pain medication to be sent to her pharmacy. I instructed her that this was already completed and she should call her pharmacy today to check? She states she called yesterday and it wasn't there. I made sure the pharmacy that is documented in the chart is the one she called.

## 2021-07-27 NOTE — PROGRESS NOTES
Dilma Chambers is a 63 y.o. female here for 6 month follow up for left breast cancer. Research follow up. 1. Have you been to the ER, urgent care clinic since your last visit? Hospitalized since your last visit? no    2. Have you seen or consulted any other health care providers outside of the [address] [place] [address] since your last visit? Include any pap smears or colon screening. no    Pt has not worked in 2 weeks due to anxiety over pain and bumps in breast. Saw Dr Sadiq Mauro but has not heard anything back. Says she has been in bed for 7 days. She has not been to lymphedema clinic since this occurred.

## 2021-07-28 NOTE — PROGRESS NOTES
[address]  Social Work Navigator Encounter     Patient Name:  Mikaela Cervantes    Medical History: dx recurrent breast cancer (TNBC)    Advance Directives:    Narrative: pt still working at [employer] for 13 years - she loves. PT has dtr who is 29 yo and has who are 17,15, and 10 and since the pandemic - stays at home with kids. MD asked for dtr to come to next appt. Goal of treatment will be to shrink cancer but also utilize immunotherapy which has promising results.    MD discussed choices of working or SSDI and for pt to think about her Bucket list.      Barriers to Care:     Plan:    Referral:

## 2021-07-28 NOTE — PROGRESS NOTES
[address] [facility] [address], [address] [facility], 45 [facility], 200 [facility]  [phone]      Follow-up Note        Patient: Brynn Solis MRN: [id number]  SSN: [id number]    YOB: 1960  Age: 63 y.o. Sex: female        Diagnosis:     1. Recurrent/metastatic breast cancer, Dx 07/2021   Inflammatory recurrence with regional nodes    2. Left breast carcinoma:  T1c N1mi M0 (Stage I) infiltrating ductal carcinoma, Tumor size 1.6 cm, LN 1/3 with micromet, grade 3, ER -ve, FL -ve, Her 2 -ve    ypT2 N0    Treatment:     1. Neoadjuvant chemotherapy   Adriamycin, cytoxan - s/p 4 cycles   Weekly Taxol - s/p 12 cycles  2. S/p left lumpectomy on 9/5/2019 by Dr. Monty Perkins  3. S/P Adjuvant radiation  4. Enrolled in  (for residual disease post surgery) - randomized to placebo     Subjective:      Brynn Solis is a 63 y.o. female with a diagnosis of left sided invasive breast cancer. She felt a lump in the left breast, went to see her PCP, got a mammogram and was noted to have abnormal density in the left upper outer quadrant of the breast. A biopsy of the mass revealed gr 3 IDC, TNBC. The axillary LN is clear of disease. She saw Dr. Estrella Fortune. An MRI of the breast shows the tumor to be larger than previously believed to be. A left axillary LN biopsy showed 1/3 nodes with micrometastasis. She works at [employer] full time. Ms. Manny Erickson completed neoadjuvant chemotherapy and underwent lumpectomy in September 2020. She enrolled in the clinical trial  and was randomized to the placebo arm. She noticed change in the left breast skin approximately 4-6 weeks ago. Breast has progressively hardened. In additional few raised areas popped up. She initially had pain in the left breast but this has subsided. She saw Dr. Monty Perkins. A punch bx of the skin on the breast revealed TNBC. She is working at [employer] for the last 13 years.        Review of Systems:    Constitutional: negative  Eyes: negative  Ears, Nose, Mouth, Throat, and Face: negative  Respiratory: negative  Cardiovascular: negative  Gastrointestinal: negative  Genitourinary:negative  Integument/Breast: skin changes and hardening of the breast  Hematologic/Lymphatic: negative  Musculoskeletal:negative  Neurological: negative        Past Medical History:   Diagnosis Date    Breast cancer ([facility] [address]     left side    Menopause      Past Surgical History:   Procedure Laterality Date    HX BREAST BIOPSY Left     x2    HX BREAST LUMPECTOMY Left 2019    LEFT BREAST LUMPECTOMY WITH ULTRASOUND performed by Veronica Francis MD at [facility] AMBULATORY OR    HX HYSTERECTOMY      partial, ovaries remain    VASCULAR SURGERY PROCEDURE UNLIST      [facility]      Family History   Problem Relation Age of Onset    Cancer Father     Cancer Maternal Aunt     Breast Cancer Maternal Aunt         4 paternal aunts    Cancer Maternal Uncle     Cancer Paternal Aunt     Cancer Paternal Uncle     Breast Cancer Maternal Grandmother      Social History     Tobacco Use    Smoking status: Former Smoker     Packs/day: 0.10     Quit date: 2020     Years since quittin.5    Smokeless tobacco: Never Used   Substance Use Topics    Alcohol use: No      Prior to Admission medications    Medication Sig Start Date End Date Taking? Authorizing Provider   amoxicillin (AMOXIL) 875 mg tablet Take 875 mg by mouth two (2) times a day.   Patient not taking: Reported on 2021    Provider, Historical          Allergies   Allergen Reactions    Codeine Rash     Rash from tylenol #3         Objective:     Visit Vitals  /88 (BP 1 Location: Right upper arm, BP Patient Position: Sitting)   Pulse (!) 126   Temp 99.6 °F (37.6 °C) (Temporal)   Ht 5' 2.5\" (1.588 m)   Wt 193 lb 6.4 oz (87.7 kg)   SpO2 97%   BMI 34.81 kg/m²       Pain Scale: 0 - No pain/10  Pain Location:       Physical Exam:     GENERAL: alert, cooperative, no distress, appears stated age  EYE: conjunctivae/corneas clear. LYMPHATIC: Cervical, supraclavicular, and axillary nodes normal.   THROAT & NECK: normal and no erythema or exudates noted. BREAST: Left breast is hard, skin is thickened, peau de' orange appearance  LUNG: clear to auscultation bilaterally  HEART: regular rate and rhythm  ABDOMEN: soft, non-tender. EXTREMITIES:  extremities normal, atraumatic, no cyanosis or edema  SKIN: Normal.  NEUROLOGIC: AOx3. Gait normal.          Physical exam and ROS has been modified from a prior visit to make it relevant and current        Assessment:     1. Recurrent/metastatic breast cancer, Dx 07/2021   Inflammatory recurrence with regional nodes    2. Left breast carcinoma:  T1c N1mi M0 (Stage I) infiltrating ductal carcinoma, Tumor size 1.6 cm, LN 1/3 with micromet, grade 3, ER -ve, NV -ve, Her 2 -ve    ECOG PS 0       Received neoadjuvant chemotherapy   Adriamycin, Cyclophosphamide - s/p 4 cycles   Weekly Taxol - s/p 12 cycles - completed 8/15/2019    S/p left lumpectomy on 9/5/2019 with Dr. Lainey Loza  ypT2 N0    Since is enrolled in a clinical trial.  She is randomized to the placebo arm. SWOG : A Randomized, Phase III Trial to Evaluate the Efficacy and Safety of MK-3475 (Pembrolizumab) as Adjuvant Therapy for Triple Receptor-Negative Breast Cancer with >/= 1 CM Residual Invasive Cancer or Positive Lymph Nodes (ypN+) after Neoadjuvant Chemotherapy    Disease has recurred in the breast with changes suggestive of inflammatory disease and regional nodes are enlarged. I will complete staging with CT and bone scan. I will complete biomarker testing with PD-L1 and NGS on the tumor tissue. BRCA germline testing. I educated her about the seriousness of this recurrent disease. Plan:     1. PD-L1 and NGS on the tumor tissue  2. CT chest/abd/pelvis, bone scan  3. Germline BRCA1/2  4. Return in 2 weeks  5.  Discuss the case in tumor board    I performed a history and physical examination of the patient and discussed his management with the NPP. I reviewed the NPP note and agree with the documented findings and plan of care. The patient was seen in conjunction with Ms. Chapis. Ms. Yolanda Craig is a women with recurrent TNBC of the left breast. Physical exam reveals peau de' orange appearance of the left breast. I will re-stage her with CT and bone scan and bring her in 2 weeks for discussion about the next steps. Signed by: Coby Mejia MD                     July 28, 2021      CC. Richi Bonner MD  CC. John Scales MD  CC.  Jessica Martin MD

## 2021-07-28 NOTE — LETTER
7/28/2021    Patient: Rashad Rausch   YOB: 1960   Date of Visit: 7/28/2021     Raul Palmer MD  [address] [facility]    Dear Raul Palmer MD,      Thank you for referring Ms. Erasmo Jacobson to [address] [place] [address] for evaluation. My notes for this consultation are attached. If you have questions, please do not hesitate to call me. I look forward to following your patient along with you.       Sincerely,    Uday Rubio MD

## 2021-08-05 NOTE — PROGRESS NOTES
Kermit Agosto is a 63 y.o. female here for follow up for recurrent/met breast cancer. 1. Have you been to the ER, urgent care clinic since your last visit? Hospitalized since your last visit? no    2. Have you seen or consulted any other health care providers outside of the [address] [place] [address] since your last visit? Include any pap smears or colon screening. No    No complaints.

## 2021-08-06 NOTE — LETTER
8/10/2021    Patient: Rachael Quiroga   YOB: 1960   Date of Visit: 8/6/2021     Case Isaac MD  [address] [facility] [address]  Chelo Correa    Dear Case Isaac MD,      Thank you for referring Ms. Olimpia Rankin to [address] [place] [address] for evaluation. My notes for this consultation are attached. If you have questions, please do not hesitate to call me. I look forward to following your patient along with you.       Sincerely,    José Paniagua MD

## 2021-08-06 NOTE — PROGRESS NOTES
[address]  Social Work Navigator Encounter     Patient Name:  Olimpia Rankin    Medical History: TNBC - in lymph nodes    Advance Directives:    Narrative: PT and pt dtr present to review treatment plan. Pt to receive IV carboplatin + gemcitabine + Keytruda 2 weeks on 1 week off for 3 treatments, scan and then repeat. MD to have IR put in port since surgeon is out next week.       Barriers to Care:     Plan:    Referral:

## 2021-08-06 NOTE — PROGRESS NOTES
PER VORB from Dr. Martin Lieberman lidocaine-prilocaine (EMLA) cream apply to affected area as needed for pain dispense 30 refill 2  Per VORB from Dr. Tres Ruelas ODT 4MG EVERY 8 [facility] 59. QUANTITY 40 REFILL 2  VORB: IR INSERT TUNL CVAD  W PORT OVER THAN ONE YEAR as a one time order. Chemotherapy education packet provided to the patient and reviewed. Consent was also obtained. All questions and concerns were addressed. Time spent with patient approximately 15 minutes. Patient daughter present during visit. She prefers wednesdays for treatments.

## 2021-08-11 NOTE — PROGRESS NOTES
[address] [facility] [address], 210 [facility], 45 [facility], 200 [facility]  [phone]      Follow-up Note        Patient: German Still MRN: [id number]  SSN: [id number]    YOB: 1960  Age: 63 y.o. Sex: female        Diagnosis:     1. Recurrent/metastatic breast cancer, Dx 07/2021   Inflammatory recurrence with regional nodes, mediastinal nodes and RP nodes    2. Left breast carcinoma:  T1c N1mi M0 (Stage I) infiltrating ductal carcinoma, Tumor size 1.6 cm, LN 1/3 with micromet, grade 3, ER -ve, [place] -ve, Her 2 -ve    ypT2 N0    Treatment:     1. Neoadjuvant chemotherapy   Adriamycin, cytoxan - s/p 4 cycles   Weekly Taxol - s/p 12 cycles  2. S/p left lumpectomy on 9/5/2019 by Dr. Luis Dan  3. S/P Adjuvant radiation  4. Enrolled in  (for residual disease post surgery) - randomized to placebo     Subjective:      German Still is a 63 y.o. female with a diagnosis of left sided invasive breast cancer. She felt a lump in the left breast, went to see her PCP, got a mammogram and was noted to have abnormal density in the left upper outer quadrant of the breast. A biopsy of the mass revealed gr 3 IDC, TNBC. The axillary LN is clear of disease. She saw Dr. Chen Hawkins. An MRI of the breast shows the tumor to be larger than previously believed to be. A left axillary LN biopsy showed 1/3 nodes with micrometastasis. She works at [employer] full time. Ms. Liliam Cisneros completed neoadjuvant chemotherapy and underwent lumpectomy in September 2020. She enrolled in the clinical trial  and was randomized to the placebo arm. She noticed change in the left breast skin approximately 4-6 weeks ago. Breast has progressively hardened. In additional few raised areas popped up. She initially had pain in the left breast but this has subsided. She saw Dr. Luis Dan. A punch bx of the skin on the breast revealed TNBC.  She is working at [employer] for the last 13 years. She comes back to discuss the next steps. Review of Systems:    Constitutional: negative  Eyes: negative  Ears, Nose, Mouth, Throat, and Face: negative  Respiratory: negative  Cardiovascular: negative  Gastrointestinal: negative  Genitourinary:negative  Integument/Breast: skin changes and hardening of the breast  Hematologic/Lymphatic: negative  Musculoskeletal:negative  Neurological: negative        Past Medical History:   Diagnosis Date    Breast cancer ([address]     left side    Menopause      Past Surgical History:   Procedure Laterality Date    HX BREAST BIOPSY Left     x2    HX BREAST LUMPECTOMY Left 2019    LEFT BREAST LUMPECTOMY WITH ULTRASOUND performed [facility] Rebecca Barnes MD at [facility] AMBULATORY OR    HX HYSTERECTOMY      partial, ovaries remain    VASCULAR SURGERY PROCEDURE UNLIST      portacath      Family History   Problem Relation Age of Onset    Cancer Father     Cancer Maternal Aunt     Breast Cancer Maternal Aunt         4 paternal aunts    Cancer Maternal Uncle     Cancer Paternal Aunt     Cancer Paternal Uncle     Breast Cancer Maternal Grandmother      Social History     Tobacco Use    Smoking status: Former Smoker     Packs/day: 0.10     Quit date: 2020     Years since quittin.6    Smokeless tobacco: Never Used   Substance Use Topics    Alcohol use: No      Prior to Admission medications    Medication Sig Start Date End Date Taking? Authorizing Provider   lidocaine-prilocaine (EMLA) topical cream Apply  to affected area as needed for Pain. Apply generous amount to port site 30 minutes prior to infusions and cover with plastic wrap 21  Yes Rylee Pendleton MD   ondansetron (ZOFRAN ODT) 4 mg disintegrating tablet Take 1 Tablet by mouth every eight (8) hours as needed for Nausea or Vomiting. 21  Yes Rylee Pendleton MD   amoxicillin (AMOXIL) 875 mg tablet Take 875 mg by mouth two (2) times a day.   Patient not taking: Reported on 2021 Provider, Historical          Allergies   Allergen Reactions    Codeine Rash     Rash from tylenol #3         Objective:     Visit Vitals  BP (!) 160/91 (BP 1 Location: Right upper arm, BP Patient Position: Sitting)   Pulse 98   Temp 97.9 °F (36.6 °C) (Temporal)   Ht 5' 2.5\" (1.588 m)   Wt 194 lb (88 kg)   SpO2 100%   BMI 34.92 kg/m²       Pain Scale: 0 - No pain/10  Pain Location:       Physical Exam:     GENERAL: alert, cooperative, no distress, appears stated age  EYE: conjunctivae/corneas clear. LYMPHATIC: Cervical, supraclavicular, and axillary nodes normal.   THROAT & NECK: normal and no erythema or exudates noted. BREAST: Left breast is hard, skin is thickened, peau de' orange appearance  LUNG: clear to auscultation bilaterally  HEART: regular rate and rhythm  ABDOMEN: soft, non-tender. EXTREMITIES:  extremities normal, atraumatic, no cyanosis or edema  SKIN: Normal.  NEUROLOGIC: AOx3. Gait normal.          Physical exam and ROS has been modified from a prior visit to make it relevant and current    CT Results (most recent):  Results from Hospital Encounter encounter on 08/03/21    CT CHEST W CONT    Narrative  EXAM:  CT CHEST W CONT, CT ABD PELV W CONT    INDICATION: Breast cancer    COMPARISON:    CONTRAST:  100 mL of Isovue-370. TECHNIQUE:  Following the uneventful intravenous administration of contrast, thin axial  images were obtained through the chest, abdomen and pelvis. Coronal and sagittal  reconstructions were generated. Oral contrast was administered. CT dose  reduction was achieved through use of a standardized protocol tailored for this  examination and automatic exposure control for dose modulation. FINDINGS:  There is a 2.8 cm left supraclavicular lymph node. There is bilateral axillary  adenopathy. Left lymph node measures 2.2 cm. Right lymph node measures 2.9 cm.   There is bilateral skin thickening in the breast right greater than left and  there is asymmetric density in the left breast compared to the right  THYROID: No nodule. MEDIASTINUM: There are slightly enlarged lymph nodes lateral to the aortic arch  measuring 1.2 cm. There is slight soft tissue thickening posterior to the  sternum  KENNY: No mass or lymphadenopathy. THORACIC AORTA: No dissection or aneurysm. MAIN PULMONARY ARTERY: Normal in caliber. TRACHEA/BRONCHI: Patent. ESOPHAGUS: No wall thickening or dilatation. HEART: Normal in size. PLEURA: No effusion or pneumothorax. LUNGS: No nodule, mass, or airspace disease. There is minor basilar atelectasis  LIVER: No mass or biliary dilatation. GALLBLADDER: Unremarkable. SPLEEN: No mass. PANCREAS: No mass or ductal dilatation. ADRENALS: Unremarkable. KIDNEYS: No mass, calculus, or hydronephrosis. STOMACH: Unremarkable. SMALL BOWEL: No dilatation or wall thickening. COLON: No dilatation or wall thickening. APPENDIX: Unremarkable. PERITONEUM: No ascites or pneumoperitoneum. RETROPERITONEUM: There is an enlarged left periaortic lymph node measuring 1.8 x  1.7 cm. There is enlarged left iliac lymph nodes measuring 2.2 x 1.3 cm. . There  is a large left external iliac lymph node measuring 3.4 x 1.7 cm. REPRODUCTIVE ORGANS: Patient is status post hysterectomy  URINARY BLADDER: No mass or calculus. BONES: No destructive bone lesion. ADDITIONAL COMMENTS: N/A    Impression  1. CT of the chest demonstrates bilateral breast skin thickening left greater  than right and asymmetric density in the left breast.    There is bilateral axillary adenopathy. There is left supraclavicular  adenopathy. There are enlarged lymph nodes in the mediastinum lateral to the  aortic arch  which are suspicious for metastatic disease. 2. CT of the abdomen and pelvis demonstrates enlarged left retroperitoneal lymph  nodes and enlarged left pelvic lymph nodes suspicious for metastatic disease. Assessment:     1.  Recurrent/metastatic breast cancer, Dx 07/2021   Inflammatory recurrence with regional nodes, mediastinal nodes and RP nodes    2. Left breast carcinoma:  T1c N1mi M0 (Stage I) infiltrating ductal carcinoma, Tumor size 1.6 cm, LN 1/3 with micromet, grade 3, ER -ve, [place] -ve, Her 2 -ve    ECOG PS 0       Received neoadjuvant chemotherapy   Adriamycin, Cyclophosphamide - s/p 4 cycles   Weekly Taxol - s/p 12 cycles - completed 8/15/2019    S/p left lumpectomy on 9/5/2019 with Dr. Veronica Pak  ypT2 N0    She is randomized to the placebo arm of SWOG  study: A Randomized, Phase III Trial to Evaluate the Efficacy and Safety of MK-3475 (Pembrolizumab) as Adjuvant Therapy for Triple Receptor-Negative Breast Cancer with >/= 1 CM Residual Invasive Cancer or Positive Lymph Nodes (ypN+) after Neoadjuvant Chemotherapy    Disease has recurred in the breast with changes suggestive of inflammatory disease and regional nodes are enlarged. I recommended we start systemic therapy with Carboplatin/Gemzar and Keytruda    The duration of this treatment plan will be until progression, intolerable side effects, or patient choice. Patient will be meeting with navigation services to discuss any financial barriers to care/estimated cost of care. The patient's emotional well being was addressed during this office visit and patient seems to be coping well with the diagnosis and the treatment. Common Side Effects of Chemotherapy  Decreased Blood Counts Your blood counts can decrease temporarily due to chemotherapy, they will recover over time. This is an expected side effect that your Doctor will be monitoring.  - If you experience fevers (temperature >100.4°F), bleeding or unexplained bruising, please call the office right away   Risk of Infection Your white blood cells can decrease temporarily due to chemotherapy and can put you at higher risk of infection.   Washing hands frequently with soap and avoiding sick contacts can reduce your risk of infection.  - If you experience fevers (temperature >100.4°F), shaking chills, or any signs of infection, please call the office immediately   Anemia Chemotherapy can cause your red blood cells to temporarily decrease; this is an expected side effect that your Doctor will be monitoring.  - You may experience fatigue if this occurs, please notify the office if you experience bleeding, shortness of breath with minimal exertion or at rest, rapid heartbeat, or feeling as though you may lose consciousness. Hair Loss Chemotherapy can affect your hair follicles and cause you to lose hair. This can occur on your scalp hair but also all over your body including eyebrows and eye lashes   Nausea  You have been prescribed nausea medication to take if needed. Please follow the directions given to you by your Doctor. - Please call the office if the medications you have been given are not relieving nausea. Vomiting Make sure you are taking anti-nausea medication as prescribed. Eating small amounts of bland foods frequently can help. - Please call the office right away if you are vomiting more than 4 times per day or are unable to keep down food or fluids   Diarrhea Eating small amounts of bland foods frequently can help, increase your fluid intake. It is usually ok to take Imodium for diarrhea. - Please call the office right away if you experience more than 4 episodes of watery diarrhea or if you are feeling dehydrated. You can reach Medical Oncology at [facility] with further questions or concerns at: [phone]  - Calls during normal business hours will reach our office.  - Calls after hours or on the weekend will reach an answering service and the on-call Oncologist will return your call. Plan:     1. Start systemic therapy - carbo/gem/pembro  2. Germline BRCA1/2      Signed by: Uday Rubio MD                     August 10, 2021      CC. Kashmir Cody MD  CC. Carly Collado MD  CC.  Jeremy Marin MD

## 2021-08-16 NOTE — H&P
Radiology History and Physical    Patient: Kermit Agosto 63 y.o. female       Chief Complaint: No chief complaint on file. History of Present Illness: 63year old woman with metastatic breast cancer. Presents for port placement. Has had prior right chest port, removed 2 years ago with no issues. History:    Past Medical History:   Diagnosis Date    Breast cancer ([address]     left side    Menopause      Family History   Problem Relation Age of Onset    Cancer Father     Cancer Maternal Aunt     Breast Cancer Maternal Aunt         4 paternal aunts    Cancer Maternal Uncle     Cancer Paternal Aunt     Cancer Paternal Uncle     Breast Cancer Maternal Grandmother      Social History     Socioeconomic History    Marital status: SINGLE     Spouse name: Not on file    Number of children: Not on file    Years of education: Not on file    Highest education level: Not on file   Occupational History    Not on file   Tobacco Use    Smoking status: Former Smoker     Packs/day: 0.10     Quit date: 2020     Years since quittin.6    Smokeless tobacco: Never Used   Substance and Sexual Activity    Alcohol use: No    Drug use: No    Sexual activity: Not Currently   Other Topics Concern    Not on file   Social History Narrative    Not on file     Social Determinants of Health     Financial Resource Strain:     Difficulty of Paying Living Expenses:    Food Insecurity:     Worried About Running Out of Food in the Last Year:     Ran Out of Food in the Last Year:    Transportation Needs:     Lack of Transportation (Medical):      Lack of Transportation (Non-Medical):    Physical Activity:     Days of Exercise per Week:     Minutes of Exercise per Session:    Stress:     Feeling of Stress :    Social Connections:     Frequency of Communication with Friends and Family:     Frequency of Social Gatherings with Friends and Family:     Attends Spiritism Services:     Active Member of Clubs or Organizations:     Attends Club or Organization Meetings:     Marital Status:    Intimate Partner Violence:     Fear of Current or Ex-Partner:     Emotionally Abused:     Physically Abused:     Sexually Abused: Allergies: Allergies   Allergen Reactions    Codeine Rash     Rash from tylenol #3       Current Medications:  Current Outpatient Medications   Medication Sig    lidocaine-prilocaine (EMLA) topical cream Apply  to affected area as needed for Pain. Apply generous amount to port site 30 minutes prior to infusions and cover with plastic wrap    ondansetron (ZOFRAN ODT) 4 mg disintegrating tablet Take 1 Tablet by mouth every eight (8) hours as needed for Nausea or Vomiting.  amoxicillin (AMOXIL) 875 mg tablet Take 875 mg by mouth two (2) times a day. Current Facility-Administered Medications   Medication Dose Route Frequency    ceFAZolin (ANCEF) 2 g in sterile water (preservative free) 20 mL IV syringe  2 g IntraVENous ONCE        Physical Exam:  Blood pressure 109/67, pulse 94, temperature 98.6 °F (37 °C), resp. rate 16, height 5' 2\" (1.575 m), weight 87.5 kg (193 lb), SpO2 98 %, not currently breastfeeding. GENERAL: alert, cooperative, no distress, appears stated age,   LUNG: Nonlabored respiration on room air  HEART: regular rate and rhythm    Alerts:    Hospital Problems  Date Reviewed: 7/21/2021    None          Laboratory:    No results for input(s): HGB, HCT, WBC, PLT, INR, BUN, CREA, K, CRCLT, HGBEXT, HCTEXT, PLTEXT, INREXT in the last 72 hours. No lab exists for component: PTT, PT      Plan of Care/Planned Procedure:  Risks, benefits, and alternatives reviewed with patient and she agrees to proceed with the procedure. Port placement    Deemed appropriate for moderate sedation with versed and fentanyl.     Luster Osler, MD  Interventional Radiology  [facility]  8:43 AM, 8/16/2021

## 2021-08-16 NOTE — PROGRESS NOTES
Name of procedure:Port placement    Sedation medications given: 5 mg versed with 100 mcg of fentanyl IV.  Ancef 2 grams prior to procedure    Sedation tolerated: well    Total Sedation time: 18 minutes    Vital Signs:stable    Any complications related to procedure:None

## 2021-08-16 NOTE — ROUTINE PROCESS
Procedure reviewed with patient by Dr. Kathy King. Opportunity to verbalize questions and concerns. Consent obtained.

## 2021-08-16 NOTE — PROCEDURES
Interventional Radiology  Procedure Note        8/16/2021 8:44 AM    Patient: Fernanda Castle     Informed consent obtained    Diagnosis: Metastatic breast cancer    Procedure(s): port placement    Specimens removed:  none    Complications: None    Primary Physician: Louann Wagoner MD    Recommendations: N/A    Discharge Disposition: Stable; discharge to home    Full dictated report to follow    Louann Wagoner MD  Interventional Radiology  [facility], [facility].  8:44 AM, 8/16/2021

## 2021-08-16 NOTE — DISCHARGE INSTRUCTIONS
[facility] [facility]  Angiography Department      Radiologist:    Dr. Kathleen Chadwick     Date:   8/16/2021      [facility] Discharge Instructions      Watch for signs of infection:    1. Redness,   2. Fever, chills,   3. Increased pain, and/or drainage from the site. If this occurs, call your physician at once. If you have an appointment with a provider or at the infusion center in the upcoming week,  they may check your site and change the dressing for you. Keep your dressing clean and dry. Leave the dressing in place for 3 days. Continue your previous diet and restart your regularly prescribed medications. You may take Tylenol, as directed on the label, for pain if needed. Avoid ibuprofen (Advil, Motrin) and aspirin as they may cause you to bleed. Because you received sedation, you are not to drive or sign any legal documents for the next 24 hours. Do not lift anything heavier than 5 pounds with the affected arm and avoid pushing and pulling movements for several days. If you have any questions or concerns, please call our radiology department at [phone].

## 2021-08-20 NOTE — PROGRESS NOTES
Laura Arvizu is a 63 y.o. female here for follow up for recurrent/met breast cancer. Treatment today:  Cycle 1 Day 1 Pembrolizumab + Carboplatin + Gemcitabine    1. Have you been to the ER, urgent care clinic since your last visit? Hospitalized since your last visit? no  2. Have you seen or consulted any other health care providers outside of the [address] [place] [address] since your last visit? Include any pap smears or colon screening. no    Pt received port 8/16/21. Pt states she has been well. No complaints.

## 2021-08-23 NOTE — PROGRESS NOTES
[address] [facility] [address], [address] [facility], [address] [facility], [address] [facility]  [phone]      Follow-up Note        Patient: Rachael Quiroga MRN: [id number]  SSN: [id number]    YOB: 1960  Age: 63 y.o. Sex: female        Diagnosis:     1. Recurrent/metastatic breast cancer, Dx 07/2021   Inflammatory recurrence with regional nodes, mediastinal nodes and RP nodes    2. Left breast carcinoma:  T1c N1mi M0 (Stage I) infiltrating ductal carcinoma, Tumor size 1.6 cm, LN 1/3 with micromet, grade 3, ER -ve, GA -ve, Her 2 -ve    ypT2 N0    Treatment:     1. Neoadjuvant chemotherapy   Adriamycin, cytoxan - s/p 4 cycles   Weekly Taxol - s/p 12 cycles  2. S/p left lumpectomy on 9/5/2019 by Dr. Regine Lara  3. S/P Adjuvant radiation  4. Enrolled in  (for residual disease post surgery) - randomized to placebo   5. Palliative chemotherapy   Carboplatin/Gemzar and Keytruda, Cycle 1 day 1    Subjective:      Rachael Quiroga is a 63 y.o. female with a diagnosis of left sided invasive breast cancer. She felt a lump in the left breast, went to see her PCP, got a mammogram and was noted to have abnormal density in the left upper outer quadrant of the breast. A biopsy of the mass revealed gr 3 IDC, TNBC. The axillary LN is clear of disease. She saw Dr. Dalia Starr. An MRI of the breast shows the tumor to be larger than previously believed to be. A left axillary LN biopsy showed 1/3 nodes with micrometastasis. She works at [employer] full time. Ms. Maude Schroeder completed neoadjuvant chemotherapy and underwent lumpectomy in September 2020. She enrolled in the clinical trial  and was randomized to the placebo arm. She noticed change in the left breast skin approximately 4-6 weeks ago. Breast has progressively hardened. In additional few raised areas popped up. She initially had pain in the left breast but this has subsided. She saw Dr. Regine Lara.  A punch bx of the skin on the breast revealed TNBC. She is working at [email or web address] for the last 13 years. Ms. Jordyn Aguirre is here today to start treatment. She says her appetite has been decreased since she received her recent diagnosis. She is also anxious. Review of Systems:     Constitutional: negative, anxiety  Eyes: negative  Ears, Nose, Mouth, Throat, and Face: negative  Respiratory: negative  Cardiovascular: negative  Gastrointestinal: negative  Genitourinary:negative  Integument/Breast: skin changes and hardening of the breast  Hematologic/Lymphatic: negative  Musculoskeletal:negative  Neurological: negative    ROS was pulled in from a previous visit. No changes were made d/t symptoms remain the same. Past Medical History:   Diagnosis Date    Breast cancer ([address]     left side    Menopause      Past Surgical History:   Procedure Laterality Date    HX BREAST BIOPSY Left     x2    HX BREAST LUMPECTOMY Left 2019    LEFT BREAST LUMPECTOMY WITH ULTRASOUND performed by Alisia Michael MD at [facility]    HX HYSTERECTOMY      partial, ovaries remain    IR INSERT TUNL CVC W PORT OVER 5 YEARS  2021    VASCULAR SURGERY PROCEDURE UNLIST      portacath      Family History   Problem Relation Age of Onset    Cancer Father     Cancer Maternal Aunt     Breast Cancer Maternal Aunt         4 paternal aunts    Cancer Maternal Uncle     Cancer Paternal Aunt     Cancer Paternal Uncle     Breast Cancer Maternal Grandmother      Social History     Tobacco Use    Smoking status: Former Smoker     Packs/day: 0.10     Quit date: 2020     Years since quittin.6    Smokeless tobacco: Never Used   Substance Use Topics    Alcohol use: No      Prior to Admission medications    Medication Sig Start Date End Date Taking? Authorizing Provider   lidocaine-prilocaine (EMLA) topical cream Apply  to affected area as needed for Pain.  Apply generous amount to port site 30 minutes prior to infusions and cover with plastic wrap 8/6/21  Yes Karen Cunningham MD   ondansetron (ZOFRAN ODT) 4 mg disintegrating tablet Take 1 Tablet by mouth every eight (8) hours as needed for Nausea or Vomiting. 8/6/21  Yes Karen Cunningham MD   amoxicillin (AMOXIL) 875 mg tablet Take 875 mg by mouth two (2) times a day. Yes Provider, Historical          Allergies   Allergen Reactions    Codeine Rash     Rash from tylenol #3         Objective:     Visit Vitals  BP (!) 146/96   Pulse (!) 104   Temp 98.5 °F (36.9 °C)   Resp 18   Ht 5' 2.5\" (1.588 m)   Wt 192 lb (87.1 kg)   SpO2 100%   BMI 34.56 kg/m²       Pain Scale: 0 - No pain/10  Pain Location:       Physical Exam:     GENERAL: alert, cooperative, no distress, appears stated age  EYE: conjunctivae/corneas clear. LYMPHATIC: Cervical, supraclavicular, and axillary nodes normal.   THROAT & NECK: normal and no erythema or exudates noted. BREAST: Left breast is hard, skin is thickened, peau de' orange appearance  LUNG: clear to auscultation bilaterally  HEART: regular rate and rhythm  ABDOMEN: soft, non-tender. EXTREMITIES:  extremities normal, atraumatic, no cyanosis or edema  SKIN: Normal.  NEUROLOGIC: AOx3. Gait normal.      Physical exam was pulled in from a previous visit. No changes were made d/t physical exam remains the same. CT Results (most recent):  Results from Hospital Encounter encounter on 08/03/21    CT CHEST W CONT    Narrative  EXAM:  CT CHEST W CONT, CT ABD PELV W CONT    INDICATION: Breast cancer    COMPARISON:    CONTRAST:  100 mL of Isovue-370. TECHNIQUE:  Following the uneventful intravenous administration of contrast, thin axial  images were obtained through the chest, abdomen and pelvis. Coronal and sagittal  reconstructions were generated. Oral contrast was administered. CT dose  reduction was achieved through use of a standardized protocol tailored for this  examination and automatic exposure control for dose modulation.     FINDINGS:  There is a 2.8 cm left supraclavicular lymph node. There is bilateral axillary  adenopathy. Left lymph node measures 2.2 cm. Right lymph node measures 2.9 cm. There is bilateral skin thickening in the breast right greater than left and  there is asymmetric density in the left breast compared to the right  THYROID: No nodule. MEDIASTINUM: There are slightly enlarged lymph nodes lateral to the aortic arch  measuring 1.2 cm. There is slight soft tissue thickening posterior to the  sternum  KENNY: No mass or lymphadenopathy. THORACIC AORTA: No dissection or aneurysm. MAIN PULMONARY ARTERY: Normal in caliber. TRACHEA/BRONCHI: Patent. ESOPHAGUS: No wall thickening or dilatation. HEART: Normal in size. PLEURA: No effusion or pneumothorax. LUNGS: No nodule, mass, or airspace disease. There is minor basilar atelectasis  LIVER: No mass or biliary dilatation. GALLBLADDER: Unremarkable. SPLEEN: No mass. PANCREAS: No mass or ductal dilatation. ADRENALS: Unremarkable. KIDNEYS: No mass, calculus, or hydronephrosis. STOMACH: Unremarkable. SMALL BOWEL: No dilatation or wall thickening. COLON: No dilatation or wall thickening. APPENDIX: Unremarkable. PERITONEUM: No ascites or pneumoperitoneum. RETROPERITONEUM: There is an enlarged left periaortic lymph node measuring 1.8 x  1.7 cm. There is enlarged left iliac lymph nodes measuring 2.2 x 1.3 cm. . There  is a large left external iliac lymph node measuring 3.4 x 1.7 cm. REPRODUCTIVE ORGANS: Patient is status post hysterectomy  URINARY BLADDER: No mass or calculus. BONES: No destructive bone lesion. ADDITIONAL COMMENTS: N/A    Impression  1. CT of the chest demonstrates bilateral breast skin thickening left greater  than right and asymmetric density in the left breast.    There is bilateral axillary adenopathy. There is left supraclavicular  adenopathy.  There are enlarged lymph nodes in the mediastinum lateral to the  aortic arch  which are suspicious for metastatic disease. 2. CT of the abdomen and pelvis demonstrates enlarged left retroperitoneal lymph  nodes and enlarged left pelvic lymph nodes suspicious for metastatic disease. Assessment:     1. Recurrent/metastatic breast cancer, Dx 07/2021   Inflammatory recurrence with regional nodes, mediastinal nodes and RP nodes    PD-L1 ~ 2% (IC)  NGS: p53, CKS1B, FGFR1 amplification    Previously   Left breast carcinoma:  T1c N1mi M0 (Stage I) infiltrating ductal carcinoma, Tumor size 1.6 cm, LN 1/3 with micromet, grade 3, ER -ve, MN -ve, Her 2 -ve    ECOG PS 0       Received neoadjuvant chemotherapy   Adriamycin, Cyclophosphamide - s/p 4 cycles   Weekly Taxol - s/p 12 cycles - completed 8/15/2019    S/p left lumpectomy on 9/5/2019 with Dr. Mariam Victor  ypT2 N0    She is randomized to the placebo arm of SWOG  study: A Randomized, Phase III Trial to Evaluate the Efficacy and Safety of [id number]-3475 (Pembrolizumab) as Adjuvant Therapy for Triple Receptor-Negative Breast Cancer with >/= 1 CM Residual Invasive Cancer or Positive Lymph Nodes (ypN+) after Neoadjuvant Chemotherapy    Disease has recurred in the breast with changes suggestive of inflammatory disease and regional nodes are enlarged. Starting Carboplatin/Gemzar and Keytruda  Cycle 1 day 1     Blood counts are acceptable. Results reviewed with the patient. I educated her about the side effects of the treatment and ways to manage it. She vocalized understanding. 2. Anemia - normocytic     Observation     3. Anxiety     Feels it is better. She will let us know if she elects to start medication    Plan:     1. Start systemic therapy - carbo/gem/pembro  2. Germline BRCA1/2  3. Follow up in 1 week    I performed a history and physical examination of the patient and discussed his management with the NPP. I reviewed the NPP note and agree with the documented findings and plan of care. The patient was seen in conjunction with Ms. Nation.      Ms. Yola Rosado is a women with recurrent TNBC. Left breast is hard and indurated. She is starting systemic therapy today. Signed by: Bradly Schwartz MD                     August 24, 2021      CC. Shira Godinez MD  CC. Cosme Pop MD  CC.  Mechelle Cabrera MD

## 2021-08-23 NOTE — LETTER
8/24/2021    Patient: Margret Simental   YOB: 1960   Date of Visit: 8/23/2021     Lamont Causey MD  [address] [facility]    Dear Lamont Causey MD,      Thank you for referring Ms. Cayden Mckinley to [address] [place] [address] for evaluation. My notes for this consultation are attached. If you have questions, please do not hesitate to call me. I look forward to following your patient along with you.       Sincerely,    Lizabeth Del Castillo, NP

## 2021-08-23 NOTE — PROGRESS NOTES
Outpatient Infusion Center - Chemotherapy Progress Note    0900- Pt admit to [facility] for Keytrua/Carboplatin/Gemzar in stable condition. Assessment completed, buffy has no complaints other than decrease in appetite. Patient denied having any symptoms of COVID-19, i.e. SOB, coughing, fever, or generally not feeling well. Also denies having been exposed to COVID-19 recently or having had any recent contact with family/friend that has a pending COVID test.     Right chest port accessed with positive blood return. Labs drawn per order and sent. Line flushed, clamped, Curos Cap applied to end clave. Chemotherapy Flowsheet 8/23/2021   Cycle C1D1   Date 8/23/2021   Drug / Regimen Keytruda/Carbo/Gemzar   Pre Meds given   Notes given        Patient Vitals for the past 12 hrs:   Temp Pulse Resp BP SpO2   08/23/21 1425  92  (!) 153/92    08/23/21 0903 98.5 °F (36.9 °C) (!) 104 18 (!) 146/96 100 %        Recent Results (from the past 12 hour(s))   CBC WITH AUTOMATED DIFF    Collection Time: 08/23/21  9:11 AM   Result Value Ref Range    WBC 4.9 3.6 - 11.0 K/uL    RBC 4.08 3.80 - 5.20 M/uL    HGB 11.0 (L) 11.5 - 16.0 g/dL    HCT 35.4 35.0 - 47.0 %    MCV 86.8 80.0 - 99.0 FL    MCH 27.0 26.0 - 34.0 PG    MCHC 31.1 30.0 - 36.5 g/dL    RDW 15.9 (H) 11.5 - 14.5 %    PLATELET 585 344 - 814 K/uL    MPV 9.3 8.9 - 12.9 FL    NRBC 0.0 0  WBC    ABSOLUTE NRBC 0.00 0.00 - 0.01 K/uL    NEUTROPHILS 63 32 - 75 %    LYMPHOCYTES 24 12 - 49 %    MONOCYTES 10 5 - 13 %    EOSINOPHILS 3 0 - 7 %    BASOPHILS 0 0 - 1 %    IMMATURE GRANULOCYTES 0 0.0 - 0.5 %    ABS. NEUTROPHILS 3.1 1.8 - 8.0 K/UL    ABS. LYMPHOCYTES 1.2 0.8 - 3.5 K/UL    ABS. MONOCYTES 0.5 0.0 - 1.0 K/UL    ABS. EOSINOPHILS 0.1 0.0 - 0.4 K/UL    ABS. BASOPHILS 0.0 0.0 - 0.1 K/UL    ABS. IMM.  GRANS. 0.0 0.00 - 0.04 K/UL    DF AUTOMATED     METABOLIC PANEL, COMPREHENSIVE    Collection Time: 08/23/21  9:11 AM   Result Value Ref Range    Sodium 138 136 - 145 mmol/L Potassium 3.7 3.5 - 5.1 mmol/L    Chloride 106 97 - 108 mmol/L    CO2 25 21 - 32 mmol/L    Anion gap 7 5 - 15 mmol/L    Glucose 97 65 - 100 mg/dL    BUN 9 6 - 20 MG/DL    Creatinine 0.64 0.55 - 1.02 MG/DL    BUN/Creatinine ratio 14 12 - 20      GFR est AA >60 >60 ml/min/1.73m2    GFR est non-AA >60 >60 ml/min/1.73m2    Calcium 8.9 8.5 - 10.1 MG/DL    Bilirubin, total 0.4 0.2 - 1.0 MG/DL    ALT (SGPT) 16 12 - 78 U/L    AST (SGOT) 17 15 - 37 U/L    Alk. phosphatase 98 45 - 117 U/L    Protein, total 7.3 6.4 - 8.2 g/dL    Albumin 2.9 (L) 3.5 - 5.0 g/dL    Globulin 4.4 (H) 2.0 - 4.0 g/dL    A-G Ratio 0.7 (L) 1.1 - 2.2     TSH 3RD GENERATION    Collection Time: 08/23/21  9:11 AM   Result Value Ref Range    TSH 0.58 0.36 - 3.74 uIU/mL   HEPATITIS PANEL, ACUTE    Collection Time: 08/23/21  9:11 AM   Result Value Ref Range    Hepatitis A, IgM NONREACTIVE NR      __          Hepatitis B surface Ag <0.10 Index    Hep B surface Ag Interp. Negative NEG      __          Hepatitis B core, IgM NONREACTIVE NR      __          Hep C virus Ab Interp.  NONREACTIVE NR          Medications:  Medications Administered     0.9% sodium chloride infusion     Admin Date  08/23/2021 Action  New Bag Dose  25 mL/hr Rate  25 mL/hr Route  IntraVENous Administered By  Presley Padilla RN          CARBOplatin (PARAPLATIN) 300 mg in 0.9% sodium chloride 250 mL, overfill volume 25 mL chemo infusion     Admin Date  08/23/2021 Action  New Bag Dose  300 mg Rate  610 mL/hr Route  IntraVENous Administered By  Presley Padilla RN          dexamethasone (DECADRON) 4 mg/mL injection 8 mg     Admin Date  08/23/2021 Action  Given Dose  8 mg Route  IntraVENous Administered By  Presley Padilla RN          gemcitabine (GEMZAR) 1,970 mg in 0.9% sodium chloride 250 mL, overfill volume 25 mL chemo infusion     Admin Date  08/23/2021 Action  New Bag Dose  1,970 mg Rate  653.6 mL/hr Route  IntraVENous Administered By  Presley Padilla RN          heparin (porcine) pf 300-500 Units     Admin Date  08/23/2021 Action  Given Dose  500 Units Route  InterCATHeter Administered By  Esther Hunt RN          ondansetron [facility]) injection 8 mg     Admin Date  08/23/2021 Action  Given Dose  8 mg Route  IntraVENous Administered By  Esther Hunt RN          pembrolizumab [facility]) 200 mg in 0.9% sodium chloride 100 mL, overfill volume 10 mL IVPB     Admin Date  08/23/2021 Action  New Bag Dose  200 mg Rate  236 mL/hr Route  IntraVENous Administered By  Esther Hunt RN          sodium chloride (NS) flush 10 mL     Admin Date  08/23/2021 Action  Given Dose  10 mL Route  IntraVENous Administered By  Esther Hunt RN                 Pt tolerated treatment well. Patient provided with first-dose education packets and had no questions regarding treatment today. Port maintained positive blood return throughout treatment, flushed with positive blood return at conclusion, and de-accessed. 1430- D/c home in no distress.  Pt aware of next [facility] appointment scheduled for:    Future Appointments   Date Time Provider Jyotsna Burns   8/30/2021 10:00 AM [id number] [employer]   8/30/2021 10:15 AM Channing Moritz, NP ONCMR BS AMB   9/13/2021 10:00 AM [facility] [address] REG   9/20/2021 10:00 AM [facility]4 [place] [address] REG   9/29/2021  9:45 AM Clinton Ponce MD VBC BS AMB

## 2021-08-26 NOTE — PROGRESS NOTES
German Still is a 63 y.o. female here for follow up for recurrent/met breast cancer. Treatment today:  Cycle 1 Day 8 Pembrolizumab + Carboplatin + Gemcitabine    1. Have you been to the ER, urgent care clinic since your last visit? Hospitalized since your last visit? no    2. Have you seen or consulted any other health care providers outside of the [address] [place] [address] since your last visit? Include any pap smears or colon screening. no    Pt states she is doing well. No complaints.

## 2021-08-30 NOTE — LETTER
8/31/2021    Patient: Caitlin Forde   YOB: 1960   Date of Visit: 8/30/2021     Marita Azevedo MD  [address]    Dear Marita Azevedo MD,      Thank you for referring Ms. Jitendra Wolfe to [address] [place] [address] for evaluation. My notes for this consultation are attached. If you have questions, please do not hesitate to call me. I look forward to following your patient along with you.       Sincerely,    Karolyn Fry NP

## 2021-08-30 NOTE — PROGRESS NOTES
Pt arrived to [facility] ambulatory in no acute distress at 1000 for Carbo/Gemzar C1D8.  Assessment unremarkable except n/v. R chest port accessed without issue and positive blood return noted.  Labs obtained, CBC, CMP. Patient denied having any symptoms of COVID-19, i.e. SOB, coughing, fever, or generally not feeling well. Also denies having been exposed to COVID-19 recently or having had any recent contact with family/friend that has a pending COVID test.    Visit Vitals  BP (!) 154/91 (BP 1 Location: Right upper arm, BP Patient Position: Sitting)   Pulse (!) 102   Temp 98.8 °F (37.1 °C)   Resp 18   Ht 5' 2.5\" (1.588 m)   Wt 86.8 kg (191 lb 4.8 oz)   SpO2 99%   BMI 34.43 kg/m²     Recent Results (from the past 12 hour(s))   CBC WITH AUTOMATED DIFF    Collection Time: 08/30/21 10:05 AM   Result Value Ref Range    WBC 2.6 (L) 3.6 - 11.0 K/uL    RBC 3.80 3.80 - 5.20 M/uL    HGB 10.2 (L) 11.5 - 16.0 g/dL    HCT 32.6 (L) 35.0 - 47.0 %    MCV 85.8 80.0 - 99.0 FL    MCH 26.8 26.0 - 34.0 PG    MCHC 31.3 30.0 - 36.5 g/dL    RDW 14.9 (H) 11.5 - 14.5 %    PLATELET 787 390 - 221 K/uL    MPV 8.4 (L) 8.9 - 12.9 FL    NRBC 0.0 0  WBC    ABSOLUTE NRBC 0.00 0.00 - 0.01 K/uL    NEUTROPHILS 73 32 - 75 %    LYMPHOCYTES 23 12 - 49 %    MONOCYTES 2 (L) 5 - 13 %    EOSINOPHILS 2 0 - 7 %    BASOPHILS 0 0 - 1 %    IMMATURE GRANULOCYTES 0 0.0 - 0.5 %    ABS. NEUTROPHILS 1.8 1.8 - 8.0 K/UL    ABS. LYMPHOCYTES 0.6 (L) 0.8 - 3.5 K/UL    ABS. MONOCYTES 0.1 0.0 - 1.0 K/UL    ABS. EOSINOPHILS 0.1 0.0 - 0.4 K/UL    ABS. BASOPHILS 0.0 0.0 - 0.1 K/UL    ABS. IMM.  GRANS. 0.0 0.00 - 0.04 K/UL    DF MANUAL      RBC COMMENTS EDWARD CELLS  PRESENT       METABOLIC PANEL, COMPREHENSIVE    Collection Time: 08/30/21 10:05 AM   Result Value Ref Range    Sodium 138 136 - 145 mmol/L    Potassium 3.6 3.5 - 5.1 mmol/L    Chloride 103 97 - 108 mmol/L    CO2 27 21 - 32 mmol/L    Anion gap 8 5 - 15 mmol/L    Glucose 106 (H) 65 - 100 mg/dL    BUN 13 6 - 20 MG/DL    Creatinine 0.67 0.55 - 1.02 MG/DL    BUN/Creatinine ratio 19 12 - 20      GFR est AA >60 >60 ml/min/1.73m2    GFR est non-AA >60 >60 ml/min/1.73m2    Calcium 9.0 8.5 - 10.1 MG/DL    Bilirubin, total 0.3 0.2 - 1.0 MG/DL    ALT (SGPT) 29 12 - 78 U/L    AST (SGOT) 24 15 - 37 U/L    Alk.  phosphatase 86 45 - 117 U/L    Protein, total 7.3 6.4 - 8.2 g/dL    Albumin 2.7 (L) 3.5 - 5.0 g/dL    Globulin 4.6 (H) 2.0 - 4.0 g/dL    A-G Ratio 0.6 (L) 1.1 - 2.2         The following medications administered:  Medications Administered     0.9% sodium chloride infusion     Admin Date  08/30/2021 Action  New Bag Dose  25 mL/hr Rate  25 mL/hr Route  IntraVENous Administered By  Yoselin Wadsworth RN          CARBOplatin (PARAPLATIN) 300 mg in 0.9% sodium chloride 250 mL, overfill volume 25 mL chemo infusion     Admin Date  08/30/2021 Action  New Bag Dose  300 mg Rate  610 mL/hr Route  IntraVENous Administered By  Yoselin Wadsworth RN          dexamethasone (DECADRON) 4 mg/mL injection 8 mg     Admin Date  08/30/2021 Action  Given Dose  8 mg Route  IntraVENous Administered By  Yoselin Wadsworth RN          gemcitabine (GEMZAR) 1,970 mg in 0.9% sodium chloride 250 mL, overfill volume 25 mL chemo infusion     Admin Date  08/30/2021 Action  New Bag Dose  1,970 mg Rate  653.6 mL/hr Route  IntraVENous Administered By  Yoselin Wadsworth RN          heparin (porcine) pf 300-500 Units     Admin Date  08/30/2021 Action  Given Dose  500 Units Route  InterCATHeter Administered By  Yoselin Wadsworth RN          ondansetron [facility]) injection 8 mg     Admin Date  08/30/2021 Action  Given Dose  8 mg Route  IntraVENous Administered By  Yoselin Wadsworth RN          sodium chloride (NS) flush 10 mL     Admin Date  08/30/2021 Action  Given Dose  10 mL Route  IntraVENous Administered By  Yoselin Wadsworth RN           Admin Date  08/30/2021 Action  Given Dose  10 mL Route  IntraVENous Administered By  Yoselin Wadsworth, HIRO              Visit Vitals  BP 139/85   Pulse 85   Temp 98.8 °F (37.1 °C)   Resp 18   Ht 5' 2.5\" (1.588 m)   Wt 86.8 kg (191 lb 4.8 oz)   SpO2 99%   BMI 34.43 kg/m²       Pt tolerated treatment well. Port flushed per policy and de-accessed, 2x2 and tape placed.  Pt discharged ambulatory in no acute distress at 1400, accompanied by family. Next appointment 9/13/21 at 1000.

## 2021-08-30 NOTE — PROGRESS NOTES
[address], [address] [facility], [address] [facility], [address] [facility]  [phone]      Follow-up Note        Patient: Laura Arvizu MRN: [id number]  SSN: [id number]    YOB: 1960  Age: 63 y.o. Sex: female        Diagnosis:     1. Recurrent/metastatic breast cancer, Dx 07/2021   Inflammatory recurrence with regional nodes, mediastinal nodes and RP nodes    2. Left breast carcinoma:  T1c N1mi M0 (Stage I) infiltrating ductal carcinoma, Tumor size 1.6 cm, LN 1/3 with micromet, grade 3, ER -ve, TX -ve, Her 2 -ve    ypT2 N0    Treatment:     1. Neoadjuvant chemotherapy   Adriamycin, cytoxan - s/p 4 cycles   Weekly Taxol - s/p 12 cycles  2. S/p left lumpectomy on 9/5/2019 by Dr. Pawel Puentes  3. S/P Adjuvant radiation  4. Enrolled in  (for residual disease post surgery) - randomized to placebo   5. Palliative chemotherapy   Carboplatin/Gemzar and Keytruda, Cycle 1 day 8    Subjective:      Laura Arvizu is a 63 y.o. female with a diagnosis of left sided invasive breast cancer. She felt a lump in the left breast, went to see her PCP, got a mammogram and was noted to have abnormal density in the left upper outer quadrant of the breast. A biopsy of the mass revealed gr 3 IDC, TNBC. The axillary LN is clear of disease. She saw Dr. Alfonso Victor. An MRI of the breast shows the tumor to be larger than previously believed to be. A left axillary LN biopsy showed 1/3 nodes with micrometastasis. She works at [employer] full time. Ms. Lorena Garcia completed neoadjuvant chemotherapy and underwent lumpectomy in September 2020. She enrolled in the clinical trial  and was randomized to the placebo arm. She noticed change in the left breast skin approximately 4-6 weeks ago. Breast has progressively hardened. In additional few raised areas popped up. She initially had pain in the left breast but this has subsided. She saw Dr. Pawel Puentes.  A punch bx of the skin on the breast revealed TNBC. She is working at [employer] for the last 13 years. Ms. Rufina Ho received her first treatment and did well. She denies any nausea, vomiting or diarrhea. Review of Systems:     Constitutional: negative, anxiety  Eyes: negative  Ears, Nose, Mouth, Throat, and Face: negative  Respiratory: negative  Cardiovascular: negative  Gastrointestinal: negative  Genitourinary:negative  Integument/Breast: skin changes and hardening of the breast  Hematologic/Lymphatic: negative  Musculoskeletal:negative  Neurological: negative    ROS was pulled in from a previous visit. No changes were made d/t symptoms remain the same. Past Medical History:   Diagnosis Date    Breast cancer ([facility] [address]     left side    Menopause      Past Surgical History:   Procedure Laterality Date    HX BREAST BIOPSY Left     x2    HX BREAST LUMPECTOMY Left 2019    LEFT BREAST LUMPECTOMY WITH ULTRASOUND performed by Bernadette Trivedi [facility]    HX HYSTERECTOMY      partial, ovaries remain    IR INSERT TUNL CVC W PORT OVER 5 YEARS  2021    VASCULAR SURGERY PROCEDURE UNLIST      portacath      Family History   Problem Relation Age of Onset    Cancer Father     Cancer Maternal Aunt     Breast Cancer Maternal Aunt         4 paternal aunts    Cancer Maternal Uncle     Cancer Paternal Aunt     Cancer Paternal Uncle     Breast Cancer Maternal Grandmother      Social History     Tobacco Use    Smoking status: Former Smoker     Packs/day: 0.10     Quit date: 2020     Years since quittin.6    Smokeless tobacco: Never Used   Substance Use Topics    Alcohol use: No      Prior to Admission medications    Medication Sig Start Date End Date Taking? Authorizing Provider   lidocaine-prilocaine (EMLA) topical cream Apply  to affected area as needed for Pain.  Apply generous amount to port site 30 minutes prior to infusions and cover with plastic wrap 21   Celena Livingston MD   ondansetron (ZOFRAN ODT) 4 mg disintegrating tablet Take 1 Tablet by mouth every eight (8) hours as needed for Nausea or Vomiting. 8/6/21   Howard Fish MD   amoxicillin (AMOXIL) 875 mg tablet Take 875 mg by mouth two (2) times a day. Provider, Historical          Allergies   Allergen Reactions    Codeine Rash     Rash from tylenol #3         Objective:     Visit Vitals  BP (!) 154/91   Pulse (!) 102   Temp 98.8 °F (37.1 °C)   Resp 18   Ht 5' 2.5\" (1.588 m)   Wt 191 lb (86.6 kg)   SpO2 99%   BMI 34.38 kg/m²       Pain Scale: 0 - No pain/10  Pain Location:       Physical Exam:     GENERAL: alert, cooperative, no distress, appears stated age  EYE: conjunctivae/corneas clear. LYMPHATIC: Cervical, supraclavicular, and axillary nodes normal.   THROAT & NECK: normal and no erythema or exudates noted. BREAST: Left breast is hard, skin is thickened, peau de' orange appearance  LUNG: clear to auscultation bilaterally  HEART: regular rate and rhythm  ABDOMEN: soft, non-tender. EXTREMITIES:  extremities normal, atraumatic, no cyanosis or edema  SKIN: Normal.  NEUROLOGIC: AOx3. Gait normal.      Physical exam was pulled in from a previous visit. No changes were made d/t physical exam remains the same. CT Results (most recent):  Results from Hospital Encounter encounter on 08/03/21    CT CHEST W CONT    Narrative  EXAM:  CT CHEST W CONT, CT ABD PELV W CONT    INDICATION: Breast cancer    COMPARISON:    CONTRAST:  100 mL of Isovue-370. TECHNIQUE:  Following the uneventful intravenous administration of contrast, thin axial  images were obtained through the chest, abdomen and pelvis. Coronal and sagittal  reconstructions were generated. Oral contrast was administered. CT dose  reduction was achieved through use of a standardized protocol tailored for this  examination and automatic exposure control for dose modulation. FINDINGS:  There is a 2.8 cm left supraclavicular lymph node.  There is bilateral axillary  adenopathy. Left lymph node measures 2.2 cm. Right lymph node measures 2.9 cm. There is bilateral skin thickening in the breast right greater than left and  there is asymmetric density in the left breast compared to the right  THYROID: No nodule. MEDIASTINUM: There are slightly enlarged lymph nodes lateral to the aortic arch  measuring 1.2 cm. There is slight soft tissue thickening posterior to the  sternum  KENNY: No mass or lymphadenopathy. THORACIC AORTA: No dissection or aneurysm. MAIN PULMONARY ARTERY: Normal in caliber. TRACHEA/BRONCHI: Patent. ESOPHAGUS: No wall thickening or dilatation. HEART: Normal in size. PLEURA: No effusion or pneumothorax. LUNGS: No nodule, mass, or airspace disease. There is minor basilar atelectasis  LIVER: No mass or biliary dilatation. GALLBLADDER: Unremarkable. SPLEEN: No mass. PANCREAS: No mass or ductal dilatation. ADRENALS: Unremarkable. KIDNEYS: No mass, calculus, or hydronephrosis. STOMACH: Unremarkable. SMALL BOWEL: No dilatation or wall thickening. COLON: No dilatation or wall thickening. APPENDIX: Unremarkable. PERITONEUM: No ascites or pneumoperitoneum. RETROPERITONEUM: There is an enlarged left periaortic lymph node measuring 1.8 x  1.7 cm. There is enlarged left iliac lymph nodes measuring 2.2 x 1.3 cm. . There  is a large left external iliac lymph node measuring 3.4 x 1.7 cm. REPRODUCTIVE ORGANS: Patient is status post hysterectomy  URINARY BLADDER: No mass or calculus. BONES: No destructive bone lesion. ADDITIONAL COMMENTS: N/A    Impression  1. CT of the chest demonstrates bilateral breast skin thickening left greater  than right and asymmetric density in the left breast.    There is bilateral axillary adenopathy. There is left supraclavicular  adenopathy. There are enlarged lymph nodes in the mediastinum lateral to the  aortic arch  which are suspicious for metastatic disease.   2. CT of the abdomen and pelvis demonstrates enlarged left retroperitoneal lymph  nodes and enlarged left pelvic lymph nodes suspicious for metastatic disease. Lab Results   Component Value Date/Time    WBC 2.6 (L) 08/30/2021 10:05 AM    HGB 10.2 (L) 08/30/2021 10:05 AM    HCT 32.6 (L) 08/30/2021 10:05 AM    PLATELET 112 57/05/9823 10:05 AM    MCV 85.8 08/30/2021 10:05 AM       Lab Results   Component Value Date/Time    Sodium 138 08/30/2021 10:05 AM    Potassium 3.6 08/30/2021 10:05 AM    Chloride 103 08/30/2021 10:05 AM    CO2 27 08/30/2021 10:05 AM    Anion gap 8 08/30/2021 10:05 AM    Glucose 106 (H) 08/30/2021 10:05 AM    BUN 13 08/30/2021 10:05 AM    Creatinine 0.67 08/30/2021 10:05 AM    BUN/Creatinine ratio 19 08/30/2021 10:05 AM    GFR est AA >60 08/30/2021 10:05 AM    GFR est non-AA >60 08/30/2021 10:05 AM    Calcium 9.0 08/30/2021 10:05 AM    Bilirubin, total 0.3 08/30/2021 10:05 AM    Alk. phosphatase 86 08/30/2021 10:05 AM    Protein, total 7.3 08/30/2021 10:05 AM    Albumin 2.7 (L) 08/30/2021 10:05 AM    Globulin 4.6 (H) 08/30/2021 10:05 AM    A-G Ratio 0.6 (L) 08/30/2021 10:05 AM    ALT (SGPT) 29 08/30/2021 10:05 AM    AST (SGOT) 24 08/30/2021 10:05 AM         Assessment:     1.  Recurrent/metastatic breast cancer, Dx 07/2021   Inflammatory recurrence with regional nodes, mediastinal nodes and RP nodes    PD-L1 ~ 2% (IC)  NGS: p53, CKS1B, FGFR1 amplification    Previously   Left breast carcinoma:  T1c N1mi M0 (Stage I) infiltrating ductal carcinoma, Tumor size 1.6 cm, LN 1/3 with micromet, grade 3, ER -ve, ND -ve, Her 2 -ve    ECOG PS 0       Received neoadjuvant chemotherapy   Adriamycin, Cyclophosphamide - s/p 4 cycles   Weekly Taxol - s/p 12 cycles - completed 8/15/2019    S/p left lumpectomy on 9/5/2019 with Dr. Michael Belcher  ypT2 N0    She is randomized to the placebo arm of SWOG  study: A Randomized, Phase III Trial to Evaluate the Efficacy and Safety of [id number] (Pembrolizumab) as Adjuvant Therapy for Triple Receptor-Negative Breast Cancer with >/= 1 CM Residual Invasive Cancer or Positive Lymph Nodes (ypN+) after Neoadjuvant Chemotherapy    Disease has recurred in the breast with changes suggestive of inflammatory disease and regional nodes are enlarged. Receiving palliative chemotherapy  Carboplatin/Gemzar and Keytruda  Cycle 1 day 8    Tolerating treatment   A detailed system by system evaluation of side effect was performed to assess chemotherapy related toxicity. Blood counts are acceptable. Results reviewed with the patient. 2. Anemia - normocytic     Observation     3. Anxiety     Feels it is better. She will let us know if she elects to start medication    Plan:     1. Continue systemic chemotherapy - carbo/gem/pembro  2. Germline BRCA1/2  3. Follow up 9/13    I performed a history and physical examination of the patient and discussed his management with the NPP. I reviewed the NPP note and agree with the documented findings and plan of care. The patient was seen in conjunction with Ms. Arreolany. Ms. Jese Lovelace is a women with metastatic TNBC. She is receiving chemo-immunotherapy in a palliative intent. Her physical exam is remarkable for dermal infiltrative disease in the left breast.       Signed by: Mercedes Alejandre MD                     August 31, 2021      CC. Don Benitez MD  CC. Ngozi Lutz MD  CC.  Robert Gomez MD

## 2021-09-02 NOTE — TELEPHONE ENCOUNTER
HIPAA verified. Returned call to patient. Per GUADALUPE De Jesus, NP patient to use Bacitracin on the areas on her chest. Patient verbalized understanding, thanked for call.

## 2021-09-02 NOTE — TELEPHONE ENCOUNTER
Patient had chemo yesterday and now has hives all over her body and inside her moth . Please call to see what she can do?

## 2021-09-02 NOTE — TELEPHONE ENCOUNTER
HIPAA verified. Patient states she had chemo yesterday. She started yesterday afternoon with \"hives\" they are itchy, denies SOB, swelling in mouth or throat. Spoke with DR Ramonita Levin, he would like for patient to continue with Benadryl. Call office tomorrow if not is resolving. Call if she gets worse. Returned call to patient, relayed above message to patient, she verbalized understanding. Patient questions if Dr Ramonita Levin can/has ordered any Cream (?) for the bumps on her chest.    I will check into this and get back with patient.

## 2021-09-13 NOTE — PROGRESS NOTES
[address] [facility] [address], [address] hospitals, 45 [facility], 200 [facility]  [phone]      Follow-up Note        Patient: Tiago Fajardo MRN: [id number]  SSN: [id number]    YOB: 1960  Age: 63 y.o. Sex: female        Diagnosis:     1. Recurrent/metastatic breast cancer, Dx 07/2021   Inflammatory recurrence with regional nodes, mediastinal nodes and RP nodes    2. Left breast carcinoma:  T1c N1mi M0 (Stage I) infiltrating ductal carcinoma, Tumor size 1.6 cm, LN 1/3 with micromet, grade 3, ER -ve, NV -ve, Her 2 -ve    ypT2 N0    Treatment:     1. Neoadjuvant chemotherapy   Adriamycin, cytoxan - s/p 4 cycles   Weekly Taxol - s/p 12 cycles  2. S/p left lumpectomy on 9/5/2019 by Dr. Michael Belcher  3. S/P Adjuvant radiation  4. Enrolled in  (for residual disease post surgery) - randomized to placebo   5. Palliative chemotherapy   Carboplatin/Gemzar and Keytruda, Cycle 2 day 1    Subjective:      Tiago Fajardo is a 63 y.o. female with a diagnosis of left sided invasive breast cancer. She felt a lump in the left breast, went to see her PCP, got a mammogram and was noted to have abnormal density in the left upper outer quadrant of the breast. A biopsy of the mass revealed gr 3 IDC, TNBC. The axillary LN is clear of disease. She saw Dr. Ajay Ferrell. An MRI of the breast shows the tumor to be larger than previously believed to be. A left axillary LN biopsy showed 1/3 nodes with micrometastasis. She works at [employer] full time. Ms. Gerald Bhatti completed neoadjuvant chemotherapy and underwent lumpectomy in September 2020. She enrolled in the clinical trial  and was randomized to the placebo arm. She noticed change in the left breast skin approximately 4-6 weeks ago. Breast has progressively hardened. In additional few raised areas popped up. She initially had pain in the left breast but this has subsided. She saw Dr. Michael Belcher.  A punch bx of the skin on the breast revealed TNBC. She is working at [email or web address] for the last 13 years. Ms. [place] received her first treatment and did well. She feels her breast is softer and the pain has decreased. She is able to wear a bra and sleep on her left side without issues. She does report a decreased in appetite. She is also suffering with seasonal allergies. Review of Systems:     Constitutional: negative, anxiety  Eyes: negative  Ears, Nose, Mouth, Throat, and Face: negative  Respiratory: negative  Cardiovascular: negative  Gastrointestinal: negative  Genitourinary:negative  Integument/Breast: skin changes and hardening of the breast is slightly better  Hematologic/Lymphatic: negative  Musculoskeletal:negative  Neurological: negative        Past Medical History:   Diagnosis Date    Breast cancer ([address]     left side    Menopause      Past Surgical History:   Procedure Laterality Date    HX BREAST BIOPSY Left     x2    HX BREAST LUMPECTOMY Left 2019    LEFT BREAST LUMPECTOMY WITH ULTRASOUND performed by Vilma oMra MD at [facility] OR    HX HYSTERECTOMY      partial, ovaries remain    IR INSERT TUNL CVC W PORT OVER 5 YEARS  2021    VASCULAR SURGERY PROCEDURE UNLIST      portacath      Family History   Problem Relation Age of Onset    Cancer Father     Cancer Maternal Aunt     Breast Cancer Maternal Aunt         4 paternal aunts    Cancer Maternal Uncle     Cancer Paternal Aunt     Cancer Paternal Uncle     Breast Cancer Maternal Grandmother      Social History     Tobacco Use    Smoking status: Former Smoker     Packs/day: 0.10     Quit date: 2020     Years since quittin.7    Smokeless tobacco: Never Used   Substance Use Topics    Alcohol use: No      Prior to Admission medications    Medication Sig Start Date End Date Taking? Authorizing Provider   lidocaine-prilocaine (EMLA) topical cream Apply  to affected area as needed for Pain.  Apply generous amount to port site 30 minutes prior to infusions and cover with plastic wrap 8/6/21  Yes Mikaela Francis MD   ondansetron (ZOFRAN ODT) 4 mg disintegrating tablet Take 1 Tablet by mouth every eight (8) hours as needed for Nausea or Vomiting. 8/6/21  Yes Mikaela Francis MD   amoxicillin (AMOXIL) 875 mg tablet Take 875 mg by mouth two (2) times a day. Patient not taking: Reported on 8/30/2021    Provider, Historical          Allergies   Allergen Reactions    Codeine Rash     Rash from tylenol #3         Objective:     Visit Vitals  BP (!) 153/89 (BP 1 Location: Left arm, BP Patient Position: Sitting, BP Cuff Size: Large adult)   Pulse (!) 114   Temp 98.6 °F (37 °C) (Temporal)   Resp 18   Ht 5' 2\" (1.575 m)   Wt 186 lb (84.4 kg)   SpO2 100%   BMI 34.02 kg/m²     Pain Scale: 0 - No pain/10      Physical Exam:     GENERAL: alert, cooperative, no distress  EYE: conjunctivae/corneas clear. xanthelasma  LYMPHATIC: Cervical, supraclavicular, and axillary nodes normal.   THROAT & NECK: normal and no erythema or exudates noted. BREAST: Left breast is hard, skin is thickened, peau de' orange appearance - overall appears softer  LUNG: clear to auscultation bilaterally  HEART: regular rate and rhythm  ABDOMEN: soft, non-tender. EXTREMITIES:  extremities normal, atraumatic, no cyanosis or edema  NEUROLOGIC: AOx3. Gait normal.       Physical exam and ROS has been modified from a prior visit to make it relevant and current           CT Results (most recent):  Results from Hospital Encounter encounter on 08/03/21    CT CHEST W CONT    Narrative  EXAM:  CT CHEST W CONT, CT ABD PELV W CONT    INDICATION: Breast cancer    COMPARISON:    CONTRAST:  100 mL of Isovue-370. TECHNIQUE:  Following the uneventful intravenous administration of contrast, thin axial  images were obtained through the chest, abdomen and pelvis. Coronal and sagittal  reconstructions were generated. Oral contrast was administered.  CT dose  reduction was achieved through use of a standardized protocol tailored for this  examination and automatic exposure control for dose modulation. FINDINGS:  There is a 2.8 cm left supraclavicular lymph node. There is bilateral axillary  adenopathy. Left lymph node measures 2.2 cm. Right lymph node measures 2.9 cm. There is bilateral skin thickening in the breast right greater than left and  there is asymmetric density in the left breast compared to the right  THYROID: No nodule. MEDIASTINUM: There are slightly enlarged lymph nodes lateral to the aortic arch  measuring 1.2 cm. There is slight soft tissue thickening posterior to the  sternum  KENNY: No mass or lymphadenopathy. THORACIC AORTA: No dissection or aneurysm. MAIN PULMONARY ARTERY: Normal in caliber. TRACHEA/BRONCHI: Patent. ESOPHAGUS: No wall thickening or dilatation. HEART: Normal in size. PLEURA: No effusion or pneumothorax. LUNGS: No nodule, mass, or airspace disease. There is minor basilar atelectasis  LIVER: No mass or biliary dilatation. GALLBLADDER: Unremarkable. SPLEEN: No mass. PANCREAS: No mass or ductal dilatation. ADRENALS: Unremarkable. KIDNEYS: No mass, calculus, or hydronephrosis. STOMACH: Unremarkable. SMALL BOWEL: No dilatation or wall thickening. COLON: No dilatation or wall thickening. APPENDIX: Unremarkable. PERITONEUM: No ascites or pneumoperitoneum. RETROPERITONEUM: There is an enlarged left periaortic lymph node measuring 1.8 x  1.7 cm. There is enlarged left iliac lymph nodes measuring 2.2 x 1.3 cm. . There  is a large left external iliac lymph node measuring 3.4 x 1.7 cm. REPRODUCTIVE ORGANS: Patient is status post hysterectomy  URINARY BLADDER: No mass or calculus. BONES: No destructive bone lesion. ADDITIONAL COMMENTS: N/A    Impression  1. CT of the chest demonstrates bilateral breast skin thickening left greater  than right and asymmetric density in the left breast.    There is bilateral axillary adenopathy.  There is left supraclavicular  adenopathy. There are enlarged lymph nodes in the mediastinum lateral to the  aortic arch  which are suspicious for metastatic disease. 2. CT of the abdomen and pelvis demonstrates enlarged left retroperitoneal lymph  nodes and enlarged left pelvic lymph nodes suspicious for metastatic disease. Lab Results   Component Value Date/Time    WBC 1.9 (L) 09/13/2021 10:08 AM    HGB 9.4 (L) 09/13/2021 10:08 AM    HCT 29.6 (L) 09/13/2021 10:08 AM    PLATELET 959 (H) 01/27/1127 10:08 AM    MCV 85.3 09/13/2021 10:08 AM       Lab Results   Component Value Date/Time    Sodium 138 08/30/2021 10:05 AM    Potassium 3.6 08/30/2021 10:05 AM    Chloride 103 08/30/2021 10:05 AM    CO2 27 08/30/2021 10:05 AM    Anion gap 8 08/30/2021 10:05 AM    Glucose 106 (H) 08/30/2021 10:05 AM    BUN 13 08/30/2021 10:05 AM    Creatinine 0.67 08/30/2021 10:05 AM    BUN/Creatinine ratio 19 08/30/2021 10:05 AM    GFR est AA >60 08/30/2021 10:05 AM    GFR est non-AA >60 08/30/2021 10:05 AM    Calcium 9.0 08/30/2021 10:05 AM    Bilirubin, total 0.3 08/30/2021 10:05 AM    Alk. phosphatase 86 08/30/2021 10:05 AM    Protein, total 7.3 08/30/2021 10:05 AM    Albumin 2.7 (L) 08/30/2021 10:05 AM    Globulin 4.6 (H) 08/30/2021 10:05 AM    A-G Ratio 0.6 (L) 08/30/2021 10:05 AM    ALT (SGPT) 29 08/30/2021 10:05 AM    AST (SGOT) 24 08/30/2021 10:05 AM         Assessment:     1.  Recurrent/metastatic breast cancer, Dx 07/2021   Inflammatory recurrence with regional nodes, mediastinal nodes and RP nodes    PD-L1 ~ 2% (IC)  NGS: p53, CKS1B, FGFR1 amplification    Previously   Left breast carcinoma:  T1c N1mi M0 (Stage I) infiltrating ductal carcinoma, Tumor size 1.6 cm, LN 1/3 with micromet, grade 3, ER -ve, NE -ve, Her 2 -ve    ECOG PS 0       Received neoadjuvant chemotherapy   Adriamycin, Cyclophosphamide - s/p 4 cycles   Weekly Taxol - s/p 12 cycles - completed 8/15/2019    S/p left lumpectomy on 9/5/2019 with Dr. Noe Holly  ypT2 N0    She is randomized to the placebo arm of SWOG  study: A Randomized, Phase III Trial to Evaluate the Efficacy and Safety of [id number] (Pembrolizumab) as Adjuvant Therapy for Triple Receptor-Negative Breast Cancer with >/= 1 CM Residual Invasive Cancer or Positive Lymph Nodes (ypN+) after Neoadjuvant Chemotherapy    Disease has recurred in the breast with changes suggestive of inflammatory disease and regional nodes are enlarged. Receiving palliative chemotherapy  Carboplatin/Gemzar and Keytruda  Cycle 2 day 1  Administer pembro   Hold carbo/gem   Dose reduce Lara/[place] for the next cycle    Tolerating treatment   A detailed system by system evaluation of side effect was performed to assess chemotherapy related toxicity. Blood counts are acceptable. Results reviewed with the patient. Breast is slightly softer      2. Anemia - normocytic     Observation     3. Anxiety     Feels it is better. She will let us know if she elects to start medication      4. Neutropenia    Chemotherapy induced  Hold chemo and only give Keytruda      Plan:     1. Continue systemic chemotherapy - carbo/gem/pembro (Hold carbo/gem and give Pembrolizumab today. For day 8 give gemcitabine -dose reduced only. Next cycle change carbo to AUC 3 on day 1 only with [place] reduction  2. Germline BRCA1/2 - invitae today  3. Follow up 3 weeks    I performed a history and physical examination of the patient and discussed his management with the NPP. I reviewed the NPP note and agree with the documented findings and plan of care. The patient was seen in conjunction with Ms. Nation. Ms. Laith Cramer is a women with recurrent/metastatic breast ca. She is receiving palliative chemotherapy. She is doing fair. Exam is remarkable for hard indurated left breast.       Signed by: Bradly Schwartz MD                     September 13, 2021      CC. Shira Godinez MD  CC. Cosme Pop MD  CC.  Mechelle Cabrera MD

## 2021-09-13 NOTE — LETTER
9/13/2021    Patient: Fernanda Castle   YOB: 1960   Date of Visit: 9/13/2021     Damian Massey MD  [facility]    Dear Damian Massey MD,      Thank you for referring Ms. Mikaela Cervantes to [address] [place] [address] for evaluation. My notes for this consultation are attached. If you have questions, please do not hesitate to call me. I look forward to following your patient along with you.       Sincerely,    Jennie Hoang, NP

## 2021-09-13 NOTE — PROGRESS NOTES
Pt arrived to [facility] ambulatory in no acute distress at 1005 for [address] [facility]. Assessment unremarkable except hypertension and rash above eyelids which she states is due to allergies. Patient had hives on bilateral arms after first round but has now resolved. R chest port accessed without issue and positive blood return noted. Labs obtained, CBC and CMP. Patient to MD for [address] came back at 0.8. Carbo and Gemzar held, proceed with Keytruda. Patient denied having any symptoms of COVID-19, i.e. SOB, coughing, fever, or generally not feeling well. Also denies having been exposed to COVID-19 recently or having had any recent contact with family/friend that has a pending COVID test.    Patient Vitals for the past 12 hrs:   Temp Pulse Resp BP SpO2   09/13/21 1400  93 16 (!) 148/90    09/13/21 1006 98.6 °F (37 °C) (!) 114 16 (!) 153/89 100 %     Recent Results (from the past 12 hour(s))   CBC WITH AUTOMATED DIFF    Collection Time: 09/13/21 10:08 AM   Result Value Ref Range    WBC 1.9 (L) 3.6 - 11.0 K/uL    RBC 3.47 (L) 3.80 - 5.20 M/uL    HGB 9.4 (L) 11.5 - 16.0 g/dL    HCT 29.6 (L) 35.0 - 47.0 %    MCV 85.3 80.0 - 99.0 FL    MCH 27.1 26.0 - 34.0 PG    MCHC 31.8 30.0 - 36.5 g/dL    RDW 16.6 (H) 11.5 - 14.5 %    PLATELET 456 (H) 474 - 400 K/uL    MPV 9.2 8.9 - 12.9 FL    NRBC 0.0 0  WBC    ABSOLUTE NRBC 0.00 0.00 - 0.01 K/uL    NEUTROPHILS 44 32 - 75 %    LYMPHOCYTES 45 12 - 49 %    MONOCYTES 10 5 - 13 %    EOSINOPHILS 0 0 - 7 %    BASOPHILS 1 0 - 1 %    IMMATURE GRANULOCYTES 0 0.0 - 0.5 %    ABS. NEUTROPHILS 0.8 (L) 1.8 - 8.0 K/UL    ABS. LYMPHOCYTES 0.9 0.8 - 3.5 K/UL    ABS. MONOCYTES 0.2 0.0 - 1.0 K/UL    ABS. EOSINOPHILS 0.0 0.0 - 0.4 K/UL    ABS. BASOPHILS 0.0 0.0 - 0.1 K/UL    ABS. IMM.  GRANS. 0.0 0.00 - 0.04 K/UL    DF MANUAL      RBC COMMENTS ANISOCYTOSIS  1+       METABOLIC PANEL, COMPREHENSIVE    Collection Time: 09/13/21 10:08 AM   Result Value Ref Range    Sodium 135 (L) 136 - 145 mmol/L    Potassium 3.4 (L) 3.5 - 5.1 mmol/L    Chloride 103 97 - 108 mmol/L    CO2 25 21 - 32 mmol/L    Anion gap 7 5 - 15 mmol/L    Glucose 91 65 - 100 mg/dL    BUN 7 6 - 20 MG/DL    Creatinine 0.70 0.55 - 1.02 MG/DL    BUN/Creatinine ratio 10 (L) 12 - 20      GFR est AA >60 >60 ml/min/1.73m2    GFR est non-AA >60 >60 ml/min/1.73m2    Calcium 8.8 8.5 - 10.1 MG/DL    Bilirubin, total 0.5 0.2 - 1.0 MG/DL    ALT (SGPT) 46 12 - 78 U/L    AST (SGOT) 32 15 - 37 U/L    Alk. phosphatase 110 45 - 117 U/L    Protein, total 7.2 6.4 - 8.2 g/dL    Albumin 2.9 (L) 3.5 - 5.0 g/dL    Globulin 4.3 (H) 2.0 - 4.0 g/dL    A-G Ratio 0.7 (L) 1.1 - 2.2         The following medications administered:  Medications Administered     0.9% sodium chloride infusion     Admin Date  09/13/2021 Action  New Bag Dose  25 mL/hr Rate  25 mL/hr Route  IntraVENous Administered By  Tamika Salgado RN          0.9% sodium chloride injection 10 mL     Admin Date  09/13/2021 Action  Given Dose  10 mL Route  IntraVENous Administered By  Tamika Salgado RN          heparin (porcine) pf 300-500 Units     Admin Date  09/13/2021 Action  Given Dose  500 Units Route  InterCATHeter Administered By  Tamika Salgado RN          pembrolizumab (KEYTRUDA) 200 mg in 0.9% sodium chloride 100 mL, overfill volume 10 mL IVPB     Admin Date  09/13/2021 Action  New Bag Dose  200 mg Rate  236 mL/hr Route  IntraVENous Administered By  Efrain Son RN          sodium chloride (NS) flush 10 mL     Admin Date  09/13/2021 Action  Given Dose  10 mL Route  IntraVENous Administered By  Tamika Salgado RN                Medication took almost 2 hours to come from pharmacy once requested. Safe care event filled out. Pt tolerated treatment well. Port flushed and de-accessed per policy, 2x2 and soft tape placed. Pt discharged ambulatory in no acute distress at 1400, accompanied by self. Next appointment 9/20/2021.

## 2021-09-13 NOTE — PROGRESS NOTES
Chief Complaint   Patient presents with    Follow-up     opic 2 week     Patient had hives the Wednesday 09/01/21 after Chemo. She took Benadryl for the hives. Visit Vitals  BP (!) 153/89 (BP 1 Location: Left arm, BP Patient Position: Sitting, BP Cuff Size: Large adult)   Pulse (!) 114   Temp 98.6 °F (37 °C) (Temporal)   Resp 18   Ht 5' 2\" (1.575 m)   Wt 186 lb (84.4 kg)   SpO2 100%   BMI 34.02 kg/m²     1. Have you been to the ER, urgent care clinic since your last visit? Hospitalized since your last visit?no    2. Have you seen or consulted any other health care providers outside of the [address] [place] [address] since your last visit? Include any pap smears or colon screening.  no

## 2021-09-15 NOTE — TELEPHONE ENCOUNTER
[address] [facility] at [address] [place] [address], [address] [facility]   W: [phone]  F: [phone]      Medical Nutrition Therapy  Nutrition Referral:    Referral received from Florencia Ibrahim NP regarding decreased appetite. Called patient, no answer. Left a message, explaining that RD is available to address nutrition throughout the spectrum of care. Contact information provided. Plan to send a [employer] message as well and include information on improving appetite.         Chemotherapy Flowsheet 9/13/2021   Cycle C2D1   Date 9/13/2021   Drug / Regimen Keytruda/Carbo/Gemzar   Pre Meds -   Notes -         Signed By: Vikash Irizarry, [facility] [employer]

## 2021-09-20 NOTE — PROGRESS NOTES
Pt arrived to [facility] ambulatory in no acute distress at 1010 for Carbo/Gemzar/Keytruda C2D8. Assessment unremarkable except diarrhea. R chest port accessed without issue and positive blood return noted. Labs obtained, CBC and CMP. Patient denied having any symptoms of COVID-19, i.e. SOB, coughing, fever, or generally not feeling well. Also denies having been exposed to COVID-19 recently or having had any recent contact with family/friend that has a pending COVID test.    Patient Vitals for the past 12 hrs:   Temp Pulse Resp BP SpO2   09/20/21 1325  88 18 120/75    09/20/21 1008 98.3 °F (36.8 °C) (!) 105 18 126/84 100 %     Recent Results (from the past 12 hour(s))   CBC WITH AUTOMATED DIFF    Collection Time: 09/20/21 10:13 AM   Result Value Ref Range    WBC 3.9 3.6 - 11.0 K/uL    RBC 3.33 (L) 3.80 - 5.20 M/uL    HGB 8.7 (L) 11.5 - 16.0 g/dL    HCT 27.9 (L) 35.0 - 47.0 %    MCV 83.8 80.0 - 99.0 FL    MCH 26.1 26.0 - 34.0 PG    MCHC 31.2 30.0 - 36.5 g/dL    RDW 17.1 (H) 11.5 - 14.5 %    PLATELET 156 (H) 658 - 400 K/uL    MPV 9.2 8.9 - 12.9 FL    NRBC 1.0 (H) 0  WBC    ABSOLUTE NRBC 0.04 (H) 0.00 - 0.01 K/uL    NEUTROPHILS 71 32 - 75 %    BAND NEUTROPHILS 1 %    LYMPHOCYTES 19 12 - 49 %    MONOCYTES 9 5 - 13 %    EOSINOPHILS 0 0 - 7 %    BASOPHILS 0 0 - 1 %    IMMATURE GRANULOCYTES 0 0.0 - 0.5 %    ABS. NEUTROPHILS 2.8 1.8 - 8.0 K/UL    ABS. LYMPHOCYTES 0.7 (L) 0.8 - 3.5 K/UL    ABS. MONOCYTES 0.4 0.0 - 1.0 K/UL    ABS. EOSINOPHILS 0.0 0.0 - 0.4 K/UL    ABS. BASOPHILS 0.0 0.0 - 0.1 K/UL    ABS. IMM.  GRANS. 0.0 0.00 - 0.04 K/UL    DF MANUAL      RBC COMMENTS ANISOCYTOSIS  1+        RBC COMMENTS POLYCHROMASIA  1+        WBC COMMENTS FEW     METABOLIC PANEL, COMPREHENSIVE    Collection Time: 09/20/21 10:13 AM   Result Value Ref Range    Sodium 134 (L) 136 - 145 mmol/L    Potassium 3.3 (L) 3.5 - 5.1 mmol/L    Chloride 100 97 - 108 mmol/L    CO2 27 21 - 32 mmol/L    Anion gap 7 5 - 15 mmol/L    Glucose 130 (H) 65 - 100 mg/dL    BUN 8 6 - 20 MG/DL    Creatinine 0.72 0.55 - 1.02 MG/DL    BUN/Creatinine ratio 11 (L) 12 - 20      GFR est AA >60 >60 ml/min/1.73m2    GFR est non-AA >60 >60 ml/min/1.73m2    Calcium 8.4 (L) 8.5 - 10.1 MG/DL    Bilirubin, total 0.5 0.2 - 1.0 MG/DL    ALT (SGPT) 27 12 - 78 U/L    AST (SGOT) 22 15 - 37 U/L    Alk. phosphatase 79 45 - 117 U/L    Protein, total 6.9 6.4 - 8.2 g/dL    Albumin 2.7 (L) 3.5 - 5.0 g/dL    Globulin 4.2 (H) 2.0 - 4.0 g/dL    A-G Ratio 0.6 (L) 1.1 - 2.2       The following medications administered:  Medications Administered     0.9% sodium chloride infusion     Admin Date  09/20/2021 Action  New Bag Dose  25 mL/hr Rate  25 mL/hr Route  IntraVENous Administered By  Sailaja Sen RN          0.9% sodium chloride injection 10 mL     Admin Date  09/20/2021 Action  Given Dose  10 mL Route  IntraVENous Administered By  Sailaja Sen RN          dexamethasone (DECADRON) 4 mg/mL injection 8 mg     Admin Date  09/20/2021 Action  Given Dose  8 mg Route  IntraVENous Administered By  Sailaja Sen RN          gemcitabine (GEMZAR) 1,576 mg in 0.9% sodium chloride 250 mL, overfill volume 25 mL chemo infusion     Admin Date  09/20/2021 Action  New Bag Dose  1,576 mg Rate  632.9 mL/hr Route  IntraVENous Administered By  Sailaja Sen RN          heparin (porcine) pf 300-500 Units     Admin Date  09/20/2021 Action  Given Dose  500 Units Route  InterCATHeter Administered By  Larisa Valencia RN          [facility] injection 8 mg     Admin Date  09/20/2021 Action  Given Dose  8 mg Route  IntraVENous Administered By  Sailaja Sen RN          sodium chloride (NS) flush 10 mL     Admin Date  09/20/2021 Action  Given Dose  10 mL Route  IntraVENous Administered By  Larisa Valencia, RN                Pt tolerated treatment well. Port flushed and de-accessed per policy, 2x2 and soft tape placed.   Pt discharged ambulatory in no acute distress at 1325, accompanied by self.  Next appointment 10/4/2021.

## 2021-09-22 NOTE — PROGRESS NOTES
Completed FMLA paperwork. Called to inform pt. No answer. Left VM asking for call back. Will place forms at the front for pickup.

## 2021-09-29 NOTE — PROGRESS NOTES
HISTORY OF PRESENT ILLNESS  Brissa Terry is a 57 y.o. female. HPI ESTABLISHED Patient here for follow up. Punch Biopsy 7-19-21, Pathology TNBC. Currently doing Chemo with carbo/gem/pembro that was started in August 2021 and is followed by Dr. Valentina Avila. She has been doing well and is having a lot less pain. She has recurrent breast cancer, inflammatory with mets to mediastinal and retroperitoneal lymph nodes. Breast history -   Referring - Dr. Isaac Cantrell  1/3/19 - port insertion and LEFT SLNB. 57 yo with T1c N1mi M0 (Stage I) infiltrating ductal carcinoma, Tumor size 1.6 cm, LN 1/3 with micromet, grade 3, ER -ve, OK -ve, Her 2 -ve  7/2019 - completed neoadjuvant chemotherapy - Dr. Valentina Avila   9/5/19 - LEFT breast lumpectomy and port removal. 22 mm not great response, Margins clear. - Dr. Sam Livingston - XRT - completed 11/2019    Review of Systems   All other systems reviewed and are negative. Physical Exam  Vitals and nursing note reviewed. Chest:          Comments: Multiple medial dermal mets now with extension onto lateral breast.   Breast with thickened skin   Mets extending onto right breast.         ASSESSMENT and PLAN    ICD-10-CM ICD-9-CM    1. Recurrent malignant neoplasm of left breast (HCC)  C50.912 174.9      Stage 4 recurrent left breast cancer  - worsening clinical presentation of breast  - spoke with Dr. Valentina Avila who will recheck scans.   - oncologic plan dependent on imaging. 30 minutes was spent with patient on counseling and coordination of care.

## 2021-09-29 NOTE — PROGRESS NOTES
spoke with . Pt has new dermal mets. We will need to get scans done to see how her disease is responding. Will need to switch her to [place]. Can keep her on for Monday just for office appt. But can take off [facility] please.

## 2021-09-29 NOTE — PROGRESS NOTES
PER JENNIFER FROM DR Kaelyn Lopez NM BONE SCAN [address] BODY as a one time order and CT CHEST ABD PELV W CONTRAST.

## 2021-09-30 NOTE — PROGRESS NOTES
Joey Mejia is a 57 y.o. female here for follow up for recurrent/met breast cancer. Here to discuss plan of care. 1. Have you been to the ER, urgent care clinic since your last visit? Hospitalized since your last visit? no    2. Have you seen or consulted any other health care providers outside of the [address] [place] [address] since your last visit? Include any pap smears or colon screening. No    No concerns brought up.

## 2021-10-04 NOTE — LETTER
10/4/2021    Patient: Hortensia Horner   YOB: 1960   Date of Visit: 10/4/2021     Davon Wilcox MD  13 [facility]    Dear Davon Wilcox MD,      Thank you for referring Ms. Suly Hurtado to [address] [place] [address] for evaluation. My notes for this consultation are attached. If you have questions, please do not hesitate to call me. I look forward to following your patient along with you.       Sincerely,    Kedar Martino NP

## 2021-10-04 NOTE — PROGRESS NOTES
2001 [facility] [address], 210 [facility], 45 [facility], 200 [facility]  [phone]      Follow-up Note        Patient: Luiz Raymundo MRN: [id number]  SSN: [id number]    YOB: 1960  Age: 57 y.o. Sex: female        Diagnosis:     1. Recurrent/metastatic breast cancer, Dx 07/2021   Inflammatory recurrence with regional nodes, mediastinal nodes and RP nodes    2. Left breast carcinoma:  T1c N1mi M0 (Stage I) infiltrating ductal carcinoma, Tumor size 1.6 cm, LN 1/3 with micromet, grade 3, ER -ve, [place] -ve, Her 2 -ve    ypT2 N0    Treatment:     1. Neoadjuvant chemotherapy   Adriamycin, cytoxan - s/p 4 cycles   Weekly Taxol - s/p 12 cycles  2. S/p left lumpectomy on 9/5/2019 by Dr. Desiree Hernandez  3. S/P Adjuvant radiation  4. Enrolled in  (for residual disease post surgery) - randomized to placebo   5. Palliative chemotherapy   Carboplatin/Gemzar and Keytruda, s/p 2 Cycles    Subjective:      Luiz Raymundo is a 57 y.o. female with a diagnosis of left sided invasive breast cancer. She felt a lump in the left breast, went to see her PCP, got a mammogram and was noted to have abnormal density in the left upper outer quadrant of the breast. A biopsy of the mass revealed gr 3 IDC, TNBC. The axillary LN is clear of disease. She saw Dr. Riaz Reeves. An MRI of the breast shows the tumor to be larger than previously believed to be. A left axillary LN biopsy showed 1/3 nodes with micrometastasis. She works at [employer] full time. Ms. Spencer Dash completed neoadjuvant chemotherapy and underwent lumpectomy in September 2020. She enrolled in the clinical trial  and was randomized to the placebo arm. She noticed change in the left breast skin approximately 4-6 weeks ago. Breast has progressively hardened. In additional few raised areas popped up. She initially had pain in the left breast but this has subsided. She saw Dr. Desiree Hernandez.  A punch bx of the skin on the breast revealed TNBC. She is working at [employer] for the last 13 years. Ms. Hope Mason is receiving palliative chemo-immunotherapy. She feels poorly after the infusion. She saw Dr. Carlos Craig last week. She is concerned about additional dermal lesions on the left breast.       Review of Systems:     Constitutional: negative, anxiety  Eyes: negative  Ears, Nose, Mouth, Throat, and Face: negative  Respiratory: negative  Cardiovascular: negative  Gastrointestinal: negative  Genitourinary:negative  Integument/Breast: skin changes and hardening of the breast with worsening dermal nodules. Hematologic/Lymphatic: negative  Musculoskeletal:negative  Neurological: negative        Past Medical History:   Diagnosis Date    Breast cancer ([address]     left side    Menopause      Past Surgical History:   Procedure Laterality Date    HX BREAST BIOPSY Left     x2    HX BREAST LUMPECTOMY Left 2019    LEFT BREAST LUMPECTOMY WITH ULTRASOUND performed by Arun Reddy MD at [facility]    HX HYSTERECTOMY      partial, ovaries remain    IR INSERT TUNL CVC W PORT OVER 5 YEARS  2021    VASCULAR SURGERY PROCEDURE UNLIST      portacath      Family History   Problem Relation Age of Onset    Cancer Father     Cancer Maternal Aunt     Breast Cancer Maternal Aunt         4 paternal aunts    Cancer Maternal Uncle     Cancer Paternal Aunt     Cancer Paternal Uncle     Breast Cancer Maternal Grandmother      Social History     Tobacco Use    Smoking status: Former Smoker     Packs/day: 0.10     Quit date: 2020     Years since quittin.7    Smokeless tobacco: Never Used   Substance Use Topics    Alcohol use: No      Prior to Admission medications    Medication Sig Start Date End Date Taking? Authorizing Provider   lidocaine-prilocaine (EMLA) topical cream Apply  to affected area as needed for Pain.  Apply generous amount to port site 30 minutes prior to infusions and cover with plastic wrap 8/6/21  Yes Terell Saxena MD          Allergies   Allergen Reactions    Codeine Rash     Rash from tylenol #3         Objective:     Visit Vitals  BP (!) 143/88 (BP 1 Location: Right upper arm, BP Patient Position: Sitting)   Pulse (!) 109   Temp 98.4 °F (36.9 °C) (Temporal)   Ht 5' 2\" (1.575 m)   Wt 183 lb 6.4 oz (83.2 kg)   SpO2 96%   BMI 33.54 kg/m²     Pain Scale: 0 - No pain/10      Physical Exam:     GENERAL: alert, cooperative, no distress  EYE: conjunctivae/corneas clear. xanthelasma  LYMPHATIC: Cervical, supraclavicular, and axillary nodes normal.   THROAT & NECK: normal and no erythema or exudates noted. BREAST: Left breast is hard, skin is thickened, peau de' orange appearance - overall appears softer  LUNG: clear to auscultation bilaterally  HEART: regular rate and rhythm  ABDOMEN: soft, non-tender. EXTREMITIES:  extremities normal, atraumatic, no cyanosis or edema  NEUROLOGIC: AOx3. Gait normal.       Physical exam and ROS has been modified from a prior visit to make it relevant and current           CT Results (most recent):  Results from Hospital Encounter encounter on 08/03/21    CT CHEST W CONT    Narrative  EXAM:  CT CHEST W CONT, CT ABD PELV W CONT    INDICATION: Breast cancer    COMPARISON:    CONTRAST:  100 mL of Isovue-370. TECHNIQUE:  Following the uneventful intravenous administration of contrast, thin axial  images were obtained through the chest, abdomen and pelvis. Coronal and sagittal  reconstructions were generated. Oral contrast was administered. CT dose  reduction was achieved through use of a standardized protocol tailored for this  examination and automatic exposure control for dose modulation. FINDINGS:  There is a 2.8 cm left supraclavicular lymph node. There is bilateral axillary  adenopathy. Left lymph node measures 2.2 cm. Right lymph node measures 2.9 cm.   There is bilateral skin thickening in the breast right greater than left and  there is asymmetric density in the left breast compared to the right  THYROID: No nodule. MEDIASTINUM: There are slightly enlarged lymph nodes lateral to the aortic arch  measuring 1.2 cm. There is slight soft tissue thickening posterior to the  sternum  KENNY: No mass or lymphadenopathy. THORACIC AORTA: No dissection or aneurysm. MAIN PULMONARY ARTERY: Normal in caliber. TRACHEA/BRONCHI: Patent. ESOPHAGUS: No wall thickening or dilatation. HEART: Normal in size. PLEURA: No effusion or pneumothorax. LUNGS: No nodule, mass, or airspace disease. There is minor basilar atelectasis  LIVER: No mass or biliary dilatation. GALLBLADDER: Unremarkable. SPLEEN: No mass. PANCREAS: No mass or ductal dilatation. ADRENALS: Unremarkable. KIDNEYS: No mass, calculus, or hydronephrosis. STOMACH: Unremarkable. SMALL BOWEL: No dilatation or wall thickening. COLON: No dilatation or wall thickening. APPENDIX: Unremarkable. PERITONEUM: No ascites or pneumoperitoneum. RETROPERITONEUM: There is an enlarged left periaortic lymph node measuring 1.8 x  1.7 cm. There is enlarged left iliac lymph nodes measuring 2.2 x 1.3 cm. . There  is a large left external iliac lymph node measuring 3.4 x 1.7 cm. REPRODUCTIVE ORGANS: Patient is status post hysterectomy  URINARY BLADDER: No mass or calculus. BONES: No destructive bone lesion. ADDITIONAL COMMENTS: N/A    Impression  1. CT of the chest demonstrates bilateral breast skin thickening left greater  than right and asymmetric density in the left breast.    There is bilateral axillary adenopathy. There is left supraclavicular  adenopathy. There are enlarged lymph nodes in the mediastinum lateral to the  aortic arch  which are suspicious for metastatic disease. 2. CT of the abdomen and pelvis demonstrates enlarged left retroperitoneal lymph  nodes and enlarged left pelvic lymph nodes suspicious for metastatic disease.       Lab Results   Component Value Date/Time    WBC 3.9 09/20/2021 10:13 AM    HGB 8.7 (L) 09/20/2021 10:13 AM    HCT 27.9 (L) 09/20/2021 10:13 AM    PLATELET 381 (H) 47/62/5182 10:13 AM    MCV 83.8 09/20/2021 10:13 AM       Lab Results   Component Value Date/Time    Sodium 134 (L) 09/20/2021 10:13 AM    Potassium 3.3 (L) 09/20/2021 10:13 AM    Chloride 100 09/20/2021 10:13 AM    CO2 27 09/20/2021 10:13 AM    Anion gap 7 09/20/2021 10:13 AM    Glucose 130 (H) 09/20/2021 10:13 AM    BUN 8 09/20/2021 10:13 AM    Creatinine 0.72 09/20/2021 10:13 AM    BUN/Creatinine ratio 11 (L) 09/20/2021 10:13 AM    GFR est AA >60 09/20/2021 10:13 AM    GFR est non-AA >60 09/20/2021 10:13 AM    Calcium 8.4 (L) 09/20/2021 10:13 AM    Bilirubin, total 0.5 09/20/2021 10:13 AM    Alk. phosphatase 79 09/20/2021 10:13 AM    Protein, total 6.9 09/20/2021 10:13 AM    Albumin 2.7 (L) 09/20/2021 10:13 AM    Globulin 4.2 (H) 09/20/2021 10:13 AM    A-G Ratio 0.6 (L) 09/20/2021 10:13 AM    ALT (SGPT) 27 09/20/2021 10:13 AM    AST (SGOT) 22 09/20/2021 10:13 AM         Assessment:     1.  Recurrent/metastatic breast cancer, Dx 07/2021   Inflammatory recurrence with regional nodes, mediastinal nodes and RP nodes    PD-L1 ~ 2% (IC)  NGS: p53, CKS1B, FGFR1 amplification    Previously   Left breast carcinoma:  T1c N1mi M0 (Stage I) infiltrating ductal carcinoma, Tumor size 1.6 cm, LN 1/3 with micromet, grade 3, ER -ve, [place] -ve, Her 2 -ve    ECOG PS 0       Received neoadjuvant chemotherapy   Adriamycin, Cyclophosphamide - s/p 4 cycles   Weekly Taxol - s/p 12 cycles - completed 8/15/2019    S/p left lumpectomy on 9/5/2019 with Dr. Vipin Mclaughlin  ypT2 N0    She is randomized to the placebo arm of SWOG  study: A Randomized, Phase III Trial to Evaluate the Efficacy and Safety of MK-3475 (Pembrolizumab) as Adjuvant Therapy for Triple Receptor-Negative Breast Cancer with >/= 1 CM Residual Invasive Cancer or Positive Lymph Nodes (ypN+) after Neoadjuvant Chemotherapy    Disease has recurred in the breast with changes suggestive of inflammatory disease and regional nodes are enlarged. Receiving palliative chemotherapy  Carboplatin/Gemzar and Keytruda  S/p 2 Cycles    Last cycle carbo/gem held but Pembro administered    Tolerating chemotherapy poorly - weakness, fatigue, depression  A detailed system by system evaluation of side effect was performed to assess chemotherapy related toxicity. Blood counts are acceptable. Results reviewed with the patient. Breast shows worsening changes. New dermal nodules on the left breast.       2. Anemia - normocytic     Observation       3. Anxiety     Feels it is better. She will let us know if she elects to start medication      4. Neutropenia    Chemotherapy induced  Hold chemo and only give Keytruda      Plan:       1. CT scheduled for Wed  2. Germline BRCA1/2 pending  3. Follow up next week      Signed by: Chris Blake MD                     October 4, 2021      CC. Kobe Frye MD  CC. Valente Gardner MD  CC.  Maria T Ribeiro MD

## 2021-10-08 NOTE — PROGRESS NOTES
Nayana Campa is a 57 y.o. female here for follow up for recurrent/met breast cancer. Treatment today:  Cycle 1 Day 1 Sacituzumab Govitecan-HZIY    1. Have you been to the ER, urgent care clinic since your last visit? Hospitalized since your last visit? no    2. Have you seen or consulted any other health care providers outside of the [address] [place] [address] since your last visit? Include any pap smears or colon screening no    Pt states the pain in her left breast is about level 8. She had to leave work early yesterday due to so much pain. Penelope Camargo

## 2021-10-11 NOTE — PROGRESS NOTES
2001 [facility] [address], 210 [facility], 45 [facility]  [address] [place] [address] Ne, 200 [address] [facility]  [phone]      Follow-up Note        Patient: Danette Dumont MRN: [id number]  SSN: [id number]    YOB: 1960  Age: 57 y.o. Sex: female        Diagnosis:     1. Recurrent/metastatic breast cancer, Dx 07/2021   Inflammatory recurrence with regional nodes, mediastinal nodes and RP nodes    2. Left breast carcinoma:  T1c N1mi M0 (Stage I) infiltrating ductal carcinoma, Tumor size 1.6 cm, LN 1/3 with micromet, grade 3, ER -ve, [place] -ve, Her 2 -ve    ypT2 N0    Treatment:     1. Neoadjuvant chemotherapy   Adriamycin, cytoxan - s/p 4 cycles   Weekly Taxol - s/p 12 cycles  2. S/p left lumpectomy on 9/5/2019 by Dr. Shirlene Lee  3. S/P Adjuvant radiation  4. Enrolled in  (for residual disease post surgery) - randomized to placebo   5. Palliative chemotherapy   Carboplatin/Gemzar and Keytruda, s/p 2 Cycles   Sacituzumab [place]an, cycle 1 day 1    Subjective:      Danette Dumont is a 57 y.o. female with a diagnosis of left sided invasive breast cancer. She felt a lump in the left breast, went to see her PCP, got a mammogram and was noted to have abnormal density in the left upper outer quadrant of the breast. A biopsy of the mass revealed gr 3 IDC, TNBC. The axillary LN is clear of disease. She saw Dr. Matthews Cheadle. An MRI of the breast shows the tumor to be larger than previously believed to be. A left axillary LN biopsy showed 1/3 nodes with micrometastasis. She works at [employer] full time. Ms. Freida Stubbs completed neoadjuvant chemotherapy and underwent lumpectomy in September 2020. She enrolled in the clinical trial  and was randomized to the placebo arm. She noticed change in the left breast skin approximately 4-6 weeks ago. Breast has progressively hardened. In additional few raised areas popped up.  She initially had pain in the left breast but this has subsided. She saw Dr. Soham Babin. A punch bx of the skin on the breast revealed TNBC. She is working at [employer] for the last 13 years. Ms. Viviana Li is receiving palliative chemotherapy. Skin nodules on the breast has spread further. Left breast is now very painful. She is starting Shane. Review of Systems:     Constitutional: negative, anxiety  Eyes: negative  Ears, Nose, Mouth, Throat, and Face: negative  Respiratory: negative  Cardiovascular: negative  Gastrointestinal: negative  Genitourinary:negative  Integument/Breast: skin changes and hardening of the breast with worsening dermal nodules. Hematologic/Lymphatic: negative  Musculoskeletal:negative  Neurological: negative        Past Medical History:   Diagnosis Date    Breast cancer ([address]     left side    Menopause      Past Surgical History:   Procedure Laterality Date    HX BREAST BIOPSY Left     x2    HX BREAST LUMPECTOMY Left 2019    LEFT BREAST LUMPECTOMY WITH ULTRASOUND performed by Ryan Arredondo MD at [facility]    HX HYSTERECTOMY      partial, ovaries remain    IR INSERT TUNL CVC W PORT OVER 5 YEARS  2021    VASCULAR SURGERY PROCEDURE UNLIST      portacath      Family History   Problem Relation Age of Onset    Cancer Father     Cancer Maternal Aunt     Breast Cancer Maternal Aunt         4 paternal aunts    Cancer Maternal Uncle     Cancer Paternal Aunt     Cancer Paternal Uncle     Breast Cancer Maternal Grandmother      Social History     Tobacco Use    Smoking status: Former Smoker     Packs/day: 0.10     Quit date: 2020     Years since quittin.7    Smokeless tobacco: Never Used   Substance Use Topics    Alcohol use: No      Prior to Admission medications    Medication Sig Start Date End Date Taking? Authorizing Provider   lidocaine-prilocaine (EMLA) topical cream Apply  to affected area as needed for Pain.  Apply generous amount to port site 30 minutes prior to infusions and cover with plastic wrap 8/6/21  Yes Terell Saxena MD          Allergies   Allergen Reactions    Codeine Rash     Rash from tylenol #3         Objective:     Visit Vitals  BP (!) 145/101   Pulse (!) 104   Temp 98.1 °F (36.7 °C)   Resp 18   Ht 5' 2\" (1.575 m)   Wt 189 lb (85.7 kg)   SpO2 100%   BMI 34.57 kg/m²     Pain Scale: /10      Physical Exam:     GENERAL: alert, cooperative, no distress  EYE: conjunctivae/corneas clear. xanthelasma  LYMPHATIC: Cervical, supraclavicular, and axillary nodes normal.   THROAT & NECK: normal and no erythema or exudates noted. BREAST: Left breast is hard, skin is thickened, peau de' orange appearance - overall appears softer  LUNG: clear to auscultation bilaterally  HEART: regular rate and rhythm  ABDOMEN: soft, non-tender. EXTREMITIES:  extremities normal, atraumatic, no cyanosis or edema  NEUROLOGIC: AOx3. Gait normal.       Physical exam and ROS has been modified from a prior visit to make it relevant and current           CT Results (most recent):  Results from Hospital Encounter encounter on 10/06/21    CT ABD PELV W CONT    Narrative  INDICATION:   Breast cancer    EXAM:  CT CHEST, abdomen, and pelvis WITH  CONTRAST    COMPARISON:  8/3/2021    TECHNIQUE:  Thin collimation axial images were obtained through the chest,  abdomen, and pelvis with IV contrast administration. Coronal and sagittal  reconstructions were obtained. CT dose reduction was achieved through use of a  standardized protocol tailored for this examination and automatic exposure  control for dose modulation. FINDINGS:    LUNGS: Stable tiny 2 mm right upper lobe lung nodule (series 4, image 38). Minimal bibasilar atelectasis versus scarring. No other significant pulmonary  abnormality. The central airways are patent. LYMPH NODES: Left supraclavicular lymph node has decreased and measures 1.8 cm  in short axis (image 11), compared to 2.6 cm.  Slight decrease in a few left  axillary lymph nodes with a representative lymph node measuring 9 mm in short  axis (image 22), compared to 13 mm previously. Increase in size of several right  axillary lymph nodes with a representative lymph node measuring 2.1 cm in short  axis (image 27), compared to 17 mm previously. No significant change in  minimally enlarged mediastinal lymph nodes. Slight increased prominence of  borderline enlarged anterior cardiophrenic lymph nodes. PLEURAL FLUID: No pleural effusion. PERICARDIAL FLUID: No pericardial effusion. THYROID: No dominant nodule  OTHER: Right chest port extends to the distal SVC. Increased multifocal  nodularity in the left breast. Bilateral breast skin thickening is again noted. Abdomen:    LIVER: No mass or biliary dilatation. GALLBLADDER: No calcified gallstone  SPLEEN: Unremarkable  PANCREAS: No mass or ductal dilatation. ADRENALS: Unremarkable. KIDNEYS/URETERS: Symmetric nephrograms with low density right renal lesion too  small to characterize. No evidence for enhancing renal mass. No hydronephrosis  PERITONEUM: No ascites. Progression of left-sided retroperitoneal  lymphadenopathy with a representative lymph node measuring 14 mm in short axis  (image 74), compared to 5 mm previously. A second lymph node measures 16 mm in  short axis (image 80), compared to 11 mm previously. COLON: Diverticulosis without evidence for diverticulitis  APPENDIX: Unremarkable. SMALL BOWEL: No dilatation or wall thickening. STOMACH: Unremarkable. Pelvis:    PELVIS: Stable left external iliac chain lymphadenopathy with a representative  lymph node measuring 17 mm in short axis (series 2, image 103). Left common  iliac chain lymph node is slightly larger measuring 9 mm (image 91), compared to  4 mm previously. Slight decrease in size of left internal iliac chain lymph node  measuring 8 mm in short axis (image 97), compared to 13 mm previously. No pelvic  free fluid. The bladder is unremarkable.   BONES: No destructive bone lesion. Impression  Mixed response to therapy. Slightly decreased left supraclavicular, left axillary, and left internal iliac  chain lymphadenopathy. Progression of right axillary, anterior cardiophrenic,  left retroperitoneal, and left common iliac chain lymphadenopathy. Slightly increased soft tissue nodularity in the left breast.      Lab Results   Component Value Date/Time    WBC 3.7 10/11/2021 10:49 AM    HGB 9.1 (L) 10/11/2021 10:49 AM    HCT 29.6 (L) 10/11/2021 10:49 AM    PLATELET 150 (H) 31/46/0149 10:49 AM    MCV 87.8 10/11/2021 10:49 AM       Lab Results   Component Value Date/Time    Sodium 134 (L) 09/20/2021 10:13 AM    Potassium 3.3 (L) 09/20/2021 10:13 AM    Chloride 100 09/20/2021 10:13 AM    CO2 27 09/20/2021 10:13 AM    Anion gap 7 09/20/2021 10:13 AM    Glucose 130 (H) 09/20/2021 10:13 AM    BUN 8 09/20/2021 10:13 AM    Creatinine 0.72 09/20/2021 10:13 AM    BUN/Creatinine ratio 11 (L) 09/20/2021 10:13 AM    GFR est AA >60 09/20/2021 10:13 AM    GFR est non-AA >60 09/20/2021 10:13 AM    Calcium 8.4 (L) 09/20/2021 10:13 AM    Bilirubin, total 0.5 09/20/2021 10:13 AM    Alk. phosphatase 79 09/20/2021 10:13 AM    Protein, total 6.9 09/20/2021 10:13 AM    Albumin 2.7 (L) 09/20/2021 10:13 AM    Globulin 4.2 (H) 09/20/2021 10:13 AM    A-G Ratio 0.6 (L) 09/20/2021 10:13 AM    ALT (SGPT) 27 09/20/2021 10:13 AM    AST (SGOT) 22 09/20/2021 10:13 AM         Assessment:     1.  Recurrent/metastatic breast cancer, Dx 07/2021   Inflammatory recurrence with regional nodes, mediastinal nodes and RP nodes    PD-L1 ~ 2% (IC)  NGS: p53, CKS1B, FGFR1 amplification    Previously   Left breast carcinoma:  T1c N1mi M0 (Stage I) infiltrating ductal carcinoma, Tumor size 1.6 cm, LN 1/3 with micromet, grade 3, ER -ve, [place] -ve, Her 2 -ve    ECOG PS 0       Received neoadjuvant chemotherapy   Adriamycin, Cyclophosphamide - s/p 4 cycles   Weekly Taxol - s/p 12 cycles - completed 8/15/2019    S/p left lumpectomy on 9/5/2019 with Dr. Bernice Sever  ypT2 N0    She is randomized to the placebo arm of SWOG  study: A Randomized, Phase III Trial to Evaluate the Efficacy and Safety of MK-3475 (Pembrolizumab) as Adjuvant Therapy for Triple Receptor-Negative Breast Cancer with >/= 1 CM Residual Invasive Cancer or Positive Lymph Nodes (ypN+) after Neoadjuvant Chemotherapy    Disease has recurred in the breast with changes suggestive of inflammatory disease and regional nodes are enlarged. Receiving palliative therapy  Carboplatin/Gemzar and Keytruda  S/p 2 Cycles    Due to progression of disease, Keytruda/chemo d/c'd  Sacituzumab, cycle 1 day 1  I educated her about the side effects of the treatment and ways to manage it. She vocalized understanding. Blood counts are adequate. Results reviewed with the patient. 2. Anemia - normocytic     Observation       3. Breast pain from cancer    Oxycodone  Refer to Palliative        Plan:       1. Start Trodelvy  2. NGS on tissue  3. Oxycodone   4. Refer to Palliative care  5. Follow up 1 week    I performed a history and physical examination of the patient and discussed his management with the NPP. I reviewed the NPP note and agree with the documented findings and plan of care. The patient was seen in conjunction with Ms. Chapis. Ms. Bobby Pérez is a women with advanced TNBC. She is now receiving Shane. I have prescribed Oxycodone for breast pain. Exam is remarkable for left breast hardening and dermal nodules. Signed by: Uday Reyes MD                     October 11, 2021      CC. Katya Le MD  CC. Bj Lagunas MD  CC.  Sol Tolentino MD

## 2021-10-11 NOTE — PROGRESS NOTES
Pt arrived to [facility] [place] ambulatory in no acute distress at 1045 for Trodelvy C1D1. Assessment unremarkable except increasing pain in left breast. R chest port accessed without issue and positive blood return noted. Labs obtained, CBC and CMP. Baseline hep B testing done 8/23/2021. Patient to MD office for apt. Per Beverley Saha RN patient signed consent in office and will be uploaded to chart. Patient given education materials and reviewed new medication. She did not have any questions at this time. Patient denied having any symptoms of COVID-19, i.e. SOB, coughing, fever, or generally not feeling well. Also denies having been exposed to COVID-19 recently or having had any recent contact with family/friend that has a pending COVID test.    Patient Vitals for the past 12 hrs:   Temp Pulse Resp BP SpO2   10/11/21 1703  95 16 133/81    10/11/21 1045 98.1 °F (36.7 °C) (!) 104 18 (!) 145/101 100 %     Recent Results (from the past 12 hour(s))   CBC WITH AUTOMATED DIFF    Collection Time: 10/11/21 10:49 AM   Result Value Ref Range    WBC 3.7 3.6 - 11.0 K/uL    RBC 3.37 (L) 3.80 - 5.20 M/uL    HGB 9.1 (L) 11.5 - 16.0 g/dL    HCT 29.6 (L) 35.0 - 47.0 %    MCV 87.8 80.0 - 99.0 FL    MCH 27.0 26.0 - 34.0 PG    MCHC 30.7 30.0 - 36.5 g/dL    RDW 22.4 (H) 11.5 - 14.5 %    PLATELET 985 (H) 092 - 400 K/uL    MPV 9.0 8.9 - 12.9 FL    NRBC 0.0 0  WBC    ABSOLUTE NRBC 0.00 0.00 - 0.01 K/uL    NEUTROPHILS 62 32 - 75 %    LYMPHOCYTES 23 12 - 49 %    MONOCYTES 11 5 - 13 %    EOSINOPHILS 4 0 - 7 %    BASOPHILS 0 0 - 1 %    IMMATURE GRANULOCYTES 0 0.0 - 0.5 %    ABS. NEUTROPHILS 2.3 1.8 - 8.0 K/UL    ABS. LYMPHOCYTES 0.9 0.8 - 3.5 K/UL    ABS. MONOCYTES 0.4 0.0 - 1.0 K/UL    ABS. EOSINOPHILS 0.1 0.0 - 0.4 K/UL    ABS. BASOPHILS 0.0 0.0 - 0.1 K/UL    ABS. IMM.  GRANS. 0.0 0.00 - 0.04 K/UL    DF SMEAR SCANNED      RBC COMMENTS ANISOCYTOSIS  2+        RBC COMMENTS POLYCHROMASIA  PRESENT       METABOLIC PANEL, COMPREHENSIVE Collection Time: 10/11/21 10:49 AM   Result Value Ref Range    Sodium 137 136 - 145 mmol/L    Potassium 3.5 3.5 - 5.1 mmol/L    Chloride 105 97 - 108 mmol/L    CO2 26 21 - 32 mmol/L    Anion gap 6 5 - 15 mmol/L    Glucose 92 65 - 100 mg/dL    BUN 7 6 - 20 MG/DL    Creatinine 0.58 0.55 - 1.02 MG/DL    BUN/Creatinine ratio 12 12 - 20      GFR est AA >60 >60 ml/min/1.73m2    GFR est non-AA >60 >60 ml/min/1.73m2    Calcium 9.0 8.5 - 10.1 MG/DL    Bilirubin, total 0.6 0.2 - 1.0 MG/DL    ALT (SGPT) 44 12 - 78 U/L    AST (SGOT) 50 (H) 15 - 37 U/L    Alk.  phosphatase 105 45 - 117 U/L    Protein, total 6.5 6.4 - 8.2 g/dL    Albumin 2.5 (L) 3.5 - 5.0 g/dL    Globulin 4.0 2.0 - 4.0 g/dL    A-G Ratio 0.6 (L) 1.1 - 2.2         The following medications administered:  Medications Administered     0.9% sodium chloride infusion     Admin Date  10/11/2021 Action  New Bag Dose  25 mL/hr Rate  25 mL/hr Route  IntraVENous Administered By  Minus HIRO Huerta          0.9% sodium chloride injection 10 mL     Admin Date  10/11/2021 Action  Given Dose  10 mL Route  IntraVENous Administered By  Minus HIRO Huerta          acetaminophen (TYLENOL) tablet 650 mg     Admin Date  10/11/2021 Action  Given Dose  650 mg Route  Oral Administered By  Minus HIRO Huerta          dexamethasone (DECADRON) 12 mg in 0.9% sodium chloride 50 mL IVPB     Admin Date  10/11/2021 Action  New Bag Dose  12 mg Route  IntraVENous Administered By  Minus HIRO Huerta          diphenhydrAMINE (BENADRYL) injection 50 mg     Admin Date  10/11/2021 Action  Given Dose  50 mg Route  IntraVENous Administered By  Minus HIRO Huerta          famotidine (PF) (PEPCID) 20 mg in 0.9% sodium chloride 10 mL injection     Admin Date  10/11/2021 Action  Given Dose  20 mg Route  IntraVENous Administered By  Minus HIRO Huerta          heparin (porcine) pf 300-500 Units     Admin Date  10/11/2021 Action  Given Dose  500 Units Route  InterCATHeter Administered By  Minus Bradley, HIRO          palonosetron HCl (ALOXI) injection 0.25 mg     Admin Date  10/11/2021 Action  Given Dose  0.25 mg Route  IntraVENous Administered By  Ana M Alvarez RN          sacituzumab govitecan-hziy (TRODELVY) 180 mg in 0.9% sodium chloride 100 mL, overfill volume 10 mL chemo infusion     Admin Date  10/11/2021 Action  New Bag Dose  180 mg Rate  192 mL/hr Route  IntraVENous Administered By  Ana M Alvarez RN          sacituzumab govitecan-hziy (TRODELVY) 646 mg in 0.9% sodium chloride 250 mL, overfill volume 25 mL chemo infusion     Admin Date  10/11/2021 Action  New Bag Dose  646 mg Rate  145.5 mL/hr Route  IntraVENous Administered By  Ana M Alvarez RN          sodium chloride (NS) flush 10 mL     Admin Date  10/11/2021 Action  Given Dose  10 mL Route  IntraVENous Administered By  Ana M Alvarez, HIRO                Pt tolerated treatment well. Patient observed for 30 minutes after infusion with no complaints voiced. Port flushed and de-accessed per policy, 2x2 and soft tape placed. Pt discharged ambulatory in no acute distress at 1705, accompanied by self. Next appointment 10/18/2021.

## 2021-10-11 NOTE — LETTER
10/11/2021    Patient: Natalie Kelly   YOB: 1960   Date of Visit: 10/11/2021     Minesh Arellano MD  [address] [facility] [place]    Dear Minesh Arellano MD,      Thank you for referring Ms. Netta Carrillo to [address] [place] [address] for evaluation. My notes for this consultation are attached. If you have questions, please do not hesitate to call me. I look forward to following your patient along with you.       Sincerely,    Paul Layton NP

## 2021-10-14 NOTE — TELEPHONE ENCOUNTER
Call placed to [facility] to offer earlier appt. She is at dentist with child and will call me back after speaking to patient to see if patient wants to change to 10/19/2021 at either 12:30 or 1:30pm at [facility]. Will await her call back.

## 2021-10-14 NOTE — TELEPHONE ENCOUNTER
Palliative Medicine  Nursing Note  [phone]  Fax [phone]      Telephone Call  Patient Name: Juan No  YOB: 1960    10/14/2021         Advance Care Planning 9/20/2021   Patient's Healthcare Decision Maker is: Legal Next of Kin   Confirm Advance Directive None   Patient Would Like to Complete Advance Directive No       Received outpatient Palliative Medicine referral from Dr. Noemi Kaiser to see patient for symptom management and supportive care. Chart  reviewed. Juan No is a 57 y.o. female with a diagnosis of left sided invasive breast cancer. Pt's most recent office visit with Dr. Noemi Kaiser was 10/11/21. See Office Visit note for complete HPI, treatment history, and plan. ACP: Not on file                                                                                                                                  Nurse called pt to introduced Palliative Medicine services - Left voicemail for return call. Spoke with patient's daughter, Aletha Earl and offered appointment for Tuesday, 10/26/21 at 10:30 am [facility] [address] [facility].  Errol Marsh will discuss with patient and call back if there are any other appointment conflicts    Hannah Michael RN  Palliative Medicine

## 2021-10-15 NOTE — PROGRESS NOTES
Hortensia Horner is a 57 y.o. female here for follow up for recurrent/met breast cancer. Treatment today:  Cycle 1 Day 8 Sacituzumab Govitecan    1. Have you been to the ER, urgent care clinic since your last visit? Hospitalized since your last visit? no    2. Have you seen or consulted any other health care providers outside of the [address] [place] [address] since your last visit? Include any pap smears or colon screening. no    Pt states she is doing much better than last visit. She used the oxycodone for her breast pain only for a few days and has not needed since.

## 2021-10-15 NOTE — TELEPHONE ENCOUNTER
Called me this morning to state she will take the 12:30pm appt with Dr. Jose D Villasenor on 10/19/2021 at [facility]. Confirmed location/date/time with patient.

## 2021-10-18 NOTE — PROGRESS NOTES
\[facility] [place] [facility]\"" Outpatient Infusion Progress Note/Treatment Held  Name:Debbie Woodard  : 1960  MRN: [id number]    Pt arrived to [facility] ambulatory in no acute distress at 1000 for Trodelvy C1 D8. Assessment unremarkable except patient blood pressure was elevated. R chest port accessed without issue and positive blood return noted. Labs obtained, cbc with diff. Vital Signs:  Patient Vitals for the past 12 hrs:   Temp Pulse Resp BP SpO2   10/18/21 1001 98.8 °F (37.1 °C) 97 17 (!) 171/94 100 %       Labs:  Recent Results (from the past 12 hour(s))   CBC WITH AUTOMATED DIFF    Collection Time: 10/18/21 10:23 AM   Result Value Ref Range    WBC 0.9 (LL) 3.6 - 11.0 K/uL    RBC 2.90 (L) 3.80 - 5.20 M/uL    HGB 7.9 (L) 11.5 - 16.0 g/dL    HCT 24.9 (L) 35.0 - 47.0 %    MCV 85.9 80.0 - 99.0 FL    MCH 27.2 26.0 - 34.0 PG    MCHC 31.7 30.0 - 36.5 g/dL    RDW 20.5 (H) 11.5 - 14.5 %    PLATELET 358 766 - 102 K/uL    MPV 9.2 8.9 - 12.9 FL    NRBC 0.0 0  WBC    ABSOLUTE NRBC 0.00 0.00 - 0.01 K/uL    NEUTROPHILS PENDING %    LYMPHOCYTES PENDING %    MONOCYTES PENDING %    EOSINOPHILS PENDING %    BASOPHILS PENDING %    IMMATURE GRANULOCYTES PENDING %    ABS. NEUTROPHILS PENDING K/UL    ABS. LYMPHOCYTES PENDING K/UL    ABS. MONOCYTES PENDING K/UL    ABS. EOSINOPHILS PENDING K/UL    ABS. BASOPHILS PENDING K/UL    ABS. IMM. GRANS. PENDING K/UL    DF PENDING      Patients treatment held per MD office due to WBC 0.9. Patient denied having any symptoms of COVID-19, i.e. SOB, coughing, fever, or generally not feeling well. Also denies having been exposed to COVID-19 recently or having had any recent contact with family/friend that has a pending COVID test.       Pt tolerated treatment well. IV flushed per policy and removed, 2x2 and bandaid placed. Pt discharged ambulatory in no acute distress at 1120, accompanied by self. Next appointment 2021 @ 1000.     Future Appointments   Date Time Provider Department Center   10/19/2021 12:30 PM Melodie Mallory MD [facility] BS AMB   11/1/2021 10:00 AM [facility] [place] [address] REG   11/8/2021 10:00 AM [facility] [address] REG   11/22/2021 10:00 AM [facility] [place] [address] REG   11/29/2021  2:00 PM [facility] [place] [address] REG   12/13/2021 10:00 AM [facility] [place] [address] REG   12/20/2021 10:00 AM [facility] [address] [place] REG   1/5/2022  9:45 AM Leyda Pool MD C BS AMB

## 2021-10-18 NOTE — PROGRESS NOTES
[address] [facility] [address] [facility] [address], [address] [facility], [address] [facility], 200 [facility]  [phone]      Follow-up Note        Patient: Sean Lovelace MRN: [id number]  SSN: [id number]    YOB: 1960  Age: 57 y.o. Sex: female        Diagnosis:     1. Recurrent/metastatic breast cancer, Dx 07/2021   Inflammatory recurrence with regional nodes, mediastinal nodes and RP nodes    2. Left breast carcinoma:  T1c N1mi M0 (Stage I) infiltrating ductal carcinoma, Tumor size 1.6 cm, LN 1/3 with micromet, grade 3, ER -ve, MS -ve, Her 2 -ve    ypT2 N0    Treatment:     1. Neoadjuvant chemotherapy   Adriamycin, cytoxan - s/p 4 cycles   Weekly Taxol - s/p 12 cycles  2. S/p left lumpectomy on 9/5/2019 by Dr. Shari Oliver  3. S/P Adjuvant radiation  4. Enrolled in  (for residual disease post surgery) - randomized to placebo   5. Palliative chemotherapy   Carboplatin/Gemzar and Keytruda, s/p 2 Cycles   Sacituzumab Gav[place]an, cycle 1 day 8    Subjective:      Sean Lovelace is a 57 y.o. female with a diagnosis of left sided invasive breast cancer. She felt a lump in the left breast, went to see her PCP, got a mammogram and was noted to have abnormal density in the left upper outer quadrant of the breast. A biopsy of the mass revealed gr 3 IDC, TNBC. The axillary LN is clear of disease. She saw Dr. Vivien Elise. An MRI of the breast shows the tumor to be larger than previously believed to be. A left axillary LN biopsy showed 1/3 nodes with micrometastasis. She works at [employer] full time. Ms. Gilson Yuan completed neoadjuvant chemotherapy and underwent lumpectomy in September 2020. She enrolled in the clinical trial  and was randomized to the placebo arm. She noticed change in the left breast skin approximately 4-6 weeks ago. Breast has progressively hardened. In additional few raised areas popped up.  She initially had pain in the left breast but this has subsided. She saw Dr. Jacinta Thomas. A punch bx of the skin on the breast revealed TNBC. She is working at [employer] for the last 13 years. Ms. Arvilla Krabbe is receiving palliative chemotherapy. Skin nodules on the breast has spread further. Left breast is improving after starting the [place]. Review of Systems:     Constitutional: negative, anxiety  Eyes: negative  Ears, Nose, Mouth, Throat, and Face: negative  Respiratory: negative  Cardiovascular: negative  Gastrointestinal: negative  Genitourinary:negative  Integument/Breast: skin changes and hardening of the breast with worsening dermal nodules. Hematologic/Lymphatic: negative  Musculoskeletal:negative  Neurological: negative    Review of systems was reviewed and updated as needed on 21    Past Medical History:   Diagnosis Date    Breast cancer ([address]     left side    Menopause      Past Surgical History:   Procedure Laterality Date    HX BREAST BIOPSY Left     x2    HX BREAST LUMPECTOMY Left 2019    LEFT BREAST LUMPECTOMY WITH ULTRASOUND performed by Loan Shoemaker MD at [facility] OR    HX HYSTERECTOMY      partial, ovaries remain    IR INSERT TUNL CVC W PORT OVER 5 YEARS  2021    VASCULAR SURGERY PROCEDURE UNLIST      portacath      Family History   Problem Relation Age of Onset    Cancer Father     Cancer Maternal Aunt     Breast Cancer Maternal Aunt         4 paternal aunts    Cancer Maternal Uncle     Cancer Paternal Aunt     Cancer Paternal Uncle     Breast Cancer Maternal Grandmother      Social History     Tobacco Use    Smoking status: Former Smoker     Packs/day: 0.10     Quit date: 2020     Years since quittin.8    Smokeless tobacco: Never Used   Substance Use Topics    Alcohol use: No      Prior to Admission medications    Medication Sig Start Date End Date Taking? Authorizing Provider   lidocaine-prilocaine (EMLA) topical cream Apply  to affected area as needed for Pain.  Apply generous amount to port site 30 minutes prior to infusions and cover with plastic wrap 8/6/21  Yes Lincoln Mcneil MD   ondansetron (ZOFRAN ODT) 4 mg disintegrating tablet  10/17/21   Provider, Historical   pantoprazole (PROTONIX) 40 mg tablet Take 1 Tablet by mouth daily. 10/19/21   Paige Gentile MD   dronabinoL (Marinol) 2.5 mg capsule Take 1 Capsule by mouth two (2) times a day. Max Daily Amount: 5 mg. 10/19/21   Paige Gentile MD          Allergies   Allergen Reactions    Codeine Rash     Rash from tylenol #3         Objective:     Visit Vitals  BP (!) 171/94   Pulse 97   Temp 98.8 °F (37.1 °C)   Resp 17   Ht 5' 2\" (1.575 m)   Wt 181 lb (82.1 kg)   SpO2 100%   BMI 33.11 kg/m²     Pain Scale: 0 - No pain/10    Physical Exam:     GENERAL: alert, cooperative, no distress  EYE: conjunctivae/corneas clear. xanthelasma  LYMPHATIC: Cervical, supraclavicular, and axillary nodes normal.   THROAT & NECK: normal and no erythema or exudates noted. BREAST: Left breast is hard, skin is thickened, peau de' orange appearance - overall appears softer  LUNG: clear to auscultation bilaterally  HEART: regular rate and rhythm  ABDOMEN: soft, non-tender. EXTREMITIES:  extremities normal, atraumatic, no cyanosis or edema  NEUROLOGIC: AOx3. Gait normal.       Physical exam and ROS has been modified from a prior visit to make it relevant and current        CT Results (most recent):  Results from Hospital Encounter encounter on 10/06/21    CT ABD PELV W CONT    Narrative  INDICATION:   Breast cancer    EXAM:  CT CHEST, abdomen, and pelvis WITH  CONTRAST    COMPARISON:  8/3/2021    TECHNIQUE:  Thin collimation axial images were obtained through the chest,  abdomen, and pelvis with IV contrast administration. Coronal and sagittal  reconstructions were obtained. CT dose reduction was achieved through use of a  standardized protocol tailored for this examination and automatic exposure  control for dose modulation.     FINDINGS:    LUNGS: Stable tiny 2 mm right upper lobe lung nodule (series 4, image 38). Minimal bibasilar atelectasis versus scarring. No other significant pulmonary  abnormality. The central airways are patent. LYMPH NODES: Left supraclavicular lymph node has decreased and measures 1.8 cm  in short axis (image 11), compared to 2.6 cm. Slight decrease in a few left  axillary lymph nodes with a representative lymph node measuring 9 mm in short  axis (image 22), compared to 13 mm previously. Increase in size of several right  axillary lymph nodes with a representative lymph node measuring 2.1 cm in short  axis (image 27), compared to 17 mm previously. No significant change in  minimally enlarged mediastinal lymph nodes. Slight increased prominence of  borderline enlarged anterior cardiophrenic lymph nodes. PLEURAL FLUID: No pleural effusion. PERICARDIAL FLUID: No pericardial effusion. THYROID: No dominant nodule  OTHER: Right chest port extends to the distal SVC. Increased multifocal  nodularity in the left breast. Bilateral breast skin thickening is again noted. Abdomen:    LIVER: No mass or biliary dilatation. GALLBLADDER: No calcified gallstone  SPLEEN: Unremarkable  PANCREAS: No mass or ductal dilatation. ADRENALS: Unremarkable. KIDNEYS/URETERS: Symmetric nephrograms with low density right renal lesion too  small to characterize. No evidence for enhancing renal mass. No hydronephrosis  PERITONEUM: No ascites. Progression of left-sided retroperitoneal  lymphadenopathy with a representative lymph node measuring 14 mm in short axis  (image 74), compared to 5 mm previously. A second lymph node measures 16 mm in  short axis (image 80), compared to 11 mm previously. COLON: Diverticulosis without evidence for diverticulitis  APPENDIX: Unremarkable. SMALL BOWEL: No dilatation or wall thickening. STOMACH: Unremarkable.     Pelvis:    PELVIS: Stable left external iliac chain lymphadenopathy with a representative  lymph node measuring 17 mm in short axis (series 2, image 103). Left common  iliac chain lymph node is slightly larger measuring 9 mm (image 91), compared to  4 mm previously. Slight decrease in size of left internal iliac chain lymph node  measuring 8 mm in short axis (image 97), compared to 13 mm previously. No pelvic  free fluid. The bladder is unremarkable. BONES: No destructive bone lesion. Impression  Mixed response to therapy. Slightly decreased left supraclavicular, left axillary, and left internal iliac  chain lymphadenopathy. Progression of right axillary, anterior cardiophrenic,  left retroperitoneal, and left common iliac chain lymphadenopathy. Slightly increased soft tissue nodularity in the left breast.      Lab Results   Component Value Date/Time    WBC 0.9 (LL) 10/18/2021 10:23 AM    HGB 7.9 (L) 10/18/2021 10:23 AM    HCT 24.9 (L) 10/18/2021 10:23 AM    PLATELET 655 94/04/4663 10:23 AM    MCV 85.9 10/18/2021 10:23 AM       Lab Results   Component Value Date/Time    Sodium 137 10/11/2021 10:49 AM    Potassium 3.5 10/11/2021 10:49 AM    Chloride 105 10/11/2021 10:49 AM    CO2 26 10/11/2021 10:49 AM    Anion gap 6 10/11/2021 10:49 AM    Glucose 92 10/11/2021 10:49 AM    BUN 7 10/11/2021 10:49 AM    Creatinine 0.58 10/11/2021 10:49 AM    BUN/Creatinine ratio 12 10/11/2021 10:49 AM    GFR est AA >60 10/11/2021 10:49 AM    GFR est non-AA >60 10/11/2021 10:49 AM    Calcium 9.0 10/11/2021 10:49 AM    Bilirubin, total 0.6 10/11/2021 10:49 AM    Alk. phosphatase 105 10/11/2021 10:49 AM    Protein, total 6.5 10/11/2021 10:49 AM    Albumin 2.5 (L) 10/11/2021 10:49 AM    Globulin 4.0 10/11/2021 10:49 AM    A-G Ratio 0.6 (L) 10/11/2021 10:49 AM    ALT (SGPT) 44 10/11/2021 10:49 AM    AST (SGOT) 50 (H) 10/11/2021 10:49 AM         Assessment:     1.  Recurrent/metastatic breast cancer, Dx 07/2021   Inflammatory recurrence with regional nodes, mediastinal nodes and RP nodes    PD-L1 ~ 2% (IC)  NGS: p53, CKS1B, FGFR1 amplification    Previously   Left breast carcinoma:  T1c N1mi M0 (Stage I) infiltrating ductal carcinoma, Tumor size 1.6 cm, LN 1/3 with micromet, grade 3, ER -ve, IL -ve, Her 2 -ve    ECOG PS 0       Received neoadjuvant chemotherapy   Adriamycin, Cyclophosphamide - s/p 4 cycles   Weekly Taxol - s/p 12 cycles - completed 8/15/2019    S/p left lumpectomy on 9/5/2019 with Dr. Mitzi De Guzman  ypT2 N0    She is randomized to the placebo arm of SWOG  study: A Randomized, Phase III Trial to Evaluate the Efficacy and Safety of [id number] (Pembrolizumab) as Adjuvant Therapy for Triple Receptor-Negative Breast Cancer with >/= 1 CM Residual Invasive Cancer or Positive Lymph Nodes (ypN+) after Neoadjuvant Chemotherapy    Disease has recurred in the breast with changes suggestive of inflammatory disease and regional nodes are enlarged. Receiving palliative therapy  Carboplatin/Gemzar and Keytruda  S/p 2 Cycles    Due to progression of disease, Keytruda/chemo d/c'd    Sacituzumab, cycle 1 day 8 - hold d/t neutropenia     A detailed system by system evaluation of side effect was performed to assess chemotherapy related toxicity. Blood counts are NOT acceptable. Results reviewed with the patient. Symptom management form reviewed with patient. 2. Anemia - normocytic     Observation       3. Breast pain from cancer    Oxycodone  Palliative medicine     4. Neutropenia    Hold treatment  Dose adjustment for next cycle   Attempt approval for neulasta    Plan:       1. Hold Trodelvy d/t neutropenia  2. Follow up with Palliative care  3. Dose adjustment and attempt approval for neulasta on day 9  4. Follow up in 2 weeks    I performed a history and physical examination of the patient and discussed his management with the NPP. I reviewed the NPP note and agree with the documented findings and plan of care. The patient was seen in conjunction with Ms. Nation.      Ms. Lara Suh is a women with advanced TNBC of the left breast. She is receiving second line Trodelvy. ANC is too low and the treatment will be held. She is doing fair. Physical exam reveals growth in the dermal nodules on the breast.      Signed by: Loreto Du MD                     November 1, 2021      CC. Chhaya Max MD  CC. Mago Gibbs MD  CC.  Wild Boucher MD

## 2021-10-18 NOTE — LETTER
11/1/2021    Patient: Ariella Campo   YOB: 1960   Date of Visit: 10/18/2021     Agnes Diaz MD  [facility] [place]    Dear Agnes Diaz MD,      Thank you for referring Ms. Nani Benitez to [address] [place] [address] for evaluation. My notes for this consultation are attached. If you have questions, please do not hesitate to call me. I look forward to following your patient along with you.       Sincerely,    Olvin Coyne NP

## 2021-10-19 NOTE — PROGRESS NOTES
[address] [facility],4Th Floor  Palliative Medicine   [phone]    Outpatient Palliative Social Work Note    Content  [x] Introduction to Palliative SW  [x] Counseling or Psychosocial Support  [x] Resource Referral   [x] Advance Care Planning    Location  [x] Clinic: [facility]  [] OPIC:   [] Virtual Visit  [] Telephone Call    Narrative  SW met with patient in clinic following visit with palliative medicine physician Dr. Maikel Luu. Problems discussed include coping with diagnosis of stage IV cancer diagnosed in 2021, a recurrence following treatment for triple negative breast cancer in 2018. Current signs and symptoms include anhedonia and disinterest in normally enjoyable activities, staying in bed with poor quality sleep, poor appetite, and fixated thoughts on upcoming financial difficulties and other stressors. Patient expresses she has a strong support system centering on her daughter Yash Canseco as well as her best friend, whom she refers to as her sister because of their closeness. Patient endorses that the phone is constantly ringing with people wanting to support her, and yet, she has difficulty drawing close to that support in part because she wants to protect others from her difficulty. Patient has family caregiving experience with her father who  of cancer in  and she is deeply impacted by his cancer journey and death (without signs of complicated bereavement). No risk issues are identified. SW engaged in Motivational Interviewing marked by reflective listening, validation, and empathy, as well as introducing the Acceptance and Commitment Therapy \"Tug of War with Emotions\" metaphor. Progress towards goals and strengths includes identification of a strong family system, pre-emptive conversations with Ilsa Nino about goals of care (see ACP Note), and a true desire to receive support and move through her presenting depression.  Referral made on this day to [facility] to call after meeting with her employer's  tomorrow. Homework assigned to call [facility] and to connect with this  tomorrow and following 2 weeks on Wednesday mornings.      Plan  SW to continue following:  - End of day tomorrow  - Wednesday mornings at 8:30am for short-term counseling via [address], [address] [place]   Supervisee in Social Work  [facility] [place]  [phone]    Time in: 1:40pm  Time out: 2:20pm

## 2021-10-19 NOTE — PROGRESS NOTES
Palliative Medicine Office Visit  Palliative Medicine Nurse Check In  [phone]    Patient Name: Natalie Kelly  YOB: 1960      Date of Office Visit: 10/19/2021    Patient states: \"  \"    From Check In Sheet (scanned in Media):  Has a medical provider talked with you about cardiopulmonary resuscitation (CPR)? [x] Yes   [] No   [] Unable to obtain    Nurse reminder to complete or update ACP FlowSheet:    Is ACP on the Problem List?    [] Yes    [x] No  IF ACP Document is ON FILE; Nurse to place ACP on Problem List     Is there an ACP Note in Chart Review/Note? [] Yes    [x] No   If NO: ALERT PROVIDER       Primary Decision MakeMona Bellamy - [phone]  Advance Care Planning 10/19/2021   Patient's [address] is: Patient declined (Legal Next of Kin remains as decision maker)   Confirm Advance Directive None   Patient Would Like to Complete Advance Directive Yes         Is there anything that we should know about you as a person in order to provide you the best care possible? Have you been to the ER, urgent care clinic since your last visit? [] Yes   [x] No   [] Unable to obtain    Have you been hospitalized since your last visit? [] Yes   [x] No   [] Unable to obtain    Have you seen or consulted any other health care providers outside of the [address] [place] [address] since your last visit?    [] Yes   [x] No   [] Unable to obtain    Functional status (describe):         Last BM: 10/19/2021     accessed (date): 10/19/2021    Bottle review (for opioid pain medication):  Medication 1:   Date filled:   Directions:   # filled:   # left:   # pills taking per day:  Last dose taken:    Medication 2:   Date filled:   Directions:   # filled:   # left:   # pills taking per day:  Last dose taken:    Medication 3:   Date filled:   Directions:   # filled:   # left:   # pills taking per day:  Last dose taken:    Medication 4:   Date filled:   Directions: # filled:   # left:   # pills taking per day:  Last dose taken:

## 2021-10-19 NOTE — ACP (ADVANCE CARE PLANNING)
Non-Provider Advance Care Planning (ACP) Note    Date of ACP Conversation: 10/19/2021  Persons included in Conversation: patient  Length of ACP Conversation in minutes: <16 minutes (Non-Billable)    Conversation requested by:   Provider    Authorized Decision Maker (if patient is incapable of making informed decisions): This person is:  Named in Advance Directive or Healthcare Power of       Primary Decision Maker: Marino Pérez - Daughter - [address] for ALL Patients with Decision Making Capacity:    Advance Directive Conversation with Patients who have not yet planned:  Importance of advance care planning, including choosing a healthcare agent to communicate patient's healthcare decisions if patient lost the ability to make decisions, such as after a sudden illness or accident  \"Have you thought about who you would want to make decisions about your future healthcare and treatment if you were unable to?\" Yes: Name and Relationship durga Palacio  Reviewed healthcare agent role and authority/de jesus  Reviewed healthcare situations as outlined in advance directive form, such as:   Imminent death  Severe, permanent brain injury  Explored patient's values, goals, and care preferences as related to these situations    Review of Existing Advance Directive: (Select questions covered)  N/A    Interventions Provided:  Completed new Advance Directive with patient  Provided copy of completed document for scanning into medical record  Recommended communicating the plan and providing copies for the healthcare agent, personal physician, and others as appropriate   Recommended review of completed ACP document annually or upon change in health status     Narrative:  Patient expressed consistently and clearly throughout advance care planning that her wishes are to \"just let me go\".  She expresses that if she cannot return to former quality of life, she does not want her family to experience what she experienced with her father's death. Patient shared with SW about \"the tubes and the wasting\" that she observed, which was very distressing for the family when he  in . Patient has already engaged in spontaneous goals of care family conversations with her daughter, Rogerio Bear, who is not present; patient endorses daughter is able to uphold her wishes. Patient declines to name a secondary mPOA.        Meghan Cooper, [facility]   Palliative Medicine   Supervisee in Social Work  [id number] [place] [address]  [phone]

## 2021-10-19 NOTE — PROGRESS NOTES
Palliative Medicine Outpatient Services  Soo: [phone])    Patient Name: Michelle Coy  YOB: 1960    Date of Current Visit: 10/19/21  Location of Current Visit:    [] [facility] Office  [] [facility] Office  [x] [facility] Office  [] Home  []Synchronous real-time A/V virtual visit    Date of Initial Visit: 10/19/2021   Referral from: Dr. Manny Grace  Primary Care Physician: Jaziel Leiva MD      SUMMARY:   Michelle Coy is a 57y.o. year old with breast cancer who was referred to Palliative Medicine by Dr. Manny Grace for symptom management. Current treatment: she was diagnosed with recurrent breast cancer in summer 2021. Currently Jarred Altamirano is on hold d/t neutropenia. Per Oncology, plan is for dose adjustment and attempt approval for neulasta. The patients social history includes: she has 2 children. Right now she has not been sharing too many details about her cancer with others and has been keeping a lot of things to herself. She is open to Social Work support. Initial Referral Intake note reviewed   PALLIATIVE DIAGNOSES:       ICD-10-CM ICD-9-CM    1. Malignant neoplasm of upper-outer quadrant of left breast in female, estrogen receptor negative (HCC)  C50.412 174.4     Z17.1 V86.1    2. Cancer related pain  G89.3 338.3    3. Non-intractable vomiting with nausea, unspecified vomiting type  R11.2 787.01 dronabinoL (Marinol) 2.5 mg capsule   4. Decreased appetite  R63.0 783.0 dronabinoL (Marinol) 2.5 mg capsule   5. Indigestion  K30 536.8    6. Neoplastic malignant related fatigue  R53.0 780.79    7. Depressed mood  R45.89 799.29    8. Advanced care planning/counseling discussion  Z71.89 V65.49           PLAN:   Patient Instructions     Michelle Coy,    It was a pleasure to see you in the [facility] office today. Below is the plan we discussed. Breast cancer:  -You found out your cancer was back on July 31 of this year. That affected you a lot.   -You just started a new treatment last week.  -You have had issues with your white count in the past.    Loss of appetite:  -You have lost 40-45 pounds recently because you don't have an appetite.  -You haven't really been eating. The smell and taste of things bother you.  -You have tried ensure shakes but didn't really like it. We discussed to see if you can tolerate the Ensure Clear.  -We discussed considering marinol because this may help your appetite some as well as your nausea. I will prescribe this for you. -Please let me know if you would like to talk to a dietician. Nausea/indigestion:  -You can have a burning sensation with a weird taste in your throat.  -I will prescribe you protonix 40 mg once daily to help prevent acid reflux. It is very important to take it 30-60 minutes before you eat to help maximize its effectiveness.  -You have zofran which you can continue to use. You are not sure how much this is helping.  -I will prescribe compazine for you so that you have another option for nausea. Pain:  -Your pain is in your left breast/chest. It can be hard for you to sleep due to the pain.  -You feel like the chemo has been helpful for the pain reduction.  -You have oxycodone 5 mg tabs which you filled on 10/11. You think it is prescribed to be taken every 6 hours as needed. You have only taken 5 pills of it since you weren't having pain during chemo.  -Now that chemo is over you are having more pain.  -You can try to take a tab 1-2 hours before you go to bed to see if that helps you sleep better.  -Our office can help you with pain management and your pain medicine prescriptions. Constipation:  -You have been having some diarrhea.  -Opioids can cause constipation.  -You may need to eventually take senna to help stimulate your bowels. Fatigue/weakness:  -You feel tired in general but especially at work. You have a good work team, but you still feel tired.   -You do go to work [employer], and this can be challenging.  -When you are home you mainly rest.  -Let's see how you do with improved control of pain and nausea before considering medication changes for fatigue.  -We discussed that you may consider reducing your work hours. You are considering taking FMLA. Support system:  -You have a strong support system, but you block them. -You are not sure why you are blocking them. You know you need them, but for some reason you feel like you need to have time to yourself.  -Your friends and family really want to help you, but you feel like it's just you. You have been shutting people out though that's not what you want to do. -You would love to talk to our team . Our  Giovanni Ewing met with you today.  -You are used to caring for other people.  -You have two children. Advance care planning:  -You would like your daughter Tre Agarwal to be your medical power of  (mPOA). She is 34. You talk to your daughter every day and she knows what is important to you. -We completed an Advance Medical Directive today listing your daughter as your mPOA. -We discussed that you would like hospital evaluations and treatments if something could be improved/cured but that you would not want life prolonging measures if it appeared that you would not get better.  -We discussed code status. For now you would like to be Full Code. Follow up:  -We will see you back in person in 1 month, sooner as needed.           GOALS OF CARE / TREATMENT PREFERENCES:   [====Goals of Care====]  GOALS OF CARE:  Patient / health care proxy stated goals: See Patient Instructions / Summary    TREATMENT PREFERENCES:   Code Status:  [] Attempt Resuscitation       [] Do Not Attempt Resuscitation    Advance Care Planning:  [] [facility] Interdisciplinary Team has updated the ACP Navigator with Decision Maker and Patient Capacity      Primary Decision MakeCharles Joseph - Daughter - [phone]  Advance Care Planning 10/19/2021   Patient's Healthcare Decision Maker is: Patient declined (Legal Next of Kin remains as decision maker)   Confirm Advance Directive None   Patient Would Like to Complete Advance Directive Yes       Other:  (If patient appropriate for POST, consider using PALLPOST smart phrase here)    The palliative care team has discussed with patient / health care proxy about goals of care / treatment preferences for patient.  [====Goals of Care====]     PRESCRIPTIONS GIVEN:     Medications Ordered Today   Medications    prochlorperazine (Compazine) 10 mg tablet     Sig: Take 1 Tablet by mouth every six (6) hours as needed for Nausea or Vomiting for up to 7 days. Dispense:  60 Tablet     Refill:  3    pantoprazole (PROTONIX) 40 mg tablet     Sig: Take 1 Tablet by mouth daily. Dispense:  30 Tablet     Refill:  3    dronabinoL (Marinol) 2.5 mg capsule     Sig: Take 1 Capsule by mouth two (2) times a day. Max Daily Amount: 5 mg. Dispense:  60 Capsule     Refill:  1           FOLLOW UP:     Future Appointments   Date Time Provider Jyotsna Katy   11/1/2021 10:00 AM [address] [facility] REG   11/8/2021 10:00 AM [facility] MED1 [facility]   11/22/2021  8:30 AM Teressa Ghosh MD [facility] BS AMB   11/22/2021 10:00 AM [address] [facility] REG   11/29/2021  2:00 PM PROCTOR MED7 [place] [address] REG   12/13/2021 10:00 AM 42 [address] Ricku [facility] REG   12/20/2021 10:00 AM 42 [address] [facility] REG   1/5/2022  9:45 AM Norman Pool MD [facility] BS AMB           PHYSICIANS INVOLVED IN CARE:   Patient Care Team:  Ritchie Lyle MD as PCP - General (Family Medicine)  Rosalind Dewitt MD as Physician (Breast Surgery)  Wilbert Amos MD as Physician (Hematology and Oncology)  Rosalind Dewitt MD (Breast Surgery)  Syeda Patel MD as Physician (Radiation Oncology)       HISTORY:   Reviewed patient-completed ESAS and advance care planning form.   Reviewed patient record in prescription monitoring program.    CHIEF COMPLAINT:   Chief Complaint   Patient presents with    Nausea       HPI/SUBJECTIVE:    The patient is: [x] Verbal / [] Nonverbal     10/19: patient is seen in [address] [place] [address] today for initial Palliative visit. She arrived to appointment alone. She has concerns about nausea and lack of appetite. See plan for further details. Clinical Pain Assessment (nonverbal scale for nonverbal patients):   [++++ Clinical Pain Assessment++++]  [++++Pain Severity++++]: Pain: 9  [++++Pain Character++++]:  [++++Pain Duration++++]:  [++++Pain Effect++++]:  [++++Pain Factors++++]:  [++++Pain Frequency++++]:  [++++Pain Location++++]:  [++++ Clinical Pain Assessment++++]       FUNCTIONAL ASSESSMENT:     Palliative Performance Scale (PPS):  PPS: 70       PSYCHOSOCIAL/SPIRITUAL SCREENING:     Any spiritual / Congregational concerns:  [] Yes /  [x] No    Caregiver Burnout:  [] Yes /  [x] No /  [] No Caregiver Present      Anticipatory grief assessment:   [x] Normal  / [] Maladaptive       ESAS Anxiety: Anxiety: 8    ESAS Depression: Depression: 7       REVIEW OF SYSTEMS:     The following systems were [x] reviewed / [] unable to be reviewed  Systems: constitutional, ears/nose/mouth/throat, respiratory, gastrointestinal, genitourinary, musculoskeletal, integumentary, neurologic, psychiatric, endocrine. Positive findings noted below. Modified ESAS Completed by: provider   Fatigue: 8 Drowsiness: 0   Depression: 7 Pain: 9   Anxiety: 8 Nausea: 8   Anorexia: 8 Dyspnea: 0   Best Well-Bein Constipation: No   Other Problem (Comment): 0          PHYSICAL EXAM:     Wt Readings from Last 3 Encounters:   10/19/21 179 lb 9.6 oz (81.5 kg)   10/18/21 181 lb (82.1 kg)   10/18/21 181 lb 3.2 oz (82.2 kg)     Blood pressure (!) 152/91, pulse 90, temperature (!) 96.1 °F (35.6 °C), temperature source Temporal, resp. rate 18, height 5' 2\" (1.575 m), weight 179 lb 9.6 oz (81.5 kg), SpO2 97 %.   Last bowel movement: See Nursing Note    Constitutional: sitting up in exam room chair, awake, conversational, NAD  Eyes: pupils equal, anicteric  ENMT: no nasal discharge, moist mucous membranes  Cardiovascular: regular rhythm, radial pulses intact  Respiratory: breathing not labored, symmetric  Gastrointestinal: soft, non-tender, +bowel sounds  Musculoskeletal: no deformity, no tenderness to palpation  Skin: warm, dry  Neurologic: following commands, moving all extremities  Psychiatric: full affect, conversation and behavior appropriate  Other:       HISTORY:     Past Medical History:   Diagnosis Date    Breast cancer ([facility] [address]     left side    Menopause       Past Surgical History:   Procedure Laterality Date    HX BREAST BIOPSY Left     x2    HX BREAST LUMPECTOMY Left 2019    LEFT BREAST LUMPECTOMY WITH ULTRASOUND performed by Ajit Garcia MD at [facility]    HX HYSTERECTOMY      partial, ovaries remain    IR INSERT TUNL CVC W PORT OVER 5 YEARS  2021    VASCULAR SURGERY PROCEDURE UNLIST      portacath      Family History   Problem Relation Age of Onset    Cancer Father     Cancer Maternal Aunt     Breast Cancer Maternal Aunt         4 paternal aunts    Cancer Maternal Uncle     Cancer Paternal Aunt     Cancer Paternal Uncle     Breast Cancer Maternal Grandmother       History reviewed, no pertinent family history. Social History     Tobacco Use    Smoking status: Former Smoker     Packs/day: 0.10     Quit date: 2020     Years since quittin.8    Smokeless tobacco: Never Used   Substance Use Topics    Alcohol use: No     Allergies   Allergen Reactions    Codeine Rash     Rash from tylenol #3      Current Outpatient Medications   Medication Sig    prochlorperazine (Compazine) 10 mg tablet Take 1 Tablet by mouth every six (6) hours as needed for Nausea or Vomiting for up to 7 days.  pantoprazole (PROTONIX) 40 mg tablet Take 1 Tablet by mouth daily.     dronabinoL (Marinol) 2.5 mg capsule Take 1 Capsule by mouth two (2) times a day. Max Daily Amount: 5 mg.  oxyCODONE IR (ROXICODONE) 5 mg immediate release tablet Take 1-2 Tablets by mouth every six (6) hours as needed for Pain for up to 14 days. Max Daily Amount: 40 mg.    lidocaine-prilocaine (EMLA) topical cream Apply  to affected area as needed for Pain. Apply generous amount to port site 30 minutes prior to infusions and cover with plastic wrap    ondansetron (ZOFRAN ODT) 4 mg disintegrating tablet      No current facility-administered medications for this visit. LAB and other DATA REVIEWED:     Lab Results   Component Value Date/Time    WBC 0.9 (LL) 10/18/2021 10:23 AM    HGB 7.9 (L) 10/18/2021 10:23 AM    PLATELET 871 19/47/5678 10:23 AM     Lab Results   Component Value Date/Time    Sodium 137 10/11/2021 10:49 AM    Potassium 3.5 10/11/2021 10:49 AM    Chloride 105 10/11/2021 10:49 AM    CO2 26 10/11/2021 10:49 AM    BUN 7 10/11/2021 10:49 AM    Creatinine 0.58 10/11/2021 10:49 AM    Calcium 9.0 10/11/2021 10:49 AM      Lab Results   Component Value Date/Time    Alk. phosphatase 105 10/11/2021 10:49 AM    Protein, total 6.5 10/11/2021 10:49 AM    Albumin 2.5 (L) 10/11/2021 10:49 AM    Globulin 4.0 10/11/2021 10:49 AM     No results found for: INR, PTMR, PTP, PT1, PT2, APTT, INREXT, INREXT   No results found for: IRON, FE, TIBC, IBCT, PSAT, FERR     Detail chart reviewed. Other specialists and referral provider notes reviewed.      CONTROLLED SUBSTANCES SAFETY ASSESSMENT (IF ON CONTROLLED SUBSTANCES):     Reviewed opioid safety handout:  [x] Yes   [] No  24 hour opioid dose >150mg morphine equivalent/day:  [] Yes   [] No  Benzodiazepines:  [] Yes   [] No  Sleep apnea:  [] Yes   [] No  Urine Toxicology Testing within last 6 months:  [] Yes   [] No  History of or new aberrant medication taking behaviors:  [] Yes   [x] No  Has Narcan been prescribed [x] Yes   [] No          Total time: 16 minutes in direct ACP counseling + an additional 50 minutes with over half time spent in counseling and coordination as summarized within this note  Counseling / coordination time:   > 50% counseling / coordination?:

## 2021-10-19 NOTE — ACP (ADVANCE CARE PLANNING)
Advance Care Planning     Advance Care Planning (ACP) Physician/NP/PA Conversation      Date of Conversation: 10/19/2021  Conducted with: Patient with Decision Making Capacity    Healthcare Decision Maker:     Primary Decision Maker: Dieudonne Miner - Daughter - [phone]  Click here to complete [address] including selection of the Healthcare Decision Maker Relationship (ie \"Primary\")      Today we completed AMD today listing her daughter as her mPOA. Care Preferences:    Hospitalization: \"If your health worsens and it becomes clear that your chance of recovery is unlikely, what would be your preference regarding hospitalization? \"  patient may want hospitalization for evaluation and treatments for potentially treatable issues, but she would no life prolonging measures if it were determined that she were unlikely to survive. Ventilation: \"If you were unable to breathe on your own and your chance of recovery was unlikely, what would be your preference about the use of a ventilator (breathing machine) if it was available to you? \"   patient would not want long-term respiratory support if it were thought that she would not recover. Resuscitation: \"In the event your heart stopped as a result of an underlying serious health condition, would you want attempts to be made to restart your heart, or would you prefer a natural death? \"   Yes, attempt to resuscitate.     Additional topics discussed: treatment goals, benefit/burden of treatment options, artificial nutrition, ventilation preferences, hospitalization preferences and resuscitation preferences    Conversation Outcomes / Follow-Up Plan:   ACP complete - no further action today  Reviewed DNR/DNI and patient elects Full Code (Attempt Resuscitation)     Length of Voluntary ACP Conversation in minutes:  16 minutes    Caleb Knowles MD

## 2021-10-19 NOTE — PATIENT INSTRUCTIONS
Celestina Corrales,    It was a pleasure to see you in the [facility] office today. Below is the plan we discussed. Breast cancer:  -You found out your cancer was back on July 31 of this year. That affected you a lot. -You just started a new treatment last week. -You have had issues with your white count in the past.    Loss of appetite:  -You have lost 40-45 pounds recently because you don't have an appetite.  -You haven't really been eating. The smell and taste of things bother you.  -You have tried ensure shakes but didn't really like it. We discussed to see if you can tolerate the Ensure Clear.  -We discussed considering marinol because this may help your appetite some as well as your nausea. I will prescribe this for you. -Please let me know if you would like to talk to a dietician. Nausea/indigestion:  -You can have a burning sensation with a weird taste in your throat.  -I will prescribe you protonix 40 mg once daily to help prevent acid reflux. It is very important to take it 30-60 minutes before you eat to help maximize its effectiveness.  -You have zofran which you can continue to use. You are not sure how much this is helping.  -I will prescribe compazine for you so that you have another option for nausea. Pain:  -Your pain is in your left breast/chest. It can be hard for you to sleep due to the pain.  -You feel like the chemo has been helpful for the pain reduction.  -You have oxycodone 5 mg tabs which you filled on 10/11. You think it is prescribed to be taken every 6 hours as needed. You have only taken 5 pills of it since you weren't having pain during chemo.  -Now that chemo is over you are having more pain.  -You can try to take a tab 1-2 hours before you go to bed to see if that helps you sleep better.  -Our office can help you with pain management and your pain medicine prescriptions.     Constipation:  -You have been having some diarrhea.  -Opioids can cause constipation.  -You may need to eventually take senna to help stimulate your bowels. Fatigue/weakness:  -You feel tired in general but especially at work. You have a good work team, but you still feel tired. -You do go to work [employer], and this can be challenging.  -When you are home you mainly rest.  -Let's see how you do with improved control of pain and nausea before considering medication changes for fatigue.  -We discussed that you may consider reducing your work hours. You are considering taking FMLA. Support system:  -You have a strong support system, but you block them. -You are not sure why you are blocking them. You know you need them, but for some reason you feel like you need to have time to yourself.  -Your friends and family really want to help you, but you feel like it's just you. You have been shutting people out though that's not what you want to do. -You would love to talk to our team . Our  Gianna Hunt met with you today.  -You are used to caring for other people.  -You have two children. Advance care planning:  -You would like your daughter Rebecca Alanis to be your medical power of  (mPOA). She is 34. You talk to your daughter every day and she knows what is important to you. -We completed an Advance Medical Directive today listing your daughter as your mPOA. -We discussed that you would like hospital evaluations and treatments if something could be improved/cured but that you would not want life prolonging measures if it appeared that you would not get better.  -We discussed code status. For now you would like to be Full Code. Follow up:  -We will see you back in person in 1 month, sooner as needed.

## 2021-10-20 NOTE — TELEPHONE ENCOUNTER
[facility]  Palliative Medicine   [phone]    Outpatient Palliative Social Work Note    Narrative  SW called patient in follow-up. Patient endorses good news today! She met with her  at work and she can take FMLA, there will be reimbursement towards her insurance, she has some vacation time, and a few sick days left which she can use towards the end of the year. Patient is gladdened by this good news--SW glad with her. SW encouraged patient to carry forward with this spirit and practice being mindfully aware of any boundaries she may put up over the next week with family and friends. Patient endorses wanting to journal about this. SW affirmed. Plan  SW to follow in 1 week for virtual visit.      Erin Titus, [facility] [place]   Supervisee in Social Work  [employer] [address]  [phone]    Time in: 4:20pm  Time out: 4:30pm

## 2021-10-27 NOTE — TELEPHONE ENCOUNTER
Patient is up-to-date on age-appropriate screenings and vaccinations  There are no preventive care reminders to display for this patient.    Patient is up to date, no discussion needed.           [facility]  Palliative Medicine   [phone]    Outpatient Palliative Social Work Note    Narrative  SW called patient in follow-up. Patient unable to answer at this time; SW left voicemail with callback information. Plan  SW to continue following as needed.      MARTINEZ Bridges   Palliative Medicine   Supervisee in Social Work  [employer]® [address]  [address]  [phone]    Time in: 4:10pm  Time out: 4:15pm

## 2021-11-01 NOTE — PROGRESS NOTES
Outpatient Infusion Center - Chemotherapy Progress Note    1000 - Pt admit to [facility] for Trodelvy in stable condition (infusion reaction; see bottom of note). Assessment completed; pt experiencing numbness/tingling in the feet (this is new for her). Patient denied having any symptoms of COVID-19, i.e. SOB, coughing, fever, or generally not feeling well. Also denies having been exposed to COVID-19 recently or having had any recent contact with family/friend that has a pending COVID test.     R chest port accessed with positive blood return. Labs drawn per order and sent. Line flushed, clamped, Curos Cap applied to end clave. Chemotherapy Flowsheet 11/1/2021   Cycle C2D1   Date 11/1/2021   Drug / Regimen Elizabeth Nino   Pre Meds given   Notes -        Patient Vitals for the past 12 hrs:   Temp Pulse Resp BP SpO2   11/01/21 1405  89 16 (!) 143/86    11/01/21 1338  (!) 106 16 (!) 146/87    11/01/21 0959 97.2 °F (36.2 °C) (!) 109 16 (!) 143/90 99 %        Recent Results (from the past 12 hour(s))   CBC WITH AUTOMATED DIFF    Collection Time: 11/01/21 10:11 AM   Result Value Ref Range    WBC 3.9 3.6 - 11.0 K/uL    RBC 3.74 (L) 3.80 - 5.20 M/uL    HGB 9.9 (L) 11.5 - 16.0 g/dL    HCT 31.9 (L) 35.0 - 47.0 %    MCV 85.3 80.0 - 99.0 FL    MCH 26.5 26.0 - 34.0 PG    MCHC 31.0 30.0 - 36.5 g/dL    RDW 19.9 (H) 11.5 - 14.5 %    PLATELET 641 115 - 430 K/uL    MPV 9.3 8.9 - 12.9 FL    NRBC 0.0 0  WBC    ABSOLUTE NRBC 0.00 0.00 - 0.01 K/uL    NEUTROPHILS 60 32 - 75 %    LYMPHOCYTES 20 12 - 49 %    MONOCYTES 18 (H) 5 - 13 %    EOSINOPHILS 2 0 - 7 %    BASOPHILS 0 0 - 1 %    IMMATURE GRANULOCYTES 0 0.0 - 0.5 %    ABS. NEUTROPHILS 2.3 1.8 - 8.0 K/UL    ABS. LYMPHOCYTES 0.8 0.8 - 3.5 K/UL    ABS. MONOCYTES 0.7 0.0 - 1.0 K/UL    ABS. EOSINOPHILS 0.1 0.0 - 0.4 K/UL    ABS. BASOPHILS 0.0 0.0 - 0.1 K/UL    ABS. IMM.  GRANS. 0.0 0.00 - 0.04 K/UL    DF SMEAR SCANNED      RBC COMMENTS ANISOCYTOSIS  1+       METABOLIC PANEL, COMPREHENSIVE    Collection Time: 11/01/21 10:11 AM   Result Value Ref Range    Sodium 135 (L) 136 - 145 mmol/L    Potassium 3.4 (L) 3.5 - 5.1 mmol/L    Chloride 100 97 - 108 mmol/L    CO2 25 21 - 32 mmol/L    Anion gap 10 5 - 15 mmol/L    Glucose 114 (H) 65 - 100 mg/dL    BUN 9 6 - 20 MG/DL    Creatinine 0.67 0.55 - 1.02 MG/DL    BUN/Creatinine ratio 13 12 - 20      GFR est AA >60 >60 ml/min/1.73m2    GFR est non-AA >60 >60 ml/min/1.73m2    Calcium 9.4 8.5 - 10.1 MG/DL    Bilirubin, total 0.5 0.2 - 1.0 MG/DL    ALT (SGPT) 24 12 - 78 U/L    AST (SGOT) 28 15 - 37 U/L    Alk.  phosphatase 86 45 - 117 U/L    Protein, total 7.1 6.4 - 8.2 g/dL    Albumin 2.6 (L) 3.5 - 5.0 g/dL    Globulin 4.5 (H) 2.0 - 4.0 g/dL    A-G Ratio 0.6 (L) 1.1 - 2.2          Medications:  Medications Administered     0.9% sodium chloride infusion     Admin Date  11/01/2021 Action  New Bag Dose  25 mL/hr Rate  25 mL/hr Route  IntraVENous Administered By  HIRO Child          acetaminophen (TYLENOL) tablet 650 mg     Admin Date  11/01/2021 Action  Given Dose  650 mg Route  Oral Administered By  HIRO Child          dexamethasone (DECADRON) 12 mg in 0.9% sodium chloride 50 mL IVPB     Admin Date  11/01/2021 Action  New Bag Dose  12 mg Route  IntraVENous Administered By  HIRO Child          diphenhydrAMINE (BENADRYL) injection 50 mg     Admin Date  11/01/2021 Action  Given Dose  50 mg Route  IntraVENous Administered By  HIRO Child          famotidine (PF) (PEPCID) 20 mg in 0.9% sodium chloride 10 mL injection     Admin Date  11/01/2021 Action  Given Dose  20 mg Route  IntraVENous Administered By  HIRO Child          heparin (porcine) pf 300-500 Units     Admin Date  11/01/2021 Action  Given Dose  500 Units Route  InterCATHeter Administered By  HIRO Child          palonosetron HCl (ALOXI) injection 0.25 mg     Admin Date  11/01/2021 Action  Given Dose  0.25 mg Route  IntraVENous Administered By  HIRO Child          sacituzumab govitecan-hziy (TRODELVY) 620 mg in 0.9% sodium chloride 250 mL, overfill volume 25 mL chemo infusion     Admin Date  11/01/2021 Action  New Bag Dose  620 mg Rate  168.5 mL/hr Route  IntraVENous Administered By  HIRO Child          sodium chloride (NS) flush 10 mL     Admin Date  11/01/2021 Action  Given Dose  10 mL Route  IntraVENous Administered By  HIRO Child           Admin Date  11/01/2021 Action  Given Dose  10 mL Route  IntraVENous Administered By  HIRO Child                 Pt began forcefully coughing with just 10 minutes left of infusion. NP contacted and infusion paused for 15 minutes. Symptoms resolved and infusion completed with no further issues. Port maintained positive blood return throughout treatment, flushed with positive blood return at conclusion, and de-accessed. 1400- D/c home in no distress.  Pt aware of next [facility] appointment scheduled for:    Future Appointments   Date Time Provider Jyotsna Katy   11/8/2021 10:00 AM [facility] MED1 [id number] [address]    11/22/2021  8:30 AM Lynn Park MD [facility] BS AMB   11/22/2021 10:00 AM [facility] MED4 [place] [address] REG   11/22/2021 10:30 AM Wayna Mcardle, NP [facility] BS AMB   11/29/2021  2:00 PM [facility] MED7 [place] [address] REG   12/13/2021 10:00 AM [facility] [place] [address] REG   12/20/2021 10:00 AM [facility] [place] [address] REG   1/5/2022  9:45 AM Yamila Pool MD [facility] BS AMB

## 2021-11-01 NOTE — PROGRESS NOTES
Zayda Vega is a 57 y.o. female here today for Recurrent/metastatic breast cancer f/u. Pt receiving Trodelvy; cycle 2; day 1 today. VS stable w exception of slightly elevated pulse. Patient denies pain. Good appetite. Patient denies N/V/D and constipation. Patient report numbness and tingling in right foot. Patient denies mouth ulcers. Patient denies cough. Patient denies SOB. Patient denies falls. Chief Complaint   Patient presents with    Follow-up     Recurrent/metastatic breast cancer     1. Have you been to the ER, urgent care clinic since your last visit? Hospitalized since your last visit? No    2. Have you seen or consulted any other health care providers outside of the [address] [place] [address] since your last visit? Include any pap smears or colon screening.  No

## 2021-11-01 NOTE — PROGRESS NOTES
[address] [facility] [address], [address] [facility], [address] [facility], [address] [facility]  [phone]    Follow-up Note      Patient: Ariella Campo MRN: [id number]  SSN: [id number]    YOB: 1960  Age: 57 y.o. Sex: female        Diagnosis:     1. Recurrent/metastatic breast cancer, Dx 07/2021   Inflammatory recurrence with regional nodes, mediastinal nodes and RP nodes    2. Left breast carcinoma:  T1c N1mi M0 (Stage I) infiltrating ductal carcinoma, Tumor size 1.6 cm, LN 1/3 with micromet, grade 3, ER -ve, NH -ve, Her 2 -ve    ypT2 N0      Treatment:     1. Neoadjuvant chemotherapy   Adriamycin, cytoxan - s/p 4 cycles   Weekly Taxol - s/p 12 cycles  2. S/p left lumpectomy on 9/5/2019 by Dr. Mack Harada  3. S/P Adjuvant radiation  4. Enrolled in  (for residual disease post surgery) - randomized to placebo   5. Palliative chemotherapy   Carboplatin/Gemzar and Facundo Sniff, s/p 2 Cycles   Sacituzumab Gavetecan - Cycle 2 Day 1    Subjective:      Ariella Campo is a 57 y.o. female with a diagnosis of left sided invasive breast cancer. She felt a lump in the left breast, went to see her PCP, got a mammogram and was noted to have abnormal density in the left upper outer quadrant of the breast. A biopsy of the mass revealed gr 3 IDC, TNBC. The axillary LN is clear of disease. She saw Dr. Eloy Rm. An MRI of the breast shows the tumor to be larger than previously believed to be. A left axillary LN biopsy showed 1/3 nodes with micrometastasis. She works at [employer] full time. Ms. Malina Warner completed neoadjuvant chemotherapy and underwent lumpectomy in September 2020. She enrolled in the clinical trial  and was randomized to the placebo arm. She noticed change in the left breast skin approximately 4-6 weeks ago. Breast has progressively hardened. In additional few raised areas popped up.  She initially had pain in the left breast but this has subsided. She saw Dr. Maik Choi. A punch bx of the skin on the breast revealed TNBC. She is working at [employer] for the last 13 years. Ms. Tyree Ordonez is receiving palliative chemotherapy. Skin nodules on the breast has spread further. Left breast is improving after starting the Shan[place]. Treatment was held last cycle d/t neutropenia. She notes mild intermittent numbness/tingling in her right foot and nausea. She is following with palliative medicine and overall feels well. Review of Systems:     Constitutional: negative, anxiety  Eyes: negative  Ears, Nose, Mouth, Throat, and Face: negative  Respiratory: negative  Cardiovascular: negative  Gastrointestinal: negative  Genitourinary:negative  Integument/Breast: skin changes and hardening of the breast with worsening dermal nodules.    Hematologic/Lymphatic: negative  Musculoskeletal: intermittent mild numbness/tingling right foot  Neurological: negative    Review of systems was reviewed and updated as needed on 21    Past Medical History:   Diagnosis Date    Breast cancer ([facility] [address]     left side    Menopause      Past Surgical History:   Procedure Laterality Date    HX BREAST BIOPSY Left     x2    HX BREAST LUMPECTOMY Left 2019    LEFT BREAST LUMPECTOMY WITH ULTRASOUND [facility]gs [facility] AMBULATORY OR    HX HYSTERECTOMY      partial, ovaries remain    IR INSERT TUNL CVC W PORT OVER 5 YEARS  2021    VASCULAR SURGERY PROCEDURE UNLIST      portacath      Family History   Problem Relation Age of Onset    Cancer Father     Cancer Maternal Aunt     Breast Cancer Maternal Aunt         4 paternal aunts    Cancer Maternal Uncle     Cancer Paternal Aunt     Cancer Paternal Uncle     Breast Cancer Maternal Grandmother      Social History     Tobacco Use    Smoking status: Former Smoker     Packs/day: 0.10     Quit date: 2020     Years since quittin.8    Smokeless tobacco: Never Used   Substance Use Topics    Alcohol use: No      Prior to Admission medications    Medication Sig Start Date End Date Taking? Authorizing Provider   ondansetron (ZOFRAN ODT) 4 mg disintegrating tablet  10/17/21  Yes Provider, Historical   pantoprazole (PROTONIX) 40 mg tablet Take 1 Tablet by mouth daily. 10/19/21  Yes Chung Dutta MD   dronabinoL (Marinol) 2.5 mg capsule Take 1 Capsule by mouth two (2) times a day. Max Daily Amount: 5 mg. 10/19/21  Yes Chung Dutta MD   lidocaine-prilocaine (EMLA) topical cream Apply  to affected area as needed for Pain. Apply generous amount to port site 30 minutes prior to infusions and cover with plastic wrap 8/6/21  Yes Neris Dickerson MD          Allergies   Allergen Reactions    Codeine Rash     Rash from tylenol #3         Objective:     Visit Vitals  /81   Pulse (!) 102   Temp 97.2 °F (36.2 °C)   Resp 16   Ht 5' 2\" (1.575 m)   Wt 182 lb 10 oz (82.8 kg)   SpO2 96%   BMI 33.40 kg/m²     Pain Scale: 0 - No pain/10    Physical Exam:     GENERAL: alert, cooperative, no distress  EYE: conjunctivae/corneas clear. xanthelasma  LYMPHATIC: Cervical, supraclavicular, and axillary nodes normal.   THROAT & NECK: normal and no erythema or exudates noted. BREAST: Left breast is hard, skin is thickened, peau de' orange appearance - overall appears softer  LUNG: clear to auscultation bilaterally  HEART: regular rate and rhythm  ABDOMEN: soft, non-tender. EXTREMITIES:  extremities normal, atraumatic, no cyanosis or edema  NEUROLOGIC: AOx3.  Gait normal.       Physical exam and ROS has been modified from a prior visit to make it relevant and current        CT Results (most recent):  Results from Hospital Encounter encounter on 10/06/21    CT ABD PELV W CONT    Narrative  INDICATION:   Breast cancer    EXAM:  CT CHEST, abdomen, and pelvis WITH  CONTRAST    COMPARISON:  8/3/2021    TECHNIQUE:  Thin collimation axial images were obtained through the chest,  abdomen, and pelvis with IV contrast administration. Coronal and sagittal  reconstructions were obtained. CT dose reduction was achieved through use of a  standardized protocol tailored for this examination and automatic exposure  control for dose modulation. FINDINGS:    LUNGS: Stable tiny 2 mm right upper lobe lung nodule (series 4, image 38). Minimal bibasilar atelectasis versus scarring. No other significant pulmonary  abnormality. The central airways are patent. LYMPH NODES: Left supraclavicular lymph node has decreased and measures 1.8 cm  in short axis (image 11), compared to 2.6 cm. Slight decrease in a few left  axillary lymph nodes with a representative lymph node measuring 9 mm in short  axis (image 22), compared to 13 mm previously. Increase in size of several right  axillary lymph nodes with a representative lymph node measuring 2.1 cm in short  axis (image 27), compared to 17 mm previously. No significant change in  minimally enlarged mediastinal lymph nodes. Slight increased prominence of  borderline enlarged anterior cardiophrenic lymph nodes. PLEURAL FLUID: No pleural effusion. PERICARDIAL FLUID: No pericardial effusion. THYROID: No dominant nodule  OTHER: Right chest port extends to the distal SVC. Increased multifocal  nodularity in the left breast. Bilateral breast skin thickening is again noted. Abdomen:    LIVER: No mass or biliary dilatation. GALLBLADDER: No calcified gallstone  SPLEEN: Unremarkable  PANCREAS: No mass or ductal dilatation. ADRENALS: Unremarkable. KIDNEYS/URETERS: Symmetric nephrograms with low density right renal lesion too  small to characterize. No evidence for enhancing renal mass. No hydronephrosis  PERITONEUM: No ascites. Progression of left-sided retroperitoneal  lymphadenopathy with a representative lymph node measuring 14 mm in short axis  (image 74), compared to 5 mm previously. A second lymph node measures 16 mm in  short axis (image 80), compared to 11 mm previously.   COLON: Diverticulosis without evidence for diverticulitis  APPENDIX: Unremarkable. SMALL BOWEL: No dilatation or wall thickening. STOMACH: Unremarkable. Pelvis:    PELVIS: Stable left external iliac chain lymphadenopathy with a representative  lymph node measuring 17 mm in short axis (series 2, image 103). Left common  iliac chain lymph node is slightly larger measuring 9 mm (image 91), compared to  4 mm previously. Slight decrease in size of left internal iliac chain lymph node  measuring 8 mm in short axis (image 97), compared to 13 mm previously. No pelvic  free fluid. The bladder is unremarkable. BONES: No destructive bone lesion. Impression  Mixed response to therapy. Slightly decreased left supraclavicular, left axillary, and left internal iliac  chain lymphadenopathy. Progression of right axillary, anterior cardiophrenic,  left retroperitoneal, and left common iliac chain lymphadenopathy. Slightly increased soft tissue nodularity in the left breast.      Lab Results   Component Value Date/Time    WBC 3.9 11/01/2021 10:11 AM    HGB 9.9 (L) 11/01/2021 10:11 AM    HCT 31.9 (L) 11/01/2021 10:11 AM    PLATELET 619 17/08/0494 10:11 AM    MCV 85.3 11/01/2021 10:11 AM       Lab Results   Component Value Date/Time    Sodium 135 (L) 11/01/2021 10:11 AM    Potassium 3.4 (L) 11/01/2021 10:11 AM    Chloride 100 11/01/2021 10:11 AM    CO2 25 11/01/2021 10:11 AM    Anion gap 10 11/01/2021 10:11 AM    Glucose 114 (H) 11/01/2021 10:11 AM    BUN 9 11/01/2021 10:11 AM    Creatinine 0.67 11/01/2021 10:11 AM    BUN/Creatinine ratio 13 11/01/2021 10:11 AM    GFR est AA >60 11/01/2021 10:11 AM    GFR est non-AA >60 11/01/2021 10:11 AM    Calcium 9.4 11/01/2021 10:11 AM    Bilirubin, total 0.5 11/01/2021 10:11 AM    Alk.  phosphatase 86 11/01/2021 10:11 AM    Protein, total 7.1 11/01/2021 10:11 AM    Albumin 2.6 (L) 11/01/2021 10:11 AM    Globulin 4.5 (H) 11/01/2021 10:11 AM    A-G Ratio 0.6 (L) 11/01/2021 10:11 AM    ALT (SGPT) 24 11/01/2021 10:11 AM    AST (SGOT) 28 11/01/2021 10:11 AM         Assessment:     1. Recurrent/metastatic breast cancer, Dx 07/2021   Inflammatory recurrence with regional nodes, mediastinal nodes and RP nodes    PD-L1 ~ 2% (IC)  NGS: p53, CKS1B, FGFR1 amplification    Previously   Left breast carcinoma:  T1c N1mi M0 (Stage I) infiltrating ductal carcinoma, Tumor size 1.6 cm, LN 1/3 with micromet, grade 3, ER -ve, [place] -ve, Her 2 -ve    ECOG PS 0       Received neoadjuvant chemotherapy   Adriamycin, Cyclophosphamide - s/p 4 cycles   Weekly Taxol - s/p 12 cycles - completed 8/15/2019    S/p left lumpectomy on 9/5/2019 with Dr. Shari Oliver  ypT2 N0    She is randomized to the placebo arm of SWOG  study: A Randomized, Phase III Trial to Evaluate the Efficacy and Safety of [id number] (Pembrolizumab) as Adjuvant Therapy for Triple Receptor-Negative Breast Cancer with >/= 1 CM Residual Invasive Cancer or Positive Lymph Nodes (ypN+) after Neoadjuvant Chemotherapy    Disease has recurred in the breast with changes suggestive of inflammatory disease and regional nodes are enlarged. Receiving palliative therapy  Carboplatin/Gemzar and Keytruda  S/p 2 Cycles    Due to progression of disease, Keytruda/chemo d/c'd    Receiving palliative chemotherapy   Trodelvy - Cycle 2 Day 1 (Treatment delayed after Cycle 1 and dose reduced to 75% d/t neutropenia)     A detailed system by system evaluation of side effect was performed to assess chemotherapy related toxicity. Blood counts are acceptable. Results reviewed with the patient. Symptom management form reviewed with patient. 2. Anemia - normocytic     Observation       3. Breast pain from cancer    Oxycodone  Palliative medicine     4. Neutropenia - resolved    Treatment held last cycle  Dose reduced 75% starting with Cycle 2   Neulasta on-body on Day 8    5.  Nausea    Using compazine and zofran as needed      Plan:       > Continue Trodelvy  > Neulasta on-body on Day 8  > Following with Palliative care  > Follow-up in 3 weeks        I performed a history and physical examination of the patient and discussed his management with the NPP. I reviewed the NPP note and agree with the documented findings and plan of care. The patient was seen in conjunction with Ms. Donna Smith. Ms. Viktor Quinn is a women with advanced TNBC. She is receiving Shane. Exam is remarkable for left breast hardening and dermal metastatic nodules. Signed by: Lanny Zimmerman MD                     November 1, 2021      CC. Rebecca Simental MD  CC. Nuha Fontana MD  CC.  Martha Cantu MD

## 2021-11-01 NOTE — LETTER
11/1/2021    Patient: Jose Oliver   YOB: 1960   Date of Visit: 11/1/2021     Mary Donnelly MD  13 [facility] [address]    Dear Mary Donnelly MD,      Thank you for referring Ms. Rakesh Seymour to [address] [place] [address] for evaluation. My notes for this consultation are attached. If you have questions, please do not hesitate to call me. I look forward to following your patient along with you.       Sincerely,    Elizabeth Rosales NP

## 2021-11-08 NOTE — PROGRESS NOTES
[facility] Progress Note  Name:Debbie Dumont Holding  : 1960  MRN: [id number]    Pt arrived to [facility] ambulatory in no acute distress at 1000 for  Trodelvy C2 D8. Assessment unremarkable except patients ANC 0.6. MD Aleyda Eric office contacted regarding lab values, received orders to proceed with treatment . Patient given education on Neulasta onpro. Port accessed without issue and positive blood return noted. Labs obtained as ordered. Vital Signs:  Patient Vitals for the past 12 hrs:   Temp Pulse Resp BP SpO2   21 1350  85 17 (!) 142/83 100 %   21 1000 97.6 °F (36.4 °C) 93 18 (!) 152/92 100 %       Labs:  Recent Results (from the past 12 hour(s))   CBC WITH AUTOMATED DIFF    Collection Time: 21 10:21 AM   Result Value Ref Range    WBC 1.7 (L) 3.6 - 11.0 K/uL    RBC 3.66 (L) 3.80 - 5.20 M/uL    HGB 9.6 (L) 11.5 - 16.0 g/dL    HCT 32.0 (L) 35.0 - 47.0 %    MCV 87.4 80.0 - 99.0 FL    MCH 26.2 26.0 - 34.0 PG    MCHC 30.0 30.0 - 36.5 g/dL    RDW 19.9 (H) 11.5 - 14.5 %    PLATELET 296 (H) 873 - 400 K/uL    MPV 9.0 8.9 - 12.9 FL    NRBC 0.0 0  WBC    ABSOLUTE NRBC 0.00 0.00 - 0.01 K/uL    NEUTROPHILS 37 32 - 75 %    LYMPHOCYTES 44 12 - 49 %    MONOCYTES 15 (H) 5 - 13 %    EOSINOPHILS 2 0 - 7 %    BASOPHILS 1 0 - 1 %    IMMATURE GRANULOCYTES 1 (H) 0.0 - 0.5 %    ABS. NEUTROPHILS 0.6 (L) 1.8 - 8.0 K/UL    ABS. LYMPHOCYTES 0.8 0.8 - 3.5 K/UL    ABS. MONOCYTES 0.3 0.0 - 1.0 K/UL    ABS. EOSINOPHILS 0.0 0.0 - 0.4 K/UL    ABS. BASOPHILS 0.0 0.0 - 0.1 K/UL    ABS. IMM. GRANS. 0.0 0.00 - 0.04 K/UL    DF SMEAR SCANNED      RBC COMMENTS ANISOCYTOSIS  1+        RBC COMMENTS OVALOCYTES  PRESENT           Patient denied having any symptoms of COVID-19, i.e. SOB, coughing, fever, or generally not feeling well.   Also denies having been exposed to COVID-19 recently or having had any recent contact with family/friend that has a pending COVID test.     The following medications administered:  Medications Administered     0.9% sodium chloride infusion     Admin Date  11/08/2021 Action  New Bag Dose  25 mL/hr Rate  25 mL/hr Route  IntraVENous Administered By  Delfin Cartagena RN          acetaminophen (TYLENOL) tablet 650 mg     Admin Date  11/08/2021 Action  Given Dose  650 mg Route  Oral Administered By  Delfin Cartagena RN          dexamethasone (DECADRON) 12 mg in 0.9% sodium chloride 50 mL IVPB     Admin Date  11/08/2021 Action  New Bag Dose  12 mg Route  IntraVENous Administered By  Delfin Cartagena RN          diphenhydrAMINE (BENADRYL) injection 50 mg     Admin Date  11/08/2021 Action  Given Dose  50 mg Route  IntraVENous Administered By  Delfin Cartagena RN          famotidine (PF) (PEPCID) 20 mg in 0.9% sodium chloride 10 mL injection     Admin Date  11/08/2021 Action  Given Dose  20 mg Route  IntraVENous Administered By  Delfin Cartagena RN          heparin (porcine) pf 300-500 Units     Admin Date  11/08/2021 Action  Given Dose  500 Units Route  InterCATHeter Administered By  Delfin Cartagena RN          palonosetron HCl (ALOXI) injection 0.25 mg     Admin Date  11/08/2021 Action  Given Dose  0.25 mg Route  IntraVENous Administered By  Delfin Cartagena RN          pegfilgrastim (NEULASTA) wearable SQ injector 6 mg     Admin Date  11/08/2021 Action  Given Dose  6 mg Route  SubCUTAneous Administered By  Delfin Cartagena RN          sacituzumab govitecan-hziy (TRODELVY) 620 mg in 0.9% sodium chloride 250 mL, overfill volume 25 mL chemo infusion     Admin Date  11/08/2021 Action  New Bag Dose  620 mg Rate  337 mL/hr Route  IntraVENous Administered By  Delfin Cartagena RN          sodium chloride (NS) flush 10 mL     Admin Date  11/08/2021 Action  Given Dose  10 mL Route  IntraVENous Administered By  Delfin Cartagena RN              Neulasta on-pro applied to right arm. Patient understands that she is to take office device after 8 pm on 11/9/21. Pt tolerated treatment well.   Port  flushed per policy and removed, 2x2 and bandaid placed. Pt discharged ambulatory in no acute distress at 1355, accompanied by self. Next appointment 11/22 @ 1000.     Future Appointments   Date Time Provider Jyotsna Burns   11/22/2021  8:30 AM Fritz Watkins MD [facility] BS AMB   11/22/2021 10:00 AM [facility] [place] [address] REG   11/22/2021 10:30 AM Yola Anne NP [facility] BS AMB   11/29/2021  2:00 PM [facility] [place] [address] REG   12/13/2021 10:00 AM [facility] [place] [address] REG   12/20/2021 10:00 AM [facility] [place] [address] REG   1/5/2022  9:45 AM Yeimy Pool MD [facility] BS AMB

## 2021-11-08 NOTE — DISCHARGE INSTRUCTIONS
Patient Education   Pegfilgrastim (By injection)   Pegfilgrastim (peg-claire-GRA-stim)  Helps your body make white blood cells after you receive cancer medicine. Brand Name(s): Neulasta, Neulasta Onpro   There may be other brand names for this medicine. When This Medicine Should Not Be Used: This medicine is not right for everyone. Do not use it if you had an allergic reaction to pegfilgrastim or filgrastim. How to Use This Medicine:   Injectable  · Your doctor will prescribe your exact dose and tell you how often it should be given. This medicine is given as a shot under your skin. · A nurse or other health provider will give you this medicine. · You may be taught how to give your medicine at home. Make sure you understand all instructions before giving yourself an injection. Do not use more medicine or use it more often than your doctor tells you to. · Start using the medicine 24 hours or more after you finish your chemotherapy. However, do not use it within 24 hours before you begin another chemotherapy treatment. · Read and follow the patient instructions that come with this medicine. Talk to your doctor or pharmacist if you have any questions. · If the medicine has been in the refrigerator, let it warm to room temperature for 30 minutes before you use it. · Do not shake the syringe. Do not save leftover medicine. · Missed dose: This medicine needs to be given on a fixed schedule. If you miss a dose, call your doctor, home health caregiver, or treatment clinic for instructions. · Neulasta® single-use prefilled syringe: If you store this medicine at home, keep it in the refrigerator. Do not freeze. If the medicine is accidently frozen, let it thaw out in the refrigerator before you use it. If the medicine freezes a second time, do not use it. Keep the medicine in the carton and away from heat or direct light. The medicine can stay out of the refrigerator for up to 48 hours.  Throw away any medicine that has been out of the refrigerator for more than 48 hours. · Neulasta® Onpro kit: Keep the medicine in the carton and away from heat or direct light. The medicine can stay out of the refrigerator for up to 12 hours. Throw away any medicine that has been out of the refrigerator for more than 12 hours. · Throw away used needles in a hard, closed container that the needles cannot poke through. Keep this container away from children and pets. Drugs and Foods to Avoid:      Ask your doctor or pharmacist before using any other medicine, including over-the-counter medicines, vitamins, and herbal products. Warnings While Using This Medicine:   · Tell your doctor if you are pregnant or breastfeeding, or if you have kidney disease, lung disease, breathing problems, or sickle cell disease. Tell your doctor if you have an allergy to acrylic adhesives or latex. · This medicine may cause the following problems:  ¨ Spleen problems  ¨ Serious lung problems  ¨ Kidney problems  ¨ Capillary leak syndrome  · On-body Injector for Neulasta®:   ¨ Keep the injector at least 4 inches away from electrical equipment, such as cell or cordless phones, microwaves, and other appliances. ¨ Do not use hot tubs or saunas while you are wearing the injector. Do not expose the injector to sunlight. Do not sleep on the injector. Call your doctor if the injector comes off before or during a dose. ¨ Keep the injector dry at least 3 hours before the dose is scheduled to start. This will help you notice any leaks. Call your doctor if the bandage becomes wet or the medicine is dripping. ¨ The injector is programmed to deliver your dose about 27 hours after it is placed on your skin. It will take about 45 minutes for the dose to be given. Avoid activities, such as driving and traveling, that may interfere with the injector 1 hour before the dose starts, while it is being given, and for at least 1 hour afterward.   · Tell any doctor or dentist who treats you that you are using this medicine. This medicine may affect certain medical test results. · Your doctor will do lab tests at regular visits to check on the effects of this medicine. Keep all appointments. · Keep all medicine out of the reach of children. Never share your medicine with anyone. Possible Side Effects While Using This Medicine:   Call your doctor right away if you notice any of these side effects:  · Allergic reaction: Itching or hives, swelling in your face or hands, swelling or tingling in your mouth or throat, chest tightness, trouble breathing  · Decrease in how much or how often you urinate, red or dark brown urine, lower back or side pain  · Fever, chills, cough, sore throat, and body aches  · Lightheadedness, dizziness, or fainting  · Pain in your left side or shoulder, or feeling unusually full  · Skin redness, blisters, or sores, red or purple spots on your skin  · Swelling in your face, ankles, or feet  · Trouble breathing or fast breathing  · Unusual bleeding, bruising, tiredness, or weakness  If you notice these less serious side effects, talk with your doctor:   · Bone pain, pain in your arms or legs  · Pain, redness, or swelling where the shot was given  If you notice other side effects that you think are caused by this medicine, tell your doctor. Call your doctor for medical advice about side effects. You may report side effects to FDA at 3-596-FDA-3646  © 2017 [facility] Information is for End User's use only and may not be sold, redistributed or otherwise used for commercial purposes. The above information is an  only. It is not intended as medical advice for individual conditions or treatments. Talk to your doctor, nurse or pharmacist before following any medical regimen to see if it is safe and effective for you.

## 2021-11-18 NOTE — TELEPHONE ENCOUNTER
Called patient to advise/confirm upcoming appt with Dr. Adam Florian on 11/22/2021     at 8:30am at [facility]. No answer so left voicemail. Also advised to please bring in your Drivers License and Insurance Card with you to appointment as well as any medication in the original container with you to appt.

## 2021-11-19 NOTE — PROGRESS NOTES
Natalie Kelly is a 57 y.o. female here for follow up for recurrent/met breast cancer. Treatment today:  Cycle 3 Day 1 Sacituzumab Govitecan  She just had appt with Dr. Tom Perez for her left breast pain. She will be starting Lidocaine cream to help  Everything else is good. 1. Have you been to the ER, urgent care clinic since your last visit? Hospitalized since your last visit? no    2. Have you seen or consulted any other health care providers outside of the [address] [place] [address] since your last visit? Include any pap smears or colon screening.  no

## 2021-11-22 NOTE — PROGRESS NOTES
[facility] [place] [facility] Progress Note    Date: 2021    Name: Bin Bliss    MRN: [id number]         : 1960    Ms. Hope Mason arrived (ambulation) at 1773 and in no distress for cycle 3 day 1 of Trodelvy regimen. Assessment was completed, no acute issues at this time, no new complaints voiced. Covid Screening    Patient denied having any symptoms of COVID-19, i.e. SOB, coughing, fever, or generally not feeling well. Also denies having been exposed to COVID-19 recently or having had any recent contact with family/friend that has a pending COVID test.    Right chest port accessed without difficulty with 1 inch garcia needle; + blood return noted, labs drawn and sent. 1010:  Proceeded to appt with Dr. Marlyne Boas. Ms. Osmar Sanchez vitals were reviewed. Patient Vitals for the past 12 hrs:   Temp Pulse Resp BP SpO2   21 1317  83 16 127/80 98 %   21 1000 98.9 °F (37.2 °C) 67 18 (!) 143/85 97 %       Lab results were obtained and reviewed. Recent Results (from the past 12 hour(s))   CBC WITH AUTOMATED DIFF    Collection Time: 21 10:02 AM   Result Value Ref Range    WBC 3.2 (L) 3.6 - 11.0 K/uL    RBC 3.59 (L) 3.80 - 5.20 M/uL    HGB 9.9 (L) 11.5 - 16.0 g/dL    HCT 31.9 (L) 35.0 - 47.0 %    MCV 88.9 80.0 - 99.0 FL    MCH 27.6 26.0 - 34.0 PG    MCHC 31.0 30.0 - 36.5 g/dL    RDW 22.3 (H) 11.5 - 14.5 %    PLATELET 517 867 - 285 K/uL    MPV 9.7 8.9 - 12.9 FL    NRBC 0.0 0  WBC    ABSOLUTE NRBC 0.00 0.00 - 0.01 K/uL    NEUTROPHILS 65 32 - 75 %    LYMPHOCYTES 23 12 - 49 %    MONOCYTES 9 5 - 13 %    EOSINOPHILS 2 0 - 7 %    BASOPHILS 0 0 - 1 %    IMMATURE GRANULOCYTES 1 (H) 0.0 - 0.5 %    ABS. NEUTROPHILS 2.1 1.8 - 8.0 K/UL    ABS. LYMPHOCYTES 0.7 (L) 0.8 - 3.5 K/UL    ABS. MONOCYTES 0.3 0.0 - 1.0 K/UL    ABS. EOSINOPHILS 0.1 0.0 - 0.4 K/UL    ABS. BASOPHILS 0.0 0.0 - 0.1 K/UL    ABS. IMM.  GRANS. 0.0 0.00 - 0.04 K/UL    DF SMEAR SCANNED      RBC COMMENTS ANISOCYTOSIS  2+        RBC COMMENTS HYPOCHROMIA  1+        RBC COMMENTS OVALOCYTES  1+       METABOLIC PANEL, COMPREHENSIVE    Collection Time: 11/22/21 10:02 AM   Result Value Ref Range    Sodium 139 136 - 145 mmol/L    Potassium 3.5 3.5 - 5.1 mmol/L    Chloride 105 97 - 108 mmol/L    CO2 26 21 - 32 mmol/L    Anion gap 8 5 - 15 mmol/L    Glucose 94 65 - 100 mg/dL    BUN 8 6 - 20 MG/DL    Creatinine 0.65 0.55 - 1.02 MG/DL    BUN/Creatinine ratio 12 12 - 20      GFR est AA >60 >60 ml/min/1.73m2    GFR est non-AA >60 >60 ml/min/1.73m2    Calcium 9.1 8.5 - 10.1 MG/DL    Bilirubin, total 0.7 0.2 - 1.0 MG/DL    ALT (SGPT) 20 12 - 78 U/L    AST (SGOT) 17 15 - 37 U/L    Alk. phosphatase 106 45 - 117 U/L    Protein, total 6.6 6.4 - 8.2 g/dL    Albumin 2.9 (L) 3.5 - 5.0 g/dL    Globulin 3.7 2.0 - 4.0 g/dL    A-G Ratio 0.8 (L) 1.1 - 2.2         Pre-medications  were administered as ordered and chemotherapy was initiated.      Medication:  Medications Administered     0.9% sodium chloride infusion     Admin Date  11/22/2021 Action  New Bag Dose  25 mL/hr Rate  25 mL/hr Route  IntraVENous Administered By  Chelo Ghee R          0.9% sodium chloride injection 10 mL     Admin Date  11/22/2021 Action  Given Dose  10 mL Route  IntraVENous Administered By  Chelo [facility] R          acetaminophen (TYLENOL) tablet 650 mg     Admin Date  11/22/2021 Action  Given Dose  650 mg Route  Oral Administered By  Chelo [facility] R          dexamethasone (DECADRON) 12 mg in 0.9% sodium chloride 50 mL IVPB     Admin Date  11/22/2021 Action  New Bag Dose  12 mg Route  IntraVENous Administered By  Bernpavel Bronson          diphenhydrAMINE (BENADRYL) injection 50 mg     Admin Date  11/22/2021 Action  Given Dose  50 mg Route  IntraVENous Administered By  Chelo Ghee R          famotidine (PF) (PEPCID) 20 mg in 0.9% sodium chloride 10 mL injection     Admin Date  11/22/2021 Action  Given Dose  20 mg Route  IntraVENous Administered By  Elizabeth Bronson heparin (porcine) pf 300-500 Units     Admin Date  2021 Action  Given Dose  500 Units Route  InterCATHeter Administered By  Norris JARVIS          palonosetron HCl (ALOXI) injection 0.25 mg     Admin Date  2021 Action  Given Dose  0.25 mg Route  IntraVENous Administered By  Hilton Ribera          sacituzumab govitecan-hziy (TRODELVY) 620 mg in 0.9% sodium chloride 250 mL, overfill volume 25 mL chemo infusion     Admin Date  2021 Action  New Bag Dose  620 mg Rate  337 mL/hr Route  IntraVENous Administered By  Norris JARVIS          sodium chloride (NS) flush 10 mL     Admin Date  2021 Action  Given Dose  10 mL Route  IntraVENous Administered By  Poncho [place] Date  2021 Action  Given Dose  10 mL Route  IntraVENous Administered By  Norris JARVIS              Patient port flushed; heparinized; and de-accessed per protocol. Ms. Aristides Zapata tolerated treatment well and was discharged from [place] [address] in stable condition at 1325. She is aware of when to return for her next appointment.     Future Appointments   Date Time Provider Jyotsna Burns   2021 10:00 AM [facility] [place] [address] REG   2021 10:00 AM [facility]4 [place] [address] REG   2021 10:15 AM Laura Patton NP ON[facility] BS AMB   2021 10:00 AM [facility] [place] [address] REG   2021  8:30 AM Angie Valdivia MD [facility] BS AMB   2022  9:45 AM Diony De La Cruz MD [facility] BS AMB       Ty Morrison  2021

## 2021-11-22 NOTE — LETTER
12/13/2021    Patient: Hortensia Horner   YOB: 1960   Date of Visit: 11/22/2021     Davon Wilcox MD  [address] [facility] [place].  [facility]    Dear Davon Wilcox MD,      Thank you for referring Ms. Suly Hurtado to [address] [place] [address] for evaluation. My notes for this consultation are attached. If you have questions, please do not hesitate to call me. I look forward to following your patient along with you.       Sincerely,    Kedar Martino NP

## 2021-11-22 NOTE — PROGRESS NOTES
Palliative Medicine Outpatient Services  Soo: [phone] ([phone])    Patient Name: Dayan Barnes  YOB: 1960    Date of Current Visit: 11/22/21  Location of Current Visit:    [] [facility] Office  [] [facility] Office  [x] [facility] Office  [] Home  []Synchronous real-time A/V virtual visit    Date of Initial Visit: 10/19/2021   Referral from: Dr. Laura Haney  Primary Care Physician: Estefani De Oliveira MD      SUMMARY:   Dayan Barnes is a 57y.o. year old with breast cancer who was referred to Palliative Medicine by Dr. Laura Haney for symptom management. Current treatment: she was diagnosed with recurrent breast cancer in summer 2021. Currently Shasha Kilgore is on hold d/t neutropenia. Per Oncology, plan is for dose adjustment and attempt approval for neulasta. The patients social history includes: she has 2 children. Right now she has not been sharing too many details about her cancer with others and has been keeping a lot of things to herself. She is open to Social Work support. PALLIATIVE DIAGNOSES:       ICD-10-CM ICD-9-CM    1. Inflammatory carcinoma of left breast (HCC)  C50.912 174.9 oxyCODONE IR (ROXICODONE) 10 mg tab immediate release tablet   2. Non-intractable vomiting with nausea, unspecified vomiting type  R11.2 787.01    3. Decreased appetite  R63.0 783.0    4. Breast pain  N64.4 611.71           PLAN:   Patient Instructions     Dayan Barnes,    It was a pleasure to see you in the [facility] office today. Below is the plan we discussed. Left breast inflammatory lesions  - You are taking Oxycodone 10 mg every 6 hours for the pain. It is very painful to touch. I am providing you with a refill  - Apply Lidocaine 4% cream to apply multiple times a day on the breast  - We will work on getting you Morphine cream as well. Breast cancer:  -You found out your cancer was back on July 31 of this year. That affected you a lot. -You just started a new treatment last week.   -You have had issues with your white count in the past.    Loss of appetite:  -You have lost 40-45 pounds recently because you don't have an appetite.  -You haven't really been eating. The smell and taste of things bother you.  -You have tried ensure shakes but didn't really like it. We discussed to see if you can tolerate the Ensure Clear.  -You tried Marinol but it gave you stomach cramps. Let us try Megace 40 mg two times a day    Nausea/indigestion:  -You can have a burning sensation with a weird taste in your throat.  -You have protonix 40 mg once daily to help prevent acid reflux. It is very important to take it 30-60 minutes before you eat to help maximize its effectiveness.  -You have zofran which you can continue to use. You are not sure how much this is helping.  -I will prescribe compazine for you so that you have another option for nausea. .    Constipation:  -You have been having some diarrhea.  -Opioids can cause constipation.  -You may need to eventually take senna to help stimulate your bowels. Fatigue/weakness:  -You feel tired in general but especially at work. You have a good work team, but you still feel tired. -You do go to work [employer], and this can be challenging.  -When you are home you mainly rest.  -Let's see how you do with improved control of pain and nausea before considering medication changes for fatigue.  -We discussed that you may consider reducing your work hours. You are considering taking FMLA. Support system:  -You have a strong support system, but you block them. -You are not sure why you are blocking them. You know you need them, but for some reason you feel like you need to have time to yourself.  -Your friends and family really want to help you, but you feel like it's just you. You have been shutting people out though that's not what you want to do. -You are used to caring for other people.  -You have two children.     Advance care planning:  -You would like your daughter Felix Overton [place] to be your medical power of  (mPOA). She is 34. You talk to your daughter every day and she knows what is important to you. -We completed an Advance Medical Directive today listing your daughter as your mPOA. -We discussed that you would like hospital evaluations and treatments if something could be improved/cured but that you would not want life prolonging measures if it appeared that you would not get better.  -We discussed code status. For now you would like to be Full Code. Follow up:  -We will see you back in person in 1 month, sooner as needed. GOALS OF CARE / TREATMENT PREFERENCES:   [====Goals of Care====]  GOALS OF CARE:  Patient / health care proxy stated goals: See Patient Instructions / Summary    TREATMENT PREFERENCES:   Code Status:  [x] Attempt Resuscitation       [] Do Not Attempt Resuscitation    Advance Care Planning:  [x] The [employer] Interdisciplinary Team has updated the ACP Navigator with Decision Maker and Patient Capacity      Primary Decision MakeDesi Bellamy - [phone]  Advance Care Planning 11/22/2021   Patient's Healthcare Decision Maker is: Named in scanned ACP document   Confirm Advance Directive Yes, on file   Patient Would Like to Complete Advance Directive -       Other:  (If patient appropriate for POST, consider using [facility] smart phrase here)    The palliative care team has discussed with patient / health care proxy about goals of care / treatment preferences for patient.  [====Goals of Care====]     PRESCRIPTIONS GIVEN:     Medications Ordered Today   Medications    lidocaine (XYLOCAINE) 4 % topical cream     Sig: Apply  to affected area four (4) times daily as needed for Pain. Dispense:  15 g     Refill:  4    oxyCODONE IR (ROXICODONE) 10 mg tab immediate release tablet     Sig: Take 1 Tablet by mouth every six (6) hours as needed for Pain for up to 30 days. Max Daily Amount: 40 mg.      Dispense:  120 Tablet     Refill: 0    megestroL (MEGACE) 40 mg tablet     Sig: Take 1 Tablet by mouth two (2) times a day. Dispense:  60 Tablet     Refill:  2           FOLLOW UP:     Future Appointments   Date Time Provider Jyotsna Burns   11/22/2021 10:00 AM [address] [facility]   11/22/2021 10:30 [facility]   11/29/2021 10:00 AM [facility] REG   12/13/2021 10:00 AM 42 Sadie [address] [facility] REG   12/13/2021 10:15 AM [facility]   12/20/2021 10:00 AM 42 Man[facility] REG   1/5/2022  9:45 AM Yamila Pool MD [facility]           PHYSICIANS INVOLVED IN CARE:   Patient Care Team:  Hari Velasquez MD as PCP - General (Family Medicine)  Ryan Arredondo MD as Physician (Breast Surgery)  Sharmin Hart MD as Physician (Hematology and Oncology)  Ryan Arredondo MD (Breast Surgery)  Luly Lloyd MD as Physician (Radiation Oncology)       HISTORY:   Reviewed patient-completed ESAS and advance care planning form. Reviewed patient record in prescription monitoring program.    CHIEF COMPLAINT:   No chief complaint on file. HPI/SUBJECTIVE:    The patient is: [x] Verbal / [] Nonverbal     10/19: patient is seen in [facility] clinic today for initial Palliative visit. She arrived to appointment alone. She has concerns about nausea and lack of appetite. See plan for further details.     Clinical Pain Assessment (nonverbal scale for nonverbal patients):   [++++ Clinical Pain Assessment++++]  [++++Pain Severity++++]: Pain: 8  [++++Pain Character++++]:throbbing pain  [++++Pain Duration++++]:months  [++++Pain Effect++++]:functional  [++++Pain Factors++++]:none  [++++Pain Frequency++++]:constant  [++++Pain Location++++]:left breast  [++++ Clinical Pain Assessment++++]       FUNCTIONAL ASSESSMENT:     Palliative Performance Scale (PPS):  PPS: 70       PSYCHOSOCIAL/SPIRITUAL SCREENING:     Any spiritual / Shinto concerns:  [] Yes /  [x] No    Caregiver Burnout:  [] Yes /  [x] No /  [] No Caregiver Present      Anticipatory grief assessment:   [x] Normal  / [] Maladaptive       ESAS Anxiety: Anxiety: 0    ESAS Depression: Depression: 0       REVIEW OF SYSTEMS:     The following systems were [x] reviewed / [] unable to be reviewed  Systems: constitutional, ears/nose/mouth/throat, respiratory, gastrointestinal, genitourinary, musculoskeletal, integumentary, neurologic, psychiatric, endocrine. Positive findings noted below. Modified ESAS Completed by: provider   Fatigue: 0 Drowsiness: 0   Depression: 0 Pain: 8   Anxiety: 0 Nausea: 0   Anorexia: 0 Dyspnea: 0   Best Well-Being: 3 Constipation: No   Other Problem (Comment): 0          PHYSICAL EXAM:     Wt Readings from Last 3 Encounters:   11/22/21 179 lb 6.4 oz (81.4 kg)   11/08/21 178 lb 1.6 oz (80.8 kg)   11/01/21 176 lb 1.6 oz (79.9 kg)     Blood pressure (!) 145/90, pulse 97, temperature (!) 96.5 °F (35.8 °C), temperature source Temporal, resp. rate 18, height 5' 2\" (1.575 m), weight 179 lb 6.4 oz (81.4 kg), SpO2 98 %.   Last bowel movement: See Nursing Note    Constitutional: sitting up in exam room chair, awake, conversational, NAD  Eyes: pupils equal, anicteric  ENMT: no nasal discharge, moist mucous membranes  Cardiovascular: regular rhythm, radial pulses intact  Respiratory: breathing not labored, symmetric  Gastrointestinal: soft, non-tender, +bowel sounds  Musculoskeletal: no deformity, no tenderness to palpation  Left breast-multiple tender nodules with some open sores, hard breast with inflammatory skin infiltration  Skin: warm, dry  Neurologic: following commands, moving all extremities  Psychiatric: full affect, conversation and behavior appropriate  Other:       HISTORY:     Past Medical History:   Diagnosis Date    Breast cancer ([facility] [address]     left side    Menopause       Past Surgical History:   Procedure Laterality Date    HX BREAST BIOPSY Left     x2    HX BREAST LUMPECTOMY Left 9/5/2019    LEFT BREAST LUMPECTOMY WITH ULTRASOUND performed by [facility]ervin Alvarez [facility] at [facility] AM[facility]    HX HYSTERECTOMY      partial, ovaries remain    IR INSERT TUNL CVC W PORT OVER 5 YEARS  2021    VASCULAR SURGERY PROCEDURE UNLIST      portacath      Family History   Problem Relation Age of Onset    Cancer Father     Cancer Maternal Aunt     Breast Cancer Maternal Aunt         4 paternal aunts    Cancer Maternal Uncle     Cancer Paternal Aunt     Cancer Paternal Uncle     Breast Cancer Maternal Grandmother       History reviewed, no pertinent family history. Social History     Tobacco Use    Smoking status: Former Smoker     Packs/day: 0.10     Quit date: 2020     Years since quittin.8    Smokeless tobacco: Never Used   Substance Use Topics    Alcohol use: No     Allergies   Allergen Reactions    Codeine Rash     Rash from tylenol #3      Current Outpatient Medications   Medication Sig    lidocaine (XYLOCAINE) 4 % topical cream Apply  to affected area four (4) times daily as needed for Pain.  oxyCODONE IR (ROXICODONE) 10 mg tab immediate release tablet Take 1 Tablet by mouth every six (6) hours as needed for Pain for up to 30 days. Max Daily Amount: 40 mg.    megestroL (MEGACE) 40 mg tablet Take 1 Tablet by mouth two (2) times a day.  ondansetron (ZOFRAN ODT) 4 mg disintegrating tablet     pantoprazole (PROTONIX) 40 mg tablet Take 1 Tablet by mouth daily.  dronabinoL (Marinol) 2.5 mg capsule Take 1 Capsule by mouth two (2) times a day. Max Daily Amount: 5 mg. (Patient not taking: Reported on 2021)    lidocaine-prilocaine (EMLA) topical cream Apply  to affected area as needed for Pain. Apply generous amount to port site 30 minutes prior to infusions and cover with plastic wrap     No current facility-administered medications for this visit.           LAB and other DATA REVIEWED:     Lab Results   Component Value Date/Time    WBC 1.7 (L) 2021 10:21 AM    HGB 9.6 (L) 2021 10:21 AM    PLATELET 867 (H) 80/89/1919 10:21 AM     Lab Results   Component Value Date/Time    Sodium 135 (L) 11/01/2021 10:11 AM    Potassium 3.4 (L) 11/01/2021 10:11 AM    Chloride 100 11/01/2021 10:11 AM    CO2 25 11/01/2021 10:11 AM    BUN 9 11/01/2021 10:11 AM    Creatinine 0.67 11/01/2021 10:11 AM    Calcium 9.4 11/01/2021 10:11 AM      Lab Results   Component Value Date/Time    Alk. phosphatase 86 11/01/2021 10:11 AM    Protein, total 7.1 11/01/2021 10:11 AM    Albumin 2.6 (L) 11/01/2021 10:11 AM    Globulin 4.5 (H) 11/01/2021 10:11 AM     No results found for: INR, PTMR, PTP, PT1, PT2, APTT, INREXT, INREXT   No results found for: IRON, FE, TIBC, IBCT, PSAT, FERR     Detail chart reviewed. Other specialists and referral provider notes reviewed.      CONTROLLED SUBSTANCES SAFETY ASSESSMENT (IF ON CONTROLLED SUBSTANCES):     Reviewed opioid safety handout:  [x] Yes   [] No  24 hour opioid dose >150mg morphine equivalent/day:  [] Yes   [] No  Benzodiazepines:  [] Yes   [] No  Sleep apnea:  [] Yes   [] No  Urine Toxicology Testing within last 6 months:  [] Yes   [] No  History of or new aberrant medication taking behaviors:  [] Yes   [x] No  Has Narcan been prescribed [x] Yes   [] No

## 2021-11-22 NOTE — PATIENT INSTRUCTIONS
Natalie Kelly,    [facility]. Below is the plan we discussed. Left breast inflammatory lesions  - You are taking Oxycodone 10 mg every 6 hours for the pain. It is very painful to touch. I am providing you with a refill  - Apply Lidocaine 4% cream to apply multiple times a day on the breast  - We will work on getting you Morphine cream as well. Breast cancer:  -You found out your cancer was back on July 31 of this year. That affected you a lot. -You just started a new treatment last week. -You have had issues with your white count in the past.    Loss of appetite:  -You have lost 40-45 pounds recently because you don't have an appetite.  -You haven't really been eating. The smell and taste of things bother you.  -You have tried ensure shakes but didn't really like it. We discussed to see if you can tolerate the Ensure Clear.  -You tried Marinol but it gave you stomach cramps. Let us try Megace 40 mg two times a day    Nausea/indigestion:  -You can have a burning sensation with a weird taste in your throat.  -You have protonix 40 mg once daily to help prevent acid reflux. It is very important to take it 30-60 minutes before you eat to help maximize its effectiveness.  -You have zofran which you can continue to use. You are not sure how much this is helping.  -I will prescribe compazine for you so that you have another option for nausea. .    Constipation:  -You have been having some diarrhea.  -Opioids can cause constipation.  -You may need to eventually take senna to help stimulate your bowels. Fatigue/weakness:  -You feel tired in general but especially at work. You have a good work team, but you still feel tired.   -You do go to work [employer], and this can be challenging.  -When you are home you mainly rest.  -Let's see how you do with improved control of pain and nausea before considering medication changes for fatigue.  -We discussed that you may consider reducing your work hours. You are considering taking FMLA. Support system:  -You have a strong support system, but you block them. -You are not sure why you are blocking them. You know you need them, but for some reason you feel like you need to have time to yourself.  -Your friends and family really want to help you, but you feel like it's just you. You have been shutting people out though that's not what you want to do. -You are used to caring for other people.  -You have two children. Advance care planning:  -You would like your daughter Eugenio Hi to be your medical power of  (mPOA). She is 34. You talk to your daughter every day and she knows what is important to you. -We completed an Advance Medical Directive today listing your daughter as your mPOA. -We discussed that you would like hospital evaluations and treatments if something could be improved/cured but that you would not want life prolonging measures if it appeared that you would not get better.  -We discussed code status. For now you would like to be Full Code. Follow up:  -We will see you back in person in 1 month, sooner as needed.

## 2021-11-22 NOTE — PROGRESS NOTES
[address] [facility] [address], [address] [facility], [address] [facility], [address] [facility]  [phone]    Follow-up Note      Patient: Karoline Cameron MRN: [id number]  SSN: [id number]    YOB: 1960  Age: 57 y.o. Sex: female        Diagnosis:     1. Recurrent/metastatic breast cancer, Dx 07/2021   Inflammatory recurrence with regional nodes, mediastinal nodes and RP nodes    2. Left breast carcinoma:  T1c N1mi M0 (Stage I) infiltrating ductal carcinoma, Tumor size 1.6 cm, LN 1/3 with micromet, grade 3, ER -ve, MI -ve, Her 2 -ve    ypT2 N0    Treatment:     1. Neoadjuvant chemotherapy   Adriamycin, cytoxan - s/p 4 cycles   Weekly Taxol - s/p 12 cycles  2. S/p left lumpectomy on 9/5/2019 by Dr. Rochelle Parker  3. S/P Adjuvant radiation  4. Enrolled in  (for residual disease post surgery) - randomized to placebo   5. Palliative chemotherapy   Carboplatin/Gemzar and Noy Allegra, s/p 2 Cycles   Sacituzumab Gavetecan - Cycle 3 Day 1    Subjective:      Karoline Cameron is a 57 y.o. female with a diagnosis of left sided invasive breast cancer. She felt a lump in the left breast, went to see her PCP, got a mammogram and was noted to have abnormal density in the left upper outer quadrant of the breast. A biopsy of the mass revealed gr 3 IDC, TNBC. The axillary LN is clear of disease. She saw Dr. Garett Vinson. An MRI of the breast shows the tumor to be larger than previously believed to be. A left axillary LN biopsy showed 1/3 nodes with micrometastasis. She works at [employer] full time. Ms. Opal Carmona completed neoadjuvant chemotherapy and underwent lumpectomy in September 2020. She enrolled in the clinical trial  and was randomized to the placebo arm. She noticed change in the left breast skin approximately 4-6 weeks ago. Breast has progressively hardened. In additional few raised areas popped up. She initially had pain in the left breast but this has subsided. She saw Dr. Fay Narayan. A punch bx of the skin on the breast revealed TNBC. She is working at [employer] for the last 13 years. Ms. Mimi Avendaño is receiving palliative chemotherapy. Skin nodules on the breast has spread further. Left breast is improving after starting the Shanefurt. Review of Systems:     Constitutional: negative, anxiety  Eyes: negative  Ears, Nose, Mouth, Throat, and Face: negative  Respiratory: negative  Cardiovascular: negative  Gastrointestinal: negative  Genitourinary:negative  Integument/Breast: skin changes and hardening of the breast with worsening dermal nodules. Hematologic/Lymphatic: negative  Musculoskeletal: intermittent mild numbness/tingling right foot  Neurological: negative    Review of systems was reviewed and updated as needed on 21    Past Medical History:   Diagnosis Date    Breast cancer ([facility] [address]     left side    Menopause      Past Surgical History:   Procedure Laterality Date    HX BREAST BIOPSY Left     x2    HX BREAST LUMPECTOMY Left 2019    LEFT BREAST LUMPECTOMY WITH ULTRASOUND performed by Iman Solis MD at [facility]    HX HYSTERECTOMY      partial, ovaries remain    IR INSERT TUNL CVC W PORT OVER 5 YEARS  2021    VASCULAR SURGERY PROCEDURE UNLIST      portacath      Family History   Problem Relation Age of Onset    Cancer Father     Cancer Maternal Aunt     Breast Cancer Maternal Aunt         4 paternal aunts    Cancer Maternal Uncle     Cancer Paternal Aunt     Cancer Paternal Uncle     Breast Cancer Maternal Grandmother      Social History     Tobacco Use    Smoking status: Former Smoker     Packs/day: 0.10     Quit date: 2020     Years since quittin.8    Smokeless tobacco: Never Used   Substance Use Topics    Alcohol use: No      Prior to Admission medications    Medication Sig Start Date End Date Taking?  Authorizing Provider   lidocaine (XYLOCAINE) 4 % topical cream Apply  to affected area four (4) times daily as needed for Pain. 11/22/21  Yes Griselda Cristobal MD   oxyCODONE IR (ROXICODONE) 10 mg tab immediate release tablet Take 1 Tablet by mouth every six (6) hours as needed for Pain for up to 30 days. Max Daily Amount: 40 mg. 11/22/21 12/22/21 Yes Griselda Cristobal MD   megestroL (MEGACE) 40 mg tablet Take 1 Tablet by mouth two (2) times a day. 11/22/21  Yes Griselda Cristobal MD   ondansetron (ZOFRAN ODT) 4 mg disintegrating tablet  10/17/21  Yes Patti Monk   pantoprazole (PROTONIX) 40 mg tablet Take 1 Tablet by mouth daily. 10/19/21  Yes Dinah Muniz MD   lidocaine-prilocaine (EMLA) topical cream Apply  to affected area as needed for Pain. Apply generous amount to port site 30 minutes prior to infusions and cover with plastic wrap 8/6/21  Yes Kushal Craig MD   dronabinoL (Marinol) 2.5 mg capsule Take 1 Capsule by mouth two (2) times a day. Max Daily Amount: 5 mg. Patient not taking: Reported on 11/22/2021 10/19/21   Dinah Muniz MD          Allergies   Allergen Reactions    Codeine Rash     Rash from tylenol #3         Objective:     Visit Vitals  BP (!) 143/85   Pulse 67   Temp 98.9 °F (37.2 °C)   Resp 18   Ht 5' 2\" (1.575 m)   Wt 178 lb (80.7 kg)   SpO2 97%   BMI 32.56 kg/m²     Pain Scale: 8/10    Physical Exam:     GENERAL: alert, cooperative, no distress  EYE: conjunctivae/corneas clear. xanthelasma  LYMPHATIC: Cervical, supraclavicular, and axillary nodes normal.   THROAT & NECK: normal and no erythema or exudates noted. BREAST: Left breast is hard, skin is thickened, peau de' orange appearance - overall appears softer  LUNG: clear to auscultation bilaterally  HEART: regular rate and rhythm  ABDOMEN: soft, non-tender. EXTREMITIES:  extremities normal, atraumatic, no cyanosis or edema  NEUROLOGIC: AOx3. Gait normal.     The above physical exam was reviewed and updated as needed on 11/22/21.       CT Results (most recent):  Results from [facility] Encounter encounter on 10/06/21    CT ABD PELV W CONT    Narrative  INDICATION:   Breast cancer    EXAM:  CT CHEST, abdomen, and pelvis WITH  CONTRAST    COMPARISON:  8/3/2021    TECHNIQUE:  Thin collimation axial images were obtained through the chest,  abdomen, and pelvis with IV contrast administration. Coronal and sagittal  reconstructions were obtained. CT dose reduction was achieved through use of a  standardized protocol tailored for this examination and automatic exposure  control for dose modulation. FINDINGS:    LUNGS: Stable tiny 2 mm right upper lobe lung nodule (series 4, image 38). Minimal bibasilar atelectasis versus scarring. No other significant pulmonary  abnormality. The central airways are patent. LYMPH NODES: Left supraclavicular lymph node has decreased and measures 1.8 cm  in short axis (image 11), compared to 2.6 cm. Slight decrease in a few left  axillary lymph nodes with a representative lymph node measuring 9 mm in short  axis (image 22), compared to 13 mm previously. Increase in size of several right  axillary lymph nodes with a representative lymph node measuring 2.1 cm in short  axis (image 27), compared to 17 mm previously. No significant change in  minimally enlarged mediastinal lymph nodes. Slight increased prominence of  borderline enlarged anterior cardiophrenic lymph nodes. PLEURAL FLUID: No pleural effusion. PERICARDIAL FLUID: No pericardial effusion. THYROID: No dominant nodule  OTHER: Right chest port extends to the distal SVC. Increased multifocal  nodularity in the left breast. Bilateral breast skin thickening is again noted. Abdomen:    LIVER: No mass or biliary dilatation. GALLBLADDER: No calcified gallstone  SPLEEN: Unremarkable  PANCREAS: No mass or ductal dilatation. ADRENALS: Unremarkable. KIDNEYS/URETERS: Symmetric nephrograms with low density right renal lesion too  small to characterize. No evidence for enhancing renal mass. No hydronephrosis  PERITONEUM: No ascites. Progression of left-sided retroperitoneal  lymphadenopathy with a representative lymph node measuring 14 mm in short axis  (image 74), compared to 5 mm previously. A second lymph node measures 16 mm in  short axis (image 80), compared to 11 mm previously. COLON: Diverticulosis without evidence for diverticulitis  APPENDIX: Unremarkable. SMALL BOWEL: No dilatation or wall thickening. STOMACH: Unremarkable. Pelvis:    PELVIS: Stable left external iliac chain lymphadenopathy with a representative  lymph node measuring 17 mm in short axis (series 2, image 103). Left common  iliac chain lymph node is slightly larger measuring 9 mm (image 91), compared to  4 mm previously. Slight decrease in size of left internal iliac chain lymph node  measuring 8 mm in short axis (image 97), compared to 13 mm previously. No pelvic  free fluid. The bladder is unremarkable. BONES: No destructive bone lesion. Impression  Mixed response to therapy. Slightly decreased left supraclavicular, left axillary, and left internal iliac  chain lymphadenopathy. Progression of right axillary, anterior cardiophrenic,  left retroperitoneal, and left common iliac chain lymphadenopathy.     Slightly increased soft tissue nodularity in the left breast.      Lab Results   Component Value Date/Time    WBC 3.2 (L) 11/22/2021 10:02 AM    HGB 9.9 (L) 11/22/2021 10:02 AM    HCT 31.9 (L) 11/22/2021 10:02 AM    PLATELET 134 33/11/0982 10:02 AM    MCV 88.9 11/22/2021 10:02 AM       Lab Results   Component Value Date/Time    Sodium 139 11/22/2021 10:02 AM    Potassium 3.5 11/22/2021 10:02 AM    Chloride 105 11/22/2021 10:02 AM    CO2 26 11/22/2021 10:02 AM    Anion gap 8 11/22/2021 10:02 AM    Glucose 94 11/22/2021 10:02 AM    BUN 8 11/22/2021 10:02 AM    Creatinine 0.65 11/22/2021 10:02 AM    BUN/Creatinine ratio 12 11/22/2021 10:02 AM    GFR est AA >60 11/22/2021 10:02 AM    GFR est non-AA >60 11/22/2021 10:02 AM    Calcium 9.1 11/22/2021 10:02 AM Bilirubin, total 0.7 11/22/2021 10:02 AM    Alk. phosphatase 106 11/22/2021 10:02 AM    Protein, total 6.6 11/22/2021 10:02 AM    Albumin 2.9 (L) 11/22/2021 10:02 AM    Globulin 3.7 11/22/2021 10:02 AM    A-G Ratio 0.8 (L) 11/22/2021 10:02 AM    ALT (SGPT) 20 11/22/2021 10:02 AM    AST (SGOT) 17 11/22/2021 10:02 AM         Assessment:     1. Recurrent/metastatic breast cancer, Dx 07/2021   Inflammatory recurrence with regional nodes, mediastinal nodes and RP nodes    PD-L1 ~ 2% (IC)  NGS: p53, CKS1B, FGFR1 amplification    Previously   Left breast carcinoma:  T1c N1mi M0 (Stage I) infiltrating ductal carcinoma, Tumor size 1.6 cm, LN 1/3 with micromet, grade 3, ER -ve, GA -ve, Her 2 -ve    ECOG PS 0       Received neoadjuvant chemotherapy   Adriamycin, Cyclophosphamide - s/p 4 cycles   Weekly Taxol - s/p 12 cycles - completed 8/15/2019    S/p left lumpectomy on 9/5/2019 with Dr. Constance Tan  ypT2 N0    She is randomized to the placebo arm of SWOG  study: A Randomized, Phase III Trial to Evaluate the Efficacy and Safety of [id number] (Pembrolizumab) as Adjuvant Therapy for Triple Receptor-Negative Breast Cancer with >/= 1 CM Residual Invasive Cancer or Positive Lymph Nodes (ypN+) after Neoadjuvant Chemotherapy    Disease has recurred in the breast with changes suggestive of inflammatory disease and regional nodes are enlarged. Receiving palliative therapy  Carboplatin/Gemzar and Keytruda  S/p 2 Cycles    Due to progression of disease, Keytruda/chemo d/c'd    Receiving palliative chemotherapy   Trodelvy - Cycle 3 Day 1 (Treatment delayed after Cycle 1 and dose reduced to 75% d/t neutropenia)     A detailed system by system evaluation of side effect was performed to assess chemotherapy related toxicity. Blood counts are acceptable. Results reviewed with the patient. Symptom management form reviewed with patient. 2. Anemia - normocytic     Observation       3.  Breast pain from cancer    Oxycodone  Palliative medicine       4. Neutropenia - resolved    Treatment held last cycle  Dose reduced 75% starting with Cycle 2   Neulasta on-body on Day 8      5. Nausea    Using compazine and zofran as needed      Plan:     > Continue Trodelvy  > Neulasta on-body on Day 8  > Following with Palliative care  > Follow-up in 3 weeks    I performed a history and physical examination of the patient and discussed his management with the NPP. I reviewed the NPP note and agree with the documented findings and plan of care. The patient was seen in conjunction with Ms. Buenobrittany Bela. Ms. Katey Ovalle is a women with metastatic TNBC. She is receiving Sacituzumab. The treatment has been compromised due to recurrent cytopenias. The disease continues to progress in the skin and chest wall. Exam is remarkable for nodular lesions all over the left breast.       Signed by: Brandt Neal MD                     November 22, 2021      CC. Maria Alejandra Reyna MD  CC. Colleen Ziegler MD  CC.  Indiana Ibrahim MD

## 2021-11-22 NOTE — PROGRESS NOTES
Palliative Medicine Office Visit  Palliative Medicine Nurse Check In  [phone]    Patient Name: Aarti Hoang  YOB: 1960      Date of Office Visit: 11/22/2021    Patient states: \"  \"    From Check In Sheet (scanned in Media):  Has a medical provider talked with you about cardiopulmonary resuscitation (CPR)? [x] Yes   [] No   [] Unable to obtain    Nurse reminder to complete or update ACP FlowSheet:    Is ACP on the Problem List?    [x] Yes    [] No  IF ACP Document is ON FILE; Nurse to place ACP on Problem List     Is there an ACP Note in Chart Review/Note? [x] Yes    [] No   If NO: ALERT PROVIDER       Primary Decision MakerEmilia Enoch - Daughter - Kg Lacy 89 Planning 11/22/2021   Patient's Healthcare Decision Maker is: Named in scanned ACP document   Confirm Advance Directive Yes, on file   Patient Would Like to Complete Advance Directive -         Is there anything that we should know about you as a person in order to provide you the best care possible? Have you been to the ER, urgent care clinic since your last visit? [] Yes   [x] No   [] Unable to obtain    Have you been hospitalized since your last visit? [] Yes   [x] No   [] Unable to obtain    Have you seen or consulted any other health care providers outside of the [address] [place] [address] since your last visit?    [] Yes   [x] No   [] Unable to obtain    Functional status (describe):         Last BM: 11/22/2021     accessed (date): 11/22/2021    Bottle review (for opioid pain medication):  Medication 1:   Date filled:   Directions:   # filled:   # left:   # pills taking per day:  Last dose taken:    Medication 2:   Date filled:   Directions:   # filled:   # left:   # pills taking per day:  Last dose taken:    Medication 3:   Date filled:   Directions:   # filled:   # left:   # pills taking per day:  Last dose taken:    Medication 4:   Date filled:   Directions:   # filled:   # left:   # pills taking per day:  Last dose taken:

## 2021-11-24 NOTE — TELEPHONE ENCOUNTER
Returned call to patient Oxycodone 10 mg is at patients local [facility] [address] [facility] for ,     Morphine compounding cream is at [facility] and ready for      Lidocaine cream is not covered by patients insurance , patient aware and will pay out of pocket $15 for fill

## 2021-11-24 NOTE — TELEPHONE ENCOUNTER
Patient is calling with some questions on medications for nurses. 1)  Still does not have the lidocaine patch  2)  Wants to know when she might get the compound medication  3)  Pain pills not working - she is out has none left. Advised nurse would call her back.

## 2021-11-29 NOTE — PROGRESS NOTES
[facility]fusion [facility] - Chemotherapy Progress Note    1000- Pt admit to [facility] for C3D8 in stable condition. Assessment completed. Patient denied having any symptoms of COVID-19, i.e. SOB, coughing, fever, or generally not feeling well. Also denies having been exposed to COVID-19 recently or having had any recent contact with family/friend that has a pending COVID test.     R chest port accessed with positive blood return. Labs drawn per order and sent. Line flushed, clamped, Curos Cap applied to end clave. Chemotherapy Flowsheet 11/29/2021   Cycle C3D8   Date 11/29/2021   Drug / Regimen Leslee Lorenzana   Pre Meds given   Notes given      Orders Placed This Encounter    CBC WITH AUTOMATED DIFF     Standing Status:   Standing     Number of Occurrences:   1    VERIFY LABS     Please make sure the following labs have been completed prior to releasing orders for treatment. Add order(s) if labs are not already ordered: 1) CBC w/diff. Standing Status:   Standing     Number of Occurrences:   1    REGIMEN NOTES/COMMUNICATIONS     Confirm local requirements for atropine 0.4 mg SQ pre-medication and/or atropine 0.4 mg SQ q 2 hrs prn. SQ route may also be updated to IV based on local policy and practice. Standing Status:   Standing     Number of Occurrences:   1    TREATMENT CONDITIONS     Hold treatment and notify oncologist if: 1) ANC less than 1000 /mm3, 2) Platelets less than 817,900/VL7. Standing Status:   Standing     Number of Occurrences:   1    NURSING-MISCELLANEOUS: Nurse to confirm Nurse to confirm patient has outpatient prescriptions or Instruct patient to purchase over the counter. Loperamide 4 mg PO at onset of diarrhea, then 2 mg PO every 2 hours until diarrhea stops for at least 1... Nurse to confirm patient has outpatient prescriptions or Instruct patient to purchase over the counter.  Loperamide 4 mg PO at onset of diarrhea, then 2 mg PO every 2 hours until diarrhea stops for at least 12 hours.     Standing Status:   Standing     Number of Occurrences:   1     Order Specific Question:   Description of Order:     Answer:   Nurse to confirm    NURSING-MISCELLANEOUS: ok to treat Ok to treat today with ANC 0.3  CONTINUOUS     Ok to treat today with ANC 0.3     Standing Status:   Standing     Number of Occurrences:   1     Order Specific Question:   Description of Order:     Answer:   ok to treat    sodium chloride (NS) flush 10 mL    0.9% sodium chloride injection 10 mL    heparin (porcine) pf 300-500 Units    0.9% sodium chloride infusion    acetaminophen (TYLENOL) tablet 650 mg    palonosetron HCl (ALOXI) injection 0.25 mg    dexamethasone (DECADRON) 12 mg in 0.9% sodium chloride 50 mL IVPB    diphenhydrAMINE (BENADRYL) injection 50 mg    famotidine (PF) (PEPCID) 20 mg in 0.9% sodium chloride 10 mL injection     Order Specific Question:   H2RA INDICATION     Answer: Other (describe in comments)     Comments:   premedication    pegfilgrastim (NEULASTA) wearable SQ injector 6 mg    sacituzumab govitecan-hziy (TRODELVY) 620 mg in 0.9% sodium chloride 250 mL, overfill volume 25 mL chemo infusion     Dilute to a concentration of 1.1 to 3.4 mg/mL. Total volume should not exceed 500 mL. If patient is greater than 170 kg, divide total dose equally in two 500 mL bags and infuse sequentially.        Patient Vitals for the past 12 hrs:   Temp Pulse Resp BP SpO2   11/29/21 1343  75 16 (!) 144/76    11/29/21 0957 97.6 °F (36.4 °C) 79 16 (!) 155/85 100 %        Recent Results (from the past 12 hour(s))   CBC WITH AUTOMATED DIFF    Collection Time: 11/29/21 10:04 AM   Result Value Ref Range    WBC 1.1 (L) 3.6 - 11.0 K/uL    RBC 3.60 (L) 3.80 - 5.20 M/uL    HGB 9.8 (L) 11.5 - 16.0 g/dL    HCT 31.8 (L) 35.0 - 47.0 %    MCV 88.3 80.0 - 99.0 FL    MCH 27.2 26.0 - 34.0 PG    MCHC 30.8 30.0 - 36.5 g/dL    RDW 20.7 (H) 11.5 - 14.5 %    PLATELET 511 939 - 700 K/uL    MPV 9.3 8.9 - 12.9 FL    NRBC 0.0 0 PER 100 WBC    ABSOLUTE NRBC 0.00 0.00 - 0.01 K/uL    NEUTROPHILS 29 (L) 32 - 75 %    LYMPHOCYTES 54 (H) 12 - 49 %    MONOCYTES 15 (H) 5 - 13 %    EOSINOPHILS 1 0 - 7 %    BASOPHILS 1 0 - 1 %    IMMATURE GRANULOCYTES 0 0.0 - 0.5 %    ABS. NEUTROPHILS 0.3 (L) 1.8 - 8.0 K/UL    ABS. LYMPHOCYTES 0.6 (L) 0.8 - 3.5 K/UL    ABS. MONOCYTES 0.2 0.0 - 1.0 K/UL    ABS. EOSINOPHILS 0.0 0.0 - 0.4 K/UL    ABS. BASOPHILS 0.0 0.0 - 0.1 K/UL    ABS. IMM.  GRANS. 0.0 0.00 - 0.04 K/UL    DF MANUAL      RBC COMMENTS ANISOCYTOSIS  2+        RBC COMMENTS OVALOCYTES  1+            Medications:  Medications Administered     0.9% sodium chloride infusion     Admin Date  11/29/2021 Action  New Bag Dose  25 mL/hr Rate  25 mL/hr Route  IntraVENous Administered By  Sudheer Calvo RN          0.9% sodium chloride injection 10 mL     Admin Date  11/29/2021 Action  Given Dose  10 mL Route  IntraVENous Administered By  Sudheer Calvo RN          acetaminophen (TYLENOL) tablet 650 mg     Admin Date  11/29/2021 Action  Given Dose  650 mg Route  Oral Administered By  Sudheer Calvo RN          dexamethasone (DECADRON) 12 mg in 0.9% sodium chloride 50 mL IVPB     Admin Date  11/29/2021 Action  New Bag Dose  12 mg Route  IntraVENous Administered By  Sudheer Calvo RN          diphenhydrAMINE (BENADRYL) injection 50 mg     Admin Date  11/29/2021 Action  Given Dose  50 mg Route  IntraVENous Administered By  Sudheer Calvo RN          famotidine (PF) (PEPCID) 20 mg in 0.9% sodium chloride 10 mL injection     Admin Date  11/29/2021 Action  Given Dose  20 mg Route  IntraVENous Administered By  Sudheer Calvo RN          heparin (porcine) pf 300-500 Units     Admin Date  11/29/2021 Action  Given Dose  500 Units Route  InterCATHeter Administered By  Sudheer Calvo RN          palonosetron HCl (ALOXI) injection 0.25 mg     Admin Date  11/29/2021 Action  Given Dose  0.25 mg Route  IntraVENous Administered By  Sudheer Calvo RN pegfilgrastim (NEULASTA) wearable SQ injector 6 mg     Admin Date  11/29/2021 Action  Given Dose  6 mg Route  SubCUTAneous Administered By  Argelia Caldwell RN          sacituzumab govitecan-hziy (TRODELVY) 620 mg in 0.9% sodium chloride 250 mL, overfill volume 25 mL chemo infusion     Admin Date  11/29/2021 Action  New Bag Dose  620 mg Rate  337 mL/hr Route  IntraVENous Administered By  Argelia Caldwell RN          sodium chloride (NS) flush 10 mL     Admin Date  11/29/2021 Action  Given Dose  10 mL Route  IntraVENous Administered By  Argelia Caldwell RN           Admin Date  11/29/2021 Action  Given Dose  10 mL Route  IntraVENous Administered By  Argelia Caldwell RN                 Pt tolerated treatment well. Port maintained positive blood return throughout treatment, flushed with positive blood return at conclusion, and de-accessed. 1350- D/c home in no distress.  Pt aware of next [facility] appointment scheduled for:    Future Appointments   Date Time Provider Jyotsna Burns   [facility]   12/13/2021 10:15 AM Rosalie Ashford NP [facility] BS AMB   12/20/2021 10:00 AM [facility] REG   12/29/2021  8:30 AM Madan Capellan MD [facility] BS AMB   1/3/2022 10:00 AM [facility] [place] [address] REG   1/5/2022  9:45 AM Arun Reddy MD [facility] BS AMB   1/10/2022 10:00 AM [facility] [address] REG   1/24/2022 10:00 AM [address] REG   1/31/2022 10:00 AM [facility]

## 2021-12-13 NOTE — PROGRESS NOTES
[address] [facility] [address] [address], [address] [facility], [facility], 200 [facility]  [phone]    Follow-up Note      Patient: Abdulakdir Navarro MRN: [id number]  SSN: [id number]    YOB: 1960  Age: 57 y.o. Sex: female        Diagnosis:     1. Recurrent/metastatic breast cancer, Dx 07/2021   Inflammatory recurrence with regional nodes, mediastinal nodes and RP nodes    2. Left breast carcinoma:  T1c N1mi M0 (Stage I) infiltrating ductal carcinoma, Tumor size 1.6 cm, LN 1/3 with micromet, grade 3, ER -ve, [place] -ve, Her 2 -ve    ypT2 N0    Treatment:     1. Neoadjuvant chemotherapy   Adriamycin, cytoxan - s/p 4 cycles   Weekly Taxol - s/p 12 cycles  2. S/p left lumpectomy on 9/5/2019 by Dr. Prince Haider  3. S/P Adjuvant radiation  4. Enrolled in  (for residual disease post surgery) - randomized to placebo   5. Palliative chemotherapy   Carboplatin/Gemzar and [facility], s/p 2 Cycles   Sacituzumab Gavetecan - Cycle 3 Day 1    Subjective:      Abdulkadir Navarro is a 57 y.o. female with a diagnosis of left sided invasive breast cancer. She felt a lump in the left breast, went to see her PCP, got a mammogram and was noted to have abnormal density in the left upper outer quadrant of the breast. A biopsy of the mass revealed gr 3 IDC, TNBC. The axillary LN is clear of disease. She saw Dr. Des Muro. An MRI of the breast shows the tumor to be larger than previously believed to be. A left axillary LN biopsy showed 1/3 nodes with micrometastasis. She works at [employer] full time. Ms. Edi Odom completed neoadjuvant chemotherapy and underwent lumpectomy in September 2020. She enrolled in the clinical trial  and was randomized to the placebo arm. She noticed change in the left breast skin approximately 4-6 weeks ago. Breast has progressively hardened. In additional few raised areas popped up. She initially had pain in the left breast but this has subsided. She saw Dr. Sterling Goldberg. A punch bx of the skin on the breast revealed TNBC. She is working at [employer] for the last 13 years. Ms. Sandi Carrasquillo is receiving palliative chemotherapy. Skin nodules on the breast has spread further. Left breast is improving after starting the [place]. Review of Systems:     Constitutional: negative, anxiety  Eyes: negative  Ears, Nose, Mouth, Throat, and Face: negative  Respiratory: negative  Cardiovascular: negative  Gastrointestinal: negative  Genitourinary:negative  Integument/Breast: skin changes and hardening of the breast with worsening dermal nodules. Hematologic/Lymphatic: negative  Musculoskeletal: intermittent mild numbness/tingling right foot  Neurological: negative    Review of systems was reviewed and updated as needed on 21    Past Medical History:   Diagnosis Date    Breast cancer ([address]     left side    Menopause      Past Surgical History:   Procedure Laterality Date    HX BREAST BIOPSY Left     x2    HX BREAST LUMPECTOMY Left 2019    LEFT BREAST LUMPECTOMY WITH ULTRASOUND performed by Manuela Dang MD at [facility] OR    HX HYSTERECTOMY      partial, ovaries remain    IR INSERT TUNL CVC W PORT OVER 5 YEARS  2021    VASCULAR SURGERY PROCEDURE UNLIST      portacath      Family History   Problem Relation Age of Onset    Cancer Father     Cancer Maternal Aunt     Breast Cancer Maternal Aunt         4 paternal aunts    Cancer Maternal Uncle     Cancer Paternal Aunt     Cancer Paternal Uncle     Breast Cancer Maternal Grandmother      Social History     Tobacco Use    Smoking status: Former Smoker     Packs/day: 0.10     Quit date: 2020     Years since quittin.9    Smokeless tobacco: Never Used   Substance Use Topics    Alcohol use: No      Prior to Admission medications    Medication Sig Start Date End Date Taking?  Authorizing Provider   lidocaine (XYLOCAINE) 4 % topical cream Apply  to affected area four (4) times daily as needed for Pain. 11/22/21  Yes Amira Reynolds MD   oxyCODONE IR (ROXICODONE) 10 mg tab immediate release tablet Take 1 Tablet by mouth every six (6) hours as needed for Pain for up to 30 days. Max Daily Amount: 40 mg. 11/22/21 12/22/21 Yes Amira Reynolds MD   megestroL (MEGACE) 40 mg tablet Take 1 Tablet by mouth two (2) times a day. 11/22/21  Yes Amira Reynolds MD   ondansetron (ZOFRAN ODT) 4 mg disintegrating tablet  10/17/21  Yes Patti Monk   pantoprazole (PROTONIX) 40 mg tablet Take 1 Tablet by mouth daily. 10/19/21  Yes David Bianchi MD   lidocaine-prilocaine (EMLA) topical cream Apply  to affected area as needed for Pain. Apply generous amount to port site 30 minutes prior to infusions and cover with plastic wrap 8/6/21  Yes Maycol Eli MD   dronabinoL (Marinol) 2.5 mg capsule Take 1 Capsule by mouth two (2) times a day. Max Daily Amount: 5 mg. Patient not taking: Reported on 11/22/2021 10/19/21   David Bianchi MD          Allergies   Allergen Reactions    Codeine Rash     Rash from tylenol #3         Objective:     Visit Vitals  BP (!) 161/88   Pulse 99   Temp 97.5 °F (36.4 °C) (Oral)   Resp 18   Ht 5' 2\" (1.575 m)   Wt 175 lb 7.8 oz (79.6 kg)   SpO2 100% Comment: RA   BMI 32.10 kg/m²     Pain Scale: 0 - No pain/10    Physical Exam:     GENERAL: alert, cooperative, no distress  EYE: conjunctivae/corneas clear. xanthelasma  LYMPHATIC: Cervical, supraclavicular, and axillary nodes normal.   THROAT & NECK: normal and no erythema or exudates noted. BREAST: Left breast is hard, skin is thickened, peau de' orange appearance - overall appears softer  LUNG: clear to auscultation bilaterally  HEART: regular rate and rhythm  ABDOMEN: soft, non-tender. EXTREMITIES:  extremities normal, atraumatic, no cyanosis or edema  NEUROLOGIC: AOx3. Gait normal.     The above physical exam was reviewed and updated as needed on 12/13/21.       CT Results (most recent):  Results from Hospital Encounter encounter on 10/06/21    CT ABD PELV W CONT    Narrative  INDICATION:   Breast cancer    EXAM:  CT CHEST, abdomen, and pelvis WITH  CONTRAST    COMPARISON:  8/3/2021    TECHNIQUE:  Thin collimation axial images were obtained through the chest,  abdomen, and pelvis with IV contrast administration. Coronal and sagittal  reconstructions were obtained. CT dose reduction was achieved through use of a  standardized protocol tailored for this examination and automatic exposure  control for dose modulation. FINDINGS:    LUNGS: Stable tiny 2 mm right upper lobe lung nodule (series 4, image 38). Minimal bibasilar atelectasis versus scarring. No other significant pulmonary  abnormality. The central airways are patent. LYMPH NODES: Left supraclavicular lymph node has decreased and measures 1.8 cm  in short axis (image 11), compared to 2.6 cm. Slight decrease in a few left  axillary lymph nodes with a representative lymph node measuring 9 mm in short  axis (image 22), compared to 13 mm previously. Increase in size of several right  axillary lymph nodes with a representative lymph node measuring 2.1 cm in short  axis (image 27), compared to 17 mm previously. No significant change in  minimally enlarged mediastinal lymph nodes. Slight increased prominence of  borderline enlarged anterior cardiophrenic lymph nodes. PLEURAL FLUID: No pleural effusion. PERICARDIAL FLUID: No pericardial effusion. THYROID: No dominant nodule  OTHER: Right chest port extends to the distal SVC. Increased multifocal  nodularity in the left breast. Bilateral breast skin thickening is again noted. Abdomen:    LIVER: No mass or biliary dilatation. GALLBLADDER: No calcified gallstone  SPLEEN: Unremarkable  PANCREAS: No mass or ductal dilatation. ADRENALS: Unremarkable. KIDNEYS/URETERS: Symmetric nephrograms with low density right renal lesion too  small to characterize. No evidence for enhancing renal mass.  No hydronephrosis  PERITONEUM: No ascites. Progression of left-sided retroperitoneal  lymphadenopathy with a representative lymph node measuring 14 mm in short axis  (image 74), compared to 5 mm previously. A second lymph node measures 16 mm in  short axis (image 80), compared to 11 mm previously. COLON: Diverticulosis without evidence for diverticulitis  APPENDIX: Unremarkable. SMALL BOWEL: No dilatation or wall thickening. STOMACH: Unremarkable. Pelvis:    PELVIS: Stable left external iliac chain lymphadenopathy with a representative  lymph node measuring 17 mm in short axis (series 2, image 103). Left common  iliac chain lymph node is slightly larger measuring 9 mm (image 91), compared to  4 mm previously. Slight decrease in size of left internal iliac chain lymph node  measuring 8 mm in short axis (image 97), compared to 13 mm previously. No pelvic  free fluid. The bladder is unremarkable. BONES: No destructive bone lesion. Impression  Mixed response to therapy. Slightly decreased left supraclavicular, left axillary, and left internal iliac  chain lymphadenopathy. Progression of right axillary, anterior cardiophrenic,  left retroperitoneal, and left common iliac chain lymphadenopathy.     Slightly increased soft tissue nodularity in the left breast.      Lab Results   Component Value Date/Time    WBC 1.1 (L) 11/29/2021 10:04 AM    HGB 9.8 (L) 11/29/2021 10:04 AM    HCT 31.8 (L) 11/29/2021 10:04 AM    PLATELET 859 43/08/6217 10:04 AM    MCV 88.3 11/29/2021 10:04 AM       Lab Results   Component Value Date/Time    Sodium 139 11/22/2021 10:02 AM    Potassium 3.5 11/22/2021 10:02 AM    Chloride 105 11/22/2021 10:02 AM    CO2 26 11/22/2021 10:02 AM    Anion gap 8 11/22/2021 10:02 AM    Glucose 94 11/22/2021 10:02 AM    BUN 8 11/22/2021 10:02 AM    Creatinine 0.65 11/22/2021 10:02 AM    BUN/Creatinine ratio 12 11/22/2021 10:02 AM    GFR est AA >60 11/22/2021 10:02 AM    GFR est non-AA >60 11/22/2021 10:02 AM Calcium 9.1 11/22/2021 10:02 AM    Bilirubin, total 0.7 11/22/2021 10:02 AM    Alk. phosphatase 106 11/22/2021 10:02 AM    Protein, total 6.6 11/22/2021 10:02 AM    Albumin 2.9 (L) 11/22/2021 10:02 AM    Globulin 3.7 11/22/2021 10:02 AM    A-G Ratio 0.8 (L) 11/22/2021 10:02 AM    ALT (SGPT) 20 11/22/2021 10:02 AM    AST (SGOT) 17 11/22/2021 10:02 AM         Assessment:     1. Recurrent/metastatic breast cancer, Dx 07/2021   Inflammatory recurrence with regional nodes, mediastinal nodes and RP nodes    PD-L1 ~ 2% (IC)  NGS: p53, CKS1B, FGFR1 amplification    Previously   Left breast carcinoma:  T1c N1mi M0 (Stage I) infiltrating ductal carcinoma, Tumor size 1.6 cm, LN 1/3 with micromet, grade 3, ER -ve, IL -ve, Her 2 -ve    ECOG PS 0       Received neoadjuvant chemotherapy   Adriamycin, Cyclophosphamide - s/p 4 cycles   Weekly Taxol - s/p 12 cycles - completed 8/15/2019    S/p left lumpectomy on 9/5/2019 with Dr. Ki Guzman  ypT2 N0    She is randomized to the placebo arm of SWOG  study: A Randomized, Phase III Trial to Evaluate the Efficacy and Safety of [id number] (Pembrolizumab) as Adjuvant Therapy for Triple Receptor-Negative Breast Cancer with >/= 1 CM Residual Invasive Cancer or Positive Lymph Nodes (ypN+) after Neoadjuvant Chemotherapy    Disease has recurred in the breast with changes suggestive of inflammatory disease and regional nodes are enlarged. Receiving palliative therapy  Carboplatin/Gemzar and Keytruda  S/p 2 Cycles    Due to progression of disease, Keytruda/chemo d/c'd    Receiving palliative chemotherapy   Ankit  - s/p 3 cycles  (Treatment delayed after Cycle 1 and dose reduced to 75% d/t neutropenia)     A detailed system by system evaluation of side effect was performed to assess chemotherapy related toxicity. Blood counts are acceptable. Results reviewed with the patient. Symptom management form reviewed with patient. 2. Anemia - normocytic     Observation       3.  Breast pain from cancer    Oxycodone  Palliative medicine       4. Neutropenia - resolved    Treatment held last cycle  Dose reduced 75% starting with Cycle 2   Neulasta on-body on Day 8    5. Nausea    Using compazine and zofran as needed      Plan:     > Discontinue Trodelvy  > Following with Palliative care  > Referral to [facility]   > Follow-up in 3 weeks      Signed by: Chucho Badillo NP                     December 13, 2021      I personally saw and evaluated the patient and performed the key components of medical decision making. The history, physical exam, and documentation were performed by Mary Gonzalez NP. I reviewed and verified the above documentation and modified it as needed. Signed by: Vesna Ross MD                     January 23, 2022        CC. Justine Vazquez MD  CC. Jessica Javed MD  CC.  Zenobia Nathan MD

## 2021-12-13 NOTE — LETTER
1/23/2022    Patient: Nestor Olmedo   YOB: 1960   Date of Visit: 12/13/2021     Ryan Resendiz MD  [address] [place] [address]    Dear Ryan Resendiz MD,      Thank you for referring Ms. Yonas Dillon to [address] [place] [address] for evaluation. My notes for this consultation are attached. If you have questions, please do not hesitate to call me. I look forward to following your patient along with you.       Sincerely,    Clifton Doss NP

## 2021-12-13 NOTE — PROGRESS NOTES
Pt arrived to [facility] ambulatory in no acute distress at 1000 for Lion C4D1.  Assessment unremarkable. R chest port accessed without issue and positive blood return noted.  Labs obtained, CBC, CMP. Patient denied having any symptoms of COVID-19, i.e. SOB, coughing, fever, or generally not feeling well. Also denies having been exposed to COVID-19 recently or having had any recent contact with family/friend that has a pending COVID test.    Patient Vitals for the past 12 hrs:   Temp Pulse Resp BP SpO2   12/13/21 1337  90 18 136/71    12/13/21 0959 97.5 °F (36.4 °C) 99 18 (!) 161/88 100 %     Recent Results (from the past 12 hour(s))   CBC WITH AUTOMATED DIFF    Collection Time: 12/13/21 10:01 AM   Result Value Ref Range    WBC 3.5 (L) 3.6 - 11.0 K/uL    RBC 3.69 (L) 3.80 - 5.20 M/uL    HGB 10.1 (L) 11.5 - 16.0 g/dL    HCT 32.7 (L) 35.0 - 47.0 %    MCV 88.6 80.0 - 99.0 FL    MCH 27.4 26.0 - 34.0 PG    MCHC 30.9 30.0 - 36.5 g/dL    RDW 22.6 (H) 11.5 - 14.5 %    PLATELET 518 529 - 531 K/uL    MPV 10.1 8.9 - 12.9 FL    NRBC 0.0 0  WBC    ABSOLUTE NRBC 0.00 0.00 - 0.01 K/uL    NEUTROPHILS 64 32 - 75 %    LYMPHOCYTES 23 12 - 49 %    MONOCYTES 10 5 - 13 %    EOSINOPHILS 2 0 - 7 %    BASOPHILS 0 0 - 1 %    IMMATURE GRANULOCYTES 1 (H) 0.0 - 0.5 %    ABS. NEUTROPHILS 2.2 1.8 - 8.0 K/UL    ABS. LYMPHOCYTES 0.8 0.8 - 3.5 K/UL    ABS. MONOCYTES 0.4 0.0 - 1.0 K/UL    ABS. EOSINOPHILS 0.1 0.0 - 0.4 K/UL    ABS. BASOPHILS 0.0 0.0 - 0.1 K/UL    ABS. IMM.  GRANS. 0.0 0.00 - 0.04 K/UL    DF SMEAR SCANNED      RBC COMMENTS ANISOCYTOSIS  2+        RBC COMMENTS OVALOCYTES  1+        RBC COMMENTS POLYCHROMASIA  1+       METABOLIC PANEL, COMPREHENSIVE    Collection Time: 12/13/21 10:01 AM   Result Value Ref Range    Sodium 140 136 - 145 mmol/L    Potassium 3.2 (L) 3.5 - 5.1 mmol/L    Chloride 109 (H) 97 - 108 mmol/L    CO2 22 21 - 32 mmol/L    Anion gap 9 5 - 15 mmol/L    Glucose 91 65 - 100 mg/dL    BUN 9 6 - 20 MG/DL Creatinine 0.70 0.55 - 1.02 MG/DL    BUN/Creatinine ratio 13 12 - 20      GFR est AA >60 >60 ml/min/1.73m2    GFR est non-AA >60 >60 ml/min/1.73m2    Calcium 9.3 8.5 - 10.1 MG/DL    Bilirubin, total 0.7 0.2 - 1.0 MG/DL    ALT (SGPT) 23 12 - 78 U/L    AST (SGOT) 16 15 - 37 U/L    Alk. phosphatase 110 45 - 117 U/L    Protein, total 6.7 6.4 - 8.2 g/dL    Albumin 3.0 (L) 3.5 - 5.0 g/dL    Globulin 3.7 2.0 - 4.0 g/dL    A-G Ratio 0.8 (L) 1.1 - 2.2       GUADALUPE Nation NP notified of K level.  Orders obtained    The following medications administered:  Medications Administered     0.9% sodium chloride infusion     Admin Date  12/13/2021 Action  New Bag Dose  25 mL/hr Rate  25 mL/hr Route  IntraVENous Administered By  Klaus Lacey RN          acetaminophen (TYLENOL) tablet 650 mg     Admin Date  12/13/2021 Action  Given Dose  650 mg Route  Oral Administered By  Klaus Lacey RN          dexamethasone (DECADRON) 12 mg in 0.9% sodium chloride 50 mL IVPB     Admin Date  12/13/2021 Action  New Bag Dose  12 mg Route  IntraVENous Administered By  Klaus Lacey RN          diphenhydrAMINE (BENADRYL) injection 50 mg     Admin Date  12/13/2021 Action  Given Dose  50 mg Route  IntraVENous Administered By  Klaus Lacey RN          famotidine (PF) (PEPCID) 20 mg in 0.9% sodium chloride 10 mL injection     Admin Date  12/13/2021 Action  Given Dose  20 mg Route  IntraVENous Administered By  Klaus Lacey RN          heparin (porcine) pf 300-500 Units     Admin Date  12/13/2021 Action  Given Dose  500 Units Route  InterCATHeter Administered By  Klaus Lacey RN          palonosetron HCl (ALOXI) injection 0.25 mg     Admin Date  12/13/2021 Action  Given Dose  0.25 mg Route  IntraVENous Administered By  Klaus Lacey RN          potassium chloride SR (KLOR-CON 10) tablet 40 mEq     Admin Date  12/13/2021 Action  Given Dose  40 mEq Route  Oral Administered By  HIRO Wright govitecan-hziy (TRODELVY) 620 mg in 0.9% sodium chloride 250 mL, overfill volume 25 mL chemo infusion     Admin Date  12/13/2021 Action  New Bag Dose  620 mg Rate  337 mL/hr Route  IntraVENous Administered By  Klaus Lacey RN          sodium chloride (NS) flush 10 mL     Admin Date  12/13/2021 Action  Given Dose  10 mL Route  IntraVENous Administered By  Klaus Lacey RN           Admin Date  12/13/2021 Action  Given Dose  10 mL Route  IntraVENous Administered By  Klaus Lacey, HIRO                Pt tolerated treatment well. Port flushed per policy and de-accessed, 2x2 and tape placed.  Pt discharged ambulatory in no acute distress at 1340, accompanied by self. Next appointment 12/20/21 at 1000, however this may be adjusted due to changing tx to Abraxane.

## 2021-12-13 NOTE — PROGRESS NOTES
56 Y/O AAF here for fu appt for metastatic breast cancer. Cycle 4 of trodelvy. Mild numbness/tingling of bilateral hands but still able to complete ADL'S, night time is worse. Pt reports her breast pain is a lot better than it was beforehand. Pt uses cream to put on her breast from palliative care. Not bleeding anymore. 1. Have you been to the ER, urgent care clinic since your last visit? Hospitalized since your last visit? No    2. Have you seen or consulted any other health care providers outside of the [address] [place] [address] since your last visit? Include any pap smears or colon screening.  No

## 2021-12-15 NOTE — PROGRESS NOTES
Request from [address] [place] [address] to send patient specimen slides to [facility] per patient request signed and faxed back to [address] [place] [address]. Confirmation fax received.

## 2021-12-20 NOTE — TELEPHONE ENCOUNTER
Calling patient to see if she can do a virtual visit on Wednesday 12/29/2021. No answer so left voicemail asking patient to call office.

## 2021-12-21 NOTE — TELEPHONE ENCOUNTER
Calling Patient to see if she is able to do a Virtual Visit on 12/29/2021. Patient states yes she can do a virtual visit. Advised nurse would call between 8;30am and 9:00 to start virtual visit.

## 2021-12-21 NOTE — PROGRESS NOTES
Will take pt off the schedule for this Monday since she is seeing [facility]. Can reschedule at a later date. Called pt, no answer. Left detailed VM.

## 2021-12-29 NOTE — PATIENT INSTRUCTIONS
Js Sousa,      Below is the plan we discussed. Left breast inflammatory lesions  - You are taking Oxycodone 10 mg every 6 hours for the pain. It is very painful to touch.  -The pain has worsened recently. You can take oxycodone 10 mg every 4 hours during the day and take 2 tablets at nighttime to help you with sleep. Apply morphine compounded cream 2-3 times per day. -Topical lidocaine is not helping.  -You can also take Advil 200 mg capsules up to 4 capsules/day. Breast cancer:  -You found out your cancer was back on July 31 of this year. That affected you a lot. -You j are currently off all chemo to see if you are eligible for clinical trial at [facility]. Being of chemo has been helpful in getting her energy back. Loss of appetite:  -You have lost 40-45 pounds recently because you don't have an appetite.  -You haven't really been eating but have been maintaining your weight. You are on Marinol and this is somewhat helpful. You are drinking Ensure Plus daily. Nausea/indigestion:  -You can have a burning sensation with a weird taste in your throat.  -You have protonix 40 mg once daily to help prevent acid reflux. It is very important to take it 30-60 minutes before you eat to help maximize its effectiveness.  -You have zofran which you can continue to use. You are not sure how much this is helping.  -I will prescribe compazine for you so that you have another option for nausea. .    Constipation:  -You have been having some diarrhea.  -Opioids can cause constipation.  -You may need to eventually take senna to help stimulate your bowels. Fatigue/weakness:  -You feel tired in general but especially at work. You have a good work team, but you still feel tired. Support system:  -You have a strong support system, but you block them. -You are not sure why you are blocking them.  You know you need them, but for some reason you feel like you need to have time to yourself.  -Your friends and family really want to help you, but you feel like it's just you. You have been shutting people out though that's not what you want to do. -You are used to caring for other people.  -You have two children. Advance care planning:  -You would like your daughter Miguelina Strong to be your medical power of  (mPOA). She is 34. You talk to your daughter every day and she knows what is important to you. -We completed an Advance Medical Directive today listing your daughter as your mPOA. -We discussed that you would like hospital evaluations and treatments if something could be improved/cured but that you would not want life prolonging measures if it appeared that you would not get better.  -We discussed code status. For now you would like to be Full Code. Follow up:  -We will see you back in person in 1 month, sooner as needed.

## 2021-12-29 NOTE — PROGRESS NOTES
Palliative Medicine Outpatient Services  [place]: [phone] ([phone])    Patient Name: Javier Chacko  YOB: 1960    Date of Current Visit: 12/29/21  Location of Current Visit:    [] [facility] Office  [] [facility] Office  [x] [facility] Office  [] Home  []Synchronous real-time A/V virtual visit    Date of Initial Visit: 10/19/2021   Referral from: Dr. Nestor Burns  Primary Care Physician: Moo Duong MD      SUMMARY:   Javier Chacko is a 57y.o. year old with breast cancer who was referred to Palliative Medicine by Dr. Nestor Burns for symptom management. Current treatment: she was diagnosed with recurrent breast cancer in summer 2021. Currently Leslee Lorenzana is on hold d/t neutropenia. Per Oncology, plan is for dose adjustment and attempt approval for neulasta. The patients social history includes: she has 2 children. Right now she has not been sharing too many details about her cancer with others and has been keeping a lot of things to herself. She is open to Social Work support. PALLIATIVE DIAGNOSES:       ICD-10-CM ICD-9-CM    1. Inflammatory carcinoma of left breast (HCC)  C50.912 174.9 oxyCODONE IR (ROXICODONE) 10 mg tab immediate release tablet   2. Breast pain  N64.4 611.71    3. Decreased appetite  R63.0 783.0    4. Neoplastic malignant related fatigue  R53.0 780.79           PLAN:   Patient Instructions     Javier Chacko,      Below is the plan we discussed. Left breast inflammatory lesions  - You are taking Oxycodone 10 mg every 6 hours for the pain. It is very painful to touch.  -The pain has worsened recently. You can take oxycodone 10 mg every 4 hours during the day and take 2 tablets at nighttime to help you with sleep. Apply morphine compounded cream 2-3 times per day. -Topical lidocaine is not helping.  -You can also take Advil 200 mg capsules up to 4 capsules/day. Breast cancer:  -You found out your cancer was back on July 31 of this year. That affected you a lot.   -You angie are currently off all chemo to see if you are eligible for clinical trial at [facility]. Being of chemo has been helpful in getting her energy back. Loss of appetite:  -You have lost 40-45 pounds recently because you don't have an appetite.  -You haven't really been eating but have been maintaining your weight. You are on Marinol and this is somewhat helpful. You are drinking Ensure Plus daily. Nausea/indigestion:  -You can have a burning sensation with a weird taste in your throat.  -You have protonix 40 mg once daily to help prevent acid reflux. It is very important to take it 30-60 minutes before you eat to help maximize its effectiveness.  -You have zofran which you can continue to use. You are not sure how much this is helping.  -I will prescribe compazine for you so that you have another option for nausea. .    Constipation:  -You have been having some diarrhea.  -Opioids can cause constipation.  -You may need to eventually take senna to help stimulate your bowels. Fatigue/weakness:  -You feel tired in general but especially at work. You have a good work team, but you still feel tired. Support system:  -You have a strong support system, but you block them. -You are not sure why you are blocking them. You know you need them, but for some reason you feel like you need to have time to yourself.  -Your friends and family really want to help you, but you feel like it's just you. You have been shutting people out though that's not what you want to do. -You are used to caring for other people.  -You have two children. Advance care planning:  -You would like your daughter Kaylyn Ward to be your medical power of  (mPOA). She is 34. You talk to your daughter every day and she knows what is important to you. -We completed an Advance Medical Directive today listing your daughter as your mPOA.   -We discussed that you would like hospital evaluations and treatments if something could be improved/cured but that you would not want life prolonging measures if it appeared that you would not get better.  -We discussed code status. For now you would like to be Full Code. Follow up:  -We will see you back in person in 1 month, sooner as needed. GOALS OF CARE / TREATMENT PREFERENCES:   [====Goals of Care====]  GOALS OF CARE:  Patient / health care proxy stated goals: See Patient Instructions / Summary    TREATMENT PREFERENCES:   Code Status:  [x] Attempt Resuscitation       [] Do Not Attempt Resuscitation    Advance Care Planning:  [x] The [employer] Interdisciplinary Team has updated the ACP Navigator with Decision Maker and Patient Capacity      Primary Decision MakerEmry Part - Mia - [phone]  Advance Care Planning 11/22/2021   Patient's Healthcare Decision Maker is: Named in scanned ACP document   Confirm Advance Directive Yes, on file   Patient Would Like to Complete Advance Directive -       Other:  (If patient appropriate for POST, consider using PALLPOST smart phrase here)    The palliative care team has discussed with patient / health care proxy about goals of care / treatment preferences for patient.  [====Goals of Care====]     PRESCRIPTIONS GIVEN:     Medications Ordered Today   Medications    oxyCODONE IR (ROXICODONE) 10 mg tab immediate release tablet     Sig: Take 1 Tablet by mouth every four (4) hours as needed for Pain for up to 30 days. Max Daily Amount: 60 mg.      Dispense:  180 Tablet     Refill:  0           FOLLOW UP:     Future Appointments   Date Time Provider Jyotsna Katy   1/5/2022  9:45 AM Leopoldo York MD [facility]   1/17/2022 10:45 AM [facility]           PHYSICIANS INVOLVED IN CARE:   Patient Care Team:  Milly Jarquin MD as PCP - General (Family Medicine)  Leopoldo York MD as Physician (Breast Surgery)  Eusebio Stinson MD as Physician (Hematology and Oncology)  Leopoldo York MD (Breast Surgery)  Josiah Brayan Loomis MD as Physician (Radiation Oncology)       HISTORY:   Reviewed patient-completed ESAS and advance care planning form. Reviewed patient record in prescription monitoring program.    CHIEF COMPLAINT:   Chief Complaint   Patient presents with    Follow-up    Breast pain     Left       HPI/SUBJECTIVE:    The patient is: [x] Verbal / [] Nonverbal     10/19: patient is seen in [address] [place] [address] today for initial Palliative visit. She arrived to appointment alone. She has concerns about nausea and lack of appetite. See plan for further details. Clinical Pain Assessment (nonverbal scale for nonverbal patients):   [++++ Clinical Pain Assessment++++]  [++++Pain Severity++++]: Pain: 9  [++++Pain Character++++]:throbbing pain  [++++Pain Duration++++]:months  [++++Pain Effect++++]:functional  [++++Pain Factors++++]:none  [++++Pain Frequency++++]:constant  [++++Pain Location++++]:left breast  [++++ Clinical Pain Assessment++++]       FUNCTIONAL ASSESSMENT:     Palliative Performance Scale (PPS):  PPS: 90       PSYCHOSOCIAL/SPIRITUAL SCREENING:     Any spiritual / Sikh concerns:  [] Yes /  [x] No    Caregiver Burnout:  [] Yes /  [x] No /  [] No Caregiver Present      Anticipatory grief assessment:   [x] Normal  / [] Maladaptive       ESAS Anxiety: Anxiety: 0    ESAS Depression: Depression: 0       REVIEW OF SYSTEMS:     The following systems were [x] reviewed / [] unable to be reviewed  Systems: constitutional, ears/nose/mouth/throat, respiratory, gastrointestinal, genitourinary, musculoskeletal, integumentary, neurologic, psychiatric, endocrine. Positive findings noted below.   Modified ESAS Completed by: provider   Fatigue: 0 Drowsiness: 0   Depression: 0 Pain: 9   Anxiety: 0 Nausea: 4   Anorexia: 6 Dyspnea: 0   Best Well-Bein Constipation: No              PHYSICAL EXAM:     Wt Readings from Last 3 Encounters:   21 175 lb 8 oz (79.6 kg)   21 175 lb 7.8 oz (79.6 kg)   21 172 lb 9.6 oz (78.3 kg)     There were no vitals taken for this visit. Last bowel movement: See Nursing Note    Constitutional    [x] Appears well-developed and well-nourished in no apparent distress    [] Abnormal:  Mental status  [x] Alert and awake  [x] Oriented to person/place/time  [x] Able to follow commands  [] Abnormal:   Eyes  [x] EOM normal   [x] Sclera normal   [x] No visible ocular discharge  [] Abnormal:   HENT  [x] Normocephalic, atraumatic  [x] Mouth/Throat: Moist mucous membranes   [x] External Ears normal  [] Abnormal:  Neck  [x] No visualized mass  [] Abnormal:  Pulmonary/Chest   [] Respiratory effort normal  [] No visualized signs of difficulty breathing or respiratory distress  [] Abnormal: Left breast swelling with severe inflammation throughout the entire breast extending into the left axilla. 2-3 open sores seen. Musculoskeletal  [x] Normal gait with no signs of ataxia  [x] Normal range of motion of neck  [] Abnormal:  Neurological:   [x] No facial asymmetry (Cranial nerve 7 motor function)  [x] No gaze palsy  [] Abnormal:   Skin  [x] No significant exanthematous lesions or discoloration noted on facial skin  [] Abnormal:                                  Psychiatric  [x] Normal affect  [x] No hallucinations  [] Abnormal:    Other pertinent observable physical exam findings:    Due to this being a TeleHealth evaluation, many elements of the physical examination are unable to be assessed.                HISTORY:     Past Medical History:   Diagnosis Date    Breast cancer ([address]     left side    Menopause       Past Surgical History:   Procedure Laterality Date    HX BREAST BIOPSY Left     x2    HX BREAST LUMPECTOMY Left 9/5/2019    LEFT BREAST LUMPECTOMY WITH ULTRASOUND performed [facility] Kelin Brown [facility]    HX HYSTERECTOMY      partial, ovaries remain    IR INSERT TUNL CVC W PORT OVER 5 YEARS  8/16/2021    VASCULAR SURGERY PROCEDURE UNLIST      portacath      Family History   Problem Relation Age of Onset    Cancer Father     Cancer Maternal Aunt     Breast Cancer Maternal Aunt         4 paternal aunts    Cancer Maternal Uncle     Cancer Paternal Aunt     Cancer Paternal Uncle     Breast Cancer Maternal Grandmother       History reviewed, no pertinent family history. Social History     Tobacco Use    Smoking status: Former Smoker     Packs/day: 0.10     Quit date: 2020     Years since quittin.9    Smokeless tobacco: Never Used   Substance Use Topics    Alcohol use: No     Allergies   Allergen Reactions    Codeine Rash     Rash from tylenol #3      Current Outpatient Medications   Medication Sig    [START ON 2022] oxyCODONE IR (ROXICODONE) 10 mg tab immediate release tablet Take 1 Tablet by mouth every four (4) hours as needed for Pain for up to 30 days. Max Daily Amount: 60 mg.    lidocaine (XYLOCAINE) 4 % topical cream Apply  to affected area four (4) times daily as needed for Pain.  megestroL (MEGACE) 40 mg tablet Take 1 Tablet by mouth two (2) times a day.  ondansetron (ZOFRAN ODT) 4 mg disintegrating tablet     pantoprazole (PROTONIX) 40 mg tablet Take 1 Tablet by mouth daily.  dronabinoL (Marinol) 2.5 mg capsule Take 1 Capsule by mouth two (2) times a day. Max Daily Amount: 5 mg.  lidocaine-prilocaine (EMLA) topical cream Apply  to affected area as needed for Pain. Apply generous amount to port site 30 minutes prior to infusions and cover with plastic wrap     No current facility-administered medications for this visit.           LAB and other DATA REVIEWED:     Lab Results   Component Value Date/Time    WBC 3.5 (L) 2021 10:01 AM    HGB 10.1 (L) 2021 10:01 AM    PLATELET 856  10:01 AM     Lab Results   Component Value Date/Time    Sodium 140 2021 10:01 AM    Potassium 3.2 (L) 2021 10:01 AM    Chloride 109 (H) 2021 10:01 AM    CO2 22 2021 10:01 AM    BUN 9 2021 10:01 AM    Creatinine 0.70 2021 10:01 AM    Calcium 9.3 12/13/2021 10:01 AM      Lab Results   Component Value Date/Time    Alk. phosphatase 110 12/13/2021 10:01 AM    Protein, total 6.7 12/13/2021 10:01 AM    Albumin 3.0 (L) 12/13/2021 10:01 AM    Globulin 3.7 12/13/2021 10:01 AM     No results found for: INR, PTMR, PTP, PT1, PT2, APTT, INREXT, INREXT   No results found for: IRON, FE, TIBC, IBCT, PSAT, FERR     Detail chart reviewed. Other specialists and referral provider notes reviewed. CONTROLLED SUBSTANCES SAFETY ASSESSMENT (IF ON CONTROLLED SUBSTANCES):     Reviewed opioid safety handout:  [x] Yes   [] No  24 hour opioid dose >150mg morphine equivalent/day:  [] Yes   [] No  Benzodiazepines:  [] Yes   [] No  Sleep apnea:  [] Yes   [] No  Urine Toxicology Testing within last 6 months:  [] Yes   [] No  History of or new aberrant medication taking behaviors:  [] Yes   [x] No  Has Narcan been prescribed [x] Yes   [] No              Consent:  He and/or health care decision maker is aware that that he may receive a bill for this telehealth service, depending on his insurance coverage, and has provided verbal consent to proceed: Yes    CPT Codes 71270-95173 for Established Patients may apply to this Telehealth VisitTime-based coding, delete if not needed: I spent at least 40 minutes with this established patient, and >50% of the time was spent counseling and/or coordinating care regarding opioid safety, psychosocial support  Pursuant to the emergency declaration under the [employer] and the [address] authority and the [employer] Act, this Virtual  Visit was conducted, with patient's consent, to reduce the patient's risk of exposure to COVID-19 and provide continuity of care for an established patient. Services were provided through a video synchronous discussion virtually to substitute for in-person clinic visit.

## 2022-01-01 ENCOUNTER — TELEPHONE (OUTPATIENT)
Dept: PALLATIVE CARE | Age: 62
End: 2022-01-01

## 2022-01-01 ENCOUNTER — HOME CARE VISIT (OUTPATIENT)
Dept: HOSPICE | Facility: HOSPICE | Age: 62
End: 2022-01-01
Payer: MEDICAID

## 2022-01-01 ENCOUNTER — HOSPITAL ENCOUNTER (INPATIENT)
Age: 62
LOS: 4 days | End: 2022-06-28
Attending: FAMILY MEDICINE | Admitting: FAMILY MEDICINE

## 2022-01-01 ENCOUNTER — APPOINTMENT (OUTPATIENT)
Dept: INFUSION THERAPY | Age: 62
End: 2022-01-01

## 2022-01-01 ENCOUNTER — HOME CARE VISIT (OUTPATIENT)
Dept: SCHEDULING | Facility: HOME HEALTH | Age: 62
End: 2022-01-01
Payer: MEDICAID

## 2022-01-01 ENCOUNTER — HOSPITAL ENCOUNTER (INPATIENT)
Age: 62
LOS: 13 days | Discharge: HOME HEALTH | DRG: 721 | End: 2022-04-07
Attending: EMERGENCY MEDICINE | Admitting: STUDENT IN AN ORGANIZED HEALTH CARE EDUCATION/TRAINING PROGRAM
Payer: MEDICAID

## 2022-01-01 ENCOUNTER — APPOINTMENT (OUTPATIENT)
Dept: NON INVASIVE DIAGNOSTICS | Age: 62
DRG: 721 | End: 2022-01-01
Attending: INTERNAL MEDICINE
Payer: MEDICAID

## 2022-01-01 ENCOUNTER — APPOINTMENT (OUTPATIENT)
Dept: CT IMAGING | Age: 62
DRG: 721 | End: 2022-01-01
Attending: EMERGENCY MEDICINE
Payer: MEDICAID

## 2022-01-01 ENCOUNTER — HOSPITAL ENCOUNTER (INPATIENT)
Age: 62
LOS: 5 days | Discharge: HOSPICE | End: 2022-05-22
Attending: FAMILY MEDICINE | Admitting: FAMILY MEDICINE

## 2022-01-01 ENCOUNTER — HOSPITAL ENCOUNTER (OUTPATIENT)
Dept: INFUSION THERAPY | Age: 62
End: 2022-01-01

## 2022-01-01 ENCOUNTER — TELEPHONE (OUTPATIENT)
Dept: ONCOLOGY | Age: 62
End: 2022-01-01

## 2022-01-01 ENCOUNTER — OFFICE VISIT (OUTPATIENT)
Dept: INTERNAL MEDICINE CLINIC | Age: 62
End: 2022-01-01
Payer: MEDICAID

## 2022-01-01 ENCOUNTER — APPOINTMENT (OUTPATIENT)
Dept: INTERVENTIONAL RADIOLOGY/VASCULAR | Age: 62
DRG: 721 | End: 2022-01-01
Attending: INTERNAL MEDICINE
Payer: MEDICAID

## 2022-01-01 ENCOUNTER — VIRTUAL VISIT (OUTPATIENT)
Dept: PALLATIVE CARE | Age: 62
End: 2022-01-01
Payer: COMMERCIAL

## 2022-01-01 ENCOUNTER — APPOINTMENT (OUTPATIENT)
Dept: CT IMAGING | Age: 62
DRG: 721 | End: 2022-01-01
Attending: INTERNAL MEDICINE
Payer: MEDICAID

## 2022-01-01 ENCOUNTER — HOSPICE ADMISSION (OUTPATIENT)
Dept: HOSPICE | Facility: HOSPICE | Age: 62
End: 2022-01-01

## 2022-01-01 ENCOUNTER — HOSPICE ADMISSION (OUTPATIENT)
Dept: HOSPICE | Facility: HOSPICE | Age: 62
End: 2022-01-01
Payer: MEDICAID

## 2022-01-01 VITALS
SYSTOLIC BLOOD PRESSURE: 138 MMHG | WEIGHT: 183.1 LBS | DIASTOLIC BLOOD PRESSURE: 76 MMHG | HEART RATE: 132 BPM | BODY MASS INDEX: 33.69 KG/M2 | HEIGHT: 62 IN | RESPIRATION RATE: 16 BRPM | OXYGEN SATURATION: 92 % | TEMPERATURE: 98.3 F

## 2022-01-01 VITALS
DIASTOLIC BLOOD PRESSURE: 58 MMHG | TEMPERATURE: 98 F | OXYGEN SATURATION: 90 % | HEART RATE: 78 BPM | SYSTOLIC BLOOD PRESSURE: 102 MMHG | RESPIRATION RATE: 14 BRPM

## 2022-01-01 VITALS
SYSTOLIC BLOOD PRESSURE: 126 MMHG | RESPIRATION RATE: 14 BRPM | TEMPERATURE: 98.1 F | OXYGEN SATURATION: 91 % | DIASTOLIC BLOOD PRESSURE: 72 MMHG | HEART RATE: 122 BPM

## 2022-01-01 VITALS
DIASTOLIC BLOOD PRESSURE: 82 MMHG | TEMPERATURE: 97.7 F | SYSTOLIC BLOOD PRESSURE: 135 MMHG | HEART RATE: 122 BPM | RESPIRATION RATE: 20 BRPM | OXYGEN SATURATION: 95 %

## 2022-01-01 VITALS
SYSTOLIC BLOOD PRESSURE: 161 MMHG | DIASTOLIC BLOOD PRESSURE: 99 MMHG | TEMPERATURE: 96.3 F | BODY MASS INDEX: 28.88 KG/M2 | HEART RATE: 129 BPM | WEIGHT: 163 LBS | HEIGHT: 63 IN

## 2022-01-01 VITALS
SYSTOLIC BLOOD PRESSURE: 94 MMHG | HEART RATE: 122 BPM | TEMPERATURE: 97.6 F | DIASTOLIC BLOOD PRESSURE: 60 MMHG | RESPIRATION RATE: 20 BRPM

## 2022-01-01 VITALS
SYSTOLIC BLOOD PRESSURE: 102 MMHG | HEART RATE: 119 BPM | TEMPERATURE: 97.9 F | RESPIRATION RATE: 12 BRPM | DIASTOLIC BLOOD PRESSURE: 60 MMHG

## 2022-01-01 VITALS
OXYGEN SATURATION: 98 % | TEMPERATURE: 97.8 F | RESPIRATION RATE: 16 BRPM | DIASTOLIC BLOOD PRESSURE: 76 MMHG | HEART RATE: 115 BPM | SYSTOLIC BLOOD PRESSURE: 122 MMHG

## 2022-01-01 VITALS
TEMPERATURE: 98.6 F | SYSTOLIC BLOOD PRESSURE: 107 MMHG | RESPIRATION RATE: 16 BRPM | OXYGEN SATURATION: 99 % | HEART RATE: 111 BPM | DIASTOLIC BLOOD PRESSURE: 70 MMHG

## 2022-01-01 VITALS — SYSTOLIC BLOOD PRESSURE: 102 MMHG | HEART RATE: 98 BPM | RESPIRATION RATE: 14 BRPM | DIASTOLIC BLOOD PRESSURE: 50 MMHG

## 2022-01-01 VITALS
OXYGEN SATURATION: 78 % | DIASTOLIC BLOOD PRESSURE: 39 MMHG | RESPIRATION RATE: 18 BRPM | SYSTOLIC BLOOD PRESSURE: 60 MMHG | HEART RATE: 123 BPM | TEMPERATURE: 102.2 F

## 2022-01-01 VITALS — HEART RATE: 84 BPM | DIASTOLIC BLOOD PRESSURE: 60 MMHG | SYSTOLIC BLOOD PRESSURE: 112 MMHG | RESPIRATION RATE: 14 BRPM

## 2022-01-01 VITALS
HEART RATE: 110 BPM | OXYGEN SATURATION: 97 % | TEMPERATURE: 98 F | SYSTOLIC BLOOD PRESSURE: 100 MMHG | DIASTOLIC BLOOD PRESSURE: 60 MMHG | RESPIRATION RATE: 18 BRPM

## 2022-01-01 VITALS
DIASTOLIC BLOOD PRESSURE: 75 MMHG | TEMPERATURE: 98.1 F | RESPIRATION RATE: 16 BRPM | SYSTOLIC BLOOD PRESSURE: 126 MMHG | HEART RATE: 118 BPM

## 2022-01-01 VITALS
SYSTOLIC BLOOD PRESSURE: 107 MMHG | OXYGEN SATURATION: 97 % | DIASTOLIC BLOOD PRESSURE: 70 MMHG | TEMPERATURE: 97.8 F | HEART RATE: 111 BPM | RESPIRATION RATE: 16 BRPM

## 2022-01-01 VITALS
TEMPERATURE: 99.1 F | HEART RATE: 121 BPM | RESPIRATION RATE: 16 BRPM | SYSTOLIC BLOOD PRESSURE: 136 MMHG | DIASTOLIC BLOOD PRESSURE: 88 MMHG

## 2022-01-01 VITALS
SYSTOLIC BLOOD PRESSURE: 128 MMHG | DIASTOLIC BLOOD PRESSURE: 82 MMHG | RESPIRATION RATE: 18 BRPM | HEART RATE: 90 BPM | TEMPERATURE: 97.4 F

## 2022-01-01 VITALS — RESPIRATION RATE: 14 BRPM

## 2022-01-01 VITALS
RESPIRATION RATE: 18 BRPM | SYSTOLIC BLOOD PRESSURE: 138 MMHG | TEMPERATURE: 99 F | DIASTOLIC BLOOD PRESSURE: 80 MMHG | HEART RATE: 124 BPM

## 2022-01-01 DIAGNOSIS — R11.2 NON-INTRACTABLE VOMITING WITH NAUSEA, UNSPECIFIED VOMITING TYPE: ICD-10-CM

## 2022-01-01 DIAGNOSIS — C50.912 INFLAMMATORY CARCINOMA OF LEFT BREAST (HCC): ICD-10-CM

## 2022-01-01 DIAGNOSIS — N61.0 CELLULITIS OF LEFT BREAST: ICD-10-CM

## 2022-01-01 DIAGNOSIS — G89.3 CANCER ASSOCIATED PAIN: ICD-10-CM

## 2022-01-01 DIAGNOSIS — C50.912 BREAST CANCER, STAGE 4, LEFT (HCC): ICD-10-CM

## 2022-01-01 DIAGNOSIS — J91.0 PLEURAL EFFUSION, MALIGNANT: ICD-10-CM

## 2022-01-01 DIAGNOSIS — C50.412 MALIGNANT NEOPLASM OF UPPER-OUTER QUADRANT OF LEFT BREAST IN FEMALE, ESTROGEN RECEPTOR NEGATIVE (HCC): ICD-10-CM

## 2022-01-01 DIAGNOSIS — C50.919 METASTATIC BREAST CANCER (HCC): Primary | ICD-10-CM

## 2022-01-01 DIAGNOSIS — R11.2 INTRACTABLE NAUSEA AND VOMITING: ICD-10-CM

## 2022-01-01 DIAGNOSIS — R65.20 SEVERE SEPSIS (HCC): Primary | ICD-10-CM

## 2022-01-01 DIAGNOSIS — Z51.5 HOSPICE CARE PATIENT: ICD-10-CM

## 2022-01-01 DIAGNOSIS — R53.0 NEOPLASTIC MALIGNANT RELATED FATIGUE: ICD-10-CM

## 2022-01-01 DIAGNOSIS — A41.9 SEVERE SEPSIS (HCC): Primary | ICD-10-CM

## 2022-01-01 DIAGNOSIS — C50.919 METASTATIC BREAST CANCER (HCC): ICD-10-CM

## 2022-01-01 DIAGNOSIS — R45.1 RESTLESSNESS: ICD-10-CM

## 2022-01-01 DIAGNOSIS — C50.912 INFLAMMATORY CARCINOMA OF LEFT BREAST (HCC): Primary | ICD-10-CM

## 2022-01-01 DIAGNOSIS — Z51.5 PALLIATIVE CARE ENCOUNTER: ICD-10-CM

## 2022-01-01 DIAGNOSIS — R06.89 IRREGULAR BREATHING PATTERN: ICD-10-CM

## 2022-01-01 DIAGNOSIS — G89.3 CANCER RELATED PAIN: ICD-10-CM

## 2022-01-01 DIAGNOSIS — K62.89 RECTAL PAIN: ICD-10-CM

## 2022-01-01 DIAGNOSIS — Z17.1 MALIGNANT NEOPLASM OF UPPER-OUTER QUADRANT OF LEFT BREAST IN FEMALE, ESTROGEN RECEPTOR NEGATIVE (HCC): ICD-10-CM

## 2022-01-01 DIAGNOSIS — R41.89 UNRESPONSIVE: ICD-10-CM

## 2022-01-01 DIAGNOSIS — R45.89 NEED FOR EMOTIONAL SUPPORT: ICD-10-CM

## 2022-01-01 DIAGNOSIS — Z71.89 GOALS OF CARE, COUNSELING/DISCUSSION: ICD-10-CM

## 2022-01-01 DIAGNOSIS — R63.0 DECREASED APPETITE: ICD-10-CM

## 2022-01-01 DIAGNOSIS — N64.4 BREAST PAIN: Primary | ICD-10-CM

## 2022-01-01 LAB
ALBUMIN SERPL-MCNC: 2.1 G/DL (ref 3.5–5)
ALBUMIN/GLOB SERPL: 0.6 {RATIO} (ref 1.1–2.2)
ALP SERPL-CCNC: 98 U/L (ref 45–117)
ALT SERPL-CCNC: 10 U/L (ref 12–78)
ANION GAP SERPL CALC-SCNC: 10 MMOL/L (ref 5–15)
ANION GAP SERPL CALC-SCNC: 11 MMOL/L (ref 5–15)
ANION GAP SERPL CALC-SCNC: 11 MMOL/L (ref 5–15)
ANION GAP SERPL CALC-SCNC: 14 MMOL/L (ref 5–15)
ANION GAP SERPL CALC-SCNC: 6 MMOL/L (ref 5–15)
ANION GAP SERPL CALC-SCNC: 7 MMOL/L (ref 5–15)
ANION GAP SERPL CALC-SCNC: 8 MMOL/L (ref 5–15)
ANION GAP SERPL CALC-SCNC: 9 MMOL/L (ref 5–15)
AST SERPL-CCNC: 24 U/L (ref 15–37)
ATRIAL RATE: 112 BPM
ATRIAL RATE: 138 BPM
BACTERIA SPEC CULT: ABNORMAL
BACTERIA SPEC CULT: NORMAL
BASOPHILS # BLD: 0 K/UL (ref 0–0.1)
BASOPHILS NFR BLD: 0 % (ref 0–1)
BASOPHILS NFR BLD: 1 % (ref 0–1)
BILIRUB SERPL-MCNC: 0.5 MG/DL (ref 0.2–1)
BUN SERPL-MCNC: 10 MG/DL (ref 6–20)
BUN SERPL-MCNC: 12 MG/DL (ref 6–20)
BUN SERPL-MCNC: 3 MG/DL (ref 6–20)
BUN SERPL-MCNC: 4 MG/DL (ref 6–20)
BUN SERPL-MCNC: 5 MG/DL (ref 6–20)
BUN SERPL-MCNC: 6 MG/DL (ref 6–20)
BUN SERPL-MCNC: 7 MG/DL (ref 6–20)
BUN SERPL-MCNC: 8 MG/DL (ref 6–20)
BUN SERPL-MCNC: 8 MG/DL (ref 6–20)
BUN SERPL-MCNC: 9 MG/DL (ref 6–20)
BUN SERPL-MCNC: 9 MG/DL (ref 6–20)
BUN/CREAT SERPL: 10 (ref 12–20)
BUN/CREAT SERPL: 11 (ref 12–20)
BUN/CREAT SERPL: 11 (ref 12–20)
BUN/CREAT SERPL: 12 (ref 12–20)
BUN/CREAT SERPL: 12 (ref 12–20)
BUN/CREAT SERPL: 14 (ref 12–20)
BUN/CREAT SERPL: 20 (ref 12–20)
BUN/CREAT SERPL: 26 (ref 12–20)
BUN/CREAT SERPL: 6 (ref 12–20)
BUN/CREAT SERPL: 7 (ref 12–20)
BUN/CREAT SERPL: 8 (ref 12–20)
CALCIUM SERPL-MCNC: 8 MG/DL (ref 8.5–10.1)
CALCIUM SERPL-MCNC: 8.1 MG/DL (ref 8.5–10.1)
CALCIUM SERPL-MCNC: 8.1 MG/DL (ref 8.5–10.1)
CALCIUM SERPL-MCNC: 8.2 MG/DL (ref 8.5–10.1)
CALCIUM SERPL-MCNC: 8.2 MG/DL (ref 8.5–10.1)
CALCIUM SERPL-MCNC: 8.3 MG/DL (ref 8.5–10.1)
CALCIUM SERPL-MCNC: 8.4 MG/DL (ref 8.5–10.1)
CALCIUM SERPL-MCNC: 8.4 MG/DL (ref 8.5–10.1)
CALCIUM SERPL-MCNC: 8.5 MG/DL (ref 8.5–10.1)
CALCIUM SERPL-MCNC: 8.6 MG/DL (ref 8.5–10.1)
CALCIUM SERPL-MCNC: 8.8 MG/DL (ref 8.5–10.1)
CALCULATED P AXIS, ECG09: 45 DEGREES
CALCULATED P AXIS, ECG09: 45 DEGREES
CALCULATED R AXIS, ECG10: 35 DEGREES
CALCULATED R AXIS, ECG10: 47 DEGREES
CALCULATED T AXIS, ECG11: 47 DEGREES
CALCULATED T AXIS, ECG11: 53 DEGREES
CHLORIDE SERPL-SCNC: 101 MMOL/L (ref 97–108)
CHLORIDE SERPL-SCNC: 102 MMOL/L (ref 97–108)
CHLORIDE SERPL-SCNC: 104 MMOL/L (ref 97–108)
CHLORIDE SERPL-SCNC: 105 MMOL/L (ref 97–108)
CHLORIDE SERPL-SCNC: 106 MMOL/L (ref 97–108)
CHLORIDE SERPL-SCNC: 107 MMOL/L (ref 97–108)
CHLORIDE SERPL-SCNC: 107 MMOL/L (ref 97–108)
CHLORIDE SERPL-SCNC: 108 MMOL/L (ref 97–108)
CHLORIDE SERPL-SCNC: 108 MMOL/L (ref 97–108)
CO2 SERPL-SCNC: 20 MMOL/L (ref 21–32)
CO2 SERPL-SCNC: 22 MMOL/L (ref 21–32)
CO2 SERPL-SCNC: 23 MMOL/L (ref 21–32)
CO2 SERPL-SCNC: 24 MMOL/L (ref 21–32)
CO2 SERPL-SCNC: 25 MMOL/L (ref 21–32)
CO2 SERPL-SCNC: 25 MMOL/L (ref 21–32)
CO2 SERPL-SCNC: 27 MMOL/L (ref 21–32)
CREAT SERPL-MCNC: 0.42 MG/DL (ref 0.55–1.02)
CREAT SERPL-MCNC: 0.47 MG/DL (ref 0.55–1.02)
CREAT SERPL-MCNC: 0.49 MG/DL (ref 0.55–1.02)
CREAT SERPL-MCNC: 0.53 MG/DL (ref 0.55–1.02)
CREAT SERPL-MCNC: 0.53 MG/DL (ref 0.55–1.02)
CREAT SERPL-MCNC: 0.64 MG/DL (ref 0.55–1.02)
CREAT SERPL-MCNC: 0.65 MG/DL (ref 0.55–1.02)
CREAT SERPL-MCNC: 0.67 MG/DL (ref 0.55–1.02)
CREAT SERPL-MCNC: 0.7 MG/DL (ref 0.55–1.02)
CREAT SERPL-MCNC: 0.71 MG/DL (ref 0.55–1.02)
CREAT SERPL-MCNC: 0.72 MG/DL (ref 0.55–1.02)
CREAT SERPL-MCNC: 0.72 MG/DL (ref 0.55–1.02)
CREAT SERPL-MCNC: 0.77 MG/DL (ref 0.55–1.02)
DATE LAST DOSE: ABNORMAL
DIAGNOSIS, 93000: NORMAL
DIAGNOSIS, 93000: NORMAL
DIFFERENTIAL METHOD BLD: ABNORMAL
ECHO LA DIAMETER INDEX: 2.04 CM/M2
ECHO LA DIAMETER: 3.4 CM
ECHO LV FRACTIONAL SHORTENING: 28 % (ref 28–44)
ECHO LV INTERNAL DIMENSION DIASTOLE INDEX: 2.57 CM/M2
ECHO LV INTERNAL DIMENSION DIASTOLIC: 4.3 CM (ref 3.9–5.3)
ECHO LV INTERNAL DIMENSION SYSTOLIC INDEX: 1.86 CM/M2
ECHO LV INTERNAL DIMENSION SYSTOLIC: 3.1 CM
ECHO LV IVSD: 1.1 CM (ref 0.6–0.9)
ECHO LV MASS 2D: 132.7 G (ref 67–162)
ECHO LV MASS INDEX 2D: 79.5 G/M2 (ref 43–95)
ECHO LV POSTERIOR WALL DIASTOLIC: 0.8 CM (ref 0.6–0.9)
ECHO LV RELATIVE WALL THICKNESS RATIO: 0.37
EOSINOPHIL # BLD: 0 K/UL (ref 0–0.4)
EOSINOPHIL # BLD: 0 K/UL (ref 0–0.4)
EOSINOPHIL # BLD: 0.1 K/UL (ref 0–0.4)
EOSINOPHIL # BLD: 0.2 K/UL (ref 0–0.4)
EOSINOPHIL NFR BLD: 0 % (ref 0–7)
EOSINOPHIL NFR BLD: 0 % (ref 0–7)
EOSINOPHIL NFR BLD: 2 % (ref 0–7)
EOSINOPHIL NFR BLD: 2 % (ref 0–7)
EOSINOPHIL NFR BLD: 4 % (ref 0–7)
EOSINOPHIL NFR BLD: 4 % (ref 0–7)
EOSINOPHIL NFR BLD: 5 % (ref 0–7)
ERYTHROCYTE [DISTWIDTH] IN BLOOD BY AUTOMATED COUNT: 21.9 % (ref 11.5–14.5)
ERYTHROCYTE [DISTWIDTH] IN BLOOD BY AUTOMATED COUNT: 21.9 % (ref 11.5–14.5)
ERYTHROCYTE [DISTWIDTH] IN BLOOD BY AUTOMATED COUNT: 22 % (ref 11.5–14.5)
ERYTHROCYTE [DISTWIDTH] IN BLOOD BY AUTOMATED COUNT: 22 % (ref 11.5–14.5)
ERYTHROCYTE [DISTWIDTH] IN BLOOD BY AUTOMATED COUNT: 22.9 % (ref 11.5–14.5)
ERYTHROCYTE [DISTWIDTH] IN BLOOD BY AUTOMATED COUNT: 23.5 % (ref 11.5–14.5)
ERYTHROCYTE [DISTWIDTH] IN BLOOD BY AUTOMATED COUNT: 23.7 % (ref 11.5–14.5)
ERYTHROCYTE [DISTWIDTH] IN BLOOD BY AUTOMATED COUNT: 24.7 % (ref 11.5–14.5)
ERYTHROCYTE [DISTWIDTH] IN BLOOD BY AUTOMATED COUNT: 25.5 % (ref 11.5–14.5)
GLOBULIN SER CALC-MCNC: 3.8 G/DL (ref 2–4)
GLUCOSE SERPL-MCNC: 101 MG/DL (ref 65–100)
GLUCOSE SERPL-MCNC: 126 MG/DL (ref 65–100)
GLUCOSE SERPL-MCNC: 69 MG/DL (ref 65–100)
GLUCOSE SERPL-MCNC: 71 MG/DL (ref 65–100)
GLUCOSE SERPL-MCNC: 71 MG/DL (ref 65–100)
GLUCOSE SERPL-MCNC: 73 MG/DL (ref 65–100)
GLUCOSE SERPL-MCNC: 73 MG/DL (ref 65–100)
GLUCOSE SERPL-MCNC: 75 MG/DL (ref 65–100)
GLUCOSE SERPL-MCNC: 82 MG/DL (ref 65–100)
GLUCOSE SERPL-MCNC: 84 MG/DL (ref 65–100)
GLUCOSE SERPL-MCNC: 88 MG/DL (ref 65–100)
GLUCOSE SERPL-MCNC: 91 MG/DL (ref 65–100)
GLUCOSE SERPL-MCNC: 97 MG/DL (ref 65–100)
GRAM STN SPEC: ABNORMAL
GRAM STN SPEC: NORMAL
HCT VFR BLD AUTO: 27.4 % (ref 35–47)
HCT VFR BLD AUTO: 27.6 % (ref 35–47)
HCT VFR BLD AUTO: 27.7 % (ref 35–47)
HCT VFR BLD AUTO: 27.7 % (ref 35–47)
HCT VFR BLD AUTO: 28.1 % (ref 35–47)
HCT VFR BLD AUTO: 28.1 % (ref 35–47)
HCT VFR BLD AUTO: 28.2 % (ref 35–47)
HCT VFR BLD AUTO: 28.9 % (ref 35–47)
HCT VFR BLD AUTO: 29.6 % (ref 35–47)
HGB BLD-MCNC: 7.9 G/DL (ref 11.5–16)
HGB BLD-MCNC: 8.1 G/DL (ref 11.5–16)
HGB BLD-MCNC: 8.1 G/DL (ref 11.5–16)
HGB BLD-MCNC: 8.2 G/DL (ref 11.5–16)
HGB BLD-MCNC: 8.2 G/DL (ref 11.5–16)
HGB BLD-MCNC: 8.3 G/DL (ref 11.5–16)
HGB BLD-MCNC: 8.3 G/DL (ref 11.5–16)
HGB BLD-MCNC: 8.7 G/DL (ref 11.5–16)
HGB BLD-MCNC: 8.9 G/DL (ref 11.5–16)
IMM GRANULOCYTES # BLD AUTO: 0 K/UL (ref 0–0.04)
IMM GRANULOCYTES # BLD AUTO: 0.1 K/UL (ref 0–0.04)
IMM GRANULOCYTES NFR BLD AUTO: 0 % (ref 0–0.5)
IMM GRANULOCYTES NFR BLD AUTO: 1 % (ref 0–0.5)
LACTATE SERPL-SCNC: 3.5 MMOL/L (ref 0.4–2)
LACTATE SERPL-SCNC: 4 MMOL/L (ref 0.4–2)
LACTATE SERPL-SCNC: 4.4 MMOL/L (ref 0.4–2)
LACTATE SERPL-SCNC: 5.1 MMOL/L (ref 0.4–2)
LACTATE SERPL-SCNC: 5.2 MMOL/L (ref 0.4–2)
LACTATE SERPL-SCNC: 5.2 MMOL/L (ref 0.4–2)
LACTATE SERPL-SCNC: 5.3 MMOL/L (ref 0.4–2)
LACTATE SERPL-SCNC: 5.9 MMOL/L (ref 0.4–2)
LYMPHOCYTES # BLD: 0.5 K/UL (ref 0.8–3.5)
LYMPHOCYTES # BLD: 0.6 K/UL (ref 0.8–3.5)
LYMPHOCYTES # BLD: 0.7 K/UL (ref 0.8–3.5)
LYMPHOCYTES # BLD: 0.7 K/UL (ref 0.8–3.5)
LYMPHOCYTES # BLD: 0.9 K/UL (ref 0.8–3.5)
LYMPHOCYTES # BLD: 0.9 K/UL (ref 0.8–3.5)
LYMPHOCYTES # BLD: 1.2 K/UL (ref 0.8–3.5)
LYMPHOCYTES NFR BLD: 10 % (ref 12–49)
LYMPHOCYTES NFR BLD: 14 % (ref 12–49)
LYMPHOCYTES NFR BLD: 15 % (ref 12–49)
LYMPHOCYTES NFR BLD: 18 % (ref 12–49)
LYMPHOCYTES NFR BLD: 21 % (ref 12–49)
LYMPHOCYTES NFR BLD: 21 % (ref 12–49)
LYMPHOCYTES NFR BLD: 22 % (ref 12–49)
MAGNESIUM SERPL-MCNC: 1.5 MG/DL (ref 1.6–2.4)
MAGNESIUM SERPL-MCNC: 1.7 MG/DL (ref 1.6–2.4)
MAGNESIUM SERPL-MCNC: 1.7 MG/DL (ref 1.6–2.4)
MAGNESIUM SERPL-MCNC: 1.9 MG/DL (ref 1.6–2.4)
MAGNESIUM SERPL-MCNC: 1.9 MG/DL (ref 1.6–2.4)
MCH RBC QN AUTO: 22.3 PG (ref 26–34)
MCH RBC QN AUTO: 22.6 PG (ref 26–34)
MCH RBC QN AUTO: 22.9 PG (ref 26–34)
MCH RBC QN AUTO: 23.1 PG (ref 26–34)
MCH RBC QN AUTO: 23.2 PG (ref 26–34)
MCH RBC QN AUTO: 23.4 PG (ref 26–34)
MCH RBC QN AUTO: 23.7 PG (ref 26–34)
MCH RBC QN AUTO: 24.3 PG (ref 26–34)
MCH RBC QN AUTO: 24.5 PG (ref 26–34)
MCHC RBC AUTO-ENTMCNC: 28.6 G/DL (ref 30–36.5)
MCHC RBC AUTO-ENTMCNC: 28.8 G/DL (ref 30–36.5)
MCHC RBC AUTO-ENTMCNC: 29.2 G/DL (ref 30–36.5)
MCHC RBC AUTO-ENTMCNC: 29.2 G/DL (ref 30–36.5)
MCHC RBC AUTO-ENTMCNC: 29.4 G/DL (ref 30–36.5)
MCHC RBC AUTO-ENTMCNC: 29.4 G/DL (ref 30–36.5)
MCHC RBC AUTO-ENTMCNC: 29.9 G/DL (ref 30–36.5)
MCHC RBC AUTO-ENTMCNC: 30 G/DL (ref 30–36.5)
MCHC RBC AUTO-ENTMCNC: 30.8 G/DL (ref 30–36.5)
MCV RBC AUTO: 74.5 FL (ref 80–99)
MCV RBC AUTO: 75.9 FL (ref 80–99)
MCV RBC AUTO: 77.6 FL (ref 80–99)
MCV RBC AUTO: 79.1 FL (ref 80–99)
MCV RBC AUTO: 80.1 FL (ref 80–99)
MCV RBC AUTO: 80.1 FL (ref 80–99)
MCV RBC AUTO: 81 FL (ref 80–99)
MCV RBC AUTO: 81.2 FL (ref 80–99)
MCV RBC AUTO: 83.2 FL (ref 80–99)
MONOCYTES # BLD: 0.4 K/UL (ref 0–1)
MONOCYTES # BLD: 0.5 K/UL (ref 0–1)
MONOCYTES # BLD: 0.5 K/UL (ref 0–1)
MONOCYTES # BLD: 1.1 K/UL (ref 0–1)
MONOCYTES NFR BLD: 10 % (ref 5–13)
MONOCYTES NFR BLD: 10 % (ref 5–13)
MONOCYTES NFR BLD: 11 % (ref 5–13)
MONOCYTES NFR BLD: 11 % (ref 5–13)
MONOCYTES NFR BLD: 14 % (ref 5–13)
MONOCYTES NFR BLD: 9 % (ref 5–13)
MONOCYTES NFR BLD: 9 % (ref 5–13)
NEUTS SEG # BLD: 1.8 K/UL (ref 1.8–8)
NEUTS SEG # BLD: 2.3 K/UL (ref 1.8–8)
NEUTS SEG # BLD: 2.7 K/UL (ref 1.8–8)
NEUTS SEG # BLD: 3 K/UL (ref 1.8–8)
NEUTS SEG # BLD: 3.5 K/UL (ref 1.8–8)
NEUTS SEG # BLD: 3.6 K/UL (ref 1.8–8)
NEUTS SEG # BLD: 9.8 K/UL (ref 1.8–8)
NEUTS SEG NFR BLD: 60 % (ref 32–75)
NEUTS SEG NFR BLD: 64 % (ref 32–75)
NEUTS SEG NFR BLD: 64 % (ref 32–75)
NEUTS SEG NFR BLD: 69 % (ref 32–75)
NEUTS SEG NFR BLD: 73 % (ref 32–75)
NEUTS SEG NFR BLD: 75 % (ref 32–75)
NEUTS SEG NFR BLD: 80 % (ref 32–75)
NRBC # BLD: 0 K/UL (ref 0–0.01)
NRBC BLD-RTO: 0 PER 100 WBC
P-R INTERVAL, ECG05: 126 MS
P-R INTERVAL, ECG05: 130 MS
PHOSPHATE SERPL-MCNC: 3.4 MG/DL (ref 2.6–4.7)
PHOSPHATE SERPL-MCNC: 3.5 MG/DL (ref 2.6–4.7)
PHOSPHATE SERPL-MCNC: 3.8 MG/DL (ref 2.6–4.7)
PLATELET # BLD AUTO: 402 K/UL (ref 150–400)
PLATELET # BLD AUTO: 414 K/UL (ref 150–400)
PLATELET # BLD AUTO: 425 K/UL (ref 150–400)
PLATELET # BLD AUTO: 428 K/UL (ref 150–400)
PLATELET # BLD AUTO: 432 K/UL (ref 150–400)
PLATELET # BLD AUTO: 439 K/UL (ref 150–400)
PLATELET # BLD AUTO: 446 K/UL (ref 150–400)
PLATELET # BLD AUTO: 468 K/UL (ref 150–400)
PLATELET # BLD AUTO: 469 K/UL (ref 150–400)
PMV BLD AUTO: 8 FL (ref 8.9–12.9)
PMV BLD AUTO: 8.2 FL (ref 8.9–12.9)
PMV BLD AUTO: 8.3 FL (ref 8.9–12.9)
PMV BLD AUTO: 8.3 FL (ref 8.9–12.9)
PMV BLD AUTO: 8.5 FL (ref 8.9–12.9)
PMV BLD AUTO: 8.5 FL (ref 8.9–12.9)
PMV BLD AUTO: 9 FL (ref 8.9–12.9)
POTASSIUM SERPL-SCNC: 3.1 MMOL/L (ref 3.5–5.1)
POTASSIUM SERPL-SCNC: 3.3 MMOL/L (ref 3.5–5.1)
POTASSIUM SERPL-SCNC: 3.3 MMOL/L (ref 3.5–5.1)
POTASSIUM SERPL-SCNC: 3.5 MMOL/L (ref 3.5–5.1)
POTASSIUM SERPL-SCNC: 3.6 MMOL/L (ref 3.5–5.1)
POTASSIUM SERPL-SCNC: 3.8 MMOL/L (ref 3.5–5.1)
POTASSIUM SERPL-SCNC: 3.8 MMOL/L (ref 3.5–5.1)
POTASSIUM SERPL-SCNC: 3.9 MMOL/L (ref 3.5–5.1)
POTASSIUM SERPL-SCNC: 4 MMOL/L (ref 3.5–5.1)
POTASSIUM SERPL-SCNC: 4 MMOL/L (ref 3.5–5.1)
POTASSIUM SERPL-SCNC: 4.2 MMOL/L (ref 3.5–5.1)
PROT SERPL-MCNC: 5.9 G/DL (ref 6.4–8.2)
Q-T INTERVAL, ECG07: 274 MS
Q-T INTERVAL, ECG07: 332 MS
QRS DURATION, ECG06: 42 MS
QRS DURATION, ECG06: 64 MS
QTC CALCULATION (BEZET), ECG08: 415 MS
QTC CALCULATION (BEZET), ECG08: 453 MS
RBC # BLD AUTO: 3.39 M/UL (ref 3.8–5.2)
RBC # BLD AUTO: 3.42 M/UL (ref 3.8–5.2)
RBC # BLD AUTO: 3.46 M/UL (ref 3.8–5.2)
RBC # BLD AUTO: 3.46 M/UL (ref 3.8–5.2)
RBC # BLD AUTO: 3.49 M/UL (ref 3.8–5.2)
RBC # BLD AUTO: 3.51 M/UL (ref 3.8–5.2)
RBC # BLD AUTO: 3.53 M/UL (ref 3.8–5.2)
RBC # BLD AUTO: 3.88 M/UL (ref 3.8–5.2)
RBC # BLD AUTO: 3.9 M/UL (ref 3.8–5.2)
RBC MORPH BLD: ABNORMAL
REPORTED DOSE,DOSE: ABNORMAL UNITS
REPORTED DOSE/TIME,TMG: ABNORMAL
SERVICE CMNT-IMP: ABNORMAL
SERVICE CMNT-IMP: ABNORMAL
SERVICE CMNT-IMP: NORMAL
SODIUM SERPL-SCNC: 133 MMOL/L (ref 136–145)
SODIUM SERPL-SCNC: 135 MMOL/L (ref 136–145)
SODIUM SERPL-SCNC: 136 MMOL/L (ref 136–145)
SODIUM SERPL-SCNC: 137 MMOL/L (ref 136–145)
SODIUM SERPL-SCNC: 137 MMOL/L (ref 136–145)
SODIUM SERPL-SCNC: 138 MMOL/L (ref 136–145)
SODIUM SERPL-SCNC: 138 MMOL/L (ref 136–145)
SODIUM SERPL-SCNC: 139 MMOL/L (ref 136–145)
SODIUM SERPL-SCNC: 140 MMOL/L (ref 136–145)
SODIUM SERPL-SCNC: 141 MMOL/L (ref 136–145)
SODIUM SERPL-SCNC: 141 MMOL/L (ref 136–145)
TROPONIN-HIGH SENSITIVITY: 12 NG/L (ref 0–51)
VANCOMYCIN TROUGH SERPL-MCNC: 21.8 UG/ML (ref 5–10)
VENTRICULAR RATE, ECG03: 112 BPM
VENTRICULAR RATE, ECG03: 138 BPM
WBC # BLD AUTO: 12.2 K/UL (ref 3.6–11)
WBC # BLD AUTO: 2.9 K/UL (ref 3.6–11)
WBC # BLD AUTO: 3 K/UL (ref 3.6–11)
WBC # BLD AUTO: 3.5 K/UL (ref 3.6–11)
WBC # BLD AUTO: 3.9 K/UL (ref 3.6–11)
WBC # BLD AUTO: 4.2 K/UL (ref 3.6–11)
WBC # BLD AUTO: 4.9 K/UL (ref 3.6–11)
WBC # BLD AUTO: 5.1 K/UL (ref 3.6–11)
WBC # BLD AUTO: 9.9 K/UL (ref 3.6–11)
WBC MORPH BLD: ABNORMAL
WBC MORPH BLD: ABNORMAL

## 2022-01-01 PROCEDURE — 0651 HSPC ROUTINE HOME CARE

## 2022-01-01 PROCEDURE — 74011250637 HC RX REV CODE- 250/637: Performed by: NURSE PRACTITIONER

## 2022-01-01 PROCEDURE — 2709999900 HC NON-CHARGEABLE SUPPLY

## 2022-01-01 PROCEDURE — 74011250637 HC RX REV CODE- 250/637: Performed by: INTERNAL MEDICINE

## 2022-01-01 PROCEDURE — 84100 ASSAY OF PHOSPHORUS: CPT

## 2022-01-01 PROCEDURE — 74011250636 HC RX REV CODE- 250/636: Performed by: INTERNAL MEDICINE

## 2022-01-01 PROCEDURE — 36415 COLL VENOUS BLD VENIPUNCTURE: CPT

## 2022-01-01 PROCEDURE — HOSPICE MEDICATION HC HH HOSPICE MEDICATION

## 2022-01-01 PROCEDURE — 85025 COMPLETE CBC W/AUTO DIFF WBC: CPT

## 2022-01-01 PROCEDURE — 74011250636 HC RX REV CODE- 250/636: Performed by: STUDENT IN AN ORGANIZED HEALTH CARE EDUCATION/TRAINING PROGRAM

## 2022-01-01 PROCEDURE — 74011000250 HC RX REV CODE- 250: Performed by: STUDENT IN AN ORGANIZED HEALTH CARE EDUCATION/TRAINING PROGRAM

## 2022-01-01 PROCEDURE — G0300 HHS/HOSPICE OF LPN EA 15 MIN: HCPCS

## 2022-01-01 PROCEDURE — G0299 HHS/HOSPICE OF RN EA 15 MIN: HCPCS

## 2022-01-01 PROCEDURE — 96375 TX/PRO/DX INJ NEW DRUG ADDON: CPT

## 2022-01-01 PROCEDURE — 74011250636 HC RX REV CODE- 250/636: Performed by: FAMILY MEDICINE

## 2022-01-01 PROCEDURE — 74011250637 HC RX REV CODE- 250/637

## 2022-01-01 PROCEDURE — 93005 ELECTROCARDIOGRAM TRACING: CPT

## 2022-01-01 PROCEDURE — 83605 ASSAY OF LACTIC ACID: CPT

## 2022-01-01 PROCEDURE — 87040 BLOOD CULTURE FOR BACTERIA: CPT

## 2022-01-01 PROCEDURE — 65660000000 HC RM CCU STEPDOWN

## 2022-01-01 PROCEDURE — G0156 HHCP-SVS OF AIDE,EA 15 MIN: HCPCS

## 2022-01-01 PROCEDURE — 80048 BASIC METABOLIC PNL TOTAL CA: CPT

## 2022-01-01 PROCEDURE — 74011250636 HC RX REV CODE- 250/636: Performed by: EMERGENCY MEDICINE

## 2022-01-01 PROCEDURE — 87205 SMEAR GRAM STAIN: CPT

## 2022-01-01 PROCEDURE — 80053 COMPREHEN METABOLIC PANEL: CPT

## 2022-01-01 PROCEDURE — 87077 CULTURE AEROBIC IDENTIFY: CPT

## 2022-01-01 PROCEDURE — 96361 HYDRATE IV INFUSION ADD-ON: CPT

## 2022-01-01 PROCEDURE — 0655 HSPC INPATIENT RESPITE

## 2022-01-01 PROCEDURE — 74011250637 HC RX REV CODE- 250/637: Performed by: STUDENT IN AN ORGANIZED HEALTH CARE EDUCATION/TRAINING PROGRAM

## 2022-01-01 PROCEDURE — G0155 HHCP-SVS OF CSW,EA 15 MIN: HCPCS

## 2022-01-01 PROCEDURE — 80202 ASSAY OF VANCOMYCIN: CPT

## 2022-01-01 PROCEDURE — 99232 SBSQ HOSP IP/OBS MODERATE 35: CPT | Performed by: STUDENT IN AN ORGANIZED HEALTH CARE EDUCATION/TRAINING PROGRAM

## 2022-01-01 PROCEDURE — 71275 CT ANGIOGRAPHY CHEST: CPT

## 2022-01-01 PROCEDURE — 83735 ASSAY OF MAGNESIUM: CPT

## 2022-01-01 PROCEDURE — 74011000258 HC RX REV CODE- 258: Performed by: EMERGENCY MEDICINE

## 2022-01-01 PROCEDURE — 74011250636 HC RX REV CODE- 250/636: Performed by: NURSE PRACTITIONER

## 2022-01-01 PROCEDURE — 85027 COMPLETE CBC AUTOMATED: CPT

## 2022-01-01 PROCEDURE — 99215 OFFICE O/P EST HI 40 MIN: CPT | Performed by: INTERNAL MEDICINE

## 2022-01-01 PROCEDURE — 96376 TX/PRO/DX INJ SAME DRUG ADON: CPT

## 2022-01-01 PROCEDURE — 0656 HSPC GENERAL INPATIENT

## 2022-01-01 PROCEDURE — 76937 US GUIDE VASCULAR ACCESS: CPT

## 2022-01-01 PROCEDURE — 99233 SBSQ HOSP IP/OBS HIGH 50: CPT | Performed by: STUDENT IN AN ORGANIZED HEALTH CARE EDUCATION/TRAINING PROGRAM

## 2022-01-01 PROCEDURE — 93308 TTE F-UP OR LMTD: CPT | Performed by: INTERNAL MEDICINE

## 2022-01-01 PROCEDURE — 74011000258 HC RX REV CODE- 258: Performed by: INTERNAL MEDICINE

## 2022-01-01 PROCEDURE — 74011250637 HC RX REV CODE- 250/637: Performed by: FAMILY MEDICINE

## 2022-01-01 PROCEDURE — 99223 1ST HOSP IP/OBS HIGH 75: CPT | Performed by: INTERNAL MEDICINE

## 2022-01-01 PROCEDURE — 99255 IP/OBS CONSLTJ NEW/EST HI 80: CPT | Performed by: INTERNAL MEDICINE

## 2022-01-01 PROCEDURE — 87186 SC STD MICRODIL/AGAR DIL: CPT

## 2022-01-01 PROCEDURE — 99203 OFFICE O/P NEW LOW 30 MIN: CPT | Performed by: INTERNAL MEDICINE

## 2022-01-01 PROCEDURE — 99233 SBSQ HOSP IP/OBS HIGH 50: CPT | Performed by: INTERNAL MEDICINE

## 2022-01-01 PROCEDURE — 74011000250 HC RX REV CODE- 250: Performed by: FAMILY MEDICINE

## 2022-01-01 PROCEDURE — 74011000636 HC RX REV CODE- 636: Performed by: INTERNAL MEDICINE

## 2022-01-01 PROCEDURE — 96374 THER/PROPH/DIAG INJ IV PUSH: CPT

## 2022-01-01 PROCEDURE — 77030031139 HC SUT VCRL2 J&J -A

## 2022-01-01 PROCEDURE — 3336500001 HSPC ELECTION

## 2022-01-01 PROCEDURE — 77030010507 HC ADH SKN DERMBND J&J -B

## 2022-01-01 PROCEDURE — 93325 DOPPLER ECHO COLOR FLOW MAPG: CPT | Performed by: INTERNAL MEDICINE

## 2022-01-01 PROCEDURE — 74011000636 HC RX REV CODE- 636: Performed by: EMERGENCY MEDICINE

## 2022-01-01 PROCEDURE — C1751 CATH, INF, PER/CENT/MIDLINE: HCPCS

## 2022-01-01 PROCEDURE — 99232 SBSQ HOSP IP/OBS MODERATE 35: CPT | Performed by: INTERNAL MEDICINE

## 2022-01-01 PROCEDURE — 99285 EMERGENCY DEPT VISIT HI MDM: CPT

## 2022-01-01 PROCEDURE — 77001 FLUOROGUIDE FOR VEIN DEVICE: CPT

## 2022-01-01 PROCEDURE — 65270000015 HC RM PRIVATE ONCOLOGY

## 2022-01-01 PROCEDURE — 77030018786 HC NDL GD F/USND BARD -B

## 2022-01-01 PROCEDURE — 0JPT0WZ REMOVAL OF TOTALLY IMPLANTABLE VASCULAR ACCESS DEVICE FROM TRUNK SUBCUTANEOUS TISSUE AND FASCIA, OPEN APPROACH: ICD-10-PCS | Performed by: STUDENT IN AN ORGANIZED HEALTH CARE EDUCATION/TRAINING PROGRAM

## 2022-01-01 PROCEDURE — 93306 TTE W/DOPPLER COMPLETE: CPT

## 2022-01-01 PROCEDURE — 77030020847 HC STATLOK BARD -A

## 2022-01-01 PROCEDURE — 74011250636 HC RX REV CODE- 250/636

## 2022-01-01 PROCEDURE — 84484 ASSAY OF TROPONIN QUANT: CPT

## 2022-01-01 PROCEDURE — 77010033678 HC OXYGEN DAILY

## 2022-01-01 RX ORDER — VANCOMYCIN/0.9 % SOD CHLORIDE 1.5G/250ML
1500 PLASTIC BAG, INJECTION (ML) INTRAVENOUS ONCE
Status: COMPLETED | OUTPATIENT
Start: 2022-01-01 | End: 2022-01-01

## 2022-01-01 RX ORDER — HYDROMORPHONE HYDROCHLORIDE 2 MG/ML
10 INJECTION, SOLUTION INTRAMUSCULAR; INTRAVENOUS; SUBCUTANEOUS
Status: DISCONTINUED | OUTPATIENT
Start: 2022-01-01 | End: 2022-01-01

## 2022-01-01 RX ORDER — HYDROMORPHONE HYDROCHLORIDE 2 MG/ML
4 INJECTION, SOLUTION INTRAMUSCULAR; INTRAVENOUS; SUBCUTANEOUS
Status: DISCONTINUED | OUTPATIENT
Start: 2022-01-01 | End: 2022-01-01

## 2022-01-01 RX ORDER — OXYCODONE HYDROCHLORIDE 5 MG/1
10 TABLET ORAL EVERY 4 HOURS
Status: DISCONTINUED | OUTPATIENT
Start: 2022-01-01 | End: 2022-01-01

## 2022-01-01 RX ORDER — HYOSCYAMINE SULFATE 0.12 MG/1
0.12 TABLET SUBLINGUAL
Status: DISCONTINUED | OUTPATIENT
Start: 2022-01-01 | End: 2022-01-01 | Stop reason: HOSPADM

## 2022-01-01 RX ORDER — FENTANYL 12.5 UG/1
1 PATCH TRANSDERMAL
Qty: 10 PATCH | Refills: 0 | Status: SHIPPED | OUTPATIENT
Start: 2022-01-01 | End: 2022-01-01

## 2022-01-01 RX ORDER — LORAZEPAM 2 MG/ML
2 INJECTION INTRAMUSCULAR
Status: DISCONTINUED | OUTPATIENT
Start: 2022-01-01 | End: 2022-01-01

## 2022-01-01 RX ORDER — ACETAMINOPHEN 325 MG/1
650 TABLET ORAL
Status: DISCONTINUED | OUTPATIENT
Start: 2022-01-01 | End: 2022-01-01 | Stop reason: HOSPADM

## 2022-01-01 RX ORDER — HYDROMORPHONE HYDROCHLORIDE 5 MG/5ML
6 SOLUTION ORAL
Status: DISCONTINUED | OUTPATIENT
Start: 2022-01-01 | End: 2022-01-01

## 2022-01-01 RX ORDER — FACIAL-BODY WIPES
10 EACH TOPICAL DAILY PRN
Status: DISCONTINUED | OUTPATIENT
Start: 2022-01-01 | End: 2022-06-29 | Stop reason: HOSPADM

## 2022-01-01 RX ORDER — HYDROMORPHONE HYDROCHLORIDE 2 MG/ML
4 INJECTION, SOLUTION INTRAMUSCULAR; INTRAVENOUS; SUBCUTANEOUS EVERY 4 HOURS
Status: DISCONTINUED | OUTPATIENT
Start: 2022-01-01 | End: 2022-01-01

## 2022-01-01 RX ORDER — HEPARIN SODIUM 200 [USP'U]/100ML
400 INJECTION, SOLUTION INTRAVENOUS ONCE
Status: COMPLETED | OUTPATIENT
Start: 2022-01-01 | End: 2022-01-01

## 2022-01-01 RX ORDER — HALOPERIDOL 2 MG/ML
1 SOLUTION ORAL
Status: DISCONTINUED | OUTPATIENT
Start: 2022-01-01 | End: 2022-01-01 | Stop reason: HOSPADM

## 2022-01-01 RX ORDER — SODIUM CHLORIDE 0.9 % (FLUSH) 0.9 %
5-40 SYRINGE (ML) INJECTION EVERY 8 HOURS
Status: DISCONTINUED | OUTPATIENT
Start: 2022-01-01 | End: 2022-01-01 | Stop reason: HOSPADM

## 2022-01-01 RX ORDER — GLYCOPYRROLATE 0.2 MG/ML
0.2 INJECTION INTRAMUSCULAR; INTRAVENOUS
Status: DISCONTINUED | OUTPATIENT
Start: 2022-01-01 | End: 2022-06-29 | Stop reason: HOSPADM

## 2022-01-01 RX ORDER — ONDANSETRON 2 MG/ML
4 INJECTION INTRAMUSCULAR; INTRAVENOUS
Status: DISCONTINUED | OUTPATIENT
Start: 2022-01-01 | End: 2022-01-01 | Stop reason: HOSPADM

## 2022-01-01 RX ORDER — METRONIDAZOLE 7.5 MG/G
GEL TOPICAL DAILY
Qty: 60 G | Refills: 0 | Status: SHIPPED | OUTPATIENT
Start: 2022-01-01

## 2022-01-01 RX ORDER — POTASSIUM CHLORIDE 750 MG/1
40 TABLET, FILM COATED, EXTENDED RELEASE ORAL
Status: COMPLETED | OUTPATIENT
Start: 2022-01-01 | End: 2022-01-01

## 2022-01-01 RX ORDER — OXYCODONE HCL 40 MG/1
40 TABLET, FILM COATED, EXTENDED RELEASE ORAL EVERY 12 HOURS
Qty: 28 TABLET | Refills: 0 | Status: SHIPPED | OUTPATIENT
Start: 2022-01-01 | End: 2022-01-01

## 2022-01-01 RX ORDER — SODIUM CHLORIDE 9 MG/ML
25 INJECTION, SOLUTION INTRAVENOUS CONTINUOUS
Status: DISCONTINUED | OUTPATIENT
Start: 2022-01-01 | End: 2022-01-01

## 2022-01-01 RX ORDER — LORAZEPAM 2 MG/ML
0.5 CONCENTRATE ORAL
Status: DISCONTINUED | OUTPATIENT
Start: 2022-01-01 | End: 2022-01-01 | Stop reason: HOSPADM

## 2022-01-01 RX ORDER — CARVEDILOL 3.12 MG/1
3.12 TABLET ORAL 2 TIMES DAILY WITH MEALS
Status: DISCONTINUED | OUTPATIENT
Start: 2022-01-01 | End: 2022-01-01 | Stop reason: HOSPADM

## 2022-01-01 RX ORDER — AMOXICILLIN 250 MG
2 CAPSULE ORAL DAILY
Qty: 40 TABLET | Refills: 0 | Status: SHIPPED | OUTPATIENT
Start: 2022-01-01 | End: 2022-01-01

## 2022-01-01 RX ORDER — MORPHINE SULFATE 30 MG/1
30 TABLET, FILM COATED, EXTENDED RELEASE ORAL EVERY 12 HOURS
Status: DISCONTINUED | OUTPATIENT
Start: 2022-01-01 | End: 2022-01-01 | Stop reason: HOSPADM

## 2022-01-01 RX ORDER — ACETAMINOPHEN 650 MG/1
650 SUPPOSITORY RECTAL
Status: DISCONTINUED | OUTPATIENT
Start: 2022-01-01 | End: 2022-06-29 | Stop reason: HOSPADM

## 2022-01-01 RX ORDER — PROCHLORPERAZINE EDISYLATE 5 MG/ML
10 INJECTION INTRAMUSCULAR; INTRAVENOUS
Status: DISCONTINUED | OUTPATIENT
Start: 2022-01-01 | End: 2022-06-29 | Stop reason: HOSPADM

## 2022-01-01 RX ORDER — METRONIDAZOLE 250 MG/1
500 TABLET ORAL DAILY
Status: DISCONTINUED | OUTPATIENT
Start: 2022-01-01 | End: 2022-01-01 | Stop reason: HOSPADM

## 2022-01-01 RX ORDER — HYDROMORPHONE HYDROCHLORIDE 2 MG/ML
12 INJECTION, SOLUTION INTRAMUSCULAR; INTRAVENOUS; SUBCUTANEOUS
Status: DISCONTINUED | OUTPATIENT
Start: 2022-01-01 | End: 2022-06-29 | Stop reason: HOSPADM

## 2022-01-01 RX ORDER — METRONIDAZOLE 7.5 MG/G
GEL TOPICAL DAILY
Status: DISCONTINUED | OUTPATIENT
Start: 2022-01-01 | End: 2022-01-01

## 2022-01-01 RX ORDER — ACETAMINOPHEN 650 MG/1
650 SUPPOSITORY RECTAL
Status: DISCONTINUED | OUTPATIENT
Start: 2022-01-01 | End: 2022-01-01 | Stop reason: HOSPADM

## 2022-01-01 RX ORDER — HYDROMORPHONE HYDROCHLORIDE 2 MG/ML
8 INJECTION, SOLUTION INTRAMUSCULAR; INTRAVENOUS; SUBCUTANEOUS
Status: DISCONTINUED | OUTPATIENT
Start: 2022-01-01 | End: 2022-01-01

## 2022-01-01 RX ORDER — AMOXICILLIN 250 MG
2 CAPSULE ORAL DAILY
Status: DISCONTINUED | OUTPATIENT
Start: 2022-01-01 | End: 2022-01-01 | Stop reason: HOSPADM

## 2022-01-01 RX ORDER — HYDROMORPHONE HYDROCHLORIDE 2 MG/ML
2 INJECTION, SOLUTION INTRAMUSCULAR; INTRAVENOUS; SUBCUTANEOUS
Status: DISCONTINUED | OUTPATIENT
Start: 2022-01-01 | End: 2022-01-01

## 2022-01-01 RX ORDER — LORAZEPAM 2 MG/ML
4 INJECTION INTRAMUSCULAR
Status: DISCONTINUED | OUTPATIENT
Start: 2022-01-01 | End: 2022-06-29 | Stop reason: HOSPADM

## 2022-01-01 RX ORDER — AMOXICILLIN 250 MG
2 CAPSULE ORAL
Status: DISCONTINUED | OUTPATIENT
Start: 2022-01-01 | End: 2022-01-01 | Stop reason: HOSPADM

## 2022-01-01 RX ORDER — NALOXONE HYDROCHLORIDE 4 MG/.1ML
SPRAY NASAL
Qty: 2 EACH | Refills: 0 | Status: SHIPPED | OUTPATIENT
Start: 2022-01-01

## 2022-01-01 RX ORDER — LIDOCAINE HYDROCHLORIDE AND EPINEPHRINE 10; 10 MG/ML; UG/ML
30 INJECTION, SOLUTION INFILTRATION; PERINEURAL ONCE
Status: COMPLETED | OUTPATIENT
Start: 2022-01-01 | End: 2022-01-01

## 2022-01-01 RX ORDER — SODIUM CHLORIDE 0.9 % (FLUSH) 0.9 %
5-40 SYRINGE (ML) INJECTION AS NEEDED
Status: DISCONTINUED | OUTPATIENT
Start: 2022-01-01 | End: 2022-01-01 | Stop reason: HOSPADM

## 2022-01-01 RX ORDER — HYDROMORPHONE HYDROCHLORIDE 1 MG/ML
2 INJECTION, SOLUTION INTRAMUSCULAR; INTRAVENOUS; SUBCUTANEOUS
Status: DISCONTINUED | OUTPATIENT
Start: 2022-01-01 | End: 2022-01-01

## 2022-01-01 RX ORDER — FENTANYL CITRATE 50 UG/ML
100 INJECTION, SOLUTION INTRAMUSCULAR; INTRAVENOUS
Status: DISCONTINUED | OUTPATIENT
Start: 2022-01-01 | End: 2022-01-01

## 2022-01-01 RX ORDER — LIDOCAINE HYDROCHLORIDE 20 MG/ML
10 INJECTION, SOLUTION INFILTRATION; PERINEURAL ONCE
Status: COMPLETED | OUTPATIENT
Start: 2022-01-01 | End: 2022-01-01

## 2022-01-01 RX ORDER — OXYCODONE HYDROCHLORIDE 10 MG/1
20 TABLET ORAL
Qty: 360 TABLET | Refills: 0 | Status: SHIPPED | OUTPATIENT
Start: 2022-01-01 | End: 2022-01-01 | Stop reason: SDUPTHER

## 2022-01-01 RX ORDER — HYDROMORPHONE HYDROCHLORIDE 2 MG/ML
6 INJECTION, SOLUTION INTRAMUSCULAR; INTRAVENOUS; SUBCUTANEOUS
Status: DISCONTINUED | OUTPATIENT
Start: 2022-01-01 | End: 2022-01-01

## 2022-01-01 RX ORDER — ONDANSETRON 2 MG/ML
4 INJECTION INTRAMUSCULAR; INTRAVENOUS
Status: DISCONTINUED | OUTPATIENT
Start: 2022-01-01 | End: 2022-06-29 | Stop reason: HOSPADM

## 2022-01-01 RX ORDER — HYDROMORPHONE HYDROCHLORIDE 2 MG/ML
2 INJECTION, SOLUTION INTRAMUSCULAR; INTRAVENOUS; SUBCUTANEOUS EVERY 4 HOURS
Status: DISCONTINUED | OUTPATIENT
Start: 2022-01-01 | End: 2022-01-01

## 2022-01-01 RX ORDER — ONDANSETRON 4 MG/1
4 TABLET, ORALLY DISINTEGRATING ORAL
Status: DISCONTINUED | OUTPATIENT
Start: 2022-01-01 | End: 2022-01-01 | Stop reason: HOSPADM

## 2022-01-01 RX ORDER — HYDROMORPHONE HYDROCHLORIDE 5 MG/5ML
6 SOLUTION ORAL
Status: DISCONTINUED | OUTPATIENT
Start: 2022-01-01 | End: 2022-01-01 | Stop reason: HOSPADM

## 2022-01-01 RX ORDER — MORPHINE SULFATE 2 MG/ML
4 INJECTION, SOLUTION INTRAMUSCULAR; INTRAVENOUS
Status: COMPLETED | OUTPATIENT
Start: 2022-01-01 | End: 2022-01-01

## 2022-01-01 RX ORDER — POTASSIUM CHLORIDE 750 MG/1
40 TABLET, FILM COATED, EXTENDED RELEASE ORAL ONCE
Status: COMPLETED | OUTPATIENT
Start: 2022-01-01 | End: 2022-01-01

## 2022-01-01 RX ORDER — OXYCODONE HCL 10 MG/1
20 TABLET, FILM COATED, EXTENDED RELEASE ORAL EVERY 12 HOURS
Status: DISCONTINUED | OUTPATIENT
Start: 2022-01-01 | End: 2022-01-01

## 2022-01-01 RX ORDER — HYDROMORPHONE HYDROCHLORIDE 2 MG/1
2 TABLET ORAL
Qty: 70 TABLET | Refills: 0 | Status: SHIPPED | OUTPATIENT
Start: 2022-01-01 | End: 2022-01-01

## 2022-01-01 RX ORDER — OXYCODONE HYDROCHLORIDE 5 MG/1
10 TABLET ORAL
Status: DISCONTINUED | OUTPATIENT
Start: 2022-01-01 | End: 2022-01-01

## 2022-01-01 RX ORDER — LORAZEPAM 2 MG/ML
1 INJECTION INTRAMUSCULAR
Status: DISCONTINUED | OUTPATIENT
Start: 2022-01-01 | End: 2022-01-01

## 2022-01-01 RX ORDER — CARVEDILOL 3.12 MG/1
3.12 TABLET ORAL 2 TIMES DAILY WITH MEALS
Qty: 60 TABLET | Refills: 0 | Status: SHIPPED | OUTPATIENT
Start: 2022-01-01

## 2022-01-01 RX ORDER — FACIAL-BODY WIPES
10 EACH TOPICAL DAILY PRN
Status: DISCONTINUED | OUTPATIENT
Start: 2022-01-01 | End: 2022-01-01 | Stop reason: HOSPADM

## 2022-01-01 RX ORDER — HYDROMORPHONE HYDROCHLORIDE 1 MG/ML
2 INJECTION, SOLUTION INTRAMUSCULAR; INTRAVENOUS; SUBCUTANEOUS
Status: DISCONTINUED | OUTPATIENT
Start: 2022-01-01 | End: 2022-01-01 | Stop reason: HOSPADM

## 2022-01-01 RX ORDER — HYDROMORPHONE HYDROCHLORIDE 2 MG/1
2 TABLET ORAL
Status: DISCONTINUED | OUTPATIENT
Start: 2022-01-01 | End: 2022-01-01 | Stop reason: HOSPADM

## 2022-01-01 RX ORDER — ENOXAPARIN SODIUM 100 MG/ML
40 INJECTION SUBCUTANEOUS DAILY
Status: DISCONTINUED | OUTPATIENT
Start: 2022-01-01 | End: 2022-01-01 | Stop reason: HOSPADM

## 2022-01-01 RX ORDER — POLYETHYLENE GLYCOL 3350 17 G/17G
17 POWDER, FOR SOLUTION ORAL DAILY PRN
Status: DISCONTINUED | OUTPATIENT
Start: 2022-01-01 | End: 2022-01-01 | Stop reason: HOSPADM

## 2022-01-01 RX ORDER — KETOROLAC TROMETHAMINE 30 MG/ML
30 INJECTION, SOLUTION INTRAMUSCULAR; INTRAVENOUS
Status: DISCONTINUED | OUTPATIENT
Start: 2022-01-01 | End: 2022-06-29 | Stop reason: HOSPADM

## 2022-01-01 RX ORDER — METRONIDAZOLE 7.5 MG/G
GEL TOPICAL DAILY
Status: DISCONTINUED | OUTPATIENT
Start: 2022-01-01 | End: 2022-01-01 | Stop reason: HOSPADM

## 2022-01-01 RX ORDER — SODIUM CHLORIDE 9 MG/ML
50 INJECTION, SOLUTION INTRAVENOUS CONTINUOUS
Status: DISCONTINUED | OUTPATIENT
Start: 2022-01-01 | End: 2022-01-01

## 2022-01-01 RX ORDER — POTASSIUM CHLORIDE 750 MG/1
40 TABLET, FILM COATED, EXTENDED RELEASE ORAL
Status: DISPENSED | OUTPATIENT
Start: 2022-01-01 | End: 2022-01-01

## 2022-01-01 RX ORDER — LORAZEPAM 2 MG/ML
2 INJECTION INTRAMUSCULAR EVERY 4 HOURS
Status: DISCONTINUED | OUTPATIENT
Start: 2022-01-01 | End: 2022-01-01

## 2022-01-01 RX ORDER — MORPHINE SULFATE 2 MG/ML
4 INJECTION, SOLUTION INTRAMUSCULAR; INTRAVENOUS
Status: DISCONTINUED | OUTPATIENT
Start: 2022-01-01 | End: 2022-01-01

## 2022-01-01 RX ORDER — OXYCODONE HCL 10 MG/1
40 TABLET, FILM COATED, EXTENDED RELEASE ORAL ONCE
Status: COMPLETED | OUTPATIENT
Start: 2022-01-01 | End: 2022-01-01

## 2022-01-01 RX ORDER — SODIUM CHLORIDE 9 MG/ML
75 INJECTION, SOLUTION INTRAVENOUS CONTINUOUS
Status: DISCONTINUED | OUTPATIENT
Start: 2022-01-01 | End: 2022-01-01

## 2022-01-01 RX ORDER — OXYCODONE HYDROCHLORIDE 10 MG/1
20 TABLET ORAL
Qty: 360 TABLET | Refills: 0 | Status: SHIPPED | OUTPATIENT
Start: 2022-01-01 | End: 2022-01-01

## 2022-01-01 RX ORDER — OXYCODONE AND ACETAMINOPHEN 10; 325 MG/1; MG/1
1 TABLET ORAL
Status: DISCONTINUED | OUTPATIENT
Start: 2022-01-01 | End: 2022-01-01

## 2022-01-01 RX ORDER — OXYCODONE HCL 20 MG/1
40 TABLET, FILM COATED, EXTENDED RELEASE ORAL EVERY 12 HOURS
Status: DISCONTINUED | OUTPATIENT
Start: 2022-01-01 | End: 2022-01-01 | Stop reason: HOSPADM

## 2022-01-01 RX ORDER — FENTANYL 25 UG/1
1 PATCH TRANSDERMAL
Qty: 10 PATCH | Refills: 0 | Status: CANCELLED | OUTPATIENT
Start: 2022-01-01 | End: 2022-01-01

## 2022-01-01 RX ORDER — CARVEDILOL 3.12 MG/1
3.12 TABLET ORAL 2 TIMES DAILY WITH MEALS
Status: DISCONTINUED | OUTPATIENT
Start: 2022-01-01 | End: 2022-01-01

## 2022-01-01 RX ORDER — ONDANSETRON 4 MG/1
4 TABLET, ORALLY DISINTEGRATING ORAL
Qty: 15 TABLET | Refills: 0 | Status: SHIPPED | OUTPATIENT
Start: 2022-01-01

## 2022-01-01 RX ORDER — OXYCODONE AND ACETAMINOPHEN 7.5; 325 MG/1; MG/1
1 TABLET ORAL
Status: DISCONTINUED | OUTPATIENT
Start: 2022-01-01 | End: 2022-01-01

## 2022-01-01 RX ORDER — SODIUM CHLORIDE 9 MG/ML
50 INJECTION, SOLUTION INTRAVENOUS CONTINUOUS
Status: DISCONTINUED | OUTPATIENT
Start: 2022-01-01 | End: 2022-01-01 | Stop reason: HOSPADM

## 2022-01-01 RX ORDER — MORPHINE SULFATE 30 MG/1
30 TABLET, FILM COATED, EXTENDED RELEASE ORAL EVERY 12 HOURS
Status: DISCONTINUED | OUTPATIENT
Start: 2022-01-01 | End: 2022-01-01

## 2022-01-01 RX ORDER — OXYCODONE HCL 10 MG/1
10 TABLET, FILM COATED, EXTENDED RELEASE ORAL EVERY 12 HOURS
Status: DISCONTINUED | OUTPATIENT
Start: 2022-01-01 | End: 2022-01-01

## 2022-01-01 RX ADMIN — HYDROMORPHONE HYDROCHLORIDE 2 MG: 1 INJECTION, SOLUTION INTRAMUSCULAR; INTRAVENOUS; SUBCUTANEOUS at 05:14

## 2022-01-01 RX ADMIN — Medication 6 MG: at 14:24

## 2022-01-01 RX ADMIN — LORAZEPAM 2 MG: 2 INJECTION INTRAMUSCULAR; INTRAVENOUS at 05:32

## 2022-01-01 RX ADMIN — METRONIDAZOLE 500 MG: 250 TABLET ORAL at 23:03

## 2022-01-01 RX ADMIN — METRONIDAZOLE: 7.5 GEL TOPICAL at 09:12

## 2022-01-01 RX ADMIN — SODIUM CHLORIDE, PRESERVATIVE FREE 10 ML: 5 INJECTION INTRAVENOUS at 16:12

## 2022-01-01 RX ADMIN — METRONIDAZOLE: 7.5 GEL TOPICAL at 10:54

## 2022-01-01 RX ADMIN — SENNOSIDES AND DOCUSATE SODIUM 2 TABLET: 50; 8.6 TABLET ORAL at 08:03

## 2022-01-01 RX ADMIN — OXYCODONE HYDROCHLORIDE 40 MG: 20 TABLET, FILM COATED, EXTENDED RELEASE ORAL at 09:19

## 2022-01-01 RX ADMIN — OXYCODONE AND ACETAMINOPHEN 1 TABLET: 7.5; 325 TABLET ORAL at 12:17

## 2022-01-01 RX ADMIN — OXYCODONE 10 MG: 5 TABLET ORAL at 16:11

## 2022-01-01 RX ADMIN — ACETAMINOPHEN 325MG 650 MG: 325 TABLET ORAL at 15:12

## 2022-01-01 RX ADMIN — HYDROMORPHONE HYDROCHLORIDE 4 MG: 2 INJECTION, SOLUTION INTRAMUSCULAR; INTRAVENOUS; SUBCUTANEOUS at 16:26

## 2022-01-01 RX ADMIN — IOPAMIDOL 100 ML: 755 INJECTION, SOLUTION INTRAVENOUS at 17:30

## 2022-01-01 RX ADMIN — SODIUM CHLORIDE, PRESERVATIVE FREE 10 ML: 5 INJECTION INTRAVENOUS at 21:09

## 2022-01-01 RX ADMIN — HYDROMORPHONE HYDROCHLORIDE 2 MG: 1 INJECTION, SOLUTION INTRAMUSCULAR; INTRAVENOUS; SUBCUTANEOUS at 11:13

## 2022-01-01 RX ADMIN — METRONIDAZOLE: 7.5 GEL TOPICAL at 08:17

## 2022-01-01 RX ADMIN — MORPHINE SULFATE 4 MG: 2 INJECTION, SOLUTION INTRAMUSCULAR; INTRAVENOUS at 06:57

## 2022-01-01 RX ADMIN — LORAZEPAM 2 MG: 2 INJECTION INTRAMUSCULAR; INTRAVENOUS at 16:26

## 2022-01-01 RX ADMIN — HYDROMORPHONE HYDROCHLORIDE 4 MG: 2 INJECTION, SOLUTION INTRAMUSCULAR; INTRAVENOUS; SUBCUTANEOUS at 08:03

## 2022-01-01 RX ADMIN — SODIUM CHLORIDE, PRESERVATIVE FREE 10 ML: 5 INJECTION INTRAVENOUS at 17:44

## 2022-01-01 RX ADMIN — HYDROMORPHONE HYDROCHLORIDE 2 MG: 1 INJECTION, SOLUTION INTRAMUSCULAR; INTRAVENOUS; SUBCUTANEOUS at 05:08

## 2022-01-01 RX ADMIN — DOCUSATE SODIUM 50MG AND SENNOSIDES 8.6MG 2 TABLET: 8.6; 5 TABLET, FILM COATED ORAL at 21:06

## 2022-01-01 RX ADMIN — HYDROMORPHONE HYDROCHLORIDE 2 MG: 1 INJECTION, SOLUTION INTRAMUSCULAR; INTRAVENOUS; SUBCUTANEOUS at 12:48

## 2022-01-01 RX ADMIN — Medication 6 MG: at 05:51

## 2022-01-01 RX ADMIN — SODIUM CHLORIDE 75 ML/HR: 9 INJECTION, SOLUTION INTRAVENOUS at 09:19

## 2022-01-01 RX ADMIN — HYDROMORPHONE HYDROCHLORIDE 2 MG: 1 INJECTION, SOLUTION INTRAMUSCULAR; INTRAVENOUS; SUBCUTANEOUS at 00:12

## 2022-01-01 RX ADMIN — WATER 2 G: 1 INJECTION INTRAMUSCULAR; INTRAVENOUS; SUBCUTANEOUS at 08:42

## 2022-01-01 RX ADMIN — Medication 1 APPLICATORFUL: at 16:15

## 2022-01-01 RX ADMIN — OXYCODONE AND ACETAMINOPHEN 1 TABLET: 7.5; 325 TABLET ORAL at 01:51

## 2022-01-01 RX ADMIN — HYDROMORPHONE HYDROCHLORIDE 2 MG: 1 INJECTION, SOLUTION INTRAMUSCULAR; INTRAVENOUS; SUBCUTANEOUS at 19:56

## 2022-01-01 RX ADMIN — WATER 2 G: 1 INJECTION INTRAMUSCULAR; INTRAVENOUS; SUBCUTANEOUS at 23:02

## 2022-01-01 RX ADMIN — HYDROMORPHONE HYDROCHLORIDE 2 MG: 1 INJECTION, SOLUTION INTRAMUSCULAR; INTRAVENOUS; SUBCUTANEOUS at 17:36

## 2022-01-01 RX ADMIN — METRONIDAZOLE: 7.5 GEL TOPICAL at 10:23

## 2022-01-01 RX ADMIN — ENOXAPARIN SODIUM 40 MG: 100 INJECTION SUBCUTANEOUS at 09:22

## 2022-01-01 RX ADMIN — CARVEDILOL 3.12 MG: 3.12 TABLET, FILM COATED ORAL at 10:44

## 2022-01-01 RX ADMIN — HYDROMORPHONE HYDROCHLORIDE 2 MG: 2 TABLET ORAL at 05:04

## 2022-01-01 RX ADMIN — LORAZEPAM 2 MG: 2 INJECTION INTRAMUSCULAR; INTRAVENOUS at 16:44

## 2022-01-01 RX ADMIN — WATER 2 G: 1 INJECTION INTRAMUSCULAR; INTRAVENOUS; SUBCUTANEOUS at 23:01

## 2022-01-01 RX ADMIN — HYDROMORPHONE HYDROCHLORIDE 4 MG: 2 INJECTION, SOLUTION INTRAMUSCULAR; INTRAVENOUS; SUBCUTANEOUS at 05:55

## 2022-01-01 RX ADMIN — HYDROMORPHONE HYDROCHLORIDE 8 MG: 2 INJECTION, SOLUTION INTRAMUSCULAR; INTRAVENOUS; SUBCUTANEOUS at 09:07

## 2022-01-01 RX ADMIN — HYDROMORPHONE HYDROCHLORIDE 2 MG: 1 INJECTION, SOLUTION INTRAMUSCULAR; INTRAVENOUS; SUBCUTANEOUS at 01:58

## 2022-01-01 RX ADMIN — LORAZEPAM 4 MG: 2 INJECTION INTRAMUSCULAR; INTRAVENOUS at 09:56

## 2022-01-01 RX ADMIN — LORAZEPAM 2 MG: 2 INJECTION INTRAMUSCULAR; INTRAVENOUS at 08:15

## 2022-01-01 RX ADMIN — Medication 6 MG: at 06:01

## 2022-01-01 RX ADMIN — METRONIDAZOLE 500 MG: 250 TABLET ORAL at 14:50

## 2022-01-01 RX ADMIN — HYDROMORPHONE HYDROCHLORIDE 2 MG: 1 INJECTION, SOLUTION INTRAMUSCULAR; INTRAVENOUS; SUBCUTANEOUS at 14:13

## 2022-01-01 RX ADMIN — LORAZEPAM 1 MG: 2 INJECTION INTRAMUSCULAR; INTRAVENOUS at 13:51

## 2022-01-01 RX ADMIN — MORPHINE SULFATE 4 MG: 2 INJECTION, SOLUTION INTRAMUSCULAR; INTRAVENOUS at 13:05

## 2022-01-01 RX ADMIN — HYDROMORPHONE HYDROCHLORIDE 4 MG: 2 INJECTION, SOLUTION INTRAMUSCULAR; INTRAVENOUS; SUBCUTANEOUS at 06:17

## 2022-01-01 RX ADMIN — OXYCODONE AND ACETAMINOPHEN 1 TABLET: 7.5; 325 TABLET ORAL at 16:28

## 2022-01-01 RX ADMIN — VANCOMYCIN HYDROCHLORIDE 750 MG: 750 INJECTION, POWDER, LYOPHILIZED, FOR SOLUTION INTRAVENOUS at 06:25

## 2022-01-01 RX ADMIN — HYDROMORPHONE HYDROCHLORIDE 4 MG: 2 INJECTION, SOLUTION INTRAMUSCULAR; INTRAVENOUS; SUBCUTANEOUS at 18:12

## 2022-01-01 RX ADMIN — GLYCOPYRROLATE 0.2 MG: 0.2 INJECTION, SOLUTION INTRAMUSCULAR; INTRAVENOUS at 08:01

## 2022-01-01 RX ADMIN — MORPHINE SULFATE 30 MG: 30 TABLET, FILM COATED, EXTENDED RELEASE ORAL at 09:47

## 2022-01-01 RX ADMIN — LORAZEPAM 4 MG: 2 INJECTION INTRAMUSCULAR; INTRAVENOUS at 10:38

## 2022-01-01 RX ADMIN — OXYCODONE 10 MG: 5 TABLET ORAL at 19:44

## 2022-01-01 RX ADMIN — WATER 2 G: 1 INJECTION INTRAMUSCULAR; INTRAVENOUS; SUBCUTANEOUS at 00:55

## 2022-01-01 RX ADMIN — MORPHINE SULFATE 4 MG: 2 INJECTION, SOLUTION INTRAMUSCULAR; INTRAVENOUS at 20:54

## 2022-01-01 RX ADMIN — Medication 6 MG: at 23:46

## 2022-01-01 RX ADMIN — HYDROMORPHONE HYDROCHLORIDE 4 MG: 2 INJECTION, SOLUTION INTRAMUSCULAR; INTRAVENOUS; SUBCUTANEOUS at 00:46

## 2022-01-01 RX ADMIN — LORAZEPAM 2 MG: 2 INJECTION INTRAMUSCULAR; INTRAVENOUS at 20:16

## 2022-01-01 RX ADMIN — LORAZEPAM 4 MG: 2 INJECTION INTRAMUSCULAR; INTRAVENOUS at 16:10

## 2022-01-01 RX ADMIN — HYDROMORPHONE HYDROCHLORIDE 2 MG: 1 INJECTION, SOLUTION INTRAMUSCULAR; INTRAVENOUS; SUBCUTANEOUS at 10:01

## 2022-01-01 RX ADMIN — LORAZEPAM 4 MG: 2 INJECTION INTRAMUSCULAR; INTRAVENOUS at 09:07

## 2022-01-01 RX ADMIN — ENOXAPARIN SODIUM 40 MG: 100 INJECTION SUBCUTANEOUS at 08:11

## 2022-01-01 RX ADMIN — OXYCODONE 10 MG: 5 TABLET ORAL at 03:13

## 2022-01-01 RX ADMIN — LORAZEPAM 2 MG: 2 INJECTION INTRAMUSCULAR; INTRAVENOUS at 07:38

## 2022-01-01 RX ADMIN — LORAZEPAM 4 MG: 2 INJECTION INTRAMUSCULAR; INTRAVENOUS at 18:13

## 2022-01-01 RX ADMIN — HYDROMORPHONE HYDROCHLORIDE 2 MG: 2 INJECTION, SOLUTION INTRAMUSCULAR; INTRAVENOUS; SUBCUTANEOUS at 12:46

## 2022-01-01 RX ADMIN — HYDROMORPHONE HYDROCHLORIDE 8 MG: 2 INJECTION, SOLUTION INTRAMUSCULAR; INTRAVENOUS; SUBCUTANEOUS at 09:18

## 2022-01-01 RX ADMIN — PIPERACILLIN AND TAZOBACTAM 3.38 G: 3; .375 INJECTION, POWDER, LYOPHILIZED, FOR SOLUTION INTRAVENOUS at 09:00

## 2022-01-01 RX ADMIN — HYDROMORPHONE HYDROCHLORIDE 2 MG: 1 INJECTION, SOLUTION INTRAMUSCULAR; INTRAVENOUS; SUBCUTANEOUS at 01:33

## 2022-01-01 RX ADMIN — HYDROMORPHONE HYDROCHLORIDE 10 MG: 2 INJECTION, SOLUTION INTRAMUSCULAR; INTRAVENOUS; SUBCUTANEOUS at 09:56

## 2022-01-01 RX ADMIN — Medication 6 MG: at 05:55

## 2022-01-01 RX ADMIN — HYDROMORPHONE HYDROCHLORIDE 4 MG: 2 INJECTION, SOLUTION INTRAMUSCULAR; INTRAVENOUS; SUBCUTANEOUS at 03:40

## 2022-01-01 RX ADMIN — LORAZEPAM 2 MG: 2 INJECTION INTRAMUSCULAR; INTRAVENOUS at 17:03

## 2022-01-01 RX ADMIN — VANCOMYCIN HYDROCHLORIDE 750 MG: 750 INJECTION, POWDER, LYOPHILIZED, FOR SOLUTION INTRAVENOUS at 15:05

## 2022-01-01 RX ADMIN — PIPERACILLIN AND TAZOBACTAM 3.38 G: 3; .375 INJECTION, POWDER, LYOPHILIZED, FOR SOLUTION INTRAVENOUS at 15:48

## 2022-01-01 RX ADMIN — OXYCODONE AND ACETAMINOPHEN 1 TABLET: 10; 325 TABLET ORAL at 06:27

## 2022-01-01 RX ADMIN — ENOXAPARIN SODIUM 40 MG: 100 INJECTION SUBCUTANEOUS at 09:07

## 2022-01-01 RX ADMIN — HYDROMORPHONE HYDROCHLORIDE 2 MG: 1 INJECTION, SOLUTION INTRAMUSCULAR; INTRAVENOUS; SUBCUTANEOUS at 18:23

## 2022-01-01 RX ADMIN — IRON SUCROSE 200 MG: 20 INJECTION, SOLUTION INTRAVENOUS at 09:07

## 2022-01-01 RX ADMIN — Medication 6 MG: at 21:47

## 2022-01-01 RX ADMIN — METRONIDAZOLE: 7.5 GEL TOPICAL at 08:21

## 2022-01-01 RX ADMIN — LORAZEPAM 2 MG: 2 INJECTION INTRAMUSCULAR; INTRAVENOUS at 03:41

## 2022-01-01 RX ADMIN — SODIUM CHLORIDE, PRESERVATIVE FREE 10 ML: 5 INJECTION INTRAVENOUS at 05:09

## 2022-01-01 RX ADMIN — HYDROMORPHONE HYDROCHLORIDE 2 MG: 1 INJECTION, SOLUTION INTRAMUSCULAR; INTRAVENOUS; SUBCUTANEOUS at 06:05

## 2022-01-01 RX ADMIN — HYDROMORPHONE HYDROCHLORIDE 2 MG: 1 INJECTION, SOLUTION INTRAMUSCULAR; INTRAVENOUS; SUBCUTANEOUS at 22:22

## 2022-01-01 RX ADMIN — HYDROMORPHONE HYDROCHLORIDE 2 MG: 1 INJECTION, SOLUTION INTRAMUSCULAR; INTRAVENOUS; SUBCUTANEOUS at 12:15

## 2022-01-01 RX ADMIN — HYDROMORPHONE HYDROCHLORIDE 4 MG: 2 INJECTION, SOLUTION INTRAMUSCULAR; INTRAVENOUS; SUBCUTANEOUS at 19:52

## 2022-01-01 RX ADMIN — HYDROMORPHONE HYDROCHLORIDE 2 MG: 1 INJECTION, SOLUTION INTRAMUSCULAR; INTRAVENOUS; SUBCUTANEOUS at 15:31

## 2022-01-01 RX ADMIN — MORPHINE SULFATE 30 MG: 30 TABLET, FILM COATED, EXTENDED RELEASE ORAL at 11:13

## 2022-01-01 RX ADMIN — HYDROMORPHONE HYDROCHLORIDE 4 MG: 2 INJECTION, SOLUTION INTRAMUSCULAR; INTRAVENOUS; SUBCUTANEOUS at 22:18

## 2022-01-01 RX ADMIN — GLYCOPYRROLATE 0.2 MG: 0.2 INJECTION, SOLUTION INTRAMUSCULAR; INTRAVENOUS at 03:07

## 2022-01-01 RX ADMIN — MORPHINE SULFATE 30 MG: 30 TABLET, FILM COATED, EXTENDED RELEASE ORAL at 08:20

## 2022-01-01 RX ADMIN — HYDROMORPHONE HYDROCHLORIDE 4 MG: 2 INJECTION, SOLUTION INTRAMUSCULAR; INTRAVENOUS; SUBCUTANEOUS at 10:08

## 2022-01-01 RX ADMIN — HYDROMORPHONE HYDROCHLORIDE 2 MG: 2 TABLET ORAL at 12:59

## 2022-01-01 RX ADMIN — HYDROMORPHONE HYDROCHLORIDE 2 MG: 2 INJECTION, SOLUTION INTRAMUSCULAR; INTRAVENOUS; SUBCUTANEOUS at 12:59

## 2022-01-01 RX ADMIN — HYDROMORPHONE HYDROCHLORIDE 4 MG: 2 INJECTION, SOLUTION INTRAMUSCULAR; INTRAVENOUS; SUBCUTANEOUS at 14:06

## 2022-01-01 RX ADMIN — SODIUM CHLORIDE 500 ML: 9 INJECTION, SOLUTION INTRAVENOUS at 01:46

## 2022-01-01 RX ADMIN — OXYCODONE HYDROCHLORIDE 25 MG: 15 TABLET, FILM COATED, EXTENDED RELEASE ORAL at 21:21

## 2022-01-01 RX ADMIN — MORPHINE SULFATE 30 MG: 30 TABLET, FILM COATED, EXTENDED RELEASE ORAL at 21:08

## 2022-01-01 RX ADMIN — HYDROMORPHONE HYDROCHLORIDE 2 MG: 1 INJECTION, SOLUTION INTRAMUSCULAR; INTRAVENOUS; SUBCUTANEOUS at 15:02

## 2022-01-01 RX ADMIN — WATER 2 G: 1 INJECTION INTRAMUSCULAR; INTRAVENOUS; SUBCUTANEOUS at 15:22

## 2022-01-01 RX ADMIN — OXYCODONE AND ACETAMINOPHEN 1 TABLET: 10; 325 TABLET ORAL at 12:36

## 2022-01-01 RX ADMIN — HYDROMORPHONE HYDROCHLORIDE 4 MG: 2 INJECTION, SOLUTION INTRAMUSCULAR; INTRAVENOUS; SUBCUTANEOUS at 04:20

## 2022-01-01 RX ADMIN — ENOXAPARIN SODIUM 40 MG: 100 INJECTION SUBCUTANEOUS at 08:14

## 2022-01-01 RX ADMIN — HYDROMORPHONE HYDROCHLORIDE 2 MG: 2 TABLET ORAL at 00:27

## 2022-01-01 RX ADMIN — SENNOSIDES AND DOCUSATE SODIUM 2 TABLET: 50; 8.6 TABLET ORAL at 08:43

## 2022-01-01 RX ADMIN — OXYCODONE AND ACETAMINOPHEN 1 TABLET: 7.5; 325 TABLET ORAL at 22:40

## 2022-01-01 RX ADMIN — HYDROMORPHONE HYDROCHLORIDE 4 MG: 2 INJECTION, SOLUTION INTRAMUSCULAR; INTRAVENOUS; SUBCUTANEOUS at 05:32

## 2022-01-01 RX ADMIN — LORAZEPAM 2 MG: 2 INJECTION INTRAMUSCULAR; INTRAVENOUS at 22:49

## 2022-01-01 RX ADMIN — MORPHINE SULFATE 4 MG: 2 INJECTION, SOLUTION INTRAMUSCULAR; INTRAVENOUS at 23:23

## 2022-01-01 RX ADMIN — LORAZEPAM 2 MG: 2 INJECTION INTRAMUSCULAR; INTRAVENOUS at 04:16

## 2022-01-01 RX ADMIN — LORAZEPAM 2 MG: 2 INJECTION INTRAMUSCULAR; INTRAVENOUS at 22:36

## 2022-01-01 RX ADMIN — SODIUM CHLORIDE, PRESERVATIVE FREE 10 ML: 5 INJECTION INTRAVENOUS at 06:06

## 2022-01-01 RX ADMIN — OXYCODONE HYDROCHLORIDE 20 MG: 10 TABLET, FILM COATED, EXTENDED RELEASE ORAL at 20:36

## 2022-01-01 RX ADMIN — SODIUM CHLORIDE, PRESERVATIVE FREE 10 ML: 5 INJECTION INTRAVENOUS at 05:38

## 2022-01-01 RX ADMIN — PIPERACILLIN AND TAZOBACTAM 3.38 G: 3; .375 INJECTION, POWDER, LYOPHILIZED, FOR SOLUTION INTRAVENOUS at 01:51

## 2022-01-01 RX ADMIN — WATER 2 G: 1 INJECTION INTRAMUSCULAR; INTRAVENOUS; SUBCUTANEOUS at 09:44

## 2022-01-01 RX ADMIN — LORAZEPAM 2 MG: 2 INJECTION INTRAMUSCULAR; INTRAVENOUS at 00:03

## 2022-01-01 RX ADMIN — SODIUM CHLORIDE, PRESERVATIVE FREE 10 ML: 5 INJECTION INTRAVENOUS at 22:09

## 2022-01-01 RX ADMIN — OXYCODONE HYDROCHLORIDE 40 MG: 20 TABLET, FILM COATED, EXTENDED RELEASE ORAL at 20:19

## 2022-01-01 RX ADMIN — HYDROMORPHONE HYDROCHLORIDE 6 MG: 2 INJECTION, SOLUTION INTRAMUSCULAR; INTRAVENOUS; SUBCUTANEOUS at 01:27

## 2022-01-01 RX ADMIN — ACETAMINOPHEN 325MG 650 MG: 325 TABLET ORAL at 04:20

## 2022-01-01 RX ADMIN — HYDROMORPHONE HYDROCHLORIDE 4 MG: 2 INJECTION, SOLUTION INTRAMUSCULAR; INTRAVENOUS; SUBCUTANEOUS at 17:03

## 2022-01-01 RX ADMIN — HYDROMORPHONE HYDROCHLORIDE 2 MG: 2 TABLET ORAL at 13:23

## 2022-01-01 RX ADMIN — SODIUM CHLORIDE, PRESERVATIVE FREE 10 ML: 5 INJECTION INTRAVENOUS at 21:41

## 2022-01-01 RX ADMIN — SODIUM CHLORIDE, PRESERVATIVE FREE 10 ML: 5 INJECTION INTRAVENOUS at 23:17

## 2022-01-01 RX ADMIN — ENOXAPARIN SODIUM 40 MG: 100 INJECTION SUBCUTANEOUS at 08:03

## 2022-01-01 RX ADMIN — OXYCODONE HYDROCHLORIDE 10 MG: 10 TABLET, FILM COATED, EXTENDED RELEASE ORAL at 10:00

## 2022-01-01 RX ADMIN — OXYCODONE HYDROCHLORIDE 40 MG: 20 TABLET, FILM COATED, EXTENDED RELEASE ORAL at 21:02

## 2022-01-01 RX ADMIN — Medication 6 MG: at 02:00

## 2022-01-01 RX ADMIN — METRONIDAZOLE: 7.5 GEL TOPICAL at 16:14

## 2022-01-01 RX ADMIN — ACETAMINOPHEN 325MG 650 MG: 325 TABLET ORAL at 23:00

## 2022-01-01 RX ADMIN — HYDROMORPHONE HYDROCHLORIDE 4 MG: 2 INJECTION, SOLUTION INTRAMUSCULAR; INTRAVENOUS; SUBCUTANEOUS at 00:15

## 2022-01-01 RX ADMIN — LORAZEPAM 4 MG: 2 INJECTION INTRAMUSCULAR; INTRAVENOUS at 19:43

## 2022-01-01 RX ADMIN — HYDROMORPHONE HYDROCHLORIDE 4 MG: 2 INJECTION, SOLUTION INTRAMUSCULAR; INTRAVENOUS; SUBCUTANEOUS at 16:21

## 2022-01-01 RX ADMIN — OXYCODONE 10 MG: 5 TABLET ORAL at 08:14

## 2022-01-01 RX ADMIN — HYDROMORPHONE HYDROCHLORIDE 2 MG: 1 INJECTION, SOLUTION INTRAMUSCULAR; INTRAVENOUS; SUBCUTANEOUS at 09:39

## 2022-01-01 RX ADMIN — LORAZEPAM 2 MG: 2 INJECTION INTRAMUSCULAR; INTRAVENOUS at 02:22

## 2022-01-01 RX ADMIN — CARVEDILOL 3.12 MG: 3.12 TABLET, FILM COATED ORAL at 17:04

## 2022-01-01 RX ADMIN — VANCOMYCIN HYDROCHLORIDE 750 MG: 750 INJECTION, POWDER, LYOPHILIZED, FOR SOLUTION INTRAVENOUS at 13:39

## 2022-01-01 RX ADMIN — SODIUM CHLORIDE 50 ML/HR: 9 INJECTION, SOLUTION INTRAVENOUS at 00:14

## 2022-01-01 RX ADMIN — HYDROMORPHONE HYDROCHLORIDE 2 MG: 1 INJECTION, SOLUTION INTRAMUSCULAR; INTRAVENOUS; SUBCUTANEOUS at 13:41

## 2022-01-01 RX ADMIN — HYDROMORPHONE HYDROCHLORIDE 2 MG: 1 INJECTION, SOLUTION INTRAMUSCULAR; INTRAVENOUS; SUBCUTANEOUS at 05:50

## 2022-01-01 RX ADMIN — HYDROMORPHONE HYDROCHLORIDE 2 MG: 1 INJECTION, SOLUTION INTRAMUSCULAR; INTRAVENOUS; SUBCUTANEOUS at 18:26

## 2022-01-01 RX ADMIN — HYDROMORPHONE HYDROCHLORIDE 4 MG: 2 INJECTION, SOLUTION INTRAMUSCULAR; INTRAVENOUS; SUBCUTANEOUS at 11:54

## 2022-01-01 RX ADMIN — ENOXAPARIN SODIUM 40 MG: 100 INJECTION SUBCUTANEOUS at 09:11

## 2022-01-01 RX ADMIN — WATER 2 G: 1 INJECTION INTRAMUSCULAR; INTRAVENOUS; SUBCUTANEOUS at 13:42

## 2022-01-01 RX ADMIN — WATER 2 G: 1 INJECTION INTRAMUSCULAR; INTRAVENOUS; SUBCUTANEOUS at 15:34

## 2022-01-01 RX ADMIN — LORAZEPAM 4 MG: 2 INJECTION INTRAMUSCULAR; INTRAVENOUS at 09:38

## 2022-01-01 RX ADMIN — Medication 6 MG: at 07:46

## 2022-01-01 RX ADMIN — WATER 2 G: 1 INJECTION INTRAMUSCULAR; INTRAVENOUS; SUBCUTANEOUS at 00:04

## 2022-01-01 RX ADMIN — PIPERACILLIN AND TAZOBACTAM 3.38 G: 3; .375 INJECTION, POWDER, LYOPHILIZED, FOR SOLUTION INTRAVENOUS at 06:35

## 2022-01-01 RX ADMIN — VANCOMYCIN HYDROCHLORIDE 750 MG: 750 INJECTION, POWDER, LYOPHILIZED, FOR SOLUTION INTRAVENOUS at 23:06

## 2022-01-01 RX ADMIN — OXYCODONE AND ACETAMINOPHEN 1 TABLET: 7.5; 325 TABLET ORAL at 22:54

## 2022-01-01 RX ADMIN — SODIUM CHLORIDE, PRESERVATIVE FREE 10 ML: 5 INJECTION INTRAVENOUS at 06:00

## 2022-01-01 RX ADMIN — LORAZEPAM 4 MG: 2 INJECTION INTRAMUSCULAR; INTRAVENOUS at 14:05

## 2022-01-01 RX ADMIN — Medication 6 MG: at 15:01

## 2022-01-01 RX ADMIN — ENOXAPARIN SODIUM 40 MG: 100 INJECTION SUBCUTANEOUS at 08:42

## 2022-01-01 RX ADMIN — HYDROMORPHONE HYDROCHLORIDE 6 MG: 2 INJECTION, SOLUTION INTRAMUSCULAR; INTRAVENOUS; SUBCUTANEOUS at 05:21

## 2022-01-01 RX ADMIN — OXYCODONE 10 MG: 5 TABLET ORAL at 19:49

## 2022-01-01 RX ADMIN — HYDROMORPHONE HYDROCHLORIDE 6 MG: 2 INJECTION, SOLUTION INTRAMUSCULAR; INTRAVENOUS; SUBCUTANEOUS at 08:28

## 2022-01-01 RX ADMIN — HYDROMORPHONE HYDROCHLORIDE 2 MG: 1 INJECTION, SOLUTION INTRAMUSCULAR; INTRAVENOUS; SUBCUTANEOUS at 05:04

## 2022-01-01 RX ADMIN — WATER 2 G: 1 INJECTION INTRAMUSCULAR; INTRAVENOUS; SUBCUTANEOUS at 09:12

## 2022-01-01 RX ADMIN — OXYCODONE AND ACETAMINOPHEN 1 TABLET: 7.5; 325 TABLET ORAL at 20:08

## 2022-01-01 RX ADMIN — LORAZEPAM 2 MG: 2 INJECTION INTRAMUSCULAR; INTRAVENOUS at 19:52

## 2022-01-01 RX ADMIN — HYDROMORPHONE HYDROCHLORIDE 6 MG: 2 INJECTION, SOLUTION INTRAMUSCULAR; INTRAVENOUS; SUBCUTANEOUS at 00:29

## 2022-01-01 RX ADMIN — LORAZEPAM 1 MG: 2 INJECTION INTRAMUSCULAR; INTRAVENOUS at 12:45

## 2022-01-01 RX ADMIN — OXYCODONE HYDROCHLORIDE 20 MG: 10 TABLET, FILM COATED, EXTENDED RELEASE ORAL at 08:03

## 2022-01-01 RX ADMIN — HYDROMORPHONE HYDROCHLORIDE 2 MG: 1 INJECTION, SOLUTION INTRAMUSCULAR; INTRAVENOUS; SUBCUTANEOUS at 16:38

## 2022-01-01 RX ADMIN — HYDROMORPHONE HYDROCHLORIDE 12 MG: 2 INJECTION, SOLUTION INTRAMUSCULAR; INTRAVENOUS; SUBCUTANEOUS at 19:44

## 2022-01-01 RX ADMIN — ACETAMINOPHEN 325MG 650 MG: 325 TABLET ORAL at 16:35

## 2022-01-01 RX ADMIN — WATER 2 G: 1 INJECTION INTRAMUSCULAR; INTRAVENOUS; SUBCUTANEOUS at 16:31

## 2022-01-01 RX ADMIN — Medication 6 MG: at 07:04

## 2022-01-01 RX ADMIN — PIPERACILLIN AND TAZOBACTAM 3.38 G: 3; .375 INJECTION, POWDER, LYOPHILIZED, FOR SOLUTION INTRAVENOUS at 01:57

## 2022-01-01 RX ADMIN — HYDROMORPHONE HYDROCHLORIDE 2 MG: 1 INJECTION, SOLUTION INTRAMUSCULAR; INTRAVENOUS; SUBCUTANEOUS at 19:43

## 2022-01-01 RX ADMIN — HYDROMORPHONE HYDROCHLORIDE 2 MG: 1 INJECTION, SOLUTION INTRAMUSCULAR; INTRAVENOUS; SUBCUTANEOUS at 09:07

## 2022-01-01 RX ADMIN — OXYCODONE AND ACETAMINOPHEN 1 TABLET: 7.5; 325 TABLET ORAL at 07:05

## 2022-01-01 RX ADMIN — WATER 2 G: 1 INJECTION INTRAMUSCULAR; INTRAVENOUS; SUBCUTANEOUS at 16:35

## 2022-01-01 RX ADMIN — OXYCODONE AND ACETAMINOPHEN 1 TABLET: 7.5; 325 TABLET ORAL at 12:44

## 2022-01-01 RX ADMIN — WATER 2 G: 1 INJECTION INTRAMUSCULAR; INTRAVENOUS; SUBCUTANEOUS at 08:03

## 2022-01-01 RX ADMIN — HYDROMORPHONE HYDROCHLORIDE 2 MG: 1 INJECTION, SOLUTION INTRAMUSCULAR; INTRAVENOUS; SUBCUTANEOUS at 03:18

## 2022-01-01 RX ADMIN — HYDROMORPHONE HYDROCHLORIDE 8 MG: 2 INJECTION, SOLUTION INTRAMUSCULAR; INTRAVENOUS; SUBCUTANEOUS at 09:01

## 2022-01-01 RX ADMIN — MORPHINE SULFATE 4 MG: 2 INJECTION, SOLUTION INTRAMUSCULAR; INTRAVENOUS at 03:17

## 2022-01-01 RX ADMIN — SODIUM CHLORIDE, POTASSIUM CHLORIDE, SODIUM LACTATE AND CALCIUM CHLORIDE 1000 ML: 600; 310; 30; 20 INJECTION, SOLUTION INTRAVENOUS at 06:33

## 2022-01-01 RX ADMIN — METRONIDAZOLE 500 MG: 250 TABLET ORAL at 12:30

## 2022-01-01 RX ADMIN — OXYCODONE AND ACETAMINOPHEN 1 TABLET: 7.5; 325 TABLET ORAL at 06:47

## 2022-01-01 RX ADMIN — Medication 6 MG: at 16:12

## 2022-01-01 RX ADMIN — SODIUM CHLORIDE, PRESERVATIVE FREE 10 ML: 5 INJECTION INTRAVENOUS at 06:29

## 2022-01-01 RX ADMIN — LORAZEPAM 2 MG: 2 INJECTION INTRAMUSCULAR; INTRAVENOUS at 08:01

## 2022-01-01 RX ADMIN — HYDROMORPHONE HYDROCHLORIDE 4 MG: 2 INJECTION, SOLUTION INTRAMUSCULAR; INTRAVENOUS; SUBCUTANEOUS at 11:17

## 2022-01-01 RX ADMIN — LORAZEPAM 2 MG: 2 INJECTION INTRAMUSCULAR; INTRAVENOUS at 03:30

## 2022-01-01 RX ADMIN — HYDROMORPHONE HYDROCHLORIDE 2 MG: 2 INJECTION, SOLUTION INTRAMUSCULAR; INTRAVENOUS; SUBCUTANEOUS at 13:36

## 2022-01-01 RX ADMIN — HYDROMORPHONE HYDROCHLORIDE 2 MG: 1 INJECTION, SOLUTION INTRAMUSCULAR; INTRAVENOUS; SUBCUTANEOUS at 23:06

## 2022-01-01 RX ADMIN — SODIUM CHLORIDE, PRESERVATIVE FREE 10 ML: 5 INJECTION INTRAVENOUS at 05:03

## 2022-01-01 RX ADMIN — HYDROMORPHONE HYDROCHLORIDE 2 MG: 1 INJECTION, SOLUTION INTRAMUSCULAR; INTRAVENOUS; SUBCUTANEOUS at 22:05

## 2022-01-01 RX ADMIN — MORPHINE SULFATE 4 MG: 2 INJECTION, SOLUTION INTRAMUSCULAR; INTRAVENOUS at 15:06

## 2022-01-01 RX ADMIN — SODIUM CHLORIDE, PRESERVATIVE FREE 10 ML: 5 INJECTION INTRAVENOUS at 15:05

## 2022-01-01 RX ADMIN — HYDROMORPHONE HYDROCHLORIDE 2 MG: 1 INJECTION, SOLUTION INTRAMUSCULAR; INTRAVENOUS; SUBCUTANEOUS at 08:14

## 2022-01-01 RX ADMIN — Medication 6 MG: at 16:55

## 2022-01-01 RX ADMIN — HYDROMORPHONE HYDROCHLORIDE 2 MG: 1 INJECTION, SOLUTION INTRAMUSCULAR; INTRAVENOUS; SUBCUTANEOUS at 08:48

## 2022-01-01 RX ADMIN — HYDROMORPHONE HYDROCHLORIDE 2 MG: 2 TABLET ORAL at 13:52

## 2022-01-01 RX ADMIN — Medication 6 MG: at 08:20

## 2022-01-01 RX ADMIN — LORAZEPAM 2 MG: 2 INJECTION INTRAMUSCULAR; INTRAVENOUS at 04:20

## 2022-01-01 RX ADMIN — BISACODYL 10 MG: 10 SUPPOSITORY RECTAL at 07:04

## 2022-01-01 RX ADMIN — HYDROMORPHONE HYDROCHLORIDE 10 MG: 2 INJECTION, SOLUTION INTRAMUSCULAR; INTRAVENOUS; SUBCUTANEOUS at 14:04

## 2022-01-01 RX ADMIN — SODIUM CHLORIDE, PRESERVATIVE FREE 10 ML: 5 INJECTION INTRAVENOUS at 15:20

## 2022-01-01 RX ADMIN — HYDROMORPHONE HYDROCHLORIDE 2 MG: 1 INJECTION, SOLUTION INTRAMUSCULAR; INTRAVENOUS; SUBCUTANEOUS at 18:50

## 2022-01-01 RX ADMIN — Medication 6 MG: at 14:17

## 2022-01-01 RX ADMIN — VANCOMYCIN HYDROCHLORIDE 750 MG: 750 INJECTION, POWDER, LYOPHILIZED, FOR SOLUTION INTRAVENOUS at 09:23

## 2022-01-01 RX ADMIN — VANCOMYCIN HYDROCHLORIDE 750 MG: 750 INJECTION, POWDER, LYOPHILIZED, FOR SOLUTION INTRAVENOUS at 22:23

## 2022-01-01 RX ADMIN — LORAZEPAM 2 MG: 2 INJECTION INTRAMUSCULAR; INTRAVENOUS at 11:54

## 2022-01-01 RX ADMIN — VANCOMYCIN HYDROCHLORIDE 750 MG: 750 INJECTION, POWDER, LYOPHILIZED, FOR SOLUTION INTRAVENOUS at 22:44

## 2022-01-01 RX ADMIN — OXYCODONE AND ACETAMINOPHEN 1 TABLET: 10; 325 TABLET ORAL at 16:29

## 2022-01-01 RX ADMIN — LORAZEPAM 1 MG: 2 INJECTION INTRAMUSCULAR; INTRAVENOUS at 13:36

## 2022-01-01 RX ADMIN — MORPHINE SULFATE 4 MG: 2 INJECTION, SOLUTION INTRAMUSCULAR; INTRAVENOUS at 18:50

## 2022-01-01 RX ADMIN — HYDROMORPHONE HYDROCHLORIDE 2 MG: 1 INJECTION, SOLUTION INTRAMUSCULAR; INTRAVENOUS; SUBCUTANEOUS at 08:12

## 2022-01-01 RX ADMIN — OXYCODONE AND ACETAMINOPHEN 1 TABLET: 7.5; 325 TABLET ORAL at 07:12

## 2022-01-01 RX ADMIN — VANCOMYCIN HYDROCHLORIDE 750 MG: 750 INJECTION, POWDER, LYOPHILIZED, FOR SOLUTION INTRAVENOUS at 03:25

## 2022-01-01 RX ADMIN — LORAZEPAM 2 MG: 2 INJECTION INTRAMUSCULAR; INTRAVENOUS at 05:21

## 2022-01-01 RX ADMIN — HYDROMORPHONE HYDROCHLORIDE 2 MG: 1 INJECTION, SOLUTION INTRAMUSCULAR; INTRAVENOUS; SUBCUTANEOUS at 22:41

## 2022-01-01 RX ADMIN — LORAZEPAM 4 MG: 2 INJECTION INTRAMUSCULAR; INTRAVENOUS at 09:01

## 2022-01-01 RX ADMIN — SODIUM CHLORIDE, PRESERVATIVE FREE 10 ML: 5 INJECTION INTRAVENOUS at 16:35

## 2022-01-01 RX ADMIN — OXYCODONE HYDROCHLORIDE 10 MG: 10 TABLET, FILM COATED, EXTENDED RELEASE ORAL at 09:43

## 2022-01-01 RX ADMIN — OXYCODONE AND ACETAMINOPHEN 1 TABLET: 10; 325 TABLET ORAL at 00:10

## 2022-01-01 RX ADMIN — HYDROMORPHONE HYDROCHLORIDE 2 MG: 2 TABLET ORAL at 18:11

## 2022-01-01 RX ADMIN — HYDROMORPHONE HYDROCHLORIDE 2 MG: 1 INJECTION, SOLUTION INTRAMUSCULAR; INTRAVENOUS; SUBCUTANEOUS at 07:12

## 2022-01-01 RX ADMIN — SODIUM CHLORIDE, PRESERVATIVE FREE 10 ML: 5 INJECTION INTRAVENOUS at 22:30

## 2022-01-01 RX ADMIN — IRON SUCROSE 100 MG: 20 INJECTION, SOLUTION INTRAVENOUS at 11:24

## 2022-01-01 RX ADMIN — HYDROMORPHONE HYDROCHLORIDE 2 MG: 1 INJECTION, SOLUTION INTRAMUSCULAR; INTRAVENOUS; SUBCUTANEOUS at 19:01

## 2022-01-01 RX ADMIN — MORPHINE SULFATE 30 MG: 30 TABLET, FILM COATED, EXTENDED RELEASE ORAL at 20:18

## 2022-01-01 RX ADMIN — HYDROMORPHONE HYDROCHLORIDE 2 MG: 1 INJECTION, SOLUTION INTRAMUSCULAR; INTRAVENOUS; SUBCUTANEOUS at 15:13

## 2022-01-01 RX ADMIN — SENNOSIDES AND DOCUSATE SODIUM 2 TABLET: 50; 8.6 TABLET ORAL at 09:44

## 2022-01-01 RX ADMIN — IRON SUCROSE 200 MG: 20 INJECTION, SOLUTION INTRAVENOUS at 08:47

## 2022-01-01 RX ADMIN — HYDROMORPHONE HYDROCHLORIDE 10 MG: 2 INJECTION, SOLUTION INTRAMUSCULAR; INTRAVENOUS; SUBCUTANEOUS at 17:20

## 2022-01-01 RX ADMIN — LORAZEPAM 2 MG: 2 INJECTION INTRAMUSCULAR; INTRAVENOUS at 14:06

## 2022-01-01 RX ADMIN — LORAZEPAM 4 MG: 2 INJECTION INTRAMUSCULAR; INTRAVENOUS at 09:18

## 2022-01-01 RX ADMIN — HYDROMORPHONE HYDROCHLORIDE 4 MG: 2 INJECTION, SOLUTION INTRAMUSCULAR; INTRAVENOUS; SUBCUTANEOUS at 21:53

## 2022-01-01 RX ADMIN — SODIUM CHLORIDE, POTASSIUM CHLORIDE, SODIUM LACTATE AND CALCIUM CHLORIDE 1000 ML: 600; 310; 30; 20 INJECTION, SOLUTION INTRAVENOUS at 13:15

## 2022-01-01 RX ADMIN — LORAZEPAM 2 MG: 2 INJECTION INTRAMUSCULAR; INTRAVENOUS at 21:33

## 2022-01-01 RX ADMIN — OXYCODONE HYDROCHLORIDE 40 MG: 20 TABLET, FILM COATED, EXTENDED RELEASE ORAL at 22:07

## 2022-01-01 RX ADMIN — HYDROMORPHONE HYDROCHLORIDE 2 MG: 1 INJECTION, SOLUTION INTRAMUSCULAR; INTRAVENOUS; SUBCUTANEOUS at 03:12

## 2022-01-01 RX ADMIN — Medication 6 MG: at 02:41

## 2022-01-01 RX ADMIN — WATER 2 G: 1 INJECTION INTRAMUSCULAR; INTRAVENOUS; SUBCUTANEOUS at 15:53

## 2022-01-01 RX ADMIN — LORAZEPAM 2 MG: 2 INJECTION INTRAMUSCULAR; INTRAVENOUS at 00:16

## 2022-01-01 RX ADMIN — HYDROMORPHONE HYDROCHLORIDE 2 MG: 2 TABLET ORAL at 22:07

## 2022-01-01 RX ADMIN — LORAZEPAM 2 MG: 2 INJECTION INTRAMUSCULAR; INTRAVENOUS at 08:28

## 2022-01-01 RX ADMIN — OXYCODONE AND ACETAMINOPHEN 1 TABLET: 7.5; 325 TABLET ORAL at 09:00

## 2022-01-01 RX ADMIN — SODIUM CHLORIDE, PRESERVATIVE FREE 10 ML: 5 INJECTION INTRAVENOUS at 14:00

## 2022-01-01 RX ADMIN — HYDROMORPHONE HYDROCHLORIDE 2 MG: 2 TABLET ORAL at 02:12

## 2022-01-01 RX ADMIN — PIPERACILLIN AND TAZOBACTAM 3.38 G: 3; .375 INJECTION, POWDER, LYOPHILIZED, FOR SOLUTION INTRAVENOUS at 22:15

## 2022-01-01 RX ADMIN — OXYCODONE HYDROCHLORIDE 10 MG: 10 TABLET, FILM COATED, EXTENDED RELEASE ORAL at 20:05

## 2022-01-01 RX ADMIN — PIPERACILLIN AND TAZOBACTAM 3.38 G: 3; .375 INJECTION, POWDER, LYOPHILIZED, FOR SOLUTION INTRAVENOUS at 11:18

## 2022-01-01 RX ADMIN — HYDROMORPHONE HYDROCHLORIDE 2 MG: 1 INJECTION, SOLUTION INTRAMUSCULAR; INTRAVENOUS; SUBCUTANEOUS at 06:25

## 2022-01-01 RX ADMIN — SODIUM CHLORIDE, PRESERVATIVE FREE 10 ML: 5 INJECTION INTRAVENOUS at 18:50

## 2022-01-01 RX ADMIN — HYDROMORPHONE HYDROCHLORIDE 10 MG: 2 INJECTION, SOLUTION INTRAMUSCULAR; INTRAVENOUS; SUBCUTANEOUS at 11:03

## 2022-01-01 RX ADMIN — WATER 2 G: 1 INJECTION INTRAMUSCULAR; INTRAVENOUS; SUBCUTANEOUS at 15:17

## 2022-01-01 RX ADMIN — Medication 6 MG: at 12:51

## 2022-01-01 RX ADMIN — HEPARIN SODIUM IN SODIUM CHLORIDE 800 UNITS: 200 INJECTION INTRAVENOUS at 16:06

## 2022-01-01 RX ADMIN — WATER 2 G: 1 INJECTION INTRAMUSCULAR; INTRAVENOUS; SUBCUTANEOUS at 23:22

## 2022-01-01 RX ADMIN — IOPAMIDOL 100 ML: 755 INJECTION, SOLUTION INTRAVENOUS at 13:56

## 2022-01-01 RX ADMIN — HYDROMORPHONE HYDROCHLORIDE 2 MG: 1 INJECTION, SOLUTION INTRAMUSCULAR; INTRAVENOUS; SUBCUTANEOUS at 03:11

## 2022-01-01 RX ADMIN — WATER 2 G: 1 INJECTION INTRAMUSCULAR; INTRAVENOUS; SUBCUTANEOUS at 09:15

## 2022-01-01 RX ADMIN — HYDROMORPHONE HYDROCHLORIDE 10 MG: 2 INJECTION, SOLUTION INTRAMUSCULAR; INTRAVENOUS; SUBCUTANEOUS at 10:38

## 2022-01-01 RX ADMIN — HYDROMORPHONE HYDROCHLORIDE 4 MG: 2 INJECTION, SOLUTION INTRAMUSCULAR; INTRAVENOUS; SUBCUTANEOUS at 00:03

## 2022-01-01 RX ADMIN — WATER 2 G: 1 INJECTION INTRAMUSCULAR; INTRAVENOUS; SUBCUTANEOUS at 15:26

## 2022-01-01 RX ADMIN — LORAZEPAM 2 MG: 2 INJECTION INTRAMUSCULAR; INTRAVENOUS at 00:46

## 2022-01-01 RX ADMIN — WATER 2 G: 1 INJECTION INTRAMUSCULAR; INTRAVENOUS; SUBCUTANEOUS at 10:44

## 2022-01-01 RX ADMIN — HYDROMORPHONE HYDROCHLORIDE 4 MG: 2 INJECTION, SOLUTION INTRAMUSCULAR; INTRAVENOUS; SUBCUTANEOUS at 13:51

## 2022-01-01 RX ADMIN — SODIUM CHLORIDE, PRESERVATIVE FREE 10 ML: 5 INJECTION INTRAVENOUS at 14:47

## 2022-01-01 RX ADMIN — HYDROMORPHONE HYDROCHLORIDE 10 MG: 2 INJECTION, SOLUTION INTRAMUSCULAR; INTRAVENOUS; SUBCUTANEOUS at 16:10

## 2022-01-01 RX ADMIN — ENOXAPARIN SODIUM 40 MG: 100 INJECTION SUBCUTANEOUS at 09:43

## 2022-01-01 RX ADMIN — HYDROMORPHONE HYDROCHLORIDE 6 MG: 2 INJECTION, SOLUTION INTRAMUSCULAR; INTRAVENOUS; SUBCUTANEOUS at 02:22

## 2022-01-01 RX ADMIN — HYDROMORPHONE HYDROCHLORIDE 4 MG: 2 INJECTION, SOLUTION INTRAMUSCULAR; INTRAVENOUS; SUBCUTANEOUS at 14:00

## 2022-01-01 RX ADMIN — LORAZEPAM 4 MG: 2 INJECTION INTRAMUSCULAR; INTRAVENOUS at 11:04

## 2022-01-01 RX ADMIN — SODIUM CHLORIDE, PRESERVATIVE FREE 10 ML: 5 INJECTION INTRAVENOUS at 06:34

## 2022-01-01 RX ADMIN — SODIUM CHLORIDE, PRESERVATIVE FREE 5 ML: 5 INJECTION INTRAVENOUS at 22:50

## 2022-01-01 RX ADMIN — HYDROMORPHONE HYDROCHLORIDE 2 MG: 1 INJECTION, SOLUTION INTRAMUSCULAR; INTRAVENOUS; SUBCUTANEOUS at 23:05

## 2022-01-01 RX ADMIN — OXYCODONE AND ACETAMINOPHEN 1 TABLET: 7.5; 325 TABLET ORAL at 00:00

## 2022-01-01 RX ADMIN — POTASSIUM CHLORIDE 40 MEQ: 750 TABLET, EXTENDED RELEASE ORAL at 06:33

## 2022-01-01 RX ADMIN — IRON SUCROSE 200 MG: 20 INJECTION, SOLUTION INTRAVENOUS at 12:05

## 2022-01-01 RX ADMIN — OXYCODONE HYDROCHLORIDE 40 MG: 20 TABLET, FILM COATED, EXTENDED RELEASE ORAL at 10:44

## 2022-01-01 RX ADMIN — DOCUSATE SODIUM 50MG AND SENNOSIDES 8.6MG 2 TABLET: 8.6; 5 TABLET, FILM COATED ORAL at 21:08

## 2022-01-01 RX ADMIN — SODIUM CHLORIDE, PRESERVATIVE FREE 10 ML: 5 INJECTION INTRAVENOUS at 15:17

## 2022-01-01 RX ADMIN — POTASSIUM CHLORIDE 40 MEQ: 750 TABLET, EXTENDED RELEASE ORAL at 06:47

## 2022-01-01 RX ADMIN — OXYCODONE HYDROCHLORIDE 40 MG: 20 TABLET, FILM COATED, EXTENDED RELEASE ORAL at 08:42

## 2022-01-01 RX ADMIN — HYDROMORPHONE HYDROCHLORIDE 4 MG: 2 INJECTION, SOLUTION INTRAMUSCULAR; INTRAVENOUS; SUBCUTANEOUS at 20:57

## 2022-01-01 RX ADMIN — HYDROMORPHONE HYDROCHLORIDE 10 MG: 2 INJECTION, SOLUTION INTRAMUSCULAR; INTRAVENOUS; SUBCUTANEOUS at 09:38

## 2022-01-01 RX ADMIN — HYDROMORPHONE HYDROCHLORIDE 2 MG: 2 TABLET ORAL at 06:04

## 2022-01-01 RX ADMIN — ENOXAPARIN SODIUM 40 MG: 100 INJECTION SUBCUTANEOUS at 09:00

## 2022-01-01 RX ADMIN — CARVEDILOL 3.12 MG: 3.12 TABLET, FILM COATED ORAL at 09:12

## 2022-01-01 RX ADMIN — LIDOCAINE HYDROCHLORIDE 200 MG: 20 INJECTION, SOLUTION INFILTRATION; PERINEURAL at 15:55

## 2022-01-01 RX ADMIN — HYDROMORPHONE HYDROCHLORIDE 2 MG: 1 INJECTION, SOLUTION INTRAMUSCULAR; INTRAVENOUS; SUBCUTANEOUS at 12:55

## 2022-01-01 RX ADMIN — SODIUM CHLORIDE, PRESERVATIVE FREE 10 ML: 5 INJECTION INTRAVENOUS at 21:32

## 2022-01-01 RX ADMIN — LORAZEPAM 1 MG: 2 INJECTION INTRAMUSCULAR; INTRAVENOUS at 12:59

## 2022-01-01 RX ADMIN — OXYCODONE 10 MG: 5 TABLET ORAL at 03:11

## 2022-01-01 RX ADMIN — HYDROMORPHONE HYDROCHLORIDE 12 MG: 2 INJECTION, SOLUTION INTRAMUSCULAR; INTRAVENOUS; SUBCUTANEOUS at 18:13

## 2022-01-01 RX ADMIN — HYDROMORPHONE HYDROCHLORIDE 2 MG: 2 TABLET ORAL at 09:24

## 2022-01-01 RX ADMIN — WATER 2 G: 1 INJECTION INTRAMUSCULAR; INTRAVENOUS; SUBCUTANEOUS at 00:00

## 2022-01-01 RX ADMIN — HYDROMORPHONE HYDROCHLORIDE 2 MG: 2 TABLET ORAL at 09:08

## 2022-01-01 RX ADMIN — LORAZEPAM 2 MG: 2 INJECTION INTRAMUSCULAR; INTRAVENOUS at 11:10

## 2022-01-01 RX ADMIN — SODIUM CHLORIDE 50 ML/HR: 9 INJECTION, SOLUTION INTRAVENOUS at 05:55

## 2022-01-01 RX ADMIN — HYDROMORPHONE HYDROCHLORIDE 2 MG: 1 INJECTION, SOLUTION INTRAMUSCULAR; INTRAVENOUS; SUBCUTANEOUS at 22:23

## 2022-01-01 RX ADMIN — VANCOMYCIN HYDROCHLORIDE 750 MG: 750 INJECTION, POWDER, LYOPHILIZED, FOR SOLUTION INTRAVENOUS at 14:40

## 2022-01-01 RX ADMIN — HYDROMORPHONE HYDROCHLORIDE 4 MG: 2 INJECTION, SOLUTION INTRAMUSCULAR; INTRAVENOUS; SUBCUTANEOUS at 07:38

## 2022-01-01 RX ADMIN — SODIUM CHLORIDE, PRESERVATIVE FREE 10 ML: 5 INJECTION INTRAVENOUS at 22:33

## 2022-01-01 RX ADMIN — SODIUM CHLORIDE 50 ML/HR: 9 INJECTION, SOLUTION INTRAVENOUS at 05:08

## 2022-01-01 RX ADMIN — PIPERACILLIN AND TAZOBACTAM 3.38 G: 3; .375 INJECTION, POWDER, LYOPHILIZED, FOR SOLUTION INTRAVENOUS at 00:23

## 2022-01-01 RX ADMIN — Medication 6 MG: at 09:59

## 2022-01-01 RX ADMIN — OXYCODONE HYDROCHLORIDE 10 MG: 5 TABLET ORAL at 13:00

## 2022-01-01 RX ADMIN — LORAZEPAM 2 MG: 2 INJECTION INTRAMUSCULAR; INTRAVENOUS at 17:07

## 2022-01-01 RX ADMIN — LORAZEPAM 2 MG: 2 INJECTION INTRAMUSCULAR; INTRAVENOUS at 01:27

## 2022-01-01 RX ADMIN — LORAZEPAM 2 MG: 2 INJECTION INTRAMUSCULAR; INTRAVENOUS at 08:04

## 2022-01-01 RX ADMIN — OXYCODONE 10 MG: 5 TABLET ORAL at 23:05

## 2022-01-01 RX ADMIN — Medication 6 MG: at 22:30

## 2022-01-01 RX ADMIN — LORAZEPAM 2 MG: 2 INJECTION INTRAMUSCULAR; INTRAVENOUS at 20:57

## 2022-01-01 RX ADMIN — DOCUSATE SODIUM 50MG AND SENNOSIDES 8.6MG 2 TABLET: 8.6; 5 TABLET, FILM COATED ORAL at 20:18

## 2022-01-01 RX ADMIN — HYDROMORPHONE HYDROCHLORIDE 6 MG: 2 INJECTION, SOLUTION INTRAMUSCULAR; INTRAVENOUS; SUBCUTANEOUS at 03:08

## 2022-01-01 RX ADMIN — OXYCODONE 10 MG: 5 TABLET ORAL at 11:57

## 2022-01-01 RX ADMIN — HYDROMORPHONE HYDROCHLORIDE 6 MG: 2 INJECTION, SOLUTION INTRAMUSCULAR; INTRAVENOUS; SUBCUTANEOUS at 22:49

## 2022-01-01 RX ADMIN — PIPERACILLIN AND TAZOBACTAM 3.38 G: 3; .375 INJECTION, POWDER, LYOPHILIZED, FOR SOLUTION INTRAVENOUS at 17:47

## 2022-01-01 RX ADMIN — SENNOSIDES AND DOCUSATE SODIUM 2 TABLET: 50; 8.6 TABLET ORAL at 08:14

## 2022-01-01 RX ADMIN — CARVEDILOL 3.12 MG: 3.12 TABLET, FILM COATED ORAL at 09:28

## 2022-01-01 RX ADMIN — METRONIDAZOLE 500 MG: 250 TABLET ORAL at 14:25

## 2022-01-01 RX ADMIN — HYDROMORPHONE HYDROCHLORIDE 4 MG: 2 INJECTION, SOLUTION INTRAMUSCULAR; INTRAVENOUS; SUBCUTANEOUS at 17:05

## 2022-01-01 RX ADMIN — HYDROMORPHONE HYDROCHLORIDE 6 MG: 2 INJECTION, SOLUTION INTRAMUSCULAR; INTRAVENOUS; SUBCUTANEOUS at 23:15

## 2022-01-01 RX ADMIN — OXYCODONE 10 MG: 5 TABLET ORAL at 08:03

## 2022-01-01 RX ADMIN — WATER 2 G: 1 INJECTION INTRAMUSCULAR; INTRAVENOUS; SUBCUTANEOUS at 23:05

## 2022-01-01 RX ADMIN — LORAZEPAM 2 MG: 2 INJECTION INTRAMUSCULAR; INTRAVENOUS at 05:55

## 2022-01-01 RX ADMIN — PIPERACILLIN AND TAZOBACTAM 3.38 G: 3; .375 INJECTION, POWDER, LYOPHILIZED, FOR SOLUTION INTRAVENOUS at 08:14

## 2022-01-01 RX ADMIN — CARVEDILOL 3.12 MG: 3.12 TABLET, FILM COATED ORAL at 16:07

## 2022-01-01 RX ADMIN — HYDROMORPHONE HYDROCHLORIDE 4 MG: 2 INJECTION, SOLUTION INTRAMUSCULAR; INTRAVENOUS; SUBCUTANEOUS at 03:31

## 2022-01-01 RX ADMIN — WATER 2 G: 1 INJECTION INTRAMUSCULAR; INTRAVENOUS; SUBCUTANEOUS at 16:11

## 2022-01-01 RX ADMIN — SODIUM CHLORIDE, PRESERVATIVE FREE 10 ML: 5 INJECTION INTRAVENOUS at 21:03

## 2022-01-01 RX ADMIN — OXYCODONE 10 MG: 5 TABLET ORAL at 16:31

## 2022-01-01 RX ADMIN — HYDROMORPHONE HYDROCHLORIDE 2 MG: 1 INJECTION, SOLUTION INTRAMUSCULAR; INTRAVENOUS; SUBCUTANEOUS at 18:55

## 2022-01-01 RX ADMIN — LORAZEPAM 2 MG: 2 INJECTION INTRAMUSCULAR; INTRAVENOUS at 00:29

## 2022-01-01 RX ADMIN — SENNOSIDES AND DOCUSATE SODIUM 2 TABLET: 50; 8.6 TABLET ORAL at 08:11

## 2022-01-01 RX ADMIN — OXYCODONE AND ACETAMINOPHEN 1 TABLET: 10; 325 TABLET ORAL at 10:59

## 2022-01-01 RX ADMIN — HYDROMORPHONE HYDROCHLORIDE 6 MG: 2 INJECTION, SOLUTION INTRAMUSCULAR; INTRAVENOUS; SUBCUTANEOUS at 08:01

## 2022-01-01 RX ADMIN — VANCOMYCIN HYDROCHLORIDE 750 MG: 750 INJECTION, POWDER, LYOPHILIZED, FOR SOLUTION INTRAVENOUS at 06:22

## 2022-01-01 RX ADMIN — WATER 2 G: 1 INJECTION INTRAMUSCULAR; INTRAVENOUS; SUBCUTANEOUS at 15:12

## 2022-01-01 RX ADMIN — HYDROMORPHONE HYDROCHLORIDE 10 MG: 2 INJECTION, SOLUTION INTRAMUSCULAR; INTRAVENOUS; SUBCUTANEOUS at 10:17

## 2022-01-01 RX ADMIN — OXYCODONE HYDROCHLORIDE 40 MG: 20 TABLET, FILM COATED, EXTENDED RELEASE ORAL at 08:10

## 2022-01-01 RX ADMIN — HYDROMORPHONE HYDROCHLORIDE 8 MG: 2 INJECTION, SOLUTION INTRAMUSCULAR; INTRAVENOUS; SUBCUTANEOUS at 08:49

## 2022-01-01 RX ADMIN — LORAZEPAM 2 MG: 2 INJECTION INTRAMUSCULAR; INTRAVENOUS at 14:00

## 2022-01-01 RX ADMIN — HYDROMORPHONE HYDROCHLORIDE 2 MG: 1 INJECTION, SOLUTION INTRAMUSCULAR; INTRAVENOUS; SUBCUTANEOUS at 04:20

## 2022-01-01 RX ADMIN — HYDROMORPHONE HYDROCHLORIDE 2 MG: 2 TABLET ORAL at 18:20

## 2022-01-01 RX ADMIN — WATER 2 G: 1 INJECTION INTRAMUSCULAR; INTRAVENOUS; SUBCUTANEOUS at 18:50

## 2022-01-01 RX ADMIN — MORPHINE SULFATE 30 MG: 30 TABLET, FILM COATED, EXTENDED RELEASE ORAL at 07:04

## 2022-01-01 RX ADMIN — OXYCODONE AND ACETAMINOPHEN 1 TABLET: 7.5; 325 TABLET ORAL at 14:41

## 2022-01-01 RX ADMIN — ACETAMINOPHEN 325MG 650 MG: 325 TABLET ORAL at 16:12

## 2022-01-01 RX ADMIN — METRONIDAZOLE: 7.5 GEL TOPICAL at 09:00

## 2022-01-01 RX ADMIN — HYDROMORPHONE HYDROCHLORIDE 2 MG: 1 INJECTION, SOLUTION INTRAMUSCULAR; INTRAVENOUS; SUBCUTANEOUS at 21:39

## 2022-01-01 RX ADMIN — HYDROMORPHONE HYDROCHLORIDE 6 MG: 2 INJECTION, SOLUTION INTRAMUSCULAR; INTRAVENOUS; SUBCUTANEOUS at 08:15

## 2022-01-01 RX ADMIN — OXYCODONE 10 MG: 5 TABLET ORAL at 00:34

## 2022-01-01 RX ADMIN — SODIUM CHLORIDE, PRESERVATIVE FREE 5 ML: 5 INJECTION INTRAVENOUS at 20:39

## 2022-01-01 RX ADMIN — SODIUM CHLORIDE, PRESERVATIVE FREE 10 ML: 5 INJECTION INTRAVENOUS at 15:41

## 2022-01-01 RX ADMIN — HYDROMORPHONE HYDROCHLORIDE 2 MG: 1 INJECTION, SOLUTION INTRAMUSCULAR; INTRAVENOUS; SUBCUTANEOUS at 09:44

## 2022-01-01 RX ADMIN — HYDROMORPHONE HYDROCHLORIDE 2 MG: 2 TABLET ORAL at 04:09

## 2022-01-01 RX ADMIN — HYDROMORPHONE HYDROCHLORIDE 4 MG: 2 INJECTION, SOLUTION INTRAMUSCULAR; INTRAVENOUS; SUBCUTANEOUS at 04:16

## 2022-01-01 RX ADMIN — SODIUM CHLORIDE, PRESERVATIVE FREE 10 ML: 5 INJECTION INTRAVENOUS at 21:11

## 2022-01-01 RX ADMIN — HYDROMORPHONE HYDROCHLORIDE 2 MG: 1 INJECTION, SOLUTION INTRAMUSCULAR; INTRAVENOUS; SUBCUTANEOUS at 01:49

## 2022-01-01 RX ADMIN — LORAZEPAM 4 MG: 2 INJECTION INTRAMUSCULAR; INTRAVENOUS at 08:49

## 2022-01-01 RX ADMIN — SODIUM CHLORIDE, PRESERVATIVE FREE 10 ML: 5 INJECTION INTRAVENOUS at 13:42

## 2022-01-01 RX ADMIN — ACETAMINOPHEN 325MG 650 MG: 325 TABLET ORAL at 05:04

## 2022-01-01 RX ADMIN — HYDROMORPHONE HYDROCHLORIDE 4 MG: 2 INJECTION, SOLUTION INTRAMUSCULAR; INTRAVENOUS; SUBCUTANEOUS at 22:36

## 2022-01-01 RX ADMIN — HYDROMORPHONE HYDROCHLORIDE 2 MG: 2 TABLET ORAL at 14:10

## 2022-01-01 RX ADMIN — WATER 2 G: 1 INJECTION INTRAMUSCULAR; INTRAVENOUS; SUBCUTANEOUS at 08:15

## 2022-01-01 RX ADMIN — HYDROMORPHONE HYDROCHLORIDE 6 MG: 2 INJECTION, SOLUTION INTRAMUSCULAR; INTRAVENOUS; SUBCUTANEOUS at 07:51

## 2022-01-01 RX ADMIN — LORAZEPAM 4 MG: 2 INJECTION INTRAMUSCULAR; INTRAVENOUS at 10:17

## 2022-01-01 RX ADMIN — OXYCODONE AND ACETAMINOPHEN 1 TABLET: 7.5; 325 TABLET ORAL at 21:08

## 2022-01-01 RX ADMIN — LORAZEPAM 2 MG: 2 INJECTION INTRAMUSCULAR; INTRAVENOUS at 18:12

## 2022-01-01 RX ADMIN — MORPHINE SULFATE 30 MG: 30 TABLET, FILM COATED, EXTENDED RELEASE ORAL at 09:50

## 2022-01-01 RX ADMIN — KETOROLAC TROMETHAMINE 30 MG: 30 INJECTION, SOLUTION INTRAMUSCULAR at 19:43

## 2022-01-01 RX ADMIN — PIPERACILLIN AND TAZOBACTAM 3.38 G: 3; .375 INJECTION, POWDER, LYOPHILIZED, FOR SOLUTION INTRAVENOUS at 17:40

## 2022-01-01 RX ADMIN — LORAZEPAM 2 MG: 2 INJECTION INTRAMUSCULAR; INTRAVENOUS at 22:18

## 2022-01-01 RX ADMIN — METRONIDAZOLE: 7.5 GEL TOPICAL at 08:09

## 2022-01-01 RX ADMIN — METRONIDAZOLE: 7.5 GEL TOPICAL at 16:44

## 2022-01-01 RX ADMIN — WATER 2 G: 1 INJECTION INTRAMUSCULAR; INTRAVENOUS; SUBCUTANEOUS at 08:11

## 2022-01-01 RX ADMIN — LORAZEPAM 2 MG: 2 INJECTION INTRAMUSCULAR; INTRAVENOUS at 11:17

## 2022-01-01 RX ADMIN — VANCOMYCIN HYDROCHLORIDE 750 MG: 750 INJECTION, POWDER, LYOPHILIZED, FOR SOLUTION INTRAVENOUS at 15:40

## 2022-01-01 RX ADMIN — HYDROMORPHONE HYDROCHLORIDE 4 MG: 2 INJECTION, SOLUTION INTRAMUSCULAR; INTRAVENOUS; SUBCUTANEOUS at 16:43

## 2022-01-01 RX ADMIN — ENOXAPARIN SODIUM 40 MG: 100 INJECTION SUBCUTANEOUS at 08:26

## 2022-01-01 RX ADMIN — OXYCODONE AND ACETAMINOPHEN 1 TABLET: 7.5; 325 TABLET ORAL at 00:46

## 2022-01-01 RX ADMIN — MORPHINE SULFATE 30 MG: 30 TABLET, FILM COATED, EXTENDED RELEASE ORAL at 21:06

## 2022-01-01 RX ADMIN — LIDOCAINE HYDROCHLORIDE AND EPINEPHRINE 200 MG: 10; 10 INJECTION, SOLUTION INFILTRATION; PERINEURAL at 15:55

## 2022-01-01 RX ADMIN — POLYETHYLENE GLYCOL 3350 17 G: 17 POWDER, FOR SOLUTION ORAL at 12:37

## 2022-01-01 RX ADMIN — HYDROMORPHONE HYDROCHLORIDE 4 MG: 2 INJECTION, SOLUTION INTRAMUSCULAR; INTRAVENOUS; SUBCUTANEOUS at 08:04

## 2022-01-01 RX ADMIN — SODIUM CHLORIDE, PRESERVATIVE FREE 10 ML: 5 INJECTION INTRAVENOUS at 14:56

## 2022-01-01 RX ADMIN — HYDROMORPHONE HYDROCHLORIDE 4 MG: 2 INJECTION, SOLUTION INTRAMUSCULAR; INTRAVENOUS; SUBCUTANEOUS at 15:31

## 2022-01-01 RX ADMIN — HYDROMORPHONE HYDROCHLORIDE 2 MG: 1 INJECTION, SOLUTION INTRAMUSCULAR; INTRAVENOUS; SUBCUTANEOUS at 09:23

## 2022-01-01 RX ADMIN — HYDROMORPHONE HYDROCHLORIDE 2 MG: 1 INJECTION, SOLUTION INTRAMUSCULAR; INTRAVENOUS; SUBCUTANEOUS at 21:09

## 2022-01-01 RX ADMIN — SODIUM CHLORIDE, PRESERVATIVE FREE 10 ML: 5 INJECTION INTRAVENOUS at 06:27

## 2022-01-01 RX ADMIN — WATER 2 G: 1 INJECTION INTRAMUSCULAR; INTRAVENOUS; SUBCUTANEOUS at 00:37

## 2022-01-01 RX ADMIN — HYDROMORPHONE HYDROCHLORIDE 4 MG: 2 INJECTION, SOLUTION INTRAMUSCULAR; INTRAVENOUS; SUBCUTANEOUS at 22:03

## 2022-01-01 RX ADMIN — HYDROMORPHONE HYDROCHLORIDE 4 MG: 2 INJECTION, SOLUTION INTRAMUSCULAR; INTRAVENOUS; SUBCUTANEOUS at 17:06

## 2022-01-01 RX ADMIN — OXYCODONE HYDROCHLORIDE 40 MG: 20 TABLET, FILM COATED, EXTENDED RELEASE ORAL at 20:38

## 2022-01-01 RX ADMIN — HYDROMORPHONE HYDROCHLORIDE 2 MG: 1 INJECTION, SOLUTION INTRAMUSCULAR; INTRAVENOUS; SUBCUTANEOUS at 17:43

## 2022-01-01 RX ADMIN — PIPERACILLIN AND TAZOBACTAM 3.38 G: 3; .375 INJECTION, POWDER, LYOPHILIZED, FOR SOLUTION INTRAVENOUS at 16:13

## 2022-01-01 RX ADMIN — METRONIDAZOLE: 7.5 GEL TOPICAL at 15:20

## 2022-01-01 RX ADMIN — LORAZEPAM 2 MG: 2 INJECTION INTRAMUSCULAR; INTRAVENOUS at 07:51

## 2022-01-01 RX ADMIN — KETOROLAC TROMETHAMINE 30 MG: 30 INJECTION, SOLUTION INTRAMUSCULAR at 22:50

## 2022-01-01 RX ADMIN — SODIUM CHLORIDE, PRESERVATIVE FREE 10 ML: 5 INJECTION INTRAVENOUS at 21:21

## 2022-01-01 RX ADMIN — SODIUM CHLORIDE 75 ML/HR: 9 INJECTION, SOLUTION INTRAVENOUS at 18:52

## 2022-01-01 RX ADMIN — SENNOSIDES AND DOCUSATE SODIUM 2 TABLET: 50; 8.6 TABLET ORAL at 08:47

## 2022-01-01 RX ADMIN — OXYCODONE AND ACETAMINOPHEN 1 TABLET: 7.5; 325 TABLET ORAL at 21:31

## 2022-01-01 RX ADMIN — WATER 2 G: 1 INJECTION INTRAMUSCULAR; INTRAVENOUS; SUBCUTANEOUS at 23:00

## 2022-01-01 RX ADMIN — ENOXAPARIN SODIUM 40 MG: 100 INJECTION SUBCUTANEOUS at 08:47

## 2022-01-01 RX ADMIN — HYDROMORPHONE HYDROCHLORIDE 4 MG: 2 INJECTION, SOLUTION INTRAMUSCULAR; INTRAVENOUS; SUBCUTANEOUS at 21:33

## 2022-01-01 RX ADMIN — HYDROMORPHONE HYDROCHLORIDE 2 MG: 1 INJECTION, SOLUTION INTRAMUSCULAR; INTRAVENOUS; SUBCUTANEOUS at 14:47

## 2022-01-01 RX ADMIN — LORAZEPAM 2 MG: 2 INJECTION INTRAMUSCULAR; INTRAVENOUS at 08:02

## 2022-01-01 RX ADMIN — SODIUM CHLORIDE, POTASSIUM CHLORIDE, SODIUM LACTATE AND CALCIUM CHLORIDE 1000 ML: 600; 310; 30; 20 INJECTION, SOLUTION INTRAVENOUS at 22:06

## 2022-01-01 RX ADMIN — LORAZEPAM 2 MG: 2 INJECTION INTRAMUSCULAR; INTRAVENOUS at 23:16

## 2022-01-01 RX ADMIN — SODIUM CHLORIDE, PRESERVATIVE FREE 10 ML: 5 INJECTION INTRAVENOUS at 05:18

## 2022-01-01 RX ADMIN — SODIUM CHLORIDE, PRESERVATIVE FREE 10 ML: 5 INJECTION INTRAVENOUS at 22:14

## 2022-01-01 RX ADMIN — LORAZEPAM 2 MG: 2 INJECTION INTRAMUSCULAR; INTRAVENOUS at 03:08

## 2022-01-01 RX ADMIN — SODIUM CHLORIDE, PRESERVATIVE FREE 10 ML: 5 INJECTION INTRAVENOUS at 14:15

## 2022-01-01 RX ADMIN — HYDROMORPHONE HYDROCHLORIDE 4 MG: 2 INJECTION, SOLUTION INTRAMUSCULAR; INTRAVENOUS; SUBCUTANEOUS at 11:10

## 2022-01-01 RX ADMIN — HYDROMORPHONE HYDROCHLORIDE 4 MG: 2 INJECTION, SOLUTION INTRAMUSCULAR; INTRAVENOUS; SUBCUTANEOUS at 20:15

## 2022-01-01 RX ADMIN — SODIUM CHLORIDE 50 ML/HR: 9 INJECTION, SOLUTION INTRAVENOUS at 09:27

## 2022-01-01 RX ADMIN — METRONIDAZOLE: 7.5 GEL TOPICAL at 14:22

## 2022-01-01 RX ADMIN — OXYCODONE HYDROCHLORIDE 40 MG: 10 TABLET, FILM COATED, EXTENDED RELEASE ORAL at 15:15

## 2022-01-01 RX ADMIN — OXYCODONE AND ACETAMINOPHEN 1 TABLET: 7.5; 325 TABLET ORAL at 03:04

## 2022-01-01 RX ADMIN — VANCOMYCIN HYDROCHLORIDE 1500 MG: 10 INJECTION, POWDER, LYOPHILIZED, FOR SOLUTION INTRAVENOUS at 20:08

## 2022-01-01 RX ADMIN — DOCUSATE SODIUM 50MG AND SENNOSIDES 8.6MG 2 TABLET: 8.6; 5 TABLET, FILM COATED ORAL at 20:55

## 2022-01-01 RX ADMIN — OXYCODONE AND ACETAMINOPHEN 1 TABLET: 7.5; 325 TABLET ORAL at 19:01

## 2022-01-01 RX ADMIN — METRONIDAZOLE 500 MG: 250 TABLET ORAL at 16:14

## 2022-01-01 RX ADMIN — OXYCODONE HYDROCHLORIDE 10 MG: 10 TABLET, FILM COATED, EXTENDED RELEASE ORAL at 21:08

## 2022-01-01 RX ADMIN — MORPHINE SULFATE 30 MG: 30 TABLET, FILM COATED, EXTENDED RELEASE ORAL at 20:55

## 2022-01-17 NOTE — TELEPHONE ENCOUNTER
1st Attempt to contact patient regarding \"No Show\" appointment (1/17/22 @ 10:45am), no answer, left message to call office to reschedule.

## 2022-01-21 NOTE — TELEPHONE ENCOUNTER
Patient has Rx ready for pickup at [facility], patient will go  rx today, patient is taking Oxycodone 10 mg , 2 tabs every 8 hours, Advised patient to try taking medication every 4 hours as directed and keep a pain log over the weekend and contact our office on Monday , Patient reports she does get pain relief from taking two tabs every 8 hours but it does not last , patient will take medication every 4 hours to see if she gets better coverage through out the day,

## 2022-01-21 NOTE — TELEPHONE ENCOUNTER
Patient is calling to get a refill on her oxycodone. States that she is in a lot of pain. Patient states she only has a few left. Please send refill to [facility]. David Bettencourt

## 2022-01-25 NOTE — TELEPHONE ENCOUNTER
Calling patient to advise of Virtual Visit with Dr. Asiya Vega on    1/27/2022  . A nurse will call you between the hours of 9:00am and 11:00am..  If you have any questions, please call [phone]. No answer so left voicemail.

## 2022-01-26 NOTE — TELEPHONE ENCOUNTER
Calling patient to remind of virtual visit 1/27/2022 with Dr. Angela Suarez.  No answer so left voicemail.

## 2022-01-27 NOTE — PROGRESS NOTES
Palliative Medicine Office Visit  Palliative Medicine Nurse Check In  [phone]    Patient Name: Genet Sandoval  YOB: 1960      Date of Office Visit: 1/27/2022    Patient states: \"  \"    From Check In Sheet (scanned in Media):  Has a medical provider talked with you about cardiopulmonary resuscitation (CPR)? [x] Yes   [] No   [] Unable to obtain    Nurse reminder to complete or update ACP FlowSheet:    Is ACP on the Problem List?    [x] Yes    [] No  IF ACP Document is ON FILE; Nurse to place ACP on Problem List     Is there an ACP Note in Chart Review/Note? [x] Yes    [] No   If NO: ALERT PROVIDER       Primary Decision MakerLyndsveta Bellamy - [phone]  Advance Care Planning 1/27/2022   Patient's Healthcare Decision Maker is: Named in scanned ACP document   Confirm Advance Directive Yes, on file   Patient Would Like to Complete Advance Directive -         Is there anything that we should know about you as a person in order to provide you the best care possible? Have you been to the ER, urgent care clinic since your last visit? [] Yes   [x] No   [] Unable to obtain    Have you been hospitalized since your last visit? [] Yes   [x] No   [] Unable to obtain    Have you seen or consulted any other health care providers outside of the [address] [place] [address] since your last visit?    [] Yes   [x] No   [] Unable to obtain    Functional status (describe):         Last BM: 1/27/2022     accessed (date): 1/27/2022    Bottle review (for opioid pain medication):  Medication 1:   Date filled:   Directions:   # filled:   # left:   # pills taking per day:  Last dose taken:    Medication 2:   Date filled:   Directions:   # filled:   # left:   # pills taking per day:  Last dose taken:    Medication 3:   Date filled:   Directions:   # filled:   # left:   # pills taking per day:  Last dose taken:    Medication 4:   Date filled:   Directions:   # filled:   # left:   # pills taking per day:  Last dose taken:

## 2022-01-27 NOTE — PROGRESS NOTES
Palliative Medicine Outpatient Services  [place]: [phone] ([phone])    Patient Name: Polly Calderon  YOB: 1960    Date of Current Visit: 01/27/22  Location of Current Visit:    [] [facility] Office  [] [facility] Office  [] [facility] Office  [] Home  [x]Synchronous real-time A/V virtual visit    Date of Initial Visit: 10/19/2021   Referral from: Dr. Sigrid Butler  Primary Care Physician: Leana Aguilera MD      SUMMARY:   Polly Calderon is a 57y.o. year old with breast cancer who was referred to Palliative Medicine by Dr. iSgrid Butler for symptom management. Current treatment: she was diagnosed with recurrent breast cancer in summer 2021. Currently Corinne Erps is on hold d/t neutropenia. Starting clinical trial at [facility], BREAST 48 study which includes laser surgery under her axilla and Keytruda starting in February. The patients social history includes: she has 2 children. Right now she has not been sharing too many details about her cancer with others and has been keeping a lot of things to herself. She is open to Social Work support. PALLIATIVE DIAGNOSES:       ICD-10-CM ICD-9-CM    1. Breast pain  N64.4 611.71    2. Inflammatory carcinoma of left breast (HCC)  C50.912 174.9    3. Decreased appetite  R63.0 783.0    4. Neoplastic malignant related fatigue  R53.0 780.79    5. Non-intractable vomiting with nausea, unspecified vomiting type  R11.2 787.01           PLAN:   Patient Instructions     Polly Calderon,      Below is the plan we discussed. Left breast inflammatory lesions  - You are taking Oxycodone 10 mg every 4 hours as needed during the day and night. You tried morphine cream which was helpful but it started making your skin peel off and cause more pain to you stopped using it. I would like to start you on long-acting pain medication but you are not sure if you have active insurance at this time.   We will look into this and order accordingly.  -Topical lidocaine is not helping.  -You can also take Advil 200 mg capsules up to 4 capsules/day. Breast cancer:  -You found out your cancer was back on July 31 of this year. That affected you a lot. -You are starting clinical trial at [facility] next week and this is making you hopeful. Loss of appetite:  -You have lost 40-45 pounds recently because you don't have an appetite.  -You haven't really been eating but have been maintaining your weight. You are on Marinol and this is somewhat helpful. You are drinking Ensure Plus daily. Nausea/indigestion:  -You can have a burning sensation with a weird taste in your throat.  -You have protonix 40 mg once daily to help prevent acid reflux. It is very important to take it 30-60 minutes before you eat to help maximize its effectiveness.  -You have zofran which you can continue to use. You are not sure how much this is helping.  -I will prescribe compazine for you so that you have another option for nausea. .    Constipation:  -You have been having some diarrhea.  -Opioids can cause constipation.  -You may need to eventually take senna to help stimulate your bowels. Fatigue/weakness:  -You feel tired in general but especially at work. You have a good work team, but you still feel tired. Support system:  -You have a strong support system, but you block them. -You are not sure why you are blocking them. You know you need them, but for some reason you feel like you need to have time to yourself.  -Your friends and family really want to help you, but you feel like it's just you. You have been shutting people out though that's not what you want to do. -You are used to caring for other people.  -You have two children. Advance care planning:  -You would like your daughter Zack Arredondo to be your medical power of  (mPOA). She is 34. You talk to your daughter every day and she knows what is important to you. -We completed an Advance Medical Directive today listing your daughter as your mPOA.   -We discussed that you would like hospital evaluations and treatments if something could be improved/cured but that you would not want life prolonging measures if it appeared that you would not get better.  -We discussed code status. For now you would like to be Full Code. Follow up:  -We will see you back in person in 1 month, sooner as needed. GOALS OF CARE / TREATMENT PREFERENCES:   [====Goals of Care====]  GOALS OF CARE:  Patient / health care proxy stated goals: See Patient Instructions / Summary    TREATMENT PREFERENCES:   Code Status:  [x] Attempt Resuscitation       [] Do Not Attempt Resuscitation    Advance Care Planning:  [x] The [employer] Interdisciplinary Team has updated the ACP Navigator with Decision Maker and Patient Capacity      Primary Decision MakeAlcira Bellamy - [phone]  Advance Care Planning 1/27/2022   Patient's Healthcare Decision Maker is: Named in scanned ACP document   Confirm Advance Directive Yes, on file   Patient Would Like to Complete Advance Directive -       Other:  (If patient appropriate for POST, consider using PALLPOST smart phrase here)    The palliative care team has discussed with patient / health care proxy about goals of care / treatment preferences for patient.  [====Goals of Care====]     PRESCRIPTIONS GIVEN:     No orders of the defined types were placed in this encounter. FOLLOW UP:     No future appointments. PHYSICIANS INVOLVED IN CARE:   Patient Care Team:  Mary Brown MD as PCP - General (Family Medicine)  Mikie Patel MD as Physician (Breast Surgery)  Neno Segal MD as Physician (Hematology and Oncology)  Mikie Patel MD (Breast Surgery)  Jennyfer Pérez MD as Physician (Radiation Oncology)       HISTORY:   Reviewed patient-completed ESAS and advance care planning form. Reviewed patient record in prescription monitoring program.    CHIEF COMPLAINT:   No chief complaint on file.       HPI/SUBJECTIVE: The patient is: [x] Verbal / [] Nonverbal     Patient seen in her home. She appears well. She is doing better off of Trodelvy and Neulasta. She is excited about her clinical trial and hoping that it will give her better results. She continues to have breast pain which is controlled with oxycodone. I would like to start her on a long-acting pain medicine which she agrees to but we are unclear if she has insurance at this time. She quit her job on . She has applied for disability and Medicaid in mid 2021. See plan for further details. Clinical Pain Assessment (nonverbal scale for nonverbal patients):   [++++ Clinical Pain Assessment++++]  [++++Pain Severity++++]: Pain: 5  [++++Pain Character++++]:throbbing pain  [++++Pain Duration++++]:months  [++++Pain Effect++++]:functional  [++++Pain Factors++++]:none  [++++Pain Frequency++++]:constant  [++++Pain Location++++]:left breast  [++++ Clinical Pain Assessment++++]       FUNCTIONAL ASSESSMENT:     Palliative Performance Scale (PPS):  PPS: 70       PSYCHOSOCIAL/SPIRITUAL SCREENING:     Any spiritual / Jain concerns:  [] Yes /  [x] No    Caregiver Burnout:  [] Yes /  [x] No /  [] No Caregiver Present      Anticipatory grief assessment:   [x] Normal  / [] Maladaptive       ESAS Anxiety: Anxiety: 0    ESAS Depression: Depression: 0       REVIEW OF SYSTEMS:     The following systems were [x] reviewed / [] unable to be reviewed  Systems: constitutional, ears/nose/mouth/throat, respiratory, gastrointestinal, genitourinary, musculoskeletal, integumentary, neurologic, psychiatric, endocrine. Positive findings noted below.   Modified ESAS Completed by: provider   Fatigue: 0 Drowsiness: 0   Depression: 0 Pain: 5   Anxiety: 0 Nausea: 0   Anorexia: 5 Dyspnea: 0   Best Well-Bein Constipation: No   Other Problem (Comment): 0          PHYSICAL EXAM:     Wt Readings from Last 3 Encounters:   21 175 lb 8 oz (79.6 kg)   21 175 lb 7.8 oz (79.6 kg)   11/29/21 172 lb 9.6 oz (78.3 kg)     There were no vitals taken for this visit. Last bowel movement: See Nursing Note    Constitutional    [x] Appears well-developed and well-nourished in no apparent distress    [] Abnormal:  Mental status  [x] Alert and awake  [x] Oriented to person/place/time  [x] Able to follow commands  [] Abnormal:   Eyes  [x] EOM normal   [x] Sclera normal   [x] No visible ocular discharge  [] Abnormal:   HENT  [x] Normocephalic, atraumatic  [x] Mouth/Throat: Moist mucous membranes   [x] External Ears normal  [] Abnormal:  Neck  [x] No visualized mass  [] Abnormal:  Pulmonary/Chest   [] Respiratory effort normal  [] No visualized signs of difficulty breathing or respiratory distress  [] Abnormal: Left breast swelling with severe inflammation throughout the entire breast extending into the left axilla. 2-3 open sores seen. Musculoskeletal  [x] Normal gait with no signs of ataxia  [x] Normal range of motion of neck  [] Abnormal:  Neurological:   [x] No facial asymmetry (Cranial nerve 7 motor function)  [x] No gaze palsy  [] Abnormal:   Skin  [x] No significant exanthematous lesions or discoloration noted on facial skin  [] Abnormal:                                  Psychiatric  [x] Normal affect  [x] No hallucinations  [] Abnormal:    Other pertinent observable physical exam findings:    Due to this being a TeleHealth evaluation, many elements of the physical examination are unable to be assessed.                HISTORY:     Past Medical History:   Diagnosis Date    Breast cancer ([address]     left side    Menopause       Past Surgical History:   Procedure Laterality Date    HX BREAST BIOPSY Left     x2    HX BREAST LUMPECTOMY Left 9/5/2019    LEFT BREAST LUMPECTOMY WITH ULTRASOUND performed by Sosa Prado MD at \[facility]\"" AMBULATORY OR    HX HYSTERECTOMY      partial, ovaries remain    IR INSERT TUNL CVC W PORT OVER 5 YEARS  8/16/2021    [facility]      Family History   Problem Relation Age of Onset    Cancer Father     Cancer Maternal Aunt     Breast Cancer Maternal Aunt         4 paternal aunts    Cancer Maternal Uncle     Cancer Paternal Aunt     Cancer Paternal Uncle     Breast Cancer Maternal Grandmother       History reviewed, no pertinent family history. Social History     Tobacco Use    Smoking status: Former Smoker     Packs/day: 0.10     Quit date: 2020     Years since quittin.0    Smokeless tobacco: Never Used   Substance Use Topics    Alcohol use: No     Allergies   Allergen Reactions    Codeine Rash     Rash from tylenol #3      Current Outpatient Medications   Medication Sig    oxyCODONE IR (ROXICODONE) 10 mg tab immediate release tablet Take 1 Tablet by mouth every four (4) hours as needed for Pain for up to 30 days. Max Daily Amount: 60 mg.    lidocaine (XYLOCAINE) 4 % topical cream Apply  to affected area four (4) times daily as needed for Pain. (Patient not taking: Reported on 2022)    megestroL (MEGACE) 40 mg tablet Take 1 Tablet by mouth two (2) times a day. (Patient not taking: Reported on 2022)    ondansetron (ZOFRAN ODT) 4 mg disintegrating tablet  (Patient not taking: Reported on 2022)    pantoprazole (PROTONIX) 40 mg tablet Take 1 Tablet by mouth daily. (Patient not taking: Reported on 2022)    dronabinoL (Marinol) 2.5 mg capsule Take 1 Capsule by mouth two (2) times a day. Max Daily Amount: 5 mg. (Patient not taking: Reported on 2022)    lidocaine-prilocaine (EMLA) topical cream Apply  to affected area as needed for Pain. Apply generous amount to port site 30 minutes prior to infusions and cover with plastic wrap (Patient not taking: Reported on 2022)     No current facility-administered medications for this visit.           LAB and other DATA REVIEWED:     Lab Results   Component Value Date/Time    WBC 3.5 (L) 2021 10:01 AM    HGB 10.1 (L) 2021 10:01 AM PLATELET 451 09/65/0052 10:01 AM     Lab Results   Component Value Date/Time    Sodium 140 12/13/2021 10:01 AM    Potassium 3.2 (L) 12/13/2021 10:01 AM    Chloride 109 (H) 12/13/2021 10:01 AM    CO2 22 12/13/2021 10:01 AM    BUN 9 12/13/2021 10:01 AM    Creatinine 0.70 12/13/2021 10:01 AM    Calcium 9.3 12/13/2021 10:01 AM      Lab Results   Component Value Date/Time    Alk. phosphatase 110 12/13/2021 10:01 AM    Protein, total 6.7 12/13/2021 10:01 AM    Albumin 3.0 (L) 12/13/2021 10:01 AM    Globulin 3.7 12/13/2021 10:01 AM     No results found for: INR, PTMR, PTP, PT1, PT2, APTT, INREXT, INREXT   No results found for: IRON, FE, TIBC, IBCT, PSAT, FERR     Detail chart reviewed. Other specialists and referral provider notes reviewed.      CONTROLLED SUBSTANCES SAFETY ASSESSMENT (IF ON CONTROLLED SUBSTANCES):     Reviewed opioid safety handout:  [x] Yes   [] No  24 hour opioid dose >150mg morphine equivalent/day:  [] Yes   [] No  Benzodiazepines:  [] Yes   [] No  Sleep apnea:  [] Yes   [] No  Urine Toxicology Testing within last 6 months:  [] Yes   [] No  History of or new aberrant medication taking behaviors:  [] Yes   [x] No  Has Narcan been prescribed [x] Yes   [] No              Consent:  He and/or health care decision maker is aware that that he may receive a bill for this telehealth service, depending on his insurance coverage, and has provided verbal consent to proceed: Yes    CPT Codes 80245-54084 for Established Patients may apply to this Telehealth VisitTime-based coding, delete if not needed: I spent at least 40 minutes with this established patient, and >50% of the time was spent counseling and/or coordinating care regarding opioid safety, psychosocial support  Pursuant to the emergency declaration under the [address] and the [facility] waiver authority and the [employer] General Act, this Virtual  Visit was conducted, with patient's consent, to reduce the patient's risk of exposure to COVID-19 and provide continuity of care for an established patient. Services were provided through a video synchronous discussion virtually to substitute for in-person clinic visit.

## 2022-01-27 NOTE — PATIENT INSTRUCTIONS
Silvia Guevara,      Below is the plan we discussed. Left breast inflammatory lesions  - You are taking Oxycodone 10 mg every 4 hours as needed during the day and night. You tried morphine cream which was helpful but it started making your skin peel off and cause more pain to you stopped using it. I would like to start you on long-acting pain medication but you are not sure if you have active insurance at this time. We will look into this and order accordingly.  -Topical lidocaine is not helping.  -You can also take Advil 200 mg capsules up to 4 capsules/day. Breast cancer:  -You found out your cancer was back on July 31 of this year. That affected you a lot. -You are starting clinical trial at [facility] next week and this is making you hopeful. Loss of appetite:  -You have lost 40-45 pounds recently because you don't have an appetite.  -You haven't really been eating but have been maintaining your weight. You are on Marinol and this is somewhat helpful. You are drinking Ensure Plus daily. Nausea/indigestion:  -You can have a burning sensation with a weird taste in your throat.  -You have protonix 40 mg once daily to help prevent acid reflux. It is very important to take it 30-60 minutes before you eat to help maximize its effectiveness.  -You have zofran which you can continue to use. You are not sure how much this is helping.  -I will prescribe compazine for you so that you have another option for nausea. .    Constipation:  -You have been having some diarrhea.  -Opioids can cause constipation.  -You may need to eventually take senna to help stimulate your bowels. Fatigue/weakness:  -You feel tired in general but especially at work. You have a good work team, but you still feel tired. Support system:  -You have a strong support system, but you block them. -You are not sure why you are blocking them.  You know you need them, but for some reason you feel like you need to have time to yourself.  -Your friends and family really want to help you, but you feel like it's just you. You have been shutting people out though that's not what you want to do. -You are used to caring for other people.  -You have two children. Advance care planning:  -You would like your daughter Terrance Jackson to be your medical power of  (mPOA). She is 34. You talk to your daughter every day and she knows what is important to you. -We completed an Advance Medical Directive today listing your daughter as your mPOA. -We discussed that you would like hospital evaluations and treatments if something could be improved/cured but that you would not want life prolonging measures if it appeared that you would not get better.  -We discussed code status. For now you would like to be Full Code. Follow up:  -We will see you back in person in 1 month, sooner as needed.

## 2022-01-28 NOTE — TELEPHONE ENCOUNTER
Patient calling to get an update on her refills of Oxycodone. Advised nurse would call her back with update.

## 2022-02-21 NOTE — TELEPHONE ENCOUNTER
Called patient to advise/confirm upcoming appt with Dr. Sy Gould on 2/21/2022      at 9:30am at [id number]. No answer so left voicemail. Also advised to please bring in your Drivers License and Insurance Card with you to appointment as well as any medication in the original container with you to appt.

## 2022-02-22 NOTE — TELEPHONE ENCOUNTER
Calling patient to confirm appt for 2/23/2022 at 9:30am at [facility]. No answer so left voicemail.

## 2022-03-25 PROBLEM — R65.20 SEVERE SEPSIS (HCC): Status: ACTIVE | Noted: 2022-01-01

## 2022-03-25 PROBLEM — A41.9 SEVERE SEPSIS (HCC): Status: ACTIVE | Noted: 2022-01-01

## 2022-03-25 PROBLEM — N61.0 CELLULITIS OF BREAST: Status: ACTIVE | Noted: 2022-01-01

## 2022-03-25 PROBLEM — N64.59 PEAU D'ORANGE OVER BREAST: Status: ACTIVE | Noted: 2022-01-01

## 2022-03-25 NOTE — ED NOTES
MD Kirkland Gave advised RN to send rpt lactic now, despite order for repeat in 6hours. Lactic sent to lab at this time.

## 2022-03-25 NOTE — PROGRESS NOTES
Arrived on PCU, settled into unit. Given morphine for pain . Wound on left breat crusty brown. black with yellow drainage. Left arm edema elevated on pillows.  Report to \[facility]\""

## 2022-03-25 NOTE — ED PROVIDER NOTES
EMERGENCY DEPARTMENT HISTORY AND PHYSICAL EXAM      Date: 3/25/2022  Patient Name: Mendoza Desir  Patient Age and Sex: 57 y.o. female  MRN:  [id number]  CSN:  [id number]    History of Presenting Illness     Chief Complaint   Patient presents with    Wound Check     patient to ED with advanced breast cancer wound to L breast. Patient states she is currently not receiving any treatment for cancer. Patient reports substantial pain to area, and is unable to tolerate cardiac lead placement at this time. Wound appears crusty with yellow drainage, states has looked this way for months without any MD evaluation. Bra and shirt removed, and sterile drape placed over wound in triage       History Provided By: Patient    Ability to gather history was limited by:     HPI: Mendoza Desir, 57 y.o. female with history of recurrent metastatic breast cancer of the left breast, currently being treated at [address] [place] [address], complains of worsening pain and odor from the wounds over the left breast.  Symptoms have been present for a year, gradually worsening. No fevers. Pain is moderate severity, burning in nature. She also complains of chest pressure and heaviness on the chest which is been present for days to a week or so    Location:    Quality:      Severity:    Duration:   Timing:      Context:    Modifying factors:   Associated symptoms:     Past History      The patient's medical, surgical, and social history on file were reviewed by me today.      The family history was reviewed by me today and was non-contributory, unless otherwise specified below:    Past Medical History:  Past Medical History:   Diagnosis Date    Breast cancer ([address]     left side    Menopause        Past Surgical History:  Past Surgical History:   Procedure Laterality Date    HX BREAST BIOPSY Left     x2    HX BREAST LUMPECTOMY Left 9/5/2019    LEFT BREAST LUMPECTOMY WITH ULTRASOUND performed by Clara [facility] AM[facility]    HX HYSTERECTOMY partial, ovaries remain    IR INSERT TUNL CVC W PORT OVER 5 YEARS  2021    VASCULAR SURGERY PROCEDURE UNLIST      portacath       Family History:  Family History   Problem Relation Age of Onset    Cancer Father     Cancer Maternal Aunt     Breast Cancer Maternal Aunt         4 paternal aunts    Cancer Maternal Uncle     Cancer Paternal Aunt     Cancer Paternal Uncle     Breast Cancer Maternal Grandmother        Social History:  Social History     Tobacco Use    Smoking status: Former Smoker     Packs/day: 0.10     Quit date: 2020     Years since quittin.2    Smokeless tobacco: Never Used   Substance Use Topics    Alcohol use: No    Drug use: No       Current Medications:  No current facility-administered medications on file prior to encounter. Current Outpatient Medications on File Prior to Encounter   Medication Sig Dispense Refill    oxyCODONE ER (Xtampza ER) 9 mg capsule Take 1 Capsule by mouth every twelve (12) hours for 60 days. Max Daily Amount: 18 mg. 120 Capsule 0    lidocaine (XYLOCAINE) 4 % topical cream Apply  to affected area four (4) times daily as needed for Pain. (Patient not taking: Reported on 2022) 15 g 4    megestroL (MEGACE) 40 mg tablet Take 1 Tablet by mouth two (2) times a day. (Patient not taking: Reported on 2022) 60 Tablet 2    ondansetron (ZOFRAN ODT) 4 mg disintegrating tablet  (Patient not taking: Reported on 2022)      pantoprazole (PROTONIX) 40 mg tablet Take 1 Tablet by mouth daily. (Patient not taking: Reported on 2022) 30 Tablet 3    dronabinoL (Marinol) 2.5 mg capsule Take 1 Capsule by mouth two (2) times a day. Max Daily Amount: 5 mg. (Patient not taking: Reported on 2022) 60 Capsule 1    lidocaine-prilocaine (EMLA) topical cream Apply  to affected area as needed for Pain.  Apply generous amount to port site 30 minutes prior to infusions and cover with plastic wrap (Patient not taking: Reported on 2022) 30 g 2 Allergies: Allergies   Allergen Reactions    Codeine Rash     Rash from tylenol #3     Review of Systems    A complete ROS was reviewed by me today and was negative, unless otherwise specified below:    Review of Systems   Constitutional: Negative for fatigue and fever. Respiratory: Positive for chest tightness and shortness of breath. Cardiovascular: Positive for chest pain. Negative for palpitations. Gastrointestinal: Negative for abdominal pain. Skin: Positive for wound. All other systems reviewed and are negative. Physical Exam   Vital Signs  Patient Vitals for the past 8 hrs:   Temp Pulse Resp BP SpO2   03/25/22 1415 -- (!) 112 -- -- 97 %   03/25/22 1230 -- (!) 116 -- 136/80 96 %   03/25/22 1148 98.7 °F (37.1 °C) (!) 122 18 (!) 162/78 99 %          Physical Exam  Vitals and nursing note reviewed. Constitutional:       General: She is not in acute distress. Appearance: Normal appearance. She is well-developed. She is not ill-appearing. HENT:      Head: Normocephalic and atraumatic. Mouth/Throat:      Mouth: Mucous membranes are moist.   Eyes:      General:         Right eye: No discharge. Left eye: No discharge. Conjunctiva/sclera: Conjunctivae normal.   Cardiovascular:      Rate and Rhythm: Normal rate and regular rhythm. Heart sounds: Normal heart sounds. No murmur heard. Pulmonary:      Effort: Pulmonary effort is normal. No respiratory distress. Breath sounds: Normal breath sounds. No wheezing. Chest:      Chest wall: Deformity present. Breasts:      Left: Mass, skin change, tenderness and axillary adenopathy present. Comments: Extensive nodular breast cancer with extensive skin breakdown, crusting, and some oozing and pus. Malodorous. Extensive associated lymphadenopathy. See photographs  Abdominal:      General: There is no distension. Palpations: Abdomen is soft. Tenderness: There is no abdominal tenderness. Musculoskeletal:         General: No deformity. Normal range of motion. Cervical back: Normal range of motion and neck supple. Lymphadenopathy:      Upper Body:      Left upper body: Axillary adenopathy and pectoral adenopathy present. Skin:     General: Skin is warm and dry. Coloration: Skin is pale. Findings: Wound present. No rash. Neurological:      General: No focal deficit present. Mental Status: She is alert and oriented to person, place, and time. Psychiatric:         Speech: Speech normal.         Behavior: Behavior normal.         Cognition and Memory: Cognition normal.                 Diagnostic Study Results   Labs  Recent Results (from the past 24 hour(s))   CBC WITH AUTOMATED DIFF    Collection Time: 03/25/22 12:57 PM   Result Value Ref Range    WBC 3.9 3.6 - 11.0 K/uL    RBC 3.88 3.80 - 5.20 M/uL    HGB 8.9 (L) 11.5 - 16.0 g/dL    HCT 28.9 (L) 35.0 - 47.0 %    MCV 74.5 (L) 80.0 - 99.0 FL    MCH 22.9 (L) 26.0 - 34.0 PG    MCHC 30.8 30.0 - 36.5 g/dL    RDW 21.9 (H) 11.5 - 14.5 %    PLATELET 085 (H) 818 - 400 K/uL    MPV 8.0 (L) 8.9 - 12.9 FL    NRBC 0.0 0  WBC    ABSOLUTE NRBC 0.00 0.00 - 0.01 K/uL    NEUTROPHILS 75 32 - 75 %    LYMPHOCYTES 14 12 - 49 %    MONOCYTES 11 5 - 13 %    EOSINOPHILS 0 0 - 7 %    BASOPHILS 0 0 - 1 %    IMMATURE GRANULOCYTES 0 0.0 - 0.5 %    ABS. NEUTROPHILS 3.0 1.8 - 8.0 K/UL    ABS. LYMPHOCYTES 0.5 (L) 0.8 - 3.5 K/UL    ABS. MONOCYTES 0.4 0.0 - 1.0 K/UL    ABS. EOSINOPHILS 0.0 0.0 - 0.4 K/UL    ABS. BASOPHILS 0.0 0.0 - 0.1 K/UL    ABS. IMM.  GRANS. 0.0 0.00 - 0.04 K/UL    DF SMEAR SCANNED      RBC COMMENTS ANISOCYTOSIS  2+        RBC COMMENTS MICROCYTOSIS  1+        RBC COMMENTS HYPOCHROMIA  PRESENT        WBC COMMENTS FEW     METABOLIC PANEL, COMPREHENSIVE    Collection Time: 03/25/22 12:57 PM   Result Value Ref Range    Sodium 136 136 - 145 mmol/L    Potassium 3.9 3.5 - 5.1 mmol/L    Chloride 102 97 - 108 mmol/L    CO2 20 (L) 21 - 32 mmol/L    Anion gap 14 5 - 15 mmol/L    Glucose 97 65 - 100 mg/dL    BUN 5 (L) 6 - 20 MG/DL    Creatinine 0.42 (L) 0.55 - 1.02 MG/DL    BUN/Creatinine ratio 12 12 - 20      GFR est AA >60 >60 ml/min/1.73m2    GFR est non-AA >60 >60 ml/min/1.73m2    Calcium 8.8 8.5 - 10.1 MG/DL    Bilirubin, total 0.5 0.2 - 1.0 MG/DL    ALT (SGPT) 10 (L) 12 - 78 U/L    AST (SGOT) 24 15 - 37 U/L    Alk. phosphatase 98 45 - 117 U/L    Protein, total 5.9 (L) 6.4 - 8.2 g/dL    Albumin 2.1 (L) 3.5 - 5.0 g/dL    Globulin 3.8 2.0 - 4.0 g/dL    A-G Ratio 0.6 (L) 1.1 - 2.2     LACTIC ACID    Collection Time: 03/25/22 12:57 PM   Result Value Ref Range    Lactic acid 3.5 (HH) 0.4 - 2.0 MMOL/L   TROPONIN-HIGH SENSITIVITY    Collection Time: 03/25/22 12:57 PM   Result Value Ref Range    Troponin-High Sensitivity 12 0 - 51 ng/L       Radiologic Studies  CTA CHEST W OR W WO CONT   Final Result   1. No evidence of pulmonary embolus. 2.  New moderate left and trace right pleural effusions. 3. Bulky bilateral axillary lymphadenopathy, which is worsened since the prior   study. 4. Extensive skin thickening of both breasts, left worse than right, and   malignant infiltration of both breasts, which has worsened since the prior   study. 5. Extensive mediastinal lymphadenopathy, which has worsened since the prior   study. 5. Evidence of sternal osseous metastatic disease. CT Results  (Last 48 hours)               03/25/22 1355  CTA CHEST W OR W WO CONT Final result    Impression:  1. No evidence of pulmonary embolus. 2.  New moderate left and trace right pleural effusions. 3. Bulky bilateral axillary lymphadenopathy, which is worsened since the prior   study. 4. Extensive skin thickening of both breasts, left worse than right, and   malignant infiltration of both breasts, which has worsened since the prior   study. 5. Extensive mediastinal lymphadenopathy, which has worsened since the prior   study.    5. Evidence of sternal osseous metastatic disease. Narrative:  INDICATION:   large, severe fungating cancer over entire left breast and chest   wall, now tachycardic and chest pressure, rule out PE        COMPARISON:  Chest CT December 2021       TECHNIQUE:  Following the uneventful intravenous administration of 100 cc Isovue   286, thin helical axial images were obtained through the chest. Postprocessing   was performed. 3D image postprocessing was performed. CT dose reduction was achieved through the use of a standardized protocol   tailored for this examination and automatic exposure control for dose   modulation. FINDINGS:       There is a right chest portacatheter. There is bulky bilateral axillary   lymphadenopathy as well as left supraclavicular lymphadenopathy. There is   diffuse left breast skin thickening and soft tissue infiltration. Similar   findings are present on the right, although less dramatic. There is anterior and   middle mediastinal lymphadenopathy, which has worsened. Heart size is normal.    There is no pericardial effusion. No filling defect is seen within the pulmonary arterial system to suggest   pulmonary embolus. The aorta is normal in caliber. There is a moderate left pleural effusion, which has developed since the prior   study, and a new trace right pleural effusion. There is bibasilar atelectasis. There is no pneumothorax. Limited evaluation of the upper abdomen demonstrates no abnormality. Sclerosis   in the sternum is consistent with osseous metastatic disease. .               CXR Results  (Last 48 hours)    None          Billable Procedures   Procedures    Medical Decision Making     I reviewed the patient's most recent Emergency Dept notes and diagnostic tests in formulating my MDM on today's visit.     Provider Notes (Medical Decision Making):   69-year-old female presenting with worsening pain and odor from the extensive metastatic breast cancer involving the entire breast, which is chronic and gradually worsening, also some chest tightness. See photographs of patient's examination. Extensive nodular breast cancer with extensive skin breakdown, odor, pus, and crusting. Extensive lymphadenopathy. Vital signs notable for persistent tachycardia with heart rate 120-130 range. Concern for possible pulmonary embolism especially as she is having some chest pressure. Treat with fluids, morphine, check laboratories and CT angiography for possible PE. Jay Kate MD  1:18 PM  3/25/2022     No pulmonary embolism by CT. She does have malignant pleural effusions and signs of diffuse metastatic disease. Meets criteria for severe sepsis at 1:15 PM.    I consulted oncology as well as the breast surgeon, both of whom know this patient well. She does not have any surgical options at this point. They recommend wound care, antibiotics, pain control, consider palliative care. [facility] ED SEPSIS NOTE:     13:15 PM The patient now meets criteria for: Severe Sepsis    1. Fluid resuscitation with: Patient does not require a 30cc/kg bolus because they do not meet criteria for septic shock (SBP<90 or decreased by >40 mmHG from baseline, MAP<65, Lactate 4 or greater). 2. Due to concern for rapidly advancing infection and deterioration of patient's condition, antibiotics are started STAT and cultures ordered. Current Ideal Body Weight: Ideal body weight: 50.1 kg (110 lb 7.2 oz)  Adjusted ideal body weight: 56.4 kg (124 lb 4.3 oz)    ===========================================    I've performed a sepsis reassessment of the patient's clinical volume status and tissue perfusion at time 4:03 PM        Consults:    Social History     Tobacco Use    Smoking status: Former Smoker     Packs/day: 0.10     Quit date: 2020     Years since quittin.2    Smokeless tobacco: Never Used   Substance Use Topics    Alcohol use: No    Drug use:  No Medications Administered during ED course:  Medications   morphine injection 4 mg (4 mg IntraVENous Given 3/25/22 1506)   piperacillin-tazobactam (ZOSYN) 3.375 g in 0.9% sodium chloride (MBP/ADV) 100 mL MBP (3.375 g IntraVENous New Bag 3/25/22 1548)     Followed by   piperacillin-tazobactam (ZOSYN) 3.375 g in 0.9% sodium chloride (MBP/ADV) 100 mL MBP (has no administration in time range)   morphine injection 4 mg (4 mg IntraVENous Given 3/25/22 1305)   lactated ringers bolus infusion 1,000 mL (0 mL IntraVENous IV Completed 3/25/22 1558)   iopamidoL (ISOVUE-370) 76 % injection 100 mL (100 mL IntraVENous Given 3/25/22 1356)          Prescriptions from today's ED visit:  Current Discharge Medication List         Diagnosis and Disposition     Disposition:  Admitted    Clinical Impression:   1. Severe sepsis ([address]    2. Cellulitis of left breast    3. Breast cancer, stage 4, left ([address]    4. Pleural effusion, malignant        Attestation:  I personally performed the services described in this documentation on this date 3/25/2022 for patient Keiko Pathak. David Johnson MD        I was the first provider for this patient on this visit. To the best of my ability I reviewed relevant prior medical records, electrocardiograms, laboratories, and radiologic studies. The patient's presenting problems were discussed, and the patient was in agreement with the care plan formulated and outlined with them. David Johnson MD    Please note that this dictation was completed with Dragon voice recognition software. Quite often unanticipated grammatical, syntax, homophones, and other interpretive errors are inadvertently transcribed by the computer software. Please disregard these errors and excuse any errors that have escaped final proofreading.

## 2022-03-25 NOTE — H&P
Hospitalist Admission Note    NAME: Anshul Gutierrez   :  1960   MRN:  [id number]     Date/Time:  3/25/2022 5:23 PM    Patient PCP: None  ______________________________________________________________________  Given the patient's current clinical presentation, I have a high level of concern for decompensation if discharged from the emergency department. Complex decision making was performed, which includes reviewing the patient's available past medical records, laboratory results, and x-ray films. My assessment of this patient's clinical condition and my plan of care is as follows. Assessment / Plan:    Metastatic breast cancer  Fungating left breast cancer  Cellulitis of the left breast  Severe sepsis  -CTA chest-1. No evidence of pulmonary embolus. 2.  New moderate left and trace right pleural effusions. 3. Bulky bilateral axillary lymphadenopathy, which is worsened since the prior  study. 4. Extensive skin thickening of both breasts, left worse than right, and  malignant infiltration of both breasts, which has worsened since the prior  study. 5. Extensive mediastinal lymphadenopathy, which has worsened since the prior  study. 5. Evidence of sternal osseous metastatic disease.  -Lactic acid 3.5, got fluid boluses in the ER. Will check again.  -Sepsis indicated-tachycardia, lactic acidosis, cellulitis  -Started on vancomycin and Zosyn.  -Wound care consulted. -Blood culture ordered.  -Discussed with breast surgeon-Dr. Vanessa Laurent, patient mass is inoperable, recommended IV antibiotics along with wound care. -Patient follow-up with [facility] and he currently on clinical trial.  -Consulted palliative care. -IV fluid along with pain medication. Acute on chronic microcytic anemia  -Hemoglobin 8.9, MCV 74.5.  -We will give 1 dose of Venofer. -CBC in a.m.       Code Status: Full code  Surrogate Decision Maker: Daughter    DVT Prophylaxis: Lovenox  GI Prophylaxis: not indicated    Baseline: Ambulatory at home      Subjective:   CHIEF COMPLAINT: Left breast pain    HISTORY OF PRESENT ILLNESS:     Michael Knight is a 57 y.o.  female who presents with left breast pain. Patient past medical history of metastatic breast cancer with fungating mass-currently follow-up with [facility]. Patient states that today when she got up in the morning her pain was very excruciating and she was not feeling well, shaking and not able to even dress herself and then decided to come to the ER for evaluation. No shortness of breath, no fever or chills, no nausea vomiting, no abdominal pain, no diarrhea and constipation, no other complaints. Patient was supposed to go to [facility] today for the first day of clinical trial but because of not feeling good came to the ER. Patient breast surgeon is Dr. Alexander Lauren. We were asked to admit for work up and evaluation of the above problems.      Past Medical History:   Diagnosis Date    Breast cancer ([address]     left side    Menopause         Past Surgical History:   Procedure Laterality Date    HX BREAST BIOPSY Left     x2    HX BREAST LUMPECTOMY Left 2019    LEFT BREAST LUMPECTOMY WITH ULTRASOUND performed by Jesus Nuñez MD at [facility]    HX HYSTERECTOMY      partial, ovaries remain    IR INSERT TUNL CVC W PORT OVER 5 YEARS  2021    VASCULAR SURGERY PROCEDURE UNLIST      portacath       Social History     Tobacco Use    Smoking status: Former Smoker     Packs/day: 0.10     Quit date: 2020     Years since quittin.2    Smokeless tobacco: Never Used   Substance Use Topics    Alcohol use: No        Family History   Problem Relation Age of Onset    Cancer Father     Cancer Maternal Aunt     Breast Cancer Maternal Aunt         4 paternal aunts    Cancer Maternal Uncle     Cancer Paternal Aunt     Cancer Paternal Uncle     Breast Cancer Maternal Grandmother      Allergies   Allergen Reactions    Codeine Rash Rash from tylenol #3        Prior to Admission medications    Medication Sig Start Date End Date Taking? Authorizing Provider   oxyCODONE ER Mariana Roberto ER) 9 mg capsule Take 1 Capsule by mouth every twelve (12) hours for 60 days. Max Daily Amount: 18 mg. 1/27/22 3/28/22  Evaristo Avery MD   lidocaine (XYLOCAINE) 4 % topical cream Apply  to affected area four (4) times daily as needed for Pain. Patient not taking: Reported on 1/27/2022 11/22/21   Evaristo Avery MD   megestroL (MEGACE) 40 mg tablet Take 1 Tablet by mouth two (2) times a day. Patient not taking: Reported on 1/27/2022 11/22/21   Evaristo Avery MD   ondansetron [facility] ODT) 4 mg disintegrating tablet  10/17/21   Provider, Patti   pantoprazole (PROTONIX) 40 mg tablet Take 1 Tablet by mouth daily. Patient not taking: Reported on 1/27/2022 10/19/21   Rony Vásquez MD   dronabinoL (Marinol) 2.5 mg capsule Take 1 Capsule by mouth two (2) times a day. Max Daily Amount: 5 mg. Patient not taking: Reported on 1/27/2022 10/19/21   Rony Vásquez MD   lidocaine-prilocaine (EMLA) topical cream Apply  to affected area as needed for Pain. Apply generous amount to port site 30 minutes prior to infusions and cover with plastic wrap  Patient not taking: Reported on 1/27/2022 8/6/21   Deacon Black MD       REVIEW OF SYSTEMS:     I am not able to complete the review of systems because:    The patient is intubated and sedated    The patient has altered mental status due to his acute medical problems    The patient has baseline aphasia from prior stroke(s)    The patient has baseline dementia and is not reliable historian    The patient is in acute medical distress and unable to provide information           Total of 12 systems reviewed as follows:       POSITIVE= underlined text  Negative = text not underlined  General:  fever, chills, sweats, generalized weakness, weight loss/gain,      loss of appetite   Eyes:    blurred vision, eye pain, loss of vision, double vision  ENT:    rhinorrhea, pharyngitis   Respiratory:   cough, sputum production, SOB, [place], wheezing, pleuritic pain   Cardiology:   chest pain, palpitations, orthopnea, PND, edema, syncope, breast pain   Gastrointestinal:  abdominal pain , N/V, diarrhea, dysphagia, constipation, bleeding   Genitourinary:  frequency, urgency, dysuria, hematuria, incontinence   Muskuloskeletal :  arthralgia, myalgia, back pain  Hematology:  easy bruising, nose or gum bleeding, lymphadenopathy   Dermatological: rash, ulceration, pruritis, color change / jaundice  Endocrine:   hot flashes or polydipsia   Neurological:  headache, dizziness, confusion, focal weakness, paresthesia,     Speech difficulties, memory loss, gait difficulty  Psychological: Feelings of anxiety, depression, agitation    Objective:   VITALS:    Visit Vitals  /80   Pulse (!) 112   Temp 98.7 °F (37.1 °C)   Resp 18   Ht 5' 2\" (1.575 m)   Wt 65.8 kg (145 lb)   SpO2 97%   BMI 26.52 kg/m²       PHYSICAL EXAM:    General:    Alert, cooperative, no distress, appears stated age. HEENT: Atraumatic, anicteric sclerae, pink conjunctivae     No oral ulcers, mucosa moist, throat clear, dentition fair  Neck:  Supple, symmetrical,  thyroid: non tender  Lungs:   Clear to auscultation bilaterally. No Wheezing or Rhonchi. No rales. Chest wall:  No tenderness  No Accessory muscle use. Left breast pain and fungating mass  Heart:   Regular  rhythm,  No  murmur   No edema  Abdomen:   Soft, non-tender. Not distended. Bowel sounds normal  Extremities: No cyanosis. No clubbing,      Skin turgor normal, Capillary refill normal, Radial dial pulse 2+  Skin:     Not pale. Not Jaundiced  No rashes   Psych:  Good insight. Not depressed. Not anxious or agitated. Neurologic: EOMs intact. No facial asymmetry. No aphasia or slurred speech. Symmetrical strength, Sensation grossly intact. Alert and oriented X 4. _______________________________________________________________________  Care Plan discussed with:    Comments   Patient x    Family  x    RN x    Care Manager                    Consultant:  x Breast surgeon   _______________________________________________________________________  Expected  Disposition:   Home with Family x   HH/PT/OT/RN    SNF/LTC    YOLANDA    ________________________________________________________________________  TOTAL TIME:  61 Minutes    Critical Care Provided     Minutes non procedure based      Comments     Reviewed previous records   >50% of visit spent in counseling and coordination of care  Discussion with patient and/or family and questions answered       ________________________________________________________________________  Signed: Melva Ham MD    Procedures: see electronic medical records for all procedures/Xrays and details which were not copied into this note but were reviewed prior to creation of Plan. LAB DATA REVIEWED:    Recent Results (from the past 24 hour(s))   CBC WITH AUTOMATED DIFF    Collection Time: 03/25/22 12:57 PM   Result Value Ref Range    WBC 3.9 3.6 - 11.0 K/uL    RBC 3.88 3.80 - 5.20 M/uL    HGB 8.9 (L) 11.5 - 16.0 g/dL    HCT 28.9 (L) 35.0 - 47.0 %    MCV 74.5 (L) 80.0 - 99.0 FL    MCH 22.9 (L) 26.0 - 34.0 PG    MCHC 30.8 30.0 - 36.5 g/dL    RDW 21.9 (H) 11.5 - 14.5 %    PLATELET 178 (H) 436 - 400 K/uL    MPV 8.0 (L) 8.9 - 12.9 FL    NRBC 0.0 0  WBC    ABSOLUTE NRBC 0.00 0.00 - 0.01 K/uL    NEUTROPHILS 75 32 - 75 %    LYMPHOCYTES 14 12 - 49 %    MONOCYTES 11 5 - 13 %    EOSINOPHILS 0 0 - 7 %    BASOPHILS 0 0 - 1 %    IMMATURE GRANULOCYTES 0 0.0 - 0.5 %    ABS. NEUTROPHILS 3.0 1.8 - 8.0 K/UL    ABS. LYMPHOCYTES 0.5 (L) 0.8 - 3.5 K/UL    ABS. MONOCYTES 0.4 0.0 - 1.0 K/UL    ABS. EOSINOPHILS 0.0 0.0 - 0.4 K/UL    ABS. BASOPHILS 0.0 0.0 - 0.1 K/UL    ABS. IMM.  GRANS. 0.0 0.00 - 0.04 K/UL    DF SMEAR SCANNED      RBC COMMENTS ANISOCYTOSIS  2+ RBC COMMENTS MICROCYTOSIS  1+        RBC COMMENTS HYPOCHROMIA  PRESENT        WBC COMMENTS FEW     METABOLIC PANEL, COMPREHENSIVE    Collection Time: 03/25/22 12:57 PM   Result Value Ref Range    Sodium 136 136 - 145 mmol/L    Potassium 3.9 3.5 - 5.1 mmol/L    Chloride 102 97 - 108 mmol/L    CO2 20 (L) 21 - 32 mmol/L    Anion gap 14 5 - 15 mmol/L    Glucose 97 65 - 100 mg/dL    BUN 5 (L) 6 - 20 MG/DL    Creatinine 0.42 (L) 0.55 - 1.02 MG/DL    BUN/Creatinine ratio 12 12 - 20      GFR est AA >60 >60 ml/min/1.73m2    GFR est non-AA >60 >60 ml/min/1.73m2    Calcium 8.8 8.5 - 10.1 MG/DL    Bilirubin, total 0.5 0.2 - 1.0 MG/DL    ALT (SGPT) 10 (L) 12 - 78 U/L    AST (SGOT) 24 15 - 37 U/L    Alk.  phosphatase 98 45 - 117 U/L    Protein, total 5.9 (L) 6.4 - 8.2 g/dL    Albumin 2.1 (L) 3.5 - 5.0 g/dL    Globulin 3.8 2.0 - 4.0 g/dL    A-G Ratio 0.6 (L) 1.1 - 2.2     LACTIC ACID    Collection Time: 03/25/22 12:57 PM   Result Value Ref Range    Lactic acid 3.5 (HH) 0.4 - 2.0 MMOL/L   TROPONIN-HIGH SENSITIVITY    Collection Time: 03/25/22 12:57 PM   Result Value Ref Range    Troponin-High Sensitivity 12 0 - 51 ng/L

## 2022-03-25 NOTE — ED NOTES
Left Power Port accessed by this RN at this time. Flushes without difficulty and aspirates blood appropriately.  Tubing clamped

## 2022-03-25 NOTE — PROGRESS NOTES
[address] [facility] [address], [address] [facility], [address] [facility], [address] [place] [address]  [phone]    Follow-up Note      Patient: Emliy Crowder MRN: [id number]  SSN: [id number]    YOB: 1960  Age: 57 y.o. Sex: female        Diagnosis:     1. Recurrent/metastatic breast cancer, Dx 07/2021   Inflammatory recurrence with regional nodes, mediastinal nodes and RP nodes    2. Left breast carcinoma:  T1c N1mi M0 (Stage I) infiltrating ductal carcinoma, Tumor size 1.6 cm, LN 1/3 with micromet, grade 3, ER -ve, LA -ve, Her 2 -ve    ypT2 N0        Treatment:     1. Neoadjuvant chemotherapy   Adriamycin, cytoxan - s/p 4 cycles   Weekly Taxol - s/p 12 cycles  2. S/p left lumpectomy on 9/5/2019 by Dr. Elysia Short  3. S/P Adjuvant radiation  4. Enrolled in  (for residual disease post surgery) - randomized to placebo   5. Palliative chemotherapy   Carboplatin/Gemzar and Keytruda, s/p 2 Cycles   Sacituzumab Gavetecan - s/p 3 cycles - last cycle 12/13/2021    Currently enrolled in Clinical trial at [facility]    Subjective:      Emily Crowder is a 57 y.o. female with a diagnosis of left sided invasive breast cancer. She felt a lump in the left breast, went to see her PCP, got a mammogram and was noted to have abnormal density in the left upper outer quadrant of the breast. A biopsy of the mass revealed gr 3 IDC, TNBC. The axillary LN is clear of disease. She saw Dr. Douglas Rios. An MRI of the breast shows the tumor to be larger than previously believed to be. A left axillary LN biopsy showed 1/3 nodes with micrometastasis. She works at [employer] full time. Ms. Nick Conley completed neoadjuvant chemotherapy and underwent lumpectomy in September 2020. She enrolled in the clinical trial  and was randomized to the placebo arm. She noticed change in the left breast skin approximately 4-6 weeks ago. Breast has progressively hardened.  In additional few raised areas popped up. She initially had pain in the left breast but this has subsided. She saw Dr. Mic Mcmillan. A punch bx of the skin on the breast revealed TNBC. She is working at [employer] for the last 13 years. Ms. Jese Lovelace is received multiple lines of palliative chemotherapy. She was sent to clinical trial at [facility] in December after the skin nodules on the breast had spread further. One this admission the left breast is a fungating mass with crusty yellow drainage. She reports the pain is under control at this time.        Review of Systems:     Constitutional: negative, anxiety  Eyes: negative  Ears, Nose, Mouth, Throat, and Face: negative  Respiratory: negative  Cardiovascular: negative  Gastrointestinal: negative  Genitourinary:negative  Integument/Breast: fungating left breast mass  Hematologic/Lymphatic: negative  Musculoskeletal: intermittent mild numbness/tingling right foot  Neurological: negative    Review of systems was reviewed and updated as needed on 22    Past Medical History:   Diagnosis Date    Breast cancer ([address]     left side    Menopause      Past Surgical History:   Procedure Laterality Date    HX BREAST BIOPSY Left     x2    HX BREAST LUMPECTOMY Left 2019    LEFT BREAST LUMPECTOMY WITH ULTRASOUND performed by Martha Castellanos MD at [facility]    HX HYSTERECTOMY      partial, ovaries remain    IR INSERT TUNL CVC W PORT OVER 5 YEARS  2021    VASCULAR SURGERY PROCEDURE UNLIST      portacath      Family History   Problem Relation Age of Onset    Cancer Father     Cancer Maternal Aunt     Breast Cancer Maternal Aunt         4 paternal aunts    Cancer Maternal Uncle     Cancer Paternal Aunt     Cancer Paternal Uncle     Breast Cancer Maternal Grandmother      Social History     Tobacco Use    Smoking status: Former Smoker     Packs/day: 0.10     Quit date: 2020     Years since quittin.2    Smokeless tobacco: Never Used   Substance Use Topics    Alcohol use: No      Prior to Admission medications    Medication Sig Start Date End Date Taking? Authorizing Provider   oxyCODONE ER Wesley Solian ER) 9 mg capsule Take 1 Capsule by mouth every twelve (12) hours for 60 days. Max Daily Amount: 18 mg. 1/27/22 3/28/22  Sonja Huerta MD   lidocaine (XYLOCAINE) 4 % topical cream Apply  to affected area four (4) times daily as needed for Pain. Patient not taking: Reported on 1/27/2022 11/22/21   Sonja Huerta MD   megestroL (MEGACE) 40 mg tablet Take 1 Tablet by mouth two (2) times a day. Patient not taking: Reported on 1/27/2022 11/22/21   Sonja Huerta MD   ondansetron [facility] ODT) 4 mg disintegrating tablet  10/17/21   Provider, Patti   pantoprazole (PROTONIX) 40 mg tablet Take 1 Tablet by mouth daily. Patient not taking: Reported on 1/27/2022 10/19/21   Mercedes Haley MD   dronabinoL (Marinol) 2.5 mg capsule Take 1 Capsule by mouth two (2) times a day. Max Daily Amount: 5 mg. Patient not taking: Reported on 1/27/2022 10/19/21   Mercedes Haley MD   lidocaine-prilocaine (EMLA) topical cream Apply  to affected area as needed for Pain. Apply generous amount to port site 30 minutes prior to infusions and cover with plastic wrap  Patient not taking: Reported on 1/27/2022 8/6/21   Gutierrez Pierce MD          Allergies   Allergen Reactions    Codeine Rash     Rash from tylenol #3         Objective:     Visit Vitals  /80   Pulse (!) 112   Temp 98.7 °F (37.1 °C)   Resp 18   Ht 5' 2\" (1.575 m)   Wt 145 lb (65.8 kg)   SpO2 97%   BMI 26.52 kg/m²     Pain Scale: /10    Physical Exam:     GENERAL: alert, cooperative, no distress  EYE: conjunctivae/corneas clear. xanthelasma  LYMPHATIC: Cervical, supraclavicular, and axillary nodes normal.   THROAT & NECK: normal and no erythema or exudates noted.    BREAST: Left breast is hard, skin is thickened, peau de' orange appearance - overall appears softer  LUNG: clear to auscultation bilaterally  HEART: regular rate and rhythm  ABDOMEN: soft, non-tender. EXTREMITIES:  extremities normal, atraumatic, no cyanosis or edema  NEUROLOGIC: AOx3. Gait normal.     The above physical exam was reviewed and updated as needed on 03/25/22. CT Results (most recent):  Results from Hospital Encounter encounter on 03/25/22    CTA CHEST W OR W WO CONT    Narrative  INDICATION:   large, severe fungating cancer over entire left breast and chest  wall, now tachycardic and chest pressure, rule out PE    COMPARISON:  Chest CT December 2021    TECHNIQUE:  Following the uneventful intravenous administration of 100 cc Isovue  117, thin helical axial images were obtained through the chest. Postprocessing  was performed. 3D image postprocessing was performed. CT dose reduction was achieved through the use of a standardized protocol  tailored for this examination and automatic exposure control for dose  modulation. FINDINGS:    There is a right chest portacatheter. There is bulky bilateral axillary  lymphadenopathy as well as left supraclavicular lymphadenopathy. There is  diffuse left breast skin thickening and soft tissue infiltration. Similar  findings are present on the right, although less dramatic. There is anterior and  middle mediastinal lymphadenopathy, which has worsened. Heart size is normal.  There is no pericardial effusion. No filling defect is seen within the pulmonary arterial system to suggest  pulmonary embolus. The aorta is normal in caliber. There is a moderate left pleural effusion, which has developed since the prior  study, and a new trace right pleural effusion. There is bibasilar atelectasis. There is no pneumothorax. Limited evaluation of the upper abdomen demonstrates no abnormality. Sclerosis  in the sternum is consistent with osseous metastatic disease. .    Impression  1. No evidence of pulmonary embolus. 2.  New moderate left and trace right pleural effusions.   3. Bulky bilateral axillary lymphadenopathy, which is worsened since the prior  study. 4. Extensive skin thickening of both breasts, left worse than right, and  malignant infiltration of both breasts, which has worsened since the prior  study. 5. Extensive mediastinal lymphadenopathy, which has worsened since the prior  study. 5. Evidence of sternal osseous metastatic disease. Lab Results   Component Value Date/Time    WBC 3.9 03/25/2022 12:57 PM    HGB 8.9 (L) 03/25/2022 12:57 PM    HCT 28.9 (L) 03/25/2022 12:57 PM    PLATELET 346 (H) 18/21/4907 12:57 PM    MCV 74.5 (L) 03/25/2022 12:57 PM       Lab Results   Component Value Date/Time    Sodium 136 03/25/2022 12:57 PM    Potassium 3.9 03/25/2022 12:57 PM    Chloride 102 03/25/2022 12:57 PM    CO2 20 (L) 03/25/2022 12:57 PM    Anion gap 14 03/25/2022 12:57 PM    Glucose 97 03/25/2022 12:57 PM    BUN 5 (L) 03/25/2022 12:57 PM    Creatinine 0.42 (L) 03/25/2022 12:57 PM    BUN/Creatinine ratio 12 03/25/2022 12:57 PM    GFR est AA >60 03/25/2022 12:57 PM    GFR est non-AA >60 03/25/2022 12:57 PM    Calcium 8.8 03/25/2022 12:57 PM    Bilirubin, total 0.5 03/25/2022 12:57 PM    Alk. phosphatase 98 03/25/2022 12:57 PM    Protein, total 5.9 (L) 03/25/2022 12:57 PM    Albumin 2.1 (L) 03/25/2022 12:57 PM    Globulin 3.8 03/25/2022 12:57 PM    A-G Ratio 0.6 (L) 03/25/2022 12:57 PM    ALT (SGPT) 10 (L) 03/25/2022 12:57 PM    AST (SGOT) 24 03/25/2022 12:57 PM         Assessment:     1.  Recurrent/metastatic breast cancer, Dx 07/2021   Inflammatory recurrence with regional nodes, mediastinal nodes and RP nodes    PD-L1 ~ 2% (IC)  NGS: p53, CKS1B, FGFR1 amplification    Previously   Left breast carcinoma:  T1c N1mi M0 (Stage I) infiltrating ductal carcinoma, Tumor size 1.6 cm, LN 1/3 with micromet, grade 3, ER -ve, SC -ve, Her 2 -ve    ECOG PS 0       Received neoadjuvant chemotherapy   Adriamycin, Cyclophosphamide - s/p 4 cycles   Weekly Taxol - s/p 12 cycles - completed 8/15/2019    S/p left lumpectomy on 9/5/2019 with Dr. Mariam Victor  ypT2 N0    She is randomized to the placebo arm of SWOG  study: A Randomized, Phase III Trial to Evaluate the Efficacy and Safety of MK-3475 (Pembrolizumab) as Adjuvant Therapy for Triple Receptor-Negative Breast Cancer with >/= 1 CM Residual Invasive Cancer or Positive Lymph Nodes (ypN+) after Neoadjuvant Chemotherapy    Disease has recurred in the breast with changes suggestive of inflammatory disease and regional nodes are enlarged. Receiving palliative therapy  Carboplatin/Gemzar and Keytruda  S/p 2 Cycles    Due to progression of disease, Keytruda/chemo d/c'd    Receiving palliative chemotherapy   Claude Crutch - s/p 3 cycles  (Treatment delayed after Cycle 1 and dose reduced to 75% d/t neutropenia)     Patient sent to [facility] for clinical trial.   She is enrolled under Dr. Severa Hatch    2. Anemia - normocytic     Observation       3. Breast pain from cancer    Palliative medicine     4. Fungating breast mass    Wound care   Antibiotics      Plan:     > Follow up outpatient with palliative medicine   > Continue with clinical trial at [facility]  > Woundcare      Signed by: Neal Brown NP                     March 25, 2022    CC. Jean-Paul Barron MD  CC. Tracey Long MD  CC.  Sinai Gordon MD

## 2022-03-25 NOTE — PROGRESS NOTES
Pharmacy Antimicrobial Kinetic Dosing    Indication for Antimicrobials: fungating left breat cancer, cellulitis of the left breast    Current Regimen of Each Antimicrobial:  Zosyn 3.375 gm IV q8h (Start Date 3/25; Day # 1)  Vancomycin 1500 mg IV once, then 750 mg IV q8h (Start Date 3/25; Day # 1)    Previous Antimicrobial Therapy:    Goal Level: AUC: 400-600 mg/hr/Liter/day    Date Dose & Interval Measured (mcg/mL) Predicted AUC/MARIAH                       Date & time of next level:     Dosing calculator used: NextGame calculator    Significant Positive Cultures:   3/25 blood: pending    Conditions for Dosing Consideration: None    Labs:  Recent Labs     22  1257   CREA 0.42*   BUN 5*     Recent Labs     22  1257   WBC 3.9     Temp (24hrs), Av.7 °F (37.1 °C), Min:98.7 °F (37.1 °C), Max:98.7 °F (37.1 °C)    Creatinine Clearance (mL/min):   CrCl (Ideal Body Weight): 65.9    Impression/Plan:   · Continue Zosyn  · Vancomycin 1500 mg loading dose, followed by 750 mg IV q8h (predicted , predicted trough 13.3 mcg/ml)  · Antimicrobial stop date tbd for zosyn, 5 days for vancomycin     Pharmacy will follow daily and adjust medications as appropriate for renal function and/or serum levels.     Thank you,  Malika Levin, PHARMD

## 2022-03-26 NOTE — PROGRESS NOTES
Bedside and Verbal shift change report given to Alexei Allen, RN (oncoming nurse) by Liza Almazan, HIRO (offgoing nurse). Report included the following information SBAR, Kardex, [facility], Recent Results and Cardiac Rhythm ST.     1048- To Dr. Noemi Mcnair:  Ankit or Facility: [facility] From: Manan Brasher RE: Shagufta Givens 1960 RM: [id number] Blood cultures:GRAM POSITIVE COCCI IN CLUSTERS GROWING IN 1 OF 4 BOTTLES DRAWN Also- critical lactic results- 5.9 Need Callback: NEEDS CALLBACK FLOAT POOL    1105- Spoke with Dr. Berhane Reese. She said there is nothing that she can do for surgery. She said she had spoke with someone yesterday in regards to this. Her advice would be to transfer the patient to [facility]. Dr. Noemi Mcnair notified. 200- Consult called to Dr. Afton Dakin with ID.    6264- attempted to call the ID consult for Dr. Afton Dakin again.

## 2022-03-26 NOTE — PROGRESS NOTES
Patient is known to me and medical oncology. She has a recurrent left breast carcinoma. She is stage IV, with now bilateral axillary adenopathy, chest wall invasion, bone mets  She is surgically unresectable. Medical oncology has seen the patient. She is on trial at [facility]. Recommend discharge with palliative care or transfer to [facility]. Consult wound care. The patient needs systemic treatment per med onc. Nothing to be offered surgically.

## 2022-03-26 NOTE — PROGRESS NOTES
Hospitalist Progress Note    NAME: Lian Tello   :  1960   MRN:  [id number]       Assessment / Plan:  Metastatic breast cancer  Fungating left breast cancer  Cellulitis of the left breast  Severe sepsis  -CTA chest-1.  No evidence of pulmonary embolus. 2.  New moderate left and trace right pleural effusions. 3. Bulky bilateral axillary lymphadenopathy, which is worsened since the prior  study. 4. Extensive skin thickening of both breasts, left worse than right, and  malignant infiltration of both breasts, which has worsened since the prior  study. 5. Extensive mediastinal lymphadenopathy, which has worsened since the prior  study. 5. Evidence of sternal osseous metastatic disease.  -Lactic acid still elevated  -Sepsis indicated-tachycardia, lactic acidosis, cellulitis  -c/w vancomycin and Zosyn.  -Wound care consulted. -Blood culture ordered  -Check wound culture  -consult ID  -Will consult Dr. Anny Abarca, she says patient Dragan Figueroa is inoperable, recommended IV antibiotics along with wound care,\" but may benefit from debridement?  -Patient follow-up with [facility] and he currently on clinical trial.  -Consulted palliative care  -Consult hematology/oncology  -IV fluid along with pain medication, change morphine todilaudid     Acute on chronic microcytic anemia  -Hemoglobin 8.9, MCV 74.5.  -Give another dose of venofer  - Trend hgb, transfuse for hgb < 7        Code Status: Full code  Surrogate Decision Maker: Daughter     DVT Prophylaxis: Lovenox  GI Prophylaxis: not indicated     Baseline: Ambulatory at home      25.0 - 29.9 Overweight / Body mass index is 26.52 kg/m². Estimated discharge date:   Barriers: Abx, recs from consultants    Code status: Full  Prophylaxis: Lovenox  Recommended Disposition: Home w/Family     Subjective:     Chief Complaint / Reason for Physician Visit  Patient seen and examined at the bedside. Patient daughter at bedside as well.   She states that her pain has improved since yesterday. She states that her breath does not smell as bad as it did yesterday as well. She also states that her pain is not well controlled right now. Discussed with RN events overnight. Review of Systems:  Symptom Y/N Comments  Symptom Y/N Comments   Fever/Chills    Chest Pain     Poor Appetite    Edema     Cough    Abdominal Pain     Sputum    Joint Pain     SOB/WADSWORTH    Pruritis/Rash     Nausea/vomit    Tolerating PT/OT     Diarrhea    Tolerating Diet     Constipation    Other       Could NOT obtain due to:      Objective:     VITALS:   Last 24hrs VS reviewed since prior progress note. Most recent are:  Patient Vitals for the past 24 hrs:   Temp Pulse Resp BP SpO2   03/26/22 0820 98 °F (36.7 °C) (!) 109 15 117/69 99 %   03/26/22 0322 98 °F (36.7 °C) (!) 114 16 (!) 152/75 95 %   03/25/22 2306 97.9 °F (36.6 °C) (!) 117 8 127/63 97 %   03/25/22 2206 -- (!) 102 10 119/67 98 %   03/25/22 2135 -- (!) 106 15 120/64 100 %   03/25/22 2004 98.2 °F (36.8 °C) (!) 116 18 (!) 149/72 99 %   03/25/22 1828 99 °F (37.2 °C) (!) 112 20 (!) 140/82 99 %   03/25/22 1415 -- (!) 112 -- -- 97 %   03/25/22 1230 -- (!) 116 -- 136/80 96 %   03/25/22 1148 98.7 °F (37.1 °C) (!) 122 18 (!) 162/78 99 %       Intake/Output Summary (Last 24 hours) at 3/26/2022 0911  Last data filed at 3/26/2022 8249  Gross per 24 hour   Intake 2313.75 ml   Output --   Net 2313.75 ml        I had a face to face encounter and independently examined this patient on 3/26/2022, as outlined below:  PHYSICAL EXAM:  General: WD, WN. Alert, cooperative, no acute distress    EENT:  EOMI. Anicteric sclerae. MMM  Resp:  CTA bilaterally, no wheezing or rales. No accessory muscle use  CV:  Regular  rhythm,  No edema  GI:  Soft, Non distended, Non tender. +Bowel sounds  Neurologic:  Alert and oriented X 3, normal speech,   Psych:   Good insight. Not anxious nor agitated  Skin:  No rashes.   No jaundice, fungating mass with exudate encompassing the entire left breast.    Reviewed most current lab test results and cultures  YES  Reviewed most current radiology test results   YES  Review and summation of old records today    NO  Reviewed patient's current orders and MAR    YES  PMH/SH reviewed - no change compared to H&P  ________________________________________________________________________  Care Plan discussed with:    Comments   Patient x    Family  x    RN     Care Manager     Consultant                        Multidiciplinary team rounds were held today with , nursing, pharmacist and clinical coordinator. Patient's plan of care was discussed; medications were reviewed and discharge planning was addressed. ________________________________________________________________________  Total NON critical care TIME:  34   Minutes    Total CRITICAL CARE TIME Spent:   Minutes non procedure based      Comments   >50% of visit spent in counseling and coordination of care     ________________________________________________________________________  Lloyd Cotto MD     Procedures: see electronic medical records for all procedures/Xrays and details which were not copied into this note but were reviewed prior to creation of Plan. LABS:  I reviewed today's most current labs and imaging studies.   Pertinent labs include:  Recent Labs     03/26/22  0345 03/25/22  1257   WBC 3.0* 3.9   HGB 8.7* 8.9*   HCT 29.6* 28.9*   * 439*     Recent Labs     03/26/22  0345 03/25/22  1257    136   K 3.3* 3.9    102   CO2 23 20*   GLU 73 97   BUN 3* 5*   CREA 0.53* 0.42*   CA 8.2* 8.8   MG 1.7  --    ALB  --  2.1*   TBILI  --  0.5   ALT  --  10*       Signed: Lloyd Cotto MD

## 2022-03-26 NOTE — PROGRESS NOTES
1900: Bedside and Verbal shift change report given to nirali (oncoming nurse) by des (offgoing nurse). Report included the following information SBAR, Kardex, Intake/Output, MAR, Recent Results and Cardiac Rhythm sinustach. 2000: dressg to L breast applied     0530: NP nocturnist messaged lactic 4.4, K+ 3.3, repeat lactic for 0945, 1x LR bolus, PO K+ ordered   End of Shift Note    Bedside shift change report given to fernanda(oncoming nurse) by Benoit Madison (offgoing nurse). Report included the following information SBAR, Kardex, Intake/Output, MAR and Recent Results    Shift worked:  night     Shift summary and any significant changes:     see above      Concerns for physician to address:       Zone phone for oncoming shift:          Activity:  Activity Level: Up with Assistance  Number times ambulated in hallways past shift: 0  Number of times OOB to chair past shift: 0    Cardiac:   Cardiac Monitoring: Yes      Cardiac Rhythm: Sinus Tachy    Access:   Current line(s): port     Genitourinary:   Urinary status: voiding    Respiratory:   O2 Device: None (Room air)  Chronic home O2 use?: NO  Incentive spirometer at bedside: NO       GI:  Last Bowel Movement Date: 03/25/22  Current diet:  ADULT DIET Regular; 4 carb choices (60 gm/meal); Low Fat/Low Chol/High Fiber/2 gm Na  DIET ONE TIME MESSAGE  ADULT ORAL NUTRITION SUPPLEMENT Breakfast, AM Snack, Lunch, HS Snack; Standard High Calorie/High Protein  Passing flatus: YES  Tolerating current diet: YES       Pain Management:   Patient states pain is manageable on current regimen: YES    Skin:  Geovanny Score: 20  Interventions: float heels, increase time out of bed, foam dressing and PT/OT consult    Patient Safety:  Fall Score:  Total Score: 2  Interventions: bed/chair alarm, assistive device (walker, cane, etc), gripper socks, pt to call before getting OOB and stay with me (per policy)       Length of Stay:  Expected LOS: - - -  Actual LOS: 1      Nirali Alexander

## 2022-03-26 NOTE — PROGRESS NOTES
Problem: Falls - Risk of  Goal: *Absence of Falls  Description: Document Nella Holley Fall Risk and appropriate interventions in the flowsheet.   Outcome: Progressing Towards Goal  Note: Fall Risk Interventions:  Mobility Interventions: Bed/chair exit alarm         Medication Interventions: Bed/chair exit alarm,Patient to call before getting OOB,Teach patient to arise slowly    Elimination Interventions: Call light in reach,Patient to call for help with toileting needs,Stay With Me (per policy),Toileting schedule/hourly rounds,Toilet paper/wipes in reach              Problem: Patient Education: Go to Patient Education Activity  Goal: Patient/Family Education  Outcome: Progressing Towards Goal     Problem: Infection - Risk of, Central Venous Catheter-Associated Bloodstream Infection  Goal: *Absence of infection signs and symptoms  Outcome: Progressing Towards Goal     Problem: Patient Education: Go to Patient Education Activity  Goal: Patient/Family Education  Outcome: Progressing Towards Goal

## 2022-03-26 NOTE — PROGRESS NOTES
1524  Rapid Response Follow-up for MEWS score 4. Discussed current conditions with HIRO Gao on floor. MEWS of 2, based on current VS and mentation. Outcome: MEWS score will reflect current data.

## 2022-03-27 NOTE — PROGRESS NOTES
Pharmacy Antimicrobial Kinetic Dosing    Indication for Antimicrobials: fungating left breat cancer, cellulitis of the left breast    Current Regimen of Each Antimicrobial:  Zosyn 3.375 gm IV q8h (Start Date 3/25 Day 3)  Vancomycin 750 mg IV q8h (Start Date 3/25 Day 3)    Previous Antimicrobial Therapy:    Vancomycin Goal Level: AUC: 400-600 mg/hr/Liter/day    Date Dose & Interval Measured (mcg/mL) Predicted AUC/MARIAH   3/27 02:02am 750mg IV q8h 21.8 550                 Dosing calculator used: Haha Pinche calculator    Significant Positive Cultures:   3/25 blood: possible S Aureus 1 of 4    Conditions for Dosing Consideration: None    Labs:  Recent Labs     22  0152 22  1257   CREA 0.53* 0.53* 0.42*   BUN 4* 3* 5*     Recent Labs     22  01522  1257   WBC 2.9* 3.0* 3.9     Temp (24hrs), Av.9 °F (36.6 °C), Min:97.3 °F (36.3 °C), Max:98.3 °F (36.8 °C)    Creatinine Clearance (mL/min):   CrCl (Ideal Body Weight): 65.9    Impression/Plan:   · SCr stable, afebrile, 3/25 BCx ? S Aureus  · Vanc level therapeutic with  projected at Css  · Continue Vanc regimen  · Antimicrobial stop date tbd for zosyn, 5 days for vancomycin     Pharmacy will follow daily and adjust medications as appropriate for renal function and/or serum levels.     Thank you,  Milena Kingsley, [facility]

## 2022-03-27 NOTE — PROGRESS NOTES
Hospitalist Progress Note    NAME: Vick Hassan   :  1960   MRN:  [id number]       Assessment / Plan:  Metastatic breast cancer  Fungating left breast cancer  Cellulitis of the left breast  Severe sepsis  -CTA chest-1.  No evidence of pulmonary embolus. 2.  New moderate left and trace right pleural effusions. 3. Bulky bilateral axillary lymphadenopathy, which is worsened since the prior  study. 4. Extensive skin thickening of both breasts, left worse than right, and  malignant infiltration of both breasts, which has worsened since the prior  study. 5. Extensive mediastinal lymphadenopathy, which has worsened since the prior  study. 5. Evidence of sternal osseous metastatic disease.  -Lactic acid still elevated  -Sepsis indicated-tachycardia, lactic acidosis, cellulitis  -c/w vancomycin and Zosyn.  -Wound care consulted. -Blood culture possible staph aureus, await S&S  -Repeat blood cultures until negative, Check ECHO  -wound culture growing GNR, staph aureus and diphteroids  -consult ID  consult Dr. Portia Thomason, she says patient not a surgical candidate for intevention  -Patient follow-up with [facility] and he currently on clinical trial  -Discussed possible transfer with [facility] who does not see an indication for transfer at this time. Patient unable to undergo clinical trial until her infection resolves. -Consulted palliative care  -Consulted hematology/oncology, appreciate recs  -IVF DC'd due to swelling  -Dilaudid more effective for pain control     Acute on chronic microcytic anemia  -Hemoglobin 8.9, MCV 74.5.  -Give another dose of venofer  - Trend hgb, transfuse for hgb < 7        Code Status: Full code  Surrogate Decision Maker: Daughter     DVT Prophylaxis: Lovenox  GI Prophylaxis: not indicated     Baseline: Ambulatory at home      25.0 - 29.9 Overweight / Body mass index is 26.52 kg/m².     Estimated discharge date:   Barriers: Abx, recs from consultants    Code status: Full  Prophylaxis: Lovenox  Recommended Disposition: Home w/Family     Subjective:     Chief Complaint / Reason for Physician Visit  Patient seen and examined at the bedside. Patient sister at bedside. Patient continues to feel better every day. She states that her pain is better controlled with Dilaudid. She is no longer experiencing chills. I did update her about the discussion with [facility] oncology. Patient does complain of some left upper extremity swelling today. I have instructed her to keep her arm elevated. Discussed with RN events overnight. Review of Systems:  Symptom Y/N Comments  Symptom Y/N Comments   Fever/Chills    Chest Pain     Poor Appetite    Edema     Cough    Abdominal Pain     Sputum    Joint Pain     SOB/WADSWORTH    Pruritis/Rash     Nausea/vomit    Tolerating PT/OT     Diarrhea    Tolerating Diet     Constipation    Other       Could NOT obtain due to:      Objective:     VITALS:   Last 24hrs VS reviewed since prior progress note. Most recent are:  Patient Vitals for the past 24 hrs:   Temp Pulse Resp BP SpO2   03/27/22 1107 97.6 °F (36.4 °C) (!) 101 16 131/70 98 %   03/27/22 0315 98 °F (36.7 °C) 95 18 (!) 141/71 97 %   03/26/22 2253 98.3 °F (36.8 °C) (!) 102 25 118/64 98 %   03/26/22 1942 98 °F (36.7 °C) (!) 109 19 (!) 144/82 99 %   03/26/22 1435 97.3 °F (36.3 °C) (!) 105 18 136/72 99 %       Intake/Output Summary (Last 24 hours) at 3/27/2022 1117  Last data filed at 3/26/2022 1502  Gross per 24 hour   Intake 250 ml   Output --   Net 250 ml        I had a face to face encounter and independently examined this patient on 3/27/2022, as outlined below:  PHYSICAL EXAM:  General: WD, WN. Alert, cooperative, no acute distress    EENT:  EOMI. Anicteric sclerae. MMM  Resp:  CTA bilaterally, no wheezing or rales. No accessory muscle use  CV:  Regular  rhythm,  LUE edema  GI:  Soft, Non distended, Non tender.   +Bowel sounds  Neurologic:  Alert and oriented X 3, normal speech,   Psych:   Good insight. Not anxious nor agitated  Skin:  No rashes. No jaundice, fungating mass with exudate encompassing the entire left breast.    Reviewed most current lab test results and cultures  YES  Reviewed most current radiology test results   YES  Review and summation of old records today    NO  Reviewed patient's current orders and MAR    YES  PMH/SH reviewed - no change compared to H&P  ________________________________________________________________________  Care Plan discussed with:    Comments   Patient x    Family  x    RN     Care Manager     Consultant                        Multidiciplinary team rounds were held today with , nursing, pharmacist and clinical coordinator. Patient's plan of care was discussed; medications were reviewed and discharge planning was addressed. ________________________________________________________________________  Total NON critical care TIME:  34   Minutes    Total CRITICAL CARE TIME Spent:   Minutes non procedure based      Comments   >50% of visit spent in counseling and coordination of care     ________________________________________________________________________  Amy Abraham MD     Procedures: see electronic medical records for all procedures/Xrays and details which were not copied into this note but were reviewed prior to creation of Plan. LABS:  I reviewed today's most current labs and imaging studies.   Pertinent labs include:  Recent Labs     03/27/22  0152 03/26/22  0345 03/25/22  1257   WBC 2.9* 3.0* 3.9   HGB 7.9* 8.7* 8.9*   HCT 27.6* 29.6* 28.9*   * 414* 439*     Recent Labs     03/27/22  0152 03/26/22  0345 03/25/22  1257    140 136   K 3.6 3.3* 3.9    106 102   CO2 22 23 20*   GLU 71 73 97   BUN 4* 3* 5*   CREA 0.53* 0.53* 0.42*   CA 8.0* 8.2* 8.8   MG 1.5* 1.7  --    PHOS 3.5  --   --    ALB  --   --  2.1*   TBILI  --   --  0.5   ALT  --   --  10*       Signed: Amy Abraham MD

## 2022-03-27 NOTE — PROGRESS NOTES
Problem: Falls - Risk of  Goal: *Absence of Falls  Description: Document Irineo Ford Fall Risk and appropriate interventions in the flowsheet.   Outcome: Progressing Towards Goal  Note: Fall Risk Interventions:  Mobility Interventions: Communicate number of staff needed for ambulation/transfer,Patient to call before getting OOB         Medication Interventions: Patient to call before getting OOB,Teach patient to arise slowly,Bed/chair exit alarm    Elimination Interventions: Call light in reach,Bed/chair exit alarm,Patient to call for help with toileting needs,Stay With Me (per policy),Toileting schedule/hourly rounds              Problem: Patient Education: Go to Patient Education Activity  Goal: Patient/Family Education  Outcome: Progressing Towards Goal     Problem: Infection - Risk of, Central Venous Catheter-Associated Bloodstream Infection  Goal: *Absence of infection signs and symptoms  Outcome: Progressing Towards Goal     Problem: Patient Education: Go to Patient Education Activity  Goal: Patient/Family Education  Outcome: Progressing Towards Goal

## 2022-03-27 NOTE — PROGRESS NOTES
End of Shift Note    Bedside shift change report given to [facility]) by Geraldine Torre RN (offgoing nurse). Report included the following information     Shift worked: 7A-7P     Shift summary and any significant changes:    Pt's breast dressing remains dry. Pt's pain is much better with dilaudid and gets it q 3 hrs  One set of Blood cultures done. LA still high 5.1. Pt up ad roque      Concerns for physician to address: Pending culture     Zone phone for oncoming shift:   [phone]         Activity:  Activity Level: Up with Assistance  Number times ambulated in hallways past shift: 0  Number of times OOB to chair past shift: 1    Cardiac:   Cardiac Monitoring: Yes      Cardiac Rhythm: Sinus Tachy    Access:   Current line(s): port     Genitourinary:   Urinary status: voiding    Respiratory:   O2 Device: None (Room air)  Chronic home O2 use?: NO  Incentive spirometer at bedside: NO       GI:  Last Bowel Movement Date: 03/26/22  Current diet:  ADULT DIET Regular; 4 carb choices (60 gm/meal); Low Fat/Low Chol/High Fiber/2 gm Na  DIET ONE TIME MESSAGE  ADULT ORAL NUTRITION SUPPLEMENT Breakfast, AM Snack, Lunch, HS Snack; Standard High Calorie/High Protein  Passing flatus: YES  Tolerating current diet: YES       Pain Management:   Patient states pain is manageable on current regimen: YES    Skin:  Geovanny Score: 19  Interventions: float heels, increase time out of bed, PT/OT consult and nutritional support     Patient Safety:  Fall Score:  Total Score: 2  Interventions: gripper socks, pt to call before getting OOB and stay with me (per policy)       Length of Stay:  Expected LOS: - - -  Actual LOS: 2      Geraldine Torre, RN

## 2022-03-27 NOTE — PROGRESS NOTES
CM attempted to meet with patient and complete initial assessment. Patient was having a procedure done by nurse at bedside. Primary CM will continue to follow patient for discharge planning needs and arrange for services as deemed necessary.      Berhane Fraire, MSN, RN  Care Manager

## 2022-03-27 NOTE — PROGRESS NOTES
1900: Bedside and Verbal shift change report given to candice (oncoming nurse) by Enedelia Paul (offgoing nurse). Report included the following information SBAR, Kardex, Intake/Output, MAR, Recent Results and Cardiac Rhythm sinus tach. End of Shift Note    Bedside shift change report given to des (oncoming nurse) by Buck Almazan (offgoing nurse). Report included the following information SBAR, Kardex, Intake/Output, MAR and Recent Results    Shift worked:  night     Shift summary and any significant changes:     lactic remains elevated, pain meds given please see MAR, wound care done to BRODERICK Noble twice      Concerns for physician to address:  see above      Zone phone for oncoming shift:          Activity:  Activity Level: Up with Assistance  Number times ambulated in hallways past shift: 0  Number of times OOB to chair past shift: 0    Cardiac:   Cardiac Monitoring: Yes      Cardiac Rhythm: Sinus Tachy    Access:   Current line(s): port     Genitourinary:   Urinary status: voiding    Respiratory:   O2 Device: None (Room air)  Chronic home O2 use?: NO  Incentive spirometer at bedside: NO       GI:  Last Bowel Movement Date: 03/26/22  Current diet:  ADULT DIET Regular; 4 carb choices (60 gm/meal); Low Fat/Low Chol/High Fiber/2 gm Na  DIET ONE TIME MESSAGE  ADULT ORAL NUTRITION SUPPLEMENT Breakfast, AM Snack, Lunch, HS Snack; Standard High Calorie/High Protein  Passing flatus: YES  Tolerating current diet: YES       Pain Management:   Patient states pain is manageable on current regimen: YES    Skin:  Geovanny Score: 20  Interventions: float heels, increase time out of bed and PT/OT consult    Patient Safety:  Fall Score:  Total Score: 2  Interventions: bed/chair alarm, gripper socks, pt to call before getting OOB and stay with me (per policy)       Length of Stay:  Expected LOS: - - -  Actual LOS: 2      Buck Almazan

## 2022-03-27 NOTE — CONSULTS
Consult already seen. Please see note from 3/25/22 from Oncology, written by JANIYA Stanton and Dr. Carlos Vuong.

## 2022-03-27 NOTE — CONSULTS
Infectious Disease Consult Note    Reason for Consult: Bacteremia,   Date of Consultation: March 27, 2022  Date of Admission: 3/25/2022  Referring Physician: Dr Merlyn Vila      HPI:  Ms Mylene De León is a 23-year-old lady with a history of recurrent breast cancer (L) status post neoadjuvant chemotherapy, lumpectomy in 2019 followed by radiation, enrolled in clinical trial at [facility], indwelling port inserted August 2021 who was admitted after presentation with severe pain in the left breast and not feeling well overall. Patient tells me that she is having left wound of the breast chronically now for months. She states that the drainage recently has been more and foul-smelling. She denies any fevers chills. She admits to having had weight loss since 2021. She reports she had neoadjuvant chemo as well as radiation in 2019. She states that the cancer recurred in 2021 again. She had a port placed and is supposed to follow-up with [facility] for treatment and is enrolled in a clinical trial.  She states that she is feeling better since admission. Pain of the left breast is better controlled. He denies any nausea vomiting diarrhea. She says she had some loose stool as she had asked for laxative prior. She denies any night sweats. She denies any headache or visual changes. She denies any sores/ulcers in her mouth. She denies any vaginal discharge or sore/ulcer in the vaginal area. She denies rash.         Past Medical History:  Past Medical History:   Diagnosis Date    Breast cancer ([address]     left side    Menopause          Surgical History:  Past Surgical History:   Procedure Laterality Date    HX BREAST BIOPSY Left     x2    HX BREAST LUMPECTOMY Left 9/5/2019    LEFT BREAST LUMPECTOMY WITH ULTRASOUND performed by Candance Hopes, MD at [facility] AMBULATORY OR    HX HYSTERECTOMY      partial, ovaries remain    IR INSERT TUNL CVC W PORT OVER 5 YEARS  8/16/2021    VASCULAR SURGERY PROCEDURE UNLIST      portacath Family History:   Family History   Problem Relation Age of Onset    Cancer Father     Cancer Maternal Aunt     Breast Cancer Maternal Aunt         4 paternal aunts    Cancer Maternal Uncle     Cancer Paternal Aunt     Cancer Paternal Uncle     Breast Cancer Maternal Grandmother          Social History:     · Living Situation:. From home. Used to work at [employer]  · Tobacco: Denied  · Alcohol: Denied  · Illicit Drugs: Denied    Allergies: Allergies   Allergen Reactions    Codeine Rash     Rash from tylenol #3         Review of Systems:  10 point review of systems per HPI. All was negative. Medications:  No current facility-administered medications on file prior to encounter. Current Outpatient Medications on File Prior to Encounter   Medication Sig Dispense Refill    oxyCODONE ER (Xtampza ER) 9 mg capsule Take 1 Capsule by mouth every twelve (12) hours for 60 days. Max Daily Amount: 18 mg. 120 Capsule 0    lidocaine (XYLOCAINE) 4 % topical cream Apply  to affected area four (4) times daily as needed for Pain. (Patient not taking: Reported on 1/27/2022) 15 g 4    megestroL (MEGACE) 40 mg tablet Take 1 Tablet by mouth two (2) times a day. (Patient not taking: Reported on 1/27/2022) 60 Tablet 2    ondansetron (ZOFRAN ODT) 4 mg disintegrating tablet  (Patient not taking: Reported on 1/27/2022)      pantoprazole (PROTONIX) 40 mg tablet Take 1 Tablet by mouth daily. (Patient not taking: Reported on 1/27/2022) 30 Tablet 3    dronabinoL (Marinol) 2.5 mg capsule Take 1 Capsule by mouth two (2) times a day. Max Daily Amount: 5 mg. (Patient not taking: Reported on 1/27/2022) 60 Capsule 1    lidocaine-prilocaine (EMLA) topical cream Apply  to affected area as needed for Pain.  Apply generous amount to port site 30 minutes prior to infusions and cover with plastic wrap (Patient not taking: Reported on 1/27/2022) 30 g 2           Physical Exam:    Vitals:   Patient Vitals for the past 24 hrs: Temp Pulse Resp BP SpO2   03/27/22 1107 97.6 °F (36.4 °C) (!) 101 16 131/70 98 %   03/27/22 0315 98 °F (36.7 °C) 95 18 (!) 141/71 97 %   03/26/22 2253 98.3 °F (36.8 °C) (!) 102 25 118/64 98 %   03/26/22 1942 98 °F (36.7 °C) (!) 109 19 (!) 144/82 99 %   03/26/22 1435 97.3 °F (36.3 °C) (!) 105 18 136/72 99 %   ·   · GEN: NAD  · HEENT:no scleral icterus,  no thrush  · CV: S1, S2 regular on monitor  · Lungs: Nonlabored  · Abdomen: soft, non distended, non tender,   · Extremities: no edema  · Neuro: Alert, oriented to time, place and situation, moves all extremities to commands, verbal   · Skin: no rash  · Psych: good affect, good eye contact, non tearful   Lines: port R chest no erythema surrounding port  MSK large wound L breast, no surrounding cellulitis, no purulent discharge    Labs:   Recent Results (from the past 24 hour(s))   LACTIC ACID    Collection Time: 03/26/22  3:38 PM   Result Value Ref Range    Lactic acid 5.2 (HH) 0.4 - 2.0 MMOL/L   METABOLIC PANEL, BASIC    Collection Time: 03/27/22  1:52 AM   Result Value Ref Range    Sodium 141 136 - 145 mmol/L    Potassium 3.6 3.5 - 5.1 mmol/L    Chloride 108 97 - 108 mmol/L    CO2 22 21 - 32 mmol/L    Anion gap 11 5 - 15 mmol/L    Glucose 71 65 - 100 mg/dL    BUN 4 (L) 6 - 20 MG/DL    Creatinine 0.53 (L) 0.55 - 1.02 MG/DL    BUN/Creatinine ratio 8 (L) 12 - 20      GFR est AA >60 >60 ml/min/1.73m2    GFR est non-AA >60 >60 ml/min/1.73m2    Calcium 8.0 (L) 8.5 - 10.1 MG/DL   MAGNESIUM    Collection Time: 03/27/22  1:52 AM   Result Value Ref Range    Magnesium 1.5 (L) 1.6 - 2.4 mg/dL   PHOSPHORUS    Collection Time: 03/27/22  1:52 AM   Result Value Ref Range    Phosphorus 3.5 2.6 - 4.7 MG/DL   CBC WITH AUTOMATED DIFF    Collection Time: 03/27/22  1:52 AM   Result Value Ref Range    WBC 2.9 (L) 3.6 - 11.0 K/uL    RBC 3.49 (L) 3.80 - 5.20 M/uL    HGB 7.9 (L) 11.5 - 16.0 g/dL    HCT 27.6 (L) 35.0 - 47.0 %    MCV 79.1 (L) 80.0 - 99.0 FL    MCH 22.6 (L) 26.0 - 34.0 PG MCHC 28.6 (L) 30.0 - 36.5 g/dL    RDW 21.9 (H) 11.5 - 14.5 %    PLATELET 824 (H) 649 - 400 K/uL    MPV 8.2 (L) 8.9 - 12.9 FL    NRBC 0.0 0  WBC    ABSOLUTE NRBC 0.00 0.00 - 0.01 K/uL    NEUTROPHILS 60 32 - 75 %    LYMPHOCYTES 21 12 - 49 %    MONOCYTES 14 (H) 5 - 13 %    EOSINOPHILS 5 0 - 7 %    BASOPHILS 0 0 - 1 %    IMMATURE GRANULOCYTES 0 0.0 - 0.5 %    ABS. NEUTROPHILS 1.8 1.8 - 8.0 K/UL    ABS. LYMPHOCYTES 0.6 (L) 0.8 - 3.5 K/UL    ABS. MONOCYTES 0.4 0.0 - 1.0 K/UL    ABS. EOSINOPHILS 0.1 0.0 - 0.4 K/UL    ABS. BASOPHILS 0.0 0.0 - 0.1 K/UL    ABS. IMM.  GRANS. 0.0 0.00 - 0.04 K/UL    DF AUTOMATED      RBC COMMENTS ANISOCYTOSIS  2+        RBC COMMENTS MICROCYTOSIS  1+        RBC COMMENTS HYPOCHROMIA  1+        RBC COMMENTS OVALOCYTES  1+       LACTIC ACID    Collection Time: 03/27/22  1:52 AM   Result Value Ref Range    Lactic acid 5.2 (HH) 0.4 - 2.0 MMOL/L   VANCOMYCIN, TROUGH    Collection Time: 03/27/22  2:02 AM   Result Value Ref Range    Vancomycin,trough 21.8 (HH) 5.0 - 10.0 ug/mL    Reported dose date NOT PROVIDED      Reported dose time: NOT PROVIDED      Reported dose: NOT PROVIDED UNITS       Microbiology Data:       Blood:  CULTURE, BLOOD, PAIRED [[id number]] (Order [id number])  Microbiology  Date: 3/25/2022 Department: [facility] Released By/Authorizing: Verneita Oppenheim, MD (auto-released)        Contains abnormal data CULTURE, BLOOD, PAIRED  Order: [id number]   Collected 3/25/2022 12:57     Status: Preliminary result    Specimen Information: Blood         0 Result Notes     Ref Range & Units 3/25/22 1257 Resulting Agency   Special Requests:   NO SPECIAL REQUESTS P  [address] [facility]   Culture result:   POSSIBLE STAPHYLOCOCCUS AUREUS GROWING IN 1 OF 4 BOTTLES DRAWN SITES=PORT 1 Abnormal  [address]   Culture result:   REMAINING BOTTLE(S) HAS/HAVE NO GROWTH SO FAR P  ST. [address]   Culture result:  (NOTE) GPC CLUSTERS GROWING IN 1 OUT OF 4 BOTTLES CALLED TO AND READ BACK BY Brook López RN AT 10:20AM ON                     CULTURE, Freddy Judge [[id number]] (Order [id number])  Microbiology  Date: 3/26/2022 Department: [facility] Released By: Dieudonne Richards RN (auto-released) Authorizing: Wileen Castleman, MD        Contains abnormal Ivar Gallus  Order: [id number]   Collected 3/26/2022 09:42     Status: Preliminary result    Specimen Information: Breast; Wound         0 Result Notes     Ref Range & Units 3/26/22 0942 Resulting Agency   Special Requests:   NO SPECIAL REQUESTS P  [facility]   GRAM STAIN   FEW WBCS SEEN P  [address]   FEW [address]   GRAM STAIN   FEW [address]   GRAM STAIN   FEW GRAM POSITIVE COCCI IN [address] [facility]   Culture result:   HEAVY [facility]   Culture result:   MODERATE PROBABLE STAPHYLOCOCCUS [address]   Culture result:   HEAVY DIPHTHEROIDS Abnormal  P                Imaging:   CT chest  Study Result    Narrative & Impression   INDICATION:   large, severe fungating cancer over entire left breast and chest  wall, now tachycardic and chest pressure, rule out PE      COMPARISON:  Chest CT December 2021     TECHNIQUE:  Following the uneventful intravenous administration of 100 cc Isovue  729, thin helical axial images were obtained through the chest. Postprocessing  was performed. 3D image postprocessing was performed.     CT dose reduction was achieved through the use of a standardized protocol  tailored for this examination and automatic exposure control for dose  modulation.     FINDINGS:     There is a right chest portacatheter. There is bulky bilateral axillary  lymphadenopathy as well as left supraclavicular lymphadenopathy. There is  diffuse left breast skin thickening and soft tissue infiltration.  Similar  findings are present on the right, although less dramatic. There is anterior and  middle mediastinal lymphadenopathy, which has worsened. Heart size is normal.   There is no pericardial effusion.     No filling defect is seen within the pulmonary arterial system to suggest  pulmonary embolus. The aorta is normal in caliber.     There is a moderate left pleural effusion, which has developed since the prior  study, and a new trace right pleural effusion. There is bibasilar atelectasis. There is no pneumothorax.     Limited evaluation of the upper abdomen demonstrates no abnormality. Sclerosis  in the sternum is consistent with osseous metastatic disease. .     IMPRESSION  1. No evidence of pulmonary embolus. 2.  New moderate left and trace right pleural effusions. 3. Bulky bilateral axillary lymphadenopathy, which is worsened since the prior  study. 4. Extensive skin thickening of both breasts, left worse than right, and  malignant infiltration of both breasts, which has worsened since the prior  study. 5. Extensive mediastinal lymphadenopathy, which has worsened since the prior  study.   5. Evidence of sternal osseous metastatic disease.                Assessment / Plan:     Ms Saint Heading is a 79-year-old lady with a history of recurrent breast cancer (L) status post neoadjuvant chemotherapy, lumpectomy in 2019 followed by radiation, enrolled in clinical trial at [facility], indwelling port inserted August 2021 with bacteremia    1) S aureus bacteremia and sepsis  2) infiltrating breast wounds  3) recurrent breast cancer status post neoadjuvant chemotherapy and lumpectomy in 2019, followed by radiation, currently enrolled in a trial at [facility]  4) indwelling port  5) leukopenia  6) anemia  7) osseous metastatic disease    Plan:  Continue renally dosed vancomycin and Zosyn  Antibiotics to be adjusted based on cultures  Surgically unresectable per breast surgeon  Needs aggressive wound care for left breast wound  Unfortunately port will most likely need to be removed given staph aureus and blood from the port  Check TTE, May need AN  Further plans per oncology regarding recurrent breast cancer  Noted palliative consulted as well    Thank for the opportunity to participate in the care of this patient. Please contact with questions or concerns.      Zoila Parry DO  12:02 PM

## 2022-03-28 NOTE — PROGRESS NOTES
Spiritual Care Assessment/Progress Note  [facility]      NAME: Simeon Samuels      MRN: [id number]  AGE: 57 y.o. SEX: female  Yazdanism Affiliation: Non Spiritism   Language: English     3/28/2022     Total Time (in minutes): 10     Spiritual Assessment begun in [facility] PROGRESSIVE CARE through conversation with:         [x]Patient        [x] Family    [] Friend(s)        Reason for Consult: Initial/Spiritual assessment, patient floor     Spiritual beliefs: (Please include comment if needed)     [x] Identifies with a khushboo tradition: Sofia        [] Supported by a khushboo community:            [] Claims no spiritual orientation:           [] Seeking spiritual identity:                [] Adheres to an individual form of spirituality:           [] Not able to assess:                           Identified resources for coping:      [] Prayer                               [] Music                  [] Guided Imagery     [x] Family/friends                 [] Pet visits     [] Devotional reading                         [] Unknown     [] Other:                                               Interventions offered during this visit: (See comments for more details)    Patient Interventions: Affirmation of emotions/emotional suffering,Coping skills reviewed/reinforced,Initial/Spiritual assessment, patient floor     Family/Friend(s):  Affirmation of emotions/emotional suffering,Coping skills reviewed/reinforced     Plan of Care:     [x] Support spiritual and/or cultural needs    [] Support AMD and/or advance care planning process      [] Support grieving process   [] Coordinate Rites and/or Rituals    [] Coordination with community clergy   [] No spiritual needs identified at this time   [] Detailed Plan of Care below (See Comments)  [] Make referral to Music Therapy  [] Make referral to Pet Therapy     [] Make referral to Addiction services  [] Make referral to [facility]  [] Make referral to Spiritual Care Partner  [] No future visits requested        [x] Contact Spiritual Care for further referrals     Comments:     Initial Spiritual Care Assessment visit for Mrs. Kwok in 2265. Reviewed the chart notes before visit. Patient was alert, her daughter Riddhi Hudson was present during the visit.  introduced self and role, explored any concerns or worries if she has, the pt denied any spiritual/emotional needs at that moment but thankful for 's visit.  affirmed the daughter's supportive presence, advised of  availability.       [address] [facility] HERMAN Samson.Anca,Th.M, [address] Provider   Paging Service [phone])

## 2022-03-28 NOTE — PROGRESS NOTES
Transition of Care Plan:    RUR: 11%    Disposition: Home    Follow up appointments: To be placed on AVS prior to discharge. DME needed: To be determined. Transportation [address] or means to access home:  Daughter has keys        IM Medicare Letter:N/A--Patient has Medicaid    Is patient a BCPI-A Bundle: No            If yes, was Bundle Letter given?: N/A     Is patient a [place] and connected with the [place]? No                If yes, was [employer] transfer form completed and VA notified? N/A    Caregiver Contact:Daughter    Discharge Caregiver contacted prior to discharge? Caregiver to be contacted prior to discharge. Care Conference needed?:  Not at this time. Reason for Admission:  Patient came to ed for worsening pain and odor from left breast wound. RUR Score:  11%                   Plan for utilizing home health: She is open to services if needed. PCP: First and Last name:  Clinical specialist to obtain patient a pcp prior to discharge. Name of Practice: To be determined. Are you a current patient: Yes/No:  To obtain a new pcp prior to discharge. Approximate date of last visit:  To obtain a new pcp     Can you participate in a virtual visit with your PCP: Yes                      Current Advanced Directive/Advance Care Plan: Full Code  Advance Care Planning     General Advance Care Planning (ACP) Conversation      Date of Conversation: 3/28/22  Conducted with: Patient with Decision Making Capacity    Healthcare Decision Maker:     Primary Decision Maker: Tomas Bellamy - [phone]  Click here to complete [address] including selection of the Healthcare Decision Maker Relationship (ie \"Primary\")          Content/Action Overview:    Has ACP document(s) on file - reflects the patient's care preferences  Reviewed DNR/DNI and patient elects Full Code (Attempt Resuscitation)         Length of Voluntary ACP Conversation in minutes:  <16 minutes (Non-Billable)    Rosemarie Marquez RN                 Healthcare Decision Maker:   Click here to complete [address] including selection of the Healthcare Decision Maker Relationship (ie \"Primary\")             Primary Decision [facility] - Daughter - [phone]                  Confirmed demographics with patient. Patient states she does not have a pcp and is in agreement for clinical specialist to obtain one for her. Patient lives in one story home alone. She states her daughter comes over everyday. Patient was independent prior to coming to the hospital. She does not use home oxygen or cpap. She does her own cooking and cleaning. Her daughter takes her to appointments or where ever she has to go. She does not use dme for ambulation. Patient sees a specialist at [facility] where she is in a clinical trial study. The md is Dr Reina Arreguin and the telephone number is [phone]. Geneva Abdi RN BSN CRM        [phone]

## 2022-03-28 NOTE — PROGRESS NOTES
Bedside shift change report given to HIRO Yanez (oncoming nurse) by Lianne Clemente RN (offgoing nurse). Report included the following information SBAR, Kardex, Intake/Output, MAR and Cardiac Rhythm sinus/ST. Patient lactic yesterday 5.1 orders for redraw, messaged Dr. Merline Peacemaker and asked if he wants to redraw. MD states no, it will not . Bedside shift change report given to Herve Luke RN (oncoming nurse) by Jennifer Jones RN (offgoing nurse). Report included the following information SBAR, Kardex, Intake/Output, MAR and Cardiac Rhythm sinus/sinus tach.

## 2022-03-28 NOTE — WOUND CARE
Wound Care consult for the Left Breast wound:  Chart reviewed, spoke with patient, who was able to give a full history of the wound. She stated that she was diagnosed with Breast cancer but it was non-operable even though she had a mammogram just a few months prior to diagnosis. Pt. Is ambulatory but has limited movement of the left arm. She is using the bathroom as needed. Assessment today: the Breast wound is fungating and has some loose slough but the odor is dissipating per the patient. Mild odor today. There is some Granulation tissue noted and the entire breast is involved without any recognizable skin. Treatment today: the left breast was irrigated with NS and wiped with gauze and then wiped again with dry gauze to get more of the slough off of the wound. The Metronidazole get was applied on the tumor and then covered with 4x4's in the high drainage kit and then covered by the large ABD in the kit. Wound care orders were written for daily wound care to be done by nursing. Spoke with Elisabet Chavez RN about this today. Some Mepilex Transfer was obtained for the patient from the CCU. This will be the contact layer for the wound care going forward starting tomorrow. Instruction: cleanse the left breast with the NS and wipe with gauze and then wipe with dry gauze to remove the wound debris that is loose. Apply the Metro gel with a gloved hand all over the wound. Cover with the Mepilex Transfer dressing from the bottom first and lay on top of the wound. The upper part of the breast can be dressed with the 4x4's. Cover with the high drainage LARGE Abd from the high drainage kit. The Metrogel will eventually make the breast wound less inflamed and more beefy red as the top layer of fungal growth disappears with wound care. Can expect more bleeding from the tissue as this happens.    Raul Vu RN, BSN, [facility]

## 2022-03-28 NOTE — PROGRESS NOTES
[address] [facility] [address], [address] [facility], [address] [facility], 200 [facility]  [phone]  Oncology Progress Note      Patient: German Still MRN: [id number]  SSN: [id number]    YOB: 1960  Age: 57 y.o. Sex: female        Diagnosis:     1. Recurrent/metastatic breast cancer, Dx 07/2021   Inflammatory recurrence with regional nodes, mediastinal nodes and RP nodes    2. Left breast carcinoma:  T1c N1mi M0 (Stage I) infiltrating ductal carcinoma, Tumor size 1.6 cm, LN 1/3 with micromet, grade 3, ER -ve, HI -ve, Her 2 -ve    ypT2 N0        Treatment:     1. Neoadjuvant chemotherapy   Adriamycin, cytoxan - s/p 4 cycles   Weekly Taxol - s/p 12 cycles  2. S/p left lumpectomy on 9/5/2019 by Dr. Luis Dan  3. S/P Adjuvant radiation  4. Enrolled in  (for residual disease post surgery) - randomized to placebo   5. Palliative chemotherapy   Carboplatin/Gemzar and Keytruda, s/p 2 Cycles   Sacituzumab Gavetecan - s/p 3 cycles - last cycle 12/13/2021    Currently enrolled in Clinical trial at [facility]    Subjective:      German Still is a 57 y.o. female with a diagnosis of left sided invasive breast cancer. She felt a lump in the left breast, went to see her PCP, got a mammogram and was noted to have abnormal density in the left upper outer quadrant of the breast. A biopsy of the mass revealed gr 3 IDC, TNBC. The axillary LN is clear of disease. She saw Dr. Chen Hawkins. An MRI of the breast shows the tumor to be larger than previously believed to be. A left axillary LN biopsy showed 1/3 nodes with micrometastasis. She works at [employer] full time. Ms. Liliam Cisneros completed neoadjuvant chemotherapy and underwent lumpectomy in September 2020. She enrolled in the clinical trial  and was randomized to the placebo arm. She noticed change in the left breast skin approximately 4-6 weeks ago. Breast has progressively hardened.  In additional few raised areas popped up. She initially had pain in the left breast but this has subsided. She saw Dr. Dennise Slaughter. A punch bx of the skin on the breast revealed TNBC. She is working at [employer] for the last 13 years. Ms. Benites Console is received multiple lines of palliative chemotherapy. She was sent to clinical trial at [facility] in December after the skin nodules on the breast had spread further. One this admission the left breast is a fungating mass with crusty yellow drainage. She reports the pain is under control at this time. Interval History:     3/28/2022  Patient seen at bedside with her daughter, no complaints.      Review of Systems:     Constitutional: anxiety  Eyes: negative  Ears, Nose, Mouth, Throat, and Face: negative  Respiratory: negative  Cardiovascular: negative  Gastrointestinal: negative  Genitourinary:negative  Integument/Breast: fungating left breast mass  Hematologic/Lymphatic: negative  Musculoskeletal: intermittent mild numbness/tingling right foot  Neurological: negative    Review of systems was reviewed and updated as needed on 03/28/22    Past Medical History:   Diagnosis Date    Breast cancer ([address]     left side    Menopause      Past Surgical History:   Procedure Laterality Date    HX BREAST BIOPSY Left     x2    HX BREAST LUMPECTOMY Left 9/5/2019    LEFT BREAST LUMPECTOMY WITH ULTRASOUND performed by Shayla Conner MD at [facility] AMBULATORY OR    HX HYSTERECTOMY      partial, ovaries remain    IR INSERT TUNL CVC W PORT OVER 5 YEARS  8/16/2021    VASCULAR SURGERY PROCEDURE UNLIST      portacath      Family History   Problem Relation Age of Onset    Cancer Father     Cancer Maternal Aunt     Breast Cancer Maternal Aunt         4 paternal aunts    Cancer Maternal Uncle     Cancer Paternal Aunt     Cancer Paternal Uncle     Breast Cancer Maternal Grandmother      Social History     Tobacco Use    Smoking status: Former Smoker     Packs/day: 0.10     Quit date: 1/1/2020     Years since quittin.2    Smokeless tobacco: Never Used   Substance Use Topics    Alcohol use: No      Prior to Admission medications    Medication Sig Start Date End Date Taking? Authorizing Provider   oxyCODONE ER Jefm Calritos ER) 9 mg capsule Take 1 Capsule by mouth every twelve (12) hours for 60 days. Max Daily Amount: 18 mg. 1/27/22 3/28/22  Yinka Duran MD   lidocaine (XYLOCAINE) 4 % topical cream Apply  to affected area four (4) times daily as needed for Pain. Patient not taking: Reported on 21   Yinka Duran MD   megestroL (MEGACE) 40 mg tablet Take 1 Tablet by mouth two (2) times a day. Patient not taking: Reported on 21   Yinka Duran MD   ondansetron [facility] ODT) 4 mg disintegrating tablet  10/17/21   ProviderPatti   pantoprazole (PROTONIX) 40 mg tablet Take 1 Tablet by mouth daily. Patient not taking: Reported on 2022 10/19/21   Nathalie Ochoa MD   dronabinoL (Marinol) 2.5 mg capsule Take 1 Capsule by mouth two (2) times a day. Max Daily Amount: 5 mg. Patient not taking: Reported on 2022 10/19/21   Nathalie Ochoa MD   lidocaine-prilocaine (EMLA) topical cream Apply  to affected area as needed for Pain. Apply generous amount to port site 30 minutes prior to infusions and cover with plastic wrap  Patient not taking: Reported on 2022   Rubi Jamison MD          Allergies   Allergen Reactions    Codeine Rash     Rash from tylenol #3         Objective:     Visit Vitals  /71 (BP 1 Location: Right upper arm, BP Patient Position: At rest)   Pulse (!) 108   Temp 97.7 °F (36.5 °C)   Resp 15   Ht 5' 2\" (1.575 m)   Wt 145 lb (65.8 kg)   SpO2 100%   BMI 26.52 kg/m²     Pain Scale: /10    Physical Exam:     GENERAL: alert, cooperative, no distress  EYE: conjunctivae/corneas clear. xanthelasma  LYMPHATIC: Cervical, supraclavicular, and axillary nodes normal.   THROAT & NECK: normal and no erythema or exudates noted.    BREAST: Left breast is hard, skin is thickened, peau de' orange appearance - overall appears softer  LUNG: clear to auscultation bilaterally  HEART: regular rate and rhythm  ABDOMEN: soft, non-tender. EXTREMITIES:  extremities normal, atraumatic, no cyanosis or edema  NEUROLOGIC: AOx3. Gait normal.     The above physical exam was reviewed and updated as needed on 03/28/22. CT Results (most recent):  Results from Hospital Encounter encounter on 03/25/22    CTA CHEST W OR W WO CONT    Narrative  INDICATION:   large, severe fungating cancer over entire left breast and chest  wall, now tachycardic and chest pressure, rule out PE    COMPARISON:  Chest CT December 2021    TECHNIQUE:  Following the uneventful intravenous administration of 100 cc Isovue  045, thin helical axial images were obtained through the chest. Postprocessing  was performed. 3D image postprocessing was performed. CT dose reduction was achieved through the use of a standardized protocol  tailored for this examination and automatic exposure control for dose  modulation. FINDINGS:    There is a right chest portacatheter. There is bulky bilateral axillary  lymphadenopathy as well as left supraclavicular lymphadenopathy. There is  diffuse left breast skin thickening and soft tissue infiltration. Similar  findings are present on the right, although less dramatic. There is anterior and  middle mediastinal lymphadenopathy, which has worsened. Heart size is normal.  There is no pericardial effusion. No filling defect is seen within the pulmonary arterial system to suggest  pulmonary embolus. The aorta is normal in caliber. There is a moderate left pleural effusion, which has developed since the prior  study, and a new trace right pleural effusion. There is bibasilar atelectasis. There is no pneumothorax. Limited evaluation of the upper abdomen demonstrates no abnormality.  Sclerosis  in the sternum is consistent with osseous metastatic disease. .    Impression  1. No evidence of pulmonary embolus. 2.  New moderate left and trace right pleural effusions. 3. Bulky bilateral axillary lymphadenopathy, which is worsened since the prior  study. 4. Extensive skin thickening of both breasts, left worse than right, and  malignant infiltration of both breasts, which has worsened since the prior  study. 5. Extensive mediastinal lymphadenopathy, which has worsened since the prior  study. 5. Evidence of sternal osseous metastatic disease. Lab Results   Component Value Date/Time    WBC 3.5 (L) 03/28/2022 03:10 AM    HGB 8.1 (L) 03/28/2022 03:10 AM    HCT 27.7 (L) 03/28/2022 03:10 AM    PLATELET 386 (H) 13/50/2652 03:10 AM    MCV 80.1 03/28/2022 03:10 AM       Lab Results   Component Value Date/Time    Sodium 139 03/28/2022 03:10 AM    Potassium 3.6 03/28/2022 03:10 AM    Chloride 107 03/28/2022 03:10 AM    CO2 23 03/28/2022 03:10 AM    Anion gap 9 03/28/2022 03:10 AM    Glucose 73 03/28/2022 03:10 AM    BUN 5 (L) 03/28/2022 03:10 AM    Creatinine 0.67 03/28/2022 03:10 AM    BUN/Creatinine ratio 7 (L) 03/28/2022 03:10 AM    GFR est AA >60 03/28/2022 03:10 AM    GFR est non-AA >60 03/28/2022 03:10 AM    Calcium 8.1 (L) 03/28/2022 03:10 AM    Bilirubin, total 0.5 03/25/2022 12:57 PM    Alk. phosphatase 98 03/25/2022 12:57 PM    Protein, total 5.9 (L) 03/25/2022 12:57 PM    Albumin 2.1 (L) 03/25/2022 12:57 PM    Globulin 3.8 03/25/2022 12:57 PM    A-G Ratio 0.6 (L) 03/25/2022 12:57 PM    ALT (SGPT) 10 (L) 03/25/2022 12:57 PM    AST (SGOT) 24 03/25/2022 12:57 PM         Assessment:     1.  Recurrent/metastatic breast cancer, Dx 07/2021   Inflammatory recurrence with regional nodes, mediastinal nodes and RP nodes    PD-L1 ~ 2% (IC)  NGS: p53, CKS1B, FGFR1 amplification    Previously   Left breast carcinoma:  T1c N1mi M0 (Stage I) infiltrating ductal carcinoma, Tumor size 1.6 cm, LN 1/3 with micromet, grade 3, ER -ve, [place] -ve, Her 2 -ve    ECOG PS 0 Received neoadjuvant chemotherapy   Adriamycin, Cyclophosphamide - s/p 4 cycles   Weekly Taxol - s/p 12 cycles - completed 8/15/2019    S/p left lumpectomy on 9/5/2019 with Dr. Holly Getting  ypT2 N0    She is randomized to the placebo arm of SWOG  study: A Randomized, Phase III Trial to Evaluate the Efficacy and Safety of [id number] (Pembrolizumab) as Adjuvant Therapy for Triple Receptor-Negative Breast Cancer with >/= 1 CM Residual Invasive Cancer or Positive Lymph Nodes (ypN+) after Neoadjuvant Chemotherapy    Disease has recurred in the breast with changes suggestive of inflammatory disease and regional nodes are enlarged. Receiving palliative therapy  Carboplatin/Gemzar and Keytruda  S/p 2 Cycles    Due to progression of disease, Keytruda/chemo d/c'd    Receiving palliative chemotherapy   Lennart Sheets - s/p 3 cycles  (Treatment delayed after Cycle 1 and dose reduced to 75% d/t neutropenia)     Patient sent to [facility] for clinical trial.   She is enrolled under Dr. Nadya Perez    2. Anemia - normocytic     Observation       3. Breast pain from cancer    Palliative medicine     4. Fungating breast mass with Cellulitis and Bacteremia     Wound care   Receiving antibiotics for breast cellulitis   Blood cultures are growing GNR      Plan:     >Patient admitted with fungating breast mass with cellulitis and bacteremia   > Agree with Palliative Care to discuss goals of care, she was referred to [facility] clinical trial with disease progression. > CT from 3/25 showed disease progression since last CT in December 2021  > ID following with bacteremia, ruling out endocarditis  > Hospice is appropriate if patient no longer wishes to continue with clinical trial at [facility]. Her short term prognosis is guarded and she is undergoing work up for bacteremia. She will likely need port removed and will likely not be able to continue with treatment and clinical trial while she is receiving long term antibiotics.    > Other treatment options have been exhausted    Thank you for allowing us to participate in the care of this patient, unfortunately there are no other treatment options we can offer her. No further role for oncology at this point, will sign off. Please re-consult if needed. Signed by: Sweetie Mcgovern NP                     March 28, 2022    CC. Bárbara Dale MD  CC. Jerry White MD  CC.  Negar Solo MD

## 2022-03-28 NOTE — CONSULTS
Palliative Medicine Consult  Rl: [phone] ([phone])    Patient Name: Micky Hunt  YOB: 1960    Date of Initial Consult: 3/28/22  Reason for Consult: end stage disease  Requesting Provider: Sally Barnett MD  Primary Care Physician: None     SUMMARY:   Micky Hunt is a 57 y.o. Female with recurrent  meatstatic  breast cancer, s/p chemo and lumpectomy in 2019, followed by radiation , enrolled in clinical trial at [facility],  extensive dermal metastasis and suspected sternal metastasis, who was admitted on 3/25/2022 from home with a diagnosis of bresat pain , fungating  left breast mass Staph aureus bacteremia and sepsis, Ct chest shows progresssion of mediastinal lymphadenoppthy and axillary lymphadenopathy and infiltrating breast wounds  ID is following   Current medical issues leading to Palliative Medicine involvement include: care decision for end stage disease, patient follow up with out patient palliative care for pain and symptome management and support. Social hx: she has two children, former smoker quit 2020, used to work for [employer], lives alone, her daughter Dada Clay lives close, she stays with her few days a week, other daughter is estranged . At base line patient is independent in function . Home pain medication ; Oxycodone ER(XTAMPZA) 9 MG BID  Roxicodone 20 mg every 4 hrs prn   PALLIATIVE DIAGNOSES:   1. Cancer related pain   2. Metastatic breast cancer   3. Palliative care encounter        PLAN:   1. Met with patient she is pleasant , not in any distress. 2. Pain in left breast :  · on dilaudid 2 mg I/V every 3 hrs prn for severe pain , using round the clock. · On percocet 7.5/325 I tab every 4 hrs as needed for moderate pain, used 4 doses today. · Patient does not want me to put her on long acting oxycodone home dose 10 mg bid. · She tells me for now the current regimen is \" working perfect\".   · She will continue to follow up with out patient palliative care for pain and symptom management. 3. Goals of care : she understand her disease  has progressed, she wants to follow up with [facility] for a clinical trial at [facility], after she recovers from infection. 4. Advance directives: she has completed advance directives,with out patient , I asked her to provide a copy, she tells me her daughter cannot access it, her daughter Saritha Ashford is primary POA, patient for now want to continue with full code status, does not want to prolong her life on artificial support if she is close to dying or comatose. I encouraged her to revisit code status, given her terminal cancer the chances of survival to herself is negligible. 5. We will continue to support . 6. Initial consult note routed to primary continuity provider and/or primary health care team members  7. Communicated plan of care with: Palliative IDTElena 192 Team     GOALS OF CARE / TREATMENT PREFERENCES:     GOALS OF CARE:  Patient/Health Care Proxy Stated Goals: Prolong life    TREATMENT PREFERENCES:   Code Status: Full Code    Patient and family's personal goals include: full restorative care     Important upcoming milestones or family events: The patient identifies the following as important for living well: quality of life      Advance Care Planning:  [x] [facility] Interdisciplinary Team has updated the ACP Navigator with [address] and Patient Capacity      Primary Decision MakerTjennifer Beauchamp - Daughter - [phone]  Advance Care Planning 1/27/2022   Patient's Healthcare Decision Maker is: Named in scanned ACP document   Confirm Advance Directive Yes, on file   Patient Would Like to Complete Advance Directive -       Medical Interventions: Full interventions       Other:    As far as possible, the palliative care team has discussed with patient / health care proxy about goals of care / treatment preferences for patient.      HISTORY:     History obtained from: chart, patient and bed side RN    CHIEF COMPLAINT: pain in left breast    HPI/SUBJECTIVE:    The patient is:   [] Verbal and participatory  Admitted for worsening pain in left breast x few days, denies any nausea or vomiting, bowels are moving. Clinical Pain Assessment (nonverbal scale for severity on nonverbal patients):   Clinical Pain Assessment  Severity: 3  Location: left breast  Character: \"digging \"  Duration: 6 months, worse in last few days  Effect: cannot raise left arm  Factors: moving in certain position increases pain  Frequency: constant          Duration: for how long has pt been experiencing pain (e.g., 2 days, 1 month, years)  Frequency: how often pain is an issue (e.g., several times per day, once every few days, constant)     FUNCTIONAL ASSESSMENT:     Palliative Performance Scale (PPS):  PPS: 60       PSYCHOSOCIAL/SPIRITUAL SCREENING:     Palliative IDT has assessed this patient for cultural preferences / practices and a referral made as appropriate to needs (Cultural Services, Patient Advocacy, Ethics, etc.)    Any spiritual / Jew concerns:  [] Yes /  [x] No   If \"Yes\" to discuss with pastoral care during IDT     Does caregiver feel burdened by caring for their loved one:   [] Yes /  [x] No /  [] No Caregiver Present    If \"Yes\" to discuss with social work during IDT    Anticipatory grief assessment:   [x] Normal  / [] Maladaptive     If \"Maladaptive\" to discuss with social work during IDT    ESAS Anxiety: Anxiety: 0    ESAS Depression: Depression: 0        REVIEW OF SYSTEMS:     Positive and pertinent negative findings in ROS are noted above in HPI. The following systems were [x] reviewed / [] unable to be reviewed as noted in HPI  Other findings are noted below. Systems: constitutional, ears/nose/mouth/throat, respiratory, gastrointestinal, genitourinary, musculoskeletal, integumentary, neurologic, psychiatric, endocrine. Positive findings noted below.   Modified ESAS Completed by: provider   Fatigue: 7 Drowsiness: 0   Depression: 0 Pain: 3   Anxiety: 0 Nausea: 0   Anorexia: 4 Dyspnea: 0     Constipation: No              PHYSICAL EXAM:     From RN flowsheet:  Wt Readings from Last 3 Encounters:   03/27/22 145 lb (65.8 kg)   12/13/21 175 lb 8 oz (79.6 kg)   12/13/21 175 lb 7.8 oz (79.6 kg)     Blood pressure 128/79, pulse (!) 114, temperature 98.3 °F (36.8 °C), resp. rate 17, height 5' 2\" (1.575 m), weight 145 lb (65.8 kg), SpO2 97 %. Pain Scale 1: Numeric (0 - 10)  Pain Intensity 1: 8  Pain Onset 1: acute  Pain Location 1: Breast  Pain Orientation 1: Left  Pain Description 1: Throbbing  Pain Intervention(s) 1: Medication (see MAR)  Last bowel movement, if known:     Constitutional: alert,oriented x2, pleasant   Eyes: pupils equal, anicteric  ENMT: no nasal discharge, moist mucous membranes  Cardiovascular: regular rhythm, distal pulses intact  Respiratory: breathing not labored, symmetric  Gastrointestinal: soft non-tender, +bowel sounds  Musculoskeletal: dressing on left breast , fungating mass left breast per records, I have not examine, swelling of left arm and hand.   Skin: warm, dry  Neurologic: following commands, moving all extremities  Psychiatric: full affect, no hallucinations  Other:       HISTORY:     Active Problems:    Peau d'orange over breast (3/25/2022)      Severe sepsis ([address] (3/25/2022)      Cellulitis of breast (3/25/2022)      Past Medical History:   Diagnosis Date    Breast cancer ([address]     left side    Menopause       Past Surgical History:   Procedure Laterality Date    HX BREAST BIOPSY Left     x2    HX BREAST LUMPECTOMY Left 9/5/2019    LEFT BREAST LUMPECTOMY WITH ULTRASOUND performed [facility]    HX HYSTERECTOMY      partial, ovaries remain    IR INSERT TUNL CVC W PORT OVER 5 YEARS  8/16/2021    VASCULAR SURGERY PROCEDURE UNLIST      portacath      Family History   Problem Relation Age of Onset    Cancer Father     Cancer Maternal Aunt     Breast Cancer Maternal Aunt         4 paternal aunts    Cancer Maternal Uncle     Cancer Paternal Aunt     Cancer Paternal Uncle     Breast Cancer Maternal Grandmother       History reviewed, no pertinent family history.   Social History     Tobacco Use    Smoking status: Former Smoker     Packs/day: 0.10     Quit date: 2020     Years since quittin.2    Smokeless tobacco: Never Used   Substance Use Topics    Alcohol use: No     Allergies   Allergen Reactions    Codeine Rash     Rash from tylenol #3      Current Facility-Administered Medications   Medication Dose Route Frequency    metroNIDAZOLE (METROGEL) 0.75 % gel   Topical DAILY    HYDROmorphone (DILAUDID) injection 2 mg  2 mg IntraVENous Q3H PRN    piperacillin-tazobactam (ZOSYN) 3.375 g in 0.9% sodium chloride (MBP/ADV) 100 mL MBP  3.375 g IntraVENous Q8H    oxyCODONE-acetaminophen (PERCOCET 7.5) 7.5-325 mg per tablet 1 Tablet  1 Tablet Oral Q4H PRN    sodium chloride (NS) flush 5-40 mL  5-40 mL IntraVENous Q8H    sodium chloride (NS) flush 5-40 mL  5-40 mL IntraVENous PRN    acetaminophen (TYLENOL) tablet 650 mg  650 mg Oral Q6H PRN    Or    acetaminophen (TYLENOL) suppository 650 mg  650 mg Rectal Q6H PRN    polyethylene glycol (MIRALAX) packet 17 g  17 g Oral DAILY PRN    ondansetron (ZOFRAN ODT) tablet 4 mg  4 mg Oral Q8H PRN    Or    ondansetron (ZOFRAN) injection 4 mg  4 mg IntraVENous Q6H PRN    enoxaparin (LOVENOX) injection 40 mg  40 mg SubCUTAneous DAILY    vancomycin (VANCOCIN) 750 mg in 0.9% sodium chloride 250 mL (VIAL-MATE)  750 mg IntraVENous Q8H          LAB AND IMAGING FINDINGS:     Lab Results   Component Value Date/Time    WBC 4.2 2022 03:20 AM    HGB 8.2 (L) 2022 03:20 AM    PLATELET 048 (H)  03:20 AM     Lab Results   Component Value Date/Time    Sodium 141 2022 03:20 AM    Potassium 3.5 2022 03:20 AM    Chloride 108 2022 03:20 AM    CO2 24 2022 03:20 AM    BUN 7 03/29/2022 03:20 AM    Creatinine 0.71 03/29/2022 03:20 AM    Calcium 8.2 (L) 03/29/2022 03:20 AM    Magnesium 1.9 03/29/2022 03:20 AM    Phosphorus 3.8 03/29/2022 03:20 AM      Lab Results   Component Value Date/Time    Alk. phosphatase 98 03/25/2022 12:57 PM    Protein, total 5.9 (L) 03/25/2022 12:57 PM    Albumin 2.1 (L) 03/25/2022 12:57 PM    Globulin 3.8 03/25/2022 12:57 PM     No results found for: INR, PTMR, PTP, PT1, PT2, APTT, INREXT, INREXT   No results found for: IRON, FE, TIBC, IBCT, PSAT, FERR   No results found for: PH, PCO2, PO2  No components found for: GLPOC   No results found for: CPK, CKMB             Total time: 70 mins  Counseling / coordination time, spent as noted above: 55 mins  > 50% counseling / coordination?: yes     Prolonged service was provided for  []30 min   []75 min in face to face time in the presence of the patient, spent as noted above. Time Start:   Time End:   Note: this can only be billed with 64258 (initial) or 78119 (follow up). If multiple start / stop times, list each separately.

## 2022-03-28 NOTE — PROGRESS NOTES
[address] [place] [address] follow-up PCP transitional care appointment has been scheduled with Dr. Deepthi Pierce on 4/13/22 at 1430. Pending patient discharge.   Sophia Hannon, Care Management Assistant

## 2022-03-28 NOTE — PROGRESS NOTES
Hospitalist Progress Note    NAME: Clarissa Brooks   :  1960   MRN:  [id number]       Assessment / Plan:  Metastatic breast cancer  Fungating left breast cancer  Cellulitis of the left breast  Severe sepsis  -CTA chest-1.  No evidence of pulmonary embolus. 2.  New moderate left and trace right pleural effusions. 3. Bulky bilateral axillary lymphadenopathy, which is worsened since the prior  study. 4. Extensive skin thickening of both breasts, left worse than right, and  malignant infiltration of both breasts, which has worsened since the prior  study. 5. Extensive mediastinal lymphadenopathy, which has worsened since the prior  study. 5. Evidence of sternal osseous metastatic disease.  -Lactic acid still elevated  -Sepsis indicated-tachycardia, lactic acidosis, cellulitis  -c/w vancomycin and Zosyn.  -Wound care consulted. -Blood culture possible staph aureus, await S&S  -Repeat blood cultures until negative, ECHO inadequate to assess for endocarditis. Repeat blood cultures NGTD. Will discuss with ID about need for AN  -Need to remove port? Discuss with ID  -wound culture growing GNR, staph aureus and diphteroids  consult Dr. Yasimn Duque, she says patient not a surgical candidate for intevention  -Patient follow-up with [facility] and he currently on clinical trial  -Discussed possible transfer with [facility] who does not see an indication for transfer at this time. Patient unable to undergo clinical trial until her infection resolves.   -Consulted palliative care  -Consulted hematology/oncology, appreciate recs  -IVF DC'd due to swelling  -Dilaudid more effective for pain control     Acute on chronic microcytic anemia  -Hemoglobin 8.9, MCV 74.5.  -s/p venofer  - Trend hgb, transfuse for hgb < 7        Code Status: Full code  Surrogate Decision Maker: Daughter     DVT Prophylaxis: Lovenox  GI Prophylaxis: not indicated     Baseline: Ambulatory at home      25.0 - 29.9 Overweight / Body mass index is 26.52 kg/m². Estimated discharge date: March 31  Barriers: Abx, S&S from cultures    Code status: Full  Prophylaxis: Lovenox  Recommended Disposition: Home w/Family     Subjective:     Chief Complaint / Reason for Physician Visit  Patient seen and examined at the bedside. Patient's daughter at bedside. Patient continues to feel better since coming into the hospital.  Her pain is still being well controlled. Her left upper extremity swelling has improved as well. She does understand her hospital course. All questions answered. Discussed with RN events overnight. Review of Systems:  Symptom Y/N Comments  Symptom Y/N Comments   Fever/Chills    Chest Pain     Poor Appetite    Edema     Cough    Abdominal Pain     Sputum    Joint Pain     SOB/WADSWORTH    Pruritis/Rash     Nausea/vomit    Tolerating PT/OT     Diarrhea    Tolerating Diet     Constipation    Other       Could NOT obtain due to:      Objective:     VITALS:   Last 24hrs VS reviewed since prior progress note.  Most recent are:  Patient Vitals for the past 24 hrs:   Temp Pulse Resp BP SpO2   03/28/22 0752 98.7 °F (37.1 °C) (!) 114 17 128/78 99 %   03/28/22 0336 -- (!) 102 13 -- --   03/28/22 0326 98.2 °F (36.8 °C) (!) 109 21 125/66 --   03/28/22 0321 -- (!) 102 14 -- --   03/28/22 0306 -- 97 16 -- 97 %   03/28/22 0251 -- 97 13 -- 99 %   03/28/22 0236 -- 95 12 -- 99 %   03/28/22 0221 -- 96 12 -- 100 %   03/28/22 0206 -- 98 13 -- 97 %   03/28/22 0151 -- (!) 116 23 -- --   03/27/22 2311 -- 94 13 -- --   03/27/22 2310 -- 95 14 -- 92 %   03/27/22 2309 -- 97 14 -- --   03/27/22 2308 -- (!) 102 14 -- --   03/27/22 2307 -- 98 14 -- --   03/27/22 2306 -- (!) 108 19 -- --   03/27/22 2305 -- (!) 111 16 -- 95 %   03/27/22 2304 98.4 °F (36.9 °C) (!) 105 14 118/69 96 %   03/27/22 2017 -- 95 14 -- --   03/27/22 2016 -- 96 14 -- --   03/27/22 2015 -- 92 13 -- --   03/27/22 2014 -- 97 14 -- --   03/27/22 2013 -- 96 14 -- --   03/27/22 2012 -- 97 15 -- --   03/27/22 2011 -- 99 14 -- 100 %   03/27/22 2010 -- 98 14 -- --   03/27/22 2009 -- 98 14 -- --   03/27/22 2008 -- 98 16 -- --   03/27/22 2007 -- 100 15 -- --   03/27/22 1938 -- (!) 102 15 -- 97 %   03/27/22 1937 -- (!) 102 15 -- 98 %   03/27/22 1936 -- (!) 103 16 -- 98 %   03/27/22 1935 -- (!) 108 16 -- 98 %   03/27/22 1934 98.1 °F (36.7 °C) (!) 108 17 134/71 100 %   03/27/22 1933 -- (!) 106 17 134/71 100 %   03/27/22 1743 -- -- 13 -- --   03/27/22 1541 97.6 °F (36.4 °C) (!) 109 9 115/66 98 %   03/27/22 1406 -- -- -- 131/70 --   03/27/22 1107 97.6 °F (36.4 °C) (!) 101 16 131/70 98 %       Intake/Output Summary (Last 24 hours) at 3/28/2022 7408  Last data filed at 3/27/2022 1747  Gross per 24 hour   Intake 650 ml   Output --   Net 650 ml        I had a face to face encounter and independently examined this patient on 3/28/2022, as outlined below:  PHYSICAL EXAM:  General: WD, WN. Alert, cooperative, no acute distress    EENT:  EOMI. Anicteric sclerae. MMM  Resp:  CTA bilaterally, no wheezing or rales. No accessory muscle use  CV:  Regular  rhythm,  LUE edema  GI:  Soft, Non distended, Non tender. +Bowel sounds  Neurologic:  Alert and oriented X 3, normal speech,   Psych:   Good insight. Not anxious nor agitated  Skin:  No rashes. No jaundice, fungating mass with exudate encompassing the entire left breast.  Port right chest    Reviewed most current lab test results and cultures  YES  Reviewed most current radiology test results   YES  Review and summation of old records today    NO  Reviewed patient's current orders and MAR    YES  PMH/SH reviewed - no change compared to H&P  ________________________________________________________________________  Care Plan discussed with:    Comments   Patient x    Family  x    RN     Care Manager     Consultant                        Multidiciplinary team rounds were held today with , nursing, pharmacist and clinical coordinator.   Patient's plan of care was discussed; medications were reviewed and discharge planning was addressed. ________________________________________________________________________  Total NON critical care TIME:  34   Minutes    Total CRITICAL CARE TIME Spent:   Minutes non procedure based      Comments   >50% of visit spent in counseling and coordination of care     ________________________________________________________________________  Anup Rao MD     Procedures: see electronic medical records for all procedures/Xrays and details which were not copied into this note but were reviewed prior to creation of Plan. LABS:  I reviewed today's most current labs and imaging studies. Pertinent labs include:  Recent Labs     03/28/22 0310 03/27/22  0152 03/26/22  0345   WBC 3.5* 2.9* 3.0*   HGB 8.1* 7.9* 8.7*   HCT 27.7* 27.6* 29.6*   * 425* 414*     Recent Labs     03/28/22 0310 03/27/22  0152 03/26/22  0345 03/25/22  1257 03/25/22  1257    141 140   < > 136   K 3.6 3.6 3.3*   < > 3.9    108 106   < > 102   CO2 23 22 23   < > 20*   GLU 73 71 73   < > 97   BUN 5* 4* 3*   < > 5*   CREA 0.67 0.53* 0.53*   < > 0.42*   CA 8.1* 8.0* 8.2*   < > 8.8   MG  --  1.5* 1.7  --   --    PHOS  --  3.5  --   --   --    ALB  --   --   --   --  2.1*   TBILI  --   --   --   --  0.5   ALT  --   --   --   --  10*    < > = values in this interval not displayed.        Signed: Anup Rao MD

## 2022-03-29 NOTE — PROGRESS NOTES
Bedside shift change report given to HIRO MARROQUIN (oncoming nurse) by Andrei Ross RN (offgoing nurse). Report included the following information SBAR, Kardex, Intake/Output, MAR and Cardiac Rhythm sinus/sinus tach. Bedside shift change report given to  (oncoming nurse) by  Darek zuniga. Report included the following information SBAR, Kardex, Intake/Output, MAR and Cardiac Rhythm sinus/sinus tach.

## 2022-03-29 NOTE — PROGRESS NOTES
Infectious Disease Progress Note         Interval:  NAEO    Subjective:   Patient seen today. Reports feeling well and much better since arrival.   Denies any pain. Objective:    Vitals:   Reviewed in chart. Physical Exam:  Gen: No apparent distress  HEENT:  Normocephalic, atraumatic, no scleral icterus  CV: HDS  Lungs: room air  Abdomen: soft, non tender, non distended  Genitourinary:  no campos catheter   Skin: left breast mass wound in dressing  Psych: good affect, good eye contact, non tearful  Neuro: alert, oriented to time,  place, and situation, moves all extremities to commands, verbal  Musculoskeletal:  No joint edema, erythema or tenderness noted   Lines: PIVs, right chest port - appears benign.          Labs:  Recent Results (from the past 24 hour(s))   METABOLIC PANEL, BASIC    Collection Time: 03/29/22  3:20 AM   Result Value Ref Range    Sodium 141 136 - 145 mmol/L    Potassium 3.5 3.5 - 5.1 mmol/L    Chloride 108 97 - 108 mmol/L    CO2 24 21 - 32 mmol/L    Anion gap 9 5 - 15 mmol/L    Glucose 71 65 - 100 mg/dL    BUN 7 6 - 20 MG/DL    Creatinine 0.71 0.55 - 1.02 MG/DL    BUN/Creatinine ratio 10 (L) 12 - 20      GFR est AA >60 >60 ml/min/1.73m2    GFR est non-AA >60 >60 ml/min/1.73m2    Calcium 8.2 (L) 8.5 - 10.1 MG/DL   MAGNESIUM    Collection Time: 03/29/22  3:20 AM   Result Value Ref Range    Magnesium 1.9 1.6 - 2.4 mg/dL   PHOSPHORUS    Collection Time: 03/29/22  3:20 AM   Result Value Ref Range    Phosphorus 3.8 2.6 - 4.7 MG/DL   CBC WITH AUTOMATED DIFF    Collection Time: 03/29/22  3:20 AM   Result Value Ref Range    WBC 4.2 3.6 - 11.0 K/uL    RBC 3.53 (L) 3.80 - 5.20 M/uL    HGB 8.2 (L) 11.5 - 16.0 g/dL    HCT 27.4 (L) 35.0 - 47.0 %    MCV 77.6 (L) 80.0 - 99.0 FL    MCH 23.2 (L) 26.0 - 34.0 PG    MCHC 29.9 (L) 30.0 - 36.5 g/dL    RDW 22.9 (H) 11.5 - 14.5 %    PLATELET 328 (H) 860 - 400 K/uL    MPV 8.3 (L) 8.9 - 12.9 FL    NRBC 0.0 0  WBC    ABSOLUTE NRBC 0.00 0.00 - 0.01 K/uL    NEUTROPHILS 64 32 - 75 %    LYMPHOCYTES 22 12 - 49 %    MONOCYTES 9 5 - 13 %    EOSINOPHILS 4 0 - 7 %    BASOPHILS 1 0 - 1 %    IMMATURE GRANULOCYTES 0 0.0 - 0.5 %    ABS. NEUTROPHILS 2.7 1.8 - 8.0 K/UL    ABS. LYMPHOCYTES 0.9 0.8 - 3.5 K/UL    ABS. MONOCYTES 0.4 0.0 - 1.0 K/UL    ABS. EOSINOPHILS 0.2 0.0 - 0.4 K/UL    ABS. BASOPHILS 0.0 0.0 - 0.1 K/UL    ABS. IMM. GRANS. 0.0 0.00 - 0.04 K/UL    DF AUTOMATED                 Assessment:    Ms Brayan Yeung is a 58-year-old lady with a history of recurrent breast cancer (L) status post neoadjuvant chemotherapy, lumpectomy in 2019 followed by radiation, enrolled in clinical trial at [facility], indwelling port inserted August 2021 with bacteremia     1) S aureus bacteremia and sepsis  2) infiltrating breast wounds  3) recurrent breast cancer status post neoadjuvant chemotherapy and lumpectomy in 2019, followed by radiation, currently enrolled in a trial at [facility]. Patient reports she has not been started on the trial yet because [facility] was awaiting the prior chemotherapy agents to be out of her system before starting the trial regimen. 4) indwelling port  5) leukopenia  6) anemia  7) osseous metastatic disease        Recommendations:  - MSSA growing from port line from BCx from 3/25/22. Warrants removal - d/w Dr. David Garcia and Heme/Onc.   - BCx from 3/27 NGSF.   - TTE was inadequate study for vegetations, but not AN currently needed given low-grade bacteremia.  - Wound cultures from left breast growing MSSA, E.coli, diptheroids.   - Stop vanc zosyn and switch to cefazolin 2gm q8h. - Duration of abx anticipated is 2 weeks from negative BCx. Patient is in agreement to above. Thank you for the opportunity to participate in the care of this patient. Please contact with questions or concerns.       Elisa Campos MD  Infectious Diseases

## 2022-03-29 NOTE — PROGRESS NOTES
Palliative Medicine  [facility]: [phone] ([phone])  [facility]: [phone] ([phone])    Patient seen for emotional support and to assess needs, while she is here in the hospital. Patient is followed by our outpatient palliative group. Explained my role in the hospital as Palliative SW. Patient does not feel she needs any particular support from this writer at this time but did ask this writer to come back to meet with her daughter. Patient presents with a positive demeanor, she is hopeful that she can go to [facility] to start a clinical trial, \"once my infection clears\". She feels that she is doing better functionally and indicates that she is now able to get out of bed to walk a little. Patient lives on her own, her only daughter, Gita Officer lives close by and they are emotionally close, per patient. Patient does not want to be a \"burden\" on her daughter. She shared that she just received Medicaid and may now be eligible for some personal care in the home if needed. Patient shared that she wanted to discuss her AMD with her daughter Noe Bolaños, she also wasn't sure if she had accurately reflected her wishes on the AMD. Patient not sure if she has a copy of her AMD accessible at home. LCSW printed two copies of AMD for patient and daughter, went over what patient had chosen on the Living Will portion of the AMD (no life prolonging measures at EOL), LCSW explained to patient when the AMD goes into effect and that this relates to the period of time when patient may be imminent. Patient has a better understanding of this portion of AMD, she also confirmed that she wishes to remain Full Code and would want CPR if her heart stops or she stops breathing. Encouraged patient to give a copy of AMD to daughter and to discuss her wishes with her. Patient asked that I stop by on Thursday a m when daughter Noe Bolaños would be present, to see if they need anything further clarified.  Will see patient and daughter on 3/31/22 around 9:30 a m.

## 2022-03-29 NOTE — CONSULTS
Palliative Medicine Consult  Rl: [phone] ([id number])    Patient Name: German Still  YOB: 1960    Date of Initial Consult: 3/28/22  Reason for Consult: end stage disease  Requesting Provider: Kamilah Peters MD  Primary Care Physician: None     SUMMARY:   German Still is a 57 y.o. Female with recurrent  meatstatic  breast cancer, s/p chemo and lumpectomy in 2019, followed by radiation , enrolled in clinical trial at [facility],  extensive dermal metastasis and suspected sternal metastasis, who was admitted on 3/25/2022 from home with a diagnosis of bresat pain , fungating  left breast mass Staph aureus bacteremia and sepsis, Ct chest shows progresssion of mediastinal lymphadenoppthy and axillary lymphadenopathy and infiltrating breast wounds  ID is following   Current medical issues leading to Palliative Medicine involvement include: care decision for end stage disease, patient follow up with out patient palliative care for pain and symptome management and support. Social hx: she has two daughters, she is former smoker quit 2020, used to work for [employer], lives alone, her daughter Ethyl Dies lives close, she stays with her few days a week, other daughter is estranged . At base line patient is independent in function . Home pain medication ; Oxycodone ER(XTAMPZA) 9 MG BID  Roxicodone 20 mg every 4 hrs prn   PALLIATIVE DIAGNOSES:   1. Cancer related pain   2. Metastatic breast cancer   3. Goals of care        PLAN:   1. Follow up visit. 2. Pain in left breast :   · on dilaudid 2 mg I/V every 3 hrs prn for severe pain , using round the clock. · On percocet 7.5/325 I tab every 4 hrs as needed for moderate pain, used 0 today. · Patient does not want me to put her on long acting oxycodone home dose 10 mg bid.   · She tells me for now the current regimen is \" working perfect\", except for flare up of pain when medication starts to wear off, I explained putting her back on oxycontin 10 mg BID ( home dose ), will help in controlling flare up, she wants to think about it. · She wants to continue to follow up with out patient palliative care for pain and symptom management. · She asked me if I can setup a virtual follow up appt with out patient team, I have coordinated with out patient palliative care coordinator Eliot Medel for out patient follow up appointment. 3. Goals of care : she understand her disease  has progressed, she wants to follow up with [facility] for a clinical trial at [facility], after she recovers from infection, for now goals are full care and full code/attempt at Resuscitation. 4. Advance directives: found in chart, refer to Palliative care team ( LCSW ) Jose Risk note), she followed up with patient, provided her two copies of AMD, patient wants to discuss her AMD, with her daughter Keisha Dash. 5. Continue with bowel regimen, bowels are regular. 6. We will continue to support . 7. Initial consult note routed to primary continuity provider and/or primary health care team members  8. Communicated plan of care with: Palliative IDTElena 192 Team     GOALS OF CARE / TREATMENT PREFERENCES:     GOALS OF CARE:  Patient/Health Care Proxy Stated Goals: Prolong life    TREATMENT PREFERENCES:   Code Status: Full Code    Patient and family's personal goals include: full restorative care     Important upcoming milestones or family events:      The patient identifies the following as important for living well: quality of life      Advance Care Planning:  [x] The [facility] Interdisciplinary Team has updated the ACP Navigator with [address] and Patient Capacity      Primary Decision MakerDmoises Hollis - Mia - [phone]  Advance Care Planning 1/27/2022   Patient's Healthcare Decision Maker is: Named in scanned ACP document   Confirm Advance Directive Yes, on file   Patient Would Like to Complete Advance Directive -       Medical Interventions: Full interventions Other:    As far as possible, the palliative care team has discussed with patient / health care proxy about goals of care / treatment preferences for patient. HISTORY:     History obtained from: chart, patient and bed side RN    CHIEF COMPLAINT: pain in left breast    HPI/SUBJECTIVE:    The patient is:   [] Verbal and participatory  Admitted for worsening pain in left breast x few days, denies any nausea or vomiting, bowels are moving.    3/29/22: patient notes pain is better overall, average 2/10, now 7/10 after medication is wearing off, she slept \" good \" last night, bowels are moving every day, denies any nausea or vomiting .     Clinical Pain Assessment (nonverbal scale for severity on nonverbal patients):   Clinical Pain Assessment  Severity: 7  Location: left breast  Character: \"digging \"  Duration: 6 months, worse in last few days  Effect: cannot raise left arm  Factors: moving in certain position increases pain  Frequency: constant          Duration: for how long has pt been experiencing pain (e.g., 2 days, 1 month, years)  Frequency: how often pain is an issue (e.g., several times per day, once every few days, constant)     FUNCTIONAL ASSESSMENT:     Palliative Performance Scale (PPS):  PPS: 60       PSYCHOSOCIAL/SPIRITUAL SCREENING:     Palliative IDT has assessed this patient for cultural preferences / practices and a referral made as appropriate to needs (Cultural Services, Patient Advocacy, Ethics, etc.)    Any spiritual / Lutheran concerns:  [] Yes /  [x] No   If \"Yes\" to discuss with pastoral care during IDT     Does caregiver feel burdened by caring for their loved one:   [] Yes /  [x] No /  [] No Caregiver Present    If \"Yes\" to discuss with social work during IDT    Anticipatory grief assessment:   [x] Normal  / [] Maladaptive     If \"Maladaptive\" to discuss with social work during IDT    ESAS Anxiety: Anxiety: 0    ESAS Depression: Depression: 0        REVIEW OF SYSTEMS:     Positive and pertinent negative findings in ROS are noted above in HPI. The following systems were [x] reviewed / [] unable to be reviewed as noted in HPI  Other findings are noted below. Systems: constitutional, ears/nose/mouth/throat, respiratory, gastrointestinal, genitourinary, musculoskeletal, integumentary, neurologic, psychiatric, endocrine. Positive findings noted below. Modified ESAS Completed by: provider   Fatigue: 7 Drowsiness: 0   Depression: 0 Pain: 7   Anxiety: 0 Nausea: 0   Anorexia: 3 Dyspnea: 0     Constipation: No              PHYSICAL EXAM:     From RN flowsheet:  Wt Readings from Last 3 Encounters:   03/27/22 145 lb (65.8 kg)   12/13/21 175 lb 8 oz (79.6 kg)   12/13/21 175 lb 7.8 oz (79.6 kg)     Blood pressure 122/66, pulse 80, temperature 98.6 °F (37 °C), resp. rate 16, height 5' 2\" (1.575 m), weight 145 lb (65.8 kg), SpO2 100 %. Pain Scale 1: Numeric (0 - 10)  Pain Intensity 1: 8  Pain Onset 1: acu  Pain Location 1: Breast  Pain Orientation 1: Left  Pain Description 1: Throbbing  Pain Intervention(s) 1: Medication (see MAR)  Last bowel movement, if known:     Constitutional: alert,oriented x3,  pleasant   Eyes: pupils equal, anicteric  ENMT: no nasal discharge, moist mucous membranes  Cardiovascular: regular rhythm, distal pulses intact  Respiratory: breathing not labored, symmetric  Gastrointestinal: soft non-tender, +bowel sounds  Musculoskeletal: dressing on left breast , fungating mass left breast per records, I have not examine, swelling of left arm and hand.   Skin: warm, dry  Neurologic: following commands, moving all extremities  Psychiatric: full affect, no hallucinations  Other:       HISTORY:     Active Problems:    Peau d'orange over breast (3/25/2022)      Severe sepsis ([address] (3/25/2022)      Cellulitis of breast (3/25/2022)      Past Medical History:   Diagnosis Date    Breast cancer ([address]     left side    Menopause       Past Surgical History:   Procedure Laterality Date    HX BREAST BIOPSY Left     x2    HX BREAST LUMPECTOMY Left 2019    LEFT BREAST LUMPECTOMY WITH ULTRASOUND performed by Denice Caicedo MD at [facility] OR    HX HYSTERECTOMY      partial, ovaries remain    IR INSERT TUNL CVC W PORT OVER 5 YEARS  2021    VASCULAR SURGERY PROCEDURE UNLIST      portacath      Family History   Problem Relation Age of Onset    Cancer Father     Cancer Maternal Aunt     Breast Cancer Maternal Aunt         4 paternal aunts    Cancer Maternal Uncle     Cancer Paternal Aunt     Cancer Paternal Uncle     Breast Cancer Maternal Grandmother       History reviewed, no pertinent family history.   Social History     Tobacco Use    Smoking status: Former Smoker     Packs/day: 0.10     Quit date: 2020     Years since quittin.2    Smokeless tobacco: Never Used   Substance Use Topics    Alcohol use: No     Allergies   Allergen Reactions    Codeine Rash     Rash from tylenol #3      Current Facility-Administered Medications   Medication Dose Route Frequency    iron sucrose (VENOFER) injection 200 mg  200 mg IntraVENous DAILY    vancomycin (VANCOCIN) 750 mg in 0.9% sodium chloride 250 mL (VIAL-MATE)  750 mg IntraVENous Q12H    metroNIDAZOLE (METROGEL) 0.75 % gel   Topical DAILY    HYDROmorphone (DILAUDID) injection 2 mg  2 mg IntraVENous Q3H PRN    piperacillin-tazobactam (ZOSYN) 3.375 g in 0.9% sodium chloride (MBP/ADV) 100 mL MBP  3.375 g IntraVENous Q8H    oxyCODONE-acetaminophen (PERCOCET 7.5) 7.5-325 mg per tablet 1 Tablet  1 Tablet Oral Q4H PRN    sodium chloride (NS) flush 5-40 mL  5-40 mL IntraVENous Q8H    sodium chloride (NS) flush 5-40 mL  5-40 mL IntraVENous PRN    acetaminophen (TYLENOL) tablet 650 mg  650 mg Oral Q6H PRN    Or    acetaminophen (TYLENOL) suppository 650 mg  650 mg Rectal Q6H PRN    polyethylene glycol (MIRALAX) packet 17 g  17 g Oral DAILY PRN    ondansetron (ZOFRAN ODT) tablet 4 mg  4 mg Oral Q8H PRN    Or    ondansetron (ZOFRAN) injection 4 mg  4 mg IntraVENous Q6H PRN    enoxaparin (LOVENOX) injection 40 mg  40 mg SubCUTAneous DAILY          LAB AND IMAGING FINDINGS:     Lab Results   Component Value Date/Time    WBC 4.2 03/29/2022 03:20 AM    HGB 8.2 (L) 03/29/2022 03:20 AM    PLATELET 697 (H) 93/90/2865 03:20 AM     Lab Results   Component Value Date/Time    Sodium 141 03/29/2022 03:20 AM    Potassium 3.5 03/29/2022 03:20 AM    Chloride 108 03/29/2022 03:20 AM    CO2 24 03/29/2022 03:20 AM    BUN 7 03/29/2022 03:20 AM    Creatinine 0.71 03/29/2022 03:20 AM    Calcium 8.2 (L) 03/29/2022 03:20 AM    Magnesium 1.9 03/29/2022 03:20 AM    Phosphorus 3.8 03/29/2022 03:20 AM      Lab Results   Component Value Date/Time    Alk. phosphatase 98 03/25/2022 12:57 PM    Protein, total 5.9 (L) 03/25/2022 12:57 PM    Albumin 2.1 (L) 03/25/2022 12:57 PM    Globulin 3.8 03/25/2022 12:57 PM     No results found for: INR, PTMR, PTP, PT1, PT2, APTT, INREXT, INREXT   No results found for: IRON, FE, TIBC, IBCT, PSAT, FERR   No results found for: PH, PCO2, PO2  No components found for: GLPOC   No results found for: CPK, CKMB             Total time: 35 mins  Counseling / coordination time, spent as noted above: 25 mins  > 50% counseling / coordination?: yes     Prolonged service was provided for  []30 min   []75 min in face to face time in the presence of the patient, spent as noted above. Time Start:   Time End:   Note: this can only be billed with 75725 (initial) or 88733 (follow up). If multiple start / stop times, list each separately.

## 2022-03-29 NOTE — PROGRESS NOTES
03/29/22 1541   Vitals   Temp 98.5 °F (36.9 °C)   Temp Source Oral   Pulse (Heart Rate) (!) 111   Resp Rate 18  (Simultaneous filing. User may not have seen previous data.)   O2 Sat (%) 99 %  (Simultaneous filing. User may not have seen previous data.)   Level of Consciousness Alert (0)  (Simultaneous filing. User may not have seen previous data.)   /72   MAP (Monitor) 86   MAP (Calculated) 90   MEWS Score 3       Heart rate elevated.  MD aware

## 2022-03-29 NOTE — PROGRESS NOTES
Hospitalist Progress Note    NAME: Vale Domínguez   :  1960   MRN:  [id number]       Assessment / Plan:  Metastatic breast cancer  Fungating left breast cancer  Cellulitis of the left breast  Severe sepsis  -CTA chest-1.  No evidence of pulmonary embolus. 2.  New moderate left and trace right pleural effusions. 3. Bulky bilateral axillary lymphadenopathy, which is worsened since the prior  study. 4. Extensive skin thickening of both breasts, left worse than right, and  malignant infiltration of both breasts, which has worsened since the prior  study. 5. Extensive mediastinal lymphadenopathy, which has worsened since the prior  study. 5. Evidence of sternal osseous metastatic disease.  -Lactic acid still elevated  -Sepsis indicated-tachycardia, lactic acidosis, cellulitis  -c/w vancomycin and Zosyn. To consider de-escalation to cefazolin due to sensitivites below, will discuss with ID  -Wound care consulted. -Blood culture staph aureus resistant to clindamycin and erythromycin. Repeat on 3/27 NGTD x 2 days  -Wound culture +ve for E. coli, staph aureus and diphtheroids. Sensitivities noted. -Repeat blood cultures until negative, ECHO inadequate to assess for endocarditis. Repeat blood cultures NGTD. Will discuss with ID about need for AN  -Need to remove port? Discuss with ID  -wound culture growing GNR, staph aureus and diphteroids  consult Dr. Mitch Segal, she says patient not a surgical candidate for intevention  -Patient follow-up with [facility] and he currently on clinical trial  -Discussed possible transfer with [facility] who does not see an indication for transfer at this time. Patient unable to undergo clinical trial until her infection resolves. -Consulted palliative care  -Consulted hematology/oncology, appreciate recs  -IVF DC'd due to swelling  -Dilaudid more effective for pain control     Acute on chronic microcytic anemia  -Hemoglobin 8.2  -s/p venofer, give another dose today.  200 mg IV x 3 days  - Trend hgb, transfuse for hgb < 7        Code Status: Full code  Surrogate Decision Maker: Daughter     DVT Prophylaxis: Lovenox  GI Prophylaxis: not indicated     Baseline: Ambulatory at home      25.0 - 29.9 Overweight / Body mass index is 26.52 kg/m². Estimated discharge date: March 31  Barriers: Abx therapy, ID input    Code status: Full  Prophylaxis: Lovenox  Recommended Disposition: Home w/Family     Subjective:     Chief Complaint / Reason for Physician Visit  Patient seen and examined at the bedside. Patient's daughter at bedside as well. Patient continues to feel improved every day. She says her strength is improving and her left upper extremity swelling is also improved. Her pain regimen is effective. Discussed with RN events overnight. Review of Systems:  Symptom Y/N Comments  Symptom Y/N Comments   Fever/Chills    Chest Pain     Poor Appetite    Edema     Cough    Abdominal Pain     Sputum    Joint Pain     SOB/WADSWORTH    Pruritis/Rash     Nausea/vomit    Tolerating PT/OT     Diarrhea    Tolerating Diet     Constipation    Other       Could NOT obtain due to:      Objective:     VITALS:   Last 24hrs VS reviewed since prior progress note. Most recent are:  Patient Vitals for the past 24 hrs:   Temp Pulse Resp BP SpO2   03/29/22 0754 98.5 °F (36.9 °C) (!) 116 20 137/74 97 %   03/29/22 0328 98.3 °F (36.8 °C) (!) 114 17 128/79 97 %   03/28/22 2309 98 °F (36.7 °C) (!) 111 17 133/72 98 %   03/28/22 1944 98.3 °F (36.8 °C) (!) 110 18 136/73 98 %   03/28/22 1501 98.2 °F (36.8 °C) (!) 105 17 132/71 99 %   03/28/22 1058 97.7 °F (36.5 °C) (!) 108 15 122/71 100 %     No intake or output data in the 24 hours ending 03/29/22 0940     I had a face to face encounter and independently examined this patient on 3/29/2022, as outlined below:  PHYSICAL EXAM:  General: WD, WN. Alert, cooperative, no acute distress    EENT:  EOMI. Anicteric sclerae. MMM  Resp:  CTA bilaterally, no wheezing or rales.   No accessory muscle use  CV:  Regular  rhythm,  LUE edema  GI:  Soft, Non distended, Non tender. +Bowel sounds  Neurologic:  Alert and oriented X 3, normal speech,   Psych:   Good insight. Not anxious nor agitated  Skin:  No rashes. No jaundice, fungating mass with exudate encompassing the entire left breast.  Port right chest    Reviewed most current lab test results and cultures  YES  Reviewed most current radiology test results   YES  Review and summation of old records today    NO  Reviewed patient's current orders and MAR    YES  PMH/SH reviewed - no change compared to H&P  ________________________________________________________________________  Care Plan discussed with:    Comments   Patient x    Family  x    RN     Care Manager     Consultant                        Multidiciplinary team rounds were held today with , nursing, pharmacist and clinical coordinator. Patient's plan of care was discussed; medications were reviewed and discharge planning was addressed. ________________________________________________________________________  Total NON critical care TIME:  34   Minutes    Total CRITICAL CARE TIME Spent:   Minutes non procedure based      Comments   >50% of visit spent in counseling and coordination of care     ________________________________________________________________________  Samuel Mccauley MD     Procedures: see electronic medical records for all procedures/Xrays and details which were not copied into this note but were reviewed prior to creation of Plan. LABS:  I reviewed today's most current labs and imaging studies.   Pertinent labs include:  Recent Labs     03/29/22  0320 03/28/22  0310 03/27/22  0152   WBC 4.2 3.5* 2.9*   HGB 8.2* 8.1* 7.9*   HCT 27.4* 27.7* 27.6*   * 402* 425*     Recent Labs     03/29/22  0320 03/28/22  0310 03/27/22  0152    139 141   K 3.5 3.6 3.6    107 108   CO2 24 23 22   GLU 71 73 71   BUN 7 5* 4*   CREA 0.71 0.67 0.53* CA 8.2* 8.1* 8.0*   MG 1.9  --  1.5*   PHOS 3.8  --  3.5       Signed: Abdi Ash MD

## 2022-03-30 NOTE — PROGRESS NOTES
Infectious Disease Progress Note         Interval:  NAEO    Subjective:   Patient seen today. She is reporting feeling well and has no acute complaints. No night sweats chills rigors. Objective:    Vitals:   Reviewed in chart. Physical Exam:  Gen: No apparent distress  HEENT:  Normocephalic, atraumatic, no scleral icterus  CV: HDS  Lungs: room air  Abdomen: soft, non tender, non distended  Genitourinary:  no campos catheter   Skin: left breast mass wound in dressing  Psych: good affect, good eye contact, non tearful  Neuro: alert, oriented to time,  place, and situation, moves all extremities to commands, verbal  Musculoskeletal:  No joint edema, erythema or tenderness noted   Lines: PIVs, right chest port - appears benign.          Labs:  Recent Results (from the past 24 hour(s))   METABOLIC PANEL, BASIC    Collection Time: 03/30/22  5:24 AM   Result Value Ref Range    Sodium 139 136 - 145 mmol/L    Potassium 3.3 (L) 3.5 - 5.1 mmol/L    Chloride 107 97 - 108 mmol/L    CO2 24 21 - 32 mmol/L    Anion gap 8 5 - 15 mmol/L    Glucose 75 65 - 100 mg/dL    BUN 5 (L) 6 - 20 MG/DL    Creatinine 0.65 0.55 - 1.02 MG/DL    BUN/Creatinine ratio 8 (L) 12 - 20      GFR est AA >60 >60 ml/min/1.73m2    GFR est non-AA >60 >60 ml/min/1.73m2    Calcium 8.3 (L) 8.5 - 10.1 MG/DL   MAGNESIUM    Collection Time: 03/30/22  5:24 AM   Result Value Ref Range    Magnesium 1.9 1.6 - 2.4 mg/dL   PHOSPHORUS    Collection Time: 03/30/22  5:24 AM   Result Value Ref Range    Phosphorus 3.4 2.6 - 4.7 MG/DL   CBC WITH AUTOMATED DIFF    Collection Time: 03/30/22  5:24 AM   Result Value Ref Range    WBC 5.1 3.6 - 11.0 K/uL    RBC 3.51 (L) 3.80 - 5.20 M/uL    HGB 8.1 (L) 11.5 - 16.0 g/dL    HCT 28.1 (L) 35.0 - 47.0 %    MCV 80.1 80.0 - 99.0 FL    MCH 23.1 (L) 26.0 - 34.0 PG    MCHC 28.8 (L) 30.0 - 36.5 g/dL    RDW 23.5 (H) 11.5 - 14.5 %    PLATELET 327 (H) 906 - 400 K/uL    MPV 8.3 (L) 8.9 - 12.9 FL    NRBC 0.0 0  WBC    ABSOLUTE NRBC 0.00 0.00 - 0.01 K/uL    NEUTROPHILS 69 32 - 75 %    LYMPHOCYTES 18 12 - 49 %    MONOCYTES 10 5 - 13 %    EOSINOPHILS 2 0 - 7 %    BASOPHILS 0 0 - 1 %    IMMATURE GRANULOCYTES 1 (H) 0.0 - 0.5 %    ABS. NEUTROPHILS 3.5 1.8 - 8.0 K/UL    ABS. LYMPHOCYTES 0.9 0.8 - 3.5 K/UL    ABS. MONOCYTES 0.5 0.0 - 1.0 K/UL    ABS. EOSINOPHILS 0.1 0.0 - 0.4 K/UL    ABS. BASOPHILS 0.0 0.0 - 0.1 K/UL    ABS. IMM. GRANS. 0.0 0.00 - 0.04 K/UL    DF AUTOMATED                 Assessment:    Ms Leatha Davis is a 59-year-old lady with a history of recurrent breast cancer (L) status post neoadjuvant chemotherapy, lumpectomy in 2019 followed by radiation, enrolled in clinical trial at [facility], indwelling port inserted August 2021 with bacteremia     1) S aureus bacteremia and sepsis  2) infiltrating breast wounds  3) recurrent breast cancer status post neoadjuvant chemotherapy and lumpectomy in 2019, followed by radiation, currently enrolled in a trial at [facility]. Patient reports she has not been started on the trial yet because [facility] was awaiting the prior chemotherapy agents to be out of her system before starting the trial regimen. 4) indwelling port  5) leukopenia  6) anemia  7) osseous metastatic disease        Recommendations:  - MSSA growing from port line from BCx from 3/25/22. Warrants removal - d/w Dr. Queenie Bush and Heme/Onc.   - BCx from 3/27 NGSF.   - TTE was inadequate study for vegetations, but no AN currently needed given low-grade bacteremia.  - Wound cultures from left breast growing MSSA, E.coli, diptheroids.   -Continue cefazolin 2gm q8h. - Duration of abx anticipated is 2 weeks from negative BCx. Patient is in agreement to above. Thank you for the opportunity to participate in the care of this patient. Please contact with questions or concerns.       Pete Anderson MD  Infectious Diseases

## 2022-03-30 NOTE — H&P
INTERVENTIONAL RADIOLOGY  Preoperative History and Physical      Patient:  Alfredo Newman  :  1960  Age:  57 y.o. MRN:  [id number]  Today's Date:  3/30/2022      CC / HPI   Alfredo Newman is a 57 y.o. female with a history of recurrent breast cancer who presents for portacath removal due to port site bacteremia. PAST MEDICAL HISTORY  Past Medical History:   Diagnosis Date    Breast cancer ([address]     left side    Menopause        PAST SURGICAL HISTORY  Past Surgical History:   Procedure Laterality Date    HX BREAST BIOPSY Left     x2    HX BREAST LUMPECTOMY Left 2019    LEFT BREAST LUMPECTOMY WITH ULTRASOUND performed by Denice Caicedo MD at [facility] AMBULATORY OR    HX HYSTERECTOMY      partial, ovaries remain    IR INSERT TUNL CVC W PORT OVER 5 YEARS  2021    VASCULAR SURGERY PROCEDURE UNLIST      portacath       SOCIAL HISTORY  Social History     Socioeconomic History    Marital status: SINGLE     Spouse name: Not on file    Number of children: Not on file    Years of education: Not on file    Highest education level: Not on file   Occupational History    Not on file   Tobacco Use    Smoking status: Former Smoker     Packs/day: 0.10     Quit date: 2020     Years since quittin.2    Smokeless tobacco: Never Used   Substance and Sexual Activity    Alcohol use: No    Drug use: No    Sexual activity: Not Currently   Other Topics Concern    Not on file   Social History Narrative    Not on file     Social Determinants of Health     Financial Resource Strain:     Difficulty of Paying Living Expenses: Not on file   Food Insecurity:     Worried About Running Out of Food in the Last Year: Not on file    Jose of Food in the Last Year: Not on file   Transportation Needs:     Lack of Transportation (Medical): Not on file    Lack of Transportation (Non-Medical):  Not on file   Physical Activity:     Days of Exercise per Week: Not on file    Minutes of Exercise per Session: Not on file   Stress:     Feeling of Stress : Not on file   Social Connections:     Frequency of Communication with Friends and Family: Not on file    Frequency of Social Gatherings with Friends and Family: Not on file    Attends Orthodox Services: Not on file    Active Member of Clubs or Organizations: Not on file    Attends Club or Organization Meetings: Not on file    Marital Status: Not on file   Intimate Partner Violence:     Fear of Current or Ex-Partner: Not on file    Emotionally Abused: Not on file    Physically Abused: Not on file    Sexually Abused: Not on file   Housing Stability:     Unable to Pay for Housing in the Last Year: Not on file    Number of [place] in the Last Year: Not on file    Unstable Housing in the Last Year: Not on file       FAMILY HISTORY  Family History   Problem Relation Age of Onset    Cancer Father     Cancer Maternal Aunt     Breast Cancer Maternal Aunt         4 paternal aunts    Cancer Maternal Uncle     Cancer Paternal Aunt     Cancer Paternal Uncle     Breast Cancer Maternal Grandmother        CURRENT MEDICATIONS  Current Facility-Administered Medications   Medication Dose Route Frequency Provider Last Rate Last Admin    HYDROmorphone (DILAUDID) injection 2 mg  2 mg IntraVENous Q4H PRN Crystal Bridges MD   2 mg at 03/30/22 1341    oxyCODONE ER (OxyCONTIN) tablet 10 mg  10 mg Oral Q12H Crystal Bridges MD       [facility] senna-docusate (PERICOLACE) 8.6-50 mg per tablet 2 Tablet  2 Tablet Oral DAILY Crystal Bridges MD        iron sucrose (VENOFER) injection 200 mg  200 mg IntraVENous DAILY Desiree Romberg, MD   200 mg at 03/30/22 0907    ceFAZolin (ANCEF) 2 g in sterile water (preservative free) 20 mL IV syringe  2 g IntraVENous Q8H Brant Riley  mL/hr at 03/30/22 1342 2 g at 03/30/22 1342    metroNIDAZOLE (METROGEL) 0.75 % gel   Topical DAILY Desiree Romberg, MD   Given at 03/29/22 0817    oxyCODONE-acetaminophen (PERCOCET 7.5) 7.5-325 mg per tablet 1 Tablet  1 Tablet Oral Q4H PRN Talisha Erickson MD   1 Tablet at 03/30/22 0000    sodium chloride (NS) flush 5-40 mL  5-40 mL IntraVENous Q8H Talisha Erickson MD   10 mL at 03/29/22 2111    sodium chloride (NS) flush 5-40 mL  5-40 mL IntraVENous PRN Talisha Erickson MD   10 mL at 03/27/22 1744    acetaminophen (TYLENOL) tablet 650 mg  650 mg Oral Q6H PRN Talisha Erickson MD        Or   Ai Galan acetaminophen (TYLENOL) suppository 650 mg  650 mg Rectal Q6H PRN Talisha Erickson MD        polyethylene glycol (MIRALAX) packet 17 g  17 g Oral DAILY PRN Talisha Erickson MD   17 g at 03/26/22 1237    ondansetron (ZOFRAN ODT) tablet 4 mg  4 mg Oral Q8H PRN Talisha Erickson MD        Or    ondansetron (ZOFRAN) injection 4 mg  4 mg IntraVENous Q6H PRN Talisha Erickson MD        enoxaparin (LOVENOX) injection 40 mg  40 mg SubCUTAneous DAILY Talisha Erickson MD   40 mg at 03/30/22 0214       ALLERGIES  Allergies   Allergen Reactions    Codeine Rash     Rash from tylenol #3       DIAGNOSTIC STUDIES   IMAGING STUDIES  N/A    LABS  Lab Results   Component Value Date/Time    WBC 5.1 03/30/2022 05:24 AM    HGB 8.1 (L) 03/30/2022 05:24 AM    HCT 28.1 (L) 03/30/2022 05:24 AM    PLATELET 533 (H) 44/74/3794 05:24 AM    MCV 80.1 03/30/2022 05:24 AM     Lab Results   Component Value Date/Time    Sodium 139 03/30/2022 05:24 AM    Potassium 3.3 (L) 03/30/2022 05:24 AM    Chloride 107 03/30/2022 05:24 AM    CO2 24 03/30/2022 05:24 AM    Anion gap 8 03/30/2022 05:24 AM    Glucose 75 03/30/2022 05:24 AM    BUN 5 (L) 03/30/2022 05:24 AM    Creatinine 0.65 03/30/2022 05:24 AM    BUN/Creatinine ratio 8 (L) 03/30/2022 05:24 AM    GFR est AA >60 03/30/2022 05:24 AM    GFR est non-AA >60 03/30/2022 05:24 AM    Calcium 8.3 (L) 03/30/2022 05:24 AM     No results found for: INR, PTMR, PTP, PT1, PT2, INREXT    PHYSICAL EXAM   /74 (BP 1 Location: Right upper arm, BP Patient Position: Supine)   Pulse (!) 103   Temp 98.7 °F (37.1 °C)   Resp 18   Ht 5' 2\" (1.575 m)   Wt 65.8 kg (145 lb)   SpO2 100%   BMI 26.52 kg/m²   General:  NAD  Heart:  RRR  Lungs:  NWOB  Neurological:  AAOX3    PLAN   Procedure to be performed:  portacath removal  Plan for sedation:  Local anesthesia  Post procedure plan:  observation per protocol  Informed consent:  risks, benefits, and alternatives reviewed with the patient / family who agree to proceed  Code status:  Full Code      Franco Lynch, [address] [facility], [facility].

## 2022-03-30 NOTE — PROGRESS NOTES
Rainer Wheeler PA-C at bedside to obtain consent for port removal. Procedure explained with opportunity to ask questions. Patient states understanding of procedure prior to signing consent.

## 2022-03-30 NOTE — CONSULTS
Palliative Medicine Consult  Rl: [phone] ([phone])    Patient Name: Raúl Sorenson  YOB: 1960    Date of Initial Consult: 3/28/22  Reason for Consult: end stage disease  Requesting Provider: Ashlyn Cortez MD  Primary Care Physician: None     SUMMARY:   Raúl Sorenson is a 57 y.o. Female with recurrent  meatstatic  breast cancer, s/p chemo and lumpectomy in 2019, followed by radiation , enrolled in clinical trial at [facility],  extensive dermal metastasis and suspected sternal metastasis, who was admitted on 3/25/2022 from home with a diagnosis of bresat pain , fungating  left breast mass Staph aureus bacteremia and sepsis, Ct chest shows progresssion of mediastinal lymphadenoppthy and axillary lymphadenopathy and infiltrating breast wounds  ID is following   Current medical issues leading to Palliative Medicine involvement include: care decision for end stage disease, patient follow up with out patient palliative care for pain and symptome management and support. Social hx: she has two daughters, she is former smoker quit 2020, used to work for [employer], lives alone, her daughter Idalia Tolentino lives close, she stays with her few days a week, other daughter is estranged . At base line patient is independent in function . Home pain medication ; Oxycodone ER(XTAMPZA) 9 MG BID  Roxicodone 20 mg every 4 hrs prn   PALLIATIVE DIAGNOSES:   1. Cancer related pain   2. Metastatic breast cancer   3. Goals of care        PLAN:   1. Follow up visit, daughter Idalia Tolentino, at bed side . 2. Pain in left breast :   · on dilaudid 2 mg I/V every 3 hrs prn for severe pain , using 4-5 doses daily average. · On percocet 7.5/325 I tab every 4 hrs as needed for moderate pain, using 1-2 doses, average daily. · Councelled her on placing her back on Oxycontin 10 mg bid equal to home dose of Xtampza 9 mg bid, she agrees today( previously reluctant).   · She asked me if I can setup a virtual follow up appt with out patient team, I have coordinated with out patient palliative care coordinator Deya Pickens for out patient follow up appointment. 3. Goals of care : she understand her disease  has progressed, today we talked about it is unlikely she will recover to get stronger for any chemotherapy trial, I  suggested hospice, coordinated with brent Bradley from oncology, Dr Artem Fofana will follow up with patient this afternoon for further conversation/his input. 4. Advance directives: found in chart, refer to Palliative care team ( LCSW ) Jeniffer Vasquez), she followed up with patient, provided her two copies of AMD, patient wants to discuss her AMD, with her daughter Anthony Palmer. 5. Bowel regimen: she is currently on miralx prn, I will add senokot daily. 6. We will continue to support . 7. Initial consult note routed to primary continuity provider and/or primary health care team members  8. Communicated plan of care with: Palliative IDT, [address] Team     GOALS OF CARE / TREATMENT PREFERENCES:     GOALS OF CARE:  Patient/Health Care Proxy Stated Goals: Prolong life    TREATMENT PREFERENCES:   Code Status: Full Code    Patient and family's personal goals include: full restorative care     Important upcoming milestones or family events:      The patient identifies the following as important for living well: quality of life      Advance Care Planning:  [x] The [facility] Interdisciplinary Team has updated the ACP Navigator with [address] and Patient Capacity      Primary Decision MakerRodevelia Bellamy - [phone]  Advance Care Planning 1/27/2022   Patient's Healthcare Decision Maker is: Named in scanned ACP document   Confirm Advance Directive Yes, on file   Patient Would Like to Complete Advance Directive -       Medical Interventions: Full interventions       Other:    As far as possible, the palliative care team has discussed with patient / health care proxy about goals of care / treatment preferences for patient. HISTORY:     History obtained from: chart, patient and bed side RN    CHIEF COMPLAINT: \"feeling better\"    HPI/SUBJECTIVE:    The patient is:   [] Verbal and participatory  Admitted for worsening pain in left breast x few days, denies any nausea or vomiting, bowels are moving.    3/29/22: patient notes pain is better overall, average 2/10, now 7/10 after medication is wearing off, she slept \" good \" last night, bowels are moving every day, denies any nausea or vomiting .      3/30/22l: she slept good, pain is better , denies any nausea or vomiting . She is in good spirits.     Clinical Pain Assessment (nonverbal scale for severity on nonverbal patients):   Clinical Pain Assessment  Severity: 3  Location: left breast  Character: \"digging \"  Duration: 6 months, worse in last few days  Effect: cannot raise left arm  Factors: moving in certain position increases pain  Frequency: constant          Duration: for how long has pt been experiencing pain (e.g., 2 days, 1 month, years)  Frequency: how often pain is an issue (e.g., several times per day, once every few days, constant)     FUNCTIONAL ASSESSMENT:     Palliative Performance Scale (PPS):  PPS: 60       PSYCHOSOCIAL/SPIRITUAL SCREENING:     Palliative IDT has assessed this patient for cultural preferences / practices and a referral made as appropriate to needs (Cultural Services, Patient Advocacy, Ethics, etc.)    Any spiritual / Advent concerns:  [] Yes /  [x] No   If \"Yes\" to discuss with pastoral care during IDT     Does caregiver feel burdened by caring for their loved one:   [] Yes /  [x] No /  [] No Caregiver Present    If \"Yes\" to discuss with social work during IDT    Anticipatory grief assessment:   [x] Normal  / [] Maladaptive     If \"Maladaptive\" to discuss with social work during IDT    ESAS Anxiety: Anxiety: 0    ESAS Depression: Depression: 0        REVIEW OF SYSTEMS:     Positive and pertinent negative findings in ROS are noted above in HPI. The following systems were [x] reviewed / [] unable to be reviewed as noted in HPI  Other findings are noted below. Systems: constitutional, ears/nose/mouth/throat, respiratory, gastrointestinal, genitourinary, musculoskeletal, integumentary, neurologic, psychiatric, endocrine. Positive findings noted below. Modified ESAS Completed by: provider   Fatigue: 6 Drowsiness: 0   Depression: 0 Pain: 3   Anxiety: 0 Nausea: 0   Anorexia: 3 Dyspnea: 0     Constipation: No              PHYSICAL EXAM:     From RN flowsheet:  Wt Readings from Last 3 Encounters:   03/27/22 145 lb (65.8 kg)   12/13/21 175 lb 8 oz (79.6 kg)   12/13/21 175 lb 7.8 oz (79.6 kg)     Blood pressure 129/75, pulse (!) 111, temperature 98.7 °F (37.1 °C), resp. rate 20, height 5' 2\" (1.575 m), weight 145 lb (65.8 kg), SpO2 99 %. Pain Scale 1: Numeric (0 - 10)  Pain Intensity 1: 3  Pain Onset 1: acute  Pain Location 1: Breast  Pain Orientation 1: Anterior  Pain Description 1: Aching  Pain Intervention(s) 1: Medication (see MAR)  Last bowel movement, if known:     Constitutional: alert,oriented x3,  pleasant   Eyes: pupils equal, anicteric  ENMT: no nasal discharge, moist mucous membranes  Cardiovascular: regular rhythm, distal pulses intact  Respiratory: breathing not labored, symmetric  Gastrointestinal: soft non-tender, +bowel sounds  Musculoskeletal: dressing on left breast , fungating mass left breast per records, I have not examine, swelling of left arm and hand.   Skin: warm, dry  Neurologic: following commands, moving all extremities  Psychiatric: full affect, no hallucinations  Other:       HISTORY:     Active Problems:    Peau d'orange over breast (3/25/2022)      Severe sepsis ([address] (3/25/2022)      Cellulitis of breast (3/25/2022)      Past Medical History:   Diagnosis Date    Breast cancer ([address]     left side    Menopause       Past Surgical History:   Procedure Laterality Date    HX BREAST BIOPSY Left     x2    HX BREAST LUMPECTOMY Left 2019    LEFT BREAST LUMPECTOMY WITH ULTRASOUND [facility] Nu[facility]    HX HYSTERECTOMY      partial, ovaries remain    IR INSERT TUNL CVC W PORT OVER 5 YEARS  2021    VASCULAR SURGERY PROCEDURE UNLIST      portacath      Family History   Problem Relation Age of Onset    Cancer Father     Cancer Maternal Aunt     Breast Cancer Maternal Aunt         4 paternal aunts    Cancer Maternal Uncle     Cancer Paternal Aunt     Cancer Paternal Uncle     Breast Cancer Maternal Grandmother       History reviewed, no pertinent family history.   Social History     Tobacco Use    Smoking status: Former Smoker     Packs/day: 0.10     Quit date: 2020     Years since quittin.2    Smokeless tobacco: Never Used   Substance Use Topics    Alcohol use: No     Allergies   Allergen Reactions    Codeine Rash     Rash from tylenol #3      Current Facility-Administered Medications   Medication Dose Route Frequency    heparinized saline 2 units/mL infusion 800 Units  400 mL Irrigation ONCE    lidocaine-EPINEPHrine (XYLOCAINE) 1 %-1:100,000 injection 300 mg  30 mL SubCUTAneous ONCE    lidocaine (XYLOCAINE) 20 mg/mL (2 %) injection 200 mg  10 mL SubCUTAneous ONCE    iron sucrose (VENOFER) injection 200 mg  200 mg IntraVENous DAILY    ceFAZolin (ANCEF) 2 g in sterile water (preservative free) 20 mL IV syringe  2 g IntraVENous Q8H    metroNIDAZOLE (METROGEL) 0.75 % gel   Topical DAILY    HYDROmorphone (DILAUDID) injection 2 mg  2 mg IntraVENous Q3H PRN    oxyCODONE-acetaminophen (PERCOCET 7.5) 7.5-325 mg per tablet 1 Tablet  1 Tablet Oral Q4H PRN    sodium chloride (NS) flush 5-40 mL  5-40 mL IntraVENous Q8H    sodium chloride (NS) flush 5-40 mL  5-40 mL IntraVENous PRN    acetaminophen (TYLENOL) tablet 650 mg  650 mg Oral Q6H PRN    Or    acetaminophen (TYLENOL) suppository 650 mg  650 mg Rectal Q6H PRN    polyethylene glycol (MIRALAX) packet 17 g 17 g Oral DAILY PRN    ondansetron (ZOFRAN ODT) tablet 4 mg  4 mg Oral Q8H PRN    Or    ondansetron (ZOFRAN) injection 4 mg  4 mg IntraVENous Q6H PRN    enoxaparin (LOVENOX) injection 40 mg  40 mg SubCUTAneous DAILY          LAB AND IMAGING FINDINGS:     Lab Results   Component Value Date/Time    WBC 5.1 03/30/2022 05:24 AM    HGB 8.1 (L) 03/30/2022 05:24 AM    PLATELET 581 (H) 98/58/2361 05:24 AM     Lab Results   Component Value Date/Time    Sodium 139 03/30/2022 05:24 AM    Potassium 3.3 (L) 03/30/2022 05:24 AM    Chloride 107 03/30/2022 05:24 AM    CO2 24 03/30/2022 05:24 AM    BUN 5 (L) 03/30/2022 05:24 AM    Creatinine 0.65 03/30/2022 05:24 AM    Calcium 8.3 (L) 03/30/2022 05:24 AM    Magnesium 1.9 03/30/2022 05:24 AM    Phosphorus 3.4 03/30/2022 05:24 AM      Lab Results   Component Value Date/Time    Alk. phosphatase 98 03/25/2022 12:57 PM    Protein, total 5.9 (L) 03/25/2022 12:57 PM    Albumin 2.1 (L) 03/25/2022 12:57 PM    Globulin 3.8 03/25/2022 12:57 PM     No results found for: INR, PTMR, PTP, PT1, PT2, APTT, INREXT, INREXT   No results found for: IRON, FE, TIBC, IBCT, PSAT, FERR   No results found for: PH, PCO2, PO2  No components found for: GLPOC   No results found for: CPK, CKMB             Total time: 35 mins  Counseling / coordination time, spent as noted above: 25 mins  > 50% counseling / coordination?: yes     Prolonged service was provided for  []30 min   []75 min in face to face time in the presence of the patient, spent as noted above. Time Start:   Time End:   Note: this can only be billed with 26003 (initial) or 71729 (follow up). If multiple start / stop times, list each separately.

## 2022-03-30 NOTE — PROGRESS NOTES
Hospitalist Progress Note    NAME: Kylie Yoo   :  1960   MRN:  [id number]       Assessment / Plan:  Metastatic breast cancer  Fungating left breast cancer  Cellulitis of the left breast  Severe sepsis  -CTA chest-1.  No evidence of pulmonary embolus. 2.  New moderate left and trace right pleural effusions. 3. Bulky bilateral axillary lymphadenopathy, which is worsened since the prior  study. 4. Extensive skin thickening of both breasts, left worse than right, and  malignant infiltration of both breasts, which has worsened since the prior  study. 5. Extensive mediastinal lymphadenopathy, which has worsened since the prior  study. 5. Evidence of sternal osseous metastatic disease.  -Lactic acid still elevated  -Sepsis indicated-tachycardia, lactic acidosis, cellulitis  -c/w vancomycin and Zosyn. To consider de-escalation to cefazolin due to sensitivites below, will discuss with ID  -Wound care consulted. -Blood culture staph aureus resistant to clindamycin and erythromycin. Repeat on 3/27 NGTD x 2 days  -Wound culture +ve for E. coli, staph aureus and diphtheroids. Sensitivities noted. -Repeat blood cultures until negative, ECHO inadequate to assess for endocarditis. Repeat blood cultures NGTD. Will discuss with ID about need for AN  -Plan to remove Chemo-Port as per ID recommendation  -wound culture growing GNR, staph aureus and diphteroids  consult Dr. Ney Donnelly, she says patient not a surgical candidate for intevention  -Patient follow-up with [facility] and he currently on clinical trial  -Discussed possible transfer with [facility] who does not see an indication for transfer at this time. Patient unable to undergo clinical trial until her infection resolves.   -Consulted palliative care  -Consulted hematology/oncology, appreciate recs  -IVF DC'd due to swelling  -Dilaudid more effective for pain control       Acute on chronic microcytic anemia  -Hemoglobin 8.1  -s/p venofer, give another dose today. 200 mg IV x 3 days  - Trend hgb, transfuse for hgb < 7        Code Status: Full code  Surrogate Decision Maker: Daughter     DVT Prophylaxis: Lovenox  GI Prophylaxis: not indicated     Baseline: Ambulatory at home      25.0 - 29.9 Overweight / Body mass index is 26.52 kg/m². Estimated discharge date: March 31  Barriers: Abx therapy, ID input    Code status: Full  Prophylaxis: Lovenox  Recommended Disposition: Home w/Family     Subjective:     Chief Complaint / Reason for Physician Visit  Patient seen and examined at the bedside , plan to remove Chemo-Port as per ID recommendation  Review of Systems:  Symptom Y/N Comments  Symptom Y/N Comments   Fever/Chills n   Chest Pain n    Poor Appetite    Edema     Cough    Abdominal Pain     Sputum    Joint Pain     SOB/WADSWORTH n   Pruritis/Rash     Nausea/vomit    Tolerating PT/OT     Diarrhea    Tolerating Diet y    Constipation    Other       Could NOT obtain due to:      Objective:     VITALS:   Last 24hrs VS reviewed since prior progress note. Most recent are:  Patient Vitals for the past 24 hrs:   Temp Pulse Resp BP SpO2   03/30/22 0731 98.2 °F (36.8 °C) (!) 109 20 126/72 99 %   03/30/22 0526 98.9 °F (37.2 °C) (!) 117 18 127/71 98 %   03/30/22 0007 98.3 °F (36.8 °C) (!) 114 19 138/73 99 %   03/29/22 1937 98.2 °F (36.8 °C) (!) 103 16 136/75 100 %   03/29/22 1541 98.5 °F (36.9 °C) (!) 111 18 125/72 99 %   03/29/22 1220 98.6 °F (37 °C) 80 16 122/66 100 %   03/29/22 1056 98.4 °F (36.9 °C) (!) 114 23 117/69 100 %     No intake or output data in the 24 hours ending 03/30/22 0914     I had a face to face encounter and independently examined this patient on 3/30/2022, as outlined below:  PHYSICAL EXAM:  General: WD, WN. Alert, cooperative, no acute distress    EENT:  EOMI. Anicteric sclerae. MMM  Resp:  CTA bilaterally, no wheezing or rales. No accessory muscle use  CV:  Regular  rhythm,  LUE edema  GI:  Soft, Non distended, Non tender.   +Bowel sounds  Neurologic:  Alert and oriented X 3, normal speech,   Psych:   Good insight. Not anxious nor agitated  Skin:  No rashes. No jaundice, fungating mass with exudate encompassing the entire left breast.  Port right chest    Reviewed most current lab test results and cultures  YES  Reviewed most current radiology test results   YES  Review and summation of old records today    NO  Reviewed patient's current orders and MAR    YES  PMH/SH reviewed - no change compared to H&P  ________________________________________________________________________  Care Plan discussed with:    Comments   Patient x    Family  x    RN     Care Manager     Consultant                        Multidiciplinary team rounds were held today with , nursing, pharmacist and clinical coordinator. Patient's plan of care was discussed; medications were reviewed and discharge planning was addressed. ________________________________________________________________________  Total NON critical care TIME:  34   Minutes    Total CRITICAL CARE TIME Spent:   Minutes non procedure based      Comments   >50% of visit spent in counseling and coordination of care     ________________________________________________________________________  Josh Myers MD     Procedures: see electronic medical records for all procedures/Xrays and details which were not copied into this note but were reviewed prior to creation of Plan. LABS:  I reviewed today's most current labs and imaging studies. Pertinent labs include:  Recent Labs     03/30/22  0524 03/29/22  0320 03/28/22  0310   WBC 5.1 4.2 3.5*   HGB 8.1* 8.2* 8.1*   HCT 28.1* 27.4* 27.7*   * 432* 402*     Recent Labs     03/30/22  0524 03/29/22  0320 03/28/22  0310    141 139   K 3.3* 3.5 3.6    108 107   CO2 24 24 23   GLU 75 71 73   BUN 5* 7 5*   CREA 0.65 0.71 0.67   CA 8.3* 8.2* 8.1*   MG 1.9 1.9  --    PHOS 3.4 3.8  --        Signed:  Josh Myers MD

## 2022-03-30 NOTE — PROGRESS NOTES
0700-Bedside and Verbal shift change report given to LEONELA Sage (oncoming nurse) by Aria Babb (offgoing nurse). Report included the following information SBAR, Kardex, ED Summary, Intake/Output, MAR and Cardiac Rhythm NSR/Sinus tach.      1900- Report given to Thea Denver, [facility]

## 2022-03-30 NOTE — PROGRESS NOTES
Name of procedure: port removal - Reema Bonner PA-C    Vital Signs: stable    Samples sent to lab: cath tip to lab    Any complications related to procedure: none    Post Procedure Care Needed/order sets placed in connect care: none    TRANSFER - OUT REPORT:    Verbal report given to [employer] (name) on Juan Carlos Asotin  being transferred to PCU Rm 2265 (unit) for routine progression of care       Report consisted of patients Situation, Background, Assessment and   Recommendations(SBAR). Information from the following report(s) SBAR and Procedure Summary was reviewed with the receiving nurse. Opportunity for questions and clarification was provided.       Patient transported with:  Consent and patient labels

## 2022-03-30 NOTE — PROGRESS NOTES
End of Shift Note    Bedside shift change report given to Keegan Aquino (oncoming nurse) by Anupama Black (offgoing nurse). Report included the following information SBAR, Kardex, ED Summary, Intake/Output, MAR and Recent Results    Shift worked:  [id number]     Shift summary and any significant changes:     Nothing overnight. Pt morning labs show that K is 3.3 this morning let oncoming RN know in report. Concerns for physician to address:  Port removal and culture today      Zone phone for oncoming shift:          Activity:  Activity Level: Up with Assistance  Number times ambulated in hallways past shift: 0  Number of times OOB to chair past shift: 1    Cardiac:   Cardiac Monitoring: Yes      Cardiac Rhythm: Sinus Tachy    Access:   Current line(s): port     Genitourinary:   Urinary status: voiding    Respiratory:   O2 Device: None (Room air)  Chronic home O2 use?: NO  Incentive spirometer at bedside: NO       GI:  Last Bowel Movement Date: 03/28/22  Current diet:  ADULT DIET Regular; 4 carb choices (60 gm/meal); Low Fat/Low Chol/High Fiber/2 gm Na  DIET ONE TIME MESSAGE  ADULT ORAL NUTRITION SUPPLEMENT Breakfast, AM Snack, Lunch, HS Snack; Standard High Calorie/High Protein  ADULT ORAL NUTRITION SUPPLEMENT Breakfast, Lunch, Dinner; Standard High Calorie/High Protein  Passing flatus: YES  Tolerating current diet: YES       Pain Management:   Patient states pain is manageable on current regimen: YES    Skin:  Geovanny Score: 21  Interventions: increase time out of bed and PT/OT consult    Patient Safety:  Fall Score:  Total Score: 1  Interventions: bed/chair alarm, gripper socks, pt to call before getting OOB and stay with me (per policy)       Length of Stay:  Expected LOS: 3d 12h  Actual LOS: [address]

## 2022-03-31 NOTE — PROGRESS NOTES
Problem: Falls - Risk of  Goal: *Absence of Falls  Description: Document Tin Rapp Fall Risk and appropriate interventions in the flowsheet.   Outcome: Progressing Towards Goal  Note: Fall Risk Interventions:  Mobility Interventions: Bed/chair exit alarm,Communicate number of staff needed for ambulation/transfer,Patient to call before getting OOB         Medication Interventions: Assess postural VS orthostatic hypotension,Bed/chair exit alarm,Evaluate medications/consider consulting pharmacy,Patient to call before getting OOB    Elimination Interventions: Bed/chair exit alarm,Call light in reach,Urinal in reach

## 2022-03-31 NOTE — PROGRESS NOTES
Palliative Medicine  [facility]: [phone] ([phone])  [facility]: [phone])    Patient seen alone, her daughter left this morning (\"I asked her to leave, she has a lot on her plate\"). Patient initially wanted to have a conversation with this writer and her daughter Noe Bolaños regarding her AMD, but now she does not wish to discuss this; she feels that she will talk to her daughter when the \"time is right\". She is protective of her daughter although she notes that they \"talk daily\" and that they are emotionally close. Daughter's father (patient and he never )  of cancer in . She is now forced to cope with her mother's illness. Patient's goal and hope is to follow up with [facility] for a possible clinical trial. She is waiting to hear back from oncology to see if she is still a candidate. Patient able to share that she does feel \"down\" at times, but tries to stay positive. We talked about balancing the reality of her situation along with remaining hopeful and being positive. Patient understands this. Patient is coping well; she was able to share that she is hoping for the best but knows she is facing a daunting future. She is very conscious of not placing more of a burden on her daughter than she has to. (Patient shared today that Sally's 11 yo son has Down's Syndrome and that Noe Bolaños has a very supportive fiance). Watered patient's plant for her, she was very thankful. Will follow up as needed for support.

## 2022-03-31 NOTE — PROGRESS NOTES
Received notification from bedside RN about patient with regards to: K 3.1  VS: /72, , RR 16, O2 sat 99% on NC 2 L    Intervention given: Kdur 40 meq PO x 1 dose, has AM BMP ordered

## 2022-03-31 NOTE — HOSPICE
[facility]  [phone]     Patient Name: Clarissa Brooks  YOB: 1960  Age: 57 y.o. [facility] RN Note:  Hospice consult received, reviewing chart. Will follow up with Unit Nurse and Care Manager to discuss plan of care, patient status and discharge disposition. Thank you for the opportunity to be of service to this patient.     Marylin Morgan RN, [place] [address] Nurse Liaison  [phone] [place]  [phone] Office  Available on Perfect Serve

## 2022-03-31 NOTE — ROUTINE PROCESS
Bedside and Verbal shift change report given to [address]  (oncoming nurse) by Mallika Patel RN (offgoing nurse). Report included the following information SBAR, Kardex, Intake/Output, MAR, Accordion, Cardiac Rhythm Sinus Tachy, Alarm Parameters  and Quality Measures.

## 2022-03-31 NOTE — PROGRESS NOTES
Hospitalist Progress Note    NAME: Leatha Scales   :  1960   MRN:  [id number]       Assessment / Plan:  Metastatic breast cancer  Fungating left breast cancer  Cellulitis of the left breast  Severe sepsis  -CTA chest-1.  No evidence of pulmonary embolus. 2.  New moderate left and trace right pleural effusions. 3. Bulky bilateral axillary lymphadenopathy, which is worsened since the prior  study. 4. Extensive skin thickening of both breasts, left worse than right, and  malignant infiltration of both breasts, which has worsened since the prior  study. 5. Extensive mediastinal lymphadenopathy, which has worsened since the prior  study. 5. Evidence of sternal osseous metastatic disease.  -Lactic acid still elevated  -Sepsis indicated-tachycardia, lactic acidosis, cellulitis  -c/w  cefazolin   -Wound care consulted. -Blood culture staph aureus resistant to clindamycin and erythromycin. Repeat on 3/27 NGTD x 2 days  -Wound culture +ve for E. coli, staph aureus and diphtheroids. Sensitivities noted. -Repeat blood cultures until negative, ECHO inadequate to assess for endocarditis. Repeat blood cultures NGTD. Will discuss with ID about need for AN  -Plan to remove Chemo-Port as per ID recommendation  -wound culture growing GNR, staph aureus and diphteroids  consult Dr. Jia Lewis, she says patient not a surgical candidate for intevention  -Patient agree for hospice, inpatient hospice consult was placed  -Consulted palliative care  -Consulted hematology/oncology, appreciate recs  -Chemo-Port was removed yesterday  -Dilaudid more effective for pain control  - -Continue cefazolin 2gm q8h. - Duration of abx anticipated is 2 weeks from negative BCx  -Follow-up with hospice  recommendation patient may need midline insertion to be discharged with IV antibiotic     Acute on chronic microcytic anemia  -Hemoglobin 8.1  -s/p venofer, give another dose today.  200 mg IV x 3 days  - Trend hgb, transfuse for hgb < 7        Code Status: Full code  Surrogate Decision Maker: Daughter     DVT Prophylaxis: Lovenox  GI Prophylaxis: not indicated     Baseline: Ambulatory at home      25.0 - 29.9 Overweight / Body mass index is 33.49 kg/m². Estimated discharge date: March 31  Barriers: Abx therapy, ID input    Code status: Full  Prophylaxis: Lovenox  Recommended Disposition: Home w/Family     Subjective:     Chief Complaint / Reason for Physician Visit  Patient seen and examined at the bedside , waiting for hospice evaluation, possible discharge tomorrow   Review of Systems:  Symptom Y/N Comments  Symptom Y/N Comments   Fever/Chills n   Chest Pain n    Poor Appetite    Edema     Cough    Abdominal Pain     Sputum    Joint Pain     SOB/WADSWORTH n   Pruritis/Rash     Nausea/vomit    Tolerating PT/OT     Diarrhea    Tolerating Diet y    Constipation    Other       Could NOT obtain due to:      Objective:     VITALS:   Last 24hrs VS reviewed since prior progress note. Most recent are:  Patient Vitals for the past 24 hrs:   Temp Pulse Resp BP SpO2   03/31/22 1046 98 °F (36.7 °C) (!) 110 18 128/70 100 %   03/31/22 0900 -- (!) 118 -- 139/78 --   03/31/22 0800 -- (!) 107 -- -- 99 %   03/31/22 0700 98.2 °F (36.8 °C) (!) 112 19 122/68 99 %   03/31/22 0322 98 °F (36.7 °C) 100 16 (!) 148/72 99 %   03/30/22 2248 98.6 °F (37 °C) (!) 111 18 (!) 124/53 100 %   03/30/22 1917 98.4 °F (36.9 °C) (!) 116 20 137/69 99 %   03/30/22 1640 98.2 °F (36.8 °C) (!) 103 20 (!) 152/74 99 %   03/30/22 1555 -- (!) 103 18 132/74 100 %   03/30/22 1540 -- (!) 117 18 (!) 141/83 100 %   03/30/22 1508 -- (!) 116 18 139/74 97 %     No intake or output data in the 24 hours ending 03/31/22 1507     I had a face to face encounter and independently examined this patient on 3/31/2022, as outlined below:  PHYSICAL EXAM:  General: WD, WN. Alert, cooperative, no acute distress    EENT:  EOMI. Anicteric sclerae. MMM  Resp:  CTA bilaterally, no wheezing or rales.   No accessory muscle use  CV:  Regular  rhythm,  LUE edema  GI:  Soft, Non distended, Non tender. +Bowel sounds  Neurologic:  Alert and oriented X 3, normal speech,   Psych:   Good insight. Not anxious nor agitated  Skin:  No rashes. No jaundice, fungating mass with exudate encompassing the entire left breast.  Port right chest    Reviewed most current lab test results and cultures  YES  Reviewed most current radiology test results   YES  Review and summation of old records today    NO  Reviewed patient's current orders and MAR    YES  PMH/SH reviewed - no change compared to H&P  ________________________________________________________________________  Care Plan discussed with:    Comments   Patient x    Family  x    RN     Care Manager     Consultant                        Multidiciplinary team rounds were held today with , nursing, pharmacist and clinical coordinator. Patient's plan of care was discussed; medications were reviewed and discharge planning was addressed. ________________________________________________________________________  Total NON critical care TIME:  34   Minutes    Total CRITICAL CARE TIME Spent:   Minutes non procedure based      Comments   >50% of visit spent in counseling and coordination of care     ________________________________________________________________________  Narayan Cabrera MD     Procedures: see electronic medical records for all procedures/Xrays and details which were not copied into this note but were reviewed prior to creation of Plan. LABS:  I reviewed today's most current labs and imaging studies.   Pertinent labs include:  Recent Labs     03/31/22  0329 03/30/22  0524 03/29/22  0320   WBC 4.9 5.1 4.2   HGB 8.2* 8.1* 8.2*   HCT 28.1* 28.1* 27.4*   * 428* 432*     Recent Labs     03/31/22  0329 03/30/22  0524 03/29/22 0320    139 141   K 3.1* 3.3* 3.5    107 108   CO2 23 24 24   GLU 82 75 71   BUN 6 5* 7   CREA 0.72 0.65 0.71   CA 8.4* 8.3* 8.2* MG  --  1.9 1.9   PHOS  --  3.4 3.8       Signed:  Tarun Cabrera MD

## 2022-03-31 NOTE — PROGRESS NOTES
HISTORY OF PRESENT ILLNESS  Raúl Sorenson is a 57 y.o. female. HPI  Patient admitted with chest pain  Stage 4 breast cancer, inoperable  Had port removed yesterday  ROS  na  Physical Exam  Left chest wall mass, fungating with multiple surrounding dermal mets  ASSESSMENT and PLAN    ICD-10-CM ICD-9-CM    1. Severe sepsis (HCC)  A41.9 038.9     R65.20 995.92    2. Cellulitis of left breast  N61.0 611.0    3. Breast cancer, stage 4, left (HCC)  C50.912 174.9    4. Pleural effusion, malignant  J91.0 511.81    5. Palliative care encounter  Z51.5 V66.7    6. Cancer related pain  G89.3 338.3    7.  Goals of care, counseling/discussion  Z71.89 V65.49      - visited patient to answer any questions and offered support  Nothing to do surgically  Discharge per primary team

## 2022-03-31 NOTE — PROGRESS NOTES
Transition of Care Plan:     RUR: 11%     Disposition: Home     Follow up appointments: To be placed on AVS prior to discharge.     DME needed: To be determined.     Transportation at [address] or means to access home:  Daughter has keys        IM Medicare Letter:N/A--Patient has Medicaid     Is patient a BCPI-A Bundle: No                       If yes, was Bundle Letter given?: N/A     Is patient a [place] and connected with the [place]? No                If yes, was [employer] transfer form completed and [facility] notified? N/A     Caregiver Contact:Daughter     Discharge Caregiver contacted prior to discharge? Caregiver to be contacted prior to discharge.     Care Conference needed?:  Not at this time        Received referral for hospice consult. Spoke with daughter and they are in agreement. Choice given and they chose [facility]. Referral sent and they will follow up with the patient and family. Geneva Abdi  RN BSN CRM        [phone]

## 2022-03-31 NOTE — ROUTINE PROCESS
End of Shift Note    Bedside shift change report given to Ronnie Mccord (oncoming nurse) by Sadiq Hart RN (offgoing nurse). Report included the following information SBAR, Kardex, Intake/Output, MAR, Accordion, Recent Results, Med Rec Status, Cardiac Rhythm Sinus Tachy., Alarm Parameters  and Quality Measures    Shift worked:  03/31 8589-6941     Shift summary and any significant changes:     No new changes. Hospice consulted. Concerns for physician to address:  Increase pain medication frequency? Zone phone for oncoming shift:          Activity:  Activity Level: Up with Assistance  Number times ambulated in hallways past shift: 0  Number of times OOB to chair past shift: 3    Cardiac:   Cardiac Monitoring: Yes      Cardiac Rhythm: Sinus Tachy    Access:   Current line(s): PIV     Genitourinary:   Urinary status: voiding    Respiratory:   O2 Device: None (Room air)  Chronic home O2 use?: NO  Incentive spirometer at bedside: NO       GI:  Last Bowel Movement Date: 03/31/22  Current diet:  ADULT DIET Regular; 4 carb choices (60 gm/meal); Low Fat/Low Chol/High Fiber/2 gm Na  DIET ONE TIME MESSAGE  ADULT ORAL NUTRITION SUPPLEMENT Breakfast, AM Snack, Lunch, HS Snack; Standard High Calorie/High Protein  ADULT ORAL NUTRITION SUPPLEMENT Breakfast, Lunch, Dinner; Standard High Calorie/High Protein  Passing flatus: YES  Tolerating current diet: YES       Pain Management:   Patient states pain is manageable on current regimen: NO    Skin:  Geovanny Score: 21  Interventions: increase time out of bed and nutritional support     Patient Safety:  Fall Score:  Total Score: 1  Interventions: bed/chair alarm, gripper socks and pt to call before getting OOB       Length of Stay:  Expected LOS: 3d 12h  Actual LOS: 111 Evette Sarkar RN

## 2022-03-31 NOTE — PROGRESS NOTES
1900: Bedside and Verbal shift change report given to candice (oncoming nurse) by Joanne Carter (offgoing nurse). Report included the following information SBAR, Kardex, Intake/Output, MAR and Recent Results. End of Shift Note    Bedside shift change report given to felipe (oncoming nurse) by Deepti Wheatley (offgoing nurse). Report included the following information SBAR, Kardex, Intake/Output, MAR and Recent Results    Shift worked:  night     Shift summary and any significant changes:     prn pain meds given, port removed on dayshift, remains ST on monitor, VSS,   K+ 3.1    Concerns for physician to address:  palliative     Zone phone for oncoming shift:          Activity:  Activity Level: Up with Assistance  Number times ambulated in hallways past shift: 0  Number of times OOB to chair past shift: 0    Cardiac:   Cardiac Monitoring: Yes      Cardiac Rhythm: Sinus Tachy    Access:   Current line(s): PIV     Genitourinary:   Urinary status: voiding    Respiratory:   O2 Device: Nasal cannula  Chronic home O2 use?: NO  Incentive spirometer at bedside: YES       GI:  Last Bowel Movement Date: 03/28/22  Current diet:  ADULT DIET Regular; 4 carb choices (60 gm/meal); Low Fat/Low Chol/High Fiber/2 gm Na  DIET ONE TIME MESSAGE  ADULT ORAL NUTRITION SUPPLEMENT Breakfast, AM Snack, Lunch, HS Snack; Standard High Calorie/High Protein  ADULT ORAL NUTRITION SUPPLEMENT Breakfast, Lunch, Dinner; Standard High Calorie/High Protein  Passing flatus: YES  Tolerating current diet: YES       Pain Management:   Patient states pain is manageable on current regimen: YES    Skin:  Geovanny Score: 21  Interventions: float heels, increase time out of bed, foam dressing and PT/OT consult    Patient Safety:  Fall Score:  Total Score: 1  Interventions: gripper socks, pt to call before getting OOB and stay with me (per policy)       Length of Stay:  Expected LOS: 3d 12h  Actual LOS: [address]

## 2022-03-31 NOTE — HOSPICE
[facility]  [phone]     Patient Name: Vale Domínguez  YOB: 1960  Age: 57 y.o. [facility] RN Note: Per chart review,  patient is alert and decisional, was told today she is no longer a candidate for [facility] clinical trial.   Patient is on both PO and IV opioids, will need to ensure pain is well managed on oral medications prior to DC home. Left VM w/ daughter, Myrtle Bingham [phone] to arrange for hospice info session tomorrow AM. Of note, Nanette Severs has 16yr old grandson w/ downs syndrome     Thank you for the opportunity to be of service to this patient.

## 2022-03-31 NOTE — PROGRESS NOTES
Problem: Falls - Risk of  Goal: *Absence of Falls  Description: Document Ora Grya Fall Risk and appropriate interventions in the flowsheet.   Outcome: Progressing Towards Goal  Note: Fall Risk Interventions:  Mobility Interventions: Bed/chair exit alarm,Communicate number of staff needed for ambulation/transfer,Patient to call before getting OOB         Medication Interventions: Patient to call before getting OOB    Elimination Interventions: Patient to call for help with toileting needs,Stay With Me (per policy)

## 2022-03-31 NOTE — PROGRESS NOTES
[address] [facility] [address] [facility] [address] [facility] [address] [facility] [address], [address] hospitals, [facility], [address] [facility]  [phone]  Oncology Progress Note      Patient: Simeon Samuels MRN: [id number]  SSN: [id number]    YOB: 1960  Age: 57 y.o. Sex: female        Diagnosis:     1. Recurrent/metastatic breast cancer, Dx 07/2021   Inflammatory recurrence with regional nodes, mediastinal nodes and RP nodes    2. Left breast carcinoma:  T1c N1mi M0 (Stage I) infiltrating ductal carcinoma, Tumor size 1.6 cm, LN 1/3 with micromet, grade 3, ER -ve, NM -ve, Her 2 -ve    ypT2 N0        Treatment:     1. Neoadjuvant chemotherapy   Adriamycin, cytoxan - s/p 4 cycles   Weekly Taxol - s/p 12 cycles  2. S/p left lumpectomy on 9/5/2019 by Dr. Krystle Mcbride  3. S/P Adjuvant radiation  4. Enrolled in  (for residual disease post surgery) - randomized to placebo   5. Palliative chemotherapy   Carboplatin/Gemzar and Keytruda, s/p 2 Cycles   Sacituzumab Gavetecan - s/p 3 cycles - last cycle 12/13/2021    Currently enrolled in Clinical trial at [facility]    Subjective:      Simeon Samuels is a 57 y.o. female with a diagnosis of left sided invasive breast cancer. She felt a lump in the left breast, went to see her PCP, got a mammogram and was noted to have abnormal density in the left upper outer quadrant of the breast. A biopsy of the mass revealed gr 3 IDC, TNBC. The axillary LN is clear of disease. She saw Dr. Lennox Stabler. An MRI of the breast shows the tumor to be larger than previously believed to be. A left axillary LN biopsy showed 1/3 nodes with micrometastasis. She works at [employer] full time. Ms. Mylene De León completed neoadjuvant chemotherapy and underwent lumpectomy in September 2020. She enrolled in the clinical trial  and was randomized to the placebo arm. She noticed change in the left breast skin approximately 4-6 weeks ago. Breast has progressively hardened.  In additional few raised areas popped up. She initially had pain in the left breast but this has subsided. She saw Dr. Conte. A punch bx of the skin on the breast revealed TNBC. She is working at [employer] for the last 13 years. Ms. Benites Console is received multiple lines of palliative chemotherapy. She was sent to clinical trial at [facility] in December after the skin nodules on the breast had spread further. One this admission the left breast is a fungating mass with crusty yellow drainage. She reports the pain is under control at this time. Interval History:     3/28/2022  Patient seen at bedside with her daughter, no complaints. 3/30/2022   Patient seen at bedside with daughter and Dr. Narayan Ibarra to discuss transition to hospice. 3/31/2022  Patient seen at bedside with daughter, follow up visit after discussion regarding hospice yesterday. Dr. Narayan Ibarra spoke with [facility] MD in charge on clinical trial. Patient is no longer a candidate, they also are in agreement for hospice. Discussed this with patient, she verbalized understanding and agreement for hospice consult.      Review of Systems:     Constitutional: anxiety  Eyes: negative  Ears, Nose, Mouth, Throat, and Face: negative  Respiratory: negative  Cardiovascular: negative  Gastrointestinal: negative  Genitourinary:negative  Integument/Breast: fungating left breast mass  Hematologic/Lymphatic: negative  Musculoskeletal: intermittent mild numbness/tingling right foot  Neurological: negative    Review of systems was reviewed and updated as needed on 03/31/22    Past Medical History:   Diagnosis Date    Breast cancer ([address]     left side    Menopause      Past Surgical History:   Procedure Laterality Date    HX BREAST BIOPSY Left     x2    HX BREAST LUMPECTOMY Left 9/5/2019    LEFT BREAST LUMPECTOMY WITH ULTRASOUND performed by Shayla Conner [facility]    HX HYSTERECTOMY      partial, ovaries remain    IR INSERT TUNL CVC W PORT OVER 5 YEARS  8/16/2021    IR REMOVE TUNL CVAD W PORT/PUMP  3/30/2022    VASCULAR SURGERY PROCEDURE UNLIST      portacath      Family History   Problem Relation Age of Onset    Cancer Father     Cancer Maternal Aunt     Breast Cancer Maternal Aunt         4 paternal aunts    Cancer Maternal Uncle     Cancer Paternal Aunt     Cancer Paternal Uncle     Breast Cancer Maternal Grandmother      Social History     Tobacco Use    Smoking status: Former Smoker     Packs/day: 0.10     Quit date: 2020     Years since quittin.2    Smokeless tobacco: Never Used   Substance Use Topics    Alcohol use: No      Prior to Admission medications    Medication Sig Start Date End Date Taking? Authorizing Provider   lidocaine (XYLOCAINE) 4 % topical cream Apply  to affected area four (4) times daily as needed for Pain. Patient not taking: Reported on 21   Bernida Dakins, MD   megestroL (MEGACE) 40 mg tablet Take 1 Tablet by mouth two (2) times a day. Patient not taking: Reported on 21   Bernida Dakins, MD   ondansetron [facility] ODT) 4 mg disintegrating tablet  10/17/21   Provider, Patti   pantoprazole (PROTONIX) 40 mg tablet Take 1 Tablet by mouth daily. Patient not taking: Reported on 2022 10/19/21   Mandie Barahona MD   dronabinoL (Marinol) 2.5 mg capsule Take 1 Capsule by mouth two (2) times a day. Max Daily Amount: 5 mg. Patient not taking: Reported on 2022 10/19/21   Mandie Barahona MD   lidocaine-prilocaine (EMLA) topical cream Apply  to affected area as needed for Pain.  Apply generous amount to port site 30 minutes prior to infusions and cover with plastic wrap  Patient not taking: Reported on 2022   Dominick Katz MD          Allergies   Allergen Reactions    Codeine Rash     Rash from tylenol #3         Objective:     Visit Vitals  /70 (BP 1 Location: Right upper arm, BP Patient Position: At rest)   Pulse (!) 110   Temp 98 °F (36.7 °C)   Resp 18   Ht 5' 2\" (1.575 m)   Wt 183 lb 1.6 oz (83.1 kg)   SpO2 100%   BMI 33.49 kg/m²     Pain Scale: /10    Physical Exam:     GENERAL: alert, cooperative, no distress  EYE: conjunctivae/corneas clear. xanthelasma  LYMPHATIC: Cervical, supraclavicular, and axillary nodes normal.   THROAT & NECK: normal and no erythema or exudates noted. BREAST: Left breast is hard, skin is thickened, peau de' orange appearance - overall appears softer  LUNG: clear to auscultation bilaterally  HEART: regular rate and rhythm  ABDOMEN: soft, non-tender. EXTREMITIES:  extremities normal, atraumatic, no cyanosis or edema  NEUROLOGIC: AOx3. Gait normal.     The above physical exam was reviewed and updated as needed on 03/31/22. CT Results (most recent):  Results from Hospital Encounter encounter on 03/25/22    CTA CHEST W OR W WO CONT    Narrative  INDICATION:   large, severe fungating cancer over entire left breast and chest  wall, now tachycardic and chest pressure, rule out PE    COMPARISON:  Chest CT December 2021    TECHNIQUE:  Following the uneventful intravenous administration of 100 cc Isovue  566, thin helical axial images were obtained through the chest. Postprocessing  was performed. 3D image postprocessing was performed. CT dose reduction was achieved through the use of a standardized protocol  tailored for this examination and automatic exposure control for dose  modulation. FINDINGS:    There is a right chest portacatheter. There is bulky bilateral axillary  lymphadenopathy as well as left supraclavicular lymphadenopathy. There is  diffuse left breast skin thickening and soft tissue infiltration. Similar  findings are present on the right, although less dramatic. There is anterior and  middle mediastinal lymphadenopathy, which has worsened. Heart size is normal.  There is no pericardial effusion. No filling defect is seen within the pulmonary arterial system to suggest  pulmonary embolus.  The aorta is normal in caliber. There is a moderate left pleural effusion, which has developed since the prior  study, and a new trace right pleural effusion. There is bibasilar atelectasis. There is no pneumothorax. Limited evaluation of the upper abdomen demonstrates no abnormality. Sclerosis  in the sternum is consistent with osseous metastatic disease. .    Impression  1. No evidence of pulmonary embolus. 2.  New moderate left and trace right pleural effusions. 3. Bulky bilateral axillary lymphadenopathy, which is worsened since the prior  study. 4. Extensive skin thickening of both breasts, left worse than right, and  malignant infiltration of both breasts, which has worsened since the prior  study. 5. Extensive mediastinal lymphadenopathy, which has worsened since the prior  study. 5. Evidence of sternal osseous metastatic disease. Lab Results   Component Value Date/Time    WBC 4.9 03/31/2022 03:29 AM    HGB 8.2 (L) 03/31/2022 03:29 AM    HCT 28.1 (L) 03/31/2022 03:29 AM    PLATELET 766 (H) 82/11/5907 03:29 AM    MCV 81.2 03/31/2022 03:29 AM       Lab Results   Component Value Date/Time    Sodium 138 03/31/2022 03:29 AM    Potassium 3.1 (L) 03/31/2022 03:29 AM    Chloride 106 03/31/2022 03:29 AM    CO2 23 03/31/2022 03:29 AM    Anion gap 9 03/31/2022 03:29 AM    Glucose 82 03/31/2022 03:29 AM    BUN 6 03/31/2022 03:29 AM    Creatinine 0.72 03/31/2022 03:29 AM    BUN/Creatinine ratio 8 (L) 03/31/2022 03:29 AM    GFR est AA >60 03/31/2022 03:29 AM    GFR est non-AA >60 03/31/2022 03:29 AM    Calcium 8.4 (L) 03/31/2022 03:29 AM    Bilirubin, total 0.5 03/25/2022 12:57 PM    Alk. phosphatase 98 03/25/2022 12:57 PM    Protein, total 5.9 (L) 03/25/2022 12:57 PM    Albumin 2.1 (L) 03/25/2022 12:57 PM    Globulin 3.8 03/25/2022 12:57 PM    A-G Ratio 0.6 (L) 03/25/2022 12:57 PM    ALT (SGPT) 10 (L) 03/25/2022 12:57 PM    AST (SGOT) 24 03/25/2022 12:57 PM         Assessment:     1.  Recurrent/metastatic breast cancer, Dx 07/2021   Inflammatory recurrence with regional nodes, mediastinal nodes and RP nodes    PD-L1 ~ 2% (IC)  NGS: p53, CKS1B, FGFR1 amplification    Previously   Left breast carcinoma:  T1c N1mi M0 (Stage I) infiltrating ductal carcinoma, Tumor size 1.6 cm, LN 1/3 with micromet, grade 3, ER -ve, [place] -ve, Her 2 -ve    ECOG PS 0       Received neoadjuvant chemotherapy   Adriamycin, Cyclophosphamide - s/p 4 cycles   Weekly Taxol - s/p 12 cycles - completed 8/15/2019    S/p left lumpectomy on 9/5/2019 with Dr. Francisco Patel  ypT2 N0    She is randomized to the placebo arm of SWOG  study: A Randomized, Phase III Trial to Evaluate the Efficacy and Safety of [id number]-3475 (Pembrolizumab) as Adjuvant Therapy for Triple Receptor-Negative Breast Cancer with >/= 1 CM Residual Invasive Cancer or Positive Lymph Nodes (ypN+) after Neoadjuvant Chemotherapy    Disease has recurred in the breast with changes suggestive of inflammatory disease and regional nodes are enlarged. Receiving palliative therapy  Carboplatin/Gemzar and Keytruda  S/p 2 Cycles    Due to progression of disease, Keytruda/chemo d/c'd    Receiving palliative chemotherapy   Castillo Le - s/p 3 cycles  (Treatment delayed after Cycle 1 and dose reduced to 75% d/t neutropenia)     Patient sent to [facility] for clinical trial.   She is enrolled under Dr. Devon Taylor    2. Anemia - normocytic     Observation       3. Breast pain from cancer    Palliative medicine     4. Fungating breast mass with Cellulitis and Bacteremia     Wound care   Receiving antibiotics for breast cellulitis   Blood cultures are growing GNR      Plan:     >Patient admitted with fungating breast mass with cellulitis and bacteremia   > Agree with Palliative Care to discuss goals of care, she was referred to [facility] clinical trial with disease progression. Dr. Jonny Palencia has discussed with [facility] and patient is no longer a candidate, they recommend hospice.     > CT from 3/25 showed disease progression since last CT in December 2021  > ID following with bacteremia and will give final abx recommendations. > Patient is in agreement for hospice, will place consult while IP. Patient in agreement that she should transition to hospice after antibiotic course has been completed. Please contact with any further questions. Signed by: Diana Camargo NP                     March 31, 2022    CC. Osbaldo Flores MD  CC. Radha Parr MD  CC.  Sabrina Lin MD

## 2022-04-01 NOTE — ROUTINE PROCESS
End of Shift Note    Bedside shift change report given to Ronnie Mccord (oncoming nurse) by Nae Dover RN (offgoing nurse). Report included the following information SBAR, Kardex, MAR, Accordion, Recent Results, Cardiac Rhythm Sinus Tachy., Alarm Parameters  and Quality Measures    Shift worked: 04/01 [id number]     Shift summary and any significant changes:     Changes in pain management. Concerns for physician to address:      Zone phone for oncoming shift:       Activity:  Activity Level: Up with Assistance  Number times ambulated in hallways past shift: 0  Number of times OOB to chair past shift: 0    Cardiac:   Cardiac Monitoring: Yes      Cardiac Rhythm: Sinus Tachy    Access:   Current line(s): PIV     Genitourinary:   Urinary status: voiding    Respiratory:   O2 Device: None (Room air)  Chronic home O2 use?: NO  Incentive spirometer at bedside: NO       GI:  Last Bowel Movement Date: 03/31/22  Current diet:  DIET ONE TIME MESSAGE  ADULT ORAL NUTRITION SUPPLEMENT Breakfast, Lunch, Dinner; Standard High Calorie/High Protein  ADULT DIET Regular; 5 carb choices (75 gm/meal); Please send both milk and ensure enlive chocolate with every meal. Thank you, RD  Passing flatus: YES  Tolerating current diet: YES       Pain Management:   Patient states pain is manageable on current regimen: NO    Skin:  Geovanny Score: 20  Interventions: increase time out of bed and nutritional support     Patient Safety:  Fall Score:  Total Score: 1  Interventions: bed/chair alarm, gripper socks and pt to call before getting OOB       Length of Stay:  Expected LOS: 3d 12h  Actual LOS: [id number] KEVIN Grissom RN

## 2022-04-01 NOTE — HOSPICE
[facility]  [phone]    Patient Name: River Goldman  YOB: 1960  Age: 57 y.o. [address] RN Note:  Hospice consult noted. Chart reviewed. Plan of care discussed with patients nurse & care manager. In to meet with patient and her daughter, Spike Mayer bedside. Hospice social worker, Moriah Younger LMSW also present during this meeting. Discussed Hospice philosophy, general plan of care, levels of care, services and on call procedures. Family information packet provided & reviewed with family. Patient is alert & Oriented. She states that her pain is much more managed since she came into the hospital. Pt states that her plan is to discharge home with Home Health to complete a 2 week course of IV antibiotics. Pt shared that she is followed by palliative care for pain medications, and shared that IV Dilaudid has been very helpful. Pt and her daughter share that they are interested in Hospice information, and appreciate Hospice follow-up after discharge. Updated CM to this conversation. Per CM, home health is being set up, and pt may be ready to discharge home on 4/2/2022. Thank you for the opportunity to be of service to this patient. Hospice is available to continue with support and education for patient and family. Family state that they would like for [address] to follow them, when they are ready for Hospice services. 11:30: Updated palliative care team, of patient planned for discharge Saturday with [facility] services. Offered patient may need prescriptions for pain medication prior to discharge.      nAgeline Roth RN, [place] [address] Nurse Liaison  [phone] Mobile  [phone] Office  Available on [employer]

## 2022-04-01 NOTE — PROGRESS NOTES
Comprehensive Nutrition Assessment    Type and Reason for Visit: Initial,RD nutrition re-screen/LOS    Nutrition Recommendations/Plan:   · RD liberalized diet to allow great options given poor po; removed cardiac restriction and increased CHO allowance. · Continue ensure enlive shakes po TID with meals; prefers chocolate. · Please document % meals and supplements consumed in flowsheet I/O's under intake. Nutrition Assessment:     4/1: Chart reviewed; med noted for sepsis, met breast cancer. RD screened per LOS. Noted palliative care has been following patient and managing pain. Pt has been tolerating diet but PO intake poor today due to increased pain after PICC insertion. Pt on multiple diet combinations as well which limits overall selection. Therefore, we discussed liberalizing diet some (remove Cardiac restriction). Pt also has a Carb Controlled restriction but no documented hx or DM or a recent A1C. RD increased CHO allowance for now as pt would like to continue Ensure Enlive Chocolate shakes TID with meals AND receive the carton of milk every meal.    Patient Vitals for the past 168 hrs:   % Diet Eaten   03/27/22 1747 51 - 75%   03/27/22 1107 51 - 75%   03/27/22 0920 51 - 75%     Last Weight Metric  Weight Loss Metrics 3/31/2022 12/13/2021 12/13/2021 11/29/2021 11/22/2021 11/22/2021 11/22/2021   Today's Wt 183 lb 1.6 oz 175 lb 7.8 oz 175 lb 8 oz 172 lb 9.6 oz 178 lb 179 lb 6.4 oz 178 lb 14.4 oz   BMI 33.49 kg/m2 32.1 kg/m2 32.1 kg/m2 31.57 kg/m2 32.56 kg/m2 32.81 kg/m2 32.72 kg/m2     Estimated Daily Nutrient Needs:  Energy (kcal): 1750 (BMR 1344 x 1. 3AF); Weight Used for Energy Requirements: Current  Protein (g): 83 - 100 (1.0-1.2 g/kg bw); Weight Used for Protein Requirements: Current  Fluid (ml/day): 9351-1777 ml/day; Method Used for Fluid Requirements: 1 ml/kcal    Nutrition Related Findings:  BM: 3/31; Labs: H/H 8.2/28.1;  Meds: reviewed      Wounds:    None       Current Nutrition Therapies:  DIET ONE TIME MESSAGE  ADULT ORAL NUTRITION SUPPLEMENT Breakfast, Lunch, Dinner; Standard High Calorie/High Protein  ADULT DIET Regular; 5 carb choices (75 gm/meal); Please send both milk and ensure enlive chocolate with every meal. Thank you, RD    Anthropometric Measures:  · Height:  5' 2\" (157.5 cm)  · Current Body Wt:  83.1 kg (183 lb 3.2 oz)   · Ideal Body Wt:  110 lbs:  166.5 %   · BMI Category:  Obese class 1 (BMI 30.0-34. 9)       Nutrition Diagnosis:   · Inadequate protein-energy intake related to catabolic illness (decreased appetite) as evidenced by intake 0-25% (need for oral nutritional supplements)    Nutrition Interventions:   Food and/or Nutrient Delivery: Continue oral nutrition supplement,Modify current diet  Nutrition Education and Counseling: No recommendations at this time  Coordination of Nutrition Care: Continue to monitor while inpatient    Goals:  Trend PO intake >50% of meals + consume 240 ml ONS next 5-7 days       Nutrition Monitoring and Evaluation:   Behavioral-Environmental Outcomes: None identified  Food/Nutrient Intake Outcomes: Food and nutrient intake,Supplement intake  Physical Signs/Symptoms Outcomes: Biochemical data,Skin,Weight    Discharge Planning:     Too soon to determine     Electronically signed by Sandra Urbina RD on 4/1/2022 at 3:37 PM    Contact:

## 2022-04-01 NOTE — HOSPICE
Hospice Liaison Note:    Chart reviewed for updates in plan of care. Plan: Visit with patient and include family per patient preference this am.    [facility] can service this patient at her home address, if she chooses Hospice services. Please call Hospice team to offer support for patient, family or staff.     Thank you for your coordination with the hospice plan of care      Dayan Burns RN, [place] [address] Nurse Liaison  [phone] [place]  [phone] Office

## 2022-04-01 NOTE — PROGRESS NOTES
Palliative Medicine Consult  Rl: [phone] ([phone])    Patient Name: Alfredo Newman  YOB: 1960    Date of Initial Consult: 3/28/22  Reason for Consult: end stage disease  Requesting Provider: Toma Bailey MD  Primary Care Physician: None     SUMMARY:   Alfredo Newman is a 57 y.o. Female with recurrent  meatstatic  breast cancer, s/p chemo and lumpectomy in 2019, followed by radiation , enrolled in clinical trial at [facility],  extensive dermal metastasis and suspected sternal metastasis, who was admitted on 3/25/2022 from home with a diagnosis of bresat pain , fungating  left breast mass Staph aureus bacteremia and sepsis, Ct chest shows progresssion of mediastinal lymphadenoppthy and axillary lymphadenopathy and infiltrating breast wounds  ID is following   Current medical issues leading to Palliative Medicine involvement include: care decision for end stage disease, patient follow up with out patient palliative care for pain and symptome management and support. Social hx: she has two daughters, she is former smoker quit 2020, used to work for [employer], lives alone, her daughter Adam Herrera lives close, she stays with her few days a week, other daughter is estranged . At base line patient is independent in function . Home pain medication ; Oxycodone ER(XTAMPZA) 9 MG BID  Roxicodone 20 mg every 4 hrs prn   PALLIATIVE DIAGNOSES:   1. Cancer related pain   2. Metastatic breast cancer   3. Goals of care   4. Need for emotional support     PLAN:   1. Follow up visit, daughter Adam Herrera, at bed side . 2. Pain in left breast :   · Currently on 2mg IV Hydromorphone- last dose was at 9:44. She was getting it every 4hours up till this morning, but then it was increased to every 3 hours. Prior to yesterday, she was not taking it every 4 around the clock  · She is on Percocet 7.5/325mg every 4 hours.   She is only taking it about 3x a day  · She is on Oxycontin 10mg BID  · As she is hoping to get home this weekend, but is relying on her IV Hydromorphone- will increase her PO regimen to accomodate for the IV Doses  · Will increase her Oxycontin to 20mg BID  · Will change her Percocet to 10mg every 4 hours  · Will decrease her IV Option to every 6 hours and reserve this for pain uncontrolled by PO options  3. Initial consult note routed to primary continuity provider and/or primary health care team members  4. Communicated plan of care with: Palliative IDT, Hospital Health Care Team    Addendum:  Followed back up and Ms Nikky Li was sobbing with reports of extreme pain. She got her PICC line today which may have exacerbated her pain. I convinced her to try the Oxycodone 10, and if still in pain would give the IV Hydromorphone 30 min later    Addendum:  Checked back in 30 min later (1:30)- she feels that the oxycodone has not kicked in yet. Will give her a dose of IV hydromorphone now  I am not sure a wean to oral is going to work today. Will put the IV option back to every 4 hours, but please reserve it for pain uncontrolled by the oxycodone. Will also schedule the oxycodone every 4 hours, so that she has some consistency in her dosing, and hopefully can get ahead of her pain a little bit (right now we are clearly playing catchup). Will follow back up in 30 min    Addendum: 1400- IV got her back on track, pain much better under control. Reviewed plan with patient and her daughter at bedside and we are all in agreement with the plan below. Also updated the hosptialist      Recommendations for the weekend:  If her pain remains uncontrolled by oral only, and she is requiring frequent dosing of IV hydromorphone- I would recommend switching to PO Hydromorphone.     If her pain is controlled at some point, but she is requiring every 4 hour dosing of PO, can consider increasing her OxyContin again as well- this will allow her to have a little more of a buffer for longer stretches of sleep and reduce the risk of her waking up in pain    Would hesitate to make more than one change per day- this transition may take a few days to get her stable on an oral regimen       GOALS OF CARE / TREATMENT PREFERENCES:     GOALS OF CARE:  Patient/Health Care Proxy Stated Goals: Other (comment) (home to complete ABX then hospice support)    TREATMENT PREFERENCES:   Code Status: Full Code    Patient and family's personal goals include: full restorative care     Important upcoming milestones or family events: The patient identifies the following as important for living well: quality of life      Advance Care Planning:  [x] [facility] Interdisciplinary Team has updated the ACP Navigator with [address] and Patient Capacity      Primary Decision MakerSmerlin Bellamy - [phone]  Advance Care Planning 1/27/2022   Patient's Healthcare Decision Maker is: Named in scanned ACP document   Confirm Advance Directive Yes, on file   Patient Would Like to Complete Advance Directive -       Medical Interventions: Limited additional interventions       Other:    As far as possible, the palliative care team has discussed with patient / health care proxy about goals of care / treatment preferences for patient. HISTORY:     History obtained from: chart, patient and bed side RN    CHIEF COMPLAINT: in so much pain    HPI/SUBJECTIVE:    The patient is:   [] Verbal and participatory  Admitted for worsening pain in left breast x few days, denies any nausea or vomiting, bowels are moving.    3/29/22: patient notes pain is better overall, average 2/10, now 7/10 after medication is wearing off, she slept \" good \" last night, bowels are moving every day, denies any nausea or vomiting .      3/30/22l: she slept good, pain is better , denies any nausea or vomiting . She is in good spirits.     4/1: pretty extreme pain and associated anxiety    Clinical Pain Assessment (nonverbal scale for severity on nonverbal patients): Clinical Pain Assessment  Severity: 9  Location: left breast  Character: \"digging \"  Duration: 6 months, worse in last few days  Effect: cannot raise left arm  Factors: moving in certain position increases pain  Frequency: constant          Duration: for how long has pt been experiencing pain (e.g., 2 days, 1 month, years)  Frequency: how often pain is an issue (e.g., several times per day, once every few days, constant)     FUNCTIONAL ASSESSMENT:     Palliative Performance Scale (PPS):  PPS: 60       PSYCHOSOCIAL/SPIRITUAL SCREENING:     Palliative IDT has assessed this patient for cultural preferences / practices and a referral made as appropriate to needs (Cultural Services, Patient Advocacy, Ethics, etc.)    Any spiritual / Caodaism concerns:  [] Yes /  [x] No   If \"Yes\" to discuss with pastoral care during IDT     Does caregiver feel burdened by caring for their loved one:   [] Yes /  [x] No /  [] No Caregiver Present    If \"Yes\" to discuss with social work during IDT    Anticipatory grief assessment:   [x] Normal  / [] Maladaptive     If \"Maladaptive\" to discuss with social work during IDT    ESAS Anxiety: Anxiety: 5    ESAS Depression: Depression: 0        REVIEW OF SYSTEMS:     Positive and pertinent negative findings in ROS are noted above in HPI. The following systems were [x] reviewed / [] unable to be reviewed as noted in HPI  Other findings are noted below. Systems: constitutional, ears/nose/mouth/throat, respiratory, gastrointestinal, genitourinary, musculoskeletal, integumentary, neurologic, psychiatric, endocrine. Positive findings noted below.   Modified ESAS Completed by: provider   Fatigue: 0 Drowsiness: 0   Depression: 0 Pain: 9   Anxiety: 5 Nausea: 0   Anorexia: 7 Dyspnea: 0     Constipation: No              PHYSICAL EXAM:     From RN flowsheet:  Wt Readings from Last 3 Encounters:   03/31/22 183 lb 1.6 oz (83.1 kg)   12/13/21 175 lb 8 oz (79.6 kg)   12/13/21 175 lb 7.8 oz (79.6 kg) Blood pressure 120/69, pulse (!) 119, temperature 98.6 °F (37 °C), resp. rate 14, height 5' 2\" (1.575 m), weight 183 lb 1.6 oz (83.1 kg), SpO2 97 %. Pain Scale 1: Numeric (0 - 10)  Pain Intensity 1: 8  Pain Onset 1: acute  Pain Location 1: Breast  Pain Orientation 1: Left  Pain Description 1: Stabbing,Throbbing  Pain Intervention(s) 1: Medication (see MAR)  Last bowel movement, if known:     Constitutional: alert,oriented x3,  In a lot of pain  Eyes: pupils equal, anicteric  ENMT: no nasal discharge, moist mucous membranes  Cardiovascular: regular rhythm, distal pulses intact  Respiratory: breathing not labored, symmetric  Gastrointestinal: soft non-tender, +bowel sounds  Musculoskeletal: dressing on left breast , fungating mass left breast per records, I have not examine, swelling of left arm and hand.   Skin: warm, dry  Neurologic: following commands, moving all extremities  Psychiatric: full affect, no hallucinations  Other:       HISTORY:     Active Problems:    Peau d'orange over breast (3/25/2022)      Severe sepsis ([address] (3/25/2022)      Cellulitis of breast (3/25/2022)      Past Medical History:   Diagnosis Date    Breast cancer ([address]     left side    Menopause       Past Surgical History:   Procedure Laterality Date    HX BREAST BIOPSY Left     x2    HX BREAST LUMPECTOMY Left 9/5/2019    LEFT BREAST LUMPECTOMY WITH ULTRASOUND performed by Miguel Hoffman MD at [facility]    HX HYSTERECTOMY      partial, ovaries remain    IR INSERT TUNL CVC W PORT OVER 5 YEARS  8/16/2021    IR REMOVE TUNL CVAD W PORT/PUMP  3/30/2022    VASCULAR SURGERY PROCEDURE UNLIST      portacath      Family History   Problem Relation Age of Onset    Cancer Father     Cancer Maternal Aunt     Breast Cancer Maternal Aunt         4 paternal aunts    Cancer Maternal Uncle     Cancer Paternal Aunt     Cancer Paternal Uncle     Breast Cancer Maternal Grandmother       History reviewed, no pertinent family history. Social History     Tobacco Use    Smoking status: Former Smoker     Packs/day: 0.10     Quit date: 2020     Years since quittin.2    Smokeless tobacco: Never Used   Substance Use Topics    Alcohol use: No     Allergies   Allergen Reactions    Codeine Rash     Rash from tylenol #3      Current Facility-Administered Medications   Medication Dose Route Frequency    oxyCODONE ER (OxyCONTIN) tablet 20 mg  20 mg Oral Q12H    oxyCODONE IR (ROXICODONE) tablet 10 mg  10 mg Oral Q4H    HYDROmorphone (DILAUDID) injection 2 mg  2 mg IntraVENous Q4H PRN    senna-docusate (PERICOLACE) 8.6-50 mg per tablet 2 Tablet  2 Tablet Oral DAILY    ceFAZolin (ANCEF) 2 g in sterile water (preservative free) 20 mL IV syringe  2 g IntraVENous Q8H    metroNIDAZOLE (METROGEL) 0.75 % gel   Topical DAILY    sodium chloride (NS) flush 5-40 mL  5-40 mL IntraVENous Q8H    sodium chloride (NS) flush 5-40 mL  5-40 mL IntraVENous PRN    acetaminophen (TYLENOL) tablet 650 mg  650 mg Oral Q6H PRN    Or    acetaminophen (TYLENOL) suppository 650 mg  650 mg Rectal Q6H PRN    polyethylene glycol (MIRALAX) packet 17 g  17 g Oral DAILY PRN    ondansetron (ZOFRAN ODT) tablet 4 mg  4 mg Oral Q8H PRN    Or    ondansetron (ZOFRAN) injection 4 mg  4 mg IntraVENous Q6H PRN    enoxaparin (LOVENOX) injection 40 mg  40 mg SubCUTAneous DAILY          LAB AND IMAGING FINDINGS:     Lab Results   Component Value Date/Time    WBC 4.9 2022 03:29 AM    HGB 8.2 (L) 2022 03:29 AM    PLATELET 358 (H)  03:29 AM     Lab Results   Component Value Date/Time    Sodium 139 2022 12:22 AM    Potassium 3.6 2022 12:22 AM    Chloride 106 2022 12:22 AM    CO2 25 2022 12:22 AM    BUN 8 2022 12:22 AM    Creatinine 0.72 2022 12:22 AM    Calcium 8.6 2022 12:22 AM    Magnesium 1.9 2022 05:24 AM    Phosphorus 3.4 2022 05:24 AM      Lab Results   Component Value Date/Time    Alk. phosphatase 98 03/25/2022 12:57 PM    Protein, total 5.9 (L) 03/25/2022 12:57 PM    Albumin 2.1 (L) 03/25/2022 12:57 PM    Globulin 3.8 03/25/2022 12:57 PM     No results found for: INR, PTMR, PTP, PT1, PT2, APTT, INREXT, INREXT   No results found for: IRON, FE, TIBC, IBCT, PSAT, FERR   No results found for: PH, PCO2, PO2  No components found for: GLPOC   No results found for: CPK, CKMB             Total time: 35 mins  Counseling / coordination time, spent as noted above: 25 mins  > 50% counseling / coordination?: yes     Prolonged service was provided for  []30 min   []75 min in face to face time in the presence of the patient, spent as noted above. Time Start:   Time End:   Note: this can only be billed with 38193 (initial) or 82962 (follow up). If multiple start / stop times, list each separately.

## 2022-04-01 NOTE — PROGRESS NOTES
Problem: Falls - Risk of  Goal: *Absence of Falls  Description: Document [place] Fall Risk and appropriate interventions in the flowsheet.   Outcome: Progressing Towards Goal  Note: Fall Risk Interventions:  Mobility Interventions: Patient to call before getting OOB         Medication Interventions: Evaluate medications/consider consulting pharmacy,Teach patient to arise slowly,Patient to call before getting OOB    Elimination Interventions: Call light in reach,Stay With Me (per policy),Patient to call for help with toileting needs,Toilet paper/wipes in reach

## 2022-04-01 NOTE — PROGRESS NOTES
Transition of Care Plan:     RUR: 11%     Disposition: Home     Follow up appointments: To be placed on AVS prior to discharge.     DME needed: To be determined.     Transportation at [address] or means to access home:  Daughter has keys        IM Medicare Letter:N/A--Patient has Medicaid     Is patient a BCPI-A Bundle: No                       If yes, was Bundle Letter given?: N/A     Is patient a  and connected with the Ashley Cassis                If yes, was [employer] transfer form completed and VA notified? N/A     Caregiver Contact:Daughter     Discharge Caregiver contacted prior to discharge? Caregiver to be contacted prior to discharge.  [address] needed?:  Not at this time       Referral sent to [facility] iv infusion for home ivab. Medicals and orders faxed to them and they are checking on benefits and will get back. Geneva Abdi RN BSN CRM        [phone]

## 2022-04-01 NOTE — PROGRESS NOTES
ID:    Assessment:  Ms Luz Francois is a 58-year-old lady with a history of recurrent breast cancer (L) status post neoadjuvant chemotherapy, lumpectomy in 2019 followed by radiation, enrolled in clinical trial at [facility] port inserted August 2021 with bacteremia     1) MSSA bacteremia and sepsis, source is the port. Port removed 3/30/22. 2) infiltrating breast wounds  3) recurrent breast cancer status post neoadjuvant chemotherapy and lumpectomy in 2019, followed by radiation, currently enrolled in a trial at [facility]. Patient reports she has not been started on the trial yet because [facility] was awaiting the prior chemotherapy agents to be out of her system before starting the trial regimen. 4) indwelling port  5) leukopenia  6) anemia  7) osseous metastatic disease      Recommendations:  - Cefazolin 2gm q8h  - Duration 2 weeks, 3/30/22 to 4/13/22.  - No ID follow up needed in clinic. · Please have labs done on weekly basis for CBC with diff, CMP, and have results sent to me by faxing to  [phone]. · Call with critical labs at [phone]. · Please ensure that patient has PICC/midline care arranged per protocol   · Once IV antibiotics have been discontinued, please ensure that PICC/IV access is promptly removed.          Kay Melo MD  Infectious Diseases

## 2022-04-01 NOTE — PROGRESS NOTES
Infectious Disease Progress Note         Interval:  NAEO    Subjective:   Patient seen today. She is reporting feeling well and has no acute complaints. No night sweats chills rigors. Objective:    Vitals:   Reviewed in chart. Physical Exam:  Gen: No apparent distress  HEENT:  Normocephalic, atraumatic, no scleral icterus  CV: HDS  Lungs: room air  Abdomen: soft, non tender, non distended  Genitourinary:  no campos catheter   Skin: left breast mass wound in dressing  Psych: good affect, good eye contact, non tearful  Neuro: alert, oriented to time,  place, and situation, moves all extremities to commands, verbal  Musculoskeletal:  No joint edema, erythema or tenderness noted   Lines: PIVs, right chest port - appears benign. Labs:  Recent Results (from the past 24 hour(s))   METABOLIC PANEL, BASIC    Collection Time: 04/01/22 12:22 AM   Result Value Ref Range    Sodium 139 136 - 145 mmol/L    Potassium 3.6 3.5 - 5.1 mmol/L    Chloride 106 97 - 108 mmol/L    CO2 25 21 - 32 mmol/L    Anion gap 8 5 - 15 mmol/L    Glucose 101 (H) 65 - 100 mg/dL    BUN 8 6 - 20 MG/DL    Creatinine 0.72 0.55 - 1.02 MG/DL    BUN/Creatinine ratio 11 (L) 12 - 20      GFR est AA >60 >60 ml/min/1.73m2    GFR est non-AA >60 >60 ml/min/1.73m2    Calcium 8.6 8.5 - 10.1 MG/DL               Assessment:    Ms Jaqui Noble is a 67-year-old lady with a history of recurrent breast cancer (L) status post neoadjuvant chemotherapy, lumpectomy in 2019 followed by radiation, enrolled in clinical trial at [facility], indwelling port inserted August 2021 with bacteremia     1) S aureus bacteremia and sepsis  2) infiltrating breast wounds  3) recurrent breast cancer status post neoadjuvant chemotherapy and lumpectomy in 2019, followed by radiation, currently enrolled in a trial at [facility].  Patient reports she has not been started on the trial yet because [facility] was awaiting the prior chemotherapy agents to be out of her system before starting the trial regimen. 4) indwelling port  5) leukopenia  6) anemia  7) osseous metastatic disease        Recommendations:        Patient is in agreement to above. Thank you for the opportunity to participate in the care of this patient. Please contact with questions or concerns.       Kristan Sparks MD  Infectious Diseases

## 2022-04-01 NOTE — HOSPICE
This LMSW and Hospice Liaison Francesco Greenowod RN met with pt and daughter Virginia Ugalde at bedside. Provided general hospice information for home. Pt stated her goal is discharge home with 2 weeks of IV antibiotics. Hospice team plan to follow up with pt and family after discharge.     Lamont Harris, [address] [place] [address]    [phone]

## 2022-04-01 NOTE — PROGRESS NOTES
1900: Bedside and Verbal shift change report given to candice (oncoming nurse) by Leslie Arce (offgoing nurse). Report included the following information SBAR, Kardex, Intake/Output, MAR, Recent Results and Cardiac Rhythm sinus tach. End of Shift Note    Bedside shift change report given to felipe (oncoming nurse) by Nichol Leon (offgoing nurse). Report included the following information SBAR, Kardex, Intake/Output, MAR, Recent Results and Cardiac Rhythm sinustach    Shift worked:  night       Shift summary and any significant changes:     pain meds given around the clock, left breast dressing CDI     Concerns for physician to address:       Zone phone for oncoming shift:          Activity:  Activity Level: Up with Assistance  Number times ambulated in hallways past shift: 0  Number of times OOB to chair past shift: 0    Cardiac:   Cardiac Monitoring: Yes      Cardiac Rhythm: Sinus Tachy    Access:   Current line(s): PIV     Genitourinary:   Urinary status: voiding    Respiratory:   O2 Device: None (Room air)  Chronic home O2 use?: NO  Incentive spirometer at bedside: NO       GI:  Last Bowel Movement Date: 03/31/22  Current diet:  ADULT DIET Regular; 4 carb choices (60 gm/meal); Low Fat/Low Chol/High Fiber/2 gm Na  DIET ONE TIME MESSAGE  ADULT ORAL NUTRITION SUPPLEMENT Breakfast, AM Snack, Lunch, HS Snack; Standard High Calorie/High Protein  ADULT ORAL NUTRITION SUPPLEMENT Breakfast, Lunch, Dinner; Standard High Calorie/High Protein  Passing flatus: YES  Tolerating current diet: YES       Pain Management:   Patient states pain is manageable on current regimen: YES    Skin:  Geovanny Score: 21  Interventions: float heels, increase time out of bed, foam dressing and PT/OT consult    Patient Safety:  Fall Score:  Total Score: 1  Interventions: assistive device (walker, cane, etc), gripper socks, pt to call before getting OOB and stay with me (per policy)       Length of Stay:  Expected LOS: 3d 12h  Actual LOS: [address]

## 2022-04-01 NOTE — PROGRESS NOTES
Midline insertion procedure note:  Pt has very limited vascular access. Procedure explained to patient along with risks and benefits. Procedure teaching completed. Pre procedure assessment done. Patient denies questions or concerns at this time. Maximum sterile barrier precautions observed throughout procedure. Lidocaine 1% 3ml sc injected to site prior to access the vein. Cannulated brachial vein using ultrasound guidance. Inserted 4.5 Fr. single lumen midline to RIGHT arm. Blood return verified and flushed with 20ml normal saline. Sterile dressing applied with Biopatch, StatLock and occlusive dressing as per protocol. Curos caps applied to port. Patient tolerated procedure well with minimal blood loss. Reason for access : antibiotics for 2 weeks    Complications related to insertion : (Initially accessed Right basilic vein, however was unable to pass the wire)     See nursing message on [employer] for midline reminders. Midline is CT and MRI compatible. Inserted by : Kaelyn Robb RN, BSN, [facility] Vascular Access Nurse  Assisted by : Adri Birch RN, BSN, KISHA       Catheter Length : 15 cm   External Length : 0 cm   arm circumference : 30 cm  Catheter occupies 23  % of vein. Type of Midline: Arrow  Ref #:   [id number]  Lot #: [id number]  Expiration Date: 08/31/2023  Informed primary nurse midline is ready for use and to hang new infusion tubing prior to use.       Kaelyn Robb RN, BSN, [facility]  Vascular Acces Team

## 2022-04-01 NOTE — PROGRESS NOTES
Hospitalist Progress Note    NAME: Leah Davenport   :  1960   MRN:  [id number]       Assessment / Plan:  Metastatic breast cancer  Fungating left breast cancer  Cellulitis of the left breast  Severe sepsis, MSSA bacteremia, improved  -CTA chest-1.  No evidence of pulmonary embolus. 2.  New moderate left and trace right pleural effusions. 3. Bulky bilateral axillary lymphadenopathy, which is worsened since the prior  study. 4. Extensive skin thickening of both breasts, left worse than right, and  malignant infiltration of both breasts, which has worsened since the prior  study. 5. Extensive mediastinal lymphadenopathy, which has worsened since the prior  study. 5. Evidence of sternal osseous metastatic disease.  -Lactic acid still elevated  -Sepsis indicated-tachycardia, lactic acidosis, cellulitis  -c/w  cefazolin   -Wound care consulted. -Blood culture staph aureus resistant to clindamycin and erythromycin. Repeat on 3/27 NGTD x 2 days  -Wound culture +ve for E. coli, staph aureus and diphtheroids. Sensitivities noted. -Repeat blood cultures until negative, ECHO inadequate to assess for endocarditis. Repeat blood cultures NGTD. Will discuss with ID about need for AN  -Plan to remove Chemo-Port as per ID recommendation  -wound culture growing GNR, staph aureus and diphteroids  consult Dr. Blair Lovett, she says patient not a surgical candidate for intevention  -Patient agree for hospice, inpatient hospice consult was placed  -Consulted palliative care  -Consulted hematology/oncology, appreciate recs  -Chemo-Port was removed yesterday  -Dilaudid more effective for pain control  - -Continue cefazolin 2gm q8h.   - Duration of abx anticipated is 2 weeks from negative BCx  -Follow-up with hospice  recommendation patient may need midline insertion to be discharged with IV antibiotic    -increase frequency of Dilaudid IV, discussed with the palliative team, likely will need to increase long-acting pain medication. We will get a midline, discussed with ID, patient may need 2 weeks of IV antibiotics     Acute on chronic microcytic anemia  -Hemoglobin 8.1  -s/p venofer, give another dose today. 200 mg IV x 3 days  - Trend hgb, transfuse for hgb < 7        Code Status: Full code  Surrogate Decision Maker: Daughter     DVT Prophylaxis: Lovenox  GI Prophylaxis: not indicated     Baseline: Ambulatory at home      25.0 - 29.9 Overweight / Body mass index is 33.49 kg/m². Estimated discharge date: April 3  Barriers: Abx therapy, ID input    Code status: Full  Prophylaxis: Lovenox  Recommended Disposition: Home w/Family     Subjective:     Chief Complaint / Reason for Physician Visit  Reports pain uncontrolled, daughter at the bedside. Review of Systems:  Symptom Y/N Comments  Symptom Y/N Comments   Fever/Chills n   Chest Pain n    Poor Appetite    Edema     Cough    Abdominal Pain     Sputum    Joint Pain     SOB/WADSWORTH n   Pruritis/Rash     Nausea/vomit    Tolerating PT/OT     Diarrhea    Tolerating Diet y    Constipation    Other       Could NOT obtain due to:      Objective:     VITALS:   Last 24hrs VS reviewed since prior progress note.  Most recent are:  Patient Vitals for the past 24 hrs:   Temp Pulse Resp BP SpO2   04/01/22 0900 -- (!) 119 -- -- --   04/01/22 0800 -- (!) 110 -- -- --   04/01/22 0700 98.6 °F (37 °C) (!) 110 -- 120/69 --   04/01/22 0310 98.5 °F (36.9 °C) (!) 103 14 134/74 97 %   03/31/22 2309 98 °F (36.7 °C) 100 16 133/75 97 %   03/31/22 1955 98.6 °F (37 °C) (!) 106 16 (!) 143/68 94 %   03/31/22 1800 -- (!) 106 -- -- --   03/31/22 1700 -- (!) 113 -- -- 96 %   03/31/22 1600 97.7 °F (36.5 °C) (!) 108 19 (!) 143/67 97 %   03/31/22 1500 -- (!) 119 -- -- 95 %   03/31/22 1400 -- (!) 122 -- -- --   03/31/22 1300 -- (!) 114 -- 128/74 97 %     No intake or output data in the 24 hours ending 04/01/22 1155     I had a face to face encounter and independently examined this patient on 4/1/2022, as outlined below:  PHYSICAL EXAM:  General: WD, WN. Alert, cooperative, no acute distress    EENT:  EOMI. Anicteric sclerae. MMM  Resp:  CTA bilaterally, no wheezing or rales. No accessory muscle use  CV:  Regular  rhythm,  LUE edema  GI:  Soft, Non distended, Non tender. +Bowel sounds  Neurologic:  Alert and oriented X 3, normal speech,   Psych:   Good insight. Not anxious nor agitated  Skin:  No rashes. No jaundice, fungating mass with exudate encompassing the entire left breast.      Reviewed most current lab test results and cultures  YES  Reviewed most current radiology test results   YES  Review and summation of old records today    NO  Reviewed patient's current orders and MAR    YES  PMH/SH reviewed - no change compared to H&P  ________________________________________________________________________  Care Plan discussed with:    Comments   Patient x    Family  x    RN     Care Manager     Consultant                        Multidiciplinary team rounds were held today with , nursing, pharmacist and clinical coordinator. Patient's plan of care was discussed; medications were reviewed and discharge planning was addressed. ________________________________________________________________________  Total NON critical care TIME:  34   Minutes    Total CRITICAL CARE TIME Spent:   Minutes non procedure based      Comments   >50% of visit spent in counseling and coordination of care     ________________________________________________________________________  Merna Valdez MD     Procedures: see electronic medical records for all procedures/Xrays and details which were not copied into this note but were reviewed prior to creation of Plan. LABS:  I reviewed today's most current labs and imaging studies.   Pertinent labs include:  Recent Labs     03/31/22 0329 03/30/22  0524   WBC 4.9 5.1   HGB 8.2* 8.1*   HCT 28.1* 28.1*   * 428*     Recent Labs     04/01/22  0022 03/31/22 0329 03/30/22  0524   NA 139 138 139   K 3.6 3.1* 3.3*    106 107   CO2 25 23 24   * 82 75   BUN 8 6 5*   CREA 0.72 0.72 0.65   CA 8.6 8.4* 8.3*   MG  --   --  1.9   PHOS  --   --  3.4       Signed: Giovanni Benjamin MD

## 2022-04-02 NOTE — PROGRESS NOTES
Problem: Falls - Risk of  Goal: *Absence of Falls  Description: Document Irineo Ford Fall Risk and appropriate interventions in the flowsheet.   Outcome: Progressing Towards Goal  Note: Fall Risk Interventions:  Mobility Interventions: Patient to call before getting OOB         Medication Interventions: Patient to call before getting OOB,Evaluate medications/consider consulting pharmacy,Teach patient to arise slowly    Elimination Interventions: Call light in reach,Patient to call for help with toileting needs              Problem: Patient Education: Go to Patient Education Activity  Goal: Patient/Family Education  Outcome: Progressing Towards Goal

## 2022-04-02 NOTE — PROGRESS NOTES
End of Shift Note    Bedside shift change report given to [employer] (oncoming nurse) by Joe Ann RN (offgoing nurse). Report included the following information SBAR, Kardex and MAR    Shift worked:  [id number]     Shift summary and any significant changes:    Patient need pain management control before discharge     Concerns for physician to address:       Zone phone for oncoming shift:          Activity:  Activity Level: Up ad roque  Number times ambulated in hallways past shift: 0  Number of times OOB to chair past shift: 0    Cardiac:   Cardiac Monitoring: Yes      Cardiac Rhythm: Sinus Tachy    Access:   Current line(s): midline     Genitourinary:   Urinary status: voiding    Respiratory:   O2 Device: None (Room air)  Chronic home O2 use?: NO  Incentive spirometer at bedside: YES       GI:  Last Bowel Movement Date: 04/01/22  Current diet:  DIET ONE TIME MESSAGE  ADULT ORAL NUTRITION SUPPLEMENT Breakfast, Lunch, Dinner; Standard High Calorie/High Protein  ADULT DIET Regular; 5 carb choices (75 gm/meal); Please send both milk and ensure enlive chocolate with every meal. Thank you, RD  Passing flatus: YES  Tolerating current diet: YES       Pain Management:   Patient states pain is manageable on current regimen: NO    Skin:  Geovanny Score: 19  Interventions: increase time out of bed    Patient Safety:  Fall Score:  Total Score: 1  Interventions: gripper socks       Length of Stay:  Expected LOS: 3d 12h  Actual LOS: 8      Joe Ann RN

## 2022-04-02 NOTE — PROGRESS NOTES
Problem: Falls - Risk of  Goal: *Absence of Falls  Description: Document Judah Jewell Fall Risk and appropriate interventions in the flowsheet.   Outcome: Progressing Towards Goal  Note: Fall Risk Interventions:  Mobility Interventions: Patient to call before getting OOB         Medication Interventions: Bed/chair exit alarm,Teach patient to arise slowly,Patient to call before getting OOB    Elimination Interventions: Call light in reach,Patient to call for help with toileting needs,Stay With Me (per policy)              Problem: Patient Education: Go to Patient Education Activity  Goal: Patient/Family Education  Outcome: Progressing Towards Goal     Problem: Infection - Risk of, Central Venous Catheter-Associated Bloodstream Infection  Goal: *Absence of infection signs and symptoms  Outcome: Progressing Towards Goal     Problem: Patient Education: Go to Patient Education Activity  Goal: Patient/Family Education  Outcome: Progressing Towards Goal

## 2022-04-02 NOTE — PROGRESS NOTES
1900: Bedside and Verbal shift change report given to candice (oncoming nurse) by Janell Luna (offgoing nurse). Report included the following information SBAR, Kardex, Intake/Output, MAR, Recent Results and Cardiac Rhythm sinus tach. 2300: wound care done to [facility]       End of Shift Note    Bedside shift change report given to  lucy(oncoming nurse) by Catherine Pearson (offgoing nurse). Report included the following information SBAR, Kardex, Intake/Output, MAR, Recent Results and Cardiac Rhythm sinus tach    Shift worked:  night     Shift summary and any significant changes:     pain has not been controlled w/ new regimen per palliative. Re-evaluation needs to be done. Home health care (IV admin & wound care)  needs should be addressed prior to d/c. Concerns for physician to address:  above      Zone phone for oncoming shift:          Activity:  Activity Level: Up with Assistance,Up ad roque  Number times ambulated in hallways past shift: 0  Number of times OOB to chair past shift: 0    Cardiac:   Cardiac Monitoring: Yes      Cardiac Rhythm: Sinus Tachy    Access:   Current line(s): midline     Genitourinary:   Urinary status: voiding    Respiratory:   O2 Device: None (Room air)  Chronic home O2 use?: NO  Incentive spirometer at bedside: NO       GI:  Last Bowel Movement Date: 03/31/22  Current diet:  DIET ONE TIME MESSAGE  ADULT ORAL NUTRITION SUPPLEMENT Breakfast, Lunch, Dinner; Standard High Calorie/High Protein  ADULT DIET Regular; 5 carb choices (75 gm/meal); Please send both milk and ensure enlive chocolate with every meal. Thank you, RD  Passing flatus: YES  Tolerating current diet: YES       Pain Management:   Patient states pain is manageable on current regimen: NO    Skin:  Geovanny Score: 20  Interventions: float heels, increase time out of bed, foam dressing and PT/OT consult    Patient Safety:  Fall Score:  Total Score: 1  Interventions: gripper socks, pt to call before getting OOB and stay with me (per policy)       Length of Stay:  Expected LOS: 3d 12h  Actual LOS: [address]

## 2022-04-02 NOTE — PROGRESS NOTES
Hospitalist Progress Note    NAME: Andrea Cox   :  1960   MRN:  [id number]       Assessment / Plan:  Metastatic breast cancer  Fungating left breast cancer  Cellulitis of the left breast  Severe sepsis, MSSA bacteremia, improved  -CTA chest-1.  No evidence of pulmonary embolus. 2.  New moderate left and trace right pleural effusions. 3. Bulky bilateral axillary lymphadenopathy, which is worsened since the prior  study. 4. Extensive skin thickening of both breasts, left worse than right, and  malignant infiltration of both breasts, which has worsened since the prior  study. 5. Extensive mediastinal lymphadenopathy, which has worsened since the prior  study. 5. Evidence of sternal osseous metastatic disease.  -Lactic acid still elevated  -Sepsis indicated-tachycardia, lactic acidosis, cellulitis  -c/w  cefazolin   -Wound care consulted. -Blood culture staph aureus resistant to clindamycin and erythromycin. Repeat on 3/27 NGTD x 2 days  -Wound culture +ve for E. coli, staph aureus and diphtheroids. Sensitivities noted. -Repeat blood cultures until negative, ECHO inadequate to assess for endocarditis. Repeat blood cultures NGTD. Will discuss with ID about need for AN  -Plan to remove Chemo-Port as per ID recommendation  -wound culture growing GNR, staph aureus and diphteroids  consult Dr. Trevor Simpson, she says patient not a surgical candidate for intevention  -Patient agree for hospice, inpatient hospice consult was placed  -Consulted palliative care  -Consulted hematology/oncology, appreciate recs  -Chemo-Port was removed yesterday  -Dilaudid more effective for pain control  - -Continue cefazolin 2gm q8h.   - Duration of abx anticipated is 2 weeks from negative BCx  -Follow-up with hospice  recommendation patient may need midline insertion to be discharged with IV antibiotic    -increase frequency of Dilaudid IV, discussed with the palliative team, likely will need to increase long-acting pain medication. We will get a midline, discussed with ID, patient may need 2 weeks of IV antibiotics    4/2-continues to use multiple IV Dilaudid . I will increase OxyContin to 25 mg twice daily, continue scheduled oxycodone IR. Continue Dilaudid 2 mg every 4 hour as needed. Acute on chronic microcytic anemia  -Hemoglobin 8.1  -s/p venofer, give another dose today. 200 mg IV x 3 days  - Trend hgb, transfuse for hgb < 7        Code Status: Full code  Surrogate Decision Maker: Daughter     DVT Prophylaxis: Lovenox  GI Prophylaxis: not indicated     Baseline: Ambulatory at home      25.0 - 29.9 Overweight / Body mass index is 33.49 kg/m². Estimated discharge date: April 4  Barriers: Pain control    Code status: Full  Prophylaxis: Lovenox  Recommended Disposition: Home w/Family     Subjective:     Chief Complaint / Reason for Physician Visit  Continues to require as needed IV Dilaudid. Says pain is uncontrolled  Review of Systems:  Symptom Y/N Comments  Symptom Y/N Comments   Fever/Chills n   Chest Pain n    Poor Appetite    Edema     Cough    Abdominal Pain     Sputum    Joint Pain     SOB/WADSWORTH n   Pruritis/Rash     Nausea/vomit    Tolerating PT/OT     Diarrhea    Tolerating Diet y    Constipation    Other       Could NOT obtain due to:      Objective:     VITALS:   Last 24hrs VS reviewed since prior progress note.  Most recent are:  Patient Vitals for the past 24 hrs:   Temp Pulse Resp BP SpO2   04/02/22 1025 98.7 °F (37.1 °C) (!) 128 18 136/69 95 %   04/02/22 0716 99.3 °F (37.4 °C) (!) 129 17 113/70 95 %   04/02/22 0314 98.9 °F (37.2 °C) (!) 122 16 127/76 96 %   04/01/22 2311 98.8 °F (37.1 °C) (!) 120 14 (!) 140/86 96 %   04/01/22 1924 99 °F (37.2 °C) (!) 120 20 (!) 143/77 96 %   04/01/22 1800 -- (!) 119 19 139/79 97 %   04/01/22 1700 -- (!) 115 -- -- 95 %   04/01/22 1600 -- (!) 110 -- -- 97 %   04/01/22 1500 -- (!) 121 -- -- --     No intake or output data in the 24 hours ending 04/02/22 1205     I had a face to face encounter and independently examined this patient on 4/2/2022, as outlined below:  PHYSICAL EXAM:  General: WD, WN. Alert, cooperative, no acute distress    EENT:  EOMI. Anicteric sclerae. MMM  Resp:  CTA bilaterally, no wheezing or rales. No accessory muscle use  CV:  Regular  rhythm,  LUE edema  GI:  Soft, Non distended, Non tender. +Bowel sounds  Neurologic:  Alert and oriented X 3, normal speech,   Psych:   Good insight. Not anxious nor agitated  Skin:  No rashes. No jaundice, fungating mass with exudate encompassing the entire left breast.      Reviewed most current lab test results and cultures  YES  Reviewed most current radiology test results   YES  Review and summation of old records today    NO  Reviewed patient's current orders and MAR    YES  PMH/SH reviewed - no change compared to H&P  ________________________________________________________________________  Care Plan discussed with:    Comments   Patient x    Family  x    RN     Care Manager     Consultant                        Multidiciplinary team rounds were held today with , nursing, pharmacist and clinical coordinator. Patient's plan of care was discussed; medications were reviewed and discharge planning was addressed. ________________________________________________________________________  Total NON critical care TIME:  34   Minutes    Total CRITICAL CARE TIME Spent:   Minutes non procedure based      Comments   >50% of visit spent in counseling and coordination of care     ________________________________________________________________________  Torey Dejesus MD     Procedures: see electronic medical records for all procedures/Xrays and details which were not copied into this note but were reviewed prior to creation of Plan. LABS:  I reviewed today's most current labs and imaging studies.   Pertinent labs include:  Recent Labs     03/31/22  0329   WBC 4.9   HGB 8.2*   HCT 28.1*   *     Recent Labs 04/02/22  0321 04/01/22  0022 03/31/22 0329    139 138   K 4.2 3.6 3.1*    106 106   CO2 27 25 23   GLU 91 101* 82   BUN 8 8 6   CREA 0.70 0.72 0.72   CA 8.5 8.6 8.4*       Signed: Noah Dixon MD

## 2022-04-02 NOTE — PROGRESS NOTES
13: 00PM Spoke jessica/[employer] ([employer]). Writer informed Medicaid portal is down and unable to verify insurance benefits for home IV ABX until Monday.         Raghu Arambula, MSW

## 2022-04-02 NOTE — PROGRESS NOTES
Problem: Falls - Risk of  Goal: *Absence of Falls  Description: Document Bin Pereira Fall Risk and appropriate interventions in the flowsheet.   Outcome: Progressing Towards Goal  Note: Fall Risk Interventions:  Mobility Interventions: Patient to call before getting OOB         Medication Interventions: Bed/chair exit alarm,Patient to call before getting OOB,Teach patient to arise slowly    Elimination Interventions: Call light in reach,Patient to call for help with toileting needs              Problem: Patient Education: Go to Patient Education Activity  Goal: Patient/Family Education  Outcome: Progressing Towards Goal     Problem: Infection - Risk of, Central Venous Catheter-Associated Bloodstream Infection  Goal: *Absence of infection signs and symptoms  Outcome: Progressing Towards Goal     Problem: Patient Education: Go to Patient Education Activity  Goal: Patient/Family Education  Outcome: Progressing Towards Goal

## 2022-04-03 NOTE — PROGRESS NOTES
Report received from Lillian Lord, [facility]    1900- End of Shift Note    Bedside shift change report given to Kayleen Fields RN (oncoming nurse) by Palomo Hunter RN (offgoing nurse). Report included the following information SBAR, Kardex and MAR    Shift worked:  7a-7p     Shift summary and any significant changes:     pain management     Concerns for physician to address:       Zone phone for oncoming shift:          Activity:  Activity Level: Up ad roque  Number times ambulated in hallways past shift: 0  Number of times OOB to chair past shift: 0    Cardiac:   Cardiac Monitoring: Yes      Cardiac Rhythm: Sinus Tachy    Access:   Current line(s): midline     Genitourinary:   Urinary status: voiding    Respiratory:   O2 Device: None (Room air)  Chronic home O2 use?: NO  Incentive spirometer at bedside: NO       GI:  Last Bowel Movement Date: 04/01/22  Current diet:  DIET ONE TIME MESSAGE  ADULT ORAL NUTRITION SUPPLEMENT Breakfast, Lunch, Dinner; Standard High Calorie/High Protein  ADULT DIET Regular; 5 carb choices (75 gm/meal); Please send both milk and ensure enlive chocolate with every meal. Thank you, RD  Passing flatus: YES  Tolerating current diet: YES       Pain Management:   Patient states pain is manageable on current regimen: NO    Skin:  Geovanny Score: 19  Interventions: increase time out of bed    Patient Safety:  Fall Score:  Total Score: 1  Interventions: gripper socks and pt to call before getting OOB       Length of Stay:  Expected LOS: 3d 12h  Actual LOS: [address] Talha Blas RN

## 2022-04-03 NOTE — PROGRESS NOTES
Received notification from bedside RN about patient with regards to: tachycardia, elevated temp.  ECG showed ST  VS: Temp 100.6, /72, , RR 92% on RA    Intervention given: Lactic, paired BC, arterial stick for lab ordered    0126: Notified of lactic 4.0  VS: /88, , RR 18, O2 sat 95% on RA    -  ml IV bolus, repeat lactic at 0800 ordered

## 2022-04-03 NOTE — PROGRESS NOTES
Hospitalist Progress Note    NAME: River Goldman   :  1960   MRN:  [id number]       Assessment / Plan:  Metastatic breast cancer  Fungating left breast cancer  Cellulitis of the left breast  Severe sepsis, MSSA bacteremia, improved  -CTA chest-1.  No evidence of pulmonary embolus. 2.  New moderate left and trace right pleural effusions. 3. Bulky bilateral axillary lymphadenopathy, which is worsened since the prior  study. 4. Extensive skin thickening of both breasts, left worse than right, and  malignant infiltration of both breasts, which has worsened since the prior  study. 5. Extensive mediastinal lymphadenopathy, which has worsened since the prior  study. 5. Evidence of sternal osseous metastatic disease.  -Lactic acid still elevated  -Sepsis indicated-tachycardia, lactic acidosis, cellulitis  -c/w  cefazolin   -Wound care consulted. -Blood culture staph aureus resistant to clindamycin and erythromycin. Repeat on 3/27 NGTD x 2 days  -Wound culture +ve for E. coli, staph aureus and diphtheroids. Sensitivities noted. -Repeat blood cultures until negative, ECHO inadequate to assess for endocarditis. Repeat blood cultures NGTD. Will discuss with ID about need for AN  -Plan to remove Chemo-Port as per ID recommendation  -wound culture growing GNR, staph aureus and diphteroids  consult Dr. Guy Ku, she says patient not a surgical candidate for intevention  -Patient agree for hospice, inpatient hospice consult was placed  -Consulted palliative care  -Consulted hematology/oncology, appreciate recs  -Chemo-Port was removed yesterday  -Dilaudid more effective for pain control  - -Continue cefazolin 2gm q8h.   - Duration of abx anticipated is 2 weeks from negative BCx  -Follow-up with hospice  recommendation patient may need midline insertion to be discharged with IV antibiotic    -increase frequency of Dilaudid IV, discussed with the palliative team, likely will need to increase long-acting pain medication. We will get a midline, discussed with ID, patient may need 2 weeks of IV antibiotics    4/2-continues to use multiple IV Dilaudid . I will increase OxyContin to 25 mg twice daily, continue scheduled oxycodone IR. Continue Dilaudid 2 mg every 4 hour as needed. 4/3-still requiring frequent Dilaudid, I will increase OxyContin to 40 mg twice daily, change oxycodone IR to Percocet(which she said worked better) PRN continue IV Dilaudid as needed. ,  Hopefully we can change to p.o. tomorrow  Follow blood culture which were drawn yesterday  Lactic acid was drawn yesterday, elevated, but better than before. No need to recheck for now    Acute on chronic microcytic anemia  -Hemoglobin 8.1  -s/p venofer, give another dose today. 200 mg IV x 3 days  - Trend hgb, transfuse for hgb < 7        Code Status: Full code  Surrogate Decision Maker: Daughter     DVT Prophylaxis: Lovenox  GI Prophylaxis: not indicated     Baseline: Ambulatory at home      25.0 - 29.9 Overweight / Body mass index is 33.49 kg/m². Estimated discharge date: April 4  Barriers: Pain control    Code status: Full  Prophylaxis: Lovenox  Recommended Disposition: Home w/Family     Subjective:     Chief Complaint / Reason for Physician Visit  Overnight she had a temp of 100.5. Denies any new symptoms except pain. Review of Systems:  Symptom Y/N Comments  Symptom Y/N Comments   Fever/Chills n   Chest Pain n    Poor Appetite    Edema     Cough    Abdominal Pain     Sputum    Joint Pain     SOB/WADSWORTH n   Pruritis/Rash     Nausea/vomit    Tolerating PT/OT     Diarrhea    Tolerating Diet y    Constipation    Other       Could NOT obtain due to:      Objective:     VITALS:   Last 24hrs VS reviewed since prior progress note.  Most recent are:  Patient Vitals for the past 24 hrs:   Temp Pulse Resp BP SpO2   04/03/22 0800 98.4 °F (36.9 °C) (!) 125 19 124/69 97 %   04/03/22 0401 -- (!) 122 -- -- 92 %   04/03/22 0400 -- (!) 122 -- (!) 152/85 96 % 04/03/22 0315 98.6 °F (37 °C) (!) 125 19 (!) 141/75 96 %   04/03/22 0200 -- (!) 117 -- 136/84 97 %   04/03/22 0139 98.4 °F (36.9 °C) (!) 130 20 (!) 150/80 96 %   04/02/22 2345 99.4 °F (37.4 °C) (!) 141 18 (!) 147/88 95 %   04/02/22 2244 -- (!) 142 -- -- --   04/02/22 2242 -- (!) 138 -- -- --   04/02/22 2241 -- (!) 138 -- -- 92 %   04/02/22 2240 (!) 100.6 °F (38.1 °C) (!) 138 -- 118/72 92 %   04/02/22 2149 98.5 °F (36.9 °C) -- -- -- --   04/02/22 1951 (!) 100.5 °F (38.1 °C) 89 18 123/81 100 %   04/02/22 1612 99 °F (37.2 °C) (!) 129 18 (!) 142/74 95 %   04/02/22 1156 -- -- -- -- 95 %     No intake or output data in the 24 hours ending 04/03/22 1127     I had a face to face encounter and independently examined this patient on 4/3/2022, as outlined below:  PHYSICAL EXAM:  General: WD, WN. Alert, cooperative, no acute distress    EENT:  EOMI. Anicteric sclerae. MMM  Resp:  CTA bilaterally, no wheezing or rales. No accessory muscle use  CV:  Regular  rhythm,  LUE edema  GI:  Soft, Non distended, Non tender. +Bowel sounds  Neurologic:  Alert and oriented X 3, normal speech,   Psych:   Good insight. Not anxious nor agitated  Skin:  No rashes. No jaundice, fungating mass with exudate encompassing the entire left breast.      Reviewed most current lab test results and cultures  YES  Reviewed most current radiology test results   YES  Review and summation of old records today    NO  Reviewed patient's current orders and MAR    YES  PMH/SH reviewed - no change compared to H&P  ________________________________________________________________________  Care Plan discussed with:    Comments   Patient x    Family  x    RN     Care Manager     Consultant                        Multidiciplinary team rounds were held today with , nursing, pharmacist and clinical coordinator. Patient's plan of care was discussed; medications were reviewed and discharge planning was addressed. ________________________________________________________________________  Total NON critical care TIME:  34   Minutes    Total CRITICAL CARE TIME Spent:   Minutes non procedure based      Comments   >50% of visit spent in counseling and coordination of care     ________________________________________________________________________  Torey Dejesus MD     Procedures: see electronic medical records for all procedures/Xrays and details which were not copied into this note but were reviewed prior to creation of Plan. LABS:  I reviewed today's most current labs and imaging studies.   Pertinent labs include:  Recent Labs     04/03/22  0017   WBC 9.9   HGB 8.3*   HCT 27.7*   *     Recent Labs     04/03/22  0017 04/02/22  0321 04/01/22  0022   * 138 139   K 3.8 4.2 3.6    105 106   CO2 22 27 25   * 91 101*   BUN 9 8 8   CREA 0.77 0.70 0.72   CA 8.4* 8.5 8.6       Signed: Torey Dejesus MD

## 2022-04-03 NOTE — PROGRESS NOTES
1930: Shift report received from RN    2000: Temp 100.5, NP informed, order to drawn blood cultures once temp reach >100.9    2222: Bilt breast pain 8/10, HR >140, EKG complete, Diluadid administered, Np informed     2242: Np at the bedside to assess patient     2300: Patient verbalizing relief from previous intervention, HR remains >135.  denies sob, chest pain     0015: AM labs drawn along with blood cultures and lactic acid     0130: Lactic 4, Np informed, redraw at 8am, rates pain 5/10, patient verbalizing pain is manageable     0400: Patient remains tachycardic, >120, Np aware, denies sob and chest pain     500:  Patients pain still uncontrolled despite receiving scheduled and Prn medication     0730: Shift report given to  [employer]

## 2022-04-03 NOTE — PROGRESS NOTES
Problem: Falls - Risk of  Goal: *Absence of Falls  Description: Document Windy Paul Fall Risk and appropriate interventions in the flowsheet.   Outcome: Progressing Towards Goal  Note: Fall Risk Interventions:  Mobility Interventions: Patient to call before getting OOB         Medication Interventions: Teach patient to arise slowly,Patient to call before getting OOB    Elimination Interventions: Call light in reach,Patient to call for help with toileting needs              Problem: Patient Education: Go to Patient Education Activity  Goal: Patient/Family Education  Outcome: Progressing Towards Goal     Problem: Infection - Risk of, Central Venous Catheter-Associated Bloodstream Infection  Goal: *Absence of infection signs and symptoms  Outcome: Progressing Towards Goal     Problem: Patient Education: Go to Patient Education Activity  Goal: Patient/Family Education  Outcome: Progressing Towards Goal

## 2022-04-04 NOTE — PROGRESS NOTES
Hospitalist Progress Note    NAME: Ghazala Head   :  1960   MRN:  [id number]       Assessment / Plan:  Metastatic breast cancer  Fungating left breast cancer  Cellulitis of the left breast  Severe sepsis, MSSA bacteremia, improved  -CTA chest-1.  No evidence of pulmonary embolus. 2.  New moderate left and trace right pleural effusions. 3. Bulky bilateral axillary lymphadenopathy, which is worsened since the prior  study. 4. Extensive skin thickening of both breasts, left worse than right, and  malignant infiltration of both breasts, which has worsened since the prior  study. 5. Extensive mediastinal lymphadenopathy, which has worsened since the prior  study. 5. Evidence of sternal osseous metastatic disease.  -Lactic acid still elevated  -Sepsis indicated-tachycardia, lactic acidosis, cellulitis  -c/w  cefazolin   -Wound care consulted. -Blood culture staph aureus resistant to clindamycin and erythromycin. Repeat on 3/27 NGTD x 2 days  -Wound culture +ve for E. coli, staph aureus and diphtheroids. Sensitivities noted. -Repeat blood cultures until negative, ECHO inadequate to assess for endocarditis. Repeat blood cultures NGTD. Will discuss with ID about need for AN  -Plan to remove Chemo-Port as per ID recommendation  -wound culture growing GNR, staph aureus and diphteroids  consult Dr. Dorene Gonzalez, she says patient not a surgical candidate for intevention  -Patient agree for hospice, inpatient hospice consult was placed  -Consulted palliative care  -Consulted hematology/oncology, appreciate recs  -Chemo-Port was removed yesterday  -Dilaudid more effective for pain control  - -Continue cefazolin 2gm q8h.   - Duration of abx anticipated is 2 weeks from negative BCx  -Follow-up with hospice  recommendation patient may need midline insertion to be discharged with IV antibiotic    -increase frequency of Dilaudid IV, discussed with the palliative team, likely will need to increase long-acting pain medication. We will get a midline, discussed with ID, patient may need 2 weeks of IV antibiotics    4/2-continues to use multiple IV Dilaudid . I will increase OxyContin to 25 mg twice daily, continue scheduled oxycodone IR. Continue Dilaudid 2 mg every 4 hour as needed. 4/3-still requiring frequent Dilaudid, I will increase OxyContin to 40 mg twice daily, change oxycodone IR to Percocet(which she said worked better) PRN continue IV Dilaudid as needed. ,  Hopefully we can change to p.o. tomorrow  Follow blood culture which were drawn yesterday  Lactic acid was drawn yesterday, elevated, but better than before. No need to recheck for now    4/4: pain improved, Palliative team to manage pain control. Will check CBC tomorrow  Add Incentive spirometer  Repeat bcx negative so far    Acute on chronic microcytic anemia  -Hemoglobin 8.1  -s/p venofer, give another dose today. 200 mg IV x 3 days  - Trend hgb, transfuse for hgb < 7        Code Status: Full code  Surrogate Decision Maker: Daughter     DVT Prophylaxis: Lovenox  GI Prophylaxis: not indicated     Baseline: Ambulatory at home      25.0 - 29.9 Overweight / Body mass index is 33.49 kg/m². Estimated discharge date: April 4  Barriers: Pain control    Code status: Full  Prophylaxis: Lovenox  Recommended Disposition: Home w/Family     Subjective:     Chief Complaint / Reason for Physician Visit  Reports pain is better than yesterday, still requiring multiple IV dialudid  Review of Systems:  Symptom Y/N Comments  Symptom Y/N Comments   Fever/Chills n   Chest Pain n    Poor Appetite    Edema     Cough    Abdominal Pain     Sputum    Joint Pain     SOB/WADSWORTH n   Pruritis/Rash     Nausea/vomit    Tolerating PT/OT     Diarrhea    Tolerating Diet y    Constipation    Other       Could NOT obtain due to:      Objective:     VITALS:   Last 24hrs VS reviewed since prior progress note.  Most recent are:  Patient Vitals for the past 24 hrs:   Temp Pulse Resp BP SpO2 04/04/22 1059 98.6 °F (37 °C) (!) 123 18 (!) 142/84 94 %   04/04/22 0800 98.2 °F (36.8 °C) (!) 123 18 126/66 98 %   04/04/22 0617 -- (!) 131 -- -- 94 %   04/04/22 0607 -- (!) 131 -- -- --   04/04/22 0557 -- (!) 134 -- (!) 140/72 94 %   04/04/22 0406 99 °F (37.2 °C) (!) 132 -- 131/74 96 %   04/04/22 0336 -- (!) 127 -- -- 94 %   04/04/22 0306 -- (!) 126 -- -- --   04/04/22 0236 -- (!) 128 -- -- 94 %   04/04/22 0206 -- (!) 133 -- 123/73 96 %   04/04/22 0136 -- (!) 133 -- -- 96 %   04/03/22 2239 99.7 °F (37.6 °C) (!) 126 20 133/74 91 %   04/03/22 2200 -- (!) 130 -- 127/68 --   04/03/22 2100 -- (!) 125 -- -- --   04/03/22 2000 98.8 °F (37.1 °C) (!) 119 18 123/70 98 %   04/03/22 1900 -- (!) 113 -- -- 96 %   04/03/22 1519 -- (!) 119 17 123/75 98 %     No intake or output data in the 24 hours ending 04/04/22 1241     I had a face to face encounter and independently examined this patient on 4/4/2022, as outlined below:  PHYSICAL EXAM:  General: WD, WN. Alert, cooperative, no acute distress    EENT:  EOMI. Anicteric sclerae. MMM  Resp:  CTA bilaterally, no wheezing or rales. No accessory muscle use  CV:  Regular  rhythm,  LUE edema  GI:  Soft, Non distended, Non tender. +Bowel sounds  Neurologic:  Alert and oriented X 3, normal speech,   Psych:   Good insight. Not anxious nor agitated  Skin:  No rashes.   No jaundice, fungating mass with exudate encompassing the entire left breast.      Reviewed most current lab test results and cultures  YES  Reviewed most current radiology test results   YES  Review and summation of old records today    NO  Reviewed patient's current orders and MAR    YES  PMH/SH reviewed - no change compared to H&P  ________________________________________________________________________  Care Plan discussed with:    Comments   Patient x    Family  x    RN     Care Manager     Consultant                        Multidiciplinary team rounds were held today with , nursing, pharmacist and clinical coordinator. Patient's plan of care was discussed; medications were reviewed and discharge planning was addressed. ________________________________________________________________________  Total NON critical care TIME:  34   Minutes    Total CRITICAL CARE TIME Spent:   Minutes non procedure based      Comments   >50% of visit spent in counseling and coordination of care     ________________________________________________________________________  Federico Garcia MD     Procedures: see electronic medical records for all procedures/Xrays and details which were not copied into this note but were reviewed prior to creation of Plan. LABS:  I reviewed today's most current labs and imaging studies.   Pertinent labs include:  Recent Labs     04/03/22  0017   WBC 9.9   HGB 8.3*   HCT 27.7*   *     Recent Labs     04/04/22  0853 04/03/22  0017 04/02/22  0321    133* 138   K 4.0 3.8 4.2    101 105   CO2 24 22 27   GLU 84 126* 91   BUN 9 9 8   CREA 0.64 0.77 0.70   CA 8.1* 8.4* 8.5   MG 1.7  --   --        Signed: Federico Garcia MD

## 2022-04-04 NOTE — PROGRESS NOTES
Palliative Medicine Consult  Rl: [phone] ([phone])    Patient Name: Yoon Nuñez  YOB: 1960    Date of Initial Consult: 3/28/22  Reason for Consult: end stage disease  Requesting Provider: Nila Amaro MD  Primary Care Physician: None     SUMMARY:   Yoon Nuñez is a 57 y.o. Female with recurrent  meatstatic  breast cancer, s/p chemo and lumpectomy in 2019, followed by radiation , enrolled in clinical trial at [facility],  extensive dermal metastasis and suspected sternal metastasis, who was admitted on 3/25/2022 from home with a diagnosis of bresat pain , fungating  left breast mass Staph aureus bacteremia and sepsis, Ct chest shows progresssion of mediastinal lymphadenoppthy and axillary lymphadenopathy and infiltrating breast wounds  ID is following   Current medical issues leading to Palliative Medicine involvement include: care decision for end stage disease, patient follow up with out patient palliative care for pain and symptome management and support. Social hx: she has two daughters, she is former smoker quit 2020, used to work for [employer], lives alone, her daughter Marleni Pereira lives close, she stays with her few days a week, other daughter is estranged . At base line patient is independent in function . Home pain medication ; Oxycodone ER(XTAMPZA) 9 MG BID  Roxicodone 20 mg every 4 hrs prn   PALLIATIVE DIAGNOSES:   1. Cancer related pain   2. Metastatic breast cancer with fungation breast mass  3. Goals of care   4. Need for emotional support     PLAN:   1. Follow up visit without family. 2. Pain in left breast :   ·  Current regimen : Currently on 2mg IV Hydromorphone every 4 hrs prn , Oxycontin 40 mg bid ( increased yesterday 4/3/22 from 20 bid ), and percocet 10/325 every 6 hrs prn.   · Patient is relying on I/V dilaudid using every 4 hrs prn, after counseling she is ready to be weaned off I/V dilaudid for d/c home , I will discontinue dilaudid in am .     · I will d/c percocet switch her to dilaudid 2 mg every 4 hrs prn for moderate and severe pain. · Will change I/V dilaudid to 2 mg I/V every 6 hrs prn only for pain not controlled within one hrs of use of oral dilaudid. , will stop I/V dilaudid in am .  · Would not suggest to go up on oxycodone ER, it has been increased from 10 mg to 40 mg in last 4 days, allow time to attain steady state for pain control. · Continue with bowel regimen . GOALS OF CARE / TREATMENT PREFERENCES:     GOALS OF CARE:  Patient/Health Care Proxy Stated Goals:  (traetment of infection for two weeks, then transition to hospice.)    TREATMENT PREFERENCES:   Code Status: Full Code    Patient and family's personal goals include: full restorative care     Important upcoming milestones or family events: The patient identifies the following as important for living well: quality of life      Advance Care Planning:  [x] The [facility] Interdisciplinary Team has updated the ACP Navigator with [address] and Patient Capacity      Primary Decision MakeAnnetta Bellamy - [phone]  Advance Care Planning 1/27/2022   Patient's Healthcare Decision Maker is: Named in scanned ACP document   Confirm Advance Directive Yes, on file   Patient Would Like to Complete Advance Directive -       Medical Interventions: Limited additional interventions       Other:    As far as possible, the palliative care team has discussed with patient / health care proxy about goals of care / treatment preferences for patient.      HISTORY:     History obtained from: chart, patient and bed side RN    CHIEF COMPLAINT: in so much pain    HPI/SUBJECTIVE:    The patient is:   [] Verbal and participatory  Admitted for worsening pain in left breast x few days, denies any nausea or vomiting, bowels are moving.    3/29/22: patient notes pain is better overall, average 2/10, now 7/10 after medication is wearing off, she slept \" good \" last night, bowels are moving every day, denies any nausea or vomiting .      3/30/22l: she slept good, pain is better , denies any nausea or vomiting . She is in good spirits. 4/1: pretty extreme pain and associated anxiety    Clinical Pain Assessment (nonverbal scale for severity on nonverbal patients):   Clinical Pain Assessment  Severity: 6  Location: left breast  Character: \"cutting \"  Duration: weeks  Effect: cannot raise left arm  Factors: moving in certain position increases pain  Frequency: constant          Duration: for how long has pt been experiencing pain (e.g., 2 days, 1 month, years)  Frequency: how often pain is an issue (e.g., several times per day, once every few days, constant)     FUNCTIONAL ASSESSMENT:     Palliative Performance Scale (PPS):  PPS: 60       PSYCHOSOCIAL/SPIRITUAL SCREENING:     Palliative IDT has assessed this patient for cultural preferences / practices and a referral made as appropriate to needs (Cultural Services, Patient Advocacy, Ethics, etc.)    Any spiritual / Methodist concerns:  [] Yes /  [x] No   If \"Yes\" to discuss with pastoral care during IDT     Does caregiver feel burdened by caring for their loved one:   [] Yes /  [x] No /  [] No Caregiver Present    If \"Yes\" to discuss with social work during IDT    Anticipatory grief assessment:   [x] Normal  / [] Maladaptive     If \"Maladaptive\" to discuss with social work during IDT    ESAS Anxiety: Anxiety: 2    ESAS Depression: Depression: 0        REVIEW OF SYSTEMS:     Positive and pertinent negative findings in ROS are noted above in HPI. The following systems were [x] reviewed / [] unable to be reviewed as noted in HPI  Other findings are noted below. Systems: constitutional, ears/nose/mouth/throat, respiratory, gastrointestinal, genitourinary, musculoskeletal, integumentary, neurologic, psychiatric, endocrine. Positive findings noted below.   Modified ESAS Completed by: provider   Fatigue: 9 Drowsiness: 0   Depression: 0 Pain: 6 Anxiety: 2 Nausea: 0   Anorexia: 6 Dyspnea: 0     Constipation: Yes              PHYSICAL EXAM:     From RN flowsheet:  Wt Readings from Last 3 Encounters:   03/31/22 183 lb 1.6 oz (83.1 kg)   12/13/21 175 lb 8 oz (79.6 kg)   12/13/21 175 lb 7.8 oz (79.6 kg)     Blood pressure (!) 142/84, pulse (!) 123, temperature 98.6 °F (37 °C), resp. rate 18, height 5' 2\" (1.575 m), weight 183 lb 1.6 oz (83.1 kg), SpO2 94 %. Pain Scale 1: Numeric (0 - 10)  Pain Intensity 1: 6  Pain Onset 1: acute  Pain Location 1: Breast  Pain Orientation 1: Anterior,Left  Pain Description 1: Aching,Constant  Pain Intervention(s) 1: Medication (see MAR)  Last bowel movement, if known:     Constitutional: alert,oriented x3, in lot of pain   Eyes: pupils equal, anicteric  ENMT: no nasal discharge, moist mucous membranes  Cardiovascular: regular rhythm, distal pulses intact  Respiratory: breathing not labored, symmetric  Gastrointestinal: soft non-tender, +bowel sounds  Musculoskeletal: dressing on left breast , fungating mass left breast per records, I have not examine, swelling of left arm and hand.   Skin: warm, dry  Neurologic: following commands, moving all extremities  Psychiatric: full affect, no hallucinations  Other:       HISTORY:     Active Problems:    Peau d'orange over breast (3/25/2022)      Severe sepsis ([address] (3/25/2022)      Cellulitis of breast (3/25/2022)      Cancer associated pain ()      Goals of care, counseling/discussion ()      Need for emotional support ()      Past Medical History:   Diagnosis Date    Breast cancer ([address]     left side    Menopause       Past Surgical History:   Procedure Laterality Date    HX BREAST BIOPSY Left     x2    HX BREAST LUMPECTOMY Left 9/5/2019    LEFT BREAST LUMPECTOMY WITH ULTRASOUND [facility]    HX HYSTERECTOMY      partial, ovaries remain    IR INSERT TUNL CVC W PORT OVER 5 YEARS  8/16/2021    IR REMOVE TUNL CVAD W PORT/PUMP 3/30/2022    VASCULAR SURGERY PROCEDURE UNLIST      [facility]      Family History   Problem Relation Age of Onset    Cancer Father     Cancer Maternal Aunt     Breast Cancer Maternal Aunt         4 paternal aunts    Cancer Maternal Uncle     Cancer Paternal Aunt     Cancer Paternal Uncle     Breast Cancer Maternal Grandmother       History reviewed, no pertinent family history.   Social History     Tobacco Use    Smoking status: Former Smoker     Packs/day: 0.10     Quit date: 2020     Years since quittin.2    Smokeless tobacco: Never Used   Substance Use Topics    Alcohol use: No     Allergies   Allergen Reactions    Codeine Rash     Rash from tylenol #3      Current Facility-Administered Medications   Medication Dose Route Frequency    0.9% sodium chloride infusion  50 mL/hr IntraVENous CONTINUOUS    oxyCODONE ER (OxyCONTIN) tablet 40 mg  40 mg Oral Q12H    oxyCODONE-acetaminophen (PERCOCET 10)  mg per tablet 1 Tablet  1 Tablet Oral Q6H PRN    HYDROmorphone (DILAUDID) injection 2 mg  2 mg IntraVENous Q4H PRN    senna-docusate (PERICOLACE) 8.6-50 mg per tablet 2 Tablet  2 Tablet Oral DAILY    ceFAZolin (ANCEF) 2 g in sterile water (preservative free) 20 mL IV syringe  2 g IntraVENous Q8H    metroNIDAZOLE (METROGEL) 0.75 % gel   Topical DAILY    sodium chloride (NS) flush 5-40 mL  5-40 mL IntraVENous Q8H    sodium chloride (NS) flush 5-40 mL  5-40 mL IntraVENous PRN    acetaminophen (TYLENOL) tablet 650 mg  650 mg Oral Q6H PRN    Or    acetaminophen (TYLENOL) suppository 650 mg  650 mg Rectal Q6H PRN    polyethylene glycol (MIRALAX) packet 17 g  17 g Oral DAILY PRN    ondansetron (ZOFRAN ODT) tablet 4 mg  4 mg Oral Q8H PRN    Or    ondansetron (ZOFRAN) injection 4 mg  4 mg IntraVENous Q6H PRN    enoxaparin (LOVENOX) injection 40 mg  40 mg SubCUTAneous DAILY          LAB AND IMAGING FINDINGS:     Lab Results   Component Value Date/Time    WBC 9.9 2022 12:17 AM    HGB 8.3 (L) 04/03/2022 12:17 AM    PLATELET 443 (H) 17/93/5340 12:17 AM     Lab Results   Component Value Date/Time    Sodium 137 04/04/2022 08:53 AM    Potassium 4.0 04/04/2022 08:53 AM    Chloride 104 04/04/2022 08:53 AM    CO2 24 04/04/2022 08:53 AM    BUN 9 04/04/2022 08:53 AM    Creatinine 0.64 04/04/2022 08:53 AM    Calcium 8.1 (L) 04/04/2022 08:53 AM    Magnesium 1.7 04/04/2022 08:53 AM    Phosphorus 3.4 03/30/2022 05:24 AM      Lab Results   Component Value Date/Time    Alk. phosphatase 98 03/25/2022 12:57 PM    Protein, total 5.9 (L) 03/25/2022 12:57 PM    Albumin 2.1 (L) 03/25/2022 12:57 PM    Globulin 3.8 03/25/2022 12:57 PM     No results found for: INR, PTMR, PTP, PT1, PT2, APTT, INREXT, INREXT   No results found for: IRON, FE, TIBC, IBCT, PSAT, FERR   No results found for: PH, PCO2, PO2  No components found for: GLPOC   No results found for: CPK, CKMB             Total time: 35 mins  Counseling / coordination time, spent as noted above: 25 mins  > 50% counseling / coordination?: yes     Prolonged service was provided for  []30 min   []75 min in face to face time in the presence of the patient, spent as noted above. Time Start:   Time End:   Note: this can only be billed with 29732 (initial) or 18766 (follow up). If multiple start / stop times, list each separately.

## 2022-04-04 NOTE — PROGRESS NOTES
Bedside and Verbal shift change report given to [address] (oncoming nurse) by  Manual Crock nurse). Report included the following information SBAR, Kardex, Intake/Output, Recent Results and Quality Measures. 1030 previous LA was 4.0 but verified with hospitalist that we are not trending them anymore as of now.

## 2022-04-04 NOTE — PROGRESS NOTES
Transition of Care Plan:    RUR: 11% low risk  Disposition:  Home with Home Infusion ([facility] has accepted and patient is covered at 100%)  Will need HH vs having [facility] provide nursing in their office  Follow up appointments:  PCP, ID  DME needed:  No DME needed at this time  Transportation at Discharge: Daughter to transport  Gisela Cunha or means to access home:  Daughter has access       IM Medicare Letter:  Pt has Medicaid insurance  Is patient a BCPI-A Bundle:   n/a        If yes, was Bundle Letter given?:    Is patient a  and connected with the [place]? N/a             If yes, was [employer] transfer form completed and [facility] notified? Caregiver Contact: DaughterUday ([phone])  Discharge Caregiver contacted prior to discharge? Daughter aware of current discharge plan  Care Conference needed?:  Not at this time    CM reviewed chart. Received telephone call from Kike Vasquez at [address] [place] [address] reporting that patient is covered at 100% for her IV antibiotics. [facility] can provide nursing care if patient can come to their office location in [place]. CM attempted to meet with patient to discuss if she would need HH vs being able to go to the IV infusion office, patient in with physician. CM to follow up with patient to determine if [facility] will need to be arranged prior to discharge or if she will use the nursing support at [address] [place] [address] office. Care Management Interventions  PCP Verified by CM: Yes  Transition of Care Consult (CM Consult): Discharge Planning  Support Systems: Other (Comment) (Lives alone)  Confirm Follow Up Transport: Family  Discharge Location  Patient Expects to be Discharged to[de-identified] Home with home health    Dom Reid.  Celine Woods, [address] [place] [address] - [id number] [address]  [employer] ACP Facilitator  Zone Phone: [phone]

## 2022-04-04 NOTE — PROGRESS NOTES
Music Therapy Assessment  [address] [id number]     1960  57 y.o.  female    Patient Telephone Number: [phone] (home)   Judaism Affiliation: Non Temple   Language: English   Patient Active Problem List    Diagnosis Date Noted    Cancer associated pain     Goals of care, counseling/discussion     Need for emotional support     Peau d'orange over breast 2022    Severe sepsis ([facility] 2022    Cellulitis of breast 2022    Malignant neoplasm of upper-outer quadrant of left breast in female, estrogen receptor negative ([facility] 2019    Severe obesity ([facility] 2018    Migraine 2011        Date: 2022            Total Time (in minutes): 30          MRM 2 PROGRESSIVE CARE    Mental Status:   [x] Alert [  ] Millicent Polite [  ]  Confused  [  ] Minimally responsive  [  ] Sleeping    Communication Status: [  ] Impaired Speech [  ] Nonverbal -N/A    Physical Status:   [  ] Oxygen in use  [  ] Hard of Hearing [  ] Vision Impaired  [  ] Ambulatory  [  ] Ambulatory with assistance [  ] Non-ambulatory -N/A    Music Preferences, Background: Pt said she likes all genres of music. Clinical Problem addressed: Support relaxation and comfort. Goal(s) met in session:  Physical/Pain management (Scale of 1-10):    Pre-session ratin  Post-session ratin, and pt emphasized that her rating remained the same, but her perception of her pain diminished during and following the music experience.   [x] Increased relaxation   [x] Affected breathing patterns  [x] Decreased muscle tension   [  ] Decreased agitation  [  ] Affected heart rate    [  ] Increased alertness     Emotional/Psychological:  [x] Increased self-expression   [  ] Decreased aggressive behavior   [  ] Decreased feelings of stress  [  ] Discussed healthy coping skills     [  ] Improved mood    [  ] Decreased withdrawn behavior     Social:  [  ] Decreased feelings of isolation/loneliness [  ] Positive social interaction   [  ] Provided support and/or comfort for family/friends    Spiritual:  [  ] Spiritual support    [  ] Expressed peace  [  ] Expressed khushboo    [  ] Discussed beliefs    Techniques Utilized (Check all that apply):   [  ] Procedural support MT [x] Music for relaxation [  ] Patient preferred music  [  ] Darline analysis  [  ] Mardene Gulling choice  [  ] Music for validation  [  ] Entrainment  [  ] Movement to music [x] Guided visualization  [  ] Wan Jamison  [  ] Patient instrument playing [  ] Mardene Gulling writing  [  ] Adams Reyes along   [  ] Renita Kindlatia  [  ] Sensory stimulation  [  ] Active Listening  [  ] Music for spiritual support [  ] Making of CDs as gifts    Session Observations:  Referral from [facility] . Patient (pt) was alert lying in bed. This music therapist (MT) introduced self and asked the pt how she was feeling. Pt reported pain that she rated at a 6 out of 10. She requested pain medication before proceeding with the visit. MT stepped out of the pt's room to inform her nurse Marizol choudhury of this. Pt's nurse entered pt's room with MT and provided medication to pt. MT asked the pt about her music preferences and music background. Pt shared vaguely about these. She chose to have music therapy to support relaxation and comfort. MT sat at bedside and played the acoustic guitar at a moderately soft volume and slow tempo. While playing this, MT spoke the pt through deep breathing and mindfulness exercises with guided visualization. Pt increased relaxation in response to the music as evidenced by (AEB) closing her eyes, her breathing slowing and deepening and her facial expression relaxing. MT speculated the pt fell asleep during part of the music experience. Pt opened her eyes and said she felt peaceful. She described envisioning she was floating down a gentle river, with a cool breeze blowing. MT asked the pt how she'd rate her pain.  Pt said she'd still rate it at a 6, and said her perception of her pain changed as a result of the music experience, and it became less bothersome. She expressed gratitude for the session.     ROXANA Hartman (Music Therapist-Board Certified)  Spiritual Care Department  Referral-based service

## 2022-04-04 NOTE — PROGRESS NOTES
End of Shift Note    Bedside shift change report given to Kelley Miller RN(oncoming nurse) by Han Scott RN (offgoing nurse). Report included the following information SBAR, Intake/Output, MAR, Recent Results and Cardiac Rhythm Sunus Tach    Shift worked:  0936-1998     Shift summary and any significant changes:     pain management      Concerns for physician to address:  none     Zone phone for oncoming shift:   [phone]       Activity:  Activity Level: Bath Room Privileges,Up ad roque  Number times ambulated in hallways past shift: 0  Number of times OOB to chair past shift: 0    Cardiac:   Cardiac Monitoring: Yes      Cardiac Rhythm: Sinus Tachy    Access:   Current line(s): midline     Genitourinary:   Urinary status: voiding    Respiratory:   O2 Device: None (Room air)  Chronic home O2 use?: NO  Incentive spirometer at bedside: NO       GI:  Last Bowel Movement Date: 04/01/22  Current diet:  DIET ONE TIME MESSAGE  ADULT ORAL NUTRITION SUPPLEMENT Breakfast, Lunch, Dinner; Standard High Calorie/High Protein  ADULT DIET Regular; 5 carb choices (75 gm/meal); Please send both milk and ensure enlive chocolate with every meal. Thank you, RD  Passing flatus: YES  Tolerating current diet: YES       Pain Management:   Patient states pain is manageable on current regimen: YES    Skin:  Geovanny Score: 19  Interventions: float heels, increase time out of bed and nutritional support     Patient Safety:  Fall Score:  Total Score: 1  Interventions: gripper socks and pt to call before getting OOB       Length of Stay:  Expected LOS: 3d 12h  Actual LOS: [address] Larry Soto RN

## 2022-04-05 NOTE — PROGRESS NOTES
End of Shift Note    Bedside shift change report given to Dennise Morales (oncoming nurse) by Fletcher Arredondo RN (offgoing nurse). Report included the following information SBAR, Kardex, MAR and Recent Results    Shift worked:  7p-7a   Shift summary and any significant changes:    Midline catheter leaking overnight; very positional. Able to get blood return. Day shift aware. HR continues to be elevated in 140's-150's. Dressing change completed. Concerns for physician to address: See above   Zone phone for oncoming shift:  [phone]     Patient Information  Elian Aguilar  57 y.o.  3/25/2022 11:36 AM by Brittany Rees MD. Elian Aguilar was admitted from Home    Problem List  Patient Active Problem List    Diagnosis Date Noted    Cancer associated pain     Goals of care, counseling/discussion     Need for emotional support     Peau d'orange over breast 03/25/2022    Severe sepsis ([address] 03/25/2022    Cellulitis of breast 03/25/2022    Malignant neoplasm of upper-outer quadrant of left breast in female, estrogen receptor negative ([address] 02/01/2019    Severe obesity ([address] 12/28/2018    Migraine 07/27/2011     Past Medical History:   Diagnosis Date    Breast cancer ([address]     left side    Menopause        Core Measures:  CVA: No No  CHF:No No  PNA:No No    Activity:  Activity Level: Up ad roque,Bath Room Privileges  Number times ambulated in hallways past shift: 0  Number of times OOB to chair past shift: 0    Cardiac:   Cardiac Monitoring: Yes      Cardiac Rhythm: Sinus Tachy    Access:   Current line(s): midline   Central Line? Yes Placement date; 4/1  PICC LINE? No     Genitourinary:   Urinary status: voiding  Urinary Catheter?  No     Respiratory:   O2 Device: None (Room air)  Chronic home O2 use?: NO  Incentive spirometer at bedside: NO       GI:  Last Bowel Movement Date: 04/04/22  Current diet:  DIET ONE TIME MESSAGE  ADULT ORAL NUTRITION SUPPLEMENT Breakfast, Lunch, Dinner; Standard High Calorie/High Protein  ADULT DIET Regular; 5 carb choices (75 gm/meal); Please send both milk and ensure enlive chocolate with every meal. Thank you, RD  Passing flatus: YES  Tolerating current diet: YES       Pain Management:   Patient states pain is manageable on current regimen: YES    Skin:  Geovanny Score: 20  Interventions: increase time out of bed    Patient Safety:  Fall Score:  Total Score: 1  Interventions: bed/chair alarm, assistive device (walker, cane, etc) and gripper socks     @Rollbelt  @dexterity to release roll belt  Yes/No ( must document dexterity  here by stating Yes or No here, otherwise this is a restraint and must follow restraint documentation policy.)    DVT prophylaxis:  DVT prophylaxis Med- Yes  DVT prophylaxis SCD or ANNIE- No     Wounds: (If Applicable)  Wounds- Yes  Location; L breast    Active Consults:  IP CONSULT TO ONCOLOGY  IP CONSULT TO PALLIATIVE CARE - PROVIDER  IP CONSULT TO INFECTIOUS DISEASES  IP CONSULT TO INFECTIOUS DISEASES  IP CONSULT TO HEMATOLOGY  IP CONSULT TO INTERVENTIONAL RADIOLOGY  IP CONSULT TO INTERVENTIONAL RADIOLOGY  IP CONSULT TO HOSPITALIST  IP CONSULT TO GENERAL SURGERY    Length of Stay:  Expected LOS: 3d 12h  Actual LOS: 11  Discharge Plan: Yes; home with       Clent Oppenheim, RN

## 2022-04-05 NOTE — PROGRESS NOTES
Problem: Falls - Risk of  Goal: *Absence of Falls  Description: Document Morene Hole Fall Risk and appropriate interventions in the flowsheet.   Outcome: Progressing Towards Goal  Note: Fall Risk Interventions:  Mobility Interventions: Bed/chair exit alarm         Medication Interventions: Assess postural VS orthostatic hypotension    Elimination Interventions: Call light in reach              Problem: Patient Education: Go to Patient Education Activity  Goal: Patient/Family Education  Outcome: Progressing Towards Goal     Problem: Infection - Risk of, Central Venous Catheter-Associated Bloodstream Infection  Goal: *Absence of infection signs and symptoms  Outcome: Progressing Towards Goal     Problem: Patient Education: Go to Patient Education Activity  Goal: Patient/Family Education  Outcome: Progressing Towards Goal

## 2022-04-05 NOTE — PROGRESS NOTES
Patient assessed at the bedside. Alert and oriented. C/o of chronic pain at the left breast. Pain management addressed. Education provided. RA. Had BM today. Ambulated to bathroom with stand by assist. Midline assessed and patency noted. Elevated Swelling LUE. Daughter at the bedside. CT abd. Today.

## 2022-04-05 NOTE — PROGRESS NOTES
Hospitalist Progress Note    NAME: Elian Aguilar   :  1960   MRN:  [id number]       Assessment / Plan:  Metastatic breast cancer  Fungating left breast cancer  Cellulitis of the left breast  Severe sepsis, MSSA bacteremia, improved  -CTA chest-1.  No evidence of pulmonary embolus. 2.  New moderate left and trace right pleural effusions. 3. Bulky bilateral axillary lymphadenopathy, which is worsened since the prior  study. 4. Extensive skin thickening of both breasts, left worse than right, and  malignant infiltration of both breasts, which has worsened since the prior  study. 5. Extensive mediastinal lymphadenopathy, which has worsened since the prior  study. 5. Evidence of sternal osseous metastatic disease.  -Lactic acid still elevated  -Sepsis indicated-tachycardia, lactic acidosis, cellulitis  -c/w  cefazolin   -Wound care consulted. -Blood culture staph aureus resistant to clindamycin and erythromycin. Repeat on 3/27 NGTD x 2 days  -Wound culture +ve for E. coli, staph aureus and diphtheroids. Sensitivities noted. -Repeat blood cultures until negative, ECHO inadequate to assess for endocarditis. Repeat blood cultures NGTD. Will discuss with ID about need for AN  -Plan to remove Chemo-Port as per ID recommendation  -wound culture growing GNR, staph aureus and diphteroids  consult Dr. Tabitha Staton, she says patient not a surgical candidate for intevention  -Patient agree for hospice, inpatient hospice consult was placed  -Consulted palliative care  -Consulted hematology/oncology, appreciate recs  -Chemo-Port was removed yesterday  -Dilaudid more effective for pain control  - -Continue cefazolin 2gm q8h.   - Duration of abx anticipated is 2 weeks from negative BCx  -Follow-up with hospice  recommendation patient may need midline insertion to be discharged with IV antibiotic    -increase frequency of Dilaudid IV, discussed with the palliative team, likely will need to increase long-acting pain medication. We will get a midline, discussed with ID, patient may need 2 weeks of IV antibiotics    4/2-continues to use multiple IV Dilaudid . I will increase OxyContin to 25 mg twice daily, continue scheduled oxycodone IR. Continue Dilaudid 2 mg every 4 hour as needed. 4/3-still requiring frequent Dilaudid, I will increase OxyContin to 40 mg twice daily, change oxycodone IR to Percocet(which she said worked better) PRN continue IV Dilaudid as needed. ,  Hopefully we can change to p.o. tomorrow  Follow blood culture which were drawn yesterday  Lactic acid was drawn yesterday, elevated, but better than before. No need to recheck for now    4/4: pain improved, Palliative team to manage pain control. Will check CBC tomorrow  Add Incentive spirometer  Repeat bcx negative so far    4/5: Pain better improved, continue OxyContin 40 twice daily, hydromorphone p.o. as needed. She is still wants to remain as a full code. Will get CTA chest to rule out any PE due to persistent tachycardia  Add Coreg    Acute on chronic microcytic anemia  -Hemoglobin 8.1  -s/p venofer, give another dose today. 200 mg IV x 3 days  - Trend hgb, transfuse for hgb < 7        Code Status: Full code  Surrogate Decision Maker: Daughter     DVT Prophylaxis: Lovenox  GI Prophylaxis: not indicated     Baseline: Ambulatory at home      25.0 - 29.9 Overweight / Body mass index is 33.49 kg/m². Estimated discharge date: April 6  Barriers: Pain control    Code status: Full  Prophylaxis: Lovenox  Recommended Disposition: Home w/Family     Subjective:     Chief Complaint / Reason for Physician Visit  Reports pain is better than yesterday, required IV narcotic only once.   Remains tachycardic, sinus  Review of Systems:  Symptom Y/N Comments  Symptom Y/N Comments   Fever/Chills n   Chest Pain n    Poor Appetite    Edema     Cough    Abdominal Pain     Sputum    Joint Pain     SOB/WADSWORTH n   Pruritis/Rash     Nausea/vomit    Tolerating PT/OT Diarrhea    Tolerating Diet y    Constipation    Other       Could NOT obtain due to:      Objective:     VITALS:   Last 24hrs VS reviewed since prior progress note. Most recent are:  Patient Vitals for the past 24 hrs:   Temp Pulse Resp BP SpO2   04/05/22 1046 98.4 °F (36.9 °C) (!) 137 21 117/67 95 %   04/05/22 0746 99.5 °F (37.5 °C) (!) 136 18 108/70 95 %   04/05/22 0500 100.4 °F (38 °C) -- -- -- --   04/05/22 0400 -- (!) 145 18 116/76 92 %   04/04/22 2353 -- (!) 142 -- (!) 142/82 94 %   04/04/22 2308 99.3 °F (37.4 °C) (!) 139 20 (!) 141/90 95 %   04/04/22 1930 99 °F (37.2 °C) (!) 145 22 126/78 96 %   04/04/22 1834 100 °F (37.8 °C) (!) 145 20 111/64 94 %   04/04/22 1506 99.8 °F (37.7 °C) (!) 142 17 122/72 93 %     No intake or output data in the 24 hours ending 04/05/22 1245     I had a face to face encounter and independently examined this patient on 4/5/2022, as outlined below:  PHYSICAL EXAM:  General: WD, WN. Alert, cooperative, no acute distress    EENT:  EOMI. Anicteric sclerae. MMM  Resp:  CTA bilaterally, no wheezing or rales. No accessory muscle use  CV:  Regular  rhythm,  LUE edema  GI:  Soft, Non distended, Non tender. +Bowel sounds  Neurologic:  Alert and oriented X 3, normal speech,   Psych:   Good insight. Not anxious nor agitated  Skin:  No rashes. No jaundice, fungating mass with exudate encompassing the entire left breast.      Reviewed most current lab test results and cultures  YES  Reviewed most current radiology test results   YES  Review and summation of old records today    NO  Reviewed patient's current orders and MAR    YES  PMH/SH reviewed - no change compared to H&P  ________________________________________________________________________  Care Plan discussed with:    Comments   Patient x    Family  x    RN     Care Manager     Consultant                        Multidiciplinary team rounds were held today with , nursing, pharmacist and clinical coordinator.   Patient's plan of care was discussed; medications were reviewed and discharge planning was addressed. ________________________________________________________________________  Total NON critical care TIME:  34   Minutes    Total CRITICAL CARE TIME Spent:   Minutes non procedure based      Comments   >50% of visit spent in counseling and coordination of care     ________________________________________________________________________  Gil Garcia MD     Procedures: see electronic medical records for all procedures/Xrays and details which were not copied into this note but were reviewed prior to creation of Plan. LABS:  I reviewed today's most current labs and imaging studies.   Pertinent labs include:  Recent Labs     04/05/22 0100 04/03/22  0017   WBC 12.2* 9.9   HGB 8.3* 8.3*   HCT 28.2* 27.7*   * 468*     Recent Labs     04/05/22 0100 04/04/22  0853 04/03/22  0017   * 137 133*   K 4.0 4.0 3.8    104 101   CO2 22 24 22   GLU 88 84 126*   BUN 10 9 9   CREA 0.49* 0.64 0.77   CA 8.0* 8.1* 8.4*   MG  --  1.7  --        Signed: Gil Garcia MD

## 2022-04-06 NOTE — PROGRESS NOTES
End of Shift Note    Bedside shift change report given to Chetan Calderon (oncoming nurse) by Luis Angel Matthews (offgoing nurse). Report included the following information Kardex    Shift worked:  7a-7p     Shift summary and any significant changes:     Transfer from PCU. Patient vitals stable upon transfer and settled into room. Pain meds given see MAR. Transitioned to PO pain meds today. Anticipated d/c tomorrow. Midline catheter flushed and patent      Concerns for physician to address:       Zone phone for oncoming shift:          Activity:  Activity Level: Bath Room Privileges  Number times ambulated in hallways past shift: 0  Number of times OOB to chair past shift: 0    Cardiac:   Cardiac Monitoring: No      Cardiac Rhythm: Sinus Tachy    Access:   Current line(s): central line     Genitourinary:   Urinary status: voiding    Respiratory:   O2 Device: None (Room air)  Chronic home O2 use?: NO  Incentive spirometer at bedside: N/A       GI:  Last Bowel Movement Date: 04/06/22  Current diet:  ADULT ORAL NUTRITION SUPPLEMENT Breakfast, Lunch, Dinner; Standard High Calorie/High Protein  ADULT DIET Regular  Passing flatus: YES  Tolerating current diet: YES       Pain Management:   Patient states pain is manageable on current regimen: YES    Skin:  Geovanny Score: 21  Interventions: increase time out of bed, PT/OT consult and nutritional support     Patient Safety:  Fall Score:  Total Score: 0  Interventions: bed/chair alarm, gripper socks and pt to call before getting OOB       Length of Stay:  Expected LOS: 3d 12h  Actual LOS: [address]

## 2022-04-06 NOTE — PROGRESS NOTES
Midline insertion procedure note:  Pt has very limited vascular access. Procedure explained to patient along with risks and benefits. Procedure teaching completed. Pre procedure assessment done. Patient denies questions or concerns at this time. Maximum sterile barrier precautions observed throughout procedure. Lidocaine 1% 3ml sc injected to site prior to access the vein. Cannulated Cephalic vein using ultrasound guidance. Inserted 4.5 Fr. single lumen midline to Right arm. Blood return verified and flushed with 20ml normal saline. Sterile dressing applied with Biopatch, StatLock and occlusive dressing as per protocol. Curos caps applied to port. Patient tolerated procedure well with minimal blood loss. Reason for access : Reliable IV access  Complications related to insertion : None     See nursing message on [employer] for midline reminders. Midline is CT and MRI compatible. Inserted by : ANABELL Rico [facility] Vascular Access Nurse  Assisted by : Reji Park RN, BSN, KISHA       Catheter Length : 12 cm   External Length : 3 cm  Total Length:  15cm   arm circumference : 32   Catheter occupies 31  % of vein. Type of Midline: Arrow  Ref #:   [id number]  Lot [id number]  Expiration Date: 8/31/2023  Informed primary nurse Charlene Osman RN midline is ready for use and to hang new infusion tubing prior to use.       Dhaval Bazzi RN, BSN, [facility]  Vascular Acces Team

## 2022-04-06 NOTE — PROGRESS NOTES
0700-Bedside and Verbal shift change report given to LEONELA Sage (oncoming nurse) by Pedro Lezama (offgoing nurse). Report included the following information SBAR, Kardex, ED Summary, Intake/Output, MAR and Cardiac Rhythm Sinus tach. 0900- Patient complains of pain and fluid leaked at midline site. Dr. Tone Vicente notified. Called PICC team nurse and informed. 4339- PICC team nurses came by to assess and stated that they have to insert a new midline on patient.   1030- Midline inserted

## 2022-04-06 NOTE — PROGRESS NOTES
Hospitalist Progress Note    NAME: Vick Hassan   :  1960   MRN:  [id number]       Assessment / Plan:  Metastatic breast cancer  Fungating left breast cancer  Cellulitis of the left breast  Severe sepsis, MSSA bacteremia, improved  -CTA chest-1.  No evidence of pulmonary embolus. 2.  New moderate left and trace right pleural effusions. 3. Bulky bilateral axillary lymphadenopathy, which is worsened since the prior  study. 4. Extensive skin thickening of both breasts, left worse than right, and  malignant infiltration of both breasts, which has worsened since the prior  study. 5. Extensive mediastinal lymphadenopathy, which has worsened since the prior  study. 5. Evidence of sternal osseous metastatic disease.  -Lactic acid still elevated  -Sepsis indicated-tachycardia, lactic acidosis, cellulitis  -c/w  cefazolin   -Wound care consulted. -Blood culture staph aureus resistant to clindamycin and erythromycin. Repeat on 3/27 NGTD x 2 days  -Wound culture +ve for E. coli, staph aureus and diphtheroids. Sensitivities noted. -Repeat blood cultures until negative, ECHO inadequate to assess for endocarditis. Repeat blood cultures NGTD. Will discuss with ID about need for AN  -Plan to remove Chemo-Port as per ID recommendation  -wound culture growing GNR, staph aureus and diphteroids  consult Dr. Phani House, she says patient not a surgical candidate for intevention  -Patient agree for hospice, inpatient hospice consult was placed  -Consulted palliative care  -Consulted hematology/oncology, appreciate recs  -Chemo-Port was removed yesterday  -Dilaudid more effective for pain control  - -Continue cefazolin 2gm q8h.   - Duration of abx anticipated is 2 weeks from negative BCx  -Follow-up with hospice  recommendation patient may need midline insertion to be discharged with IV antibiotic    -increase frequency of Dilaudid IV, discussed with the palliative team, likely will need to increase long-acting pain medication. We will get a midline, discussed with ID, patient may need 2 weeks of IV antibiotics    4/2-continues to use multiple IV Dilaudid . I will increase OxyContin to 25 mg twice daily, continue scheduled oxycodone IR. Continue Dilaudid 2 mg every 4 hour as needed. 4/3-still requiring frequent Dilaudid, I will increase OxyContin to 40 mg twice daily, change oxycodone IR to Percocet(which she said worked better) PRN continue IV Dilaudid as needed. ,  Hopefully we can change to p.o. tomorrow  Follow blood culture which were drawn yesterday  Lactic acid was drawn yesterday, elevated, but better than before. No need to recheck for now    4/4: pain improved, Palliative team to manage pain control. Will check CBC tomorrow  Add Incentive spirometer  Repeat bcx negative so far    4/5: Pain better improved, continue OxyContin 40 twice daily, hydromorphone p.o. as needed. She is still wants to remain as a full code. Will get CTA chest to rule out any PE due to persistent tachycardia  Add Coreg      4/6: Midline was replaced as old midline was leaking. Patient and the daughter was upset that nurse was late in giving p.o. Dilaudid by 1 hour.  -Discussed that if current regimen is not working, we may need to change it, but daughter knows to continue same.  -Patient has refused OxyContin in the a.m.  -CTA chest negative for PE, but showed worsening of her cancer.  -Discussed for discharge today, but they are reluctant to go home has they want to make sure that midline does not leak anymore, I told them that there is no guarantee that it will not leak again, but we are hopeful that it should not. Patient stable as much as she can be considering her advanced cancer  -I see there was a plan made for hospice after IV antibiotics, but I believe patient is not ready for hospice yet, she still has overwhelming psychological distress. Advised palliative to see her again.       Acute on chronic microcytic anemia  -Hemoglobin 8.1  -s/p venofer,   - Trend hgb, transfuse for hgb < 7        Code Status: Full code  Surrogate Decision Maker: Daughter     DVT Prophylaxis: Lovenox  GI Prophylaxis: not indicated     Baseline: Ambulatory at home      25.0 - 29.9 Overweight / Body mass index is 33.49 kg/m². Estimated discharge date:   Barriers: Psychological distress    Code status: Full  Prophylaxis: Lovenox  Recommended Disposition: Home w/Family     Subjective:     Chief Complaint / Reason for Physician Visit  This a.m. midline was leaking, so had a new midline placed. She refused her OxyContin this a.m. Review of Systems:  Symptom Y/N Comments  Symptom Y/N Comments   Fever/Chills n   Chest Pain n    Poor Appetite    Edema     Cough    Abdominal Pain     Sputum    Joint Pain     SOB/WADSWORTH n   Pruritis/Rash     Nausea/vomit    Tolerating PT/OT     Diarrhea    Tolerating Diet y    Constipation    Other       Could NOT obtain due to:      Objective:     VITALS:   Last 24hrs VS reviewed since prior progress note. Most recent are:  Patient Vitals for the past 24 hrs:   Temp Pulse Resp BP SpO2   04/06/22 0723 98.5 °F (36.9 °C) (!) 138 14 119/64 94 %   04/06/22 0412 98.7 °F (37.1 °C) (!) 132 21 (!) 114/57 92 %   04/06/22 0011 98.4 °F (36.9 °C) (!) 125 20 109/60 95 %   04/05/22 1943 98.5 °F (36.9 °C) (!) 119 24 (!) 92/58 96 %   04/05/22 1604 100.4 °F (38 °C) (!) 135 22 (!) 106/57 94 %     No intake or output data in the 24 hours ending 04/06/22 1326     I had a face to face encounter and independently examined this patient on 4/6/2022, as outlined below:  PHYSICAL EXAM:  General: WD, WN. Alert, cooperative, no acute distress    EENT:  EOMI. Anicteric sclerae. MMM  Resp:  No visible respiratory distress  CV:  Regular  rhythm,  left upper extremity edema  GI:  Soft, Non distended, Non tender. +Bowel sounds  Neurologic:  Alert and oriented  Psych:   Fair insight  Skin:  No rashes.   No jaundice,    Reviewed most current lab test results and cultures  YES  Reviewed most current radiology test results   YES  Review and summation of old records today    NO  Reviewed patient's current orders and MAR    YES  PMH/SH reviewed - no change compared to H&P  ________________________________________________________________________  Care Plan discussed with:    Comments   Patient x    Family  x    RN     Care Manager     Consultant                        Multidiciplinary team rounds were held today with , nursing, pharmacist and clinical coordinator. Patient's plan of care was discussed; medications were reviewed and discharge planning was addressed. ________________________________________________________________________  Total NON critical care TIME:  34   Minutes    Total CRITICAL CARE TIME Spent:   Minutes non procedure based      Comments   >50% of visit spent in counseling and coordination of care     ________________________________________________________________________  Wesley Ruelas MD     Procedures: see electronic medical records for all procedures/Xrays and details which were not copied into this note but were reviewed prior to creation of Plan. LABS:  I reviewed today's most current labs and imaging studies.   Pertinent labs include:  Recent Labs     04/05/22  0100   WBC 12.2*   HGB 8.3*   HCT 28.2*   *     Recent Labs     04/06/22  0408 04/05/22  0100 04/04/22  0853    135* 137   K 3.8 4.0 4.0    105 104   CO2 25 22 24   GLU 69 88 84   BUN 12 10 9   CREA 0.47* 0.49* 0.64   CA 8.0* 8.0* 8.1*   MG  --   --  1.7       Signed: Wesley Ruelas MD

## 2022-04-06 NOTE — PROGRESS NOTES
Palliative Medicine Consult  Rl: [phone] ([phone])    Patient Name: German Still  YOB: 1960    Date of Initial Consult: 3/28/22  Reason for Consult: end stage disease  Requesting Provider: Arslan Herrera MD  Primary Care Physician: None     SUMMARY:   German Still is a 57 y.o. Female with recurrent  meatstatic  breast cancer, s/p chemo and lumpectomy in 2019, followed by radiation , enrolled in clinical trial at [facility],  extensive dermal metastasis and suspected sternal metastasis, who was admitted on 3/25/2022 from home with a diagnosis of bresat pain , fungating  left breast mass Staph aureus bacteremia and sepsis, Ct chest shows progresssion of mediastinal lymphadenoppthy and axillary lymphadenopathy and infiltrating breast wounds  ID is following   Current medical issues leading to Palliative Medicine involvement include: care decision for end stage disease, patient follow up with out patient palliative care for pain and symptome management and support. Social hx: she has two daughters, she is former smoker quit 2020, used to work for [employer], lives alone, her daughter Ethyl Dies lives close, she stays with her few days a week, other daughter is estranged . At base line patient is independent in function . Home pain medication ; Oxycodone ER(XTAMPZA) 9 MG BID  Roxicodone 20 mg every 4 hrs prn   PALLIATIVE DIAGNOSES:   1. Cancer related pain   2. Metastatic breast cancer with fungation breast mass  3. Goals of care   4. Need for emotional support     PLAN:   1.  Follow up visit , for patient is upset per primary medical team, refused Oxycontin this am .  2. Psychosocial   support : patient with her daughter in the room, she tells me she does not want to be discharged today , want to check if the new Pic line replaced today is working and hopefully does not leak again, so she does not run into problem at home to complete her antibiotic treatment I told her after coordinating with bed side RN , they will transfer her to regular floor to stay tnight to address her concern , however there is always a  risk it can leak again . I  Provided listening presence, she calm down,emotional support offered. 3. Pain in left breast :     ·  flare up of pain , patient refused OxyContin this am , she tells me she asked the nurse to give her Dilaudid instead of OxyContin , explained OxyContin is slow release to control her back ground pain, and dilaudid it to catch with any break through pain, she understand. · Patient is to stable to be d/C in next 24 hrs . D/C pain medication regimen :  OxyContin 40 mg bid   Dilaudid 2 mg every 4 hrs prn for moderate and severe pain . Plan coordinated with primary medical team/Dr Whitley Lacey and bed side RN, Dr Whitley Lacey will prescribe out patient pain prescription for two weeks , plan is for hospice to follow up with her after completion of two weeks of antibiotics, if she decline hospice she can continue to follow up with out patient Palliative care for support and pain /symptom management. GOALS OF CARE / TREATMENT PREFERENCES:     GOALS OF CARE:  Patient/Health Care Proxy Stated Goals:  (treatment of infection to complete home I/V antibiotics)    TREATMENT PREFERENCES:   Code Status: Full Code    Patient and family's personal goals include: completion of home antibiotic therapy, then transition to hospice. Important upcoming milestones or family events:      The patient identifies the following as important for living well: quality of life       Advance Care Planning:  [x] [facility] Interdisciplinary Team has updated the ACP Navigator with [employer] and Patient Capacity      Primary Decision MakerJim Solorzano - Daughter - [phone]  Advance Care Planning 1/27/2022   Patient's Healthcare Decision Maker is: Named in scanned ACP document   Confirm Advance Directive Yes, on file   Patient Would Like to Complete Advance Directive -       Medical Interventions: Full interventions       Other:    As far as possible, the palliative care team has discussed with patient / health care proxy about goals of care / treatment preferences for patient. HISTORY:     History obtained from: chart, patient and bed side RN    CHIEF COMPLAINT: in so much pain    HPI/SUBJECTIVE:    The patient is:   [] Verbal and participatory  Admitted for worsening pain in left breast x few days, denies any nausea or vomiting, bowels are moving.    3/29/22: patient notes pain is better overall, average 2/10, now 7/10 after medication is wearing off, she slept \" good \" last night, bowels are moving every day, denies any nausea or vomiting .      3/30/22l: she slept good, pain is better , denies any nausea or vomiting . She is in good spirits. 4/1: pretty extreme pain and associated anxiety  4/6/22: patient is currently upset for concern if PIC line will leak again .     Clinical Pain Assessment (nonverbal scale for severity on nonverbal patients):   Clinical Pain Assessment  Severity: 7  Location: left breast  Character: \"cutting \"  Duration: weeks  Effect: cannot raise left arm  Factors: moving in certain position increases pain  Frequency: constant          Duration: for how long has pt been experiencing pain (e.g., 2 days, 1 month, years)  Frequency: how often pain is an issue (e.g., several times per day, once every few days, constant)     FUNCTIONAL ASSESSMENT:     Palliative Performance Scale (PPS):  PPS: 60       PSYCHOSOCIAL/SPIRITUAL SCREENING:     Palliative IDT has assessed this patient for cultural preferences / practices and a referral made as appropriate to needs (Cultural Services, Patient Advocacy, Ethics, etc.)    Any spiritual / Worship concerns:  [] Yes /  [x] No   If \"Yes\" to discuss with pastoral care during IDT     Does caregiver feel burdened by caring for their loved one:   [] Yes /  [x] No /  [] No Caregiver Present    If \"Yes\" to discuss with social work during IDT    Anticipatory grief assessment:   [x] Normal  / [] Maladaptive     If \"Maladaptive\" to discuss with social work during IDT    ESAS Anxiety: Anxiety: 4    ESAS Depression: Depression: 0        REVIEW OF SYSTEMS:     Positive and pertinent negative findings in ROS are noted above in HPI. The following systems were [x] reviewed / [] unable to be reviewed as noted in HPI  Other findings are noted below. Systems: constitutional, ears/nose/mouth/throat, respiratory, gastrointestinal, genitourinary, musculoskeletal, integumentary, neurologic, psychiatric, endocrine. Positive findings noted below. Modified ESAS Completed by: provider   Fatigue: 9 Drowsiness: 0   Depression: 0 Pain: 7   Anxiety: 4 Nausea: 0   Anorexia: 5 Dyspnea: 0     Constipation: Yes     Stool Occurrence(s): 1        PHYSICAL EXAM:     From RN flowsheet:  Wt Readings from Last 3 Encounters:   03/31/22 183 lb 1.6 oz (83.1 kg)   12/13/21 175 lb 8 oz (79.6 kg)   12/13/21 175 lb 7.8 oz (79.6 kg)     Blood pressure 119/64, pulse (!) 138, temperature 98.5 °F (36.9 °C), resp. rate 14, height 5' 2\" (1.575 m), weight 183 lb 1.6 oz (83.1 kg), SpO2 94 %. Pain Scale 1: Numeric (0 - 10)  Pain Intensity 1: 8  Pain Onset 1: chronic  Pain Location 1: Breast  Pain Orientation 1: Left  Pain Description 1: Aching  Pain Intervention(s) 1: Medication (see MAR)  Last bowel movement, if known:     Constitutional: alert,oriented x3, upset  Eyes: pupils equal, anicteric  ENMT: no nasal discharge, moist mucous membranes  Cardiovascular: regular rhythm, distal pulses intact  Respiratory: breathing not labored, symmetric  Gastrointestinal: soft non-tender, +bowel sounds  Musculoskeletal: dressing on left breast , fungating mass left breast per records, I have not examine, swelling of left arm and hand. Skin: warm, dry, pic line in mid arm replaced today.   Neurologic: following commands, moving all extremities  Psychiatric: anxious, no hallucinations  Other:       HISTORY:     Active Problems:    Peau d'orange over breast (3/25/2022)      Severe sepsis ([facility] (3/25/2022)      Cellulitis of breast (3/25/2022)      Cancer associated pain ()      Goals of care, counseling/discussion ()      Need for emotional support ()      Past Medical History:   Diagnosis Date    Breast cancer ([facility]     left side    Menopause       Past Surgical History:   Procedure Laterality Date    HX BREAST BIOPSY Left     x2    HX BREAST LUMPECTOMY Left 2019    LEFT BREAST LUMPECTOMY WITH ULTRASOUND performed by Gurjit Avila MD at [facility]    HX HYSTERECTOMY      partial, ovaries remain    IR INSERT TUNL CVC W PORT OVER 5 YEARS  2021    IR REMOVE TUNL CVAD W PORT/PUMP  3/30/2022    VASCULAR SURGERY PROCEDURE UNLIST      portacath      Family History   Problem Relation Age of Onset    Cancer Father     Cancer Maternal Aunt     Breast Cancer Maternal Aunt         4 paternal aunts    Cancer Maternal Uncle     Cancer Paternal Aunt     Cancer Paternal Uncle     Breast Cancer Maternal Grandmother       History reviewed, no pertinent family history.   Social History     Tobacco Use    Smoking status: Former Smoker     Packs/day: 0.10     Quit date: 2020     Years since quittin.2    Smokeless tobacco: Never Used   Substance Use Topics    Alcohol use: No     Allergies   Allergen Reactions    Codeine Rash     Rash from tylenol #3      Current Facility-Administered Medications   Medication Dose Route Frequency    carvediloL (COREG) tablet 3.125 mg  3.125 mg Oral BID WITH MEALS    HYDROmorphone (DILAUDID) injection 2 mg  2 mg IntraVENous Q6H PRN    HYDROmorphone (DILAUDID) tablet 2 mg  2 mg Oral Q4H PRN    oxyCODONE ER (OxyCONTIN) tablet 40 mg  40 mg Oral Q12H    senna-docusate (PERICOLACE) 8.6-50 mg per tablet 2 Tablet  2 Tablet Oral DAILY    ceFAZolin (ANCEF) 2 g in sterile water (preservative free) 20 mL IV syringe  2 g IntraVENous Q8H    metroNIDAZOLE (METROGEL) 0.75 % gel   Topical DAILY    sodium chloride (NS) flush 5-40 mL  5-40 mL IntraVENous Q8H    sodium chloride (NS) flush 5-40 mL  5-40 mL IntraVENous PRN    acetaminophen (TYLENOL) tablet 650 mg  650 mg Oral Q6H PRN    Or    acetaminophen (TYLENOL) suppository 650 mg  650 mg Rectal Q6H PRN    polyethylene glycol (MIRALAX) packet 17 g  17 g Oral DAILY PRN    ondansetron (ZOFRAN ODT) tablet 4 mg  4 mg Oral Q8H PRN    Or    ondansetron (ZOFRAN) injection 4 mg  4 mg IntraVENous Q6H PRN    enoxaparin (LOVENOX) injection 40 mg  40 mg SubCUTAneous DAILY          LAB AND IMAGING FINDINGS:     Lab Results   Component Value Date/Time    WBC 12.2 (H) 04/05/2022 01:00 AM    HGB 8.3 (L) 04/05/2022 01:00 AM    PLATELET 797 (H) 31/21/8642 01:00 AM     Lab Results   Component Value Date/Time    Sodium 137 04/06/2022 04:08 AM    Potassium 3.8 04/06/2022 04:08 AM    Chloride 105 04/06/2022 04:08 AM    CO2 25 04/06/2022 04:08 AM    BUN 12 04/06/2022 04:08 AM    Creatinine 0.47 (L) 04/06/2022 04:08 AM    Calcium 8.0 (L) 04/06/2022 04:08 AM    Magnesium 1.7 04/04/2022 08:53 AM    Phosphorus 3.4 03/30/2022 05:24 AM      Lab Results   Component Value Date/Time    Alk. phosphatase 98 03/25/2022 12:57 PM    Protein, total 5.9 (L) 03/25/2022 12:57 PM    Albumin 2.1 (L) 03/25/2022 12:57 PM    Globulin 3.8 03/25/2022 12:57 PM     No results found for: INR, PTMR, PTP, PT1, PT2, APTT, INREXT, INREXT   No results found for: IRON, FE, TIBC, IBCT, PSAT, FERR   No results found for: PH, PCO2, PO2  No components found for: GLPOC   No results found for: CPK, CKMB             Total time: 35   Counseling / coordination time, spent as noted above: 25 mins  > 50% counseling / coordination?: yes     Prolonged service was provided for  []30 min   []75 min in face to face time in the presence of the patient, spent as noted above.   Time Start:   Time End:   Note: this can only be billed with 76832 (initial) or 93351 (follow up). If multiple start / stop times, list each separately.

## 2022-04-06 NOTE — PROGRESS NOTES
End of Shift Note    Bedside shift change report given to Neymar Henson (oncoming nurse) by Oly Desir (offgoing nurse). Report included the following information SBAR, ED Summary, Intake/Output, MAR and Recent Results    Shift worked:  7p-7a     Shift summary and any significant changes:     no significant changes     Concerns for physician to address:       Zone phone for oncoming shift:          Activity:  Activity Level: Bath Room Privileges,Up ad roque  Number times ambulated in hallways past shift: 0  Number of times OOB to chair past shift: 0    Cardiac:   Cardiac Monitoring: Yes      Cardiac Rhythm: Sinus Tachy    Access:   Current line(s): midline     Genitourinary:   Urinary status: voiding    Respiratory:   O2 Device: None (Room air)  Chronic home O2 use?: NO  Incentive spirometer at bedside: NO       GI:  Last Bowel Movement Date: 04/05/22  Current diet:  DIET ONE TIME MESSAGE  ADULT ORAL NUTRITION SUPPLEMENT Breakfast, Lunch, Dinner; Standard High Calorie/High Protein  ADULT DIET Regular; 5 carb choices (75 gm/meal); Please send both milk and ensure enlive chocolate with every meal. Thank you, RD  Passing flatus: YES  Tolerating current diet: YES       Pain Management:   Patient states pain is manageable on current regimen: NO    Skin:  Geovanny Score: 20  Interventions: speciality bed, increase time out of bed, foam dressing and PT/OT consult    Patient Safety:  Fall Score:  Total Score: 1  Interventions: gripper socks and pt to call before getting OOB       Length of Stay:  Expected LOS: 3d 12h  Actual LOS: [address]

## 2022-04-07 NOTE — PROGRESS NOTES
I have reviewed discharge instructions with the PATIENT PARENT GUARDIAN: patient. The patient verbalized understanding. Discharge medications reviewed with patient and appropriate educational materials and side effects teaching were provided. Follow-up appointments reviewed. Opportunity for questions and clarification was provided. Venous access removed without difficulty. Patient's belongings gathered and sent with patient. Patient is ready for discharge.      Carmela Parks

## 2022-04-07 NOTE — PROGRESS NOTES
End of Shift Note    Bedside shift change report given to [address] [place] [address] (oncoming nurse) by Shazia Gonzalez (offgoing nurse). Report included the following information SBAR, Kardex and MAR    Shift worked:  7p-7a     Shift summary and any significant changes:     patient rested well this night sleeping at long intervals with no complaints voiced. Concerns for physician to address:  possible discharge today           Activity:  Activity Level: Bath Room Privileges,Up ad roque  Number times ambulated in hallways past shift: 0  Number of times OOB to chair past shift: 0    Cardiac:   Cardiac Monitoring: No      Cardiac Rhythm: Sinus Tachy    Access:   Current line(s): midline     Genitourinary:   Urinary status: voiding    Respiratory:   O2 Device: None (Room air)  Chronic home O2 use?: NO  Incentive spirometer at bedside: NO       GI:  Last Bowel Movement Date: 04/06/22  Current diet:  ADULT ORAL NUTRITION SUPPLEMENT Breakfast, Lunch, Dinner; Standard High Calorie/High Protein  ADULT DIET Regular  Tolerating current diet: YES       Pain Management:   Patient states pain is manageable on current regimen: YES    Skin:  Geovanny Score: 21  Interventions: increase time out of bed and nutritional support     Patient Safety:  Fall Score:  Total Score: 1  Interventions: gripper socks and pt to call before getting OOB       Length of Stay:  Expected LOS: 3d 12h  Actual LOS: [address]

## 2022-04-07 NOTE — PROGRESS NOTES
O2 Challenge:    Laying in bed: 95% room air  Sitting on side of bed: 97% room air   Standing/walking a few steps: 97% room air

## 2022-04-07 NOTE — DISCHARGE SUMMARY
Hospitalist Discharge Summary     Patient ID:  Candelario Perry  [id number]  57 y.o.  1960  3/25/2022    PCP on record: None    Admit date: 3/25/2022  Discharge date and time:4/7/22  DISCHARGE DIAGNOSIS:    Metastatic breast cancer  Fungating left breast cancer  Cellulitis of the left breast  Severe sepsis, MSSA bacteremia, improved                 CONSULTATIONS:  IP CONSULT TO ONCOLOGY  IP CONSULT TO INFECTIOUS DISEASES  IP CONSULT TO HEMATOLOGY    Excerpted HPI from H&P of Patience Griggs MD:  Portia Ramirez is a 57 y.o.  female who presents with left breast pain. Patient past medical history of metastatic breast cancer with fungating mass-currently follow-up with [facility]. Patient states that today when she got up in the morning her pain was very excruciating and she was not feeling well, shaking and not able to even dress herself and then decided to come to the ER for evaluation. No shortness of breath, no fever or chills, no nausea vomiting, no abdominal pain, no diarrhea and constipation, no other complaints. Patient was supposed to go to [facility] today for the first day of clinical trial but because of not feeling good came to the ER. Patient breast surgeon is Dr. Shira Godinez.       ______________________________________________________________________  DISCHARGE SUMMARY/HOSPITAL COURSE:  for full details see H&P, daily progress notes, labs, consult notes. Metastatic breast cancer  Fungating left breast cancer  Cellulitis of the left breast  Severe sepsis, MSSA bacteremia, improved  -CTA chest-1.  No evidence of pulmonary embolus. 2.  New moderate left and trace right pleural effusions. 3. Bulky bilateral axillary lymphadenopathy, which is worsened since the prior  study. 4. Extensive skin thickening of both breasts, left worse than right, and  malignant infiltration of both breasts, which has worsened since the prior  study.   5. Extensive mediastinal lymphadenopathy, which has worsened since the prior  study. 5. Evidence of sternal osseous metastatic disease.  -Lactic acid still elevated  -Sepsis indicated-tachycardia, lactic acidosis, cellulitis  -c/w  cefazolin   -Wound care consulted. -Blood culture staph aureus resistant to clindamycin and erythromycin. Repeat on 3/27 NGTD x 2 days  -Wound culture +ve for E. coli, staph aureus and diphtheroids. Sensitivities noted. -Repeat blood cultures until negative, ECHO inadequate to assess for endocarditis. Repeat blood cultures NGTD. Will discuss with ID about need for AN  -Plan to remove Chemo-Port as per ID recommendation  -wound culture growing GNR, staph aureus and diphteroids  consult Dr. Roberta Yuen, she says patient not a surgical candidate for intevention  -Patient agree for hospice, inpatient hospice consult was placed  -Consulted palliative care  -Consulted hematology/oncology, appreciate recs  -Chemo-Port was removed yesterday  -Dilaudid more effective for pain control  - -Continue cefazolin 2gm q8h. - Duration of abx anticipated is 2 weeks from negative BCx  -Follow-up with hospice  recommendation patient may need midline insertion to be discharged with IV antibiotic     4/1-increase frequency of Dilaudid IV, discussed with the palliative team, likely will need to increase long-acting pain medication. We will get a midline, discussed with ID, patient may need 2 weeks of IV antibiotics     4/2-continues to use multiple IV Dilaudid . I will increase OxyContin to 25 mg twice daily, continue scheduled oxycodone IR.   Continue Dilaudid 2 mg every 4 hour as needed.     4/3-still requiring frequent Dilaudid, I will increase OxyContin to 40 mg twice daily, change oxycodone IR to Percocet(which she said worked better) PRN continue IV Dilaudid as needed. ,  Hopefully we can change to p.o. tomorrow  Follow blood culture which were drawn yesterday  Lactic acid was drawn yesterday, elevated, but better than before. No need to recheck for now     4/4: pain improved, Palliative team to manage pain control. Will check CBC tomorrow  Add Incentive spirometer  Repeat bcx negative so far     4/5: Pain better improved, continue OxyContin 40 twice daily, hydromorphone p.o. as needed. She is still wants to remain as a full code. Will get CTA chest to rule out any PE due to persistent tachycardia  Add Coreg        4/6: Midline was replaced as old midline was leaking. Patient was prescribed OxyContin and p.o. hydromorphone for 2 weeks. Prescription for Narcan was given as well  For the pain medication will be handled by hospice team or palliative team  Patient was discharged home with IV antibiotics        Acute on chronic microcytic anemia  -Hemoglobin 8.1  -s/p venofer,   - Trend hgb, transfuse for hgb < 7        _______________________________________________________________________  Patient seen and examined by me on discharge day. Pertinent Findings:  Gen:    Not in distress  Chest: Clear lungs  CVS:   Regular rhythm. No edema  Abd:  Soft, not distended, not tender  Neuro:  Alert,   _______________________________________________________________________  DISCHARGE MEDICATIONS:   Discharge Medication List as of 4/7/2022  5:01 PM      START taking these medications    Details   carvediloL (COREG) 3.125 mg tablet Take 1 Tablet by mouth two (2) times daily (with meals). , Normal, Disp-60 Tablet, R-0      ceFAZolin 2 g IV syringe 2 g by IntraVENous route every eight (8) hours. , No Print, Disp-1 Dose, R-0Cefazolin 2gm q8h - Duration 2 weeks, 3/30/22 to 4/13/22      HYDROmorphone (DILAUDID) 2 mg tablet Take 1 Tablet by mouth every four (4) hours as needed for Pain for up to 14 days. Max Daily Amount: 12 mg., Normal, Disp-70 Tablet, R-0      metroNIDAZOLE (METROGEL) 0.75 % topical gel Apply  to affected area daily. , Normal, Disp-60 g, R-0      oxyCODONE ER (OxyCONTIN) 40 mg ER tablet Take 1 Tablet by mouth every twelve (12) hours for 14 days. Max Daily Amount: 80 mg., Normal, Disp-28 Tablet, R-0      senna-docusate (PERICOLACE) 8.6-50 mg per tablet Take 2 Tablets by mouth daily for 20 days. , Normal, Disp-40 Tablet, R-0      naloxone (Narcan) 4 mg/actuation nasal spray Use 1 spray intranasally, then discard. Repeat with new spray every 2 min as needed for opioid overdose symptoms, alternating nostrils. , Normal, Disp-2 Each, R-0         CONTINUE these medications which have CHANGED    Details   ondansetron (ZOFRAN ODT) 4 mg disintegrating tablet Take 1 Tablet by mouth every eight (8) hours as needed for Nausea or Vomiting., Normal, Disp-15 Tablet, R-0         STOP taking these medications       oxyCODONE ER (Xtampza ER) 9 mg capsule Comments:   Reason for Stopping:         lidocaine (XYLOCAINE) 4 % topical cream Comments:   Reason for Stopping:         megestroL (MEGACE) 40 mg tablet Comments:   Reason for Stopping:         pantoprazole (PROTONIX) 40 mg tablet Comments:   Reason for Stopping:         dronabinoL (Marinol) 2.5 mg capsule Comments:   Reason for Stopping:         lidocaine-prilocaine (EMLA) topical cream Comments:   Reason for Stopping:                 Patient Follow Up Instructions: Activity: Activity as tolerated  Diet: Comfort feeding and Resume previous diet  Wound Care: As directed      Follow-up Information     Follow up With Specialties Details Why Contact Info    Claudio Bishop MD Internal Medicine Go on 4/13/2022 at 2:30pm for your NEW PATIENT PCP hospital follow up. Please arrive 15 minutes early, bring photo ID, insurance cards, copay, medication bottles and any completed forms.  [address] [place] [address]  [phone]      [address] [facility]   This is the agency that will provide your IV antibiotic and infusion services Address: [address] [place] [address] isabella , [place], [address]    Phone: [phone]        ________________________________________________________________    Risk of deterioration: High    Condition at Discharge:  Stable  __________________________________________________________________    Disposition  Home with family and home health services    ____________________________________________________________________    Code Status: Full Code  ___________________________________________________________________      Total time in minutes spent coordinating this discharge (includes going over instructions, follow-up, prescriptions, and preparing report for sign off to her PCP) :  >30 minutes    Signed:  Elisa Bernabe MD

## 2022-04-07 NOTE — DISCHARGE INSTRUCTIONS
HOSPITALIST DISCHARGE INSTRUCTIONS    NAME: Rene Villafana   :  1960   MRN:  [id number]     Date/Time:  2022 10:57 AM    ADMIT DATE: 3/25/2022   DISCHARGE DATE: 2022     Metastatic breast cancer  Fungating left breast cancer  Cellulitis of the left breast  Severe sepsis, MSSA bacteremia, improved    · It is important that you take the medication exactly as they are prescribed. · Keep your medication in the bottles provided by the pharmacist and keep a list of the medication names, dosages, and times to be taken in your wallet. · Do not take other medications without consulting your doctor. What to do at Home    Recommended diet:  Regular Diet    Recommended activity: Activity as tolerated and no driving for today      If you have questions regarding the hospital related prescriptions or hospital related issues please call [facility] at [phone]. You can always direct your questions to your primary care doctor if you are unable to reach your hospital physician; your PCP works as an extension of your hospital doctor just like your hospital doctor is an extension of your PCP for your time at the [facility], [facility])    If you experience any of the following symptoms then please call your primary care physician or return to the emergency room if you cannot get hold of your doctor:    Fever, chills, nausea, vomiting, or persistent diarrhea  Worsening weakness or new problems with your speech or balance  Dark stools or visible blood in your stools  New Leg swelling or shortness of breath as these could be signs of a clot    Additional Instructions:      Bring these papers with you to your follow up appointments. The papers will help your doctors be sure to continue the care plan from the hospital.              Information obtained by :  I understand that if any problems occur once I am at home I am to contact my physician.     I understand and acknowledge receipt of the instructions indicated above.                                                                                                                                            Physician's or R.N.'s Signature                                                                  Date/Time                                                                                                                                              Patient or Representative Signature

## 2022-04-07 NOTE — PROGRESS NOTES
Transition of Care Plan:    RUR: 10% low risk for readmission   Disposition:  Home with HH (Pending accepting [facility] agency), IV Infusion ([facility])  Follow up appointments: PCP and Oncology follow up  DME needed:  IV antibiotics, possible need for home O2 (nurse to do walk test)  Transportation at Discharge:  Daughter to transport (present in room)  Keys or means to access home: Daughter has access     IM Medicare Letter:  Medicaid patient  n/a  Is patient a BCPI-A Bundle:  Not identified as a bundle patient         If yes, was Bundle Letter given?:    Is patient a [place] and connected with the [address]? n/a               If yes, was [place] transfer form completed and [facility] notified? Caregiver Contact:  Daughter  Discharge Caregiver contacted prior to discharge? Daughter aware of discharge, present in room   Care Conference needed?:  Not needed at this time    CM met with patient to discuss discharge plans. [facility] needing to do teaching with daughter prior to discharge. Pt agreeable to [facility] services and CM will arrange nursing for PICC care/labs and PT for deconditioning from hospital stay. Pt reports increased shortness of breath, nurse notified and will complete O2 challenge prior to discharge. CM called George Grigsby with [facility] to come for IV antibiotic teaching. George Grigsby reports that she can be at the hospital around 1pm to complete teaching. CM has submitted referral to [facility] to provide [facility] SN and PT services. Referral pending acceptance. Care Management Interventions  PCP Verified by CM: Yes  Transition of Care Consult (CM Consult): Discharge Planning  Support Systems: Other (Comment) (Lives alone)  Confirm Follow Up Transport: Family  Discharge Location  Patient Expects to be Discharged to[de-identified] Home with home health    [employer].  Karri Carmona, [address] - ED [facility]  [facility] [employer] ACP Facilitator  Zone Phone: [phone]

## 2022-04-08 NOTE — TELEPHONE ENCOUNTER
The patient's daughter Dru Webb states she was discharged from [facility] yesterday. She was given Oxycodone prescription and it was not covered by insurance. She wants to know if there is something else she can take.   # [id number]

## 2022-04-08 NOTE — TELEPHONE ENCOUNTER
Returned call to patient's daughter, Danica Moreira - advised PA was requested and pending response from insurance company

## 2022-04-11 NOTE — TELEPHONE ENCOUNTER
Per Dr Latanya Martínez OxyContin 40 mg changed to Fentanyl 25 mcg patch every 48 hours , orders pending signature

## 2022-04-13 NOTE — PROGRESS NOTES
Ghazala Head is a 57 y.o. female  1. Have you been to the ER, yes urgent care clinic since your last visit?no Hospitalized since your last visit? yes    2. Have you seen or consulted any other health care providers outside of the [address] [place] [address] since your last visit? no  Include any pap smears or colon screening.  no

## 2022-04-13 NOTE — PROGRESS NOTES
Office Visit Note:    Assessment/Plan:  1. Metastatic breast cancer [facility])      Metastatic stage IV breast cancer-on discharge from hospital she was recommended to follow-up with hospice. She is on pain medications from therapy care, will advised to continue to follow-up with them. At this time her infection seems to be controlled. She has 2 more days of IV antibiotics, will recommend to remove her PICC line. We will also advised to follow-up with her oncologist.  At this time I do not have anything more to offer her, will advised to keep following up with palliative care/hospice. It is hard for her to come to office appointments. She does tell me that she is a DNR. No orders of the defined types were placed in this encounter. Social Determinants of Health     Tobacco Use: Medium Risk    Smoking Tobacco Use: Former Smoker    Smokeless Tobacco Use: Never Used   Alcohol Use:     Frequency of Alcohol Consumption: Not on file    Average Number of Drinks: Not on file    Frequency of Binge Drinking: Not on file   Financial Resource Strain:     Difficulty of Paying Living Expenses: Not on file   Food Insecurity:     Worried About [address] in the Last Year: Not on file    Jose of Food in the Last Year: Not on file   Transportation Needs:     Lack of Transportation (Medical): Not on file    Lack of Transportation (Non-Medical):  Not on file   Physical Activity:     Days of Exercise per Week: Not on file    Minutes of Exercise per Session: Not on file   Stress:     Feeling of Stress : Not on file   Social Connections:     Frequency of Communication with Friends and Family: Not on file    Frequency of Social Gatherings with Friends and Family: Not on file    Attends Adventism Services: Not on file    Active Member of Clubs or Organizations: Not on file    Attends Club or Organization Meetings: Not on file    Marital Status: Not on file   Intimate Partner Violence:     Fear of Current or Ex-Partner: Not on file    Emotionally Abused: Not on file    Physically Abused: Not on file    Sexually Abused: Not on file   Depression: Not at risk    PHQ-2 Score: 0   Housing Stability:     Unable to Pay for Housing in the Last Year: Not on file    Number of Places Lived in the Last Year: Not on file    Unstable Housing in the Last Year: Not on file           I have reviewed with the patient details of the assessment and plan and all questions were answered. Relevant patient education was performed. The most recent lab findings were reviewed with the patient. An After Visit Summary was printed and given to the patient. Reason for Visit: New Patient [facility] follow up)      Subjective:  57 yr old F with metastatic triple negative stage IV breast cancer diagnosed in 2019. She had lumpectomy in 9/2019 following neoadjuvant AC-T, adjuvant radiation with recurrence in 2021. At this time oncology has recommended palliative care and palliative care is following the patient. She was admitted to the hospital on 3/25/2022 with left breast pain, developed severe sepsis and MSSA bacteremia. Her discharge plan advised her to follow-up with hospice. She is here as a hospital follow-up. She denies have any fevers or chills she still continues to have significant pain. She also complains of significant swelling in her left hand and arm. Review of Systems  A complete 11 system ROS was preformed (constitutional, eyes, ENT, cardiovascular, respiratory, gastrointestinal, genitourinary, musculoskeletal, skin, neurological, psychiatric) and was negative aside from the pertinent positives and negatives noted in the HPI.     Past Medical History:   Diagnosis Date    Breast cancer ([address]     left side    Menopause      Past Surgical History:   Procedure Laterality Date    HX BREAST BIOPSY Left     x2    HX BREAST LUMPECTOMY Left 9/5/2019    LEFT BREAST LUMPECTOMY WITH ULTRASOUND performed [facility]    HX HYSTERECTOMY      partial, ovaries remain    IR INSERT TUNL CVC W PORT OVER 5 YEARS  2021    IR REMOVE TUNL CVAD W PORT/PUMP  3/30/2022    [facility]     Social History     Socioeconomic History    Marital status: SINGLE   Tobacco Use    Smoking status: Former Smoker     Packs/day: 0.10     Quit date: 2020     Years since quittin.2    Smokeless tobacco: Never Used   Substance and Sexual Activity    Alcohol use: No    Drug use: No    Sexual activity: Not Currently     Family History   Problem Relation Age of Onset    Cancer Father     Cancer Maternal Aunt     Breast Cancer Maternal Aunt         4 paternal aunts    Cancer Maternal Uncle     Cancer Paternal Aunt     Cancer Paternal Uncle     Breast Cancer Maternal Grandmother      Current Outpatient Medications   Medication Sig Dispense Refill    fentaNYL (Duragesic) 12 mcg/hr patch 1 Patch by TransDERmal route every seventy-two (72) hours for 30 days. Max Daily Amount: 1 Patch. 10 Patch 0    carvediloL (COREG) 3.125 mg tablet Take 1 Tablet by mouth two (2) times daily (with meals). 60 Tablet 0    ceFAZolin 2 g IV syringe 2 g by IntraVENous route every eight (8) hours. 1 Dose 0    HYDROmorphone (DILAUDID) 2 mg tablet Take 1 Tablet by mouth every four (4) hours as needed for Pain for up to 14 days. Max Daily Amount: 12 mg. 70 Tablet 0    metroNIDAZOLE (METROGEL) 0.75 % topical gel Apply  to affected area daily. 60 g 0    senna-docusate (PERICOLACE) 8.6-50 mg per tablet Take 2 Tablets by mouth daily for 20 days. 40 Tablet 0    ondansetron (ZOFRAN ODT) 4 mg disintegrating tablet Take 1 Tablet by mouth every eight (8) hours as needed for Nausea or Vomiting. 15 Tablet 0    naloxone (Narcan) 4 mg/actuation nasal spray Use 1 spray intranasally, then discard. Repeat with new spray every 2 min as needed for opioid overdose symptoms, alternating nostrils.  2 Each 0    oxyCODONE ER (OxyCONTIN) 40 mg ER tablet Take 1 Tablet by mouth every twelve (12) hours for 14 days. Max Daily Amount: 80 mg. (Patient not taking: Reported on 4/13/2022) 28 Tablet 0     Allergies   Allergen Reactions    Codeine Rash     Rash from tylenol #3       Objective:  Visit Vitals  BP (!) 161/99 (BP 1 Location: Right lower arm, BP Patient Position: Sitting, BP Cuff Size: Adult)   Pulse (!) 129   Temp (!) 96.3 °F (35.7 °C) (Oral)   Ht 5' 2.5\" (1.588 m)   Wt 163 lb (73.9 kg)   BMI 29.34 kg/m²     Physical Exam:   AA&O x3. HEENT: ENT negative. Lungs: Coarse breath sounds  Heart: S1 S2 +, R   left breast bandaged  Results for orders placed or performed during the hospital encounter of 03/25/22   CULTURE, BLOOD, PAIRED    Specimen: Blood   Result Value Ref Range    Special Requests: NO SPECIAL REQUESTS      Culture result: (A)       STAPHYLOCOCCUS AUREUS GROWING IN 1 OF 4 BOTTLES DRAWN SITES=PORT 1    Culture result: REMAINING BOTTLE(S) HAS/HAVE NO GROWTH IN 5 DAYS      Culture result:       (NOTE) [facility] GROWING IN 1 OUT OF 4 BOTTLES CALLED TO AND READ BACK BY LONNY FRANKLIN RN AT 10:20AM ON 3/36/2022 RE        Susceptibility    Staphylococcus aureus - MARIAH     Clindamycin ($)  Resistant ug/mL     Daptomycin ($$$$$) 0.25 Susceptible ug/mL     Erythromycin ($$$$) >=8 Resistant ug/mL     Gentamicin ($) <=0.5 Susceptible ug/mL     Linezolid ($$$$$) 2 Susceptible ug/mL     Oxacillin <=0.25 Susceptible ug/mL     Rifampin ($$$$)* <=0.5 Susceptible ug/mL      * Rifampin is not to be used for mono-therapy.      Tetracycline <=1 Susceptible ug/mL     Trimeth/Sulfa <=10 Susceptible ug/mL     Vancomycin ($) 1 Susceptible ug/mL     Ciprofloxacin ($) <=0.5 Susceptible ug/mL     Levofloxacin ($) 0.25 Susceptible ug/mL     Moxifloxacin ($$$$) <=0.25 Susceptible ug/mL     Doxycycline ($$) <=0.5 Susceptible ug/mL   CULTURE, WOUND W GRAM STAIN    Specimen: Breast; Wound   Result Value Ref Range    Special Requests: NO SPECIAL REQUESTS      GRAM STAIN FEW WBCS SEEN      GRAM STAIN FEW GRAM NEGATIVE RODS      GRAM STAIN FEW GRAM POSITIVE RODS      GRAM STAIN FEW GRAM POSITIVE COCCI IN CLUSTERS      Culture result: HEAVY ESCHERICHIA COLI (A)      Culture result: MODERATE STAPHYLOCOCCUS AUREUS (A)      Culture result: HEAVY DIPHTHEROIDS (A)         Susceptibility    Staphylococcus aureus - MARIAH     Clindamycin ($)  Resistant ug/mL     Daptomycin ($$$$$) 0.25 Susceptible ug/mL     Erythromycin ($$$$) >=8 Resistant ug/mL     Gentamicin ($) <=0.5 Susceptible ug/mL     Linezolid ($$$$$) 2 Susceptible ug/mL     Oxacillin <=0.25 Susceptible ug/mL     Rifampin ($$$$)* <=0.5 Susceptible ug/mL      * Rifampin is not to be used for mono-therapy.      Tetracycline <=1 Susceptible ug/mL     Trimeth/Sulfa <=10 Susceptible ug/mL     Vancomycin ($) 1 Susceptible ug/mL     Ciprofloxacin ($) <=0.5 Susceptible ug/mL     Levofloxacin ($) <=0.12 Susceptible ug/mL     Moxifloxacin ($$$$) <=0.25 Susceptible ug/mL     Doxycycline ($$) <=0.5 Susceptible ug/mL    Escherichia coli - MARIAH     Amikacin ($) <=2 Susceptible ug/mL     Ampicillin ($) 4 Susceptible ug/mL     Ampicillin/sulbactam ($) <=2 Susceptible ug/mL     Cefazolin ($) <=4 Susceptible ug/mL     Ceftazidime ($) <=1 Susceptible ug/mL     Ceftriaxone ($) <=1 Susceptible ug/mL     Cefepime ($$) <=1 Susceptible ug/mL     Ciprofloxacin ($) <=0.25 Susceptible ug/mL     Gentamicin ($) <=1 Susceptible ug/mL     Levofloxacin ($) <=0.12 Susceptible ug/mL     Meropenem ($$) <=0.25 Susceptible ug/mL     Piperacillin/Tazobac ($) <=4 Susceptible ug/mL     Tobramycin ($) <=1 Susceptible ug/mL     Trimeth/Sulfa <=20 Susceptible ug/mL     Cefoxitin <=4 Susceptible ug/mL   CULTURE, BLOOD    Specimen: Blood   Result Value Ref Range    Special Requests: NO SPECIAL REQUESTS      Culture result: NO GROWTH 6 DAYS     CULTURE, WOUND W GRAM STAIN    Specimen: Wound Drainage   Result Value Ref Range    Special Requests: NO SPECIAL REQUESTS      GRAM STAIN        GRAM STAIN NOT ROUTINELY PERFORMED ON THIS SPECIMEN    Culture result: NO GROWTH 4 DAYS     CULTURE, BLOOD, PAIRED    Specimen: Blood   Result Value Ref Range    Special Requests: NO SPECIAL REQUESTS      Culture result: NO GROWTH 5 DAYS     CBC WITH AUTOMATED DIFF   Result Value Ref Range    WBC 3.9 3.6 - 11.0 K/uL    RBC 3.88 3.80 - 5.20 M/uL    HGB 8.9 (L) 11.5 - 16.0 g/dL    HCT 28.9 (L) 35.0 - 47.0 %    MCV 74.5 (L) 80.0 - 99.0 FL    MCH 22.9 (L) 26.0 - 34.0 PG    MCHC 30.8 30.0 - 36.5 g/dL    RDW 21.9 (H) 11.5 - 14.5 %    PLATELET 938 (H) 083 - 400 K/uL    MPV 8.0 (L) 8.9 - 12.9 FL    NRBC 0.0 0  WBC    ABSOLUTE NRBC 0.00 0.00 - 0.01 K/uL    NEUTROPHILS 75 32 - 75 %    LYMPHOCYTES 14 12 - 49 %    MONOCYTES 11 5 - 13 %    EOSINOPHILS 0 0 - 7 %    BASOPHILS 0 0 - 1 %    IMMATURE GRANULOCYTES 0 0.0 - 0.5 %    ABS. NEUTROPHILS 3.0 1.8 - 8.0 K/UL    ABS. LYMPHOCYTES 0.5 (L) 0.8 - 3.5 K/UL    ABS. MONOCYTES 0.4 0.0 - 1.0 K/UL    ABS. EOSINOPHILS 0.0 0.0 - 0.4 K/UL    ABS. BASOPHILS 0.0 0.0 - 0.1 K/UL    ABS. IMM. GRANS. 0.0 0.00 - 0.04 K/UL    DF SMEAR SCANNED      RBC COMMENTS ANISOCYTOSIS  2+        RBC COMMENTS MICROCYTOSIS  1+        RBC COMMENTS HYPOCHROMIA  PRESENT        WBC COMMENTS FEW     METABOLIC PANEL, COMPREHENSIVE   Result Value Ref Range    Sodium 136 136 - 145 mmol/L    Potassium 3.9 3.5 - 5.1 mmol/L    Chloride 102 97 - 108 mmol/L    CO2 20 (L) 21 - 32 mmol/L    Anion gap 14 5 - 15 mmol/L    Glucose 97 65 - 100 mg/dL    BUN 5 (L) 6 - 20 MG/DL    Creatinine 0.42 (L) 0.55 - 1.02 MG/DL    BUN/Creatinine ratio 12 12 - 20      GFR est AA >60 >60 ml/min/1.73m2    GFR est non-AA >60 >60 ml/min/1.73m2    Calcium 8.8 8.5 - 10.1 MG/DL    Bilirubin, total 0.5 0.2 - 1.0 MG/DL    ALT (SGPT) 10 (L) 12 - 78 U/L    AST (SGOT) 24 15 - 37 U/L    Alk.  phosphatase 98 45 - 117 U/L    Protein, total 5.9 (L) 6.4 - 8.2 g/dL    Albumin 2.1 (L) 3.5 - 5.0 g/dL    Globulin 3.8 2.0 - 4.0 g/dL    A-G Ratio 0.6 (L) 1.1 - 2.2     LACTIC ACID   Result Value Ref Range    Lactic acid 3.5 (HH) 0.4 - 2.0 MMOL/L   TROPONIN-HIGH SENSITIVITY   Result Value Ref Range    Troponin-High Sensitivity 12 0 - 51 ng/L   LACTIC ACID   Result Value Ref Range    Lactic acid 5.3 (HH) 0.4 - 2.0 MMOL/L   MAGNESIUM   Result Value Ref Range    Magnesium 1.7 1.6 - 2.4 mg/dL   CBC W/O DIFF   Result Value Ref Range    WBC 3.0 (L) 3.6 - 11.0 K/uL    RBC 3.90 3.80 - 5.20 M/uL    HGB 8.7 (L) 11.5 - 16.0 g/dL    HCT 29.6 (L) 35.0 - 47.0 %    MCV 75.9 (L) 80.0 - 99.0 FL    MCH 22.3 (L) 26.0 - 34.0 PG    MCHC 29.4 (L) 30.0 - 36.5 g/dL    RDW 22.0 (H) 11.5 - 14.5 %    PLATELET 209 (H) 985 - 400 K/uL    MPV 8.2 (L) 8.9 - 12.9 FL    NRBC 0.0 0  WBC    ABSOLUTE NRBC 0.00 0.00 - 4.22 K/uL   METABOLIC PANEL, BASIC   Result Value Ref Range    Sodium 140 136 - 145 mmol/L    Potassium 3.3 (L) 3.5 - 5.1 mmol/L    Chloride 106 97 - 108 mmol/L    CO2 23 21 - 32 mmol/L    Anion gap 11 5 - 15 mmol/L    Glucose 73 65 - 100 mg/dL    BUN 3 (L) 6 - 20 MG/DL    Creatinine 0.53 (L) 0.55 - 1.02 MG/DL    BUN/Creatinine ratio 6 (L) 12 - 20      GFR est AA >60 >60 ml/min/1.73m2    GFR est non-AA >60 >60 ml/min/1.73m2    Calcium 8.2 (L) 8.5 - 10.1 MG/DL   LACTIC ACID   Result Value Ref Range    Lactic acid 4.4 (HH) 0.4 - 2.0 MMOL/L   LACTIC ACID   Result Value Ref Range    Lactic acid 5.9 (HH) 0.4 - 2.0 MMOL/L   LACTIC ACID   Result Value Ref Range    Lactic acid 5.2 (HH) 0.4 - 2.0 MMOL/L   LACTIC ACID   Result Value Ref Range    Lactic acid 5.1 (HH) 0.4 - 2.0 MMOL/L   METABOLIC PANEL, BASIC   Result Value Ref Range    Sodium 141 136 - 145 mmol/L    Potassium 3.6 3.5 - 5.1 mmol/L    Chloride 108 97 - 108 mmol/L    CO2 22 21 - 32 mmol/L    Anion gap 11 5 - 15 mmol/L    Glucose 71 65 - 100 mg/dL    BUN 4 (L) 6 - 20 MG/DL    Creatinine 0.53 (L) 0.55 - 1.02 MG/DL    BUN/Creatinine ratio 8 (L) 12 - 20      GFR est AA >60 >60 ml/min/1.73m2    GFR est non-AA >60 >60 ml/min/1.73m2    Calcium 8.0 (L) 8.5 - 10.1 MG/DL   MAGNESIUM   Result Value Ref Range    Magnesium 1.5 (L) 1.6 - 2.4 mg/dL   PHOSPHORUS   Result Value Ref Range    Phosphorus 3.5 2.6 - 4.7 MG/DL   CBC WITH AUTOMATED DIFF   Result Value Ref Range    WBC 2.9 (L) 3.6 - 11.0 K/uL    RBC 3.49 (L) 3.80 - 5.20 M/uL    HGB 7.9 (L) 11.5 - 16.0 g/dL    HCT 27.6 (L) 35.0 - 47.0 %    MCV 79.1 (L) 80.0 - 99.0 FL    MCH 22.6 (L) 26.0 - 34.0 PG    MCHC 28.6 (L) 30.0 - 36.5 g/dL    RDW 21.9 (H) 11.5 - 14.5 %    PLATELET 974 (H) 891 - 400 K/uL    MPV 8.2 (L) 8.9 - 12.9 FL    NRBC 0.0 0  WBC    ABSOLUTE NRBC 0.00 0.00 - 0.01 K/uL    NEUTROPHILS 60 32 - 75 %    LYMPHOCYTES 21 12 - 49 %    MONOCYTES 14 (H) 5 - 13 %    EOSINOPHILS 5 0 - 7 %    BASOPHILS 0 0 - 1 %    IMMATURE GRANULOCYTES 0 0.0 - 0.5 %    ABS. NEUTROPHILS 1.8 1.8 - 8.0 K/UL    ABS. LYMPHOCYTES 0.6 (L) 0.8 - 3.5 K/UL    ABS. MONOCYTES 0.4 0.0 - 1.0 K/UL    ABS. EOSINOPHILS 0.1 0.0 - 0.4 K/UL    ABS. BASOPHILS 0.0 0.0 - 0.1 K/UL    ABS. IMM.  GRANS. 0.0 0.00 - 0.04 K/UL    DF AUTOMATED      RBC COMMENTS ANISOCYTOSIS  2+        RBC COMMENTS MICROCYTOSIS  1+        RBC COMMENTS HYPOCHROMIA  1+        RBC COMMENTS OVALOCYTES  1+       VANCOMYCIN, TROUGH   Result Value Ref Range    Vancomycin,trough 21.8 (HH) 5.0 - 10.0 ug/mL    Reported dose date NOT PROVIDED      Reported dose time: NOT PROVIDED      Reported dose: NOT PROVIDED UNITS   LACTIC ACID   Result Value Ref Range    Lactic acid 5.2 (HH) 0.4 - 2.0 MMOL/L   METABOLIC PANEL, BASIC   Result Value Ref Range    Sodium 139 136 - 145 mmol/L    Potassium 3.6 3.5 - 5.1 mmol/L    Chloride 107 97 - 108 mmol/L    CO2 23 21 - 32 mmol/L    Anion gap 9 5 - 15 mmol/L    Glucose 73 65 - 100 mg/dL    BUN 5 (L) 6 - 20 MG/DL    Creatinine 0.67 0.55 - 1.02 MG/DL    BUN/Creatinine ratio 7 (L) 12 - 20      GFR est AA >60 >60 ml/min/1.73m2    GFR est non-AA >60 >60 ml/min/1.73m2    Calcium 8.1 (L) 8.5 - 10.1 MG/DL   CBC WITH AUTOMATED DIFF   Result Value Ref Range    WBC 3.5 (L) 3.6 - 11.0 K/uL    RBC 3.46 (L) 3.80 - 5.20 M/uL    HGB 8.1 (L) 11.5 - 16.0 g/dL    HCT 27.7 (L) 35.0 - 47.0 %    MCV 80.1 80.0 - 99.0 FL    MCH 23.4 (L) 26.0 - 34.0 PG    MCHC 29.2 (L) 30.0 - 36.5 g/dL    RDW 22.0 (H) 11.5 - 14.5 %    PLATELET 893 (H) 210 - 400 K/uL    MPV 8.2 (L) 8.9 - 12.9 FL    NRBC 0.0 0  WBC    ABSOLUTE NRBC 0.00 0.00 - 0.01 K/uL    NEUTROPHILS 64 32 - 75 %    LYMPHOCYTES 21 12 - 49 %    MONOCYTES 11 5 - 13 %    EOSINOPHILS 4 0 - 7 %    BASOPHILS 0 0 - 1 %    IMMATURE GRANULOCYTES 0 0.0 - 0.5 %    ABS. NEUTROPHILS 2.3 1.8 - 8.0 K/UL    ABS. LYMPHOCYTES 0.7 (L) 0.8 - 3.5 K/UL    ABS. MONOCYTES 0.4 0.0 - 1.0 K/UL    ABS. EOSINOPHILS 0.1 0.0 - 0.4 K/UL    ABS. BASOPHILS 0.0 0.0 - 0.1 K/UL    ABS. IMM.  GRANS. 0.0 0.00 - 0.04 K/UL    DF SMEAR SCANNED      RBC COMMENTS ANISOCYTOSIS  2+        WBC COMMENTS REACTIVE LYMPHS     METABOLIC PANEL, BASIC   Result Value Ref Range    Sodium 141 136 - 145 mmol/L    Potassium 3.5 3.5 - 5.1 mmol/L    Chloride 108 97 - 108 mmol/L    CO2 24 21 - 32 mmol/L    Anion gap 9 5 - 15 mmol/L    Glucose 71 65 - 100 mg/dL    BUN 7 6 - 20 MG/DL    Creatinine 0.71 0.55 - 1.02 MG/DL    BUN/Creatinine ratio 10 (L) 12 - 20      GFR est AA >60 >60 ml/min/1.73m2    GFR est non-AA >60 >60 ml/min/1.73m2    Calcium 8.2 (L) 8.5 - 10.1 MG/DL   MAGNESIUM   Result Value Ref Range    Magnesium 1.9 1.6 - 2.4 mg/dL   PHOSPHORUS   Result Value Ref Range    Phosphorus 3.8 2.6 - 4.7 MG/DL   CBC WITH AUTOMATED DIFF   Result Value Ref Range    WBC 4.2 3.6 - 11.0 K/uL    RBC 3.53 (L) 3.80 - 5.20 M/uL    HGB 8.2 (L) 11.5 - 16.0 g/dL    HCT 27.4 (L) 35.0 - 47.0 %    MCV 77.6 (L) 80.0 - 99.0 FL    MCH 23.2 (L) 26.0 - 34.0 PG    MCHC 29.9 (L) 30.0 - 36.5 g/dL    RDW 22.9 (H) 11.5 - 14.5 %    PLATELET 689 (H) 132 - 400 K/uL    MPV 8.3 (L) 8.9 - 12.9 FL    NRBC 0.0 0  WBC    ABSOLUTE NRBC 0.00 0.00 - 0.01 K/uL    NEUTROPHILS 64 32 - 75 %    LYMPHOCYTES 22 12 - 49 %    MONOCYTES 9 5 - 13 %    EOSINOPHILS 4 0 - 7 %    BASOPHILS 1 0 - 1 %    IMMATURE GRANULOCYTES 0 0.0 - 0.5 %    ABS. NEUTROPHILS 2.7 1.8 - 8.0 K/UL    ABS. LYMPHOCYTES 0.9 0.8 - 3.5 K/UL    ABS. MONOCYTES 0.4 0.0 - 1.0 K/UL    ABS. EOSINOPHILS 0.2 0.0 - 0.4 K/UL    ABS. BASOPHILS 0.0 0.0 - 0.1 K/UL    ABS. IMM. GRANS. 0.0 0.00 - 0.04 K/UL    DF AUTOMATED     METABOLIC PANEL, BASIC   Result Value Ref Range    Sodium 139 136 - 145 mmol/L    Potassium 3.3 (L) 3.5 - 5.1 mmol/L    Chloride 107 97 - 108 mmol/L    CO2 24 21 - 32 mmol/L    Anion gap 8 5 - 15 mmol/L    Glucose 75 65 - 100 mg/dL    BUN 5 (L) 6 - 20 MG/DL    Creatinine 0.65 0.55 - 1.02 MG/DL    BUN/Creatinine ratio 8 (L) 12 - 20      GFR est AA >60 >60 ml/min/1.73m2    GFR est non-AA >60 >60 ml/min/1.73m2    Calcium 8.3 (L) 8.5 - 10.1 MG/DL   MAGNESIUM   Result Value Ref Range    Magnesium 1.9 1.6 - 2.4 mg/dL   PHOSPHORUS   Result Value Ref Range    Phosphorus 3.4 2.6 - 4.7 MG/DL   CBC WITH AUTOMATED DIFF   Result Value Ref Range    WBC 5.1 3.6 - 11.0 K/uL    RBC 3.51 (L) 3.80 - 5.20 M/uL    HGB 8.1 (L) 11.5 - 16.0 g/dL    HCT 28.1 (L) 35.0 - 47.0 %    MCV 80.1 80.0 - 99.0 FL    MCH 23.1 (L) 26.0 - 34.0 PG    MCHC 28.8 (L) 30.0 - 36.5 g/dL    RDW 23.5 (H) 11.5 - 14.5 %    PLATELET 385 (H) 858 - 400 K/uL    MPV 8.3 (L) 8.9 - 12.9 FL    NRBC 0.0 0  WBC    ABSOLUTE NRBC 0.00 0.00 - 0.01 K/uL    NEUTROPHILS 69 32 - 75 %    LYMPHOCYTES 18 12 - 49 %    MONOCYTES 10 5 - 13 %    EOSINOPHILS 2 0 - 7 %    BASOPHILS 0 0 - 1 %    IMMATURE GRANULOCYTES 1 (H) 0.0 - 0.5 %    ABS. NEUTROPHILS 3.5 1.8 - 8.0 K/UL    ABS. LYMPHOCYTES 0.9 0.8 - 3.5 K/UL    ABS. MONOCYTES 0.5 0.0 - 1.0 K/UL    ABS. EOSINOPHILS 0.1 0.0 - 0.4 K/UL    ABS. BASOPHILS 0.0 0.0 - 0.1 K/UL    ABS. IMM.  GRANS. 0.0 0.00 - 0.04 K/UL    DF AUTOMATED     METABOLIC PANEL, BASIC   Result Value Ref Range    Sodium 138 136 - 145 mmol/L    Potassium 3.1 (L) 3.5 - 5.1 mmol/L Chloride 106 97 - 108 mmol/L    CO2 23 21 - 32 mmol/L    Anion gap 9 5 - 15 mmol/L    Glucose 82 65 - 100 mg/dL    BUN 6 6 - 20 MG/DL    Creatinine 0.72 0.55 - 1.02 MG/DL    BUN/Creatinine ratio 8 (L) 12 - 20      GFR est AA >60 >60 ml/min/1.73m2    GFR est non-AA >60 >60 ml/min/1.73m2    Calcium 8.4 (L) 8.5 - 10.1 MG/DL   CBC WITH AUTOMATED DIFF   Result Value Ref Range    WBC 4.9 3.6 - 11.0 K/uL    RBC 3.46 (L) 3.80 - 5.20 M/uL    HGB 8.2 (L) 11.5 - 16.0 g/dL    HCT 28.1 (L) 35.0 - 47.0 %    MCV 81.2 80.0 - 99.0 FL    MCH 23.7 (L) 26.0 - 34.0 PG    MCHC 29.2 (L) 30.0 - 36.5 g/dL    RDW 23.7 (H) 11.5 - 14.5 %    PLATELET 870 (H) 774 - 400 K/uL    MPV 8.5 (L) 8.9 - 12.9 FL    NRBC 0.0 0  WBC    ABSOLUTE NRBC 0.00 0.00 - 0.01 K/uL    NEUTROPHILS 73 32 - 75 %    LYMPHOCYTES 15 12 - 49 %    MONOCYTES 10 5 - 13 %    EOSINOPHILS 2 0 - 7 %    BASOPHILS 0 0 - 1 %    IMMATURE GRANULOCYTES 1 (H) 0.0 - 0.5 %    ABS. NEUTROPHILS 3.6 1.8 - 8.0 K/UL    ABS. LYMPHOCYTES 0.7 (L) 0.8 - 3.5 K/UL    ABS. MONOCYTES 0.5 0.0 - 1.0 K/UL    ABS. EOSINOPHILS 0.1 0.0 - 0.4 K/UL    ABS. BASOPHILS 0.0 0.0 - 0.1 K/UL    ABS. IMM.  GRANS. 0.0 0.00 - 0.04 K/UL    DF AUTOMATED     METABOLIC PANEL, BASIC   Result Value Ref Range    Sodium 139 136 - 145 mmol/L    Potassium 3.6 3.5 - 5.1 mmol/L    Chloride 106 97 - 108 mmol/L    CO2 25 21 - 32 mmol/L    Anion gap 8 5 - 15 mmol/L    Glucose 101 (H) 65 - 100 mg/dL    BUN 8 6 - 20 MG/DL    Creatinine 0.72 0.55 - 1.02 MG/DL    BUN/Creatinine ratio 11 (L) 12 - 20      GFR est AA >60 >60 ml/min/1.73m2    GFR est non-AA >60 >60 ml/min/1.73m2    Calcium 8.6 8.5 - 05.3 MG/DL   METABOLIC PANEL, BASIC   Result Value Ref Range    Sodium 138 136 - 145 mmol/L    Potassium 4.2 3.5 - 5.1 mmol/L    Chloride 105 97 - 108 mmol/L    CO2 27 21 - 32 mmol/L    Anion gap 6 5 - 15 mmol/L    Glucose 91 65 - 100 mg/dL    BUN 8 6 - 20 MG/DL    Creatinine 0.70 0.55 - 1.02 MG/DL    BUN/Creatinine ratio 11 (L) 12 - 20 GFR est AA >60 >60 ml/min/1.73m2    GFR est non-AA >60 >60 ml/min/1.73m2    Calcium 8.5 8.5 - 10.1 MG/DL   LACTIC ACID   Result Value Ref Range    Lactic acid 4.0 (HH) 0.4 - 2.0 MMOL/L   METABOLIC PANEL, BASIC   Result Value Ref Range    Sodium 133 (L) 136 - 145 mmol/L    Potassium 3.8 3.5 - 5.1 mmol/L    Chloride 101 97 - 108 mmol/L    CO2 22 21 - 32 mmol/L    Anion gap 10 5 - 15 mmol/L    Glucose 126 (H) 65 - 100 mg/dL    BUN 9 6 - 20 MG/DL    Creatinine 0.77 0.55 - 1.02 MG/DL    BUN/Creatinine ratio 12 12 - 20      GFR est AA >60 >60 ml/min/1.73m2    GFR est non-AA >60 >60 ml/min/1.73m2    Calcium 8.4 (L) 8.5 - 10.1 MG/DL   CBC W/O DIFF   Result Value Ref Range    WBC 9.9 3.6 - 11.0 K/uL    RBC 3.42 (L) 3.80 - 5.20 M/uL    HGB 8.3 (L) 11.5 - 16.0 g/dL    HCT 27.7 (L) 35.0 - 47.0 %    MCV 81.0 80.0 - 99.0 FL    MCH 24.3 (L) 26.0 - 34.0 PG    MCHC 30.0 30.0 - 36.5 g/dL    RDW 24.7 (H) 11.5 - 14.5 %    PLATELET 469 (H) 122 - 400 K/uL    MPV 8.5 (L) 8.9 - 12.9 FL    NRBC 0.0 0  WBC    ABSOLUTE NRBC 0.00 0.00 - 0.34 K/uL   METABOLIC PANEL, BASIC   Result Value Ref Range    Sodium 137 136 - 145 mmol/L    Potassium 4.0 3.5 - 5.1 mmol/L    Chloride 104 97 - 108 mmol/L    CO2 24 21 - 32 mmol/L    Anion gap 9 5 - 15 mmol/L    Glucose 84 65 - 100 mg/dL    BUN 9 6 - 20 MG/DL    Creatinine 0.64 0.55 - 1.02 MG/DL    BUN/Creatinine ratio 14 12 - 20      GFR est AA >60 >60 ml/min/1.73m2    GFR est non-AA >60 >60 ml/min/1.73m2    Calcium 8.1 (L) 8.5 - 10.1 MG/DL   MAGNESIUM   Result Value Ref Range    Magnesium 1.7 1.6 - 2.4 mg/dL   METABOLIC PANEL, BASIC   Result Value Ref Range    Sodium 135 (L) 136 - 145 mmol/L    Potassium 4.0 3.5 - 5.1 mmol/L    Chloride 105 97 - 108 mmol/L    CO2 22 21 - 32 mmol/L    Anion gap 8 5 - 15 mmol/L    Glucose 88 65 - 100 mg/dL    BUN 10 6 - 20 MG/DL    Creatinine 0.49 (L) 0.55 - 1.02 MG/DL    BUN/Creatinine ratio 20 12 - 20      GFR est AA >60 >60 ml/min/1.73m2    GFR est non-AA >60 >60 ml/min/1.73m2    Calcium 8.0 (L) 8.5 - 10.1 MG/DL   CBC WITH AUTOMATED DIFF   Result Value Ref Range    WBC 12.2 (H) 3.6 - 11.0 K/uL    RBC 3.39 (L) 3.80 - 5.20 M/uL    HGB 8.3 (L) 11.5 - 16.0 g/dL    HCT 28.2 (L) 35.0 - 47.0 %    MCV 83.2 80.0 - 99.0 FL    MCH 24.5 (L) 26.0 - 34.0 PG    MCHC 29.4 (L) 30.0 - 36.5 g/dL    RDW 25.5 (H) 11.5 - 14.5 %    PLATELET 576 (H) 168 - 400 K/uL    MPV 9.0 8.9 - 12.9 FL    NRBC 0.0 0  WBC    ABSOLUTE NRBC 0.00 0.00 - 0.01 K/uL    NEUTROPHILS 80 (H) 32 - 75 %    LYMPHOCYTES 10 (L) 12 - 49 %    MONOCYTES 9 5 - 13 %    EOSINOPHILS 0 0 - 7 %    BASOPHILS 0 0 - 1 %    IMMATURE GRANULOCYTES 1 (H) 0.0 - 0.5 %    ABS. NEUTROPHILS 9.8 (H) 1.8 - 8.0 K/UL    ABS. LYMPHOCYTES 1.2 0.8 - 3.5 K/UL    ABS. MONOCYTES 1.1 (H) 0.0 - 1.0 K/UL    ABS. EOSINOPHILS 0.0 0.0 - 0.4 K/UL    ABS. BASOPHILS 0.0 0.0 - 0.1 K/UL    ABS. IMM.  GRANS. 0.1 (H) 0.00 - 0.04 K/UL    DF SMEAR SCANNED      RBC COMMENTS ANISOCYTOSIS  3+        RBC COMMENTS POLYCHROMASIA  PRESENT        RBC COMMENTS OVALOCYTES  PRESENT       METABOLIC PANEL, BASIC   Result Value Ref Range    Sodium 137 136 - 145 mmol/L    Potassium 3.8 3.5 - 5.1 mmol/L    Chloride 105 97 - 108 mmol/L    CO2 25 21 - 32 mmol/L    Anion gap 7 5 - 15 mmol/L    Glucose 69 65 - 100 mg/dL    BUN 12 6 - 20 MG/DL    Creatinine 0.47 (L) 0.55 - 1.02 MG/DL    BUN/Creatinine ratio 26 (H) 12 - 20      GFR est AA >60 >60 ml/min/1.73m2    GFR est non-AA >60 >60 ml/min/1.73m2    Calcium 8.0 (L) 8.5 - 10.1 MG/DL   EKG, 12 LEAD, INITIAL   Result Value Ref Range    Ventricular Rate 112 BPM    Atrial Rate 112 BPM    P-R Interval 130 ms    QRS Duration 42 ms    Q-T Interval 332 ms    QTC Calculation (Bezet) 453 ms    Calculated P Axis 45 degrees    Calculated R Axis 47 degrees    Calculated T Axis 47 degrees    Diagnosis       Sinus tachycardia  Low voltage QRS  Confirmed by Jessica Gutierrez ([id number]) on 3/26/2022 8:18:05 PM     EKG, 12 LEAD, INITIAL   Result Value Ref Range    Ventricular Rate 138 BPM    Atrial Rate 138 BPM    P-R Interval 126 ms    QRS Duration 64 ms    Q-T Interval 274 ms    QTC Calculation (Bezet) 415 ms    Calculated P Axis 45 degrees    Calculated R Axis 35 degrees    Calculated T Axis 53 degrees    Diagnosis       Sinus tachycardia  Low voltage QRS  Septal infarct (cited on or before 02-APR-2022)  Abnormal ECG  When compared with ECG of 25-MAR-2022 13:18,  Questionable change in QRS duration  Questionable change in initial forces of Septal leads  Confirmed by Matti Segura ([id number]) on 4/4/2022 11:59:56 AM     ECHO ADULT COMPLETE   Result Value Ref Range    IVSd 1.1 (A) 0.6 - 0.9 cm    LVIDd 4.3 3.9 - 5.3 cm    LVIDs 3.1 cm    LVPWd 0.8 0.6 - 0.9 cm    LA Diameter 3.4 cm    Fractional Shortening 2D 28 28 - 44 %    LVIDd Index 2.57 cm/m2    LVIDs Index 1.86 cm/m2    LV RWT Ratio 0.37     LV Mass 2D 132.7 67 - 162 g    LV Mass 2D Index 79.5 43 - 95 g/m2    LA Size Index 2.04 cm/m2         Deepthi Pierce MD, FACP, [facility].   [address], [place], [place].

## 2022-04-15 NOTE — HOSPICE
Patient Name: Juan J Hsieh  YOB: 1960  Age: 57                                                         Principle Hospice Diagnosis: Breast Cancer with Metastasis  Diagnoses RELATED to the terminal prognosis: Hx Sepsis  Other Diagnoses: Hypertension    Date of Hospice Admission: 4/15/2022  Hospice Attending Elected by Patient: Dr. Nerissa Amaro  Primary Care Physician: Dr. Parmjit Pulido RN: Shante Valdovinos RN  Nurse CM: Kaley Lew Worker: Lavern Werner:  Manohar Andrea DNR:  No   Service:   No        Home: Would like to be cremated though no [facility] selected as of yet. NCD requested: Yes    Hospice Summary: Upon visit arrival patient noted lying supine in bed resting with eyes closed, lymphedema noted to left arm. Daughter Suni Duogn present. Daughter stated that pt. was diagosed in 2018 and received radiation in  after having a lumpectomy and then the disease went into remission. In May 2021 pt. was told that the Breast CA was active again at Stage 4. After patient was aroused. . skin assessment done and dressing changed to left breast (cauliflower appearance) to entire left breast with clear small amount of drainage with a light green hue to it noted. IV antibiotic last dose was 11 PM on . Dressing changed using ABD pads, guaze and med fix tape after cleansing with wound cleanser. Area to left upper back also affected with raised bumpy areas noted. Derma bond to 1 inch area right upper chest where Port-A-Cath was removed recently. Patient states pain 5 out of 10 and daughter medicated during visit with PRN Dilaudid. Patient only taking bites of food daily and a small amount of fluids. She has been walking to the bathroom but is very unsteady and weak. Daughter requested bedside commode. Also patient is short of breath at nightime and upon even minimal exertion. 02 ordered, over the bed table and wheelchair ordered. . daughter states she will let Hospice know when they are ready for the hospital bed as they have to move furniture around. Medication orders reviewed with Dr. Kashmir Pinto and fentanyl patch was increased from 12 Mcg's to 25 mcg . .education given to daughter regarding removing old patch before placing new strength patch on. Meds ordered through [facility] for delivery today. Patient was very drowsy during visit. Further education needed for patient and daughter regarding CPR/DNR wishes. Patient and daughter would like to follow up with MSW regarding Living will. ER Visits/ Hospitalizations in past year: 1  Onset Date of Hospice Diagnosis: 2018  Summary of Disease Progression Leading to Hospice Diagnosis:  Excerpted from note by: Sruthi Inman MD Physician Internal Medicine. H&P -This note has been shared with the patient Signed   Date of Service:  03/25/22 3273 - Yfn Cramer is a 57 y.o.  female who presents with left breast pain. Patient past medical history of metastatic breast cancer with fungating masscurrently follow-up with [facility]. Patient states that today when she got up in the morning her pain was very excruciating and she was not feeling well, shaking and not able to even dress herself and then decided to come to the ER for evaluation. No shortness of breath, no fever or chills, no nausea vomiting, no abdominal pain, no diarrhea and constipation, no other complaints. Patient was supposed to go to [facility] today for the first day of clinical trial but because of not feeling good came to the ER. Patient breast surgeon is Dr. William Esquivel. Juan Jose Ramirez \"            Use LCD Guidelines and list features:     Cancer Diagnoses     ________  A. Disease with distant metastases at presentation OR    ____X____  B.  Progression from an earlier stage of disease to metastatic disease with either:                          ___X_____  1. continued decline in spite of therapy                          ___X_____  2. patient declines further disease directed therapy          SPIRITUAL/Social/Emotional:    MSW visit welcome and (would like assistance with Living will) also needs further CPR/DNR discussion. Psych/ Social/ Emotional Issues Identified:    Clergy visit welcome    Dr. Dr. Damon Funk  contacted, agrees to serve as attending provider for hospice and provided verbal certification of terminal illness with prognosis of 6 months or less life expectancy. Orders for hospice admission, medications and plan of treatment received. Medication reconciliation completed.     Currently this patient has:  Supplemental O2:  Ordered    MEDS:  I have reviewed the patient's medication list with MD and identified the following:  Nonformulary medications: Carvedilol  Unrelated medications: None    IDT communication to include MD, SN, SW, CH and support team.

## 2022-04-16 NOTE — HOSPICE
Prn SN visit done as f/u s/p admission. Upon visit arrival pt. noted lying supine in bed resting with eyes closed though easily aroused with verbal stimulation. Daughter Kayla Jim present and stated that all the DME that was ordered arrived last evening. I did teaching with daughter regarding the O2 for comfort. She verbalized understanding. I brought Guaze and ABD pads to last through the weekend until the ordered supplies arrive. I changed the left breast wound dressing as ordered. Pt. tolerated well. Daughter stated that the medications were delivered yesterday and that this evening she will take off the 12 mcg Fentanyl Patch(that was ordered previously) and place the 25 mcg patch on. I encouraged pt. and daughter to call Hospice with any concerns or pt. status changes. Understanding was verbalized.

## 2022-04-18 NOTE — HOSPICE
Hospice RN visit with patient and daughter Kishan Aceveod. Patient in bed, rouses to voice, alert x 3, reports 2/10 pain at L breast, last Dilaudid at 10am and is taking every 4 hours. Fentanyl patch in place on R shoulder but patient reports this doesn't seem to be helping yet (just started 25mcg dose yesterday). Kishan Vasquezr states current Dilaudid dose not really holding patient entire 4 hours. Call placed to NP [facility], new order obtained for Dilaudid 3mg every 3 hours as needed. Patient currently has Dilaudid 2mg tablets and 1mg/ml Dilaudid liquid. Pills cannot be cut or crushed and patient states liquid Dilaudid works better/faster. RN ordered Dilaudid liquid 4mg/ml concentration for delivery from [facility]. Patient ambulatory to bathroom to urinate during visit. Slightly unsteady, declines offer of walker for now. Dressing clean dry and intact to L breast, Kishan Acevedo had just changed this morning. Patient states she is not eating well, last BM yesterday. Asked about prn oxygen use, was unsure if oxygen could be turned on \"all day\" if needed. RN offered education that oxygen can be used at all times if patient needs it. NCD provided to family, signed by daughter Kishan Acevedo and copy left in the home. Discussed with family  order of hospital bed, Kishan Acevedo states they should be ready on Thursday. RN will order hospital bed for delivery from [facility] for Thursday. Will order needed meds - metronidazole gel refill, new concentration Dilaudid liquid. Family understand to call hospice with any needs or concerns.

## 2022-04-19 NOTE — HOSPICE
call initiated to provide comfort and spiritual guidance to the pt, family and CG as they make their journey towards EOL. The patient's daughter said she and the patient were doing ok. She said the patient told her she did not want a  visiting hr at this time. They will reach out if their needs change. Update sent to [facility] that initial 5 day order has been declined.

## 2022-04-21 NOTE — HOSPICE
Erma Lovelace is a 57 y.o.  female w. Hospice dx of Primary malignant neoplasm of breast with metastasis. MSW completed virtual initial assessment as patient received a lot of visit this week. Patient would like a face to face visit within the upcoming week. Patient has two daughters; the youngest is Alvarez Tyler who is the primary caregiver, and the other daughter isn't involved in pts care per Alvarez Tyler. Daughter stated that pt. was diagnosed in 2018 and received radiation in 2019 after having a lumpectomy and then the disease went into remission. In May 2021 pt. was told that the Breast CA was active again at Stage 4. Daughter inquired about a refill of Dilaudid as her mother is getting low. MSW explained that for any needs to call the main hospice number and she would get assistance with refilling medication and other help needed. Alvarez Tyler was thankful for SW reaching out. She begins to open up and share her interest in emotional support and planning to make handle pt affairs. Daughter shared pt is only nibbling on her food and she doesn't have a big appetite. The pt is drinking ensures and doing well and no signs of pain. Daughter would like to discuss a learn more about completing a Living will and name on bank account.

## 2022-04-22 NOTE — HOSPICE
Hospice RN prn visit, family request for visit for excoritation to bottom and report of green drainage from breast wound. Patient in bed, alert x 4. Calazime lotion provided from [facility] for excoriation. Wound undressed, with patient permission, FaceTime call placed to NP [facility] for wound assessment. Copious green tinted drainage, no real detectable odor. New order obtained for Ceftin 500mg BID x 10 days for possible bacterial (pseudomonas) infection. Call placed to triage RN [facility] for stat delivery of med. Wound dressed with current POC orders  Daughter Shawna Fish states Fentanyl patches don't seem to be helping pain. Recent increase in Dilaudid has been helpful but \"doesn't last long\". Call placed to NP [facility], new order to increase Fentanyl patch to 50mcg (can use two 25mcg patches until delivery). Order relayed to Shawna Fish. RN will order needed supplies - new meds, zinc paste, gloves, tape.

## 2022-04-26 NOTE — HOSPICE
routine visit. On arrival patient laying in bed a&o x4. Patient stated (L) breast pain had decreased from 7/10 to 3/10. Fentanyl patch changed prior to arrival.  Patient stated appetite is decreased. Fluid intake is minimal but trying to work on increasing. Last bm= 4/24/22. Caregiver received metrogel refill prior to arrival. Vitals taken. Assessment completed. Wound care completed by skilled nursing with caregiver assistance. Patient tolerated procedure well.   No further supplies or medications needed

## 2022-04-27 PROBLEM — Z51.5 HOSPICE CARE PATIENT: Status: ACTIVE | Noted: 2022-01-01

## 2022-05-04 NOTE — HOSPICE
Hospice RN and NP Kaden Farias visit with patient and daughter Katie Wolfe. Patient in bed, alert x 4, reports 5/10 pain in L breast.  Katie Wolfe reports patient not eating, some occasional confusion. Wound assessed by NP Kaden Farias. New wound care orders to stop metrogel and Fentanyl patch. Wound care = apply Flagyl powder, cover with Xeroforms, cover with ABDs. Hospice team trying to find way to secure dressing without tape, tubular stretch dressings attempted, not correct size. Kaden Farias to follow up with burn treatment and wound care physicians for recommendations. New orders obtained for MSContin [id number] scheduled, Dilaudid increase to 6mg q3hrs prn. Meds ordered from [facility] for delivery today. RN will order needed supplies - new wound care supplies, banadage scissors, gloves and 4x4s. DNR discussed by Kaden Farias, patient signed, form scanned to patient's chart.   Family understand to call hospice for any needs or concerns

## 2022-05-07 NOTE — HOSPICE
Hospice RN visit to patient to assess breast wound again. Patient in bed, appears more comfortable today,new order MS Contin q12hrs has been very effective. Daughter Edgar Madrigal states patient has needed less prn doses of Dilaudid since starting MS Contin. Patient has resumed walking to the bathroom for needs instead of using BSC, Edgar Madrigal states she often gets weak but does not appear exhausted from the exertion. Not eating, hardly taking fluids, passing urine ~1-2 times/day. Patient does not want Watson yet. Lymphedema continues to L arm, patient has also noted vulvar swelling. No s/s of infection, instructed family to monitor for discharge, itching or pain. Patient has had a few loose stools, stopped use of Senna. RN attempted another way to secure patient's dressings to breast wound but was unsuccessful (tried tubular mesh net dressing secure). Patient had visit by home health who had taped dressing down, causing bleeding to periwound sites. (home health is not supposed to be visiting, RN placed call to manager Bernadette Kohli to follow up). Appears that patient can no longer tolerate tape, discussed with family that ABD pads can not be secured. Current wound care is to apply Flagyl powder to wound surface after GENTLE cleaning, cover with Xeroform gauzes. RN and family discussed use of a loose pad of 2 ABDs taped together which patient can cover wound with for drainage when she is laying down and remove when she gets up briefly to the bathroom. Wound will still be covered by Xeroform as it adheres to skin. Securing dressings will be less of an issue when patient is more bedbound. Multiple hospice staff attempting to find solution to difficult dressing placement, team will continue to address issue. RN ordered needed supplies for delivery - more wound care supplies, XL diapers, wipes.   RN ordered needed meds for delivery - Flagyl powder  Mia Madrigal is reaching exhaustion as she is frequently unable to leave patient. Family declines respite stay for now, will continue to offer. Leonel Coyne understands to call hospie with any needs.

## 2022-05-10 NOTE — HOSPICE
routine visit/wound care:  patient a&o x3-4. Patient denied pain. Wound care completed. Patient c/ohemorrhoids. Per Rustam Canales order for hemorrhoid cream. Supplies ordered. Left email for Rafiq Escalante to call caregiver to discuss POA  Patient requested visit from Rustam Canales, [address].   Rustam Canales notified

## 2022-05-11 NOTE — HOSPICE
Joint visit with Catherine Kitchen NP to assess buttocks. On arrival patient laying in bed. Patient with no complaints of pain. Bed lowered and patient went to standing position with assistance. On (L) inside buttocks excoriation c/w stage 2 ulcer noted. (R) side inner buttocks smal pea-sized nodule noted. Patient c/o discomfort to touch. Calmazine cream applied to buttocks by nursing. Per Dayana's recomendation:  Order triad cream to use on buttocks daily and prn. New order for air mattress. Supplies and mattress ordered by Alda Soto RN.

## 2022-05-17 NOTE — H&P
[facility]  [phone]    Patient Name: Rafal Colón  YOB: 1960    Date of Provider Hospice Visit: 05/17/22    Level of Care:   [] General Inpatient (GIP)    [] Routine   [x] Respite    Current Location of Care:  [] [facility] [] [facility] [] [facility] [] [address] [place] [address] Street [x] Hospice House [facility]      Principle Hospice Diagnosis: Left breast cancer       HOSPICE SUMMARY     Chart Reviewed for patient's medical history and hospice care plan. Hospice Physician Certification/Recertification Narrative per Steven Bunch / luna LOPEZ:     Patient is a 41-year-old female recently admitted to hospice services secondary to metastatic breast cancer. Patient apparently initially diagnosed in 2019 where she had chemotherapy and lumpectomy followed by radiation. She apparently was enrolled in a clinical trial at [facility] but unfortunately has shown very extensive dermal metastasis with disease into the sternal area and scapula/back area. Recent CT showed significant progression of mediastinal lymphadenopathy and axillary lymphadenopathy and infiltrating breast wound. Wounds have been very difficult to manage in the home setting despite multiple different attempts at wound management given the extensive nature of the wound. Patient also has been having rectal type pain. She comes to the hospice house for respite level care secondary to caregiver exhaustion as well as possible adjustment in medication management and wound care management. Patient currently using MS Contin 30 every 12 and Dilaudid 6 mg every 3 as needed for pain. She had used fentanyl in the past without much relief. Her eating has decreased overall according to the patient and her daughter-Sally was present.     General- tired appearing but alert and oriented x4  HEENT-unremarkable  Lungs-clear to auscultation  Cardiovascular-regular rate and rhythm  Abdomen-soft, nontender  Extremities-no clubbing, cyanosis  Skin-large fungating wound from the left breast that appears to radiate into the back/scapula area. Dressing was just changed today so we will take the dressing down tomorrow to better evaluate and see if we can come up with options as far as wound care. During that time, I will also do a thorough rectal exam to see if we can decipher what is causing this rectal discomfort    Hospice diagnosis  1. Cancer associated pain  2. Large fungating breast mass  3. Rectal pain    Patient being admitted for Respite care x 5 days for   [x]  Caregiver exhaustion and needing break  []  Caregiver unavailable       PLAN   1. Patient's home medications were reviewed and reconciled. Continue with current home medications and plan of care as outlined in chart. 2. Pain management-continue her MS Contin and Dilaudid  3. Wound care-continue current management but may need further adjustments after evaluating tomorrow. 4. Comfort meds for all other symptoms-monitor her symptom burden    5.  and SW to support family needs. 6. Disposition: Home with hospice once respite stay is completed.

## 2022-05-17 NOTE — PROGRESS NOTES
1400 Pt arrived at the [facility]. Pt is alert and oriented. Pt stated pain is 5-6/10. Lungs are clear. Pt is on RA. No cough noted.  + bowel sounds. Last reported BM was this am.  Abd is soft and nontender. Watson is draining light javier urine. No edema in LE. Pt has lymphedema in her L arm. Entire  L breast covered with vaseline gauze,  Sacrum not assessed at this time. [address] left the [facility] to go get her medications  1500  Pt medicated with Dilaudid by TL RN  1131  Pt reports pain is much better. Pt watching TV.    1700  Pt resting. 1830  Pt asleep. 1900  Report given. NAME OF PATIENT:  Rene Villafana    LEVEL OF CARE:  Respite    REASON FOR GIP: n/a    *PATIENT REMAINS ELIGIBLE FOR GIP LEVEL OF CARE AS EVIDENCED BY: (MUST BE ADDRESSED OF PATIENT GIP)  n/a      REASON FOR RESPITE:  Caregiver cannot care for patient due to:  exhaustion    O2 SAFETY:  RA      FALL INTERVENTIONS PROVIDED:   Implemented/recommended assistive devices and encouraged their use, Implemented/recommended resources for alarm system (personal alarm, bed alarm, call bell, etc.)  and Implemented/recommended environmental changes (remove hazards, lower bed, improve lighting, etc.)    INTERDISPLINARY COMMUNICATION/COLLABORATION:  Physician, MSW, [place] and RN, CNA    NEW MEDICATION INITIATION DOCUMENTATION:  No new medications initiated. Reason medication is being initiated: n/a    MD / Provider name consulted re: change in status / initiation of new medication:  n/a    New Symptom(s):  n/a    New Order(s):  n/a    Name of the person notified of the changes:  n/a    Name of person being taught:  n/a    Instructions given:  n/a    Side Effects taught:  n/a    Response to teaching:  n/a      COMFORTABLE DYING MEASURE:  Is Patient/family satisfied with symptom level?  yes    DISCHARGE PLAN:  Pt will complete her Respite stay at the [facility] and then return home and continue to be followed by home Hospice.

## 2022-05-17 NOTE — PROGRESS NOTES
Problem: Patient Education: Go to Patient Education Activity  Goal: Patient/Family Education  Outcome: Progressing Towards Goal     Problem: Alteration in Mobility  Goal: Remain as independent as possible and remain safe in environment  Description: Patient will remain as independent as possible and remain safe in their environment.   Outcome: Progressing Towards Goal     Problem: Pain  Goal: Assess satisfaction of level of comfort and symptom control  Outcome: Progressing Towards Goal  Goal: *Control of acute pain  Outcome: Progressing Towards Goal

## 2022-05-17 NOTE — HOSPICE
Hospice RN visit with patient and daughter Virginia Aftab. Hospice HHA Kady Aguilera arrived during visit for patient bath. On arrival patient in bed, alert x 3, memory impairment, several questions needing to be double checked with Virginia Ugalde. Patient on room air, no SOB, patient reports occasional productive cough. Virginia Ugalde states patient is not eating and only minimal fluid intake, asks about IV fluids. RN reviewed with daughter that IV fluids would not be appropriate and could cause patient discomfort. Lymphedema now extends down entire L arm into hand, family are trying to keep arm elevated, patient cannot move arm. Patient rates current pain at 5/10, prn dose of Dilaudid given by Virginia Ugalde. Wound care completed to L breast.  Flagyl powder is doing a good job of slowing drainage from mass. Patient assisted to (almost) R side lying position in order to attempt to assess buttocks wound. Patient had had small liquid BM (RN recommended holding use of Senna for a few days), very painful to clean area, patient unable to stay on her side and completely unable to roll to L side due to breast mass and lymphedema. Patient confirmed hx of hemorrhoids, cannot use hemorrhoid cream due to stinging from open skin in that area. Very painful to remove soiled Chux and diaper from under patient. Romeo Kilpatrick states patient able to get to [facility] about once/day during which time she resheets the bed and attempts to gently clean patient's back and buttocks. RN discussed placement of Watson with family, patient agrees to Watson. [id number] 68FR balloon silicone Watson obtained from kendell and placed without incident, ~150 ml javier urine out and emptied as RN showed daughter how to empty Watson. RN discussed with Virginia Ugalde and then her mother possibility of respite stay in order to addresss wounds, any need for medication changes and allow Biance a rest.  Patient agrees to respite stay. Calls placed to Sadiq at [facility] and Artem Diaz to arrange transportation. Covid test admininstered and forms signed by [place] [employer].   Transport arranged for 1pm.  Report called to Douglas [facility]\"" at [facility] at 12:20

## 2022-05-18 NOTE — HOSPICE
MSW conducted RT visit w/ patient and daughter. Patient transported to [facility] due to caregiver exhaustion. The patient shared how the drive to the [facility] was bumpy and uncomfortable. The pt shared feeling good about being at the [facility] so her pain can be managed and so far feeling good about the care from the nurses. MSW provided active listening to patient who was in and out when talking. The pt made fair eye contact and dialogued with pt. MSW provided daughter w. [employer] forms and calling for a traveling notary. The daughter understood and SW will make another visit at [facility] to provide emotional support.

## 2022-05-18 NOTE — HOSPICE
0700 Report received from [facility]. [address] with medication administration. 0825 Patient resting in bed quietly, eyes are closed, neutral facial expression noted. Patient appears to be sleeping at this time. Medications effective. 0900 Patient resting in bed quietly, eyes are closed. Patient appears to be sleeping at this time. 1000  Patient resting in bed quietly, eyes are closed. Patient appears to be sleeping at this time. 1050 Patient resting in bed quietly, eyes are closed. Patient appears to be sleeping at this time. 12 Patient's daughter called, update given on patient's night/morning. 1150 Patient resting in bed quietly, eyes are closed, neutral facial expression noted. Patient appears to be sleeping at this time. 1240  Patient resting in bed quietly, eyes are closed, neutral facial expression noted. Patient appears to be sleeping at this time. 1350 Patient woke briefly, states she is feeling okay right now. 1445  Patient resting in bed quietly, eyes are closed, neutral facial expression noted. Patient appears to be sleeping at this time. 1540  Patient resting in bed quietly, eyes are closed, neutral facial expression noted. Patient appears to be sleeping at this time. 1615 Patient woke up, states she is ready for wound care to be completed and for Dr. Ximena Day to check her rectum for mass. Patient premedicated with SL dilaudid, see MAR.    8650 Patient's wound care completed and Dr. Ximena Day examined rectum, no mass or wounds noted. Patient fully disimpacted. Patient in lots of pain after wound care/disimpaction, Dr. Ximena Day approved extra dose of pain medication. Dr. Ximena Day ordered scheduled Pericolace daily for bowel regimen. 1745 Patient resting in bed quietly, eyes are closed, patient appears to be sleeping at this time. Patient's daughter called, update given on patient's day.     1820 Patient resting in bed quietly, eyes are closed, neutral facial expression noted. Patient appears to be sleeping at this time.

## 2022-05-18 NOTE — HOSPICE
1900:  Report received from [place], [address]:  Pt assessed. C/O pain of 7/10. Declined pain medication or to be turned. Dressing intact to left breast.  Declined to turn for assessment of skin. Left arm very edematous. 2106:  Scheduled MS contin given. CNA helping pt with bed bath. 2147:  Rates pain a 6/10 in left breast.  PRN dose of Dilaudid 6 mg given. Still declines to turn. Raised left arm on pillow. Grimaced. 0015:  Resting quietly with eyes closed. Resp even and unlabored. Neutral facial expression. 0200:  Awake. C/O chest pain rated a 7/10. PRN Dilaudid 6 mg given SL.  0300:  Resting quietly with eyes closed. Resp even and unlabored. Neutral facial expression. 0500:  Continues to rest quietly with eyes closed. Resp unlabored. Neutral facial expression. 3708:  Awake and c/o rectal pain. PRN Dilaudid 6 mg given SL. Pulled up in bed. A smear of stool observed on sheet. Stated she hurt too much to turn to get cleaned. Was able to place a pad  In between smear and pt.        0645:  Pt stated pain down to a 3/10. Turned to change sheets. Pt has a large amount of stool visible poking out of the anus. Gave permission to disimpact. Was only able to tolerate removal of a small amount of hard formed brown stool.    0704:  PRN dose of Dilaudid 6 mg, and a bisacodyl supp given. Scheduled MS contin ER given.  Report given to Chan Trivedi RN        NAME OF PATIENT:  Zeno Buerger    LEVEL OF CARE:  Respite    REASON FOR GIP:   N/A    REASON FOR RESPITE:  Caregiver cannot care for patient due to:  exhaustion    O2 SAFETY:  N/A    FALL INTERVENTIONS PROVIDED:   Implemented/recommended resources for alarm system (personal alarm, bed alarm, call bell, etc.)     INTERDISPLINARY COMMUNICATION/COLLABORATION:  Physician, MSW, [place] and RN, CNA    NEW MEDICATION INITIATION DOCUMENTATION:  N/A    Reason medication is being initiated:  N/A    MD / Provider name consulted re: change in status / initiation of new medication:  N/A    New Symptom(s):  N/A    New Order(s):  N/A    Name of the person notified of the changes:  N/A    Name of person being taught:  N/A    Instructions given: N/A    Side Effects taught:  N/A    Response to teaching:  N/A      COMFORTABLE DYING MEASURE:  Is Patient/family satisfied with symptom level?  yes    DISCHARGE PLAN:  Home after respite stay

## 2022-05-18 NOTE — PROGRESS NOTES
Problem: Falls - Risk of  Goal: *Absence of Falls  Description: Document Marciana Distance Fall Risk and appropriate interventions in the flowsheet. 5/17/2022 2228 by Dallie Gowers  Outcome: Progressing Towards Goal  Note: Fall Risk Interventions:            Medication Interventions: Bed/chair exit alarm    Elimination Interventions: Bed/chair exit alarm,Call light in reach           5/17/2022 2228 by Dallie Gowers  Note: Fall Risk Interventions:            Medication Interventions: Bed/chair exit alarm    Elimination Interventions: Bed/chair exit alarm,Call light in reach              Problem: Pressure Injury - Risk of  Goal: *Prevention of pressure injury  Description: Document Geovanny Scale and appropriate interventions in the flowsheet. 5/17/2022 2228 by Dallie Gowers  Outcome: Progressing Towards Goal  Note: Pressure Injury Interventions:  Sensory Interventions: Assess changes in LOC,Pressure redistribution bed/mattress (bed type)    Moisture Interventions: Apply protective barrier, creams and emollients,Limit adult briefs    Activity Interventions: Pressure redistribution bed/mattress(bed type)    Mobility Interventions: HOB 30 degrees or less,Pressure redistribution bed/mattress (bed type)                       5/17/2022 2228 by Dallie Gowers  Note: Pressure Injury Interventions:  Sensory Interventions: Assess changes in LOC,Pressure redistribution bed/mattress (bed type)    Moisture Interventions: Apply protective barrier, creams and emollients,Limit adult briefs    Activity Interventions: Pressure redistribution bed/mattress(bed type)    Mobility Interventions: HOB 30 degrees or less,Pressure redistribution bed/mattress (bed type)                          Problem: Alteration in Mobility  Goal: Remain as independent as possible and remain safe in environment  Description: Patient will remain as independent as possible and remain safe in their environment.   Outcome: Progressing Towards Goal     Problem: Pain  Goal: Assess satisfaction of level of comfort and symptom control  Outcome: Progressing Towards Goal     Problem: General Wound Care  Goal: *Non-infected wound: Improvement of existing wound, absence of infection, and maintenance of skin integrity  Note:   Dressing orders: Wound location & type: Left Breast. Change dressing Daily. Dressing change to consist of: Remove old dressing (may soak with saline prior to removing as needed). Gently cleanse wound with wound cleanser and 4x4 gauze. Apply Flagyl powder to wound surface, cover with Xeroform dressings. Cover with ABD pads, secure with MIMINUM amount of tape. Dressing changes to be completed by caregiver daily.  (SN to assist and change dressing on visits.) Wound to be measured weekly by Hospice SN

## 2022-05-18 NOTE — PROGRESS NOTES
[facility] Hospice Monitoring   May 18, 2022    Pharmacist monitoring provided for this 57y.o. year old female at [facility] [address]     Admitting dianosis treated appropriately : YES    Nausea/Vomiting controlled: YES    Pain Controlled: YES    Agitation/Anxiety/ Delirium controlled: YES    Depression controlled: YES    Secretions controlled : YES    Constipation/Bowel routine controlled: YES    SOB/ Dyspnea controlled: YES    Insomnia: YES

## 2022-05-18 NOTE — PROGRESS NOTES
[address] [facility] Help to Those in Need  [phone]    Patient Name: Yoon Setting  YOB: 1960    Date of Provider Hospice Visit: 05/18/22    Level of Care:   [] General Inpatient (GIP)    [] Routine   [x] Respite    Current Location of Care:  [] [facility] [place] [] [facility] [] [facility] [] [facility] [x] Hospice House [facility]      Principle Hospice Diagnosis: Left breast cancer       HOSPICE SUMMARY     Chart Reviewed for patient's medical history and hospice care plan. Hospice Physician Certification/Recertification Narrative per Norman Escobedo / other MD:     Patient is a 69-year-old female recently admitted to hospice services secondary to metastatic breast cancer. Patient apparently initially diagnosed in 2019 where she had chemotherapy and lumpectomy followed by radiation. She apparently was enrolled in a clinical trial at [facility] but unfortunately has shown very extensive dermal metastasis with disease into the sternal area and scapula/back area. Recent CT showed significant progression of mediastinal lymphadenopathy and axillary lymphadenopathy and infiltrating breast wound. Wounds have been very difficult to manage in the home setting despite multiple different attempts at wound management given the extensive nature of the wound. Patient also has been having rectal type pain. She comes to the hospice house for respite level care secondary to caregiver exhaustion as well as possible adjustment in medication management and wound care management. Patient currently using MS Contin 30 every 12 and Dilaudid 6 mg every 3 as needed for pain. She had used fentanyl in the past without much relief. Her eating has decreased overall according to the patient and her daughter-Sally was present.     General- tired appearing but alert and oriented x4  HEENT-unremarkable  Lungs-clear to auscultation  Cardiovascular-regular rate and rhythm  Abdomen-soft, nontender  Extremities-no clubbing, cyanosis  Skin-large fungating wound from the left breast that appears to radiate into the back/scapula area. Dressing was just changed today so we will take the dressing down tomorrow to better evaluate and see if we can come up with options as far as wound care. Rectal exam-significant amount of stool noted in the anal/rectal wall and this was disimpacted thoroughly. No evidence of any type of mass or obstruction. Hospice diagnosis  1. Cancer associated pain  2. Large fungating breast mass  3. Rectal pain-patient with stool impaction-disimpacted today    Patient being admitted for Respite care x 5 days for   [x]  Caregiver exhaustion and needing break  []  Caregiver unavailable       PLAN   1. Patient's home medications were reviewed and reconciled. Continue with current home medications and plan of care as outlined in chart. 2. Pain management-continue her MS Contin and Dilaudid  3. Wound care- continue current wound care for today. I want to think of something we can actually use to wrap almost completely around the breast mass in her chest to keep it supported. We will talk further with the team tomorrow  4. Constipation with rectal impaction- disimpacted today. We need to start her regular bowel regimen-senna 2 tablets daily. May need to consider adding further medications such as lactulose or sorbitol. 5. Comfort meds for all other symptoms-monitor her symptom burden    6.  and SW to support family needs. 7. Disposition: Home with hospice once respite stay is completed.

## 2022-05-19 NOTE — PROGRESS NOTES
Problem: Falls - Risk of  Goal: *Absence of Falls  Description: Document Cindia Neigh Fall Risk and appropriate interventions in the flowsheet. 5/19/2022 0925 by Timi Zabala RN  Outcome: Progressing Towards Goal  Note: Fall Risk Interventions:       Mentation Interventions: Adequate sleep, hydration, pain control,Bed/chair exit alarm,Door open when patient unattended    Medication Interventions: Bed/chair exit alarm    Elimination Interventions: Call light in reach,Bed/chair exit alarm           5/19/2022 0726 by Timi Zabala RN  Outcome: Progressing Towards Goal  Note: Fall Risk Interventions:       Mentation Interventions: Adequate sleep, hydration, pain control,Bed/chair exit alarm,Door open when patient unattended    Medication Interventions: Bed/chair exit alarm    Elimination Interventions: Call light in reach,Bed/chair exit alarm              Problem: Patient Education: Go to Patient Education Activity  Goal: Patient/Family Education  5/19/2022 0925 by Timi Zabala RN  Outcome: Progressing Towards Goal  5/19/2022 0726 by Timi Zabala RN  Outcome: Progressing Towards Goal     Problem: Pressure Injury - Risk of  Goal: *Prevention of pressure injury  Description: Document Geovanny Scale and appropriate interventions in the flowsheet. 5/19/2022 0925 by Timi Zabala RN  Outcome: Progressing Towards Goal  Note: Pressure Injury Interventions:  Sensory Interventions: Float heels,Minimize linen layers,Turn and reposition approx. every two hours (pillows and wedges if needed),Keep linens dry and wrinkle-free,Check visual cues for pain    Moisture Interventions: Absorbent underpads,Internal/External urinary devices,Minimize layers,Moisture barrier,Limit adult briefs,Contain wound drainage,Apply protective barrier, creams and emollients    Activity Interventions: Assess need for specialty bed    Mobility Interventions: Assess need for specialty bed,Turn and reposition approx.  every two hours(pillow and wedges),Float heels    Nutrition Interventions: Document food/fluid/supplement intake    Friction and Shear Interventions: Apply protective barrier, creams and emollients,Minimize layers,Lift sheet             5/19/2022 0726 by Shell Richard RN  Outcome: Progressing Towards Goal  Note: Pressure Injury Interventions:  Sensory Interventions: Float heels,Minimize linen layers,Turn and reposition approx. every two hours (pillows and wedges if needed),Keep linens dry and wrinkle-free,Check visual cues for pain    Moisture Interventions: Absorbent underpads,Internal/External urinary devices,Minimize layers,Moisture barrier,Limit adult briefs,Contain wound drainage,Apply protective barrier, creams and emollients    Activity Interventions: Assess need for specialty bed    Mobility Interventions: Assess need for specialty bed,Turn and reposition approx. every two hours(pillow and wedges),Float heels    Nutrition Interventions: Document food/fluid/supplement intake    Friction and Shear Interventions: Apply protective barrier, creams and emollients,Minimize layers,Lift sheet                Problem: Patient Education: Go to Patient Education Activity  Goal: Patient/Family Education  5/19/2022 0925 by Shell Richard RN  Outcome: Progressing Towards Goal  5/19/2022 0726 by Shell Richard RN  Outcome: Progressing Towards Goal     Problem: Alteration in Mobility  Goal: Remain as independent as possible and remain safe in environment  Description: Patient will remain as independent as possible and remain safe in their environment.   5/19/2022 0925 by Shell Richard RN  Outcome: Progressing Towards Goal  5/19/2022 0726 by Shell Richard RN  Outcome: Progressing Towards Goal     Problem: Pain  Goal: Assess satisfaction of level of comfort and symptom control  5/19/2022 0925 by Shell Richard RN  Outcome: Progressing Towards Goal  5/19/2022 0726 by Shell Richard RN  Outcome: Progressing Towards Goal  Goal: *Control of acute pain  5/19/2022 0925 by Danuta Díaz RN  Outcome: Progressing Towards Goal  5/19/2022 0726 by Danuta Díaz RN  Outcome: Progressing Towards Goal     Problem: General Wound Care  Goal: *Non-infected wound: Improvement of existing wound, absence of infection, and maintenance of skin integrity  5/19/2022 0925 by Danuta Díaz RN  Outcome: Progressing Towards Goal  5/19/2022 0726 by Danuta Díaz RN  Outcome: Progressing Towards Goal  Goal: Interventions  5/19/2022 0925 by Danuta Díaz RN  Outcome: Progressing Towards Goal  5/19/2022 0726 by Danuta Díaz RN  Outcome: Progressing Towards Goal

## 2022-05-19 NOTE — PROGRESS NOTES
Problem: Falls - Risk of  Goal: *Absence of Falls  Description: Document Ora Gray Fall Risk and appropriate interventions in the flowsheet. Outcome: Progressing Towards Goal  Note: Fall Risk Interventions:       Mentation Interventions: Bed/chair exit alarm,Door open when patient unattended    Medication Interventions: Bed/chair exit alarm    Elimination Interventions: Call light in reach,Bed/chair exit alarm              Problem: Pressure Injury - Risk of  Goal: *Prevention of pressure injury  Description: Document Geovanny Scale and appropriate interventions in the flowsheet. Note: Pressure Injury Interventions:  Sensory Interventions: Assess changes in LOC,Minimize linen layers,Float heels,Turn and reposition approx. every two hours (pillows and wedges if needed),Check visual cues for pain,Keep linens dry and wrinkle-free,Monitor skin under medical devices    Moisture Interventions: Check for incontinence Q2 hours and as needed,Internal/External urinary devices,Minimize layers,Limit adult briefs,Moisture barrier    Activity Interventions: Assess need for specialty bed    Mobility Interventions: Float heels,Turn and reposition approx.  every two hours(pillow and wedges)    Nutrition Interventions: Document food/fluid/supplement intake    Friction and Shear Interventions: Lift sheet,Minimize layers                Problem: Pain  Goal: Assess satisfaction of level of comfort and symptom control  Outcome: Progressing Towards Goal

## 2022-05-19 NOTE — PROGRESS NOTES
Problem: Falls - Risk of  Goal: *Absence of Falls  Description: Document Shannon Mcburney Fall Risk and appropriate interventions in the flowsheet. Outcome: Progressing Towards Goal  Note: Fall Risk Interventions:       Mentation Interventions: Adequate sleep, hydration, pain control    Medication Interventions: Bed/chair exit alarm    Elimination Interventions: Call light in reach              Problem: Patient Education: Go to Patient Education Activity  Goal: Patient/Family Education  Outcome: Progressing Towards Goal     Problem: Pressure Injury - Risk of  Goal: *Prevention of pressure injury  Description: Document Geovanny Scale and appropriate interventions in the flowsheet. Outcome: Progressing Towards Goal  Note: Pressure Injury Interventions:  Sensory Interventions: Float heels    Moisture Interventions: Absorbent underpads    Activity Interventions: Assess need for specialty bed    Mobility Interventions: Assess need for specialty bed    Nutrition Interventions: Document food/fluid/supplement intake    Friction and Shear Interventions: Apply protective barrier, creams and emollients                Problem: Patient Education: Go to Patient Education Activity  Goal: Patient/Family Education  Outcome: Progressing Towards Goal     Problem: Alteration in Mobility  Goal: Remain as independent as possible and remain safe in environment  Description: Patient will remain as independent as possible and remain safe in their environment.   Outcome: Progressing Towards Goal     Problem: Pain  Goal: Assess satisfaction of level of comfort and symptom control  Outcome: Progressing Towards Goal  Goal: *Control of acute pain  Outcome: Progressing Towards Goal     Problem: General Wound Care  Goal: *Non-infected wound: Improvement of existing wound, absence of infection, and maintenance of skin integrity  Outcome: Progressing Towards Goal  Goal: Interventions  Outcome: Progressing Towards Goal

## 2022-05-19 NOTE — HOSPICE
0700 Report received from Δηλη[address] 283 Patient awake lying bed, patient denies pain and states she feels pretty good, just hungry. Fresh ice water and juice gotten for patient. 5709 Patient's breakfast tray set up for patient. 2726 Patient complaining of pain in chest, 7  Out of 10. Patient given extended release MS Contin and PRN SL dilaudid, see MAR.    [id number] Patient finished eating breakfast. Patient ate 25% of meal, states she feels full now. Patient states pain is better at this time.

## 2022-05-19 NOTE — PROGRESS NOTES
Problem: Falls - Risk of  Goal: *Absence of Falls  Description: Document Edgar Warner Fall Risk and appropriate interventions in the flowsheet. Outcome: Progressing Towards Goal  Note: Fall Risk Interventions:       Mentation Interventions: Adequate sleep, hydration, pain control,Bed/chair exit alarm,Door open when patient unattended    Medication Interventions: Bed/chair exit alarm    Elimination Interventions: Call light in reach,Bed/chair exit alarm              Problem: Patient Education: Go to Patient Education Activity  Goal: Patient/Family Education  Outcome: Progressing Towards Goal     Problem: Pressure Injury - Risk of  Goal: *Prevention of pressure injury  Description: Document Geovanny Scale and appropriate interventions in the flowsheet. Outcome: Progressing Towards Goal  Note: Pressure Injury Interventions:  Sensory Interventions: Float heels,Minimize linen layers,Turn and reposition approx. every two hours (pillows and wedges if needed),Keep linens dry and wrinkle-free,Check visual cues for pain    Moisture Interventions: Absorbent underpads,Internal/External urinary devices,Minimize layers,Moisture barrier,Limit adult briefs,Contain wound drainage,Apply protective barrier, creams and emollients    Activity Interventions: Assess need for specialty bed    Mobility Interventions: Assess need for specialty bed,Turn and reposition approx.  every two hours(pillow and wedges),Float heels    Nutrition Interventions: Document food/fluid/supplement intake    Friction and Shear Interventions: Apply protective barrier, creams and emollients,Minimize layers,Lift sheet                Problem: Patient Education: Go to Patient Education Activity  Goal: Patient/Family Education  Outcome: Progressing Towards Goal     Problem: Alteration in Mobility  Goal: Remain as independent as possible and remain safe in environment  Description: Patient will remain as independent as possible and remain safe in their environment.   Outcome: Progressing Towards Goal     Problem: Pain  Goal: Assess satisfaction of level of comfort and symptom control  Outcome: Progressing Towards Goal  Goal: *Control of acute pain  Outcome: Progressing Towards Goal

## 2022-05-19 NOTE — HOSPICE
0700 Report received from Δηληγιάννη 283 Patient awake lying bed, patient denies pain and states she feels pretty good, just hungry. Fresh ice water and juice gotten for patient. 1372 Patient's breakfast tray set up for patient. 0950 Patient complaining of pain in chest, 7  Out of 10. Patient given extended release MS Contin and PRN SL dilaudid, see MAR.    [id number] Patient finished eating breakfast. Patient ate 25% of meal, states she feels full now. Patient states pain is better at this time. 1030 Patient resting in bed quietly, eyes are closed, neutral facial expression noted. Patient appears to be sleeping at this time. 1130 Daughter at the bedside. 1215 Patient's lunch tray set up for patient to eat. 1255 Patient still eating lunch. Daughter at the bedside. 1325 MSW at the bedside with patient. 1335 Patient resting in bed quietly, eyes are closed. Daughter at the bedside. 1420 Patient premedicated for wound care. 1450 Wound care completed, patient tolerated activity well.     1600 Patient resting in bed quietly, patient denies pain at this time. Fresh water gotten for patient. 1715 Patient's dinner tray set up for patient.      1810 Patient finished eating dinner, patient ate 25% of meal.

## 2022-05-19 NOTE — HOSPICE
1900:  Report received from Juan Bowden RN  1945:  Pt assessed. Smiling. Stated she feels much better after disimpaction. Rates pain a 2/10. Denies need for prn meds. Asked to eat some jello. 2108:  CNA helped to reposition pt. Took scheduled meds,  Extended release morphine and senna. Only ate  half of the container of jello. 2200:  Resting quietly with eyes closed. Resp even and unlabored. Neutral facial expression. 0000:  Continues to rest quietly with eyes closed. Resp shallow and unlabored. Neutral facial expression. 0214:  Awake. Rates pain a 2/10. Helped pt to turn. After turn rated pain a 3/10. Declined need for prn pain med. 0330: Awake stated she was hungry. Gave popsicle. Ate a 1/3 of it. Denies need for additional pain medication. 0430:  CNA just finished giving pt a bath. Pt smiling. Denies need for pain medication. 0555:  Grimacing. Asked for pain medication. Rates pain a 7/10. PRN dose of Dilaudid 6 mg given. 0630:  Pain meds starting to work.   Pt has decreased to a 5/10  0700:  Report given to Zoila Trevizo RN    NAME OF PATIENT:  Rene Villafana    LEVEL OF CARE:  Respite    REASON FOR GIP:   N/A    REASON FOR RESPITE:  Caregiver cannot care for patient due to:  exhaustion    O2 SAFETY:  N/A    FALL INTERVENTIONS PROVIDED:   Implemented/recommended resources for alarm system (personal alarm, bed alarm, call bell, etc.)     INTERDISPLINARY COMMUNICATION/COLLABORATION:  Physician, MSW, [place] and RN, CNA    NEW MEDICATION INITIATION DOCUMENTATION:  senna    Reason medication is being initiated:  constipation    MD / Provider name consulted re: change in status / initiation of new medication:  Dr. Blue Alan Symptom(s): constipation    New Order(s):  Senna-docusate 2 tabs daily    Name of the person notified of the changes:  pt    Name of person being taught:  pt    Instructions given:  yes    Side Effects taught:  yes    Response to teaching: accepting      COMFORTABLE DYING MEASURE:  Is Patient/family satisfied with symptom level?  yes    DISCHARGE PLAN:  Home after respite stay

## 2022-05-20 NOTE — PROGRESS NOTES
1900 Report received from [facility] 12 and vitals completed. Patient resting in bed watching t.v. No complaints at this time. 2105 Patient reports 6/10 pain in chest/left breast area. Scheduled morphine given. 2125 Patient pulled up in bed and turned on L side. No complaints at this time. 2155 Patient asleep with relaxed facial expression. 2230 Patient reports 6/10 pain in her left breast and back. PRN hydromorphone given. 2300 Patient resting in bed with eyes closed. 2340 Patient asleep in bed.     0100 Patient asleep in bed with unlabored respirations. 0200 Patient asleep in bed with relaxed facial expression. 0300 Patient asleep in bed on R side. 0500 Patient resting in bed with no complaints of pain at this time. Fresh water provided per patient's request.     4567 Patient resting in bed watching t.v. No complaints at this time. 0700 Report to oncoming nurse.        NAME OF PATIENT:  Lian Tello    LEVEL OF CARE:  Respite    REASON FOR GIP:   n/a    *PATIENT REMAINS ELIGIBLE FOR GIP LEVEL OF CARE AS EVIDENCED BY: n/a    REASON FOR RESPITE:  Caregiver breakdown    O2 SAFETY:  n/a    FALL INTERVENTIONS PROVIDED:   Implemented/recommended use of non-skid footwear, Implemented/recommended use of fall risk identification flag to all team members, Implemented/recommended resources for alarm system (personal alarm, bed alarm, call bell, etc.) , Implemented/recommended environmental changes (remove hazards, lower bed, improve lighting, etc.) and Implemented/recommended increased supervision/assistance    INTERDISPLINARY COMMUNICATION/COLLABORATION:  Physician, MSW, [place] and RN, CNA    NEW MEDICATION INITIATION DOCUMENTATION:  n/a    Reason medication is being initiated:  n/a    MD / Provider name consulted re: change in status / initiation of new medication:  n/a    New Symptom(s):  n/a    New Order(s):  n/a    Name of the person notified of the changes: n/a    Name of person being taught:  n/a    Instructions given:  n/a    Side Effects taught:  n/a    Response to teaching:  N/a      COMFORTABLE DYING MEASURE:  Is Patient/family satisfied with symptom level?  yes    DISCHARGE PLAN:  Home at end of respite stay

## 2022-05-20 NOTE — PROGRESS NOTES
0700  Report received. 0730  Pt lying in bed, asleep. Arouses to verbal stimuli and is alert and oriented x 4. No co pain at this time. Lungs clear. Pt is on RA. No cough noted. = bowel sounds. Last reported BM was yest.  5-19. Watson is draining dark javier urine. No edema in LE. Pt has lymphedema in her L arm. Dressing on L Breast is dry and intact. 0830  Pt tolerated 20% pf her breakfast.  Appetite continues to be poor. 2545  Pt medicated with scheduled. ER Morphine. Pt reports pain is 7/10 in her L breast,arm and chest.   1000  Pt medicated with Dilaudid SL.    1045  Pt asleep. 1145  Pt continues to sleep. Dtr Yanci Tony called to check on her Mother,  Updated on Status. 1230 Appetite is poor. Pt did not eat anything for lunch and is drinking very little. No complaints. 1400  Pt asleep. 1610  Pt asleep. No grimacing. 1730  Pt did not eat any dinner. No complaints. 1800 Pt asleep.     1900  Report given.         NAME OF PATIENT:  Zeno Buerger     LEVEL OF CARE:  Respite     REASON FOR GIP: n/a     *PATIENT REMAINS ELIGIBLE FOR [facility] LEVEL OF CARE AS EVIDENCED BY: (MUST BE ADDRESSED OF PATIENT GIP)  n/a        REASON FOR RESPITE:  Caregiver cannot care for patient due to:  exhaustion     O2 SAFETY:  RA        FALL INTERVENTIONS PROVIDED:   Implemented/recommended assistive devices and encouraged their use, Implemented/recommended resources for alarm system (personal alarm, bed alarm, call bell, etc.)  and Implemented/recommended environmental changes (remove hazards, lower bed, improve lighting, etc.)     INTERDISPLINARY COMMUNICATION/COLLABORATION:  Physician, MSW, [place] and RN, CNA     NEW MEDICATION INITIATION DOCUMENTATION:  No new medications initiated.     Reason medication is being initiated: n/a     MD / Provider name consulted re: change in status / initiation of new medication:  n/a     New Symptom(s):  n/a     New Order(s):  n/a     Name of the person notified of the changes:  n/a     Name of person being taught:  n/a     Instructions given:  n/a     Side Effects taught:  n/a     Response to teaching:  n/a        COMFORTABLE DYING MEASURE:  Is Patient/family satisfied with symptom level?  yes     DISCHARGE PLAN:  Pt will complete her Respite stay at the [facility] and then return home and continue to be followed by home Hospice.

## 2022-05-20 NOTE — PROGRESS NOTES
[address] [facility],[address] Monitoring   May 20, 2022  Attending: Dr. Matias Thayer  Pharmacist monitoring provided for this 57y.o. year old female at [facility] [address]     Admitting dianosis treated appropriately : YES    Nausea/Vomiting controlled: YES    Pain Controlled: YES    Agitation/Anxiety/ Delirium controlled: YES    Depression controlled: YES    Secretions controlled : YES    Constipation/Bowel routine controlled: YES    SOB/ Dyspnea controlled: YES    Insomnia: YES

## 2022-05-20 NOTE — PROGRESS NOTES
Problem: Falls - Risk of  Goal: *Absence of Falls  Description: Document Aquilino Carlos Fall Risk and appropriate interventions in the flowsheet. Outcome: Progressing Towards Goal  Note: Fall Risk Interventions:  Mobility Interventions: Bed/chair exit alarm    Mentation Interventions: Adequate sleep, hydration, pain control    Medication Interventions: Bed/chair exit alarm    Elimination Interventions: Call light in reach              Problem: Patient Education: Go to Patient Education Activity  Goal: Patient/Family Education  Outcome: Progressing Towards Goal     Problem: Pressure Injury - Risk of  Goal: *Prevention of pressure injury  Description: Document Geovanny Scale and appropriate interventions in the flowsheet. Outcome: Progressing Towards Goal  Note: Pressure Injury Interventions:  Sensory Interventions: Float heels    Moisture Interventions: Absorbent underpads    Activity Interventions: Assess need for specialty bed    Mobility Interventions: Assess need for specialty bed    Nutrition Interventions: Document food/fluid/supplement intake    Friction and Shear Interventions: Apply protective barrier, creams and emollients                Problem: Patient Education: Go to Patient Education Activity  Goal: Patient/Family Education  Outcome: Progressing Towards Goal     Problem: Alteration in Mobility  Goal: Remain as independent as possible and remain safe in environment  Description: Patient will remain as independent as possible and remain safe in their environment.   Outcome: Progressing Towards Goal     Problem: Pain  Goal: Assess satisfaction of level of comfort and symptom control  Outcome: Progressing Towards Goal  Goal: *Control of acute pain  Outcome: Progressing Towards Goal     Problem: General Wound Care  Goal: *Non-infected wound: Improvement of existing wound, absence of infection, and maintenance of skin integrity  Outcome: Progressing Towards Goal  Goal: Interventions  Outcome: Progressing Towards Goal

## 2022-05-20 NOTE — PROGRESS NOTES
Problem: Falls - Risk of  Goal: *Absence of Falls  Description: Document Irineo Ford Fall Risk and appropriate interventions in the flowsheet. Outcome: Progressing Towards Goal  Note: Fall Risk Interventions:  Mobility Interventions: Bed/chair exit alarm    Mentation Interventions: Adequate sleep, hydration, pain control,Bed/chair exit alarm    Medication Interventions: Bed/chair exit alarm    Elimination Interventions: Call light in reach,Bed/chair exit alarm              Problem: Patient Education: Go to Patient Education Activity  Goal: Patient/Family Education  Outcome: Progressing Towards Goal     Problem: Pressure Injury - Risk of  Goal: *Prevention of pressure injury  Description: Document Geovanny Scale and appropriate interventions in the flowsheet. Outcome: Progressing Towards Goal  Note: Pressure Injury Interventions:  Sensory Interventions: Check visual cues for pain,Float heels    Moisture Interventions: Absorbent underpads,Limit adult briefs,Maintain skin hydration (lotion/cream)    Activity Interventions: Assess need for specialty bed    Mobility Interventions: Assess need for specialty bed,HOB 30 degrees or less    Nutrition Interventions: Document food/fluid/supplement intake    Friction and Shear Interventions: Apply protective barrier, creams and emollients,HOB 30 degrees or less                Problem: Patient Education: Go to Patient Education Activity  Goal: Patient/Family Education  Outcome: Progressing Towards Goal     Problem: Alteration in Mobility  Goal: Remain as independent as possible and remain safe in environment  Description: Patient will remain as independent as possible and remain safe in their environment.   Outcome: Progressing Towards Goal     Problem: Pain  Goal: Assess satisfaction of level of comfort and symptom control  Outcome: Progressing Towards Goal  Goal: *Control of acute pain  Outcome: Progressing Towards Goal     Problem: General Wound Care  Goal: *Non-infected wound: Improvement of existing wound, absence of infection, and maintenance of skin integrity  Outcome: Progressing Towards Goal  Goal: Interventions  Outcome: Progressing Towards Goal

## 2022-05-21 NOTE — PROGRESS NOTES
0700  Report received  0730  Pt lying in bed, asleep. Awakes to verbal stimuli. Pt co pain, 7/10 on Left side - back and breast.  Lungs are clear. Pt is on RA. + bowel sounds. Last report BM was 5-19. Campos is draining javier urine. No edema noted. Pt has lymphedema in her L arm. Pt has a L breast wound. Dressing is dry and intact. 0746  Pt medicated with Dilaudid  0830  Pt asleep. 1000 Pt asleep  1210  Pt eating lunch. No compalints  1230 Dtr in to visit. Pt eating lunch. Pt tolerated 50% of her lunch. 1400 Pt up to the [facility] for a BM. Pt had a large Bm and c/o vaginal pain  1424  Pt medicated with Dilaudid. Campos adjusted. Pt very sensitive. Report pain with campos movement. Labia very swollen. Ice pack given to pt.    1500  Wound care completed on Pt's Left Breast.  Breast looks like raw hamburger meat. Areas are sensitive to touch. Pt tolerated Wound care. 1530  Pt sleeping. 1645  Pt sitting up in bed. No needs. 1730  Pt tolerated 50% of her dinner. No needs at this time. 1830  Pt resting.   1900  Report received     NAME OF PATIENT:  Debbie Kwok     LEVEL OF CARE:  Respite     REASON FOR GIP: n/a     *PATIENT REMAINS ELIGIBLE FOR GIP LEVEL OF CARE AS EVIDENCED BY: (MUST BE ADDRESSED OF PATIENT GIP)  n/a        REASON FOR RESPITE:  Caregiver cannot care for patient due to:  exhaustion     O2 SAFETY:  RA        FALL INTERVENTIONS PROVIDED:   Implemented/recommended assistive devices and encouraged their use, Implemented/recommended resources for alarm system (personal alarm, bed alarm, call bell, etc.)  and Implemented/recommended environmental changes (remove hazards, lower bed, improve lighting, etc.)     INTERDISPLINARY COMMUNICATION/COLLABORATION:  Physician, MSW, [place] and RN, CNA     NEW MEDICATION INITIATION DOCUMENTATION:  No new medications initiated.     Reason medication is being initiated: n/a     MD / Provider name consulted re: change in status / initiation of new medication:  n/a     New Symptom(s):  n/a     New Order(s):  n/a     Name of the person notified of the changes:  n/a     Name of person being taught:  n/a     Instructions given:  n/a     Side Effects taught:  n/a     Response to teaching:  n/a        COMFORTABLE DYING MEASURE:  Is Patient/family satisfied with symptom level?  yes     DISCHARGE PLAN:  Pt will complete her Respite stay at the [facility] and then return home and continue to be followed by home Hospice.

## 2022-05-21 NOTE — PROGRESS NOTES
Problem: Falls - Risk of  Goal: *Absence of Falls  Description: Document Mimi Barr Fall Risk and appropriate interventions in the flowsheet. Outcome: Progressing Towards Goal  Note: Fall Risk Interventions:  Mobility Interventions: Bed/chair exit alarm    Mentation Interventions: Adequate sleep, hydration, pain control,Bed/chair exit alarm    Medication Interventions: Bed/chair exit alarm    Elimination Interventions: Call light in reach              Problem: Patient Education: Go to Patient Education Activity  Goal: Patient/Family Education  Outcome: Progressing Towards Goal     Problem: Pressure Injury - Risk of  Goal: *Prevention of pressure injury  Description: Document Geovanny Scale and appropriate interventions in the flowsheet. Outcome: Progressing Towards Goal  Note: Pressure Injury Interventions:  Sensory Interventions: Assess changes in LOC,Check visual cues for pain    Moisture Interventions: Absorbent underpads,Apply protective barrier, creams and emollients,Limit adult briefs,Maintain skin hydration (lotion/cream)    Activity Interventions: Pressure redistribution bed/mattress(bed type)    Mobility Interventions: Assess need for specialty bed,HOB 30 degrees or less,Pressure redistribution bed/mattress (bed type)    Nutrition Interventions: Document food/fluid/supplement intake,Offer support with meals,snacks and hydration    Friction and Shear Interventions: Apply protective barrier, creams and emollients,HOB 30 degrees or less,Lift sheet                Problem: Patient Education: Go to Patient Education Activity  Goal: Patient/Family Education  Outcome: Progressing Towards Goal     Problem: Alteration in Mobility  Goal: Remain as independent as possible and remain safe in environment  Description: Patient will remain as independent as possible and remain safe in their environment.   Outcome: Progressing Towards Goal     Problem: Pain  Goal: Assess satisfaction of level of comfort and symptom control  Outcome: Progressing Towards Goal  Goal: *Control of acute pain  Outcome: Progressing Towards Goal     Problem: General Wound Care  Goal: *Non-infected wound: Improvement of existing wound, absence of infection, and maintenance of skin integrity  Outcome: Progressing Towards Goal  Goal: Interventions  Outcome: Progressing Towards Goal

## 2022-05-21 NOTE — PROGRESS NOTES
1900 Report received from [facility] AT [facility] Patient assessment and vitals completed. Patient is resting watching t.v. and eating some of her dinner. Patient has no complaints at this time. 2050 About 25% of dinner eaten. Patient given scheduled medication including her Morphine. Patient rates pain a 6/10.    2140 Patient resting in bed with eyes closed. 2340 Wound care and bath completed. Patient turned on L side. Patient pain 10/10 post wound care. PRN Dilaudid given. 0015 Patient asleep in bed. No facial grimacing. 0110 patient asleep in bed with relaxed facial expression. 0300 Patient asleep in bed with eyes closed and unlabored respirations. 8157 Patient asleep in bed.     0610 Patient asleep with no signs of distress. 0700 Report to oncoming nurse.        NAME OF PATIENT:  Leatha Scales    LEVEL OF CARE: Respite    REASON FOR GIP:   n/a    *PATIENT REMAINS ELIGIBLE FOR GIP LEVEL OF CARE AS EVIDENCED BY: n/a      REASON FOR RESPITE:  Caregiver breakdown    O2 SAFETY:  n/a    FALL INTERVENTIONS PROVIDED:   Implemented/recommended use of non-skid footwear, Implemented/recommended use of fall risk identification flag to all team members, Implemented/recommended resources for alarm system (personal alarm, bed alarm, call bell, etc.) , Implemented/recommended environmental changes (remove hazards, lower bed, improve lighting, etc.) and Implemented/recommended increased supervision/assistance    INTERDISPLINARY COMMUNICATION/COLLABORATION:  Physician, MSW, [place] and RN, CNA    NEW MEDICATION INITIATION DOCUMENTATION:  n/a    Reason medication is being initiated:  n/a    MD / Provider name consulted re: change in status / initiation of new medication:  n/a    New Symptom(s):  n/a    New Order(s):  n/a    Name of the person notified of the changes:  n/a    Name of person being taught:  n/a    Instructions given:  n/a    Side Effects taught:  n/a    Response to teaching: n/a      COMFORTABLE DYING MEASURE:  Is Patient/family satisfied with symptom level?  yes    DISCHARGE PLAN:  Home at end of respite stay.

## 2022-05-22 NOTE — PROGRESS NOTES
Problem: Falls - Risk of  Goal: *Absence of Falls  Description: Document Mariia Zeng Fall Risk and appropriate interventions in the flowsheet. Outcome: Progressing Towards Goal  Note: Fall Risk Interventions:  Mobility Interventions: Bed/chair exit alarm,Strengthening exercises (ROM-active/passive)    Mentation Interventions: Adequate sleep, hydration, pain control,More frequent rounding,Room close to nurse's station    Medication Interventions: Bed/chair exit alarm,Evaluate medications/consider consulting pharmacy    Elimination Interventions: Stay With Me (per policy),Toileting schedule/hourly rounds              Problem: Patient Education: Go to Patient Education Activity  Goal: Patient/Family Education  Outcome: Progressing Towards Goal     Problem: Pressure Injury - Risk of  Goal: *Prevention of pressure injury  Description: Document Geovanny Scale and appropriate interventions in the flowsheet.   Outcome: Progressing Towards Goal  Note: Pressure Injury Interventions:  Sensory Interventions: Keep linens dry and wrinkle-free,Minimize linen layers    Moisture Interventions: Apply protective barrier, creams and emollients,Internal/External urinary devices,Maintain skin hydration (lotion/cream),Minimize layers,Moisture barrier    Activity Interventions: Pressure redistribution bed/mattress(bed type)    Mobility Interventions: HOB 30 degrees or less,Pressure redistribution bed/mattress (bed type)    Nutrition Interventions: Document food/fluid/supplement intake    Friction and Shear Interventions: Apply protective barrier, creams and emollients,HOB 30 degrees or less,Minimize layers                Problem: Patient Education: Go to Patient Education Activity  Goal: Patient/Family Education  Outcome: Progressing Towards Goal     Problem: Alteration in Mobility  Goal: Remain as independent as possible and remain safe in environment  Description: Patient will remain as independent as possible and remain safe in their environment.   Outcome: Progressing Towards Goal     Problem: Pain  Goal: Assess satisfaction of level of comfort and symptom control  Outcome: Progressing Towards Goal  Goal: *Control of acute pain  Outcome: Progressing Towards Goal     Problem: General Wound Care  Goal: *Non-infected wound: Improvement of existing wound, absence of infection, and maintenance of skin integrity  Outcome: Progressing Towards Goal  Goal: Interventions  Outcome: Progressing Towards Goal

## 2022-05-22 NOTE — PROGRESS NOTES
0700  Report received. 0745  Pt lying in bed, asleep. Pt aroused to verbal stimuli. No co pain or dyspnea at this time. Lungs are clear but diminished. No cough noted. Pt is on RA. + bowel sounds. Last reported BM was yest.  Watson is draining. Dark javier urine. 0930  Pt tolerated 25% of her breakfast.  No needs at this time. 0950  Pt medicated with Morphine. Pt is still able to tolerate her PO meds. 1100  Pt asleep. 1230  Wound care completed on L breast.  Wound has more drainage than previously. Odor controlled. 1245  Report called to Audra Memos. Florin to do tuckin. 1255 Pt requesting dilaudid prior to leaving the [facility]. (Pt requested her home dilaudid). Pt medicated. Pt left the [facility]. Pt is alert and oriented. Pt left with her DNR, home meds and belongings.   dtr at the bedside and updated.             NAME OF PATIENT:  Brenda Earna Crigler     LEVEL OF CARE:  Respite     REASON FOR GIP: n/a     *PATIENT REMAINS ELIGIBLE FOR [facility] LEVEL OF CARE AS EVIDENCED BY: (MUST BE ADDRESSED OF PATIENT GIP)  n/a        REASON FOR RESPITE:  Caregiver cannot care for patient due to:  exhaustion     O2 SAFETY:  RA        FALL INTERVENTIONS PROVIDED:   Implemented/recommended assistive devices and encouraged their use, Implemented/recommended resources for alarm system (personal alarm, bed alarm, call bell, etc.)  and Implemented/recommended environmental changes (remove hazards, lower bed, improve lighting, etc.)     INTERDISPLINARY COMMUNICATION/COLLABORATION:  Physician, MSW, [place] and RN, CNA     NEW MEDICATION INITIATION DOCUMENTATION:  No new medications initiated.     Reason medication is being initiated: n/a     MD / Provider name consulted re: change in status / initiation of new medication:  n/a     New Symptom(s):  n/a     New Order(s):  n/a     Name of the person notified of the changes:  n/a     Name of person being taught:  n/a     Instructions given:  n/a     Side Effects taught:  n/a     Response to teaching:  n/a        COMFORTABLE DYING MEASURE:  Is Patient/family satisfied with symptom level?  yes     DISCHARGE PLAN:  Pt will complete her Respite stay and then return home and continue to be followed by Home [facility].

## 2022-05-22 NOTE — HOSPICE
Prn tuck in visit done s/p [facility] discharge. Upon visit arrival pt. noted lying toward right side in bed, alert and oriented. No distress noted. Denies SOB or pain at this time. Daughter Dante Bowers at bedside. Left breast dressing change done this date at [facility].CDI. Watson catheter draining dark javier urine 200 ml in bag. No med refills or supplies requested at this time. Mia Bowers and pt. encouraged to call Hospice with any questions or concerns. Understanding was verbalized.

## 2022-05-22 NOTE — PROGRESS NOTES
Problem: Falls - Risk of  Goal: *Absence of Falls  Description: Document Ora Gray Fall Risk and appropriate interventions in the flowsheet. Outcome: Progressing Towards Goal  Note: Fall Risk Interventions:  Mobility Interventions: Bed/chair exit alarm    Mentation Interventions: Adequate sleep, hydration, pain control,Bed/chair exit alarm,Door open when patient unattended    Medication Interventions: Bed/chair exit alarm    Elimination Interventions: Call light in reach,Bed/chair exit alarm              Problem: Patient Education: Go to Patient Education Activity  Goal: Patient/Family Education  Outcome: Progressing Towards Goal     Problem: Pressure Injury - Risk of  Goal: *Prevention of pressure injury  Description: Document Geovanny Scale and appropriate interventions in the flowsheet. Outcome: Progressing Towards Goal  Note: Pressure Injury Interventions:  Sensory Interventions: Assess changes in LOC,Check visual cues for pain    Moisture Interventions: Apply protective barrier, creams and emollients,Maintain skin hydration (lotion/cream)    Activity Interventions: Pressure redistribution bed/mattress(bed type)    Mobility Interventions: HOB 30 degrees or less    Nutrition Interventions: Offer support with meals,snacks and hydration,Document food/fluid/supplement intake    Friction and Shear Interventions: Apply protective barrier, creams and emollients,HOB 30 degrees or less,Lift sheet                Problem: Patient Education: Go to Patient Education Activity  Goal: Patient/Family Education  Outcome: Progressing Towards Goal     Problem: Alteration in Mobility  Goal: Remain as independent as possible and remain safe in environment  Description: Patient will remain as independent as possible and remain safe in their environment.   Outcome: Progressing Towards Goal     Problem: Pain  Goal: Assess satisfaction of level of comfort and symptom control  Outcome: Progressing Towards Goal  Goal: *Control of acute pain  Outcome: Progressing Towards Goal     Problem: General Wound Care  Goal: *Non-infected wound: Improvement of existing wound, absence of infection, and maintenance of skin integrity  Outcome: Progressing Towards Goal  Goal: Interventions  Outcome: Progressing Towards Goal

## 2022-05-22 NOTE — PROGRESS NOTES
NAME OF PATIENT:  Chuck Mathur    LEVEL OF CARE:  Respite    REASON FOR GIP:   N/A    *PATIENT REMAINS ELIGIBLE FOR GIP LEVEL OF CARE AS EVIDENCED BY: (MUST BE ADDRESSED OF PATIENT GIP)      REASON FOR RESPITE:  Caregiver breakdown    O2 SAFETY:  N/A    FALL INTERVENTIONS PROVIDED:   Implemented/recommended use of non-skid footwear, Implemented/recommended use of fall risk identification flag to all team members, Implemented/recommended assistive devices and encouraged their use, Implemented/recommended resources for alarm system (personal alarm, bed alarm, call bell, etc.) , Implemented/recommended environmental changes (remove hazards, lower bed, improve lighting, etc.) and Implemented/recommended increased supervision/assistance    INTERDISPLINARY COMMUNICATION/COLLABORATION:  Physician, MSW, [place] and RN, CNA    NEW MEDICATION INITIATION DOCUMENTATION:  N/A    Reason medication is being initiated:  N/A    MD / Provider name consulted re: change in status / initiation of new medication:  N/A    New Symptom(s):  N/A    New Order(s):  N/A    Name of the person notified of the changes:  N/A    Name of person being taught:  N/A    Instructions given:  N/A    Side Effects taught:  N/A    Response to teaching:  N/A      COMFORTABLE DYING MEASURE:  Is Patient/family satisfied with symptom level?  yes    DISCHARGE PLAN: Home    SHIFT REPORT  1900-Handoff report received from [address]. Respite level of care with an admitting diagnosis of L metastatic breast cancer. 1930-Patient resting quietly in bed watching TV.    2000-Shift assessment completed, see flow sheets. Neuro=A&Ox4  Cardio=WNL  Resp=RA  GI=Regular diet  =Up to [facility]  Skin=L breast raw tissue and scarring with dressing, L side of back scarring and bruising  Access=None    2100-Evening meds administered included scheduled Morphine considering patient having 7/10 pain in L breast.     2200-Patient requested to get up to [facility] to possibly have a BM. Patient also mentioned that she experienced a little bit of a burning sensation when she urinates. Patient also stated that the doctor is aware of her discomfort. 0000-Patient resting quietly in bed with eyes closed. 0245-Patient requested pain meds due to 6/10 pain in L breast. PRN Dilaudid was given. Patient also requested a toothbrush, cup of water and lip balm. 0400-Patient resting quietly in bed watching TV.     0500-Bed bath performed. Patient also placed orders for breakast, lunch and dinner meals. 0600-Patient requested pain meds with 7/10 pain. PRN Dilaudid given. Patient also requested a comb for her hair. Watson emptied at 325 ml for the shift.     0700-Handoff report given to oncoming RN.

## 2022-05-22 NOTE — PROGRESS NOTES
Problem: General Wound Care  Goal: *Non-infected wound: Improvement of existing wound, absence of infection, and maintenance of skin integrity  5/22/2022 1317 by Jv Pak RN  Outcome: Resolved/Not Met  5/22/2022 1041 by Jv Pak RN  Outcome: Progressing Towards Goal     Problem: General Wound Care  Goal: *Non-infected wound: Improvement of existing wound, absence of infection, and maintenance of skin integrity  5/22/2022 1317 by Jv Pak RN  Outcome: Resolved/Not Met  5/22/2022 1041 by Jv Pak RN  Outcome: Progressing Towards Goal     Problem: Falls - Risk of  Goal: *Absence of Falls  Description: Document Nguyễn Cheng Fall Risk and appropriate interventions in the flowsheet. 5/22/2022 1317 by Jv Pak RN  Outcome: Resolved/Met  Note: Fall Risk Interventions:  Mobility Interventions: Bed/chair exit alarm    Mentation Interventions: Adequate sleep, hydration, pain control,Bed/chair exit alarm,Door open when patient unattended    Medication Interventions: Bed/chair exit alarm    Elimination Interventions: Call light in reach,Bed/chair exit alarm           5/22/2022 1041 by Jv Pak RN  Outcome: Progressing Towards Goal  Note: Fall Risk Interventions:  Mobility Interventions: Bed/chair exit alarm    Mentation Interventions: Adequate sleep, hydration, pain control,Bed/chair exit alarm,Door open when patient unattended    Medication Interventions: Bed/chair exit alarm    Elimination Interventions: Call light in reach,Bed/chair exit alarm              Problem: Patient Education: Go to Patient Education Activity  Goal: Patient/Family Education  5/22/2022 1317 by Jv Pak RN  Outcome: Resolved/Met  5/22/2022 1041 by Jv Pak RN  Outcome: Progressing Towards Goal     Problem: Pressure Injury - Risk of  Goal: *Prevention of pressure injury  Description: Document Geovanny Scale and appropriate interventions in the flowsheet.   5/22/2022 1317 by Jv Pak RN  Outcome: Resolved/Met  Note: Pressure Injury Interventions:  Sensory Interventions: Assess changes in LOC,Check visual cues for pain    Moisture Interventions: Apply protective barrier, creams and emollients,Maintain skin hydration (lotion/cream)    Activity Interventions: Pressure redistribution bed/mattress(bed type)    Mobility Interventions: HOB 30 degrees or less    Nutrition Interventions: Offer support with meals,snacks and hydration,Document food/fluid/supplement intake    Friction and Shear Interventions: Apply protective barrier, creams and emollients,HOB 30 degrees or less,Lift sheet             5/22/2022 1041 by Alexx Dempsey RN  Outcome: Progressing Towards Goal  Note: Pressure Injury Interventions:  Sensory Interventions: Assess changes in LOC,Check visual cues for pain    Moisture Interventions: Apply protective barrier, creams and emollients,Maintain skin hydration (lotion/cream)    Activity Interventions: Pressure redistribution bed/mattress(bed type)    Mobility Interventions: HOB 30 degrees or less    Nutrition Interventions: Offer support with meals,snacks and hydration,Document food/fluid/supplement intake    Friction and Shear Interventions: Apply protective barrier, creams and emollients,HOB 30 degrees or less,Lift sheet                Problem: Patient Education: Go to Patient Education Activity  Goal: Patient/Family Education  5/22/2022 1317 by Alexx Dempsey RN  Outcome: Resolved/Met  5/22/2022 1041 by Alexx Dempsey RN  Outcome: Progressing Towards Goal     Problem: Alteration in Mobility  Goal: Remain as independent as possible and remain safe in environment  Description: Patient will remain as independent as possible and remain safe in their environment.   5/22/2022 1317 by Alexx Dempsey RN  Outcome: Resolved/Met  5/22/2022 1041 by Alexx Dempsey RN  Outcome: Progressing Towards Goal     Problem: Pain  Goal: Assess satisfaction of level of comfort and symptom control  5/22/2022 1317 by Roma Macario Cindy Olson RN  Outcome: Resolved/Met  5/22/2022 1041 by Rogerio Mejía RN  Outcome: Progressing Towards Goal  Goal: *Control of acute pain  5/22/2022 1317 by Rogerio Mjeía RN  Outcome: Resolved/Met  5/22/2022 1041 by Rogerio Mejía RN  Outcome: Progressing Towards Goal     Problem: General Wound Care  Goal: Interventions  5/22/2022 1317 by Rogerio Mejía RN  Outcome: Resolved/Met  5/22/2022 1041 by Rogerio Mejía RN  Outcome: Progressing Towards Goal

## 2022-05-24 NOTE — HOSPICE
routine visit with wound care. On arrival patient laying in bed with eyes closed. Patient easily aroused by verbal stimuli. Patient did deny pain stating pain is controoled with medications. Supplies given to caregiver from [place] office. Wound care to (L) breast completed with caregiver assistance. Patient tolerated procedure well. Patient stated has a \"knot on her butt. \"  Stated it is her butt bone from laying in bed; buttocks not broken down. Advised use of cream and frequent repositioning to keep from breakdown; patient and caregiver verbalized understanding. Ordered Flagyl powder refill from [facility]. No further needs.

## 2022-05-26 NOTE — HOSPICE
PRN visit to patient for catheter issues. Received call from [facility] during her bathing visit with patient. Patient reporting severe pain and burning at site of Watson. Belén Chan reports urine leaking around tubing. RN provided instructions by phone to patient's daughter Valentin Workman on how to remove Watson, Valentin Workman declined to perform Watson removal.  Belén Chan removed Watson with instructions from RN, patient reported immediate relief of discomfort. RN arranged to make visit to assess. On arrival, patient in bed, alert x 3, reports she is feeling much better with Watson out. Patient declines to have Watson replaced \"for now\", states she would like to leave it out for a few days. RN left supplies in home for possible later insertion (16Fr 10ml coude catheter w/Watson kit, patient needed coude for last insertion). Patient reports no other needs during visit. RN spoke to patient's daughter Valentin Workman outside. Biance reports patient is not eating and now not drinking fluids (new). Patient c/o throat discomfort with swallowing, assessed to have thrush. New order obtained from NP [facility] for Diflucan x 7 days. Ordered for delivery from [facility] along with MSContin refill and Senna refill. Last BM this morning. Simon Solano states patient also sleeping more, using less Dilaudid prn doses because she sleeps through them. RN will order needed supplies for delivery - L diapers, wipes.

## 2022-05-31 NOTE — HOSPICE
routine visit with wound care. On arrival patient laying in bed a&o x3. Patient stated pain is controlled with pain medications. Last bm= 5/28/22. Patient taking stool softeners. Patient is out of bisacodyl suppositories. Wound care completed by hospice nurse. Patient is out of  supplies. Ordered: abd pads, wound cleanser, 4x4's, chux   medication refills: dilaudid (Rx 3), bisacodyl suppositories, flagyl powder. No other needs at this time.   Encouraged caregiver to call [phone] when low on syupplies

## 2022-06-09 NOTE — HOSPICE
Hospice RN visit with patient and daughter Marleni Pereira. Patient in bed, alert x 3, denies current pain or SOB. Marleni Pereira reports patient sleeping more, occasional confusion especially at night. Patient still getting up to [facility], still declines Watson replacement. Patient becomes very short of breath with any exertion and has been refusing oxygen. RN recommended to patient to use oxygen, especially to recover after exertion. Patient not eating, last BM Monday. Wound care completed to L breast, wound is spreading through L armpit, lymphedema still present to L arm and patient unable to use arm or move it when she gets up to [facility]. RN offered delivery of a sling which daughter accepted. RN will order needed supplies for delivery - Chux, Xeroforms, 9x5 ABDs, wipes. RN will order med refills - liquid Dilaudid. Daughter understands to call hospice with any needs.

## 2022-06-14 NOTE — HOSPICE
patients oxygen tubing was retaining [facility]

## 2022-06-15 NOTE — HOSPICE
Hospice RN visit with patient and daughter Katie Wolfe. Patient in bed, appears to be in pain, facial grimacing, tense body posture, but denies pain when asked. Patient \"scattered\" in speech and metation today, mild confusion, drifts off frequently. Lymphedema to upper L arm, last BM Monday, only taking bites/sips. Biance reports patient has been sleeping much more, using her oxygen more and can no longer bear weight on her feet. Last (total assist) transfer to [facility] was unsafe, daughter now completing incontinence care in the bed. Still taking MS Contin but using less Dilaudid because patient has been sleeping so much, Katie Wolfe reports sometimes going 4-5 hours between doses due to patient sleep. RN reviewed with daughter use of med when patient is asleep, that it can be given while sleeping and then hopefully patient will not wake up in pain. Daughter states understanding. Wound care completed to L breast, more drainage noted today. Will order alginate sheets to add to dressing to try to control drainage. Patient very uncomfortable to be repositioned and pulled up in bed after wound care. RN offered Watson placement to avoid movement for incontinence care, patient declines. Speaking to [address] outside, RN expressed patient appears to be transitioning, offered respite stay, [address] would like to keep her at home for now. Will increase nursing visits to assess patient. Patient has still not received last refill of Flagyl powder for wound care, call placed to triage RN [address] [facility] [address] who will follow up with [facility]. RN will order needed med refills - Dilaudid, MS Contin. RN will order needed supplies - wound cleanser spray, gauze, alginate sheets, oral swabs, lip balm.   Family understand to call hospice with any needs

## 2022-06-19 NOTE — HOSPICE
Patient found in bed A&Ox4, appears very uncomfortable reporting moderate pain which she states is acceptable. She uses PRN dilaudid sparingly. Any movement causes pain, but patient stoically reports she is \"fine\". Daughter Rico Art and family friend are present. PAtient has had no further vomiting since calling triage and treating with haldol, though her intake has been limited to liquids. Wound care provided and fairly well tolerated. Rico Art reports her mother has withdrawn, with decreased conversation and does not enjoy watching television or listening to music. Supplie and medications reviewed with no needs found. Reminded Rico Art to call with any changes or needs.

## 2022-06-21 NOTE — HOSPICE
on arrival patient laying in bed a&o x2. Patient denied pain. Wound care completed by nursing; patient tolerated well. Supplies ordered 6/18/22. Per caregiver have not received supplies.   Advised to give 1-2 days for delivery; agrees to call [phone] if not received by then

## 2022-06-23 NOTE — HOSPICE
Hospice RN visit with patient and daughter Domo Clay. Patient in bed, lethargic, speech delayed, denies pain but grimacing frequently with movement and wound care. Domo Clay reports patient is taking MS Contin twice/day and she uses prn Dilaudid doses about 2-3 times per day (a decrease in Dilaudid use). Patient is often distracted and \"out of it\". Not eating, still taking some sips, reduced urine and BM outputs. Wound care completed to L breast, patient tolerated well. L arm lymphedema is significant. Patient has had frequent nausea/vomiting with meds. Triage recommended to [facility] to give Haldol dose ~45 minutes before MS Contin pill which has been working well. New orders also obtained from NP Carlos Mcghee to incrase Haldol dose and give Zofran scheduled q4hrs. RN will order needed supplies for delivery - Allevyn dressings for sacrum, wipes, additional wound care supplies. Ordered refill Flagyl powder from [facility] for wound care. Domo Clay understandst to call hospice for any needs.

## 2022-06-24 NOTE — HOSPICE
MSW  met with pt and Sally pt's daughter,whose at bedside at [facility]. Pt admitted today under GIP. Pt received dose of medication after arrival to [facility]. MSW inquired about patient and daughter support and needs at this time. Daughter shared her thoughts about how things are going. MSW inquired about her children and if any additional support needed there. Daughter stated they visited pt Wednesday but will not be making a visit to [facility]. MSW learned from Dr. Physician that patient will not return home and family is looking into [facility].  MSW will provide resources to patients daughter to make call and decision to make final arrangements.

## 2022-06-24 NOTE — HOSPICE
Hospice RN prn visit to patient for intractable nausea/vomiting. Received call from daughter Rachel Junior that patient had been vomiting meds since 9pm dose of MS Contin last night. Per triage's prior recommendations daughter has been medicating with Haldol for nausea 30-45 mintues prior to pills and this was working for a day or two but now patient can't keep anything down. Call received while in transit from [facility] to state that family can't get patient to stop dry heaving and vomiting. Patient could be heard in background continuously retching. On arrival, patient in bed, HOB elevated. Murphyance able to show RN green emesis, patient responding to questions but looked very uncomfortable, unable to answer questions about pain, said \"yes\" when asked if she felt badly. Order received from JANIYA Duarte Notice to give Haldol rectally, syringe obtained from Rachel Junior however patient stated she felt a BM coming and passed gas. Transport arrived at that time, med held, [place] test administered. Sally anxious to follow her mother to [facility], RN walked out with tranpsort team once patient on stretcher. Report called to Jose at hospice house. Patient to be admitted GIP.

## 2022-06-24 NOTE — PROGRESS NOTES
Problem: Pressure Injury - Risk of  Goal: *Prevention of pressure injury  Description: Document Geovanny Scale and appropriate interventions in the flowsheet. Outcome: Progressing Towards Goal  Note: Pressure Injury Interventions:  Sensory Interventions: Float heels,Minimize linen layers,Turn and reposition approx. every two hours (pillows and wedges if needed),Keep linens dry and wrinkle-free,Check visual cues for pain    Moisture Interventions: Absorbent underpads,Check for incontinence Q2 hours and as needed,Internal/External urinary devices,Minimize layers,Limit adult briefs,Contain wound drainage    Activity Interventions: Assess need for specialty bed    Mobility Interventions: Assess need for specialty bed,Turn and reposition approx. every two hours(pillow and wedges),Float heels    Nutrition Interventions: Document food/fluid/supplement intake    Friction and Shear Interventions: Apply protective barrier, creams and emollients,Minimize layers,Lift sheet                Problem: Patient Education: Go to Patient Education Activity  Goal: Patient/Family Education  Outcome: Progressing Towards Goal     Problem: Falls - Risk of  Goal: *Absence of Falls  Description: Document Lisa Fall Risk and appropriate interventions in the flowsheet.   Outcome: Progressing Towards Goal  Note: Fall Risk Interventions:  Mobility Interventions: Bed/chair exit alarm    Mentation Interventions: Adequate sleep, hydration, pain control,Bed/chair exit alarm,Door open when patient unattended    Medication Interventions: Bed/chair exit alarm    Elimination Interventions: Call light in reach,Bed/chair exit alarm              Problem: Patient Education: Go to Patient Education Activity  Goal: Patient/Family Education  Outcome: Progressing Towards Goal

## 2022-06-24 NOTE — H&P
[facility]  [phone]    Patient Name: Candelario Perry  YOB: 1960    Date of Provider Hospice Visit: 06/24/22    Level of Care:   [x] General Inpatient (GIP)    [] Routine   [] Respite    Current Location of Care:  [] [facility] [] [facility] [] [facility] [] [facility] [x] [facility], patient referred from:  [] [facility] [] [facility] [] [facility] [] [facility] [x] Home [] Other:     Principle Hospice Diagnosis: Metastatic breast cancer  Diagnoses RELATED to the terminal prognosis: Cancer associated pain, history of rectal impaction of stool  Other Diagnoses:      HOSPICE SUMMARY     Candelario Perry is a 57y.o. year old who was admitted to [address] [place] [address]. Patient is a 27-year-old female who presents to the [facility] for GIP level care secondary to unmanaged pain in the home setting despite MS Contin and Dilaudid 6 mg every 3 hours. Patient unfortunately has had intractable nausea and vomiting despite attempts at managing in the home setting with Haldol and Zofran. Patient typically takes MS Contin 30 mg twice a day along with the Dilaudid. Patient's daughter is at the bedside. Patient has not been really able to eat or drink and clearly showing evidence of further decline compared to when I saw her approximately 1 month ago at the [facility]. The patient's principle diagnosis has resulted in intractable pain and nausea and vomiting  Refer to LCD     Functionally, the patient's Karnofsky and/or Palliative Performance Scale has declined over a period of weeks and is estimated at 10 the patient is dependent on the following ADLs: All    Objective information that support this patients limited prognosis includes:   See above note    The patient/family chose comfort measures with the support of Hospice. HOSPICE DIAGNOSES   Active Symptoms:  1. Cancer associated pain  2.   Intractable nausea and vomiting-palpable mass in the left lower to mid quadrant-questionable stool versus tumor progression  3. Large left fungating breast mass  4. Hospice care       PLAN   1. Patient admitted GIP level care as she needs frequent nursing assessment, IV/subcutaneous medication, not safe to attempt oral medication which was not effective in the home setting and now also having intractable nausea and vomiting  2. Difficult to know whether she actually is a bowel obstruction or whether this is related to stool or tumor in the left lower quadrant. At this point, patient appears to be approaching end-of-life so I think we need this focus on symptom management. She did have a bowel movement while we were examining her in the room. 3. Dilaudid initially started 2 mg but clearly after 3 doses still uncomfortable so increased to 4 mg every 15 minutes as needed for pain and shortness of breath  4. Comfort meds for all other symptoms  5. Anticipate the need for scheduled medication in the next couple hours but we will assess her overall needs first  6. Plan reviewed with bedside nursing team  7. Plan reviewed with daughter-Sally outside the room. Explained that she may be approaching end-of-life care. She appreciated /home nurse-Radha talking with her prior to patient's arrival.  She knows that her mom likely is not returning home. She just wants to make sure she is comfortable and she was being overwhelmed at home. She also has caregiving responsibilities for her children. She will need ongoing support. 8. Discussed with social work team who will help with identifying a [employer]. 5.  and SW to support family needs  10. Disposition: Anticipate death at the hospice house  11. Hospice Plan of care was reviewed in detail and agree with current plan of care    Prognosis estimated based on 06/24/22 clinical assessment is:   [x] Hours to Days    [] Days to Weeks    [] Other:    Communicated plan of care with: Hospice Case Manager;  Hospice IDT; Care Team     GOALS OF CARE     Patient/Medical POA stated Goal of Care: Hospice care    [x] I have reviewed and/or updated ACP information in the Advance Care Planning Navigator. This information is available in the [address] link in the patient's chart header. Primary Decision [facility] (Health Care Agent):   Primary Decision MakeMarlin Bellamy - [phone]    Resuscitation Status: DNR  If DNR is there a Durable DNR on file? : [x] Yes [] No (If no, complete Durable DNR)    HISTORY     History obtained from: Chart, daughter, patient, home team    CHIEF COMPLAINT: Pain and nausea and vomiting  The patient is:   [x] Verbal  [] Nonverbal  [] Unresponsive    HPI/SUBJECTIVE: Patient is able to whisper a few things. She clearly is uncomfortable with moaning, grimacing, nausea.   Patient very ashen       REVIEW OF SYSTEMS     The following systems were: [] reviewed  [x] unable to be reviewed    Positive ROS include:  Constitutional: fatigue, weakness, in pain, short of breath  Ears/nose/mouth/throat: increased airway secretions  Respiratory:shortness of breath, wheezing  Gastrointestinal:poor appetite, nausea, vomiting, abdominal pain, constipation, diarrhea  Musculoskeletal:pain, deformities, swelling legs  Neurologic:confusion, hallucinations, weakness  Psychiatric:anxiety, feeling depressed, poor sleep  Endocrine:     Adult Non-Verbal Pain Assessment Score: 7    Face  [] 0   No particular expression or smile  [] 1   Occasional grimace, tearing, frowning, wrinkled forehead  [x] 2   Frequent grimace, tearing, frowning, wrinkled forehead    Activity (movement)  [] 0   Lying quietly, normal position  [] 1   Seeking attention through movement or slow, cautious movement  [x] 2   Restless, excessive activity and/or withdrawal reflexes    Guarding  [] 0   Lying quietly, no positioning of hands over areas of body  [x] 1   Splinting areas of the body, tense  [] 2   Rigid, stiff    Physiology (vital signs)  [] 0   Stable vital signs  [x] 1   Change in any of the following: SBP > 20mm Hg; HR > 20/minute  [] 2   Change in any of the following: SBP > 30mm Hg; HR > 25/minute    Respiratory  [] 0   Baseline RR/SpO2, compliant with ventilator  [x] 1   RR > 10 above baseline, or 5% drop SpO2, mild asynchrony with ventilator  [] 2   RR > 20 above baseline, or 10% drop SpO2, asynchrony with ventilator     FUNCTIONAL ASSESSMENT     Palliative Performance Scale (PPS): 10-20       PSYCHOSOCIAL/SPIRITUAL ASSESSMENT     Active Problems:    * No active hospital problems.  *    Past Medical History:   Diagnosis Date    Breast cancer ([facility] [address]     left side    Menopause       Past Surgical History:   Procedure Laterality Date    HX BREAST BIOPSY Left     x2    HX BREAST LUMPECTOMY Left 2019    LEFT BREAST LUMPECTOMY WITH ULTRASOUND performed [facility]    HX HYSTERECTOMY      partial, ovaries remain    IR INSERT TUNL CVC W PORT OVER 5 YEARS  2021    IR REMOVE TUNL CVAD W PORT/PUMP  3/30/2022    VASCULAR SURGERY PROCEDURE UNLIST      portacath      Social History     Tobacco Use    Smoking status: Former Smoker     Packs/day: 0.10     Quit date: 2020     Years since quittin.4    Smokeless tobacco: Never Used   Substance Use Topics    Alcohol use: No     Family History   Problem Relation Age of Onset    Cancer Father     Cancer Maternal Aunt     Breast Cancer Maternal Aunt         4 paternal aunts    Cancer Maternal Uncle     Cancer Paternal Aunt     Cancer Paternal Uncle     Breast Cancer Maternal Grandmother       Allergies   Allergen Reactions    Codeine Rash     Rash from tylenol #3      Current Facility-Administered Medications   Medication Dose Route Frequency    bisacodyL (DULCOLAX) suppository 10 mg  10 mg Rectal DAILY PRN    LORazepam (ATIVAN) injection 1 mg  1 mg IntraVENous Q15MIN PRN    glycopyrrolate (ROBINUL) injection 0.2 mg  0.2 mg IntraVENous Q4H PRN    acetaminophen (TYLENOL) suppository 650 mg  650 mg Rectal Q4H PRN    ketorolac (TORADOL) injection 30 mg  30 mg IntraVENous Q6H PRN    prochlorperazine (COMPAZINE) injection 10 mg  10 mg IntraVENous Q6H PRN    ondansetron (ZOFRAN) injection 4 mg  4 mg IntraVENous Q6H PRN    HYDROmorphone (DILAUDID) injection 4 mg  4 mg IntraVENous Q4H    HYDROmorphone (DILAUDID) injection 4 mg  4 mg IntraVENous Q15MIN PRN        PHYSICAL EXAM     Wt Readings from Last 3 Encounters:   04/13/22 73.9 kg (163 lb)   03/31/22 83.1 kg (183 lb 1.6 oz)   12/13/21 79.6 kg (175 lb 8 oz)       There were no vitals taken for this visit.     Supplemental O2  [] Yes  [x] NO  Last bowel movement:     Currently this patient has:  [] Peripheral IV [] PICC  [] PORT [] ICD    [] Watson Catheter [] NG Tube   [] PEG Tube    [] Rectal Tube [] Drain  [x] Other: SC    Constitutional: Ill-appearing, almost ashen, attempts to whisper, moaning, groaning, grimacing  Eyes: Open, reactive  ENMT: Dry  Cardiovascular: Tachycardic  Respiratory: Slight increased work of breathing, definite decreased breath sounds at the left base on the side of the mass  Gastrointestinal: Soft, minimal to no bowel sounds, tenderness to the left mid quadrant with palpable mass  Musculoskeletal: Muscle wasting  Skin: Left breast fungating mass that encompasses the whole left chest, left axillary region, with skin changes even along the upper abdomen  Neurologic: Nonfocal  Psychiatric: Anxious and restless  Other:       Pertinent Lab and or Imaging Tests:  Lab Results   Component Value Date/Time    Sodium 137 04/06/2022 04:08 AM    Potassium 3.8 04/06/2022 04:08 AM    Chloride 105 04/06/2022 04:08 AM    CO2 25 04/06/2022 04:08 AM    Anion gap 7 04/06/2022 04:08 AM    Glucose 69 04/06/2022 04:08 AM    BUN 12 04/06/2022 04:08 AM    Creatinine 0.47 (L) 04/06/2022 04:08 AM    BUN/Creatinine ratio 26 (H) 04/06/2022 04:08 AM    GFR est AA >60 04/06/2022 04:08 AM    GFR est non-AA >60 04/06/2022 04:08 AM Calcium 8.0 (L) 04/06/2022 04:08 AM     Lab Results   Component Value Date/Time    Protein, total 5.9 (L) 03/25/2022 12:57 PM    Albumin 2.1 (L) 03/25/2022 12:57 PM           Total time:   Counseling / coordination time:   > 50% counseling / coordination?:

## 2022-06-24 NOTE — HOSPICE
met with pt and pt's daughter, Dru Webb, at bedside at [facility]. Pt has been admitted GIP. Pt was settling in to [facility] upon arrival and meeting with the [facility] nurse. SW completed the inpatient agreement and visitor's agreement forms with Dru Webb as authorized rep. Scanned to consents for filing. No other concerns voiced at this time. Social work will continue to monitor emotional support needs of the pt and family.      Lilli JOEW   Supervised by: Bernadette Mckeon LCSW  Reviewed by Bernadette Mckeon LCSW on 6/25/22

## 2022-06-25 NOTE — PROGRESS NOTES
0700 Bedside, Verbal and Written shift change report given to Vicky Monique RN (oncoming nurse) by Ankush Enamorado (offgoing nurse). Report included the following information SBAR, Kardex, Intake/Output and MAR.   0740 IV placed. Pt tolerated well. Pt noted with apnea. Pt unresponsive to verbal/tactile stimuli. 0800 pt noted with apnea. No s/s of distress noted at this time. Will continue to monitor. 4970 provided pt update to daughter Rose Adkins. Daughter states she will be coming up to see patient. 1030 family at bedside. Provided pt update. Will continue to monitor. 1400 family at bedside. Mouth care completed. Discussed plan to complete dressing change/linen change with sister at bedside. No questions/concerns voiced. 1705 dressing change completed to sacrum and left breast. Pt noted with facial grimacing, teeth clenching. Pt required PRN meds. See MAR  1740 no s/s of distress noted. Pt noted with apnea.       NAME OF PATIENT:  Kale Dimas    LEVEL OF CARE:  GIP    REASON FOR GIP:   Pain, despite numerous changes in medications and Terminal agitation, despite changes to medications    *PATIENT REMAINS ELIGIBLE FOR GIP LEVEL OF CARE AS EVIDENCED BY: (MUST BE ADDRESSED OF PATIENT GIP)    O2 SAFETY:  room air    FALL INTERVENTIONS PROVIDED:   Implemented/recommended use of non-skid footwear, Implemented/recommended use of fall risk identification flag to all team members, Implemented/recommended assistive devices and encouraged their use, Implemented/recommended resources for alarm system (personal alarm, bed alarm, call bell, etc.)  and Implemented/recommended environmental changes (remove hazards, lower bed, improve lighting, etc.)    INTERDISPLINARY COMMUNICATION/COLLABORATION:  Physician, MSW, [place] and RN, CNA    NEW MEDICATION INITIATION DOCUMENTATION:  n/a    Reason medication is being initiated:  n/a    MD / Provider name consulted re: change in status / initiation of new medication:  N/a    New Symptom(s): n/a    New Order(s):  n/a    Name of the person notified of the changes:  n/a    Name of person being taught:  n/a    Instructions given:  n/a    Side Effects taught:  n/a    Response to teaching:  n/a      COMFORTABLE DYING MEASURE:  Is Patient/family satisfied with symptom level?  yes    DISCHARGE PLAN:  EOL

## 2022-06-25 NOTE — PROGRESS NOTES
Problem: Pressure Injury - Risk of  Goal: *Prevention of pressure injury  Description: Document Geovanny Scale and appropriate interventions in the flowsheet. Outcome: Progressing Towards Goal  Note: Pressure Injury Interventions:  Sensory Interventions: Float heels,Minimize linen layers    Moisture Interventions: Absorbent underpads,Internal/External urinary devices,Minimize layers    Activity Interventions: Assess need for specialty bed    Mobility Interventions: Float heels,HOB 30 degrees or less    Nutrition Interventions: Document food/fluid/supplement intake    Friction and Shear Interventions: Minimize layers                Problem: Falls - Risk of  Goal: *Absence of Falls  Description: Document Lisa Fall Risk and appropriate interventions in the flowsheet.   Outcome: Progressing Towards Goal  Note: Fall Risk Interventions:  Mobility Interventions: Bed/chair exit alarm    Mentation Interventions: Adequate sleep, hydration, pain control,Bed/chair exit alarm,Door open when patient unattended    Medication Interventions: Bed/chair exit alarm    Elimination Interventions: Bed/chair exit alarm,Call light in reach

## 2022-06-25 NOTE — HOSPICE
1900: report received from Hortensia Tolentino, [facility]: assessment completed. Pt sister at bedside. Pt is mostly unresponsive, she will grimace with repositioning but is otherwise unresponsive. Periods of apnea noted lasting 10-15 seconds. L breast tumor noted, covered with clean dressing. LUE with +3 non pitting edema. Trace edema noted in LLE. See flowsheet for full assessment     2016: scheduled  ativan and dilaudid given through subcu sites      2100: pt is resting quietly, sister remains at bedside, turned onto R side. This RN advised pt sister Rozina Bronson that pt appears to be declining rapidly and has made changes just since she arrived earlier today. Rozina Bronson was tearful and relayed this information to pt daughter who she was talking on the phone with. Daughter plans to call for updates through the night    2200: pt is resting quietly with relaxed facial expression, music channel turned on and lights dimmed    2255: respirations are unlabored, RR 12    2330: pt resting quietly with eyes closed     0003: scheduled IV dilaudid and ativan given via subcu sites    0043: pt is resting quietly with eyes closed, pt turned, now laying supine    0130: pt is resting quietly with unlabored respirations, RR 12    0228: pt is resting quietly with unlabored respirations, facial expression is relaxed     0320: pt is resting quietly with unlabored respirations. Turned onto R side      0416: pt grimacing when her R arm moved.  Scheduled dilaudid and ativan given      0430: pt has not voided, campos inserted, scant amount of urine drained    0500: pt is resting quietly with unlabored respirations    0555: pt is grimacing, PRN ativan and dilaudid given, see MAR    0620: pt resting quietly with eyes closed    0700: report given to oncoming nurse     NAME OF PATIENT:  Son Ramirez    LEVEL OF CARE:     REASON FOR GIP:   Pain, despite numerous changes in medications, Nausea and vomiting, despite changes to medications, Medication adjustment that must be monitored 24/ and Stabilizing treatment that cannot take place at home    *PATIENT REMAINS ELIGIBLE FOR GIP LEVEL OF CARE AS EVIDENCED BY: (MUST BE ADDRESSED OF PATIENT GIP)      REASON FOR RESPITE:  n/a    O2 SAFETY:  n/a    FALL INTERVENTIONS PROVIDED:   Implemented/recommended resources for alarm system (personal alarm, bed alarm, call bell, etc.) , Implemented/recommended environmental changes (remove hazards, lower bed, improve lighting, etc.) and Implemented/recommended increased supervision/assistance    INTERDISPLINARY COMMUNICATION/COLLABORATION:  Physician, MSW, [place] and RN, CNA    NEW MEDICATION INITIATION DOCUMENTATION:  n/a    Reason medication is being initiated: n/a    MD / Provider name consulted re: change in status / initiation of new medication: n/a    New Symptom(s): n/a    New Order(s): n/a    Name of the person notified of the changes: n/a    Name of person being taught: n/a    Instructions given: n/a    Side Effects taught: n/a    Response to teaching: n/a      COMFORTABLE DYING MEASURE:  Is Patient/family satisfied with symptom level?  yes    DISCHARGE PLAN: Pt will likely  at [facility], if she stabilizes she will return home

## 2022-06-26 NOTE — PROGRESS NOTES
Problem: Pressure Injury - Risk of  Goal: *Prevention of pressure injury  Description: Document Geovanny Scale and appropriate interventions in the flowsheet. Outcome: Progressing Towards Goal  Note: Pressure Injury Interventions:  Sensory Interventions: Assess changes in LOC,Check visual cues for pain,Float heels,Minimize linen layers    Moisture Interventions: Absorbent underpads,Apply protective barrier, creams and emollients,Internal/External urinary devices,Minimize layers    Activity Interventions: Assess need for specialty bed    Mobility Interventions: Float heels,HOB 30 degrees or less    Nutrition Interventions: Document food/fluid/supplement intake    Friction and Shear Interventions: HOB 30 degrees or less,Apply protective barrier, creams and emollients,Minimize layers                Problem: Falls - Risk of  Goal: *Absence of Falls  Description: Document Lisa Fall Risk and appropriate interventions in the flowsheet.   Outcome: Progressing Towards Goal  Note: Fall Risk Interventions:  Mobility Interventions: Bed/chair exit alarm    Mentation Interventions: Adequate sleep, hydration, pain control,Bed/chair exit alarm,Door open when patient unattended    Medication Interventions: Bed/chair exit alarm    Elimination Interventions: Bed/chair exit alarm,Call light in reach

## 2022-06-26 NOTE — HOSPICE
1900: report received from Juan Muellercogunner, Replaced by [facility]    7047: vitals and assessment completed. Pt is responsive to pain only, will grimace when touched or moved. Dressing over L breast tumor is clean and dry. Lungs are diminished. See flowsheet for full assessment     1952: scheduled IV dilaudid and ativan given     2052: pt is resting with relaxed facial expression. Daughter called for update, update provided, encouraged daughter to call [facility] through the night for updates     2152: pt is grimacing when touched. PRN IV dilaudid given     2217: IV medications effective. No grimacing noted    2315: pt resting quietly with unlabored respirations and relaxed facial expressions    0015: mouth care completed. Pt laying supine. Scheduled IV ativan and IV dilaudid given     0100: pt is resting quietly with irregular respirations, periods of apnea noted    0148: no grimacing noted    0235: 24g IV placed in R hand    0300: pt is resting with relaxed facial expression    0340: pt with furrowed brow, PRN IV ativan and dilaudid given, respirations are also labored     0420: scheduled IV dilaudid and ativan given.  Pt given bed bath and turned onto R side     0504: respirations are irregular but unlabored, periods of apnea lasting between 10-25 seconds      0600: periods of apnea lasting anywhere between 10-20 seconds, RR 4    0700; report given to oncoming nurse     NAME OF PATIENT:  Laura Whiteside    LEVEL OF CARE: GIP    REASON FOR GIP:   Pain, despite numerous changes in medications, Nausea and vomiting, despite changes to medications, Medication adjustment that must be monitored 24/7 and Stabilizing treatment that cannot take place at home    *PATIENT REMAINS ELIGIBLE FOR GIP LEVEL OF CARE AS EVIDENCED BY: pt requires IV medications and frequent nursing assessments     REASON FOR RESPITE:  n/a    O2 SAFETY:  n/a    FALL INTERVENTIONS PROVIDED:   Implemented/recommended resources for alarm system (personal alarm, bed alarm, call bell, etc.) , Implemented/recommended environmental changes (remove hazards, lower bed, improve lighting, etc.) and Implemented/recommended increased supervision/assistance    INTERDISPLINARY COMMUNICATION/COLLABORATION:  Physician, MSW, [place] and RN, CNA    NEW MEDICATION INITIATION DOCUMENTATION:  n/a    Reason medication is being initiated: n/a    MD / Provider name consulted re: change in status / initiation of new medication: n/a    New Symptom(s): n/a    New Order(s): n/a    Name of the person notified of the changes:  n/a    Name of person being taught: n/a    Instructions given: n/a    Side Effects taught: n/a    Response to teaching: n/a      COMFORTABLE DYING MEASURE:  Is Patient/family satisfied with symptom level?  yes    DISCHARGE PLAN: Pt will likely  at [facility], if she stabilizes she will return home

## 2022-06-26 NOTE — PROGRESS NOTES
Problem: Pressure Injury - Risk of  Goal: *Prevention of pressure injury  Description: Document Geovanny Scale and appropriate interventions in the flowsheet. Outcome: Progressing Towards Goal  Note: Pressure Injury Interventions:  Sensory Interventions: Float heels,Minimize linen layers,Turn and reposition approx. every two hours (pillows and wedges if needed),Check visual cues for pain,Keep linens dry and wrinkle-free,Monitor skin under medical devices    Moisture Interventions: Absorbent underpads,Minimize layers,Internal/External urinary devices,Limit adult briefs,Moisture barrier,Contain wound drainage    Activity Interventions: Assess need for specialty bed    Mobility Interventions: Assess need for specialty bed,Float heels,Turn and reposition approx. every two hours(pillow and wedges)    Nutrition Interventions: Document food/fluid/supplement intake    Friction and Shear Interventions: HOB 30 degrees or less,Apply protective barrier, creams and emollients,Minimize layers,Lift sheet                Problem: Patient Education: Go to Patient Education Activity  Goal: Patient/Family Education  Outcome: Progressing Towards Goal     Problem: Falls - Risk of  Goal: *Absence of Falls  Description: Document Lisa Fall Risk and appropriate interventions in the flowsheet.   Outcome: Progressing Towards Goal  Note: Fall Risk Interventions:  Mobility Interventions: Bed/chair exit alarm    Mentation Interventions: Adequate sleep, hydration, pain control,Bed/chair exit alarm,Door open when patient unattended    Medication Interventions: Bed/chair exit alarm    Elimination Interventions: Call light in reach,Bed/chair exit alarm              Problem: Patient Education: Go to Patient Education Activity  Goal: Patient/Family Education  Outcome: Progressing Towards Goal

## 2022-06-26 NOTE — PROGRESS NOTES
[facility] Help to Those in Need  [phone]    Patient Name: Cholo Stephens  YOB: 1960    Date of Provider Hospice Visit: 06/26/22    Level of Care:   [x] General Inpatient (GIP)    [] Routine   [] Respite    Current Location of Care:  [] [facility] [] [facility] [] [facility] [] [facility] [x] Hospice [facility], patient referred from:  [] [facility] [] [facility] [] [facility] [] [facility] [x] Home [] Other:     Principle Hospice Diagnosis: Metastatic breast cancer  Diagnoses RELATED to the terminal prognosis: Cancer associated pain, history of rectal impaction of stool  Other Diagnoses:      HOSPICE SUMMARY     Cholo Stephens is a 57y.o. year old who was admitted to [facility]. Patient is a 76-year-old female who presents to the hospice [facility] for GIP level care secondary to unmanaged pain in the home setting despite MS Contin and Dilaudid 6 mg every 3 hours. Patient unfortunately has had intractable nausea and vomiting despite attempts at managing in the home setting with Haldol and Zofran. Patient typically takes MS Contin 30 mg twice a day along with the Dilaudid. Patient's daughter is at the bedside. Patient has not been really able to eat or drink and clearly showing evidence of further decline compared to when I saw her approximately 1 month ago at the hospice [facility]. The patient's principle diagnosis has resulted in intractable pain and nausea and vomiting  Refer to LCD     Functionally, the patient's Karnofsky and/or Palliative Performance Scale has declined over a period of weeks and is estimated at 10 the patient is dependent on the following ADLs: All    Objective information that support this patients limited prognosis includes:   See above note    The patient/family chose comfort measures with the support of Hospice. HOSPICE DIAGNOSES   Active Symptoms:  1. Cancer associated pain  2.   Intractable nausea and vomiting-palpable mass in the left lower to mid quadrant-questionable stool versus tumor progression  3. Large left fungating breast mass  4. Hospice care  5. Restlessness  6. Unresponsiveness  7. Irregular breathing pattern       PLAN   1. Continue GIP level care as she needs frequent nursing assessment, IV/subcutaneous medication, not safe to attempt oral medication which was not effective in the home setting and now also having irregular breathing pattern   2. To help improve symptoms control we have adjusted Dilaudid to 4 mg IV every 3 hours routinely and every 15 minutes as needed for pain and shortness of breath  3. To help improve symptom control we have adjusted Ativan to 2 mg IV every 3 hours routinely and every 15 minutes prn anxiety, restlessness, agitation  4. Comfort meds for all other symptoms  5. Plan reviewed with bedside nursing team  6. Plan reviewed with family at bedside. Family agreeable to plan. 7.  and SW to support family needs  8. Disposition: Anticipate death at the hospice house  9. Hospice Plan of care was reviewed in detail and agree with current plan of care    Prognosis estimated based on 06/26/22 clinical assessment is:   [x] Hours to Days    [] Days to Weeks    [] Other:    Communicated plan of care with: Hospice Case Manager; Hospice IDT; Care Team     GOALS OF CARE     Patient/Medical POA stated Goal of Care: Hospice care    [x] I have reviewed and/or updated ACP information in the Advance Care Planning Navigator. This information is available in the [facility] [address] link in the patient's chart header.     Primary Decision [facility] (Health Care Agent):   Primary Decision MakerFrasfrancisca Love - Daughter - [phone]    Resuscitation Status: DNR  If DNR is there a Durable DNR on file? : [x] Yes [] No (If no, complete Durable DNR)    HISTORY     History obtained from: Chart, daughter, patient, home team    CHIEF COMPLAINT: Pain and nausea and vomiting  The patient is:   [x] Verbal  [] Nonverbal  [] Unresponsive    HPI/SUBJECTIVE: Patient is able to whisper a few things. She clearly is uncomfortable with moaning, grimacing, nausea. Patient very ashen    6/26: patient does not wake. Nursing expresses concern that patient may be more comfortable if we decrease dosing interval.     REVIEW OF SYSTEMS     The following systems were: [] reviewed  [x] unable to be reviewed    Positive ROS include:  Constitutional: fatigue, weakness, in pain, short of breath  Ears/nose/mouth/throat: increased airway secretions  Respiratory:shortness of breath, wheezing  Gastrointestinal:poor appetite, nausea, vomiting, abdominal pain, constipation, diarrhea  Musculoskeletal:pain, deformities, swelling legs  Neurologic:confusion, hallucinations, weakness  Psychiatric:anxiety, feeling depressed, poor sleep  Endocrine:     Adult Non-Verbal Pain Assessment Score: 0    Face  [x] 0   No particular expression or smile  [] 1   Occasional grimace, tearing, frowning, wrinkled forehead  [] 2   Frequent grimace, tearing, frowning, wrinkled forehead    Activity (movement)  [x] 0   Lying quietly, normal position  [] 1   Seeking attention through movement or slow, cautious movement  [] 2   Restless, excessive activity and/or withdrawal reflexes    Guarding  [x] 0   Lying quietly, no positioning of hands over areas of body  [] 1   Splinting areas of the body, tense  [] 2   Rigid, stiff    Physiology (vital signs)  [x] 0   Stable vital signs  [] 1   Change in any of the following: SBP > 20mm Hg; HR > 20/minute  [] 2   Change in any of the following: SBP > 30mm Hg; HR > 25/minute    Respiratory  [x] 0   Baseline RR/SpO2, compliant with ventilator  [] 1   RR > 10 above baseline, or 5% drop SpO2, mild asynchrony with ventilator  [] 2   RR > 20 above baseline, or 10% drop SpO2, asynchrony with ventilator     FUNCTIONAL ASSESSMENT     Palliative Performance Scale (PPS): 10       PSYCHOSOCIAL/SPIRITUAL ASSESSMENT     Active Problems:    * No active hospital problems.  *    Past Medical History: Diagnosis Date    Breast cancer ([facility] [address]     left side    Menopause       Past Surgical History:   Procedure Laterality Date    HX BREAST BIOPSY Left     x2    HX BREAST LUMPECTOMY Left 2019    LEFT BREAST LUMPECTOMY WITH ULTRASOUND performed by Clara Nash MD at [facility]    HX HYSTERECTOMY      partial, ovaries remain    IR INSERT TUNL CVC W PORT OVER 5 YEARS  2021    IR REMOVE TUNL CVAD W PORT/PUMP  3/30/2022    VASCULAR SURGERY PROCEDURE UNLIST      portacath      Social History     Tobacco Use    Smoking status: Former Smoker     Packs/day: 0.10     Quit date: 2020     Years since quittin.4    Smokeless tobacco: Never Used   Substance Use Topics    Alcohol use: No     Family History   Problem Relation Age of Onset    Cancer Father     Cancer Maternal Aunt     Breast Cancer Maternal Aunt         4 paternal aunts    Cancer Maternal Uncle     Cancer Paternal Aunt     Cancer Paternal Uncle     Breast Cancer Maternal Grandmother       Allergies   Allergen Reactions    Codeine Rash     Rash from tylenol #3      Current Facility-Administered Medications   Medication Dose Route Frequency    HYDROmorphone (DILAUDID) injection 4 mg  4 mg IntraVENous Q3H    LORazepam (ATIVAN) injection 2 mg  2 mg IntraVENous Q3H    bisacodyL (DULCOLAX) suppository 10 mg  10 mg Rectal DAILY PRN    glycopyrrolate (ROBINUL) injection 0.2 mg  0.2 mg IntraVENous Q4H PRN    acetaminophen (TYLENOL) suppository 650 mg  650 mg Rectal Q4H PRN    ketorolac (TORADOL) injection 30 mg  30 mg IntraVENous Q6H PRN    prochlorperazine (COMPAZINE) injection 10 mg  10 mg IntraVENous Q6H PRN    ondansetron (ZOFRAN) injection 4 mg  4 mg IntraVENous Q6H PRN    HYDROmorphone (DILAUDID) injection 4 mg  4 mg IntraVENous Q15MIN PRN    LORazepam (ATIVAN) injection 2 mg  2 mg IntraVENous Q15MIN PRN        PHYSICAL EXAM     Wt Readings from Last 3 Encounters:   22 73.9 kg (163 lb)   22 83.1 kg (183 lb 1.6 oz)   12/13/21 79.6 kg (175 lb 8 oz)       Visit Vitals  BP (!) 52/38   Pulse (!) 113   Temp 98.7 °F (37.1 °C)   Resp 12   SpO2 95%       Supplemental O2  [] Yes  [x] NO  Last bowel movement:     Currently this patient has:  [] Peripheral IV [] PICC  [] PORT [] ICD    [] Watson Catheter [] NG Tube   [] PEG Tube    [] Rectal Tube [] Drain  [x] Other: SC    Constitutional: Ill-appearing, almost ashen, appears be transitioning/actively dying  Eyes: closed, lids normal  ENMT: Dry mm, nares normal  Cardiovascular: Tachycardic, pulses palpable  Respiratory: shallow breathing, irregular breathing with pauses  Gastrointestinal: Soft  Musculoskeletal: Muscle wasting  Skin: warm, dry, Left breast fungating mass that encompasses the whole left chest, left axillary region  Neurologic: does not wake or stir to exam  Psychiatric: calm at this time, unable to assess further as unresponsive  Other:       Pertinent Lab and or Imaging Tests:  Lab Results   Component Value Date/Time    Sodium 137 04/06/2022 04:08 AM    Potassium 3.8 04/06/2022 04:08 AM    Chloride 105 04/06/2022 04:08 AM    CO2 25 04/06/2022 04:08 AM    Anion gap 7 04/06/2022 04:08 AM    Glucose 69 04/06/2022 04:08 AM    BUN 12 04/06/2022 04:08 AM    Creatinine 0.47 (L) 04/06/2022 04:08 AM    BUN/Creatinine ratio 26 (H) 04/06/2022 04:08 AM    GFR est AA >60 04/06/2022 04:08 AM    GFR est non-AA >60 04/06/2022 04:08 AM    Calcium 8.0 (L) 04/06/2022 04:08 AM     Lab Results   Component Value Date/Time    Protein, total 5.9 (L) 03/25/2022 12:57 PM    Albumin 2.1 (L) 03/25/2022 12:57 PM           Total time:   Counseling / coordination time:   > 50% counseling / coordination?:

## 2022-06-26 NOTE — HOSPICE
0700 Report received from [facility]. 0715 Patient resting in bed quietly, respirations are shallow, periods of apnea noted, neutral facial expression noted. 0740 Patient given scheduled medications, see MAR.     0805 Oral care provided, lip balm applied to lips. 8854 Patient resting in bed quietly, respirations are shallow, periods of apnea noted, neutral facial expression noted. 5971 Patient's daughter called, update given on patient's night/morning. 0945 Patient resting in bed quietly, respirations are shallow, periods of apnea noted, neutral facial expression noted. 1010 Patient turned and repositioned with PRISCILLA Ayala, patient groaned and furrowed brow with movement, PRN IV dilaudid given, see MAR.     1025 Patient resting in bed quietly, respirations are unlabored, neutral facial expression noted. PRN medication effective. 1110 Patient given scheduled IV medications, see MAR.     1125 Patient's sister at the bedside, update given on patient's day. 1205 Patient resting in bed quietly, eyes are closed, neutral facial expression noted, respirations are unlabored, periods of apnea noted. 1300 Oral care provided, lip balm applied to lips. 0 Dr. Rober Milner at the bedside, medications adjusted due to PRN usage. 1410 Patient given scheduled IV medications, see MAR.      1435 Daughter at the bedside, update on patient's day given. 364 [facility] into see patient and family. 32 61 16 Patient turned and repositioned in bed with HIRO Gaitan, patient groaned and grimaced with movement, PRN IV dilaudid given, see MAR. Will continue to monitor. 1600 Patient no longer grimacing, PRN medication effective. 1650  Patient resting in bed quietly, eyes are closed, neutral facial expression noted, respirations are unlabored, periods of apnea noted. [address] [place] [address],[address] Patient's daughter left the bedside. 1800 Oral care provided, lip balm applied to lips.

## 2022-06-26 NOTE — HOSPICE
Initial spiritual care visit with the patient and daughter. The patient was lying in bed and did not respond to the . The daughter said they were doing ok and not in need of  support right now. They will reach out should their needs change.

## 2022-06-27 NOTE — HOSPICE
0700 Report received from [facility].    0651 Patient resting in bed quietly, eyes are closed, neutral facial expression noted, respirations are shallow, periods of apnea noted. 1573 Patient given scheduled medications, see MAR. Vital signs obtained, see flow sheet. 1398 Oral care provided, lip balm applied to lips. 3893  Patient resting in bed quietly, eyes are closed, neutral facial expression noted, respirations are shallow, periods of apnea noted. 2962 Dr. Kin Pearson rounding at the bedside. 1040 Patient repositioned in bed, patient tolerated activity well. 1120 Patient given scheduled IV medications, see MAR. Sister at the bedside. 1140 MSW Enedina at the bedside. 1220 Oral care provided, lip balm applied to lips. 1255 Patient resting in bed quietly, eyes are closed, neutral facial expression noted, respirations are unlabored. 1340 Patient resting in bed quietly, eyes are closed, neutral facial expression noted, respirations are shallow, periods of apnea noted. Sister at the bedside. 1400 Patient given scheduled IV medications, see MAR. Patient turned and repositioned in bed with PRISCILLA Apple, patient tolerated activity well. 1440 Patient resting in bed quietly, eyes are closed, neutral facial expression noted. 0900 Dr. Kin Pearson at the bedside with sister. 1621 Patient noted to be moaning, PRN IV dilaudid given, see MAR. Will continue to monitor. 1640 Patient no longer moaning, PRN medications effective. 1705 Patient given scheduled IV medications, see MAR.     1750 Patient resting in bed quietly, eyes are closed, neutral facial expression noted, respirations are shallow, periods of apnea noted. Sister at the bedside. [address] Patient's daughter called, update given on patient's day.

## 2022-06-27 NOTE — PROGRESS NOTES
Problem: Pressure Injury - Risk of  Goal: *Prevention of pressure injury  Description: Document Geovanny Scale and appropriate interventions in the flowsheet. Outcome: Progressing Towards Goal  Note: Pressure Injury Interventions:  Sensory Interventions: Assess changes in LOC,Check visual cues for pain,Float heels,Minimize linen layers    Moisture Interventions: Absorbent underpads,Internal/External urinary devices,Minimize layers    Activity Interventions: Pressure redistribution bed/mattress(bed type)    Mobility Interventions: Float heels,HOB 30 degrees or less    Nutrition Interventions:  (npo)    Friction and Shear Interventions: Apply protective barrier, creams and emollients,HOB 30 degrees or less,Foam dressings/transparent film/skin sealants,Minimize layers                Problem: Falls - Risk of  Goal: *Absence of Falls  Description: Document Lisa Fall Risk and appropriate interventions in the flowsheet.   Outcome: Progressing Towards Goal  Note: Fall Risk Interventions:  Mobility Interventions: Bed/chair exit alarm    Mentation Interventions: Adequate sleep, hydration, pain control,Bed/chair exit alarm,Door open when patient unattended    Medication Interventions: Bed/chair exit alarm    Elimination Interventions: Bed/chair exit alarm,Call light in reach

## 2022-06-27 NOTE — HOSPICE
[facility] LCSW note: This LCSW visited the room of pt, who appears unresponsive. Pts sister Veverly Bounds at bedside holding rosenthal. LCSW introduced self as part of pts care team at [facility]. LCSW and sister discussed pts need for transfer to [facility] at [facility] due to increased symptoms and care needs. Sister reports she and pt are very close and spoke daily. LCSW provided active listening as sister talked about their relation ship. LCSW provided support for coping with relational loss and anticipatory grief. Sister has taken the week off to be at bedside. LCSW will complete a letter for her work. LCSW will continue to assess and monitor pt and family needs.

## 2022-06-27 NOTE — PROGRESS NOTES
1910-received report from HIRO Garcia  1925-pt daughter at bedside. Pt unresponsive, mouth slightly agape. Resp clear/shallow. 2015-respirations shallow/even. No visible signs of pain/discomfort. 2057-tachypneic shallow resp. Weak, moist, non-productive cough. Pt grimacing upon inspiration. Administered schedule dilaudid and lorazepam.  2120-pt resting comfortably. Resp reg and slower. No grimacing or other visible signs of pain/discomfort. 2205-pt groaning w/shallow resp. Administered PRN dilaudid. 2218-pt grunting, uncomfortable. Administered PRN dilaudid & lorazepam  2245-pt uncomfortable noted by dyspnea, grunting, and groaning. Called Dr. Kashmir Pinto and he increased Dilaudid from 4mg every 3 hours and every 15min PRN to 6mg every 2 hours and every 15min PRN. New IV in rt forearm started by Yumiko Anderson RN.  2249-pt temp 100.9. Administered PRN toradol as well as PRN dilaudid and lorazepam.  2315-pt grunting less w/ shallow resp. Administered scheduled Dilaudid and Lorazepam.  2327-pt appearing more comfortable. Grunting minimal.  0010-pt much more comfortable. No visible signs of pain. Periodic, weak non-productive cough. 0044-pt grunting restlessly. Audible secretions. Suctioned pt, obtained small amount of yellow-tinged frothy sputum. Administered PRN dilaudid/ativan. Turned pt on far rt side. 0127-pt grunting periodically. No audible secretions observed. Administered scheduled dilaudid and PRN Lorazepam.  0305-audible secretions. Attempted to suction pt, no sputum obtained. Pt still grunting. Administered scheduled meds and PRN Robinul. 0410-gave pt bath. Emptied 250mL from campos. Pt tolerated activity fairly. resp shallow  0525-pt much more comfortable noted by slower resp rate and minimal grunting. Administered scheduled meds. 0620-pt grunting at times. Audible secretions.  Shallow resp.  0700-report given to HIRO Garcia                NAME OF PATIENT:  Candelario Perry    LEVEL OF CARE:  GIP    REASON FOR GIP:   Pain, despite numerous changes in medications, Medication adjustment that must be monitored 24/7 and Stabilizing treatment that cannot take place at home    *PATIENT REMAINS ELIGIBLE FOR GIP LEVEL OF CARE AS EVIDENCED BY: (MUST BE ADDRESSED OF PATIENT GIP) frequent schedule/PRN IV medications, frequents assessments, dyspnea      REASON FOR RESPITE:  N/A    O2 SAFETY:  N/A    FALL INTERVENTIONS PROVIDED:   Implemented/recommended resources for alarm system (personal alarm, bed alarm, call bell, etc.) , Implemented/recommended environmental changes (remove hazards, lower bed, improve lighting, etc.) and Implemented/recommended increased supervision/assistance    INTERDISPLINARY COMMUNICATION/COLLABORATION:  Physician, MSW, [place] and RN, CNA    NEW MEDICATION INITIATION DOCUMENTATION:  N/A    Reason medication is being initiated:  N/A    MD / Provider name consulted re: change in status / initiation of new medication:  N/A    New Symptom(s):  N/A    New Order(s):  N/A    Name of the person notified of the changes:  N/A    Name of person being taught:  N/A    Instructions given:  N/A    Side Effects taught:  N/A    Response to teaching:  N/A      COMFORTABLE DYING MEASURE:  Is Patient/family satisfied with symptom level?  yes    DISCHARGE PLAN:  Home vs. EOL

## 2022-06-27 NOTE — HOSPICE
1900: report received from [place], [address] [place] [address]: assessment and vitals completed. Pt daughter Noe Bolaños is at the bedside. Pt is responsive to pain only. Lungs are diminished. Dressing over L breast tumor is clean and dry. Bowel sounds absent. See flowsheet for full assessment     2040: pt is resting with relaxed expression, respirations are irregular but unlabored     2133: scheduled IV medications given, see MAR    2230: pt is resting quietly with unlabored respirations     2323: pt continues with irregular respiratory pattern but respirations are unlabored     0000: pt resting with unlabored respirations     0046: scheduled IV dilaudid and IV ativan given. Pt turned, now laying supine, she grimaced briefly when repositioned. Mouth care completed     0126: IV medications effective, pt resting comfortably, no grimacing or moaning noted     0228: pt is resting with unlabored respirations    0330: scheduled IV dilaudid and IV ativan given. Pt turned onto R side, grimaced during repositioning     0430: bed bath given, mouth care completed and pt repositioned in bed     0530: scheduled IV dilaudid and IV ativan given.  Respirations remain irregular     0620: PRN IV dilaudid given for pain, pt is moaning    0700: report given to oncoming nurse    NAME OF PATIENT:  Son Ashley    LEVEL OF CARE: [facility]    REASON FOR GIP:   Pain, despite numerous changes in medications, Nausea and vomiting, despite changes to medications, Medication adjustment that must be monitored 24/7 and Stabilizing treatment that cannot take place at home    *PATIENT REMAINS ELIGIBLE FOR GIP LEVEL OF CARE AS EVIDENCED BY: Pt requires IV medications and frequent nursing assessments to manage symptoms       REASON FOR RESPITE:  n/a    O2 SAFETY:  n/a    FALL INTERVENTIONS PROVIDED:   Implemented/recommended resources for alarm system (personal alarm, bed alarm, call bell, etc.) , Implemented/recommended environmental changes (remove hazards, lower bed, improve lighting, etc.) and Implemented/recommended increased supervision/assistance    INTERDISPLINARY COMMUNICATION/COLLABORATION:  Physician, MSW, [place] and RN, CNA    NEW MEDICATION INITIATION DOCUMENTATION:  n/a    Reason medication is being initiated: n/a    MD / Provider name consulted re: change in status / initiation of new medication: n/a    New Symptom(s): n/a    New Order(s): n/a    Name of the person notified of the changes:  n/a    Name of person being taught: n/a    Instructions given: n/a    Side Effects taught:n/a    Response to teaching: n/a      COMFORTABLE DYING MEASURE:  Is Patient/family satisfied with symptom level?  yes    DISCHARGE PLAN: Pt will likely  at [facility], if she were to stabilize she would return home

## 2022-06-27 NOTE — PROGRESS NOTES
[facility]  [phone]    Patient Name: Candelario Perry  YOB: 1960    Date of Provider Hospice Visit: 06/27/22    Level of Care:   [x] General Inpatient (GIP)    [] Routine   [] Respite    Current Location of Care:  [] [facility] [] [facility] [] [facility] [] [facility] [x] Hospice [facility], patient referred from:  [] [facility] [] [facility] [] [facility] [] [facility] [x] Home [] Other:     Principle Hospice Diagnosis: Metastatic breast cancer  Diagnoses RELATED to the terminal prognosis: Cancer associated pain, history of rectal impaction of stool  Other Diagnoses:      HOSPICE SUMMARY     Candelario Perry is a 57y.o. year old who was admitted to [facility]. Patient is a 68-year-old female who presents to the hospice house for GIP level care secondary to unmanaged pain in the home setting despite MS Contin and Dilaudid 6 mg every 3 hours. Patient unfortunately has had intractable nausea and vomiting despite attempts at managing in the home setting with Haldol and Zofran. Patient typically takes MS Contin 30 mg twice a day along with the Dilaudid. Patient's daughter is at the bedside. Patient has not been really able to eat or drink and clearly showing evidence of further decline compared to when I saw her approximately 1 month ago at the hospice [facility]. The patient's principle diagnosis has resulted in intractable pain and nausea and vomiting  Refer to LCD     Functionally, the patient's Karnofsky and/or Palliative Performance Scale has declined over a period of weeks and is estimated at 10 the patient is dependent on the following ADLs: All    Objective information that support this patients limited prognosis includes:   See above note    The patient/family chose comfort measures with the support of Hospice. HOSPICE DIAGNOSES   Active Symptoms:  1. Cancer associated pain  2.   Intractable nausea and vomiting-palpable mass in the left lower to mid quadrant-questionable stool versus tumor progression  3. Large left fungating breast mass  4. Hospice care  5. Restlessness  6. Unresponsiveness  7. Irregular breathing pattern       PLAN   1. Continue GIP level care as she needs frequent nursing assessment, IV/subcutaneous medication, not safe to attempt oral medication which was not effective in the home setting and now also having irregular breathing pattern   2. Continue Dilaudid 4 mg IV every 3 hours scheduled every 15 minutes as needed for pain and shortness of breath. This adjustment seemed to help with symptoms  3. Continue Ativan 2 mg IV every 3 hours scheduled every 15 minutes as needed for restlessness  4. Comfort meds for all other symptoms  5. Plan reviewed with bedside nursing team  6. Family not at bedside this morning but they have been updated by our team.  They will talk with patient's sister at the end of the day. Updated her on the current plan of care. She is appreciative of the hospice house team.  7.  and SW to support family needs  8. Disposition: Anticipate death at the hospice house  9. Hospice Plan of care was reviewed in detail and agree with current plan of care    Prognosis estimated based on 06/27/22 clinical assessment is:   [x] Hours to Days    [] Days to Weeks    [] Other:    Communicated plan of care with: Hospice Case Manager; Hospice IDT; Care Team     GOALS OF CARE     Patient/Medical POA stated Goal of Care: Hospice care    [x] I have reviewed and/or updated ACP information in the Advance Care Planning Navigator. This information is available in the [facility] [address] link in the patient's chart header.     Primary Decision Maker [facility] Agent):   Primary Decision MakeBabs Carter Daughter - [phone]    Resuscitation Status: DNR  If DNR is there a Durable DNR on file? : [x] Yes [] No (If no, complete Durable DNR)    HISTORY     History obtained from: Chart, daughter, patient, home team    CHIEF COMPLAINT: Pain and nausea and vomiting  The patient is:   [x] Verbal  [] Nonverbal  [] Unresponsive    HPI/SUBJECTIVE: Patient is able to whisper a few things. She clearly is uncomfortable with moaning, grimacing, nausea. Patient very ashen    6/26: patient does not wake. Nursing expresses concern that patient may be more comfortable if we decrease dosing interval.    6/27-patient is unresponsive. Does appear comfortable right now with adjustment medication made yesterday.      REVIEW OF SYSTEMS     The following systems were: [] reviewed  [x] unable to be reviewed    Positive ROS include:  Constitutional: fatigue, weakness, in pain, short of breath  Ears/nose/mouth/throat: increased airway secretions  Respiratory:shortness of breath, wheezing  Gastrointestinal:poor appetite, nausea, vomiting, abdominal pain, constipation, diarrhea  Musculoskeletal:pain, deformities, swelling legs  Neurologic:confusion, hallucinations, weakness  Psychiatric:anxiety, feeling depressed, poor sleep  Endocrine:     Adult Non-Verbal Pain Assessment Score: 2    Face  [x] 0   No particular expression or smile  [x] 1   Occasional grimace, tearing, frowning, wrinkled forehead  [] 2   Frequent grimace, tearing, frowning, wrinkled forehead    Activity (movement)  [] 0   Lying quietly, normal position  [x] 1   Seeking attention through movement or slow, cautious movement  [] 2   Restless, excessive activity and/or withdrawal reflexes    Guarding  [x] 0   Lying quietly, no positioning of hands over areas of body  [] 1   Splinting areas of the body, tense  [] 2   Rigid, stiff    Physiology (vital signs)  [x] 0   Stable vital signs  [] 1   Change in any of the following: SBP > 20mm Hg; HR > 20/minute  [] 2   Change in any of the following: SBP > 30mm Hg; HR > 25/minute    Respiratory  [x] 0   Baseline RR/SpO2, compliant with ventilator  [] 1   RR > 10 above baseline, or 5% drop SpO2, mild asynchrony with ventilator  [] 2   RR > 20 above baseline, or 10% drop SpO2, asynchrony with ventilator     FUNCTIONAL ASSESSMENT     Palliative Performance Scale (PPS): 10       PSYCHOSOCIAL/SPIRITUAL ASSESSMENT     Active Problems:    * No active hospital problems.  *    Past Medical History:   Diagnosis Date    Breast cancer ([address]     left side    Menopause       Past Surgical History:   Procedure Laterality Date    HX BREAST BIOPSY Left     x2    HX BREAST LUMPECTOMY Left 2019    LEFT BREAST LUMPECTOMY WITH ULTRASOUND performed by Warden Eisenmenger, MD at [facility]    HX HYSTERECTOMY      partial, ovaries remain    IR INSERT TUNL CVC W PORT OVER 5 YEARS  2021    IR REMOVE TUNL CVAD W PORT/PUMP  3/30/2022    VASCULAR SURGERY PROCEDURE UNLIST      portacath      Social History     Tobacco Use    Smoking status: Former Smoker     Packs/day: 0.10     Quit date: 2020     Years since quittin.4    Smokeless tobacco: Never Used   Substance Use Topics    Alcohol use: No     Family History   Problem Relation Age of Onset    Cancer Father     Cancer Maternal Aunt     Breast Cancer Maternal Aunt         4 paternal aunts    Cancer Maternal Uncle     Cancer Paternal Aunt     Cancer Paternal Uncle     Breast Cancer Maternal Grandmother       Allergies   Allergen Reactions    Codeine Rash     Rash from tylenol #3      Current Facility-Administered Medications   Medication Dose Route Frequency    HYDROmorphone (DILAUDID) injection 4 mg  4 mg IntraVENous Q3H    LORazepam (ATIVAN) injection 2 mg  2 mg IntraVENous Q3H    bisacodyL (DULCOLAX) suppository 10 mg  10 mg Rectal DAILY PRN    glycopyrrolate (ROBINUL) injection 0.2 mg  0.2 mg IntraVENous Q4H PRN    acetaminophen (TYLENOL) suppository 650 mg  650 mg Rectal Q4H PRN    ketorolac (TORADOL) injection 30 mg  30 mg IntraVENous Q6H PRN    prochlorperazine (COMPAZINE) injection 10 mg  10 mg IntraVENous Q6H PRN    ondansetron (ZOFRAN) injection 4 mg  4 mg IntraVENous Q6H PRN    HYDROmorphone (DILAUDID) injection 4 mg  4 mg IntraVENous Q15MIN PRN    LORazepam (ATIVAN) injection 2 mg  2 mg IntraVENous Q15MIN PRN        PHYSICAL EXAM     Wt Readings from Last 3 Encounters:   04/13/22 73.9 kg (163 lb)   03/31/22 83.1 kg (183 lb 1.6 oz)   12/13/21 79.6 kg (175 lb 8 oz)       Visit Vitals  BP (!) 52/38   Pulse (!) 115   Temp 98 °F (36.7 °C)   Resp (!) 6   SpO2 (!) 89%       Supplemental O2  [] Yes  [x] NO  Last bowel movement:     Currently this patient has:  [] Peripheral IV [] PICC  [] PORT [] ICD    [] Watson Catheter [] NG Tube   [] PEG Tube    [] Rectal Tube [] Drain  [x] Other: SC    Constitutional: Ill-appearing,  ashen, no furrowing or grimacing  Eyes: closed, lids normal  ENMT: Dry mm, nares normal  Cardiovascular: Tachycardic, pulses palpable  Respiratory: shallow breathing, irregular breathing with pauses  Gastrointestinal: Soft  Musculoskeletal: Muscle wasting  Skin: warm, dry, Left breast fungating mass that encompasses the whole left chest, left axillary region  Neurologic: does not wake or stir to exam  Psychiatric: calm at this time, unable to assess further as unresponsive  Other:       Pertinent Lab and or Imaging Tests:  Lab Results   Component Value Date/Time    Sodium 137 04/06/2022 04:08 AM    Potassium 3.8 04/06/2022 04:08 AM    Chloride 105 04/06/2022 04:08 AM    CO2 25 04/06/2022 04:08 AM    Anion gap 7 04/06/2022 04:08 AM    Glucose 69 04/06/2022 04:08 AM    BUN 12 04/06/2022 04:08 AM    Creatinine 0.47 (L) 04/06/2022 04:08 AM    BUN/Creatinine ratio 26 (H) 04/06/2022 04:08 AM    GFR est AA >60 04/06/2022 04:08 AM    GFR est non-AA >60 04/06/2022 04:08 AM    Calcium 8.0 (L) 04/06/2022 04:08 AM     Lab Results   Component Value Date/Time    Protein, total 5.9 (L) 03/25/2022 12:57 PM    Albumin 2.1 (L) 03/25/2022 12:57 PM           Total time:   Counseling / coordination time:   > 50% counseling / coordination?:

## 2022-06-27 NOTE — PROGRESS NOTES
Problem: Pressure Injury - Risk of  Goal: *Prevention of pressure injury  Description: Document Geovanny Scale and appropriate interventions in the flowsheet. Outcome: Progressing Towards Goal  Note: Pressure Injury Interventions:  Sensory Interventions: Float heels,Minimize linen layers,Turn and reposition approx. every two hours (pillows and wedges if needed),Keep linens dry and wrinkle-free,Check visual cues for pain    Moisture Interventions: Absorbent underpads,Minimize layers,Limit adult briefs,Moisture barrier    Activity Interventions: Assess need for specialty bed    Mobility Interventions: Assess need for specialty bed,Float heels,Turn and reposition approx. every two hours(pillow and wedges)    Nutrition Interventions: Document food/fluid/supplement intake    Friction and Shear Interventions: Apply protective barrier, creams and emollients,Minimize layers,Lift sheet                Problem: Patient Education: Go to Patient Education Activity  Goal: Patient/Family Education  Outcome: Progressing Towards Goal     Problem: Falls - Risk of  Goal: *Absence of Falls  Description: Document Lisa Fall Risk and appropriate interventions in the flowsheet. Outcome: Progressing Towards Goal  Note: Fall Risk Interventions:  Mobility Interventions: Bed/chair exit alarm    Mentation Interventions: Bed/chair exit alarm    Medication Interventions: Bed/chair exit alarm    Elimination Interventions: Call light in reach,Bed/chair exit alarm              Problem: Patient Education: Go to Patient Education Activity  Goal: Patient/Family Education  Outcome: Progressing Towards Goal     Problem: Spiritual Evaluation  Goal: Identify beliefs/practices that support hospice experience  Description: Patient/family identify their beliefs/practices that impair Hospice experience. Patient/family identify their beliefs/practices that support Hospice experience. Patient coping identified.   Spiritual distress identified and decreased with visit.   Outcome: Progressing Towards Goal

## 2022-06-28 NOTE — PROGRESS NOTES
Problem: Pressure Injury - Risk of  Goal: *Prevention of pressure injury  Description: Document Geovanny Scale and appropriate interventions in the flowsheet. Outcome: Progressing Towards Goal  Note: Pressure Injury Interventions:  Sensory Interventions: Float heels,Minimize linen layers,Turn and reposition approx. every two hours (pillows and wedges if needed),Keep linens dry and wrinkle-free,Check visual cues for pain,Monitor skin under medical devices    Moisture Interventions: Absorbent underpads,Minimize layers,Limit adult briefs,Contain wound drainage    Activity Interventions: Assess need for specialty bed    Mobility Interventions: Assess need for specialty bed,Float heels,Turn and reposition approx. every two hours(pillow and wedges)    Nutrition Interventions: Document food/fluid/supplement intake    Friction and Shear Interventions: Apply protective barrier, creams and emollients,Minimize layers,Lift sheet                Problem: Patient Education: Go to Patient Education Activity  Goal: Patient/Family Education  Outcome: Progressing Towards Goal     Problem: Falls - Risk of  Goal: *Absence of Falls  Description: Document Lisa Fall Risk and appropriate interventions in the flowsheet. Outcome: Progressing Towards Goal  Note: Fall Risk Interventions:  Mobility Interventions: Assess mobility with egress test,Bed/chair exit alarm    Mentation Interventions: Adequate sleep, hydration, pain control,Bed/chair exit alarm,Door open when patient unattended    Medication Interventions: Bed/chair exit alarm    Elimination Interventions: Call light in reach,Bed/chair exit alarm              Problem: Patient Education: Go to Patient Education Activity  Goal: Patient/Family Education  Outcome: Progressing Towards Goal     Problem: Spiritual Evaluation  Goal: Identify beliefs/practices that support hospice experience  Description: Patient/family identify their beliefs/practices that impair Hospice experience. Patient/family identify their beliefs/practices that support Hospice experience. Patient coping identified. Spiritual distress identified and decreased with visit.   Outcome: Progressing Towards Goal

## 2022-06-28 NOTE — HOSPICE
0220: pt struggling with secretions, small amount of white secretions suctioned out. Pt became dyspneic after suctioning.  Respirations labored, PRN IV dilaudid and IV ativan given

## 2022-06-28 NOTE — [facility] IDG CHAPLAIN NOTES
Patient: Anshul Gutierrez    Date: 06/28/22  Time: 12:51 PM    [facility]  Notes  Attended [facility] IDG. Patient lying ion bed and did not respond to the . Daughter and sister at bedside.  '    Interventions:  Offered pastoral presence. Goal for the next week:  Assess spiritual care needs of family and offer pastoral presence while the patient is at the [facility].       Signed by: Kathy Boswell

## 2022-06-28 NOTE — [facility] IDG SOCIAL WORKER NOTES
Patient: Elis Situ    Date: 06/28/22  Time: 10:17 AM    [facility] [place]  Notes    Pt is a 58 y/o AAF with a hospice diagnosis of metastatic breast cancer. Pt is now at the [facility] the [facility] due to pain management. Pt is imminent. Problem; Need for emotional support. Intervention: Pt's daughter Fenton and family will have emotional support and supportive counseling in coping with her EOL due to metastatic breast ca. Plan: LCSW will continue to support pts daughter Fenton and family in coping with her EOL and imminence. Problem: Need for Advanced Directive and Legal Documents assistance. Intervention: Pts daughter Fenton and family will have assistance with  Advanced Directive and Legal Documents. Plan: LCSW will continue to assist family will AD and legal document needs. Moderate risk for bereavement for daughter Fenton due to pts young age 57, daughters are 39 and 36. FH: Daughter and pts sister Lacey Coyne are working on [employer], LCSW provided low cost cremation resources.     Enedina Mcduffie [facility], [facility]  [phone]             Signed by: Adis Nguyễn

## 2022-06-28 NOTE — [facility] IDG MASTER NOTE
[facility]  Date: 06/28/22  Time: 11:09 AM    ___________________    Patient: Elis Rogers  Coverage Information:     Payor: [facility] MEDICAID     Plan: [facility]     Subscriber ID: [id number]     Phone Number:   MRN: [id number]  CCN:   [place] Claim No. :     Hospice Election Date:  Current Benefit Period: Benefit Period 1  Start Date: 4/15/2022  End Date: 7/13/2022      Medical Director:   Hospice Attending Provider: Daryl Negrete MD  [id number] [place] [address]  Phone: [phone]  Fax: [phone]    Level of Care: General Inpatient Care      ___________________    Diagnoses:  Diagnoses of Hospice care patient, Cancer associated pain, Intractable nausea and vomiting, Restlessness, Unresponsive, Irregular breathing pattern, and Malignant neoplasm of upper-outer quadrant of left breast in female, estrogen receptor negative ([facility] [address] were pertinent to this visit.     Current Medications:    Current Facility-Administered Medications:     LORazepam (ATIVAN) injection 4 mg, 4 mg, IntraVENous, [id number] PRN, Daryl Negrete MD, 4 mg at 06/28/22 1038    LORazepam (ATIVAN) injection 4 mg, 4 mg, IntraVENous, W5Z, Geneva Laura MD, 4 mg at 06/28/22 1104    HYDROmorphone (DILAUDID) injection 10 mg, 10 mg, IntraVENous, Q1N, Geneva Laura MD, 10 mg at 06/28/22 1103    HYDROmorphone (DILAUDID) injection 10 mg, 10 mg, IntraVENous, [id number] PRN, Daryl Negrete MD, 10 mg at 06/28/22 1038    bisacodyL (DULCOLAX) suppository 10 mg, 10 mg, Rectal, DAILY PRN, Daryl Negrete MD    glycopyrrolate (ROBINUL) injection 0.2 mg, 0.2 mg, IntraVENous, P4P PRN, Daryl Negrete MD, 0.2 mg at 06/28/22 0801    acetaminophen (TYLENOL) suppository 650 mg, 650 mg, Rectal, V9S PRN, Geneva Laura MD    ketorolac (TORADOL) injection 30 mg, 30 mg, IntraVENous, V5J PRN, Daryl Negrete MD, 30 mg at 06/27/22 3470    prochlorperazine (COMPAZINE) injection 10 mg, 10 mg, IntraVENous, X1Z PRN, Eleni Isaac MD    ondansetron [facility]) injection 4 mg, 4 mg, IntraVENous, W0A PRN, Eleni Isaac MD    Orders:  Orders Placed This Encounter    DIET NPO     Standing Status:   Standing     Number of Occurrences:   1    NURSING-MISCELLANEOUS: GIP LOC Admit to [facility] level of care Skill Nurse- Daily X14 days with 5 PRN visits for symptom control. MSW-1X weekly with 5 visits PRN family support and need for volunteer service. [place]-1 X weekly with 5 visits PRN spirit. .. Admit to GIP level of care   Skill Nurse- Daily X14 days with 5 PRN visits for symptom control. MSW-1X weekly with 5 visits PRN family support and need for volunteer service. [place]-1 X weekly with 5 visits PRN spiritual support. CNA- daily X 14 days. Standing Status:   Standing     Number of Occurrences:   1     Order Specific Question:   Description of Order:     Answer:   GIP LOC    NURSING-MISCELLANEOUS: (see comments) 1. NO admission labs, x-rays or other diagnostic tests, unless pertinent to symptom control . 2. Discontinue ALL prior medications. CONTINUOUS     1. NO admission labs, x-rays or other diagnostic tests, unless pertinent to symptom control . 2. Discontinue ALL prior medications. Standing Status:   Standing     Number of Occurrences:   1     Order Specific Question:   Description of Order:     Answer:   (see comments)    COMFORT MEASURES ONLY     Standing Status:   Standing     Number of Occurrences:   1    VITAL SIGNS     Standing Status:   Standing     Number of Occurrences:   53650    NOTIFY PROVIDER: SPECIFY NOTIFY PROVIDER: FOR PAIN, DYSPNEA, AGITATION, OTHER DISTRESS OR NOT RESPONDING TO ORDERED INTERVENTIONS CONTINUOUS Routine     Standing Status:   Standing     Number of Occurrences:   1     Order Specific Question:   Please describe the test or procedure you would like to order.      Answer:   NOTIFY PROVIDER: FOR PAIN, DYSPNEA, AGITATION, OTHER DISTRESS OR NOT RESPONDING TO ORDERED INTERVENTIONS    ORAL CARE     Keep mouth moisturized with sponge sticks/toothettes and tap water. Vaseline to lips and nares as needed. Standing Status:   Standing     Number of Occurrences:   1    BEDREST, COMPLETE     Standing Status:   Standing     Number of Occurrences:   1    TURN & POSITION     TURN & POSITION EVERY 6 HOURS - PATIENT MAY REFUSE     Standing Status:   Standing     Number of Occurrences:   1    DO NOT RESUSCITATE     Standing Status:   Standing     Number of Occurrences:   1    OXYGEN CANNULA Liters per minute: 2; Indications for O2 therapy: OTHER PRN Routine     Oxygen as needed. Adjust for comfort. Discontinue if not contributing to patient comfort. Standing Status:   Standing     Number of Occurrences:   72943     Order Specific Question:   Liters per minute:      Answer:   2     Order Specific Question:   Indications for O2 therapy     Answer:   OTHER    SEIZURE PRECAUTIONS     Standing Status:   Standing     Number of Occurrences:   1    FALL PRECAUTIONS     Standing Status:   Standing     Number of Occurrences:   1    bisacodyL (DULCOLAX) suppository 10 mg    DISCONTD: HYDROmorphone (DILAUDID) injection 2 mg    DISCONTD: HYDROmorphone (DILAUDID) injection 2 mg    DISCONTD: LORazepam (ATIVAN) injection 1 mg    glycopyrrolate (ROBINUL) injection 0.2 mg    acetaminophen (TYLENOL) suppository 650 mg    ketorolac (TORADOL) injection 30 mg    prochlorperazine (COMPAZINE) injection 10 mg    ondansetron (ZOFRAN) injection 4 mg    DISCONTD: HYDROmorphone (DILAUDID) injection 4 mg    DISCONTD: HYDROmorphone (DILAUDID) injection 4 mg    DISCONTD: LORazepam (ATIVAN) injection 2 mg    DISCONTD: LORazepam (ATIVAN) injection 2 mg    DISCONTD: HYDROmorphone (DILAUDID) injection 4 mg    DISCONTD: HYDROmorphone (DILAUDID) injection 4 mg    DISCONTD: LORazepam (ATIVAN) injection 2 mg    DISCONTD: HYDROmorphone (DILAUDID) injection 6 mg    DISCONTD: HYDROmorphone (DILAUDID) injection 6 mg    DISCONTD: HYDROmorphone (DILAUDID) injection 8 mg    DISCONTD: HYDROmorphone (DILAUDID) injection 8 mg    LORazepam (ATIVAN) injection 4 mg    LORazepam (ATIVAN) injection 4 mg    HYDROmorphone (DILAUDID) injection 10 mg    HYDROmorphone (DILAUDID) injection 10 mg    INITIAL PHYSICIAN ORDER: HOSPICE Level Of Care: General Inpatient; Reason for Admission: Breast cancer     Standing Status:   Standing     Number of Occurrences:   1     Order Specific Question:   Status     Answer:   Hospice     Order Specific Question:   Level Of Care     Answer:   General Inpatient     Order Specific Question:   Reason for Admission     Answer:   Breast cancer     Order Specific Question:   Admitting Physician     Answer:   Josie Calloway [[id number]]     Order Specific Question:   Attending Physician     Answer:   Josie Calloway [[id number]]     Order Specific Question:   Discharge Plan:     Answer:   Home with Home Hospice       Allergies: Allergies   Allergen Reactions    Codeine Rash     Rash from tylenol #3       Care Plan:  4.14.22 Problems (Active)     Problem: Advance Directive, Legal Document Needs     Dates: Start: 04/19/22       Description: Advance Directive/legal document assistance needed    Disciplines: Interdisciplinary    Goal: Patient will fill out appropriate document(s)     Dates: Start: 04/19/22       Description: Patient will fill out appropriate document(s) within 1 month. Disciplines: Interdisciplinary    Intervention: Instruct on end of life issues     Dates: Start: 04/19/22       Description: Gaurang Kwon on end-of-life care planning             Problem: Aide activity     Dates: Start: 04/15/22       Description: Aide to assist patient activity tasks.     Disciplines: Interdisciplinary    Goal: Patient will have personal care needs met     Dates: Start: 04/15/22       Description: Patient will have personal care needs met Disciplines: Interdisciplinary    Intervention: Aide assist with transfers     Dates: Start: 04/15/22       Description: Assist with transfers using wheelchair. Intervention: Aide assist with ambulation     Dates: Start: 04/15/22       Description: Assist with ambulation using wheelchair. Safety:  non skid footwear, pathways clear, well lit areas     FALL PRECAUTIONS:  Patient is a HIGH fall risk. Intervention: Aide assist with repositioning     Dates: Start: 04/15/22       Description: Assist with repositioning patient. Problem: Aide elimination     Dates: Start: 04/15/22       Description: Aide assist with bathroom elimination    Disciplines: Interdisciplinary    Goal: Patient will have personal care needs met     Dates: Start: 04/15/22       Description: Patient will have personal care needs met. Disciplines: Interdisciplinary    Intervention: Aide to cleanse perineal area     Dates: Start: 04/15/22       Description: Cleanse perineal area report any observed or patient reported concerns to RN             Problem: Aide personal care     Dates: Start: 04/18/22       Description: Aide to assist patient with personal care    Disciplines: Interdisciplinary    Goal: Patient will have personal care needs met     Dates: Start: 04/18/22       Description: Patient's personal care needs to be met by discharge    Disciplines: Interdisciplinary    Intervention: Aide report findings     Dates: Start: 04/18/22       Description: Aide to report to RN any areas of skin breakdown       Intervention: Aide perform skin care     Dates: Start: 04/18/22       Description: Aide to report to clinician any areas of skin breakdown. Intervention: Aide assist with oral care     Dates: Start: 04/18/22       Description: Assist patient with oral care       Intervention: Aide perform hair care     Dates: Start: 04/18/22       Description: Perform hair care using brush or comb.        Intervention: Aide assist with dressing     Dates: Start: 04/18/22       Description: Assist patient with dressing upper and lower body. Intervention: Aide assist with foot care     Dates: Start: 04/18/22       Description: Assist patient with foot care       Intervention: Aide assist with shampoo     Dates: Start: 04/18/22       Description: Assist patient with shampooing hair       Intervention: Aide assist with bath     Dates: Start: 04/18/22       Description: Assistance with bed bath. Acceptable bath location(s): bed Clean immediate area after bath. Problem: Aide to report findings     Dates: Start: 04/15/22       Disciplines: Interdisciplinary    Goal: Health maintenance     Dates: Start: 04/15/22       Disciplines: Interdisciplinary    Intervention: Aide report findings     Dates: Start: 04/15/22       Description: Marcin Garrick Sarkar to Report: open skin areas. Report of pain to nurse and document. Record date of last BM  If BM observed record if small, medium or large   Contact nurse if no bowel movement in 3 days   Oxygen in use: PRN  May remove and reapply during care  Changes or concerns in patient condition:  Changes or concerns reported to: CM         Intervention: Aide participate in supervisory visit     Dates: Start: 04/15/22       Description: CassiTerence Garrick Sarkar to participate in supervisory visit. Problem: Emotional Support Needs     Dates: Start: 04/19/22       Description: Deficit related to emotional support. Disciplines: Interdisciplinary    Goal: Patient/family is receiving emotional support     Dates: Start: 04/19/22       Description: Patient/family is receiving emotional support within 3 months of service. Disciplines: Interdisciplinary    Intervention: Provide emotional support     Dates: Start: 04/19/22       Description: Provide emotional support to patient and daughter.              Problem: Home Health Aide Supervisory Visit     Dates: Start: 04/15/22       Description: Supervision of [place] Aide Disciplines: Interdisciplinary    Goal: Health maintenance     Dates: Start: 04/15/22       Description:     Disciplines: Interdisciplinary    Intervention: Supervisory visit of [address] every 14 days     Dates: Start: 04/15/22       Description: Perform supervisory visit of the home health aide at  least every 14 days             Problem: Home Safety     Dates: Start: 04/15/22       Description: Home Safety at End of Life    Disciplines: Interdisciplinary    Goal: Verbalize understanding of home safety measures; patient remains safe at home     Dates: Start: 04/15/22       Description: Mario Tran will verbalize understanding of home safety measures, and patient will remain safe at home. Disciplines: Interdisciplinary    Intervention: Assess home environment     Dates: Start: 04/15/22       Description: Assess home environment for safety concerns             Problem: Home Safety     Dates: Start: 04/15/22       Description: Home Safety at End of Life    Disciplines: Interdisciplinary    Goal: Verbalize understanding of home safety measures; patient remains safe at home     Dates: Start: 04/15/22       Description: Mario Tran will verbalize understanding of home safety measures, and patient will remain safe at home.     Disciplines: Interdisciplinary    Intervention: Instruct on proper use of home oxygen     Dates: Start: 04/15/22       Description: Instruct patient/caregiver on proper use of home oxygen   Oxygen education provided as follows:      -Causes of fire: YES    -Do not smoke near oxygen: YES    -Obtain smoke detector: YES    -Change smoke detector battery semiannually (with daylight saving time change): YES    -Proper storage of oxygen: YES    -Fire risks for neighboring residences and buildings: YES             Problem: Infection Prevention     Dates: Start: 04/15/22       Description: Risk of infection    Disciplines: Interdisciplinary    Goal: Knowledge - Infection Control     Dates: Start: 04/15/22       Description: Gurpreet Andino will verbalize understanding of infection prevention strategies within 5 visits    Disciplines: Interdisciplinary    Intervention: Instruct infection prevention     Dates: Start: 04/15/22       Description: Instruct patient/caregiver strategies to prevent infection: Proper hand washing and standard precautions             Problem: Knowledge deficit on COVID-19     Dates: Start: 04/15/22       Description: Lack of knowledge of management of COVID-19    Disciplines: Interdisciplinary    Goal: Knowledge COVID-19 Management     Dates: Start: 04/15/22       Description: Patient/caregivers will verbalize understanding of the following within 5 day(s):      1. COVID-19 symptoms and when to seek medical attention. 2. COVID-19 home management considerations. Disciplines: Interdisciplinary    Intervention: Facilitate Social Distancing     Dates: Start: 04/15/22       Description: Facilitate social distancing by taking the appropriate following measures:        1. Educate patient/caregivers about COVID-19 social distancing precautions. 2. Observe patient for psychosocial and mental status changes. 3. Suggest in-room activities. 4. Suggest alternative methods of communication with family/visitors. Intervention: Instruct Symptoms of COVID-19     Dates: Start: 04/15/22       Description: Instruct patient/caregivers on symptoms of COVID-19, including fever (100.0 F/37.8 C or greater), cough, and shortness of breath. Instruct patient/caregivers to get medical attention immediately for:        1. Difficulty breathing or shortness of breath    2. Persistent pain or pressure in the chest    3. New confusion or inability to arouse    4. Bluish lips or face    5.  Any other symptoms that are severe or concerning               Problem: Management of Home Medications     Dates: Start: 04/15/22       Description: Management of Home Medications    Disciplines: Interdisciplinary    Goal: Knowledge of medication management     Dates: Start: 04/15/22       Description: Patient/caregiver/family will follow prescribed medication therapy and schedule daily, and verbalize understanding of purpose and side effects of medication throughout current benefit period    Disciplines: Interdisciplinary    Intervention: Instruct medications     Dates: Start: 04/15/22       Description: Provide detailed medication instruction on SRK. Intervention: Assess medications     Dates: Start: 04/15/22       Description: Review and update medication list; inclusive of OTC medications             Problem: Need for General Wound Care and Education     Dates: Start: 04/15/22       Description: Need for general wound care and education. Disciplines: Interdisciplinary    Goal: Clinician will perform wound care     Dates: Start: 04/15/22       Description: Clinician will perform wound care per orders. Disciplines: Interdisciplinary    Intervention: Wound care orders     Dates: Start: 04/15/22       Description: Dressing orders: Wound location & type: Left Breast. Change dressing Daily. Dressing change to consist of: Remove old dressing (may soak with saline prior to removing as needed). Gently cleanse wound with wound cleanser and 4x4 gauze. Apply Flagyl powder to wound surface, cover with Xeroform dressings. Cover with ABD pads, secure with MIMINUM amount of tape. Dressing changes to be completed by caregiver daily. (SN to assist and change dressing on visits.) Wound to be measured weekly by Hospice SN. Goal: Patient/caregiver will perform wound care     Dates: Start: 04/15/22       Description: Rina Kumar will perform wound care independently as evidenced by return demonstration using aseptic technique within 5 day(s).     Disciplines: Interdisciplinary    Intervention: Instruct patient/caregiver to perform wound care     Dates: Start: 04/15/22       Description: Yayo Clinton patient/caregiver on infection control measures including hand washing, use of gloves, changing gloves between removal and re-application for each wound, and proper disposal of soiled dressings/equipment. Instruct patient/caregiver on wound care procedures including preparation, removal of soiled dressings, wound cleansing/irrigation, and applying/securing new dressing, and reinforcement of dressing between dressing changes. Problem: Pain     Dates: Start: 04/15/22       Description: Pain    Disciplines: Interdisciplinary    Goal: Assess satisfaction of level of comfort and symptom control     Dates: Start: 04/15/22       Description: Patient/caregiver/family will verbalize satisfaction with the patient's level of comfort and symptom control. Pt will report a reduction in pain to an acceptable level 2/10 within 24 hours of onset of symptoms    Disciplines: Interdisciplinary    Intervention: Assess for signs/symptoms of acute pain     Dates: Start: 04/15/22       Description: Assess patient for signs and symptoms of acute pain each visit             Problem: Potential for Skin Breakdown     Dates: Start: 04/15/22       Description: Potential for Skin Breakdown    Disciplines: Interdisciplinary    Goal: Monitor skin for areas of alteration in skin integrity     Dates: Start: 04/15/22       Description: Patient/caregiver/family will demonstrate ability to care for patient's skin, monitor for areas of breakdown and demonstrate methods to prevent breakdown within 5 day(s).     Disciplines: Interdisciplinary    Intervention: Assess for skin breakdown     Dates: Start: 04/15/22       Description: Assess patient for skin breakdown       Intervention: Instruct on strategies to reduce risk of skin breakdown     Dates: Start: 04/15/22       Description: Instruct patient/caregiver on strategies to reduce risk of skin breakdown such as frequent turning and repositioning as tolerated, offloading of bony prominences, floating heels on pillows as tolerated. Application of skin barrier ointment to perianal area. Perform daily skin inspections. Problem: Risk for Falls     Dates: Start: 04/15/22       Description: Risk for Falls    Disciplines: Interdisciplinary    Goal: Absence of falls     Dates: Start: 04/15/22       Disciplines: Interdisciplinary    Intervention: Assess fall risk     Dates: Start: 04/15/22       Description: Complete fall risk assessment       Intervention: Instruct on fall prevention     Dates: Start: 04/15/22       Description: Franny العلي patient/caregiver in fall prevention strategies: lighted hallways, remove throw rugs, assist with ambulation using wheelchair, keep walkways uncluttered, make position changes slowly and monitor medication that may alter mental status. monitor medication that may alter mental status. Problem: Signs and Symptoms of Infection     Dates: Start: 04/15/22       Description: Signs and Symptoms of Infection    Disciplines: Interdisciplinary    Goal: *No signs and symptoms of infection     Dates: Start: 04/15/22       Description: Patient will show no signs and symptoms of infection throughout benefit period    Disciplines: Interdisciplinary    Intervention: Teach Infections signs and symptoms     Dates: Start: 04/15/22       Description: Teach patient and caregiver the signs and symptoms of infection redness, swelling, pain, fever, drainage and when to report to physician. Intervention: Assess for signs and symptoms of infection     Dates: Start: 04/15/22       Description: Observe/Monitor for signs and symptoms of infection including redness, swelling, pain, fever, drainage            Encounter Problems (Active)     Problem: Advance Directive, Legal Document Needs     Dates: Start: 06/24/22       Description: Deficit related to Advanced Directive, Legal Documents needs.     Disciplines: Interdisciplinary    Goal: Patient will fill out appropriate document(s)     Dates: Start: 06/24/22       Priority: Medium    Description: Pts daughter Nisha Gaytan and family will have support in completing Advanced Directive, and connection to Legal Documents resources. Disciplines: Interdisciplinary    Intervention: Assist with advance directives     Dates: Start: 06/24/22       Description: Discuss/educate patient's family regarding Advance Directives             Problem: Emotional Support Needs     Dates: Start: 06/24/22       Description: Deficit related to emotional support. Disciplines: Interdisciplinary    Goal: Patient/family is receiving emotional support     Dates: Start: 06/24/22       Priority: Medium    Description: Pt's daughter Nisha Gaytan and family will have emotional support and supportive counseling in coping with her EOL due to metastatic breast ca. Disciplines: Interdisciplinary    Intervention: Provide emotional support     Dates: Start: 06/24/22       Description: LCSW will provided pt's daughter Nisha Gaytan and family with emotional support and supportive counseling in coping with pt's EOL due to metastatic breast ca. Problem: Falls - Risk of     Dates: Start: 06/24/22       Disciplines: Interdisciplinary    Goal: *Absence of Falls     Dates: Start: 06/24/22   Expected End: 06/30/22       Description: Document [place] Pyo Fall Risk and appropriate interventions in the flowsheet.     Disciplines: Interdisciplinary          Problem: Patient Education: Go to Patient Education Activity     Dates: Start: 06/24/22       Disciplines: Interdisciplinary    Goal: Patient/Family Education     Dates: Start: 06/24/22   Expected End: 06/30/22       Disciplines: Interdisciplinary          Problem: Patient Education: Go to Patient Education Activity     Dates: Start: 06/24/22       Disciplines: Interdisciplinary    Goal: Patient/Family Education     Dates: Start: 06/24/22   Expected End: 06/30/22       Disciplines: Interdisciplinary          Problem: Pressure Injury - Risk of     Dates: Start: 06/24/22       Disciplines: Interdisciplinary    Goal: *Prevention of pressure injury     Dates: Start: 06/24/22   Expected End: 06/30/22       Description: Document Geovanny Scale and appropriate interventions in the flowsheet. Disciplines: Interdisciplinary          Problem: Spiritual Evaluation     Dates: Start: 06/26/22       Disciplines: Interdisciplinary    Goal: Identify beliefs/practices that support hospice experience     Dates: Start: 06/26/22   Expected End: 06/30/22       Description: Patient/family identify their beliefs/practices that impair Hospice experience. Patient/family identify their beliefs/practices that support Hospice experience. Patient coping identified. Spiritual distress identified and decreased with visit. Disciplines: Interdisciplinary    Intervention: Assess spiritual needs     Dates: Start: 06/26/22       Description: Assist with spiritual questions            Care Plan Problems/Goals  Report    0 of 7 Goals Met 0 of 7 Met     Progressing Towards Goal (5)      *Prevention of pressure injury (Pressure Injury - Risk of)      Patient/Family Education (Patient Education: Go to Patient Education Activity)      *Absence of Falls (Falls - Risk of)      Patient/Family Education (Patient Education: Go to Patient Education Activity)      Identify beliefs/practices that support hospice experience (Spiritual Evaluation)                   No Outcome (2)      Patient will fill out appropriate document(s) (Advance Directive, Legal Document Needs)      Patient/family is receiving emotional support (Emotional Support Needs)                        ___________________    Care Team Notes    IDG- clinical note- pt is GIP at [facility] for severe pain and resp distress. Pt has a large tumor that is extending externally. This tumor is causing severe pain and restlessness. Pt needing scheduled IV meds and frequent medical and nursing interventions.           POC/IDG Notes      \[facility] [place] [facility]\"" IDG  Notes by Morena Pate at 06/28/22 1017  Version 1 of 1    Author: Morena Pate Service: Hospice and Palliative Care Author Type:     Filed: 06/28/22 1029 Date of Service: 06/28/22 1017 Status: Signed    : Morena Pate ()       Patient: German Still    Date: 06/28/22  Time: 10:17 AM    \[facility] [place] [facility]\""  Notes    Pt is a 56 y/o AAF with a hospice diagnosis of metastatic breast cancer. Pt is now at the [facility] at the [facility] due to pain management. Pt is imminent. Problem; Need for emotional support. Intervention: Pt's daughter Ethyl Dies and family will have emotional support and supportive counseling in coping with her EOL due to metastatic breast ca. Plan: LCSW will continue to support pts daughter Adrien Dies and family in coping with her EOL and imminence. Problem: Need for Advanced Directive and Legal Documents assistance. Intervention: Pts daughter Adrien Dies and family will have assistance with  Advanced Directive and Legal Documents. Plan: LCSW will continue to assist family will AD and legal document needs. Moderate risk for bereavement for daughter Adrien Dies due to pts young age 57, daughters are 39 and 36. FH: Daughter and pts sister Jose Jackson are working on [employer] arrangements, LCSW provided low cost cremation resources.     Enedina Mcduffie [facility], [address] [place] [address]  [phone]             Signed by: Tao Swift                Care Team Present:       Mehreen Li MSW  Amadeo Lacey RN, Nurse Leader

## 2022-06-28 NOTE — PROGRESS NOTES
1900-received report from [facility].  1950-pt resting comfortably w/ sister at the bedside. Resp. shallow w/ some audible secretions and periods of apnea. No facial grimacing or other visual signs of pain/discomfort. 2000-pt sister called out concerned about pt breathing pattern. Pt w/ long periods of apnea. Educated pt sister on dying process. 2005-pt time of death confirmed by two full minutes auscultation of absent heart and lungs sounds. Pt sister grieving appropriately and preferred to call pt daughter herself.               NAME OF PATIENT:  Mendoza Desir    LEVEL OF CARE:  [facility]    REASON FOR GIP:   Pain, despite numerous changes in medications, Terminal agitation, despite changes to medications, Medication adjustment that must be monitored 24/7 and Stabilizing treatment that cannot take place at home    *PATIENT REMAINS ELIGIBLE FOR [facility] LEVEL OF CARE AS EVIDENCED BY: (MUST BE ADDRESSED OF PATIENT GIP)      REASON FOR RESPITE:  N/A    O2 SAFETY:  N/A    FALL INTERVENTIONS PROVIDED:   Implemented/recommended resources for alarm system (personal alarm, bed alarm, call bell, etc.) , Implemented/recommended environmental changes (remove hazards, lower bed, improve lighting, etc.) and Implemented/recommended increased supervision/assistance    INTERDISPLINARY COMMUNICATION/COLLABORATION:  Physician, MSW, [facility] and RN, CNA    NEW MEDICATION INITIATION DOCUMENTATION:  N/A    Reason medication is being initiated:  N/A    MD / Provider name consulted re: change in status / initiation of new medication:  N/A    New Symptom(s):  N/A    New Order(s):  N/A    Name of the person notified of the changes:  N/A    Name of person being taught:  N/A    Instructions given:  N/A    Side Effects taught:  N/A    Response to teaching:  N/A      COMFORTABLE DYING MEASURE:  Is Patient/family satisfied with symptom level?  yes    DISCHARGE PLAN:  EOL vs. home

## 2022-06-28 NOTE — HOSPICE
0700 Report received from [facility] and [address] [place] [address] ([facility]) RN.    8362 Patient continuously moaning and gasping. Patient given scheduled IV dilaudid and PRN IV lorazepam, see MAR.     0801 Patient still moaning and gasping for air. Scheduled IV lorazepam given and PRN IV dilaudid given, see MAR. Secretions noted, PRN IV robinul given. 3803 Patient still moaning and gasping for air, PRN IV lorazepam and PRN IV dilaudid given, see MAR.     0830 Patient moaning and gasping for air, PRN IV dilaudid and PRN IV lorazepam given, see MAR.     [id number] Patient still moaning and gasping for, Dr. Precious Vilchis contacted, medications adjusted. 6329 Patient given PRN IV dilaudid and PRN IV lorazepam for moaning, will continue to monitor. 0901 Patient moaning, scheduled IV lorazepam and dilaudid given, see MAR.     [id number] Patient still moaning loudly and gasping, PRN IV dilaudid and PRN IV lorazepam given. 7396 Dr. Precious Vilchis at the bedside, patient still moaning and gasping, PRN IV dilaudid and PRN IV lorazepam given, see MAR. Dilaudid increased to 10 mg due to heavy symptom burden and increased PRN usage. 1740 Patient still moaning and gasping for air, PRN IV dilaudid and PRN IV lorazepam, see MAR.     [id number] Patient moaning, groaning, and gasping for air. PRN IV dilaudid and PRN IV lorazepam given, see MAR. 1017 Patient gasping for air and moaning at times. PRN IV dilaudid and PRN IV lorazepam given, see MAR. Sister at the bedside. 1040 Patient turned and repositioned in bed with RN Billy Collins and HIRO Ricci, patient given PRN IV lorazepam and PRN IV dilaudid at the bedside. 1105 Patient moaning and gasping for air less at this time. PRN medications and repositioned effective. Patient given scheduled IV medications, see MAR. Daughter and sister at the bedside. 1124 MSW at the bedside. 1200  at the bedside. 5240 Dr. Precious Vilchis at the bedside.      1320 Patient resting in bed quietly, eyes are closed, neutral facial expression noted, respirations are shallow. [id number] Patient given scheduled IV medications, see MAR. Daughter left the bedside, sister still at the bedside. 1450 Patient resting in bed quietly, eyes are closed, neutral facial expression noted. 1540 Patient's sister at the bedside. 1610 Patient given scheduled IV medications, see MAR. Sister at the bedside. 1640 Oral care provided, lip balm applied. Sister at the bedside. 1720 Patient noted to have labored breathing, PRN IV dilaudid given, see MAR.    1735 Breathing slightly less labored, PRN medication effective. 1815 Patient given scheduled medications, see MAR.     1850 Patient's sister at the bedside.

## 2022-06-29 NOTE — PROGRESS NOTES
Problem: Advance Directive, Legal Document Needs  Goal: Patient will fill out appropriate document(s)  Description: Pts daughter [place] and family will have support in completing Advanced Directive, and connection to Legal Documents resources. Outcome: Progressing Towards Goal     Problem: Emotional Support Needs  Goal: Patient/family is receiving emotional support  Description: Pt's daughter [place] and family will have emotional support and supportive counseling in coping with her EOL due to metastatic breast ca.    Outcome: Progressing Towards Goal

## 2022-06-30 ENCOUNTER — HOME CARE VISIT (OUTPATIENT)
Dept: HOSPICE | Facility: HOSPICE | Age: 62
End: 2022-06-30
Payer: MEDICAID

## 2024-07-16 NOTE — PROGRESS NOTES
1316- Attempted to call to give report, nobody answered   12- Second attempted call to give report, was put on hold but still get no answer  1351- Third attempt call, gave report to Oncology, RN  Patient is being transport via stretcher.  Daughter at bedside 132
